# Patient Record
Sex: MALE | Race: ASIAN | NOT HISPANIC OR LATINO | Employment: OTHER
[De-identification: names, ages, dates, MRNs, and addresses within clinical notes are randomized per-mention and may not be internally consistent; named-entity substitution may affect disease eponyms.]

---

## 2017-01-03 ENCOUNTER — RECORDS - HEALTHEAST (OUTPATIENT)
Dept: ADMINISTRATIVE | Facility: OTHER | Age: 27
End: 2017-01-03

## 2017-01-05 ENCOUNTER — RECORDS - HEALTHEAST (OUTPATIENT)
Dept: ADMINISTRATIVE | Facility: OTHER | Age: 27
End: 2017-01-05

## 2017-01-06 ENCOUNTER — RECORDS - HEALTHEAST (OUTPATIENT)
Dept: ADMINISTRATIVE | Facility: OTHER | Age: 27
End: 2017-01-06

## 2017-01-08 ENCOUNTER — RECORDS - HEALTHEAST (OUTPATIENT)
Dept: ADMINISTRATIVE | Facility: OTHER | Age: 27
End: 2017-01-08

## 2017-01-10 ENCOUNTER — RECORDS - HEALTHEAST (OUTPATIENT)
Dept: ADMINISTRATIVE | Facility: OTHER | Age: 27
End: 2017-01-10

## 2017-01-11 ENCOUNTER — RECORDS - HEALTHEAST (OUTPATIENT)
Dept: ADMINISTRATIVE | Facility: OTHER | Age: 27
End: 2017-01-11

## 2017-01-12 ENCOUNTER — RECORDS - HEALTHEAST (OUTPATIENT)
Dept: ADMINISTRATIVE | Facility: OTHER | Age: 27
End: 2017-01-12

## 2017-03-16 ENCOUNTER — RECORDS - HEALTHEAST (OUTPATIENT)
Dept: ADMINISTRATIVE | Facility: OTHER | Age: 27
End: 2017-03-16

## 2017-03-30 ENCOUNTER — RECORDS - HEALTHEAST (OUTPATIENT)
Dept: ADMINISTRATIVE | Facility: OTHER | Age: 27
End: 2017-03-30

## 2017-04-11 ENCOUNTER — RECORDS - HEALTHEAST (OUTPATIENT)
Dept: ADMINISTRATIVE | Facility: OTHER | Age: 27
End: 2017-04-11

## 2017-04-12 ENCOUNTER — RECORDS - HEALTHEAST (OUTPATIENT)
Dept: ADMINISTRATIVE | Facility: OTHER | Age: 27
End: 2017-04-12

## 2017-04-13 ENCOUNTER — RECORDS - HEALTHEAST (OUTPATIENT)
Dept: ADMINISTRATIVE | Facility: OTHER | Age: 27
End: 2017-04-13

## 2017-04-17 ENCOUNTER — RECORDS - HEALTHEAST (OUTPATIENT)
Dept: ADMINISTRATIVE | Facility: OTHER | Age: 27
End: 2017-04-17

## 2017-04-19 ENCOUNTER — RECORDS - HEALTHEAST (OUTPATIENT)
Dept: ADMINISTRATIVE | Facility: OTHER | Age: 27
End: 2017-04-19

## 2017-04-27 ENCOUNTER — RECORDS - HEALTHEAST (OUTPATIENT)
Dept: ADMINISTRATIVE | Facility: OTHER | Age: 27
End: 2017-04-27

## 2017-08-09 ENCOUNTER — COMMUNICATION - HEALTHEAST (OUTPATIENT)
Dept: FAMILY MEDICINE | Facility: CLINIC | Age: 27
End: 2017-08-09

## 2017-08-17 ENCOUNTER — OFFICE VISIT - HEALTHEAST (OUTPATIENT)
Dept: FAMILY MEDICINE | Facility: CLINIC | Age: 27
End: 2017-08-17

## 2017-08-17 ENCOUNTER — COMMUNICATION - HEALTHEAST (OUTPATIENT)
Dept: NURSING | Facility: CLINIC | Age: 27
End: 2017-08-17

## 2017-08-17 ENCOUNTER — RECORDS - HEALTHEAST (OUTPATIENT)
Dept: ADMINISTRATIVE | Facility: OTHER | Age: 27
End: 2017-08-17

## 2017-08-17 DIAGNOSIS — R06.2 WHEEZING: ICD-10-CM

## 2017-08-17 DIAGNOSIS — D35.2 PITUITARY MICROADENOMA (H): ICD-10-CM

## 2017-08-17 DIAGNOSIS — E03.9 HYPOTHYROIDISM: ICD-10-CM

## 2017-08-17 DIAGNOSIS — R44.0 AUDITORY HALLUCINATION: ICD-10-CM

## 2017-08-17 DIAGNOSIS — E27.40 ADRENAL INSUFFICIENCY (H): ICD-10-CM

## 2017-08-17 ASSESSMENT — MIFFLIN-ST. JEOR: SCORE: 1373.93

## 2017-08-24 ENCOUNTER — AMBULATORY - HEALTHEAST (OUTPATIENT)
Dept: CARE COORDINATION | Facility: CLINIC | Age: 27
End: 2017-08-24

## 2017-08-25 ENCOUNTER — COMMUNICATION - HEALTHEAST (OUTPATIENT)
Dept: NURSING | Facility: CLINIC | Age: 27
End: 2017-08-25

## 2017-08-25 ENCOUNTER — AMBULATORY - HEALTHEAST (OUTPATIENT)
Dept: CARE COORDINATION | Facility: CLINIC | Age: 27
End: 2017-08-25

## 2017-08-25 ENCOUNTER — OFFICE VISIT - HEALTHEAST (OUTPATIENT)
Dept: FAMILY MEDICINE | Facility: CLINIC | Age: 27
End: 2017-08-25

## 2017-08-25 DIAGNOSIS — E27.40 ADRENAL INSUFFICIENCY (H): ICD-10-CM

## 2017-08-25 DIAGNOSIS — Z71.89 COUNSELING AND COORDINATION OF CARE: ICD-10-CM

## 2017-08-25 DIAGNOSIS — F20.9 SCHIZOPHRENIA (H): ICD-10-CM

## 2017-08-28 ENCOUNTER — AMBULATORY - HEALTHEAST (OUTPATIENT)
Dept: CARE COORDINATION | Facility: CLINIC | Age: 27
End: 2017-08-28

## 2017-08-28 ENCOUNTER — HOME CARE/HOSPICE - HEALTHEAST (OUTPATIENT)
Dept: HOME HEALTH SERVICES | Facility: HOME HEALTH | Age: 27
End: 2017-08-28

## 2017-08-28 ENCOUNTER — COMMUNICATION - HEALTHEAST (OUTPATIENT)
Dept: HOME HEALTH SERVICES | Facility: HOME HEALTH | Age: 27
End: 2017-08-28

## 2017-08-28 ENCOUNTER — RECORDS - HEALTHEAST (OUTPATIENT)
Dept: ADMINISTRATIVE | Facility: OTHER | Age: 27
End: 2017-08-28

## 2017-08-28 ENCOUNTER — COMMUNICATION - HEALTHEAST (OUTPATIENT)
Dept: CARE COORDINATION | Facility: CLINIC | Age: 27
End: 2017-08-28

## 2017-08-28 DIAGNOSIS — Z91.148 NON COMPLIANCE W MEDICATION REGIMEN: ICD-10-CM

## 2017-08-28 DIAGNOSIS — Z91.148 H/O MEDICATION NONCOMPLIANCE: ICD-10-CM

## 2017-08-30 ENCOUNTER — COMMUNICATION - HEALTHEAST (OUTPATIENT)
Dept: NURSING | Facility: CLINIC | Age: 27
End: 2017-08-30

## 2017-09-06 ENCOUNTER — COMMUNICATION - HEALTHEAST (OUTPATIENT)
Dept: NURSING | Facility: CLINIC | Age: 27
End: 2017-09-06

## 2017-09-07 ENCOUNTER — AMBULATORY - HEALTHEAST (OUTPATIENT)
Dept: CARE COORDINATION | Facility: CLINIC | Age: 27
End: 2017-09-07

## 2017-09-07 ENCOUNTER — COMMUNICATION - HEALTHEAST (OUTPATIENT)
Dept: NURSING | Facility: CLINIC | Age: 27
End: 2017-09-07

## 2017-09-07 ENCOUNTER — COMMUNICATION - HEALTHEAST (OUTPATIENT)
Dept: CARE COORDINATION | Facility: CLINIC | Age: 27
End: 2017-09-07

## 2017-09-12 ENCOUNTER — COMMUNICATION - HEALTHEAST (OUTPATIENT)
Dept: CARE COORDINATION | Facility: CLINIC | Age: 27
End: 2017-09-12

## 2017-09-12 ENCOUNTER — AMBULATORY - HEALTHEAST (OUTPATIENT)
Dept: CARE COORDINATION | Facility: CLINIC | Age: 27
End: 2017-09-12

## 2017-09-15 ENCOUNTER — AMBULATORY - HEALTHEAST (OUTPATIENT)
Dept: CARE COORDINATION | Facility: CLINIC | Age: 27
End: 2017-09-15

## 2017-09-19 ENCOUNTER — COMMUNICATION - HEALTHEAST (OUTPATIENT)
Dept: CARE COORDINATION | Facility: CLINIC | Age: 27
End: 2017-09-19

## 2017-09-20 ENCOUNTER — COMMUNICATION - HEALTHEAST (OUTPATIENT)
Dept: NURSING | Facility: CLINIC | Age: 27
End: 2017-09-20

## 2017-09-20 DIAGNOSIS — Z71.89 COUNSELING AND COORDINATION OF CARE: ICD-10-CM

## 2017-09-21 ENCOUNTER — COMMUNICATION - HEALTHEAST (OUTPATIENT)
Dept: FAMILY MEDICINE | Facility: CLINIC | Age: 27
End: 2017-09-21

## 2017-09-21 ENCOUNTER — COMMUNICATION - HEALTHEAST (OUTPATIENT)
Dept: NURSING | Facility: CLINIC | Age: 27
End: 2017-09-21

## 2017-09-21 DIAGNOSIS — E27.40 ADRENAL INSUFFICIENCY (H): ICD-10-CM

## 2017-09-22 ENCOUNTER — COMMUNICATION - HEALTHEAST (OUTPATIENT)
Dept: NURSING | Facility: CLINIC | Age: 27
End: 2017-09-22

## 2017-09-26 ENCOUNTER — COMMUNICATION - HEALTHEAST (OUTPATIENT)
Dept: NURSING | Facility: CLINIC | Age: 27
End: 2017-09-26

## 2017-09-26 ENCOUNTER — OFFICE VISIT - HEALTHEAST (OUTPATIENT)
Dept: FAMILY MEDICINE | Facility: CLINIC | Age: 27
End: 2017-09-26

## 2017-09-26 DIAGNOSIS — E27.40 ADRENAL INSUFFICIENCY (H): ICD-10-CM

## 2017-09-26 DIAGNOSIS — R09.81 NASAL CONGESTION: ICD-10-CM

## 2017-09-26 DIAGNOSIS — R06.2 WHEEZING: ICD-10-CM

## 2017-09-26 DIAGNOSIS — E03.9 HYPOTHYROIDISM: ICD-10-CM

## 2017-09-26 DIAGNOSIS — R44.0 AUDITORY HALLUCINATION: ICD-10-CM

## 2017-09-26 DIAGNOSIS — F20.9 SCHIZOPHRENIA (H): ICD-10-CM

## 2017-09-26 DIAGNOSIS — Z71.89 COUNSELING AND COORDINATION OF CARE: ICD-10-CM

## 2017-09-26 DIAGNOSIS — Z23 NEED FOR INFLUENZA VACCINATION: ICD-10-CM

## 2017-09-27 ENCOUNTER — COMMUNICATION - HEALTHEAST (OUTPATIENT)
Dept: FAMILY MEDICINE | Facility: CLINIC | Age: 27
End: 2017-09-27

## 2017-09-27 DIAGNOSIS — R44.0 AUDITORY HALLUCINATION: ICD-10-CM

## 2017-09-28 ENCOUNTER — RECORDS - HEALTHEAST (OUTPATIENT)
Dept: ADMINISTRATIVE | Facility: OTHER | Age: 27
End: 2017-09-28

## 2017-10-04 ENCOUNTER — AMBULATORY - HEALTHEAST (OUTPATIENT)
Dept: CARE COORDINATION | Facility: CLINIC | Age: 27
End: 2017-10-04

## 2017-10-04 ENCOUNTER — COMMUNICATION - HEALTHEAST (OUTPATIENT)
Dept: CARE COORDINATION | Facility: CLINIC | Age: 27
End: 2017-10-04

## 2017-10-09 ENCOUNTER — COMMUNICATION - HEALTHEAST (OUTPATIENT)
Dept: NURSING | Facility: CLINIC | Age: 27
End: 2017-10-09

## 2017-10-10 ENCOUNTER — COMMUNICATION - HEALTHEAST (OUTPATIENT)
Dept: FAMILY MEDICINE | Facility: CLINIC | Age: 27
End: 2017-10-10

## 2017-10-10 DIAGNOSIS — R44.0 AUDITORY HALLUCINATION: ICD-10-CM

## 2017-10-13 ENCOUNTER — COMMUNICATION - HEALTHEAST (OUTPATIENT)
Dept: NURSING | Facility: CLINIC | Age: 27
End: 2017-10-13

## 2017-10-17 ENCOUNTER — COMMUNICATION - HEALTHEAST (OUTPATIENT)
Dept: FAMILY MEDICINE | Facility: CLINIC | Age: 27
End: 2017-10-17

## 2017-10-17 DIAGNOSIS — R44.0 AUDITORY HALLUCINATION: ICD-10-CM

## 2017-10-24 ENCOUNTER — OFFICE VISIT - HEALTHEAST (OUTPATIENT)
Dept: BEHAVIORAL HEALTH | Facility: CLINIC | Age: 27
End: 2017-10-24

## 2017-10-24 ENCOUNTER — COMMUNICATION - HEALTHEAST (OUTPATIENT)
Dept: NURSING | Facility: CLINIC | Age: 27
End: 2017-10-24

## 2017-10-24 DIAGNOSIS — F20.9 SCHIZOPHRENIA, UNSPECIFIED TYPE (H): ICD-10-CM

## 2017-10-25 ENCOUNTER — AMBULATORY - HEALTHEAST (OUTPATIENT)
Dept: CARE COORDINATION | Facility: CLINIC | Age: 27
End: 2017-10-25

## 2017-10-31 ENCOUNTER — COMMUNICATION - HEALTHEAST (OUTPATIENT)
Dept: NURSING | Facility: CLINIC | Age: 27
End: 2017-10-31

## 2017-11-01 ENCOUNTER — AMBULATORY - HEALTHEAST (OUTPATIENT)
Dept: CARE COORDINATION | Facility: CLINIC | Age: 27
End: 2017-11-01

## 2017-11-01 ENCOUNTER — COMMUNICATION - HEALTHEAST (OUTPATIENT)
Dept: CARE COORDINATION | Facility: CLINIC | Age: 27
End: 2017-11-01

## 2017-11-03 ENCOUNTER — COMMUNICATION - HEALTHEAST (OUTPATIENT)
Dept: NURSING | Facility: CLINIC | Age: 27
End: 2017-11-03

## 2017-11-06 ENCOUNTER — OFFICE VISIT - HEALTHEAST (OUTPATIENT)
Dept: BEHAVIORAL HEALTH | Facility: CLINIC | Age: 27
End: 2017-11-06

## 2017-11-06 DIAGNOSIS — R44.0 AUDITORY HALLUCINATION: ICD-10-CM

## 2017-11-06 DIAGNOSIS — F20.9 SCHIZOPHRENIA, UNSPECIFIED TYPE (H): ICD-10-CM

## 2017-11-06 DIAGNOSIS — F51.5 NIGHTMARES: ICD-10-CM

## 2017-11-06 ASSESSMENT — MIFFLIN-ST. JEOR: SCORE: 1442.95

## 2017-11-07 ENCOUNTER — RECORDS - HEALTHEAST (OUTPATIENT)
Dept: ADMINISTRATIVE | Facility: OTHER | Age: 27
End: 2017-11-07

## 2017-11-07 ENCOUNTER — COMMUNICATION - HEALTHEAST (OUTPATIENT)
Dept: FAMILY MEDICINE | Facility: CLINIC | Age: 27
End: 2017-11-07

## 2017-11-13 ENCOUNTER — COMMUNICATION - HEALTHEAST (OUTPATIENT)
Dept: NURSING | Facility: CLINIC | Age: 27
End: 2017-11-13

## 2017-11-14 ENCOUNTER — COMMUNICATION - HEALTHEAST (OUTPATIENT)
Dept: FAMILY MEDICINE | Facility: CLINIC | Age: 27
End: 2017-11-14

## 2017-11-21 ENCOUNTER — COMMUNICATION - HEALTHEAST (OUTPATIENT)
Dept: FAMILY MEDICINE | Facility: CLINIC | Age: 27
End: 2017-11-21

## 2017-11-22 ENCOUNTER — RECORDS - HEALTHEAST (OUTPATIENT)
Dept: ADMINISTRATIVE | Facility: OTHER | Age: 27
End: 2017-11-22

## 2017-11-28 ENCOUNTER — OFFICE VISIT - HEALTHEAST (OUTPATIENT)
Dept: BEHAVIORAL HEALTH | Facility: CLINIC | Age: 27
End: 2017-11-28

## 2017-11-28 DIAGNOSIS — F20.9 SCHIZOPHRENIA, UNSPECIFIED TYPE (H): ICD-10-CM

## 2017-12-04 ENCOUNTER — COMMUNICATION - HEALTHEAST (OUTPATIENT)
Dept: NURSING | Facility: CLINIC | Age: 27
End: 2017-12-04

## 2017-12-12 ENCOUNTER — COMMUNICATION - HEALTHEAST (OUTPATIENT)
Dept: FAMILY MEDICINE | Facility: CLINIC | Age: 27
End: 2017-12-12

## 2017-12-14 ENCOUNTER — OFFICE VISIT - HEALTHEAST (OUTPATIENT)
Dept: BEHAVIORAL HEALTH | Facility: CLINIC | Age: 27
End: 2017-12-14

## 2017-12-14 ENCOUNTER — COMMUNICATION - HEALTHEAST (OUTPATIENT)
Dept: FAMILY MEDICINE | Facility: CLINIC | Age: 27
End: 2017-12-14

## 2017-12-14 ENCOUNTER — COMMUNICATION - HEALTHEAST (OUTPATIENT)
Dept: NURSING | Facility: CLINIC | Age: 27
End: 2017-12-14

## 2017-12-14 DIAGNOSIS — F20.9 SCHIZOPHRENIA, UNSPECIFIED TYPE (H): ICD-10-CM

## 2017-12-18 ENCOUNTER — COMMUNICATION - HEALTHEAST (OUTPATIENT)
Dept: FAMILY MEDICINE | Facility: CLINIC | Age: 27
End: 2017-12-18

## 2017-12-19 ENCOUNTER — OFFICE VISIT - HEALTHEAST (OUTPATIENT)
Dept: ENDOCRINOLOGY | Facility: CLINIC | Age: 27
End: 2017-12-19

## 2017-12-19 DIAGNOSIS — E03.9 HYPOTHYROIDISM: ICD-10-CM

## 2017-12-19 ASSESSMENT — MIFFLIN-ST. JEOR: SCORE: 1438.46

## 2017-12-29 ENCOUNTER — COMMUNICATION - HEALTHEAST (OUTPATIENT)
Dept: NURSING | Facility: CLINIC | Age: 27
End: 2017-12-29

## 2017-12-29 ENCOUNTER — AMBULATORY - HEALTHEAST (OUTPATIENT)
Dept: CARE COORDINATION | Facility: CLINIC | Age: 27
End: 2017-12-29

## 2017-12-29 ENCOUNTER — COMMUNICATION - HEALTHEAST (OUTPATIENT)
Dept: FAMILY MEDICINE | Facility: CLINIC | Age: 27
End: 2017-12-29

## 2018-01-04 ENCOUNTER — COMMUNICATION - HEALTHEAST (OUTPATIENT)
Dept: FAMILY MEDICINE | Facility: CLINIC | Age: 28
End: 2018-01-04

## 2018-01-04 ENCOUNTER — AMBULATORY - HEALTHEAST (OUTPATIENT)
Dept: BEHAVIORAL HEALTH | Facility: CLINIC | Age: 28
End: 2018-01-04

## 2018-01-04 ENCOUNTER — OFFICE VISIT - HEALTHEAST (OUTPATIENT)
Dept: BEHAVIORAL HEALTH | Facility: CLINIC | Age: 28
End: 2018-01-04

## 2018-01-04 DIAGNOSIS — F20.9 SCHIZOPHRENIA, UNSPECIFIED TYPE (H): ICD-10-CM

## 2018-01-05 ENCOUNTER — COMMUNICATION - HEALTHEAST (OUTPATIENT)
Dept: CARE COORDINATION | Facility: CLINIC | Age: 28
End: 2018-01-05

## 2018-01-05 ENCOUNTER — OFFICE VISIT - HEALTHEAST (OUTPATIENT)
Dept: FAMILY MEDICINE | Facility: CLINIC | Age: 28
End: 2018-01-05

## 2018-01-05 DIAGNOSIS — R50.9 FEVER: ICD-10-CM

## 2018-01-05 DIAGNOSIS — E27.40 ADRENAL INSUFFICIENCY (H): ICD-10-CM

## 2018-01-05 DIAGNOSIS — R05.9 COUGH: ICD-10-CM

## 2018-01-05 LAB
FLUAV AG SPEC QL IA: NORMAL
FLUBV AG SPEC QL IA: NORMAL

## 2018-01-08 ENCOUNTER — AMBULATORY - HEALTHEAST (OUTPATIENT)
Dept: CARE COORDINATION | Facility: CLINIC | Age: 28
End: 2018-01-08

## 2018-01-08 ENCOUNTER — COMMUNICATION - HEALTHEAST (OUTPATIENT)
Dept: FAMILY MEDICINE | Facility: CLINIC | Age: 28
End: 2018-01-08

## 2018-01-08 ENCOUNTER — COMMUNICATION - HEALTHEAST (OUTPATIENT)
Dept: NURSING | Facility: CLINIC | Age: 28
End: 2018-01-08

## 2018-01-08 DIAGNOSIS — E27.40 ADRENAL INSUFFICIENCY (H): ICD-10-CM

## 2018-01-17 ENCOUNTER — COMMUNICATION - HEALTHEAST (OUTPATIENT)
Dept: BEHAVIORAL HEALTH | Facility: CLINIC | Age: 28
End: 2018-01-17

## 2018-01-23 ENCOUNTER — COMMUNICATION - HEALTHEAST (OUTPATIENT)
Dept: NURSING | Facility: CLINIC | Age: 28
End: 2018-01-23

## 2018-01-23 ENCOUNTER — RECORDS - HEALTHEAST (OUTPATIENT)
Dept: ADMINISTRATIVE | Facility: OTHER | Age: 28
End: 2018-01-23

## 2018-01-23 ENCOUNTER — COMMUNICATION - HEALTHEAST (OUTPATIENT)
Dept: FAMILY MEDICINE | Facility: CLINIC | Age: 28
End: 2018-01-23

## 2018-01-23 ENCOUNTER — COMMUNICATION - HEALTHEAST (OUTPATIENT)
Dept: CARE COORDINATION | Facility: CLINIC | Age: 28
End: 2018-01-23

## 2018-01-23 ENCOUNTER — OFFICE VISIT - HEALTHEAST (OUTPATIENT)
Dept: BEHAVIORAL HEALTH | Facility: CLINIC | Age: 28
End: 2018-01-23

## 2018-01-23 DIAGNOSIS — F20.9 SCHIZOPHRENIA, UNSPECIFIED TYPE (H): ICD-10-CM

## 2018-01-23 DIAGNOSIS — Z71.89 COUNSELING AND COORDINATION OF CARE: ICD-10-CM

## 2018-01-24 ENCOUNTER — OFFICE VISIT - HEALTHEAST (OUTPATIENT)
Dept: FAMILY MEDICINE | Facility: CLINIC | Age: 28
End: 2018-01-24

## 2018-01-24 ENCOUNTER — RECORDS - HEALTHEAST (OUTPATIENT)
Dept: GENERAL RADIOLOGY | Facility: CLINIC | Age: 28
End: 2018-01-24

## 2018-01-24 DIAGNOSIS — E27.40 ADRENAL INSUFFICIENCY (H): ICD-10-CM

## 2018-01-24 DIAGNOSIS — R05.9 COUGH: ICD-10-CM

## 2018-01-24 DIAGNOSIS — R09.81 NASAL CONGESTION: ICD-10-CM

## 2018-01-25 ENCOUNTER — AMBULATORY - HEALTHEAST (OUTPATIENT)
Dept: CARE COORDINATION | Facility: CLINIC | Age: 28
End: 2018-01-25

## 2018-01-25 ENCOUNTER — COMMUNICATION - HEALTHEAST (OUTPATIENT)
Dept: NURSING | Facility: CLINIC | Age: 28
End: 2018-01-25

## 2018-01-29 ENCOUNTER — COMMUNICATION - HEALTHEAST (OUTPATIENT)
Dept: FAMILY MEDICINE | Facility: CLINIC | Age: 28
End: 2018-01-29

## 2018-01-29 ENCOUNTER — AMBULATORY - HEALTHEAST (OUTPATIENT)
Dept: CARE COORDINATION | Facility: CLINIC | Age: 28
End: 2018-01-29

## 2018-01-29 DIAGNOSIS — Z71.89 COUNSELING AND COORDINATION OF CARE: ICD-10-CM

## 2018-01-31 ENCOUNTER — COMMUNICATION - HEALTHEAST (OUTPATIENT)
Dept: FAMILY MEDICINE | Facility: CLINIC | Age: 28
End: 2018-01-31

## 2018-01-31 DIAGNOSIS — E27.40 ADRENAL INSUFFICIENCY (H): ICD-10-CM

## 2018-02-06 ENCOUNTER — OFFICE VISIT - HEALTHEAST (OUTPATIENT)
Dept: FAMILY MEDICINE | Facility: CLINIC | Age: 28
End: 2018-02-06

## 2018-02-06 DIAGNOSIS — E03.9 HYPOTHYROIDISM: ICD-10-CM

## 2018-02-06 DIAGNOSIS — E27.40 ADRENAL INSUFFICIENCY (H): ICD-10-CM

## 2018-02-06 DIAGNOSIS — J01.00 ACUTE NON-RECURRENT MAXILLARY SINUSITIS: ICD-10-CM

## 2018-02-06 DIAGNOSIS — R09.81 NASAL CONGESTION: ICD-10-CM

## 2018-02-06 LAB
ANION GAP SERPL CALCULATED.3IONS-SCNC: 10 MMOL/L (ref 5–18)
BUN SERPL-MCNC: 11 MG/DL (ref 8–22)
CALCIUM SERPL-MCNC: 9.1 MG/DL (ref 8.5–10.5)
CHLORIDE BLD-SCNC: 108 MMOL/L (ref 98–107)
CO2 SERPL-SCNC: 25 MMOL/L (ref 22–31)
CREAT SERPL-MCNC: 0.77 MG/DL (ref 0.7–1.3)
ERYTHROCYTE [DISTWIDTH] IN BLOOD BY AUTOMATED COUNT: 12.1 % (ref 11–14.5)
GFR SERPL CREATININE-BSD FRML MDRD: >60 ML/MIN/1.73M2
GLUCOSE BLD-MCNC: 84 MG/DL (ref 70–125)
HCT VFR BLD AUTO: 46.6 % (ref 40–54)
HGB BLD-MCNC: 15.1 G/DL (ref 14–18)
MCH RBC QN AUTO: 28.8 PG (ref 27–34)
MCHC RBC AUTO-ENTMCNC: 32.3 G/DL (ref 32–36)
MCV RBC AUTO: 89 FL (ref 80–100)
PLATELET # BLD AUTO: 123 THOU/UL (ref 140–440)
PMV BLD AUTO: 9 FL (ref 7–10)
POTASSIUM BLD-SCNC: 4 MMOL/L (ref 3.5–5)
RBC # BLD AUTO: 5.24 MILL/UL (ref 4.4–6.2)
SODIUM SERPL-SCNC: 143 MMOL/L (ref 136–145)
T4 FREE SERPL-MCNC: 1.2 NG/DL (ref 0.7–1.8)
TSH SERPL DL<=0.005 MIU/L-ACNC: 0.85 UIU/ML (ref 0.3–5)
WBC: 4.4 THOU/UL (ref 4–11)

## 2018-02-13 ENCOUNTER — COMMUNICATION - HEALTHEAST (OUTPATIENT)
Dept: FAMILY MEDICINE | Facility: CLINIC | Age: 28
End: 2018-02-13

## 2018-02-13 ENCOUNTER — OFFICE VISIT - HEALTHEAST (OUTPATIENT)
Dept: BEHAVIORAL HEALTH | Facility: CLINIC | Age: 28
End: 2018-02-13

## 2018-02-13 ENCOUNTER — COMMUNICATION - HEALTHEAST (OUTPATIENT)
Dept: NURSING | Facility: CLINIC | Age: 28
End: 2018-02-13

## 2018-02-13 ENCOUNTER — AMBULATORY - HEALTHEAST (OUTPATIENT)
Dept: CARE COORDINATION | Facility: CLINIC | Age: 28
End: 2018-02-13

## 2018-02-13 DIAGNOSIS — F20.9 SCHIZOPHRENIA, UNSPECIFIED TYPE (H): ICD-10-CM

## 2018-02-15 ENCOUNTER — RECORDS - HEALTHEAST (OUTPATIENT)
Dept: ADMINISTRATIVE | Facility: OTHER | Age: 28
End: 2018-02-15

## 2018-02-16 ENCOUNTER — AMBULATORY - HEALTHEAST (OUTPATIENT)
Dept: CARE COORDINATION | Facility: CLINIC | Age: 28
End: 2018-02-16

## 2018-02-28 ENCOUNTER — COMMUNICATION - HEALTHEAST (OUTPATIENT)
Dept: FAMILY MEDICINE | Facility: CLINIC | Age: 28
End: 2018-02-28

## 2018-03-07 ENCOUNTER — OFFICE VISIT - HEALTHEAST (OUTPATIENT)
Dept: BEHAVIORAL HEALTH | Facility: CLINIC | Age: 28
End: 2018-03-07

## 2018-03-07 ENCOUNTER — OFFICE VISIT - HEALTHEAST (OUTPATIENT)
Dept: FAMILY MEDICINE | Facility: CLINIC | Age: 28
End: 2018-03-07

## 2018-03-07 ENCOUNTER — COMMUNICATION - HEALTHEAST (OUTPATIENT)
Dept: FAMILY MEDICINE | Facility: CLINIC | Age: 28
End: 2018-03-07

## 2018-03-07 DIAGNOSIS — J40 BRONCHITIS: ICD-10-CM

## 2018-03-07 DIAGNOSIS — F20.9 SCHIZOPHRENIA, UNSPECIFIED TYPE (H): ICD-10-CM

## 2018-03-07 DIAGNOSIS — Z71.85 IMMUNIZATION COUNSELING: ICD-10-CM

## 2018-03-07 DIAGNOSIS — R06.2 WHEEZING: ICD-10-CM

## 2018-03-12 ENCOUNTER — COMMUNICATION - HEALTHEAST (OUTPATIENT)
Dept: CARE COORDINATION | Facility: CLINIC | Age: 28
End: 2018-03-12

## 2018-03-19 ENCOUNTER — COMMUNICATION - HEALTHEAST (OUTPATIENT)
Dept: NURSING | Facility: CLINIC | Age: 28
End: 2018-03-19

## 2018-03-27 ENCOUNTER — COMMUNICATION - HEALTHEAST (OUTPATIENT)
Dept: NURSING | Facility: CLINIC | Age: 28
End: 2018-03-27

## 2018-03-27 ENCOUNTER — OFFICE VISIT - HEALTHEAST (OUTPATIENT)
Dept: FAMILY MEDICINE | Facility: CLINIC | Age: 28
End: 2018-03-27

## 2018-03-27 DIAGNOSIS — R09.81 NASAL CONGESTION: ICD-10-CM

## 2018-03-27 DIAGNOSIS — R06.2 WHEEZING: ICD-10-CM

## 2018-03-27 DIAGNOSIS — J45.40 MODERATE PERSISTENT ASTHMA: ICD-10-CM

## 2018-03-28 ENCOUNTER — COMMUNICATION - HEALTHEAST (OUTPATIENT)
Dept: FAMILY MEDICINE | Facility: CLINIC | Age: 28
End: 2018-03-28

## 2018-03-28 DIAGNOSIS — J45.40 MODERATE PERSISTENT ASTHMA: ICD-10-CM

## 2018-03-29 ENCOUNTER — COMMUNICATION - HEALTHEAST (OUTPATIENT)
Dept: NURSING | Facility: CLINIC | Age: 28
End: 2018-03-29

## 2018-04-05 ENCOUNTER — COMMUNICATION - HEALTHEAST (OUTPATIENT)
Dept: NURSING | Facility: CLINIC | Age: 28
End: 2018-04-05

## 2018-04-05 ENCOUNTER — OFFICE VISIT - HEALTHEAST (OUTPATIENT)
Dept: BEHAVIORAL HEALTH | Facility: CLINIC | Age: 28
End: 2018-04-05

## 2018-04-05 ENCOUNTER — COMMUNICATION - HEALTHEAST (OUTPATIENT)
Dept: FAMILY MEDICINE | Facility: CLINIC | Age: 28
End: 2018-04-05

## 2018-04-05 DIAGNOSIS — F20.9 SCHIZOPHRENIA, UNSPECIFIED TYPE (H): ICD-10-CM

## 2018-04-13 ENCOUNTER — COMMUNICATION - HEALTHEAST (OUTPATIENT)
Dept: NURSING | Facility: CLINIC | Age: 28
End: 2018-04-13

## 2018-04-18 ENCOUNTER — AMBULATORY - HEALTHEAST (OUTPATIENT)
Dept: NURSING | Facility: CLINIC | Age: 28
End: 2018-04-18

## 2018-04-26 ENCOUNTER — COMMUNICATION - HEALTHEAST (OUTPATIENT)
Dept: FAMILY MEDICINE | Facility: CLINIC | Age: 28
End: 2018-04-26

## 2018-04-26 ENCOUNTER — OFFICE VISIT - HEALTHEAST (OUTPATIENT)
Dept: BEHAVIORAL HEALTH | Facility: CLINIC | Age: 28
End: 2018-04-26

## 2018-04-26 DIAGNOSIS — F20.9 SCHIZOPHRENIA, UNSPECIFIED TYPE (H): ICD-10-CM

## 2018-04-26 DIAGNOSIS — J45.40 MODERATE PERSISTENT ASTHMA: ICD-10-CM

## 2018-05-02 ENCOUNTER — COMMUNICATION - HEALTHEAST (OUTPATIENT)
Dept: NURSING | Facility: CLINIC | Age: 28
End: 2018-05-02

## 2018-05-03 ENCOUNTER — COMMUNICATION - HEALTHEAST (OUTPATIENT)
Dept: NURSING | Facility: CLINIC | Age: 28
End: 2018-05-03

## 2018-05-04 ENCOUNTER — AMBULATORY - HEALTHEAST (OUTPATIENT)
Dept: NURSING | Facility: CLINIC | Age: 28
End: 2018-05-04

## 2018-05-08 ENCOUNTER — RECORDS - HEALTHEAST (OUTPATIENT)
Dept: ADMINISTRATIVE | Facility: OTHER | Age: 28
End: 2018-05-08

## 2018-05-17 ENCOUNTER — COMMUNICATION - HEALTHEAST (OUTPATIENT)
Dept: FAMILY MEDICINE | Facility: CLINIC | Age: 28
End: 2018-05-17

## 2018-05-17 ENCOUNTER — OFFICE VISIT - HEALTHEAST (OUTPATIENT)
Dept: FAMILY MEDICINE | Facility: CLINIC | Age: 28
End: 2018-05-17

## 2018-05-17 ENCOUNTER — AMBULATORY - HEALTHEAST (OUTPATIENT)
Dept: BEHAVIORAL HEALTH | Facility: CLINIC | Age: 28
End: 2018-05-17

## 2018-05-17 ENCOUNTER — OFFICE VISIT - HEALTHEAST (OUTPATIENT)
Dept: BEHAVIORAL HEALTH | Facility: CLINIC | Age: 28
End: 2018-05-17

## 2018-05-17 DIAGNOSIS — J30.9 ALLERGIC RHINITIS: ICD-10-CM

## 2018-05-17 DIAGNOSIS — R09.81 NASAL CONGESTION: ICD-10-CM

## 2018-05-17 DIAGNOSIS — F20.9 SCHIZOPHRENIA, UNSPECIFIED TYPE (H): ICD-10-CM

## 2018-05-24 ENCOUNTER — COMMUNICATION - HEALTHEAST (OUTPATIENT)
Dept: FAMILY MEDICINE | Facility: CLINIC | Age: 28
End: 2018-05-24

## 2018-05-24 DIAGNOSIS — E27.40 ADRENAL INSUFFICIENCY (H): ICD-10-CM

## 2018-06-01 ENCOUNTER — COMMUNICATION - HEALTHEAST (OUTPATIENT)
Dept: LAB | Facility: CLINIC | Age: 28
End: 2018-06-01

## 2018-06-01 DIAGNOSIS — E03.9 HYPOTHYROIDISM: ICD-10-CM

## 2018-06-05 ENCOUNTER — AMBULATORY - HEALTHEAST (OUTPATIENT)
Dept: NURSING | Facility: CLINIC | Age: 28
End: 2018-06-05

## 2018-06-06 ENCOUNTER — COMMUNICATION - HEALTHEAST (OUTPATIENT)
Dept: ENDOCRINOLOGY | Facility: CLINIC | Age: 28
End: 2018-06-06

## 2018-06-06 DIAGNOSIS — E03.9 HYPOTHYROIDISM: ICD-10-CM

## 2018-06-07 ENCOUNTER — COMMUNICATION - HEALTHEAST (OUTPATIENT)
Dept: NURSING | Facility: CLINIC | Age: 28
End: 2018-06-07

## 2018-06-07 ENCOUNTER — OFFICE VISIT - HEALTHEAST (OUTPATIENT)
Dept: BEHAVIORAL HEALTH | Facility: CLINIC | Age: 28
End: 2018-06-07

## 2018-06-07 ENCOUNTER — COMMUNICATION - HEALTHEAST (OUTPATIENT)
Dept: FAMILY MEDICINE | Facility: CLINIC | Age: 28
End: 2018-06-07

## 2018-06-07 DIAGNOSIS — F20.9 SCHIZOPHRENIA, UNSPECIFIED TYPE (H): ICD-10-CM

## 2018-06-20 ENCOUNTER — COMMUNICATION - HEALTHEAST (OUTPATIENT)
Dept: FAMILY MEDICINE | Facility: CLINIC | Age: 28
End: 2018-06-20

## 2018-06-20 DIAGNOSIS — E27.40 ADRENAL INSUFFICIENCY (H): ICD-10-CM

## 2018-06-28 ENCOUNTER — COMMUNICATION - HEALTHEAST (OUTPATIENT)
Dept: FAMILY MEDICINE | Facility: CLINIC | Age: 28
End: 2018-06-28

## 2018-06-28 ENCOUNTER — OFFICE VISIT - HEALTHEAST (OUTPATIENT)
Dept: BEHAVIORAL HEALTH | Facility: CLINIC | Age: 28
End: 2018-06-28

## 2018-06-28 DIAGNOSIS — F20.9 SCHIZOPHRENIA, UNSPECIFIED TYPE (H): ICD-10-CM

## 2018-07-18 ENCOUNTER — COMMUNICATION - HEALTHEAST (OUTPATIENT)
Dept: NURSING | Facility: CLINIC | Age: 28
End: 2018-07-18

## 2018-07-19 ENCOUNTER — RECORDS - HEALTHEAST (OUTPATIENT)
Dept: ADMINISTRATIVE | Facility: OTHER | Age: 28
End: 2018-07-19

## 2018-07-19 ENCOUNTER — OFFICE VISIT - HEALTHEAST (OUTPATIENT)
Dept: BEHAVIORAL HEALTH | Facility: CLINIC | Age: 28
End: 2018-07-19

## 2018-07-19 DIAGNOSIS — F20.9 SCHIZOPHRENIA, UNSPECIFIED TYPE (H): ICD-10-CM

## 2018-08-07 ENCOUNTER — COMMUNICATION - HEALTHEAST (OUTPATIENT)
Dept: NURSING | Facility: CLINIC | Age: 28
End: 2018-08-07

## 2018-08-09 ENCOUNTER — OFFICE VISIT - HEALTHEAST (OUTPATIENT)
Dept: BEHAVIORAL HEALTH | Facility: CLINIC | Age: 28
End: 2018-08-09

## 2018-08-09 ENCOUNTER — COMMUNICATION - HEALTHEAST (OUTPATIENT)
Dept: NURSING | Facility: CLINIC | Age: 28
End: 2018-08-09

## 2018-08-09 DIAGNOSIS — F20.9 SCHIZOPHRENIA, UNSPECIFIED TYPE (H): ICD-10-CM

## 2018-08-29 ENCOUNTER — COMMUNICATION - HEALTHEAST (OUTPATIENT)
Dept: NURSING | Facility: CLINIC | Age: 28
End: 2018-08-29

## 2018-09-06 ENCOUNTER — RECORDS - HEALTHEAST (OUTPATIENT)
Dept: ADMINISTRATIVE | Facility: OTHER | Age: 28
End: 2018-09-06

## 2018-09-21 ENCOUNTER — COMMUNICATION - HEALTHEAST (OUTPATIENT)
Dept: NURSING | Facility: CLINIC | Age: 28
End: 2018-09-21

## 2018-09-21 ENCOUNTER — AMBULATORY - HEALTHEAST (OUTPATIENT)
Dept: CARE COORDINATION | Facility: CLINIC | Age: 28
End: 2018-09-21

## 2018-09-26 ENCOUNTER — COMMUNICATION - HEALTHEAST (OUTPATIENT)
Dept: FAMILY MEDICINE | Facility: CLINIC | Age: 28
End: 2018-09-26

## 2018-09-26 ENCOUNTER — COMMUNICATION - HEALTHEAST (OUTPATIENT)
Dept: NURSING | Facility: CLINIC | Age: 28
End: 2018-09-26

## 2018-09-26 DIAGNOSIS — E27.40 ADRENAL INSUFFICIENCY (H): ICD-10-CM

## 2018-10-08 ENCOUNTER — COMMUNICATION - HEALTHEAST (OUTPATIENT)
Dept: NURSING | Facility: CLINIC | Age: 28
End: 2018-10-08

## 2018-10-20 ENCOUNTER — AMBULATORY - HEALTHEAST (OUTPATIENT)
Dept: NURSING | Facility: CLINIC | Age: 28
End: 2018-10-20

## 2018-11-13 ENCOUNTER — OFFICE VISIT - HEALTHEAST (OUTPATIENT)
Dept: FAMILY MEDICINE | Facility: CLINIC | Age: 28
End: 2018-11-13

## 2018-11-13 ENCOUNTER — COMMUNICATION - HEALTHEAST (OUTPATIENT)
Dept: FAMILY MEDICINE | Facility: CLINIC | Age: 28
End: 2018-11-13

## 2018-11-13 ENCOUNTER — COMMUNICATION - HEALTHEAST (OUTPATIENT)
Dept: NURSING | Facility: CLINIC | Age: 28
End: 2018-11-13

## 2018-11-13 DIAGNOSIS — E03.9 HYPOTHYROIDISM: ICD-10-CM

## 2018-11-13 DIAGNOSIS — R19.7 DIARRHEA, UNSPECIFIED TYPE: ICD-10-CM

## 2018-11-13 DIAGNOSIS — E27.40 ADRENAL INSUFFICIENCY (H): ICD-10-CM

## 2018-11-13 DIAGNOSIS — F20.9 SCHIZOPHRENIA, UNSPECIFIED TYPE (H): ICD-10-CM

## 2018-11-13 LAB
ANION GAP SERPL CALCULATED.3IONS-SCNC: 11 MMOL/L (ref 5–18)
BASOPHILS # BLD AUTO: 0 THOU/UL (ref 0–0.2)
BASOPHILS NFR BLD AUTO: 1 % (ref 0–2)
CALCIUM SERPL-MCNC: 9.9 MG/DL (ref 8.5–10.5)
CHLORIDE BLD-SCNC: 105 MMOL/L (ref 98–107)
CO2 SERPL-SCNC: 26 MMOL/L (ref 22–31)
CREAT SERPL-MCNC: 0.85 MG/DL (ref 0.7–1.3)
EOSINOPHIL # BLD AUTO: 0.2 THOU/UL (ref 0–0.4)
EOSINOPHIL NFR BLD AUTO: 3 % (ref 0–6)
ERYTHROCYTE [DISTWIDTH] IN BLOOD BY AUTOMATED COUNT: 11.9 % (ref 11–14.5)
GFR SERPL CREATININE-BSD FRML MDRD: >60 ML/MIN/1.73M2
HCT VFR BLD AUTO: 50.4 % (ref 40–54)
HGB BLD-MCNC: 16.5 G/DL (ref 14–18)
LYMPHOCYTES # BLD AUTO: 2.3 THOU/UL (ref 0.8–4.4)
LYMPHOCYTES NFR BLD AUTO: 41 % (ref 20–40)
MCH RBC QN AUTO: 29.3 PG (ref 27–34)
MCHC RBC AUTO-ENTMCNC: 32.8 G/DL (ref 32–36)
MCV RBC AUTO: 89 FL (ref 80–100)
MONOCYTES # BLD AUTO: 0.4 THOU/UL (ref 0–0.9)
MONOCYTES NFR BLD AUTO: 7 % (ref 2–10)
NEUTROPHILS # BLD AUTO: 2.8 THOU/UL (ref 2–7.7)
NEUTROPHILS NFR BLD AUTO: 49 % (ref 50–70)
PLATELET # BLD AUTO: 123 THOU/UL (ref 140–440)
PMV BLD AUTO: 9.5 FL (ref 7–10)
POTASSIUM BLD-SCNC: 3.9 MMOL/L (ref 3.5–5)
PTH-INTACT SERPL-MCNC: 82 PG/ML (ref 10–86)
RBC # BLD AUTO: 5.64 MILL/UL (ref 4.4–6.2)
SODIUM SERPL-SCNC: 142 MMOL/L (ref 136–145)
T4 FREE SERPL-MCNC: 1.2 NG/DL (ref 0.7–1.8)
TSH SERPL DL<=0.005 MIU/L-ACNC: 1.03 UIU/ML (ref 0.3–5)
WBC: 5.7 THOU/UL (ref 4–11)

## 2018-11-13 ASSESSMENT — MIFFLIN-ST. JEOR: SCORE: 1466.15

## 2018-11-14 LAB — 25(OH)D3 SERPL-MCNC: 5.8 NG/ML (ref 30–80)

## 2018-11-19 ENCOUNTER — COMMUNICATION - HEALTHEAST (OUTPATIENT)
Dept: ENDOCRINOLOGY | Facility: CLINIC | Age: 28
End: 2018-11-19

## 2018-11-21 ENCOUNTER — COMMUNICATION - HEALTHEAST (OUTPATIENT)
Dept: ADMINISTRATIVE | Facility: CLINIC | Age: 28
End: 2018-11-21

## 2018-11-21 DIAGNOSIS — E56.9 VITAMIN DEFICIENCY: ICD-10-CM

## 2018-11-24 ENCOUNTER — COMMUNICATION - HEALTHEAST (OUTPATIENT)
Dept: BEHAVIORAL HEALTH | Facility: CLINIC | Age: 28
End: 2018-11-24

## 2018-11-24 DIAGNOSIS — R44.0 AUDITORY HALLUCINATION: ICD-10-CM

## 2018-11-27 ENCOUNTER — COMMUNICATION - HEALTHEAST (OUTPATIENT)
Dept: FAMILY MEDICINE | Facility: CLINIC | Age: 28
End: 2018-11-27

## 2018-11-28 ENCOUNTER — COMMUNICATION - HEALTHEAST (OUTPATIENT)
Dept: FAMILY MEDICINE | Facility: CLINIC | Age: 28
End: 2018-11-28

## 2018-12-04 ENCOUNTER — COMMUNICATION - HEALTHEAST (OUTPATIENT)
Dept: FAMILY MEDICINE | Facility: CLINIC | Age: 28
End: 2018-12-04

## 2018-12-04 ENCOUNTER — COMMUNICATION - HEALTHEAST (OUTPATIENT)
Dept: NURSING | Facility: CLINIC | Age: 28
End: 2018-12-04

## 2018-12-04 ENCOUNTER — OFFICE VISIT - HEALTHEAST (OUTPATIENT)
Dept: FAMILY MEDICINE | Facility: CLINIC | Age: 28
End: 2018-12-04

## 2018-12-04 DIAGNOSIS — E27.40 ADRENAL INSUFFICIENCY (H): ICD-10-CM

## 2018-12-04 DIAGNOSIS — D69.6 THROMBOCYTOPENIA (H): ICD-10-CM

## 2018-12-04 DIAGNOSIS — F20.9 SCHIZOPHRENIA, UNSPECIFIED TYPE (H): ICD-10-CM

## 2018-12-04 DIAGNOSIS — J06.9 ACUTE URI: ICD-10-CM

## 2018-12-04 DIAGNOSIS — D35.2 PITUITARY MICROADENOMA (H): ICD-10-CM

## 2018-12-04 LAB
ANION GAP SERPL CALCULATED.3IONS-SCNC: 11 MMOL/L (ref 5–18)
BUN SERPL-MCNC: 8 MG/DL (ref 8–22)
CALCIUM SERPL-MCNC: 9.5 MG/DL (ref 8.5–10.5)
CHLORIDE BLD-SCNC: 108 MMOL/L (ref 98–107)
CHOLEST SERPL-MCNC: 168 MG/DL
CO2 SERPL-SCNC: 27 MMOL/L (ref 22–31)
CREAT SERPL-MCNC: 0.78 MG/DL (ref 0.7–1.3)
FASTING STATUS PATIENT QL REPORTED: YES
GFR SERPL CREATININE-BSD FRML MDRD: >60 ML/MIN/1.73M2
GLUCOSE BLD-MCNC: 81 MG/DL (ref 70–125)
HDLC SERPL-MCNC: 55 MG/DL
LDLC SERPL CALC-MCNC: 104 MG/DL
POTASSIUM BLD-SCNC: 3.9 MMOL/L (ref 3.5–5)
PROLACTIN SERPL-MCNC: 21.3 NG/ML (ref 0–15)
SODIUM SERPL-SCNC: 146 MMOL/L (ref 136–145)
TRIGL SERPL-MCNC: 47 MG/DL

## 2018-12-05 ENCOUNTER — COMMUNICATION - HEALTHEAST (OUTPATIENT)
Dept: FAMILY MEDICINE | Facility: CLINIC | Age: 28
End: 2018-12-05

## 2018-12-05 ENCOUNTER — COMMUNICATION - HEALTHEAST (OUTPATIENT)
Dept: NURSING | Facility: CLINIC | Age: 28
End: 2018-12-05

## 2018-12-05 ENCOUNTER — COMMUNICATION - HEALTHEAST (OUTPATIENT)
Dept: ENDOCRINOLOGY | Facility: CLINIC | Age: 28
End: 2018-12-05

## 2018-12-05 ENCOUNTER — COMMUNICATION - HEALTHEAST (OUTPATIENT)
Dept: BEHAVIORAL HEALTH | Facility: CLINIC | Age: 28
End: 2018-12-05

## 2018-12-05 DIAGNOSIS — J45.40 MODERATE PERSISTENT ASTHMA: ICD-10-CM

## 2018-12-05 DIAGNOSIS — R44.0 AUDITORY HALLUCINATION: ICD-10-CM

## 2018-12-05 DIAGNOSIS — E03.9 HYPOTHYROIDISM: ICD-10-CM

## 2018-12-10 ENCOUNTER — COMMUNICATION - HEALTHEAST (OUTPATIENT)
Dept: FAMILY MEDICINE | Facility: CLINIC | Age: 28
End: 2018-12-10

## 2018-12-19 ENCOUNTER — COMMUNICATION - HEALTHEAST (OUTPATIENT)
Dept: BEHAVIORAL HEALTH | Facility: CLINIC | Age: 28
End: 2018-12-19

## 2018-12-19 ENCOUNTER — COMMUNICATION - HEALTHEAST (OUTPATIENT)
Dept: NURSING | Facility: CLINIC | Age: 28
End: 2018-12-19

## 2018-12-19 ENCOUNTER — COMMUNICATION - HEALTHEAST (OUTPATIENT)
Dept: FAMILY MEDICINE | Facility: CLINIC | Age: 28
End: 2018-12-19

## 2018-12-19 ENCOUNTER — OFFICE VISIT - HEALTHEAST (OUTPATIENT)
Dept: BEHAVIORAL HEALTH | Facility: CLINIC | Age: 28
End: 2018-12-19

## 2018-12-19 DIAGNOSIS — R44.0 AUDITORY HALLUCINATION: ICD-10-CM

## 2018-12-19 DIAGNOSIS — F20.9 SCHIZOPHRENIA, UNSPECIFIED TYPE (H): ICD-10-CM

## 2018-12-21 ENCOUNTER — OFFICE VISIT - HEALTHEAST (OUTPATIENT)
Dept: ENDOCRINOLOGY | Facility: CLINIC | Age: 28
End: 2018-12-21

## 2018-12-21 DIAGNOSIS — I95.0 IDIOPATHIC HYPOTENSION: ICD-10-CM

## 2018-12-21 ASSESSMENT — MIFFLIN-ST. JEOR: SCORE: 1483.18

## 2019-01-02 ENCOUNTER — COMMUNICATION - HEALTHEAST (OUTPATIENT)
Dept: ADMINISTRATIVE | Facility: CLINIC | Age: 29
End: 2019-01-02

## 2019-01-02 ENCOUNTER — COMMUNICATION - HEALTHEAST (OUTPATIENT)
Dept: NURSING | Facility: CLINIC | Age: 29
End: 2019-01-02

## 2019-01-02 DIAGNOSIS — E27.40 ADRENAL INSUFFICIENCY (H): ICD-10-CM

## 2019-01-02 DIAGNOSIS — R44.0 AUDITORY HALLUCINATION: ICD-10-CM

## 2019-01-08 ENCOUNTER — COMMUNICATION - HEALTHEAST (OUTPATIENT)
Dept: FAMILY MEDICINE | Facility: CLINIC | Age: 29
End: 2019-01-08

## 2019-01-11 ENCOUNTER — COMMUNICATION - HEALTHEAST (OUTPATIENT)
Dept: FAMILY MEDICINE | Facility: CLINIC | Age: 29
End: 2019-01-11

## 2019-01-11 ENCOUNTER — OFFICE VISIT - HEALTHEAST (OUTPATIENT)
Dept: FAMILY MEDICINE | Facility: CLINIC | Age: 29
End: 2019-01-11

## 2019-01-11 DIAGNOSIS — E27.40 ADRENAL INSUFFICIENCY (H): ICD-10-CM

## 2019-01-11 DIAGNOSIS — J01.90 ACUTE NON-RECURRENT SINUSITIS, UNSPECIFIED LOCATION: ICD-10-CM

## 2019-01-12 ENCOUNTER — RECORDS - HEALTHEAST (OUTPATIENT)
Dept: ADMINISTRATIVE | Facility: OTHER | Age: 29
End: 2019-01-12

## 2019-01-14 ENCOUNTER — COMMUNICATION - HEALTHEAST (OUTPATIENT)
Dept: FAMILY MEDICINE | Facility: CLINIC | Age: 29
End: 2019-01-14

## 2019-01-16 ENCOUNTER — COMMUNICATION - HEALTHEAST (OUTPATIENT)
Dept: NURSING | Facility: CLINIC | Age: 29
End: 2019-01-16

## 2019-01-16 ENCOUNTER — OFFICE VISIT - HEALTHEAST (OUTPATIENT)
Dept: BEHAVIORAL HEALTH | Facility: CLINIC | Age: 29
End: 2019-01-16

## 2019-01-16 ENCOUNTER — COMMUNICATION - HEALTHEAST (OUTPATIENT)
Dept: FAMILY MEDICINE | Facility: CLINIC | Age: 29
End: 2019-01-16

## 2019-01-16 DIAGNOSIS — F20.9 SCHIZOPHRENIA, UNSPECIFIED TYPE (H): ICD-10-CM

## 2019-01-23 ENCOUNTER — COMMUNICATION - HEALTHEAST (OUTPATIENT)
Dept: ADMINISTRATIVE | Facility: CLINIC | Age: 29
End: 2019-01-23

## 2019-01-28 ENCOUNTER — COMMUNICATION - HEALTHEAST (OUTPATIENT)
Dept: ENDOCRINOLOGY | Facility: CLINIC | Age: 29
End: 2019-01-28

## 2019-01-28 ENCOUNTER — COMMUNICATION - HEALTHEAST (OUTPATIENT)
Dept: FAMILY MEDICINE | Facility: CLINIC | Age: 29
End: 2019-01-28

## 2019-01-28 DIAGNOSIS — E03.9 HYPOTHYROIDISM: ICD-10-CM

## 2019-02-06 ENCOUNTER — COMMUNICATION - HEALTHEAST (OUTPATIENT)
Dept: NURSING | Facility: CLINIC | Age: 29
End: 2019-02-06

## 2019-02-07 ENCOUNTER — COMMUNICATION - HEALTHEAST (OUTPATIENT)
Dept: NURSING | Facility: CLINIC | Age: 29
End: 2019-02-07

## 2019-02-12 ENCOUNTER — COMMUNICATION - HEALTHEAST (OUTPATIENT)
Dept: FAMILY MEDICINE | Facility: CLINIC | Age: 29
End: 2019-02-12

## 2019-02-12 ENCOUNTER — COMMUNICATION - HEALTHEAST (OUTPATIENT)
Dept: NURSING | Facility: CLINIC | Age: 29
End: 2019-02-12

## 2019-02-12 ENCOUNTER — COMMUNICATION - HEALTHEAST (OUTPATIENT)
Dept: BEHAVIORAL HEALTH | Facility: CLINIC | Age: 29
End: 2019-02-12

## 2019-02-12 ENCOUNTER — OFFICE VISIT - HEALTHEAST (OUTPATIENT)
Dept: BEHAVIORAL HEALTH | Facility: CLINIC | Age: 29
End: 2019-02-12

## 2019-02-12 DIAGNOSIS — F32.9 DEPRESSION, MAJOR: ICD-10-CM

## 2019-02-12 DIAGNOSIS — F51.5 NIGHTMARES: ICD-10-CM

## 2019-02-12 DIAGNOSIS — F20.9 SCHIZOPHRENIA, UNSPECIFIED TYPE (H): ICD-10-CM

## 2019-02-13 ENCOUNTER — AMBULATORY - HEALTHEAST (OUTPATIENT)
Dept: BEHAVIORAL HEALTH | Facility: CLINIC | Age: 29
End: 2019-02-13

## 2019-02-13 ENCOUNTER — COMMUNICATION - HEALTHEAST (OUTPATIENT)
Dept: NURSING | Facility: CLINIC | Age: 29
End: 2019-02-13

## 2019-02-13 ENCOUNTER — COMMUNICATION - HEALTHEAST (OUTPATIENT)
Dept: BEHAVIORAL HEALTH | Facility: CLINIC | Age: 29
End: 2019-02-13

## 2019-02-13 DIAGNOSIS — F20.9 SCHIZOPHRENIA, UNSPECIFIED TYPE (H): ICD-10-CM

## 2019-02-19 ENCOUNTER — OFFICE VISIT - HEALTHEAST (OUTPATIENT)
Dept: FAMILY MEDICINE | Facility: CLINIC | Age: 29
End: 2019-02-19

## 2019-02-19 DIAGNOSIS — E27.40 ADRENAL INSUFFICIENCY (H): ICD-10-CM

## 2019-02-19 DIAGNOSIS — B34.9 VIRAL SYNDROME: ICD-10-CM

## 2019-02-19 DIAGNOSIS — F20.9 SCHIZOPHRENIA, UNSPECIFIED TYPE (H): ICD-10-CM

## 2019-02-19 DIAGNOSIS — E03.9 HYPOTHYROIDISM, UNSPECIFIED TYPE: ICD-10-CM

## 2019-02-25 ENCOUNTER — OFFICE VISIT - HEALTHEAST (OUTPATIENT)
Dept: FAMILY MEDICINE | Facility: CLINIC | Age: 29
End: 2019-02-25

## 2019-02-25 ENCOUNTER — COMMUNICATION - HEALTHEAST (OUTPATIENT)
Dept: FAMILY MEDICINE | Facility: CLINIC | Age: 29
End: 2019-02-25

## 2019-02-25 DIAGNOSIS — R09.81 NASAL CONGESTION: ICD-10-CM

## 2019-03-08 ENCOUNTER — OFFICE VISIT - HEALTHEAST (OUTPATIENT)
Dept: BEHAVIORAL HEALTH | Facility: CLINIC | Age: 29
End: 2019-03-08

## 2019-03-08 ENCOUNTER — AMBULATORY - HEALTHEAST (OUTPATIENT)
Dept: BEHAVIORAL HEALTH | Facility: CLINIC | Age: 29
End: 2019-03-08

## 2019-03-08 ENCOUNTER — OFFICE VISIT - HEALTHEAST (OUTPATIENT)
Dept: FAMILY MEDICINE | Facility: CLINIC | Age: 29
End: 2019-03-08

## 2019-03-08 DIAGNOSIS — F20.1 DISORGANIZED SCHIZOPHRENIA (H): ICD-10-CM

## 2019-03-08 DIAGNOSIS — E03.8 OTHER SPECIFIED HYPOTHYROIDISM: ICD-10-CM

## 2019-03-08 DIAGNOSIS — K52.9 GASTROENTERITIS: ICD-10-CM

## 2019-03-08 DIAGNOSIS — F20.9 SCHIZOPHRENIA, UNSPECIFIED TYPE (H): ICD-10-CM

## 2019-03-14 ENCOUNTER — RECORDS - HEALTHEAST (OUTPATIENT)
Dept: ADMINISTRATIVE | Facility: OTHER | Age: 29
End: 2019-03-14

## 2019-03-18 ENCOUNTER — COMMUNICATION - HEALTHEAST (OUTPATIENT)
Dept: NURSING | Facility: CLINIC | Age: 29
End: 2019-03-18

## 2019-03-20 ENCOUNTER — OFFICE VISIT - HEALTHEAST (OUTPATIENT)
Dept: FAMILY MEDICINE | Facility: CLINIC | Age: 29
End: 2019-03-20

## 2019-03-20 DIAGNOSIS — E55.9 VITAMIN D DEFICIENCY: ICD-10-CM

## 2019-03-20 DIAGNOSIS — E03.8 OTHER SPECIFIED HYPOTHYROIDISM: ICD-10-CM

## 2019-03-20 DIAGNOSIS — K52.9 GASTROENTERITIS: ICD-10-CM

## 2019-03-20 DIAGNOSIS — F20.1 DISORGANIZED SCHIZOPHRENIA (H): ICD-10-CM

## 2019-03-21 ENCOUNTER — COMMUNICATION - HEALTHEAST (OUTPATIENT)
Dept: NURSING | Facility: CLINIC | Age: 29
End: 2019-03-21

## 2019-03-22 ENCOUNTER — OFFICE VISIT - HEALTHEAST (OUTPATIENT)
Dept: BEHAVIORAL HEALTH | Facility: CLINIC | Age: 29
End: 2019-03-22

## 2019-03-22 DIAGNOSIS — F43.10 PTSD (POST-TRAUMATIC STRESS DISORDER): ICD-10-CM

## 2019-03-22 DIAGNOSIS — F25.1 SCHIZOAFFECTIVE DISORDER, DEPRESSIVE TYPE, WITH CATATONIA (H): ICD-10-CM

## 2019-03-22 DIAGNOSIS — F06.1 SCHIZOAFFECTIVE DISORDER, DEPRESSIVE TYPE, WITH CATATONIA (H): ICD-10-CM

## 2019-03-26 ENCOUNTER — OFFICE VISIT - HEALTHEAST (OUTPATIENT)
Dept: FAMILY MEDICINE | Facility: CLINIC | Age: 29
End: 2019-03-26

## 2019-03-26 DIAGNOSIS — R00.0 TACHYCARDIA: ICD-10-CM

## 2019-03-26 DIAGNOSIS — R11.10 VOMITING, INTRACTABILITY OF VOMITING NOT SPECIFIED, PRESENCE OF NAUSEA NOT SPECIFIED, UNSPECIFIED VOMITING TYPE: ICD-10-CM

## 2019-03-26 DIAGNOSIS — E03.9 HYPOTHYROIDISM: ICD-10-CM

## 2019-03-26 LAB
ALBUMIN SERPL-MCNC: 4.9 G/DL (ref 3.5–5)
ALP SERPL-CCNC: 108 U/L (ref 45–120)
ALT SERPL W P-5'-P-CCNC: 38 U/L (ref 0–45)
ANION GAP SERPL CALCULATED.3IONS-SCNC: 11 MMOL/L (ref 5–18)
AST SERPL W P-5'-P-CCNC: 27 U/L (ref 0–40)
ATRIAL RATE - MUSE: 92 BPM
BASOPHILS # BLD AUTO: 0.1 THOU/UL (ref 0–0.2)
BASOPHILS NFR BLD AUTO: 1 % (ref 0–2)
BILIRUB SERPL-MCNC: 0.6 MG/DL (ref 0–1)
BUN SERPL-MCNC: 17 MG/DL (ref 8–22)
CALCIUM SERPL-MCNC: 10.2 MG/DL (ref 8.5–10.5)
CHLORIDE BLD-SCNC: 103 MMOL/L (ref 98–107)
CHOLEST SERPL-MCNC: 194 MG/DL
CO2 SERPL-SCNC: 23 MMOL/L (ref 22–31)
CREAT SERPL-MCNC: 0.92 MG/DL (ref 0.7–1.3)
DIASTOLIC BLOOD PRESSURE - MUSE: NORMAL MMHG
EOSINOPHIL # BLD AUTO: 0.2 THOU/UL (ref 0–0.4)
EOSINOPHIL NFR BLD AUTO: 3 % (ref 0–6)
ERYTHROCYTE [DISTWIDTH] IN BLOOD BY AUTOMATED COUNT: 12 % (ref 11–14.5)
FASTING STATUS PATIENT QL REPORTED: ABNORMAL
GFR SERPL CREATININE-BSD FRML MDRD: >60 ML/MIN/1.73M2
GLUCOSE BLD-MCNC: 136 MG/DL (ref 70–125)
HCT VFR BLD AUTO: 53.9 % (ref 40–54)
HDLC SERPL-MCNC: 41 MG/DL
HGB BLD-MCNC: 18.6 G/DL (ref 14–18)
INTERPRETATION ECG - MUSE: NORMAL
LDLC SERPL CALC-MCNC: 135 MG/DL
LYMPHOCYTES # BLD AUTO: 3 THOU/UL (ref 0.8–4.4)
LYMPHOCYTES NFR BLD AUTO: 38 % (ref 20–40)
MCH RBC QN AUTO: 29.5 PG (ref 27–34)
MCHC RBC AUTO-ENTMCNC: 34.5 G/DL (ref 32–36)
MCV RBC AUTO: 85 FL (ref 80–100)
MONOCYTES # BLD AUTO: 0.5 THOU/UL (ref 0–0.9)
MONOCYTES NFR BLD AUTO: 7 % (ref 2–10)
NEUTROPHILS # BLD AUTO: 4 THOU/UL (ref 2–7.7)
NEUTROPHILS NFR BLD AUTO: 52 % (ref 50–70)
P AXIS - MUSE: 25 DEGREES
PLATELET # BLD AUTO: 173 THOU/UL (ref 140–440)
PMV BLD AUTO: 12.5 FL (ref 8.5–12.5)
POTASSIUM BLD-SCNC: 3.8 MMOL/L (ref 3.5–5)
PR INTERVAL - MUSE: 116 MS
PROT SERPL-MCNC: 8.3 G/DL (ref 6–8)
QRS DURATION - MUSE: 72 MS
QT - MUSE: 344 MS
QTC - MUSE: 425 MS
R AXIS - MUSE: 31 DEGREES
RBC # BLD AUTO: 6.31 MILL/UL (ref 4.4–6.2)
SODIUM SERPL-SCNC: 137 MMOL/L (ref 136–145)
SYSTOLIC BLOOD PRESSURE - MUSE: NORMAL MMHG
T AXIS - MUSE: 22 DEGREES
T3 SERPL-MCNC: 103 NG/DL (ref 45–175)
T4 FREE SERPL-MCNC: 1.2 NG/DL (ref 0.7–1.8)
TRIGL SERPL-MCNC: 89 MG/DL
TSH SERPL DL<=0.005 MIU/L-ACNC: 6.24 UIU/ML (ref 0.3–5)
VENTRICULAR RATE- MUSE: 92 BPM
WBC: 7.8 THOU/UL (ref 4–11)

## 2019-04-01 ENCOUNTER — COMMUNICATION - HEALTHEAST (OUTPATIENT)
Dept: NURSING | Facility: CLINIC | Age: 29
End: 2019-04-01

## 2019-04-03 ENCOUNTER — AMBULATORY - HEALTHEAST (OUTPATIENT)
Dept: BEHAVIORAL HEALTH | Facility: CLINIC | Age: 29
End: 2019-04-03

## 2019-04-08 ENCOUNTER — OFFICE VISIT - HEALTHEAST (OUTPATIENT)
Dept: BEHAVIORAL HEALTH | Facility: CLINIC | Age: 29
End: 2019-04-08

## 2019-04-08 DIAGNOSIS — F20.9 SCHIZOPHRENIA, UNSPECIFIED TYPE (H): ICD-10-CM

## 2019-04-09 ENCOUNTER — COMMUNICATION - HEALTHEAST (OUTPATIENT)
Dept: FAMILY MEDICINE | Facility: CLINIC | Age: 29
End: 2019-04-09

## 2019-04-11 ENCOUNTER — OFFICE VISIT - HEALTHEAST (OUTPATIENT)
Dept: FAMILY MEDICINE | Facility: CLINIC | Age: 29
End: 2019-04-11

## 2019-04-11 DIAGNOSIS — J45.40 MODERATE PERSISTENT ASTHMA WITHOUT COMPLICATION: ICD-10-CM

## 2019-04-11 DIAGNOSIS — J01.90 ACUTE NON-RECURRENT SINUSITIS, UNSPECIFIED LOCATION: ICD-10-CM

## 2019-04-11 DIAGNOSIS — E03.8 OTHER SPECIFIED HYPOTHYROIDISM: ICD-10-CM

## 2019-04-11 DIAGNOSIS — Z91.09 ENVIRONMENTAL ALLERGIES: ICD-10-CM

## 2019-04-11 DIAGNOSIS — E27.40 ADRENAL INSUFFICIENCY (H): ICD-10-CM

## 2019-04-11 DIAGNOSIS — J45.40 MODERATE PERSISTENT ASTHMA: ICD-10-CM

## 2019-04-16 ENCOUNTER — AMBULATORY - HEALTHEAST (OUTPATIENT)
Dept: BEHAVIORAL HEALTH | Facility: CLINIC | Age: 29
End: 2019-04-16

## 2019-04-16 ENCOUNTER — COMMUNICATION - HEALTHEAST (OUTPATIENT)
Dept: FAMILY MEDICINE | Facility: CLINIC | Age: 29
End: 2019-04-16

## 2019-04-18 ENCOUNTER — OFFICE VISIT - HEALTHEAST (OUTPATIENT)
Dept: BEHAVIORAL HEALTH | Facility: CLINIC | Age: 29
End: 2019-04-18

## 2019-04-18 DIAGNOSIS — F20.9 SCHIZOPHRENIA, UNSPECIFIED TYPE (H): ICD-10-CM

## 2019-04-23 ENCOUNTER — COMMUNICATION - HEALTHEAST (OUTPATIENT)
Dept: NURSING | Facility: CLINIC | Age: 29
End: 2019-04-23

## 2019-05-01 ENCOUNTER — OFFICE VISIT - HEALTHEAST (OUTPATIENT)
Dept: BEHAVIORAL HEALTH | Facility: CLINIC | Age: 29
End: 2019-05-01

## 2019-05-01 ENCOUNTER — COMMUNICATION - HEALTHEAST (OUTPATIENT)
Dept: BEHAVIORAL HEALTH | Facility: CLINIC | Age: 29
End: 2019-05-01

## 2019-05-01 DIAGNOSIS — F20.9 SCHIZOPHRENIA, UNSPECIFIED TYPE (H): ICD-10-CM

## 2019-05-06 ENCOUNTER — COMMUNICATION - HEALTHEAST (OUTPATIENT)
Dept: FAMILY MEDICINE | Facility: CLINIC | Age: 29
End: 2019-05-06

## 2019-05-08 ENCOUNTER — OFFICE VISIT - HEALTHEAST (OUTPATIENT)
Dept: BEHAVIORAL HEALTH | Facility: CLINIC | Age: 29
End: 2019-05-08

## 2019-05-08 DIAGNOSIS — R40.0 SOMNOLENCE: ICD-10-CM

## 2019-05-08 DIAGNOSIS — R44.0 AUDITORY HALLUCINATION: ICD-10-CM

## 2019-05-08 ASSESSMENT — MIFFLIN-ST. JEOR: SCORE: 1513.98

## 2019-05-09 ENCOUNTER — COMMUNICATION - HEALTHEAST (OUTPATIENT)
Dept: FAMILY MEDICINE | Facility: CLINIC | Age: 29
End: 2019-05-09

## 2019-05-09 ENCOUNTER — COMMUNICATION - HEALTHEAST (OUTPATIENT)
Dept: BEHAVIORAL HEALTH | Facility: CLINIC | Age: 29
End: 2019-05-09

## 2019-05-09 ENCOUNTER — COMMUNICATION - HEALTHEAST (OUTPATIENT)
Dept: NURSING | Facility: CLINIC | Age: 29
End: 2019-05-09

## 2019-05-10 ENCOUNTER — COMMUNICATION - HEALTHEAST (OUTPATIENT)
Dept: FAMILY MEDICINE | Facility: CLINIC | Age: 29
End: 2019-05-10

## 2019-05-10 ENCOUNTER — RECORDS - HEALTHEAST (OUTPATIENT)
Dept: ADMINISTRATIVE | Facility: OTHER | Age: 29
End: 2019-05-10

## 2019-05-16 ENCOUNTER — COMMUNICATION - HEALTHEAST (OUTPATIENT)
Dept: ADMINISTRATIVE | Facility: CLINIC | Age: 29
End: 2019-05-16

## 2019-05-16 ENCOUNTER — OFFICE VISIT - HEALTHEAST (OUTPATIENT)
Dept: FAMILY MEDICINE | Facility: CLINIC | Age: 29
End: 2019-05-16

## 2019-05-16 ENCOUNTER — OFFICE VISIT - HEALTHEAST (OUTPATIENT)
Dept: BEHAVIORAL HEALTH | Facility: CLINIC | Age: 29
End: 2019-05-16

## 2019-05-16 ENCOUNTER — COMMUNICATION - HEALTHEAST (OUTPATIENT)
Dept: NURSING | Facility: CLINIC | Age: 29
End: 2019-05-16

## 2019-05-16 DIAGNOSIS — E03.9 HYPOTHYROIDISM, UNSPECIFIED TYPE: ICD-10-CM

## 2019-05-16 DIAGNOSIS — E27.40 ADRENAL INSUFFICIENCY (H): ICD-10-CM

## 2019-05-16 DIAGNOSIS — D69.6 THROMBOCYTOPENIA (H): ICD-10-CM

## 2019-05-16 DIAGNOSIS — Z91.09 ENVIRONMENTAL ALLERGIES: ICD-10-CM

## 2019-05-16 DIAGNOSIS — F25.1 SCHIZOAFFECTIVE DISORDER, DEPRESSIVE TYPE, WITH CATATONIA (H): ICD-10-CM

## 2019-05-16 DIAGNOSIS — F43.10 PTSD (POST-TRAUMATIC STRESS DISORDER): ICD-10-CM

## 2019-05-16 DIAGNOSIS — F06.1 SCHIZOAFFECTIVE DISORDER, DEPRESSIVE TYPE, WITH CATATONIA (H): ICD-10-CM

## 2019-05-16 LAB
ANION GAP SERPL CALCULATED.3IONS-SCNC: 12 MMOL/L (ref 5–18)
BASOPHILS # BLD AUTO: 0 THOU/UL (ref 0–0.2)
BASOPHILS NFR BLD AUTO: 0 % (ref 0–2)
BUN SERPL-MCNC: 21 MG/DL (ref 8–22)
CALCIUM SERPL-MCNC: 9.8 MG/DL (ref 8.5–10.5)
CHLORIDE BLD-SCNC: 105 MMOL/L (ref 98–107)
CO2 SERPL-SCNC: 21 MMOL/L (ref 22–31)
CREAT SERPL-MCNC: 0.79 MG/DL (ref 0.7–1.3)
EOSINOPHIL # BLD AUTO: 0.2 THOU/UL (ref 0–0.4)
EOSINOPHIL NFR BLD AUTO: 4 % (ref 0–6)
ERYTHROCYTE [DISTWIDTH] IN BLOOD BY AUTOMATED COUNT: 12.2 % (ref 11–14.5)
GFR SERPL CREATININE-BSD FRML MDRD: >60 ML/MIN/1.73M2
GLUCOSE BLD-MCNC: 85 MG/DL (ref 70–125)
HCT VFR BLD AUTO: 49.8 % (ref 40–54)
HGB BLD-MCNC: 16.7 G/DL (ref 14–18)
LYMPHOCYTES # BLD AUTO: 1.6 THOU/UL (ref 0.8–4.4)
LYMPHOCYTES NFR BLD AUTO: 33 % (ref 20–40)
MCH RBC QN AUTO: 30.7 PG (ref 27–34)
MCHC RBC AUTO-ENTMCNC: 33.6 G/DL (ref 32–36)
MCV RBC AUTO: 91 FL (ref 80–100)
MONOCYTES # BLD AUTO: 0.2 THOU/UL (ref 0–0.9)
MONOCYTES NFR BLD AUTO: 4 % (ref 2–10)
NEUTROPHILS # BLD AUTO: 2.8 THOU/UL (ref 2–7.7)
NEUTROPHILS NFR BLD AUTO: 59 % (ref 50–70)
PLATELET # BLD AUTO: 110 THOU/UL (ref 140–440)
PMV BLD AUTO: 9.5 FL (ref 7–10)
POTASSIUM BLD-SCNC: 3.9 MMOL/L (ref 3.5–5)
RBC # BLD AUTO: 5.45 MILL/UL (ref 4.4–6.2)
SODIUM SERPL-SCNC: 138 MMOL/L (ref 136–145)
T4 FREE SERPL-MCNC: 1.3 NG/DL (ref 0.7–1.8)
TSH SERPL DL<=0.005 MIU/L-ACNC: 0.43 UIU/ML (ref 0.3–5)
WBC: 4.8 THOU/UL (ref 4–11)

## 2019-05-20 ENCOUNTER — COMMUNICATION - HEALTHEAST (OUTPATIENT)
Dept: FAMILY MEDICINE | Facility: CLINIC | Age: 29
End: 2019-05-20

## 2019-05-20 LAB
T PALLIDUM AB SER QL: NEGATIVE
VIT B12 SERPL-MCNC: 527 PG/ML (ref 213–816)

## 2019-05-29 ENCOUNTER — COMMUNICATION - HEALTHEAST (OUTPATIENT)
Dept: NURSING | Facility: CLINIC | Age: 29
End: 2019-05-29

## 2019-05-30 ENCOUNTER — OFFICE VISIT - HEALTHEAST (OUTPATIENT)
Dept: BEHAVIORAL HEALTH | Facility: CLINIC | Age: 29
End: 2019-05-30

## 2019-05-30 ENCOUNTER — COMMUNICATION - HEALTHEAST (OUTPATIENT)
Dept: NURSING | Facility: CLINIC | Age: 29
End: 2019-05-30

## 2019-05-30 DIAGNOSIS — F20.9 SCHIZOPHRENIA, UNSPECIFIED TYPE (H): ICD-10-CM

## 2019-06-04 ENCOUNTER — COMMUNICATION - HEALTHEAST (OUTPATIENT)
Dept: ENDOCRINOLOGY | Facility: CLINIC | Age: 29
End: 2019-06-04

## 2019-06-05 ENCOUNTER — OFFICE VISIT - HEALTHEAST (OUTPATIENT)
Dept: FAMILY MEDICINE | Facility: CLINIC | Age: 29
End: 2019-06-05

## 2019-06-05 DIAGNOSIS — J01.90 ACUTE SINUSITIS WITH SYMPTOMS > 10 DAYS: ICD-10-CM

## 2019-06-07 ENCOUNTER — COMMUNICATION - HEALTHEAST (OUTPATIENT)
Dept: NURSING | Facility: CLINIC | Age: 29
End: 2019-06-07

## 2019-06-12 ENCOUNTER — COMMUNICATION - HEALTHEAST (OUTPATIENT)
Dept: ADMINISTRATIVE | Facility: CLINIC | Age: 29
End: 2019-06-12

## 2019-06-19 ENCOUNTER — COMMUNICATION - HEALTHEAST (OUTPATIENT)
Dept: FAMILY MEDICINE | Facility: CLINIC | Age: 29
End: 2019-06-19

## 2019-06-19 DIAGNOSIS — E03.9 HYPOTHYROIDISM: ICD-10-CM

## 2019-06-24 ENCOUNTER — HOSPITAL ENCOUNTER (OUTPATIENT)
Dept: MRI IMAGING | Facility: HOSPITAL | Age: 29
Discharge: HOME | End: 2019-06-24
Attending: FAMILY MEDICINE

## 2019-06-24 DIAGNOSIS — F20.9 SCHIZOPHRENIA, UNSPECIFIED TYPE (H): ICD-10-CM

## 2019-06-25 ENCOUNTER — OFFICE VISIT - HEALTHEAST (OUTPATIENT)
Dept: BEHAVIORAL HEALTH | Facility: CLINIC | Age: 29
End: 2019-06-25

## 2019-06-25 ENCOUNTER — COMMUNICATION - HEALTHEAST (OUTPATIENT)
Dept: NURSING | Facility: CLINIC | Age: 29
End: 2019-06-25

## 2019-06-25 ENCOUNTER — AMBULATORY - HEALTHEAST (OUTPATIENT)
Dept: BEHAVIORAL HEALTH | Facility: CLINIC | Age: 29
End: 2019-06-25

## 2019-06-25 DIAGNOSIS — F06.1 SCHIZOAFFECTIVE DISORDER, DEPRESSIVE TYPE, WITH CATATONIA (H): ICD-10-CM

## 2019-06-25 DIAGNOSIS — F43.10 PTSD (POST-TRAUMATIC STRESS DISORDER): ICD-10-CM

## 2019-06-25 DIAGNOSIS — F25.1 SCHIZOAFFECTIVE DISORDER, DEPRESSIVE TYPE, WITH CATATONIA (H): ICD-10-CM

## 2019-06-26 ENCOUNTER — COMMUNICATION - HEALTHEAST (OUTPATIENT)
Dept: FAMILY MEDICINE | Facility: CLINIC | Age: 29
End: 2019-06-26

## 2019-07-02 ENCOUNTER — COMMUNICATION - HEALTHEAST (OUTPATIENT)
Dept: ENDOCRINOLOGY | Facility: CLINIC | Age: 29
End: 2019-07-02

## 2019-07-02 DIAGNOSIS — E27.40 ADRENAL INSUFFICIENCY (H): ICD-10-CM

## 2019-07-03 ENCOUNTER — COMMUNICATION - HEALTHEAST (OUTPATIENT)
Dept: FAMILY MEDICINE | Facility: CLINIC | Age: 29
End: 2019-07-03

## 2019-07-03 DIAGNOSIS — J45.40 MODERATE PERSISTENT ASTHMA WITHOUT COMPLICATION: ICD-10-CM

## 2019-07-11 ENCOUNTER — COMMUNICATION - HEALTHEAST (OUTPATIENT)
Dept: NURSING | Facility: CLINIC | Age: 29
End: 2019-07-11

## 2019-07-12 ENCOUNTER — OFFICE VISIT - HEALTHEAST (OUTPATIENT)
Dept: BEHAVIORAL HEALTH | Facility: CLINIC | Age: 29
End: 2019-07-12

## 2019-07-12 ENCOUNTER — COMMUNICATION - HEALTHEAST (OUTPATIENT)
Dept: ADMINISTRATIVE | Facility: CLINIC | Age: 29
End: 2019-07-12

## 2019-07-12 DIAGNOSIS — F25.1 SCHIZOAFFECTIVE DISORDER, DEPRESSIVE TYPE, WITH CATATONIA (H): ICD-10-CM

## 2019-07-12 DIAGNOSIS — F06.1 SCHIZOAFFECTIVE DISORDER, DEPRESSIVE TYPE, WITH CATATONIA (H): ICD-10-CM

## 2019-07-12 DIAGNOSIS — F43.10 PTSD (POST-TRAUMATIC STRESS DISORDER): ICD-10-CM

## 2019-07-17 ENCOUNTER — COMMUNICATION - HEALTHEAST (OUTPATIENT)
Dept: CARE COORDINATION | Facility: CLINIC | Age: 29
End: 2019-07-17

## 2019-07-22 ENCOUNTER — COMMUNICATION - HEALTHEAST (OUTPATIENT)
Dept: NURSING | Facility: CLINIC | Age: 29
End: 2019-07-22

## 2019-07-26 ENCOUNTER — AMBULATORY - HEALTHEAST (OUTPATIENT)
Dept: BEHAVIORAL HEALTH | Facility: CLINIC | Age: 29
End: 2019-07-26

## 2019-07-26 ENCOUNTER — COMMUNICATION - HEALTHEAST (OUTPATIENT)
Dept: ADMINISTRATIVE | Facility: CLINIC | Age: 29
End: 2019-07-26

## 2019-07-26 ENCOUNTER — OFFICE VISIT - HEALTHEAST (OUTPATIENT)
Dept: BEHAVIORAL HEALTH | Facility: CLINIC | Age: 29
End: 2019-07-26

## 2019-07-26 DIAGNOSIS — F43.10 PTSD (POST-TRAUMATIC STRESS DISORDER): ICD-10-CM

## 2019-07-26 DIAGNOSIS — F25.1 SCHIZOAFFECTIVE DISORDER, DEPRESSIVE TYPE, WITH CATATONIA (H): ICD-10-CM

## 2019-07-26 DIAGNOSIS — F06.1 SCHIZOAFFECTIVE DISORDER, DEPRESSIVE TYPE, WITH CATATONIA (H): ICD-10-CM

## 2019-08-08 ENCOUNTER — OFFICE VISIT - HEALTHEAST (OUTPATIENT)
Dept: FAMILY MEDICINE | Facility: CLINIC | Age: 29
End: 2019-08-08

## 2019-08-08 DIAGNOSIS — E27.40 ADRENAL INSUFFICIENCY (H): ICD-10-CM

## 2019-08-08 DIAGNOSIS — F20.1 DISORGANIZED SCHIZOPHRENIA (H): ICD-10-CM

## 2019-08-08 DIAGNOSIS — J01.91 ACUTE RECURRENT SINUSITIS, UNSPECIFIED LOCATION: ICD-10-CM

## 2019-08-08 ASSESSMENT — MIFFLIN-ST. JEOR: SCORE: 1538.92

## 2019-08-09 ENCOUNTER — OFFICE VISIT - HEALTHEAST (OUTPATIENT)
Dept: BEHAVIORAL HEALTH | Facility: CLINIC | Age: 29
End: 2019-08-09

## 2019-08-09 DIAGNOSIS — F25.1 SCHIZOAFFECTIVE DISORDER, DEPRESSIVE TYPE, WITH CATATONIA (H): ICD-10-CM

## 2019-08-09 DIAGNOSIS — F06.1 SCHIZOAFFECTIVE DISORDER, DEPRESSIVE TYPE, WITH CATATONIA (H): ICD-10-CM

## 2019-08-12 ENCOUNTER — COMMUNICATION - HEALTHEAST (OUTPATIENT)
Dept: FAMILY MEDICINE | Facility: CLINIC | Age: 29
End: 2019-08-12

## 2019-08-16 ENCOUNTER — OFFICE VISIT - HEALTHEAST (OUTPATIENT)
Dept: FAMILY MEDICINE | Facility: CLINIC | Age: 29
End: 2019-08-16

## 2019-08-16 ENCOUNTER — RECORDS - HEALTHEAST (OUTPATIENT)
Dept: GENERAL RADIOLOGY | Facility: CLINIC | Age: 29
End: 2019-08-16

## 2019-08-16 DIAGNOSIS — M25.561 PAIN IN RIGHT KNEE: ICD-10-CM

## 2019-08-16 DIAGNOSIS — G89.29 CHRONIC PAIN OF RIGHT KNEE: ICD-10-CM

## 2019-08-16 DIAGNOSIS — G89.29 OTHER CHRONIC PAIN: ICD-10-CM

## 2019-08-16 DIAGNOSIS — M25.562 CHRONIC PAIN OF LEFT KNEE: ICD-10-CM

## 2019-08-16 DIAGNOSIS — L30.9 ECZEMA, UNSPECIFIED TYPE: ICD-10-CM

## 2019-08-16 DIAGNOSIS — G89.29 CHRONIC PAIN OF LEFT KNEE: ICD-10-CM

## 2019-08-16 DIAGNOSIS — M25.561 CHRONIC PAIN OF RIGHT KNEE: ICD-10-CM

## 2019-08-16 DIAGNOSIS — M25.562 PAIN IN LEFT KNEE: ICD-10-CM

## 2019-08-20 ENCOUNTER — COMMUNICATION - HEALTHEAST (OUTPATIENT)
Dept: FAMILY MEDICINE | Facility: CLINIC | Age: 29
End: 2019-08-20

## 2019-08-27 ENCOUNTER — COMMUNICATION - HEALTHEAST (OUTPATIENT)
Dept: ADMINISTRATIVE | Facility: CLINIC | Age: 29
End: 2019-08-27

## 2019-08-27 ENCOUNTER — OFFICE VISIT - HEALTHEAST (OUTPATIENT)
Dept: BEHAVIORAL HEALTH | Facility: CLINIC | Age: 29
End: 2019-08-27

## 2019-08-27 ENCOUNTER — COMMUNICATION - HEALTHEAST (OUTPATIENT)
Dept: ENDOCRINOLOGY | Facility: CLINIC | Age: 29
End: 2019-08-27

## 2019-08-27 ENCOUNTER — COMMUNICATION - HEALTHEAST (OUTPATIENT)
Dept: FAMILY MEDICINE | Facility: CLINIC | Age: 29
End: 2019-08-27

## 2019-08-27 DIAGNOSIS — F25.1 SCHIZOAFFECTIVE DISORDER, DEPRESSIVE TYPE, WITH CATATONIA (H): ICD-10-CM

## 2019-08-27 DIAGNOSIS — F06.1 SCHIZOAFFECTIVE DISORDER, DEPRESSIVE TYPE, WITH CATATONIA (H): ICD-10-CM

## 2019-08-27 DIAGNOSIS — F43.10 PTSD (POST-TRAUMATIC STRESS DISORDER): ICD-10-CM

## 2019-08-27 DIAGNOSIS — E27.40 ADRENAL INSUFFICIENCY (H): ICD-10-CM

## 2019-08-27 DIAGNOSIS — R09.81 NASAL CONGESTION: ICD-10-CM

## 2019-08-28 ENCOUNTER — OFFICE VISIT - HEALTHEAST (OUTPATIENT)
Dept: FAMILY MEDICINE | Facility: CLINIC | Age: 29
End: 2019-08-28

## 2019-08-28 DIAGNOSIS — L03.039 INFECTION OF TOENAIL: ICD-10-CM

## 2019-08-28 DIAGNOSIS — F43.10 PTSD (POST-TRAUMATIC STRESS DISORDER): ICD-10-CM

## 2019-08-28 DIAGNOSIS — J45.40 MODERATE PERSISTENT ASTHMA WITHOUT COMPLICATION: ICD-10-CM

## 2019-08-28 ASSESSMENT — MIFFLIN-ST. JEOR: SCORE: 1562.96

## 2019-08-29 ENCOUNTER — RECORDS - HEALTHEAST (OUTPATIENT)
Dept: ADMINISTRATIVE | Facility: OTHER | Age: 29
End: 2019-08-29

## 2019-09-01 ENCOUNTER — COMMUNICATION - HEALTHEAST (OUTPATIENT)
Dept: ENDOCRINOLOGY | Facility: CLINIC | Age: 29
End: 2019-09-01

## 2019-09-01 DIAGNOSIS — E27.40 ADRENAL INSUFFICIENCY (H): ICD-10-CM

## 2019-09-09 ENCOUNTER — COMMUNICATION - HEALTHEAST (OUTPATIENT)
Dept: BEHAVIORAL HEALTH | Facility: CLINIC | Age: 29
End: 2019-09-09

## 2019-09-09 DIAGNOSIS — R44.0 AUDITORY HALLUCINATION: ICD-10-CM

## 2019-09-10 ENCOUNTER — OFFICE VISIT - HEALTHEAST (OUTPATIENT)
Dept: FAMILY MEDICINE | Facility: CLINIC | Age: 29
End: 2019-09-10

## 2019-09-10 DIAGNOSIS — L03.039 INFECTION OF TOENAIL: ICD-10-CM

## 2019-09-10 DIAGNOSIS — Z23 NEED FOR IMMUNIZATION AGAINST INFLUENZA: ICD-10-CM

## 2019-09-10 DIAGNOSIS — Z11.4 SCREENING FOR HUMAN IMMUNODEFICIENCY VIRUS: ICD-10-CM

## 2019-09-10 DIAGNOSIS — D35.2 PITUITARY MICROADENOMA (H): ICD-10-CM

## 2019-09-10 DIAGNOSIS — E27.40 ADRENAL INSUFFICIENCY (H): ICD-10-CM

## 2019-09-10 DIAGNOSIS — R42 DIZZINESS: ICD-10-CM

## 2019-09-10 DIAGNOSIS — D69.6 THROMBOCYTOPENIA (H): ICD-10-CM

## 2019-09-10 LAB
ANION GAP SERPL CALCULATED.3IONS-SCNC: 15 MMOL/L (ref 5–18)
BUN SERPL-MCNC: 21 MG/DL (ref 8–22)
CALCIUM SERPL-MCNC: 11 MG/DL (ref 8.5–10.5)
CHLORIDE BLD-SCNC: 104 MMOL/L (ref 98–107)
CO2 SERPL-SCNC: 18 MMOL/L (ref 22–31)
CREAT SERPL-MCNC: 1.07 MG/DL (ref 0.7–1.3)
ERYTHROCYTE [DISTWIDTH] IN BLOOD BY AUTOMATED COUNT: 11.5 % (ref 11–14.5)
GFR SERPL CREATININE-BSD FRML MDRD: >60 ML/MIN/1.73M2
GLUCOSE BLD-MCNC: 86 MG/DL (ref 70–125)
HCT VFR BLD AUTO: 57.8 % (ref 40–54)
HGB BLD-MCNC: 19.8 G/DL (ref 14–18)
HIV 1+2 AB+HIV1 P24 AG SERPL QL IA: NEGATIVE
MCH RBC QN AUTO: 30.9 PG (ref 27–34)
MCHC RBC AUTO-ENTMCNC: 34.2 G/DL (ref 32–36)
MCV RBC AUTO: 90 FL (ref 80–100)
PLATELET # BLD AUTO: 171 THOU/UL (ref 140–440)
PMV BLD AUTO: 9.7 FL (ref 7–10)
POTASSIUM BLD-SCNC: 4.5 MMOL/L (ref 3.5–5)
PROLACTIN SERPL-MCNC: 11.6 NG/ML (ref 0–15)
RBC # BLD AUTO: 6.4 MILL/UL (ref 4.4–6.2)
SODIUM SERPL-SCNC: 137 MMOL/L (ref 136–145)
WBC: 5 THOU/UL (ref 4–11)

## 2019-09-12 ENCOUNTER — COMMUNICATION - HEALTHEAST (OUTPATIENT)
Dept: FAMILY MEDICINE | Facility: CLINIC | Age: 29
End: 2019-09-12

## 2019-09-12 DIAGNOSIS — E27.40 ADRENAL INSUFFICIENCY (H): ICD-10-CM

## 2019-09-12 DIAGNOSIS — R44.0 AUDITORY HALLUCINATION: ICD-10-CM

## 2019-09-13 ENCOUNTER — COMMUNICATION - HEALTHEAST (OUTPATIENT)
Dept: FAMILY MEDICINE | Facility: CLINIC | Age: 29
End: 2019-09-13

## 2019-09-16 ENCOUNTER — OFFICE VISIT - HEALTHEAST (OUTPATIENT)
Dept: BEHAVIORAL HEALTH | Facility: CLINIC | Age: 29
End: 2019-09-16

## 2019-09-16 ENCOUNTER — OFFICE VISIT - HEALTHEAST (OUTPATIENT)
Dept: FAMILY MEDICINE | Facility: CLINIC | Age: 29
End: 2019-09-16

## 2019-09-16 DIAGNOSIS — R74.8 ELEVATED LIVER ENZYMES: ICD-10-CM

## 2019-09-16 DIAGNOSIS — E03.9 HYPOTHYROIDISM, UNSPECIFIED TYPE: ICD-10-CM

## 2019-09-16 DIAGNOSIS — F06.1 SCHIZOAFFECTIVE DISORDER, DEPRESSIVE TYPE, WITH CATATONIA (H): ICD-10-CM

## 2019-09-16 DIAGNOSIS — F25.1 SCHIZOAFFECTIVE DISORDER, DEPRESSIVE TYPE, WITH CATATONIA (H): ICD-10-CM

## 2019-09-16 DIAGNOSIS — F43.10 PTSD (POST-TRAUMATIC STRESS DISORDER): ICD-10-CM

## 2019-09-16 DIAGNOSIS — E27.40 ADRENAL INSUFFICIENCY (H): ICD-10-CM

## 2019-09-16 DIAGNOSIS — E83.52 HYPERCALCEMIA: ICD-10-CM

## 2019-09-16 LAB
ALBUMIN SERPL-MCNC: 4.5 G/DL (ref 3.5–5)
ALP SERPL-CCNC: 80 U/L (ref 45–120)
ALT SERPL W P-5'-P-CCNC: 71 U/L (ref 0–45)
ANION GAP SERPL CALCULATED.3IONS-SCNC: 11 MMOL/L (ref 5–18)
AST SERPL W P-5'-P-CCNC: 46 U/L (ref 0–40)
BILIRUB DIRECT SERPL-MCNC: 0.3 MG/DL
BILIRUB SERPL-MCNC: 0.9 MG/DL (ref 0–1)
BUN SERPL-MCNC: 12 MG/DL (ref 8–22)
CALCIUM SERPL-MCNC: 9.8 MG/DL (ref 8.5–10.5)
CALCIUM, IONIZED MEASURED: 1.16 MMOL/L (ref 1.11–1.3)
CHLORIDE BLD-SCNC: 109 MMOL/L (ref 98–107)
CO2 SERPL-SCNC: 20 MMOL/L (ref 22–31)
CREAT SERPL-MCNC: 0.94 MG/DL (ref 0.7–1.3)
ERYTHROCYTE [DISTWIDTH] IN BLOOD BY AUTOMATED COUNT: 11.1 % (ref 11–14.5)
GFR SERPL CREATININE-BSD FRML MDRD: >60 ML/MIN/1.73M2
GLUCOSE BLD-MCNC: 84 MG/DL (ref 70–125)
HCT VFR BLD AUTO: 50.2 % (ref 40–54)
HGB BLD-MCNC: 17.2 G/DL (ref 14–18)
ION CA PH 7.4: 1.16 MMOL/L (ref 1.11–1.3)
MCH RBC QN AUTO: 30 PG (ref 27–34)
MCHC RBC AUTO-ENTMCNC: 34.2 G/DL (ref 32–36)
MCV RBC AUTO: 88 FL (ref 80–100)
PH: 7.39 (ref 7.35–7.45)
PHOSPHATE SERPL-MCNC: 3 MG/DL (ref 2.5–4.5)
PLATELET # BLD AUTO: 130 THOU/UL (ref 140–440)
PMV BLD AUTO: 10.1 FL (ref 7–10)
POTASSIUM BLD-SCNC: 4.1 MMOL/L (ref 3.5–5)
PROT SERPL-MCNC: 7.5 G/DL (ref 6–8)
PTH-INTACT SERPL-MCNC: 81 PG/ML (ref 10–86)
RBC # BLD AUTO: 5.71 MILL/UL (ref 4.4–6.2)
SODIUM SERPL-SCNC: 140 MMOL/L (ref 136–145)
T3 SERPL-MCNC: 72 NG/DL (ref 45–175)
T4 FREE SERPL-MCNC: 1 NG/DL (ref 0.7–1.8)
TSH SERPL DL<=0.005 MIU/L-ACNC: 3.71 UIU/ML (ref 0.3–5)
WBC: 4.9 THOU/UL (ref 4–11)

## 2019-09-16 ASSESSMENT — MIFFLIN-ST. JEOR: SCORE: 1523.5

## 2019-09-17 ENCOUNTER — COMMUNICATION - HEALTHEAST (OUTPATIENT)
Dept: FAMILY MEDICINE | Facility: CLINIC | Age: 29
End: 2019-09-17

## 2019-09-17 ENCOUNTER — COMMUNICATION - HEALTHEAST (OUTPATIENT)
Dept: BEHAVIORAL HEALTH | Facility: CLINIC | Age: 29
End: 2019-09-17

## 2019-09-17 LAB
25(OH)D3 SERPL-MCNC: 22 NG/ML (ref 30–80)
HBV SURFACE AG SERPL QL IA: NEGATIVE
HCV AB SERPL QL IA: NEGATIVE
HEPATITIS B SURFACE ANTIBODY LHE- HISTORICAL: NEGATIVE

## 2019-09-27 ENCOUNTER — COMMUNICATION - HEALTHEAST (OUTPATIENT)
Dept: NURSING | Facility: CLINIC | Age: 29
End: 2019-09-27

## 2019-09-30 ENCOUNTER — COMMUNICATION - HEALTHEAST (OUTPATIENT)
Dept: CARE COORDINATION | Facility: CLINIC | Age: 29
End: 2019-09-30

## 2019-10-02 ENCOUNTER — AMBULATORY - HEALTHEAST (OUTPATIENT)
Dept: BEHAVIORAL HEALTH | Facility: CLINIC | Age: 29
End: 2019-10-02

## 2019-10-02 ENCOUNTER — OFFICE VISIT - HEALTHEAST (OUTPATIENT)
Dept: BEHAVIORAL HEALTH | Facility: CLINIC | Age: 29
End: 2019-10-02

## 2019-10-02 ENCOUNTER — COMMUNICATION - HEALTHEAST (OUTPATIENT)
Dept: ADMINISTRATIVE | Facility: CLINIC | Age: 29
End: 2019-10-02

## 2019-10-02 DIAGNOSIS — F43.10 PTSD (POST-TRAUMATIC STRESS DISORDER): ICD-10-CM

## 2019-10-02 DIAGNOSIS — F25.1 SCHIZOAFFECTIVE DISORDER, DEPRESSIVE TYPE, WITH CATATONIA (H): ICD-10-CM

## 2019-10-02 DIAGNOSIS — F06.1 SCHIZOAFFECTIVE DISORDER, DEPRESSIVE TYPE, WITH CATATONIA (H): ICD-10-CM

## 2019-10-02 ASSESSMENT — ANXIETY QUESTIONNAIRES
2. NOT BEING ABLE TO STOP OR CONTROL WORRYING: SEVERAL DAYS
3. WORRYING TOO MUCH ABOUT DIFFERENT THINGS: SEVERAL DAYS
7. FEELING AFRAID AS IF SOMETHING AWFUL MIGHT HAPPEN: SEVERAL DAYS
GAD7 TOTAL SCORE: 6
4. TROUBLE RELAXING: SEVERAL DAYS
1. FEELING NERVOUS, ANXIOUS, OR ON EDGE: MORE THAN HALF THE DAYS
5. BEING SO RESTLESS THAT IT IS HARD TO SIT STILL: NOT AT ALL
IF YOU CHECKED OFF ANY PROBLEMS ON THIS QUESTIONNAIRE, HOW DIFFICULT HAVE THESE PROBLEMS MADE IT FOR YOU TO DO YOUR WORK, TAKE CARE OF THINGS AT HOME, OR GET ALONG WITH OTHER PEOPLE: SOMEWHAT DIFFICULT
6. BECOMING EASILY ANNOYED OR IRRITABLE: NOT AT ALL

## 2019-10-02 ASSESSMENT — PATIENT HEALTH QUESTIONNAIRE - PHQ9: SUM OF ALL RESPONSES TO PHQ QUESTIONS 1-9: 17

## 2019-10-09 ENCOUNTER — COMMUNICATION - HEALTHEAST (OUTPATIENT)
Dept: ADMINISTRATIVE | Facility: CLINIC | Age: 29
End: 2019-10-09

## 2019-10-09 ENCOUNTER — OFFICE VISIT - HEALTHEAST (OUTPATIENT)
Dept: FAMILY MEDICINE | Facility: CLINIC | Age: 29
End: 2019-10-09

## 2019-10-09 ENCOUNTER — COMMUNICATION - HEALTHEAST (OUTPATIENT)
Dept: ENDOCRINOLOGY | Facility: CLINIC | Age: 29
End: 2019-10-09

## 2019-10-09 DIAGNOSIS — Z23 NEED FOR HEPATITIS A IMMUNIZATION: ICD-10-CM

## 2019-10-09 DIAGNOSIS — Z23 NEED FOR HEPATITIS B VACCINATION: ICD-10-CM

## 2019-10-09 DIAGNOSIS — E03.8 OTHER SPECIFIED HYPOTHYROIDISM: ICD-10-CM

## 2019-10-09 DIAGNOSIS — E27.40 ADRENAL INSUFFICIENCY (H): ICD-10-CM

## 2019-10-09 DIAGNOSIS — D35.2 PITUITARY MICROADENOMA (H): ICD-10-CM

## 2019-10-09 DIAGNOSIS — E55.9 VITAMIN D DEFICIENCY: ICD-10-CM

## 2019-10-09 DIAGNOSIS — R74.8 ELEVATED LIVER ENZYMES: ICD-10-CM

## 2019-10-09 ASSESSMENT — MIFFLIN-ST. JEOR: SCORE: 1540.06

## 2019-10-09 NOTE — TELEPHONE ENCOUNTER
RECORDS RECEIVED FROM: [facility]   DATE RECEIVED: 10/09/2019   NOTES (FOR ALL VISITS) STATUS DETAILS   OFFICE NOTES from referring provider Received Dr. Jose Rangel [facility]   OFFICE NOTES from other specialist N/A    ED NOTES N/A    OPERATIVE REPORT  (thyroid, pituitary, adrenal, parathyroid) N/A    MEDICATION LIST Received    IMAGING      DEXASCAN N/A    MRI (BRAIN) Received 06/25/2019 - [facility] - Neurology-related   XR (Chest) Received 01/24/2018 - [facility] - Pulmonary-related   CT (HEAD/NECK/CHEST/ABDOMEN) N/A    NUCLEAR  N/A    ULTRASOUND (HEAD/NECK) N/A    LABS     DIABETES: HBGA1C, CREATININE, FASTING LIPIDS, MICROALBUMIN URINE, POTASSIUM, TSH, T4    THYROID: TSH, T4, CBC, THYRODLONULIN, TOTAL T3, FREE T4, CALCITONIN, CEA Received   09/16/2019 - [facility]

## 2019-10-11 ENCOUNTER — COMMUNICATION - HEALTHEAST (OUTPATIENT)
Dept: NURSING | Facility: CLINIC | Age: 29
End: 2019-10-11

## 2019-10-16 ENCOUNTER — OFFICE VISIT - HEALTHEAST (OUTPATIENT)
Dept: BEHAVIORAL HEALTH | Facility: CLINIC | Age: 29
End: 2019-10-16

## 2019-10-16 DIAGNOSIS — F25.1 SCHIZOAFFECTIVE DISORDER, DEPRESSIVE TYPE, WITH CATATONIA (H): ICD-10-CM

## 2019-10-16 DIAGNOSIS — F06.1 SCHIZOAFFECTIVE DISORDER, DEPRESSIVE TYPE, WITH CATATONIA (H): ICD-10-CM

## 2019-10-23 ENCOUNTER — COMMUNICATION - HEALTHEAST (OUTPATIENT)
Dept: ENDOCRINOLOGY | Facility: CLINIC | Age: 29
End: 2019-10-23

## 2019-10-23 DIAGNOSIS — E27.40 ADRENAL INSUFFICIENCY (H): ICD-10-CM

## 2019-10-31 ENCOUNTER — OFFICE VISIT - HEALTHEAST (OUTPATIENT)
Dept: BEHAVIORAL HEALTH | Facility: CLINIC | Age: 29
End: 2019-10-31

## 2019-10-31 ENCOUNTER — COMMUNICATION - HEALTHEAST (OUTPATIENT)
Dept: NURSING | Facility: CLINIC | Age: 29
End: 2019-10-31

## 2019-10-31 DIAGNOSIS — F25.1 SCHIZOAFFECTIVE DISORDER, DEPRESSIVE TYPE, WITH CATATONIA (H): ICD-10-CM

## 2019-10-31 DIAGNOSIS — F06.1 SCHIZOAFFECTIVE DISORDER, DEPRESSIVE TYPE, WITH CATATONIA (H): ICD-10-CM

## 2019-11-04 ENCOUNTER — COMMUNICATION - HEALTHEAST (OUTPATIENT)
Dept: ENDOCRINOLOGY | Facility: CLINIC | Age: 29
End: 2019-11-04

## 2019-11-04 DIAGNOSIS — E27.40 ADRENAL INSUFFICIENCY (H): ICD-10-CM

## 2019-11-05 ENCOUNTER — COMMUNICATION - HEALTHEAST (OUTPATIENT)
Dept: NURSING | Facility: CLINIC | Age: 29
End: 2019-11-05

## 2019-11-05 ENCOUNTER — COMMUNICATION - HEALTHEAST (OUTPATIENT)
Dept: CARE COORDINATION | Facility: CLINIC | Age: 29
End: 2019-11-05

## 2019-11-06 ENCOUNTER — COMMUNICATION - HEALTHEAST (OUTPATIENT)
Dept: FAMILY MEDICINE | Facility: CLINIC | Age: 29
End: 2019-11-06

## 2019-11-06 DIAGNOSIS — J45.40 MODERATE PERSISTENT ASTHMA WITHOUT COMPLICATION: ICD-10-CM

## 2019-11-15 ENCOUNTER — AMBULATORY - HEALTHEAST (OUTPATIENT)
Dept: NURSING | Facility: CLINIC | Age: 29
End: 2019-11-15

## 2019-11-15 DIAGNOSIS — Z23 NEED FOR HEPATITIS B VACCINATION: ICD-10-CM

## 2019-11-22 ENCOUNTER — COMMUNICATION - HEALTHEAST (OUTPATIENT)
Dept: NURSING | Facility: CLINIC | Age: 29
End: 2019-11-22

## 2019-11-22 ENCOUNTER — OFFICE VISIT - HEALTHEAST (OUTPATIENT)
Dept: BEHAVIORAL HEALTH | Facility: CLINIC | Age: 29
End: 2019-11-22

## 2019-11-22 DIAGNOSIS — F25.1 SCHIZOAFFECTIVE DISORDER, DEPRESSIVE TYPE, WITH CATATONIA (H): ICD-10-CM

## 2019-11-22 DIAGNOSIS — F06.1 SCHIZOAFFECTIVE DISORDER, DEPRESSIVE TYPE, WITH CATATONIA (H): ICD-10-CM

## 2019-11-25 ENCOUNTER — AMBULATORY - HEALTHEAST (OUTPATIENT)
Dept: CARE COORDINATION | Facility: CLINIC | Age: 29
End: 2019-11-25

## 2019-11-25 DIAGNOSIS — Z65.9 PSYCHOSOCIAL PROBLEM: ICD-10-CM

## 2019-12-02 ENCOUNTER — DOCUMENTATION ONLY (OUTPATIENT)
Dept: CARE COORDINATION | Facility: CLINIC | Age: 29
End: 2019-12-02

## 2019-12-05 ENCOUNTER — COMMUNICATION - HEALTHEAST (OUTPATIENT)
Dept: NURSING | Facility: CLINIC | Age: 29
End: 2019-12-05

## 2019-12-05 ENCOUNTER — COMMUNICATION - HEALTHEAST (OUTPATIENT)
Dept: ENDOCRINOLOGY | Facility: CLINIC | Age: 29
End: 2019-12-05

## 2019-12-05 ENCOUNTER — COMMUNICATION - HEALTHEAST (OUTPATIENT)
Dept: FAMILY MEDICINE | Facility: CLINIC | Age: 29
End: 2019-12-05

## 2019-12-05 DIAGNOSIS — J45.40 MODERATE PERSISTENT ASTHMA WITHOUT COMPLICATION: ICD-10-CM

## 2019-12-05 DIAGNOSIS — E27.40 ADRENAL INSUFFICIENCY (H): ICD-10-CM

## 2019-12-06 ENCOUNTER — COMMUNICATION - HEALTHEAST (OUTPATIENT)
Dept: NURSING | Facility: CLINIC | Age: 29
End: 2019-12-06

## 2019-12-06 ENCOUNTER — AMBULATORY - HEALTHEAST (OUTPATIENT)
Dept: NURSING | Facility: CLINIC | Age: 29
End: 2019-12-06

## 2019-12-09 ENCOUNTER — PRE VISIT (OUTPATIENT)
Dept: ENDOCRINOLOGY | Facility: CLINIC | Age: 29
End: 2019-12-09

## 2019-12-09 ENCOUNTER — COMMUNICATION - HEALTHEAST (OUTPATIENT)
Dept: NURSING | Facility: CLINIC | Age: 29
End: 2019-12-09

## 2019-12-18 ENCOUNTER — OFFICE VISIT - HEALTHEAST (OUTPATIENT)
Dept: FAMILY MEDICINE | Facility: CLINIC | Age: 29
End: 2019-12-18

## 2019-12-18 DIAGNOSIS — R09.81 NASAL CONGESTION: ICD-10-CM

## 2019-12-18 ASSESSMENT — MIFFLIN-ST. JEOR: SCORE: 1571.24

## 2019-12-19 ENCOUNTER — COMMUNICATION - HEALTHEAST (OUTPATIENT)
Dept: NURSING | Facility: CLINIC | Age: 29
End: 2019-12-19

## 2019-12-30 ENCOUNTER — COMMUNICATION - HEALTHEAST (OUTPATIENT)
Dept: ADMINISTRATIVE | Facility: CLINIC | Age: 29
End: 2019-12-30

## 2020-01-05 ENCOUNTER — COMMUNICATION - HEALTHEAST (OUTPATIENT)
Dept: FAMILY MEDICINE | Facility: CLINIC | Age: 30
End: 2020-01-05

## 2020-01-05 ENCOUNTER — COMMUNICATION - HEALTHEAST (OUTPATIENT)
Dept: ENDOCRINOLOGY | Facility: CLINIC | Age: 30
End: 2020-01-05

## 2020-01-05 DIAGNOSIS — E27.40 ADRENAL INSUFFICIENCY (H): ICD-10-CM

## 2020-01-05 DIAGNOSIS — Z91.09 ENVIRONMENTAL ALLERGIES: ICD-10-CM

## 2020-01-05 DIAGNOSIS — E03.8 OTHER SPECIFIED HYPOTHYROIDISM: ICD-10-CM

## 2020-01-06 ENCOUNTER — AMBULATORY - HEALTHEAST (OUTPATIENT)
Dept: BEHAVIORAL HEALTH | Facility: CLINIC | Age: 30
End: 2020-01-06

## 2020-01-06 DIAGNOSIS — F06.1 SCHIZOAFFECTIVE DISORDER, DEPRESSIVE TYPE, WITH CATATONIA (H): ICD-10-CM

## 2020-01-06 DIAGNOSIS — F25.1 SCHIZOAFFECTIVE DISORDER, DEPRESSIVE TYPE, WITH CATATONIA (H): ICD-10-CM

## 2020-01-07 ENCOUNTER — COMMUNICATION - HEALTHEAST (OUTPATIENT)
Dept: NURSING | Facility: CLINIC | Age: 30
End: 2020-01-07

## 2020-01-08 ENCOUNTER — COMMUNICATION - HEALTHEAST (OUTPATIENT)
Dept: CARE COORDINATION | Facility: CLINIC | Age: 30
End: 2020-01-08

## 2020-01-09 ENCOUNTER — COMMUNICATION - HEALTHEAST (OUTPATIENT)
Dept: FAMILY MEDICINE | Facility: CLINIC | Age: 30
End: 2020-01-09

## 2020-01-09 DIAGNOSIS — E27.40 ADRENAL INSUFFICIENCY (H): ICD-10-CM

## 2020-01-10 ENCOUNTER — OFFICE VISIT - HEALTHEAST (OUTPATIENT)
Dept: FAMILY MEDICINE | Facility: CLINIC | Age: 30
End: 2020-01-10

## 2020-01-10 ENCOUNTER — COMMUNICATION - HEALTHEAST (OUTPATIENT)
Dept: NURSING | Facility: CLINIC | Age: 30
End: 2020-01-10

## 2020-01-10 ENCOUNTER — AMBULATORY - HEALTHEAST (OUTPATIENT)
Dept: BEHAVIORAL HEALTH | Facility: CLINIC | Age: 30
End: 2020-01-10

## 2020-01-10 DIAGNOSIS — J45.40 MODERATE PERSISTENT ASTHMA WITHOUT COMPLICATION: ICD-10-CM

## 2020-01-10 DIAGNOSIS — F06.1 SCHIZOAFFECTIVE DISORDER, DEPRESSIVE TYPE, WITH CATATONIA (H): ICD-10-CM

## 2020-01-10 DIAGNOSIS — F25.1 SCHIZOAFFECTIVE DISORDER, DEPRESSIVE TYPE, WITH CATATONIA (H): ICD-10-CM

## 2020-01-10 DIAGNOSIS — J01.90 ACUTE SINUSITIS WITH SYMPTOMS > 10 DAYS: ICD-10-CM

## 2020-01-10 ASSESSMENT — MIFFLIN-ST. JEOR: SCORE: 1567.28

## 2020-01-16 ENCOUNTER — COMMUNICATION - HEALTHEAST (OUTPATIENT)
Dept: FAMILY MEDICINE | Facility: CLINIC | Age: 30
End: 2020-01-16

## 2020-01-16 ENCOUNTER — COMMUNICATION - HEALTHEAST (OUTPATIENT)
Dept: ENDOCRINOLOGY | Facility: CLINIC | Age: 30
End: 2020-01-16

## 2020-01-16 DIAGNOSIS — E27.40 ADRENAL INSUFFICIENCY (H): ICD-10-CM

## 2020-01-16 DIAGNOSIS — R44.0 AUDITORY HALLUCINATION: ICD-10-CM

## 2020-01-29 ENCOUNTER — COMMUNICATION - HEALTHEAST (OUTPATIENT)
Dept: ENDOCRINOLOGY | Facility: CLINIC | Age: 30
End: 2020-01-29

## 2020-01-29 ENCOUNTER — COMMUNICATION - HEALTHEAST (OUTPATIENT)
Dept: FAMILY MEDICINE | Facility: CLINIC | Age: 30
End: 2020-01-29

## 2020-01-29 DIAGNOSIS — E27.40 ADRENAL INSUFFICIENCY (H): ICD-10-CM

## 2020-01-31 ENCOUNTER — COMMUNICATION - HEALTHEAST (OUTPATIENT)
Dept: NURSING | Facility: CLINIC | Age: 30
End: 2020-01-31

## 2020-02-03 ENCOUNTER — OFFICE VISIT (OUTPATIENT)
Dept: ENDOCRINOLOGY | Facility: CLINIC | Age: 30
End: 2020-02-03
Payer: COMMERCIAL

## 2020-02-03 ENCOUNTER — RECORDS - HEALTHEAST (OUTPATIENT)
Dept: ADMINISTRATIVE | Facility: OTHER | Age: 30
End: 2020-02-03

## 2020-02-03 VITALS
WEIGHT: 163.4 LBS | SYSTOLIC BLOOD PRESSURE: 112 MMHG | HEIGHT: 64 IN | DIASTOLIC BLOOD PRESSURE: 79 MMHG | BODY MASS INDEX: 27.9 KG/M2 | HEART RATE: 85 BPM

## 2020-02-03 DIAGNOSIS — Z86.39 HISTORY OF HYPERCALCEMIA: ICD-10-CM

## 2020-02-03 DIAGNOSIS — E27.1 ADDISON'S DISEASE (H): ICD-10-CM

## 2020-02-03 DIAGNOSIS — E03.9 HYPOTHYROIDISM, UNSPECIFIED TYPE: ICD-10-CM

## 2020-02-03 DIAGNOSIS — Z60.3 IMMIGRANT WITH LANGUAGE DIFFICULTY: ICD-10-CM

## 2020-02-03 DIAGNOSIS — E27.40 ADRENAL INSUFFICIENCY (H): ICD-10-CM

## 2020-02-03 DIAGNOSIS — R79.89 HIGH SERUM THYROID STIMULATING HORMONE (TSH): ICD-10-CM

## 2020-02-03 DIAGNOSIS — E27.40 ADRENAL INSUFFICIENCY (H): Primary | ICD-10-CM

## 2020-02-03 LAB
CALCIUM SERPL-MCNC: 9.4 MG/DL (ref 8.5–10.1)
CREAT SERPL-MCNC: 0.76 MG/DL (ref 0.66–1.25)
GFR SERPL CREATININE-BSD FRML MDRD: >90 ML/MIN/{1.73_M2}
PHOSPHATE SERPL-MCNC: 3.4 MG/DL (ref 2.5–4.5)
POTASSIUM SERPL-SCNC: 3.8 MMOL/L (ref 3.4–5.3)
PTH-INTACT SERPL-MCNC: 43 PG/ML (ref 18–80)
SODIUM SERPL-SCNC: 139 MMOL/L (ref 133–144)
T4 FREE SERPL-MCNC: 1.16 NG/DL (ref 0.76–1.46)
TSH SERPL DL<=0.005 MIU/L-ACNC: 0.18 MU/L (ref 0.4–4)

## 2020-02-03 PROCEDURE — 82024 ASSAY OF ACTH: CPT

## 2020-02-03 PROCEDURE — 83970 ASSAY OF PARATHORMONE: CPT

## 2020-02-03 RX ORDER — DIPHENOXYLATE HYDROCHLORIDE AND ATROPINE SULFATE 2.5; .025 MG/1; MG/1
TABLET ORAL
COMMUNITY
Start: 2020-02-01 | End: 2020-02-04

## 2020-02-03 RX ORDER — TRIAMCINOLONE ACETONIDE 1 MG/G
CREAM TOPICAL
COMMUNITY
Start: 2019-08-16 | End: 2021-07-12

## 2020-02-03 RX ORDER — MECLIZINE HCL 25 MG/1
TABLET, CHEWABLE ORAL
COMMUNITY
Start: 2019-11-06 | End: 2021-07-12

## 2020-02-03 RX ORDER — ACETAMINOPHEN 325 MG/1
TABLET ORAL
COMMUNITY
Start: 2019-12-09 | End: 2022-01-26

## 2020-02-03 RX ORDER — FLUTICASONE PROPIONATE 50 MCG
SPRAY, SUSPENSION (ML) NASAL
COMMUNITY
Start: 2019-12-18 | End: 2020-02-04

## 2020-02-03 RX ORDER — FEXOFENADINE HCL 180 MG/1
TABLET ORAL
COMMUNITY
Start: 2020-01-06 | End: 2020-02-04

## 2020-02-03 RX ORDER — ALBUTEROL SULFATE 90 UG/1
2 AEROSOL, METERED RESPIRATORY (INHALATION)
COMMUNITY
Start: 2019-04-11 | End: 2021-08-18

## 2020-02-03 RX ORDER — FLUDROCORTISONE ACETATE 0.1 MG/1
TABLET ORAL
COMMUNITY
Start: 2019-12-05 | End: 2020-02-04

## 2020-02-03 RX ORDER — OLANZAPINE 5 MG/1
TABLET ORAL
COMMUNITY
Start: 2020-01-20 | End: 2020-02-04

## 2020-02-03 RX ORDER — AMOXICILLIN 500 MG/1
CAPSULE ORAL
COMMUNITY
Start: 2020-01-10 | End: 2020-02-04

## 2020-02-03 RX ORDER — ONDANSETRON 4 MG/1
TABLET, ORALLY DISINTEGRATING ORAL
COMMUNITY
Start: 2019-10-09 | End: 2021-07-12

## 2020-02-03 RX ORDER — HYDROCORTISONE 5 MG/1
TABLET ORAL
COMMUNITY
Start: 2020-01-09 | End: 2020-02-04

## 2020-02-03 RX ORDER — PALIPERIDONE 6 MG/1
6 TABLET, EXTENDED RELEASE ORAL
COMMUNITY
Start: 2019-05-08 | End: 2020-02-04

## 2020-02-03 RX ORDER — LOPERAMIDE HCL 2 MG
2 CAPSULE ORAL PRN
COMMUNITY
End: 2020-02-04

## 2020-02-03 RX ORDER — LEVOTHYROXINE SODIUM 100 UG/1
100 TABLET ORAL
COMMUNITY
Start: 2019-06-21 | End: 2020-02-04

## 2020-02-03 RX ORDER — LORATADINE 10 MG/1
10 TABLET ORAL
COMMUNITY
Start: 2019-12-18 | End: 2021-07-29

## 2020-02-03 SDOH — SOCIAL STABILITY - SOCIAL INSECURITY: ACCULTURATION DIFFICULTY: Z60.3

## 2020-02-03 ASSESSMENT — MIFFLIN-ST. JEOR: SCORE: 1617.18

## 2020-02-03 ASSESSMENT — PAIN SCALES - GENERAL: PAINLEVEL: NO PAIN (0)

## 2020-02-03 NOTE — NURSING NOTE
Chief Complaint   Patient presents with     New Patient     hypercalcemia,hypothyroidism      Rola Carrington CMA

## 2020-02-03 NOTE — PROGRESS NOTES
Endocrine Consult note-    Attending Assessment/Plan :     Adrenal insufficiency, primary with history of high ACTH. He is hyperpigmented  But not cushingoid in appearance today.  The med list from the home care RN says he is on HC 20/5 and florinef 0.1 mg/day.  Multiple clinic notes seem to indicate they believe he is on 10 mg AM and 5 mg PM HC.  The HE hdyrocortisone order appears to be for only 5 mg tablets given as 10 mg AM and 5 mg PM.    Labs to include renin, ACTH.    Addendum 2/5/2020: I have now been told that the dose of HC he was being given by homecare is 10/5.  (ie the MAR they submitted to us was wrong).  Renin is pending.  I am going to increase HC to 10 and 10. The 2nd dose should be given afternoon no sooner than 8 hours after the AM dose.     Immigrant with language barrier.  He is on unknown treatment through home care nurse who did not send med list until well after the appt.    I have a concern a further concern about discrepancy between the home care nurse med list and the [facility] med list, which do not align.   We will reach out to his home are nurse and see if she can provide an explanation for this.      Addendum: see my other clinic notes regarding his med managmeent  I was notified on 2/11/19 that he was out of [facility] since December!!!!!!!  I am very concerned at this late information as this was not conveyed in earlier communications with his home care nurse.      Hypothyroidism by history.  He looks hypothyriod except he is warm.  Lans following the appt show low TSH with normal free T4 on LT4.  T3 was not measured.   I will reduce the LT4- treat to normal target TSH.      Pituitary related questions.  Discrepant reports on outside past MRIs.  I will need to get the images pushed from HE so I can review, including 7/24/17 and 6/25/19 brain     Maddison Witt MD    Chief complaint:  Toe is a 29 year old male seen in consultation at the request of Dr Jose Rangel for hypercalcemia,  adrenal insufficiency hypothyroidism.   I have reviewed Care Everywhere including  [facility] lab reports, imaging reports and provider notes as indicated.      HISTORY OF PRESENT ILLNESS  Brooke was seen today along with . He presented, alone, without med list which we did not retrieve until over an hour after the appt had ended.     Toe had previously been seen by endocrinologist Dr Moisés Schmidt.  He last saw him 12/21/18. At that time he was on HC 20/10 and florinef 0.2, LT4 88 mcg/day.  He looked cushingoid.  He was advised to decrease HC to 10/5 and decrease florinef to 0.1 . LT4 changed to 100 mg/day on 6/21/2019.  He last saw dr Rangel 9/10/19 for toenail infection     The original diagnosis of hypothyroidism is not well documented on care everywhere.   The original diagnosis of adrenal insufficiency was 7/2017. He had high ACTH with low cortisol, failure to respond to cortrosyn stimulation testing.      The med list we retrieved following the appt lists the HC dose as 20 /5 which is higher than had been prescribed by Dr Schmidt. The [facility] med list has the HC as 10/5.      7/12/17: Na 129, K 4.7, Cl 97, C02 18, creatinine 1.47, lactate 1.9  7/24/17: ACTH 1845  7/25/17 cortrosyn stimulatin test : cortisol < 1 -- 1.4-- 1.8  7/29/17: TSH 2.12  12/19/17 LT4 dose increased from 75 to 88 mc/day by Dr Schmidt  11/30/18: 25OHD 5.8, PTH 82, Ca 9.9, creatinine 0.85  12/4/18: prolactin 21.3, Na 146, K 3.9, Cl 108, Co2 27, creatinine 0.78  3/26/19: TSH 6.24, free T4 1.2,   5/16/19:L Ca 9.8, Co2 21, Na 138, K 3.9, creatinine 0.79, TSH 0.43, free T4 1.3, B12 527  9/10/19 Na 137, K 4.5, Co2 18, Ca 11, creatinine 1.07  9/16/19 iCa 1.16    Imaging  7/24/17 CT abdomen: atrophic adrenal glands.   7/24/17 Brain MRI: 7 mm nodule within pituitary, hypoenhancing  6/25/19: MRI brain: normal - no comment on pituitary    REVIEW OF SYSTEMS  Stuffy nose/ runny nose.   Getting better but still have dizziness  Can't sleep  well  Bedtime ? Don't know.   Doesn't know about weight  Lost appetite  Skin color unchanged?  Don't know  Cardiac: negative  Respiratory: catching cold  GI: nausea; no vomiting;   Dizziness - last had this this AM, came on while he was sitting.  It lasted throughout the day - he has it now.   By dizziness he means head spinnign and feels he will pass out.    Reclining makes it worse; worse in bed at night;  Cane is because of the dizziness -worried he will pass out and fall down  He has passed out and fallen down in the past - ? One week ago was last time, he didn't pass out but he went down.   Strength is weak, tired  Headache   Back pain  Asking for med for dizzines and for cold.   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      Medications    Current Outpatient Medications   Medication Sig Dispense Refill     acetaminophen (TYLENOL) 325 MG tablet TAKE 1-2 TABLETS (650 MG TOTAL) BY MOUTH EVERY 6 (SIX) HOURS AS NEEDED FOR PAIN.       albuterol (PROAIR HFA/PROVENTIL HFA/VENTOLIN HFA) 108 (90 Base) MCG/ACT inhaler Inhale 2 puffs into the lungs       Ergocalciferol 50 MCG (2000 UT) TABS Take 2,000 Units by mouth daily       fexofenadine (ALLEGRA) 180 MG tablet Take 1 tablet (180 mg) by mouth daily       fludrocortisone (FLORINEF) 0.1 MG tablet Take 1 tablet (0.1 mg) by mouth daily       fluticasone (FLONASE) 50 MCG/ACT nasal spray Spray 1 spray into both nostrils daily as needed for rhinitis or allergies       guaiFENesin-dextromethorphan (ROBITUSSIN DM) 100-10 MG/5ML syrup Take 5 mLs by mouth 3 times daily as needed for cough       hydrocortisone (CORTEF)  10 MG tablet Take 2 tablets (20 mg) by mouth every morning Take extra 20 mg AM x 3 days if feeling sick       hydrocortisone (CORTEF) 5 MG tablet Take 1 tablet (5 mg) by mouth At Bedtime Take extra 10 mg at bedtime x 3 days if feeling sick       levothyroxine (SYNTHROID/LEVOTHROID) 100 MCG tablet Take 1 tablet (100 mcg) by mouth       loperamide (IMODIUM) 2 MG capsule Take 1 capsule (2 mg) by mouth as needed for diarrhea Take 1 capsule up to 3 times a day as needed       loratadine (CLARITIN) 10 MG tablet Take 10 mg by mouth       meclizine (ANTIVERT) 25 MG tablet Take 1 tablet (25 mg) by mouth 3 times daily as needed for dizziness       mometasone (ASMANEX, 60 METERED DOSES,) 220 MCG/INH inhaler INHALE 1 PUFF BY MOUTH 2 (TWO) TIMES A DAY.       Multiple Vitamin (MULTI-VITAMINS) TABS Take 1 tablet by mouth daily       OLANZapine (ZYPREXA) 5 MG tablet Take 1 tablet (5 mg) by mouth every morning       ondansetron (ZOFRAN-ODT) 4 MG ODT tab DISSOLVE 1 TABLET (4 MG TOTAL) BY MOUTH EVERY 8 (EIGHT) HOURS AS NEEDED FOR NAUSEA.       paliperidone ER (INVEGA) 6 MG 24 hr tablet Take 1 tablet (6 mg) by mouth At Bedtime       TRAVEL SICKNESS 25 MG CHEW        triamcinolone (KENALOG) 0.1 % external cream Apply thin layer to affected areas twice daily as needed       I Have personally gone over the home care med list item by item in establishing the historical med list today.      Allergies  No Known Allergies    Family History  family history includes Diabetes in his mother.  Toe doesn't know family history    Social History  Social History     Tobacco Use     Smoking status: Not on file   Substance Use Topics     Alcohol use: Not on file     Drug use: Not on file     Lives with parents; he does not live in a group home.  Dad does the cooking. Parents help him remember his medicine  [employer] phone [phone]; fax [phone] voice mail: [phone]; nurse Geeta Mojica RN [phone]; address [address] [place] [address]  "[id number]  [facility] Nurse gives him pills every 2 weeks prepared for every day; he takes pills 2 times/day at morning and afternoon.  He took once today.    He spends the days at home - watch TV, walking around.  NO smoking; ETOH : none  [place] refugee cam[place] to [place]  ARM worker reads English    Physical Exam  /79   Pulse 85   Ht 1.626 m (5' 4\")   Wt 74.1 kg (163 lb 6.4 oz)   BMI 28.05 kg/m    Body mass index is 28.05 kg/m .  GENERAL :  In no apparent distress, still, no eye contact; head slightly bent forward.  Cane.    SKIN: hyperpigmented knuckles and palmar creases  Normal c temperature, texture.      EYES: PERRL, EOMI, No scleral icterus,  No proptosis, conjunctival redness, stare, retraction  MOUTH: Moist, pink; pharynx not visualized due to poor mouth opening and tongue.  Buccal mucosa suboptimaly visutalzed for the same reason  NECK: No visible masses. No palpable adenopathy, or masses. No carotid bruits.   THYROID:  Not palpable   RESP: Lungs clear to auscultation bilaterally  CARDIAC: Regular rate and rhythm, normal S1 S2, without murmurs, rubs or gallops    ABDOMEN: Normal bowel sounds; soft, nontender, no HSM or masses       NEURO: awake, alert, Poor historian,   Cranial nerves intact.  Moves all extremities; Gait normal.  No tremor of the outstretched hand.  DTRs   o/4 ,   EXTREMITIES: No clubbing, cyanosis or edema.    DATA REVIEW      Results for [employer] T (MRN [id number]) as of 2/3/2020 22:51   Ref. Range 2/3/2020 16:27   Sodium Latest Ref Range: 133 - 144 mmol/L 139   Potassium Latest Ref Range: 3.4 - 5.3 mmol/L 3.8   Creatinine Latest Ref Range: 0.66 - 1.25 mg/dL 0.76   GFR Estimate Latest Ref Range: >60 mL/min/1.73_m2 >90   GFR Estimate If Black Latest Ref Range: >60 mL/min/1.73_m2 >90   Calcium Latest Ref Range: 8.5 - 10.1 mg/dL 9.4   Phosphorus Latest Ref Range: 2.5 - 4.5 mg/dL 3.4   T4 Free Latest Ref Range: 0.76 - 1.46 ng/dL 1.16   TSH Latest Ref Range: 0.40 - 4.00 mU/L " 0.18 (L)   Parathyroid Hormone Intact Latest Ref Range: 18 - 80 pg/mL 43

## 2020-02-03 NOTE — PATIENT INSTRUCTIONS
Labs today    We need the home care nurse to provide an accurate and up to date medication list for each doctor appointment visit.    We cannot make any medication changes without knowing exactly what is currently being taken.

## 2020-02-03 NOTE — LETTER
2/3/2020       RE: Brooke SCHAEFER Po  [address]  [address]  [place] [address]     Dear Colleague,    Thank you for referring your patient, Brooke Peterson, to the [facility] ENDOCRINOLOGY at [facility]. Please see a copy of my visit note below.    Endocrine Consult note-    Attending Assessment/Plan :     Adrenal insufficiency, primary with history of high ACTH. He is hyperpigmented  But not cushingoid in appearance today.  The med list from the home care RN says he is on HC 20/5 and florinef 0.1 mg/day.  Multiple clinic notes seem to indicate they believe he is on 10 mg AM and 5 mg PM HC.  The HE hdyrocortisone order appears to be for only 5 mg tablets given as 10 mg AM and 5 mg PM.    Labs to include renin, ACTH.    Addendum 2/5/2020: I have now been told that the dose of HC he was being given by homecare is 10/5.  (ie the MAR they submitted to us was wrong).  Renin is pending.  I am going to increase HC to 10 and 10. The 2nd dose should be given afternoon no sooner than 8 hours after the AM dose.     Immigrant with language barrier.  He is on unknown treatment through home care nurse who did not send med list until well after the appt.    I have a concern a further concern about discrepancy between the home care nurse med list and the [facility] med list, which do not align.   We will reach out to his home are nurse and see if she can provide an explanation for this.      Hypothyroidism by history.  He looks hypothyriod except he is warm.  Lans following the appt show low TSH with normal free T4 on LT4.  T3 was not measured.   I will reduce the LT4- treat to normal target TSH.      Pituitary related questions.  Discrepant reports on outside past MRIs.  I will need to get the images pushed from HE so I can review, including 7/24/17 and 6/25/19 brain     Maddison Witt MD    Chief complaint:  Brooke is a 29 year old male seen in consultation at the request of Dr Jose Rangel for  hypercalcemia, adrenal insufficiency hypothyroidism.   I have reviewed Care Everywhere including  [facility] lab reports, imaging reports and provider notes as indicated.      HISTORY OF PRESENT ILLNESS  Toe was seen today along with . He presented, alone, without med list which we did not retrieve until over an hour after the appt had ended.     Toe had previously been seen by endocrinologist Dr Moisés Schmidt.  He last saw him 12/21/18. At that time he was on HC 20/10 and florinef 0.2, LT4 88 mcg/day.  He looked cushingoid.  He was advised to decrease HC to 10/5 and decrease florinef to 0.1 . LT4 changed to 100 mg/day on 6/21/2019.  He last saw dr Rangel 9/10/19 for toenail infection     The original diagnosis of hypothyroidism is not well documented on care everywhere.   The original diagnosis of adrenal insufficiency was 7/2017. He had high ACTH with low cortisol, failure to respond to cortrosyn stimulation testing.      The med list we retrieved following the appt lists the HC dose as 20 /5 which is higher than had been prescribed by Dr Schmidt. The [facility] med list has the HC as 10/5.      7/12/17: Na 129, K 4.7, Cl 97, C02 18, creatinine 1.47, lactate 1.9  7/24/17: ACTH 1845  7/25/17 cortrosyn stimulatin test : cortisol < 1 -- 1.4-- 1.8  7/29/17: TSH 2.12  12/19/17 LT4 dose increased from 75 to 88 mc/day by Dr Schmidt  11/30/18: 25OHD 5.8, PTH 82, Ca 9.9, creatinine 0.85  12/4/18: prolactin 21.3, Na 146, K 3.9, Cl 108, Co2 27, creatinine 0.78  3/26/19: TSH 6.24, free T4 1.2,   5/16/19:L Ca 9.8, Co2 21, Na 138, K 3.9, creatinine 0.79, TSH 0.43, free T4 1.3, B12 527  9/10/19 Na 137, K 4.5, Co2 18, Ca 11, creatinine 1.07  9/16/19 iCa 1.16    Imaging  7/24/17 CT abdomen: atrophic adrenal glands.   7/24/17 Brain MRI: 7 mm nodule within pituitary, hypoenhancing  6/25/19: MRI brain: normal - no comment on pituitary    REVIEW OF SYSTEMS  Stuffy nose/ runny nose.   Getting better but still have  dizziness  Can't sleep well  Bedtime ? Don't know.   Doesn't know about weight  Lost appetite  Skin color unchanged?  Don't know  Cardiac: negative  Respiratory: catching cold  GI: nausea; no vomiting;   Dizziness - last had this this AM, came on while he was sitting.  It lasted throughout the day - he has it now.   By dizziness he means head spinnign and feels he will pass out.    Reclining makes it worse; worse in bed at night;  Cane is because of the dizziness -worried he will pass out and fall down  He has passed out and fallen down in the past - ? One week ago was last time, he didn't pass out but he went down.   Strength is weak, tired  Headache   Back pain  Asking for med for dizzines and for cold.   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      Medications    Current Outpatient Medications   Medication Sig Dispense Refill     acetaminophen (TYLENOL) 325 MG tablet TAKE 1-2 TABLETS (650 MG TOTAL) BY MOUTH EVERY 6 (SIX) HOURS AS NEEDED FOR PAIN.       albuterol (PROAIR HFA/PROVENTIL HFA/VENTOLIN HFA) 108 (90 Base) MCG/ACT inhaler Inhale 2 puffs into the lungs       Ergocalciferol 50 MCG (2000 UT) TABS Take 2,000 Units by mouth daily       fexofenadine (ALLEGRA) 180 MG tablet Take 1 tablet (180 mg) by mouth daily       fludrocortisone (FLORINEF) 0.1 MG tablet Take 1 tablet (0.1 mg) by mouth daily       fluticasone (FLONASE) 50 MCG/ACT nasal spray Spray 1 spray into both nostrils daily as needed for rhinitis or allergies       guaiFENesin-dextromethorphan (ROBITUSSIN DM) 100-10 MG/5ML syrup Take 5 mLs by mouth 3 times daily as needed for cough        hydrocortisone (CORTEF) 10 MG tablet Take 2 tablets (20 mg) by mouth every morning Take extra 20 mg AM x 3 days if feeling sick       hydrocortisone (CORTEF) 5 MG tablet Take 1 tablet (5 mg) by mouth At Bedtime Take extra 10 mg at bedtime x 3 days if feeling sick       levothyroxine (SYNTHROID/LEVOTHROID) 100 MCG tablet Take 1 tablet (100 mcg) by mouth       loperamide (IMODIUM) 2 MG capsule Take 1 capsule (2 mg) by mouth as needed for diarrhea Take 1 capsule up to 3 times a day as needed       loratadine (CLARITIN) 10 MG tablet Take 10 mg by mouth       meclizine (ANTIVERT) 25 MG tablet Take 1 tablet (25 mg) by mouth 3 times daily as needed for dizziness       mometasone (ASMANEX, 60 METERED DOSES,) 220 MCG/INH inhaler INHALE 1 PUFF BY MOUTH 2 (TWO) TIMES A DAY.       Multiple Vitamin (MULTI-VITAMINS) TABS Take 1 tablet by mouth daily       OLANZapine (ZYPREXA) 5 MG tablet Take 1 tablet (5 mg) by mouth every morning       ondansetron (ZOFRAN-ODT) 4 MG ODT tab DISSOLVE 1 TABLET (4 MG TOTAL) BY MOUTH EVERY 8 (EIGHT) HOURS AS NEEDED FOR NAUSEA.       paliperidone ER (INVEGA) 6 MG 24 hr tablet Take 1 tablet (6 mg) by mouth At Bedtime       TRAVEL SICKNESS 25 MG CHEW        triamcinolone (KENALOG) 0.1 % external cream Apply thin layer to affected areas twice daily as needed       I Have personally gone over the home care med list item by item in establishing the historical med list today.      Allergies  No Known Allergies    Family History  family history includes Diabetes in his mother.  Toe doesn't know family history    Social History  Social History     Tobacco Use     Smoking status: Not on file   Substance Use Topics     Alcohol use: Not on file     Drug use: Not on file     Lives with parents; he does not live in a group home.  Dad does the cooking. Parents help him remember his medicine  [employer] phone [phone]; fax [phone] voice mail: [phone]; nurse Geeta Mojica RN [phone]; address [address] [place] [address]  " [facility] [address]  [employer] Nurse gives him pills every 2 weeks prepared for every day; he takes pills 2 times/day at morning and afternoon.  He took once today.    He spends the days at home - watch TV, walking around.  NO smoking; ETOH : none  [place] refugee cam[place] to [place]  ARM worker reads English    Physical Exam  /79   Pulse 85   Ht 1.626 m (5' 4\")   Wt 74.1 kg (163 lb 6.4 oz)   BMI 28.05 kg/m     Body mass index is 28.05 kg/m .  GENERAL :  In no apparent distress, still, no eye contact; head slightly bent forward.  Cane.    SKIN: hyperpigmented knuckles and palmar creases  Normal c temperature, texture.      EYES: PERRL, EOMI, No scleral icterus,  No proptosis, conjunctival redness, stare, retraction  MOUTH: Moist, pink; pharynx not visualized due to poor mouth opening and tongue.  Buccal mucosa suboptimaly visutalzed for the same reason  NECK: No visible masses. No palpable adenopathy, or masses. No carotid bruits.   THYROID:  Not palpable   RESP: Lungs clear to auscultation bilaterally  CARDIAC: Regular rate and rhythm, normal S1 S2, without murmurs, rubs or gallops    ABDOMEN: Normal bowel sounds; soft, nontender, no HSM or masses       NEURO: awake, alert, Poor historian,   Cranial nerves intact.  Moves all extremities; Gait normal.  No tremor of the outstretched hand.  DTRs   o/4 ,   EXTREMITIES: No clubbing, cyanosis or edema.    DATA REVIEW      Results for PO, TOE T (MRN [id number]) as of 2/3/2020 22:51   Ref. Range 2/3/2020 16:27   Sodium Latest Ref Range: 133 - 144 mmol/L 139   Potassium Latest Ref Range: 3.4 - 5.3 mmol/L 3.8   Creatinine Latest Ref Range: 0.66 - 1.25 mg/dL 0.76   GFR Estimate Latest Ref Range: >60 mL/min/1.73_m2 >90   GFR Estimate If Black Latest Ref Range: >60 mL/min/1.73_m2 >90   Calcium Latest Ref Range: 8.5 - 10.1 mg/dL 9.4   Phosphorus Latest Ref Range: 2.5 - 4.5 mg/dL 3.4   T4 Free Latest Ref Range: 0.76 - 1.46 ng/dL 1.16   TSH Latest Ref Range: 0.40 - " 4.00 mU/L 0.18 (L)   Parathyroid Hormone Intact Latest Ref Range: 18 - 80 pg/mL 43       Again, thank you for allowing me to participate in the care of your patient.      Sincerely,    Maddison Witt MD

## 2020-02-03 NOTE — LETTER
2/3/2020      RE: Brooke T Po  [address]  [address]  [place] [address]     Endocrine Consult note-    Attending Assessment/Plan :     Adrenal insufficiency, primary with history of high ACTH. He is hyperpigmented  But not cushingoid in appearance today.  The med list from the home care RN says he is on HC 20/5 and florinef 0.1 mg/day.  Multiple clinic notes seem to indicate they believe he is on 10 mg AM and 5 mg PM HC.  The HE hdyrocortisone order appears to be for only 5 mg tablets given as 10 mg AM and 5 mg PM.    Labs to include renin, ACTH.    Addendum 2/5/2020: I have now been told that the dose of HC he was being given by homecare is 10/5.  (ie the MAR they submitted to us was wrong).  Renin is pending.  I am going to increase HC to 10 and 10. The 2nd dose should be given afternoon no sooner than 8 hours after the AM dose.     Immigrant with language barrier.  He is on unknown treatment through home care nurse who did not send med list until well after the appt.    I have a concern a further concern about discrepancy between the home care nurse med list and the [facility] med list, which do not align.   We will reach out to his home are nurse and see if she can provide an explanation for this.      Hypothyroidism by history.  He looks hypothyriod except he is warm.  Lans following the appt show low TSH with normal free T4 on LT4.  T3 was not measured.   I will reduce the LT4- treat to normal target TSH.      Pituitary related questions.  Discrepant reports on outside past MRIs.  I will need to get the images pushed from HE so I can review, including 7/24/17 and 6/25/19 brain     Maddison Witt MD    Chief complaint:  Brooke is a 29 year old male seen in consultation at the request of Dr Jose Rangel for hypercalcemia, adrenal insufficiency hypothyroidism.   I have reviewed Care Everywhere including  [facility] lab reports, imaging reports and provider notes as indicated.      HISTORY OF PRESENT  ILLNESS  Toe was seen today along with . He presented, alone, without med list which we did not retrieve until over an hour after the appt had ended.     Toe had previously been seen by endocrinologist Dr Moisés Schmidt.  He last saw him 12/21/18. At that time he was on HC 20/10 and florinef 0.2, LT4 88 mcg/day.  He looked cushingoid.  He was advised to decrease HC to 10/5 and decrease florinef to 0.1 . LT4 changed to 100 mg/day on 6/21/2019.  He last saw dr Rangel 9/10/19 for toenail infection     The original diagnosis of hypothyroidism is not well documented on care everywhere.   The original diagnosis of adrenal insufficiency was 7/2017. He had high ACTH with low cortisol, failure to respond to cortrosyn stimulation testing.      The med list we retrieved following the appt lists the HC dose as 20 /5 which is higher than had been prescribed by Dr Schmidt. The [facility] med list has the HC as 10/5.      7/12/17: Na 129, K 4.7, Cl 97, C02 18, creatinine 1.47, lactate 1.9  7/24/17: ACTH 1845  7/25/17 cortrosyn stimulatin test : cortisol < 1 -- 1.4-- 1.8  7/29/17: TSH 2.12  12/19/17 LT4 dose increased from 75 to 88 mc/day by Dr Schmidt  11/30/18: 25OHD 5.8, PTH 82, Ca 9.9, creatinine 0.85  12/4/18: prolactin 21.3, Na 146, K 3.9, Cl 108, Co2 27, creatinine 0.78  3/26/19: TSH 6.24, free T4 1.2,   5/16/19:L Ca 9.8, Co2 21, Na 138, K 3.9, creatinine 0.79, TSH 0.43, free T4 1.3, B12 527  9/10/19 Na 137, K 4.5, Co2 18, Ca 11, creatinine 1.07  9/16/19 iCa 1.16    Imaging  7/24/17 CT abdomen: atrophic adrenal glands.   7/24/17 Brain MRI: 7 mm nodule within pituitary, hypoenhancing  6/25/19: MRI brain: normal - no comment on pituitary    REVIEW OF SYSTEMS  Stuffy nose/ runny nose.   Getting better but still have dizziness  Can't sleep well  Bedtime ? Don't know.   Doesn't know about weight  Lost appetite  Skin color unchanged?  Don't know  Cardiac: negative  Respiratory: catching cold  GI: nausea; no vomiting;    Dizziness - last had this this AM, came on while he was sitting.  It lasted throughout the day - he has it now.   By dizziness he means head spinnign and feels he will pass out.    Reclining makes it worse; worse in bed at night;  Cane is because of the dizziness -worried he will pass out and fall down  He has passed out and fallen down in the past - ? One week ago was last time, he didn't pass out but he went down.   Strength is weak, tired  Headache   Back pain  Asking for med for dizzines and for cold.   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      Medications    Current Outpatient Medications   Medication Sig Dispense Refill     acetaminophen (TYLENOL) 325 MG tablet TAKE 1-2 TABLETS (650 MG TOTAL) BY MOUTH EVERY 6 (SIX) HOURS AS NEEDED FOR PAIN.       albuterol (PROAIR HFA/PROVENTIL HFA/VENTOLIN HFA) 108 (90 Base) MCG/ACT inhaler Inhale 2 puffs into the lungs       Ergocalciferol 50 MCG (2000 UT) TABS Take 2,000 Units by mouth daily       fexofenadine (ALLEGRA) 180 MG tablet Take 1 tablet (180 mg) by mouth daily       fludrocortisone (FLORINEF) 0.1 MG tablet Take 1 tablet (0.1 mg) by mouth daily       fluticasone (FLONASE) 50 MCG/ACT nasal spray Spray 1 spray into both nostrils daily as needed for rhinitis or allergies       guaiFENesin-dextromethorphan (ROBITUSSIN DM) 100-10 MG/5ML syrup Take 5 mLs by mouth 3 times daily as needed for cough       hydrocortisone (CORTEF) 10 MG tablet Take 2 tablets (20 mg) by mouth every morning Take extra 20 mg AM x 3 days if feeling sick       hydrocortisone (CORTEF) 5 MG tablet Take 1 tablet (5 mg) by mouth At  Bedtime Take extra 10 mg at bedtime x 3 days if feeling sick       levothyroxine (SYNTHROID/LEVOTHROID) 100 MCG tablet Take 1 tablet (100 mcg) by mouth       loperamide (IMODIUM) 2 MG capsule Take 1 capsule (2 mg) by mouth as needed for diarrhea Take 1 capsule up to 3 times a day as needed       loratadine (CLARITIN) 10 MG tablet Take 10 mg by mouth       meclizine (ANTIVERT) 25 MG tablet Take 1 tablet (25 mg) by mouth 3 times daily as needed for dizziness       mometasone (ASMANEX, 60 METERED DOSES,) 220 MCG/INH inhaler INHALE 1 PUFF BY MOUTH 2 (TWO) TIMES A DAY.       Multiple Vitamin (MULTI-VITAMINS) TABS Take 1 tablet by mouth daily       OLANZapine (ZYPREXA) 5 MG tablet Take 1 tablet (5 mg) by mouth every morning       ondansetron (ZOFRAN-ODT) 4 MG ODT tab DISSOLVE 1 TABLET (4 MG TOTAL) BY MOUTH EVERY 8 (EIGHT) HOURS AS NEEDED FOR NAUSEA.       paliperidone ER (INVEGA) 6 MG 24 hr tablet Take 1 tablet (6 mg) by mouth At Bedtime       TRAVEL SICKNESS 25 MG CHEW        triamcinolone (KENALOG) 0.1 % external cream Apply thin layer to affected areas twice daily as needed       I Have personally gone over the home care med list item by item in establishing the historical med list today.      Allergies  No Known Allergies    Family History  family history includes Diabetes in his mother.  Toe doesn't know family history    Social History  Social History     Tobacco Use     Smoking status: Not on file   Substance Use Topics     Alcohol use: Not on file     Drug use: Not on file     Lives with parents; he does not live in a group home.  Dad does the cooking. Parents help him remember his medicine  [employer] phone [phone]; fax [phone] voice mail: [phone]; nurse Geeta Mojica RN [phone]; address [address] [place], [place] [id number]  [employer] Nurse gives him pills every 2 weeks prepared for every day; he takes pills 2 times/day at morning and afternoon.  He took once today.    He spends the days at home -  "watch TV, walking around.  NO smoking; ETOH : none  [place] refugee cam;[place] to [place]  ARM worker reads English    Physical Exam  /79   Pulse 85   Ht 1.626 m (5' 4\")   Wt 74.1 kg (163 lb 6.4 oz)   BMI 28.05 kg/m     Body mass index is 28.05 kg/m .  GENERAL :  In no apparent distress, still, no eye contact; head slightly bent forward.  Cane.    SKIN: hyperpigmented knuckles and palmar creases  Normal c temperature, texture.      EYES: PERRL, EOMI, No scleral icterus,  No proptosis, conjunctival redness, stare, retraction  MOUTH: Moist, pink; pharynx not visualized due to poor mouth opening and tongue.  Buccal mucosa suboptimaly visutalzed for the same reason  NECK: No visible masses. No palpable adenopathy, or masses. No carotid bruits.   THYROID:  Not palpable   RESP: Lungs clear to auscultation bilaterally  CARDIAC: Regular rate and rhythm, normal S1 S2, without murmurs, rubs or gallops    ABDOMEN: Normal bowel sounds; soft, nontender, no HSM or masses       NEURO: awake, alert, Poor historian,   Cranial nerves intact.  Moves all extremities; Gait normal.  No tremor of the outstretched hand.  DTRs   o/4 ,   EXTREMITIES: No clubbing, cyanosis or edema.    DATA REVIEW      Results for [employer] [facility] (MRN [id number]) as of 2/3/2020 22:51   Ref. Range 2/3/2020 16:27   Sodium Latest Ref Range: 133 - 144 mmol/L 139   Potassium Latest Ref Range: 3.4 - 5.3 mmol/L 3.8   Creatinine Latest Ref Range: 0.66 - 1.25 mg/dL 0.76   GFR Estimate Latest Ref Range: >60 mL/min/1.73_m2 >90   GFR Estimate If Black Latest Ref Range: >60 mL/min/1.73_m2 >90   Calcium Latest Ref Range: 8.5 - 10.1 mg/dL 9.4   Phosphorus Latest Ref Range: 2.5 - 4.5 mg/dL 3.4   T4 Free Latest Ref Range: 0.76 - 1.46 ng/dL 1.16   TSH Latest Ref Range: 0.40 - 4.00 mU/L 0.18 (L)   Parathyroid Hormone Intact Latest Ref Range: 18 - 80 pg/mL 43       Maddison Witt MD    "

## 2020-02-04 ENCOUNTER — TELEPHONE (OUTPATIENT)
Dept: ENDOCRINOLOGY | Facility: CLINIC | Age: 30
End: 2020-02-04

## 2020-02-04 PROBLEM — E27.1 ADDISON'S DISEASE (H): Status: ACTIVE | Noted: 2020-02-04

## 2020-02-04 PROBLEM — Z86.39 HISTORY OF HYPERCALCEMIA: Status: ACTIVE | Noted: 2020-02-04

## 2020-02-04 PROBLEM — Z60.3 IMMIGRANT WITH LANGUAGE DIFFICULTY: Status: ACTIVE | Noted: 2020-02-04

## 2020-02-04 PROBLEM — E27.40 ADRENAL INSUFFICIENCY (H): Status: ACTIVE | Noted: 2020-02-04

## 2020-02-04 RX ORDER — GUAIFENESIN/DEXTROMETHORPHAN 100-10MG/5
5 SYRUP ORAL 3 TIMES DAILY PRN
COMMUNITY
Start: 2020-02-04 | End: 2021-07-12

## 2020-02-04 RX ORDER — HYDROCORTISONE 10 MG/1
20 TABLET ORAL EVERY MORNING
COMMUNITY
Start: 2020-02-04 | End: 2020-02-05 | Stop reason: DRUGHIGH

## 2020-02-04 RX ORDER — MECLIZINE HYDROCHLORIDE 25 MG/1
25 TABLET ORAL 3 TIMES DAILY PRN
COMMUNITY
Start: 2020-02-04 | End: 2022-03-22

## 2020-02-04 RX ORDER — HYDROCORTISONE 5 MG/1
5 TABLET ORAL AT BEDTIME
COMMUNITY
Start: 2017-07-31 | End: 2020-02-04

## 2020-02-04 RX ORDER — OLANZAPINE 5 MG/1
5 TABLET ORAL EVERY MORNING
COMMUNITY
Start: 2020-02-04 | End: 2021-09-21

## 2020-02-04 RX ORDER — LOPERAMIDE HCL 2 MG
2 CAPSULE ORAL PRN
COMMUNITY
Start: 2018-11-19 | End: 2021-07-12

## 2020-02-04 RX ORDER — MECLIZINE HYDROCHLORIDE 25 MG/1
TABLET ORAL
COMMUNITY
Start: 2020-02-04 | End: 2020-02-04

## 2020-02-04 RX ORDER — FLUDROCORTISONE ACETATE 0.1 MG/1
0.1 TABLET ORAL DAILY
COMMUNITY
Start: 2020-02-04 | End: 2020-02-04

## 2020-02-04 RX ORDER — FLUTICASONE PROPIONATE 50 MCG
1 SPRAY, SUSPENSION (ML) NASAL DAILY PRN
COMMUNITY
Start: 2020-02-04 | End: 2022-06-14

## 2020-02-04 RX ORDER — FEXOFENADINE HCL 180 MG/1
180 TABLET ORAL DAILY
COMMUNITY
Start: 2020-02-04 | End: 2020-02-04

## 2020-02-04 RX ORDER — FLUDROCORTISONE ACETATE 0.1 MG/1
0.1 TABLET ORAL DAILY
COMMUNITY
Start: 2019-01-02 | End: 2020-02-11

## 2020-02-04 RX ORDER — LEVOTHYROXINE SODIUM 100 UG/1
100 TABLET ORAL
COMMUNITY
Start: 2019-11-14 | End: 2020-02-05 | Stop reason: DRUGHIGH

## 2020-02-04 RX ORDER — HYDROCORTISONE 5 MG/1
5 TABLET ORAL AT BEDTIME
COMMUNITY
Start: 2020-02-04 | End: 2020-02-05 | Stop reason: DRUGHIGH

## 2020-02-04 RX ORDER — HYDROCORTISONE 10 MG/1
20 TABLET ORAL EVERY MORNING
COMMUNITY
Start: 2017-07-31 | End: 2020-02-04

## 2020-02-04 RX ORDER — PALIPERIDONE 6 MG/1
6 TABLET, EXTENDED RELEASE ORAL AT BEDTIME
COMMUNITY
Start: 2020-02-04 | End: 2022-03-22

## 2020-02-04 RX ORDER — FEXOFENADINE HCL 180 MG/1
180 TABLET ORAL DAILY
COMMUNITY
Start: 2019-05-20 | End: 2021-07-26

## 2020-02-04 RX ORDER — DIPHENOXYLATE HYDROCHLORIDE AND ATROPINE SULFATE 2.5; .025 MG/1; MG/1
1 TABLET ORAL DAILY
COMMUNITY
Start: 2019-09-24 | End: 2021-09-21

## 2020-02-04 NOTE — TELEPHONE ENCOUNTER
Please call his home care nurse with the following orders (read them to her and see what she says)  and also fax this note to the home care agency:   1.   Change levothyroxine from 100 mcg/day to 88 mcg/day.    2.  Labs in 2 months: TSH, free T4.  Be sure to send results to Dr Witt  3.  Please investigate the hydrocortisone dose discrepancy between your order set provided yesterday and the orders I can see in the [facility] system.    The orders I was given state he is on hydrocortisone 20 mg AM and 5 mg at HS.  The [facility] orders are for 10 mg AM and 5 mg at HS.   Which has he actually been getting?  IF it important for me to understand what has actually been happening to make the correct changes.  Either answer is correct as long as it is the truth.  This will require a review of his pill bottles and possibly a call to the pharmacy that is providing the medication.  Let me know.  Thanks    Maddison Witt MD

## 2020-02-04 NOTE — TELEPHONE ENCOUNTER
Can you tell me who is Home care nurse is ? A phone number for home care ?   I have been calling  and nobody can tell me who this is. I did call [facility] and on 1/9/20  Jose Aguilar sent them an order for  Hydrocortisone 10 mg AM and 5 mg PM  this is the  only order they have filled.  In 2018 he had been on  20 mg am and 10 mg PM . I have calls out to find out who to contact for  Home care as the nurse looking at the pill bottles will be the best resource.

## 2020-02-05 ENCOUNTER — TELEPHONE (OUTPATIENT)
Dept: ENDOCRINOLOGY | Facility: CLINIC | Age: 30
End: 2020-02-05

## 2020-02-05 DIAGNOSIS — E27.1 ADDISON'S DISEASE (H): ICD-10-CM

## 2020-02-05 DIAGNOSIS — E27.40 ADRENAL INSUFFICIENCY (H): ICD-10-CM

## 2020-02-05 RX ORDER — LEVOTHYROXINE SODIUM 88 UG/1
88 TABLET ORAL DAILY
Qty: 90 TABLET | Refills: 3 | Status: SHIPPED | OUTPATIENT
Start: 2020-02-05 | End: 2020-12-28

## 2020-02-05 RX ORDER — HYDROCORTISONE 10 MG/1
10 TABLET ORAL 2 TIMES DAILY
Qty: 210 TABLET | Refills: 3 | Status: SHIPPED | OUTPATIENT
Start: 2020-02-05 | End: 2021-02-12

## 2020-02-05 RX ORDER — HYDROCORTISONE 10 MG/1
10 TABLET ORAL 2 TIMES DAILY
Qty: 210 TABLET | Refills: 3 | Status: SHIPPED | OUTPATIENT
Start: 2020-02-05 | End: 2020-02-05

## 2020-02-05 NOTE — TELEPHONE ENCOUNTER
I have located the home care which is [facility] and the nurse is Angela who is on vacation. I entered numbers  In demographics .  No one else has been in the home  and she will go out again  next week.   Per call to pharmacy  he is taking  Hydrocortisone  10 mg AM and 5 mg PM  prescribed by Jose Aguilar 1/9/2020.No other  Orders for a different  aount are at the pharmacy this is  what he must be doing.     I will fax your note to [facility] and leave a message for Angela to obtain the information needed once she goes out again  next week .Ioana Mendoza, RN on 2/5/2020 at 8:42 AM

## 2020-02-05 NOTE — TELEPHONE ENCOUNTER
I called the  Home care nurse to leave a message for next week and  she answered. She told me the  Hydrocortisone 10 mg AM and 5 mg pm is the correct dose that he is  getting.    Scripts should go to [facility] including the  Levothyroxine  new dose . I don't see that that  has been sent .   He will have labs done in 2 months .   Ioana Mendoza RN on 2/5/2020 at 8:56 AM

## 2020-02-05 NOTE — TELEPHONE ENCOUNTER
Note with medication dose changes faxed to [facility] . Hydrocortisone did not go through  to pharmacy because of the length of the script. Script  downsized without changing the order and resent and did go through  . Ioana Mendoza RN on 2/5/2020 at 2:46 PM

## 2020-02-05 NOTE — TELEPHONE ENCOUNTER
Thank you.  I have submitted orders for the levothyroxine 88 mcg/day and the new hydrocortisone dose.  Please make sure the pharmacy received both as it appears that the hydrocortisone order might have gotten printed for some reason.      Please get her these additional orders, both verbally and written/fax'd to the home care agency    1.  Change hydrocortisone to 10 mg twice/day. The 2nd dose should be approximately 8 hours after the morning dose. (ie not at bedtime)  2.  Change hydrocortisone dose for illness to 20 mg twice/day for 3 days.     Maddison Witt MD

## 2020-02-06 LAB
ACTH PLAS-MCNC: >1250 PG/ML
RENIN PLAS-CCNC: 9.3 NG/ML/HR

## 2020-02-10 ENCOUNTER — MEDICAL CORRESPONDENCE (OUTPATIENT)
Dept: HEALTH INFORMATION MANAGEMENT | Facility: CLINIC | Age: 30
End: 2020-02-10

## 2020-02-10 ENCOUNTER — TELEPHONE (OUTPATIENT)
Dept: ENDOCRINOLOGY | Facility: CLINIC | Age: 30
End: 2020-02-10

## 2020-02-10 NOTE — TELEPHONE ENCOUNTER
[facility] Call Center    Phone Message    May a detailed message be left on voicemail: yes     Reason for Call: Medication Question or concern regarding medication   Prescription Clarification  Name of Medication: fludrocortisone (FLORINEF) 0.1 MG tablet  Prescribing Provider: Eduar     What on the order needs clarification? Angela, nurse from [facility] called and stated pt has been out of this med for awhile. Angela would like to know if pt should continue taking this med, if so pt would need refills for it. Please call back Angela for clarification. Thanks.        Action Taken: Message routed to:  [facility] ENDO    Travel Screening: Not Applicable

## 2020-02-11 ENCOUNTER — NURSE TRIAGE (OUTPATIENT)
Dept: NURSING | Facility: CLINIC | Age: 30
End: 2020-02-11

## 2020-02-11 ENCOUNTER — COMMUNICATION - HEALTHEAST (OUTPATIENT)
Dept: SCHEDULING | Facility: CLINIC | Age: 30
End: 2020-02-11

## 2020-02-11 ENCOUNTER — TELEPHONE (OUTPATIENT)
Dept: ENDOCRINOLOGY | Facility: CLINIC | Age: 30
End: 2020-02-11

## 2020-02-11 DIAGNOSIS — E27.1 ADDISON'S DISEASE (H): Primary | ICD-10-CM

## 2020-02-11 RX ORDER — FLUDROCORTISONE ACETATE 0.1 MG/1
0.1 TABLET ORAL DAILY
Qty: 90 TABLET | Refills: 3 | Status: SHIPPED | OUTPATIENT
Start: 2020-02-11 | End: 2021-03-29

## 2020-02-11 NOTE — TELEPHONE ENCOUNTER
Part of the problem is that [facility]  Home care is not part of [facility] or [facility]  so  theres no communication.  Angela [facility] care   now tells me that she first requested the refill on florinef  1/13/2020 and again 1/27/2020 from  previous endocrinologist   and with no response assumed they did not want him on it.  Florinef is for his [place] and is life sustaining . The patient was  offered [facility] but declined wanting the [facility]  Home care . Suggest speaking to the family  on changing to [facility] . Angela reports that we have been the only Dr Office to communicate with her agency on medication changes and updates. Ioana Mendoza RN on 2/11/2020 at 9:36 AM

## 2020-02-11 NOTE — TELEPHONE ENCOUNTER
Angela mobley RN from [facility] was out to see Toe and commented that the Florinef he has been out of  since possibly  December. She had contacted the  PCP and  old Endocrinologist  for refills with no response so figured they didn't want him on it. . She did fill the pill box with the hydrocortisone and levothyroxine new doses. Ioana Mendoza RN on 2/11/2020 at 8:52 AM

## 2020-02-11 NOTE — TELEPHONE ENCOUNTER
See my other note on this.  I hadn't seen this note at the time I wrote the other one. My concerns remain.   Maddison Witt MD

## 2020-02-11 NOTE — TELEPHONE ENCOUNTER
[facility]  Fax # [phone] The director is Lexie who  s not there until Thursday . She has asked for me to call her. Her direct number is  [phone].sign

## 2020-02-11 NOTE — TELEPHONE ENCOUNTER
I want to be clear that patient's have to be given choice on home care agency.(JCHAO requirement)  We cannot mandate that a Pt uses our home care agency. If there is an issue with the nurse at his agency, I recommend that you call the director of the agency and report the concern about that nurse. Madison

## 2020-02-11 NOTE — TELEPHONE ENCOUNTER
[facility] Call Center    Phone Message    May a detailed message be left on voicemail: yes     Reason for Call: Other: Director of Home care agency would like to speak to Ioana RE: conversation with homecare nurse.      Action Taken: Message routed to:  [facility] Endo    Travel Screening: Not Applicable

## 2020-02-11 NOTE — TELEPHONE ENCOUNTER
Get me the contact information and I will write a complaint letter to the home care director.    Maddison Witt MD

## 2020-02-11 NOTE — TELEPHONE ENCOUNTER
This highlights a very frequent problem here and that is that when Pt's have home care, there is no or little communication BOTH ways- us to the home care agency when there are medication adjustments or the home care agency to us about how the Pt is doing and about their current med list. This needs policy change as what we are doing is not in our Pt's best interest. Ioana can you put the home care nurse/agency in the Pt's care coordination header? Madison

## 2020-02-11 NOTE — TELEPHONE ENCOUNTER
I am concerned about what has happened with his home care / medication management. Based on a series of communications since his one and only appt with me, my current conclusions and concerns are the followin.  The medication list provided by home care was inaccurate and not in alignment with what they later reported they were actually giving him.  This includes  A) inaccurate hydrocortisone dose  B) ? Patient out of [facility] since [place] reported on 2/10/2020.  This was not conveyed at the 2/3/2020 appt, in follow up communications with his home care, and only came to us through a refill request on 2/10/2020.  Note that both of these medications are critical to sustain life in this patient.   Running out of this medication, especially without anyone knowing this has happened is not an option.   Please reach out to our  and his home care agency to see if they can explain these issues.  What can be done to be sure this doesn't happen again?  Should he have a different home care agency?    Maddison Witt MD

## 2020-02-11 NOTE — TELEPHONE ENCOUNTER
I forwarded the issue onto Madison Whites and also contacted his PCP Dr Rangel to request that  [facility]  be started instead of [facility] that is in there now. He really needs his providers to be in the same system for the best care . Life sustaining medication Florinef  for Addisons  reported as taking  have not been taken since 1/13/2020 reported by Home care nurse  for the first time today. Ioana Mendoza RN on 2/11/2020 at 10:05 AM   .

## 2020-02-11 NOTE — TELEPHONE ENCOUNTER
I connected the RN with the [facility] side of the nursing so they can get a message to the MD about the need for a critical medication refill.  Naa Devi RN-[facility]

## 2020-02-13 ENCOUNTER — COMMUNICATION - HEALTHEAST (OUTPATIENT)
Dept: NURSING | Facility: CLINIC | Age: 30
End: 2020-02-13

## 2020-02-14 ENCOUNTER — COMMUNICATION - HEALTHEAST (OUTPATIENT)
Dept: NURSING | Facility: CLINIC | Age: 30
End: 2020-02-14

## 2020-02-24 ENCOUNTER — OFFICE VISIT - HEALTHEAST (OUTPATIENT)
Dept: FAMILY MEDICINE | Facility: CLINIC | Age: 30
End: 2020-02-24

## 2020-02-24 DIAGNOSIS — G47.00 INSOMNIA, UNSPECIFIED TYPE: ICD-10-CM

## 2020-02-24 DIAGNOSIS — F20.1 DISORGANIZED SCHIZOPHRENIA (H): ICD-10-CM

## 2020-02-28 ENCOUNTER — COMMUNICATION - HEALTHEAST (OUTPATIENT)
Dept: FAMILY MEDICINE | Facility: CLINIC | Age: 30
End: 2020-02-28

## 2020-02-28 DIAGNOSIS — J45.40 MODERATE PERSISTENT ASTHMA WITHOUT COMPLICATION: ICD-10-CM

## 2020-03-01 ENCOUNTER — COMMUNICATION - HEALTHEAST (OUTPATIENT)
Dept: FAMILY MEDICINE | Facility: CLINIC | Age: 30
End: 2020-03-01

## 2020-03-03 ENCOUNTER — COMMUNICATION - HEALTHEAST (OUTPATIENT)
Dept: NURSING | Facility: CLINIC | Age: 30
End: 2020-03-03

## 2020-03-08 ENCOUNTER — RECORDS - HEALTHEAST (OUTPATIENT)
Dept: ADMINISTRATIVE | Facility: OTHER | Age: 30
End: 2020-03-08

## 2020-03-10 ENCOUNTER — COMMUNICATION - HEALTHEAST (OUTPATIENT)
Dept: NURSING | Facility: CLINIC | Age: 30
End: 2020-03-10

## 2020-03-24 ENCOUNTER — COMMUNICATION - HEALTHEAST (OUTPATIENT)
Dept: NURSING | Facility: CLINIC | Age: 30
End: 2020-03-24

## 2020-04-08 ENCOUNTER — COMMUNICATION - HEALTHEAST (OUTPATIENT)
Dept: NURSING | Facility: CLINIC | Age: 30
End: 2020-04-08

## 2020-04-10 ENCOUNTER — COMMUNICATION - HEALTHEAST (OUTPATIENT)
Dept: NURSING | Facility: CLINIC | Age: 30
End: 2020-04-10

## 2020-04-17 ENCOUNTER — AMBULATORY - HEALTHEAST (OUTPATIENT)
Dept: NURSING | Facility: CLINIC | Age: 30
End: 2020-04-17

## 2020-04-17 DIAGNOSIS — Z23 NEED FOR HEPATITIS A IMMUNIZATION: ICD-10-CM

## 2020-04-17 DIAGNOSIS — Z23 NEED FOR HEPATITIS B VACCINATION: ICD-10-CM

## 2020-05-06 ENCOUNTER — COMMUNICATION - HEALTHEAST (OUTPATIENT)
Dept: FAMILY MEDICINE | Facility: CLINIC | Age: 30
End: 2020-05-06

## 2020-05-06 DIAGNOSIS — R44.0 AUDITORY HALLUCINATION: ICD-10-CM

## 2020-05-08 ENCOUNTER — COMMUNICATION - HEALTHEAST (OUTPATIENT)
Dept: NURSING | Facility: CLINIC | Age: 30
End: 2020-05-08

## 2020-05-08 ENCOUNTER — COMMUNICATION - HEALTHEAST (OUTPATIENT)
Dept: FAMILY MEDICINE | Facility: CLINIC | Age: 30
End: 2020-05-08

## 2020-05-12 ENCOUNTER — COMMUNICATION - HEALTHEAST (OUTPATIENT)
Dept: NURSING | Facility: CLINIC | Age: 30
End: 2020-05-12

## 2020-06-03 ENCOUNTER — COMMUNICATION - HEALTHEAST (OUTPATIENT)
Dept: FAMILY MEDICINE | Facility: CLINIC | Age: 30
End: 2020-06-03

## 2020-06-03 DIAGNOSIS — E03.8 OTHER SPECIFIED HYPOTHYROIDISM: ICD-10-CM

## 2020-06-11 ENCOUNTER — COMMUNICATION - HEALTHEAST (OUTPATIENT)
Dept: NURSING | Facility: CLINIC | Age: 30
End: 2020-06-11

## 2020-06-12 ENCOUNTER — OFFICE VISIT - HEALTHEAST (OUTPATIENT)
Dept: FAMILY MEDICINE | Facility: CLINIC | Age: 30
End: 2020-06-12

## 2020-06-12 DIAGNOSIS — E27.40 ADRENAL INSUFFICIENCY (H): ICD-10-CM

## 2020-06-12 DIAGNOSIS — F25.1 SCHIZOAFFECTIVE DISORDER, DEPRESSIVE TYPE, WITH CATATONIA (H): ICD-10-CM

## 2020-06-12 DIAGNOSIS — E03.9 HYPOTHYROIDISM, UNSPECIFIED TYPE: ICD-10-CM

## 2020-06-12 DIAGNOSIS — J45.40 MODERATE PERSISTENT ASTHMA WITHOUT COMPLICATION: ICD-10-CM

## 2020-06-12 DIAGNOSIS — J45.40 MODERATE PERSISTENT ASTHMA: ICD-10-CM

## 2020-06-12 DIAGNOSIS — Z91.09 ENVIRONMENTAL ALLERGIES: ICD-10-CM

## 2020-06-12 DIAGNOSIS — F06.1 SCHIZOAFFECTIVE DISORDER, DEPRESSIVE TYPE, WITH CATATONIA (H): ICD-10-CM

## 2020-06-12 ASSESSMENT — PATIENT HEALTH QUESTIONNAIRE - PHQ9: SUM OF ALL RESPONSES TO PHQ QUESTIONS 1-9: 8

## 2020-06-16 ENCOUNTER — AMBULATORY - HEALTHEAST (OUTPATIENT)
Dept: FAMILY MEDICINE | Facility: CLINIC | Age: 30
End: 2020-06-16

## 2020-06-16 ENCOUNTER — AMBULATORY - HEALTHEAST (OUTPATIENT)
Dept: LAB | Facility: CLINIC | Age: 30
End: 2020-06-16

## 2020-06-16 DIAGNOSIS — E03.9 HYPOTHYROIDISM, UNSPECIFIED TYPE: ICD-10-CM

## 2020-06-16 DIAGNOSIS — E27.40 ADRENAL INSUFFICIENCY (H): ICD-10-CM

## 2020-06-16 LAB
ANION GAP SERPL CALCULATED.3IONS-SCNC: 8 MMOL/L (ref 5–18)
BUN SERPL-MCNC: 8 MG/DL (ref 8–22)
CALCIUM SERPL-MCNC: 9.9 MG/DL (ref 8.5–10.5)
CHLORIDE BLD-SCNC: 107 MMOL/L (ref 98–107)
CO2 SERPL-SCNC: 24 MMOL/L (ref 22–31)
CREAT SERPL-MCNC: 0.85 MG/DL (ref 0.7–1.3)
ERYTHROCYTE [DISTWIDTH] IN BLOOD BY AUTOMATED COUNT: 11.7 % (ref 11–14.5)
GFR SERPL CREATININE-BSD FRML MDRD: >60 ML/MIN/1.73M2
GLUCOSE BLD-MCNC: 79 MG/DL (ref 70–125)
HCT VFR BLD AUTO: 51 % (ref 40–54)
HGB BLD-MCNC: 17 G/DL (ref 14–18)
MCH RBC QN AUTO: 29.9 PG (ref 27–34)
MCHC RBC AUTO-ENTMCNC: 33.4 G/DL (ref 32–36)
MCV RBC AUTO: 90 FL (ref 80–100)
PLATELET # BLD AUTO: 134 THOU/UL (ref 140–440)
PMV BLD AUTO: 9.6 FL (ref 7–10)
POTASSIUM BLD-SCNC: 3.9 MMOL/L (ref 3.5–5)
RBC # BLD AUTO: 5.7 MILL/UL (ref 4.4–6.2)
SODIUM SERPL-SCNC: 139 MMOL/L (ref 136–145)
TSH SERPL DL<=0.005 MIU/L-ACNC: 0.78 UIU/ML (ref 0.3–5)
WBC: 5.3 THOU/UL (ref 4–11)

## 2020-06-19 ENCOUNTER — COMMUNICATION - HEALTHEAST (OUTPATIENT)
Dept: NURSING | Facility: CLINIC | Age: 30
End: 2020-06-19

## 2020-06-23 ENCOUNTER — COMMUNICATION - HEALTHEAST (OUTPATIENT)
Dept: FAMILY MEDICINE | Facility: CLINIC | Age: 30
End: 2020-06-23

## 2020-06-25 ENCOUNTER — COMMUNICATION - HEALTHEAST (OUTPATIENT)
Dept: FAMILY MEDICINE | Facility: CLINIC | Age: 30
End: 2020-06-25

## 2020-06-29 ENCOUNTER — COMMUNICATION - HEALTHEAST (OUTPATIENT)
Dept: FAMILY MEDICINE | Facility: CLINIC | Age: 30
End: 2020-06-29

## 2020-06-29 ENCOUNTER — OFFICE VISIT - HEALTHEAST (OUTPATIENT)
Dept: FAMILY MEDICINE | Facility: CLINIC | Age: 30
End: 2020-06-29

## 2020-06-29 DIAGNOSIS — J06.9 UPPER RESPIRATORY TRACT INFECTION, UNSPECIFIED TYPE: ICD-10-CM

## 2020-06-29 DIAGNOSIS — E03.9 HYPOTHYROIDISM, UNSPECIFIED TYPE: ICD-10-CM

## 2020-06-29 DIAGNOSIS — J45.40 MODERATE PERSISTENT ASTHMA WITHOUT COMPLICATION: ICD-10-CM

## 2020-06-29 DIAGNOSIS — Z91.09 ENVIRONMENTAL ALLERGIES: ICD-10-CM

## 2020-07-01 ENCOUNTER — COMMUNICATION - HEALTHEAST (OUTPATIENT)
Dept: CARE COORDINATION | Facility: CLINIC | Age: 30
End: 2020-07-01

## 2020-07-01 ENCOUNTER — COMMUNICATION - HEALTHEAST (OUTPATIENT)
Dept: FAMILY MEDICINE | Facility: CLINIC | Age: 30
End: 2020-07-01

## 2020-07-05 ENCOUNTER — AMBULATORY - HEALTHEAST (OUTPATIENT)
Dept: FAMILY MEDICINE | Facility: CLINIC | Age: 30
End: 2020-07-05

## 2020-07-05 DIAGNOSIS — E03.9 HYPOTHYROIDISM: ICD-10-CM

## 2020-07-07 ENCOUNTER — COMMUNICATION - HEALTHEAST (OUTPATIENT)
Dept: FAMILY MEDICINE | Facility: CLINIC | Age: 30
End: 2020-07-07

## 2020-07-07 ENCOUNTER — AMBULATORY - HEALTHEAST (OUTPATIENT)
Dept: FAMILY MEDICINE | Facility: CLINIC | Age: 30
End: 2020-07-07

## 2020-07-14 ENCOUNTER — COMMUNICATION - HEALTHEAST (OUTPATIENT)
Dept: NURSING | Facility: CLINIC | Age: 30
End: 2020-07-14

## 2020-07-23 ENCOUNTER — COMMUNICATION - HEALTHEAST (OUTPATIENT)
Dept: NURSING | Facility: CLINIC | Age: 30
End: 2020-07-23

## 2020-07-29 ENCOUNTER — COMMUNICATION - HEALTHEAST (OUTPATIENT)
Dept: NURSING | Facility: CLINIC | Age: 30
End: 2020-07-29

## 2020-07-31 NOTE — PROGRESS NOTES
"Brooke Peterson is a 29 year old male who is being evaluated via a billable telephone visit.      The patient has been notified of following:     \"This telephone visit will be conducted via a call between you and your physician/provider. We have found that certain health care needs can be provided without the need for a physical exam.  This service lets us provide the care you need with a short phone conversation.  If a prescription is necessary we can send it directly to your pharmacy.  If lab work is needed we can place an order for that and you can then stop by our lab to have the test done at a later time.    Telephone visits are billed at different rates depending on your insurance coverage. During this emergency period, for some insurers they may be billed the same as an in-person visit.  Please reach out to your insurance provider with any questions.    If during the course of the call the physician/provider feels a telephone visit is not appropriate, you will not be charged for this service.\"    Patient has given verbal consent for Telephone visit?  Yes    What phone number would you like to be contacted at? [phone]    How would you like to obtain your AVS? Mail a copy    Rola Nix MA        "

## 2020-08-03 ENCOUNTER — MEDICAL CORRESPONDENCE (OUTPATIENT)
Dept: HEALTH INFORMATION MANAGEMENT | Facility: CLINIC | Age: 30
End: 2020-08-03

## 2020-08-03 ENCOUNTER — VIRTUAL VISIT (OUTPATIENT)
Dept: ENDOCRINOLOGY | Facility: CLINIC | Age: 30
End: 2020-08-03
Payer: COMMERCIAL

## 2020-08-03 ENCOUNTER — TELEPHONE (OUTPATIENT)
Dept: ENDOCRINOLOGY | Facility: CLINIC | Age: 30
End: 2020-08-03

## 2020-08-03 ENCOUNTER — RECORDS - HEALTHEAST (OUTPATIENT)
Dept: ADMINISTRATIVE | Facility: OTHER | Age: 30
End: 2020-08-03

## 2020-08-03 DIAGNOSIS — E27.1 ADDISON'S DISEASE (H): Primary | ICD-10-CM

## 2020-08-03 DIAGNOSIS — Z60.3 IMMIGRANT WITH LANGUAGE DIFFICULTY: ICD-10-CM

## 2020-08-03 DIAGNOSIS — E27.40 ADRENAL INSUFFICIENCY (H): ICD-10-CM

## 2020-08-03 DIAGNOSIS — E03.9 HYPOTHYROIDISM, UNSPECIFIED TYPE: ICD-10-CM

## 2020-08-03 SDOH — SOCIAL STABILITY - SOCIAL INSECURITY: ACCULTURATION DIFFICULTY: Z60.3

## 2020-08-03 NOTE — LETTER
8/3/2020       RE: Brooke Peterson  [address]  [address]  [place] [address]     Dear Colleague,    Thank you for referring your patient, Brooke Peterson, to the [facility] ENDOCRINOLOGY at [facility]. Please see a copy of my visit note below.    Brooke Peterson is a 29 year old male who is being evaluated via a billable telephone visit.      Endocrine Consult note-    Attending Assessment/Plan :     Adrenal insufficiency, primary with history of high ACTH, hyperpigmentation at our last visit.  I can't see his skin color today.  Once again we have no med list which makes this appt relatively pointless.    Labs to include renin, ACTH.    Immigrant with language barrier.  He is on unknown treatment through home care nurse who did not send med list    Hypothyroidism by history.  Most recent TFTS in June were nromal.      Dizziness complaints continue - unclear if related to # 1 vs another process.  He seems to describe vertigo .     Pituitary related questions.  Discrepant reports on outside past MRIs.  I will need to get the images pushed from HE so I can review, including 7/24/17 and 6/25/19 brain     Due to the COVID 19 pandemic this visit was a telephone  visit in order to help prevent spread of infection in this high risk patient and the general population. The patient gave verbal consent for the visit today.    Start time 1415 call to  Agustin;   Stop time 1431  Total time 16  This visit would have been billed as 55326 as an E & M code    Maddison Witt MD    Chief complaint/ HISTORY OF PRESENT ILLNESS  Toe presents for follow up of primary adrenal insufficiency and hypothyroidism. I have seen him once before 2/3/2020.  At that time I changed LT4 f4om 100 to 88 mcg/day.  On 2/5 I changed hydrocortisone to 10 mg bid when well and 20 bid  x3 days with illness.  We were later told after the appt that perhaps he had been out of florinef for months prior to our appt. I filled Rx for   A year supply of florinef 0.1 mg/day on 2/11/2020.  We had a lot of discussion internally following the appt about his home care support which seemed inadequate .      was seen today along with Angela bermaner.      Toe had previously been seen by endocrinologist Dr Moisés Schmidt.  He last saw him 12/21/18. At that time he was on HC 20/10 and florinef 0.2, LT4 88 mcg/day.  He looked cushingoid.  He was advised to decrease HC to 10/5 and decrease florinef to 0.1 . LT4 changed to 100 mg/day on 6/21/2019.  He last saw dr Rangel 9/10/19 for toenail infection     The original diagnosis of hypothyroidism is not well documented on care everywhere.   The original diagnosis of adrenal insufficiency was 7/2017. He had high ACTH with low cortisol, failure to respond to cortrosyn stimulation testing.      The med list we retrieved following the appt lists the HC dose as 20 /5 which is higher than had been prescribed by Dr Schmidt. The [facility] med list has the HC as 10/5.      7/12/17: Na 129, K 4.7, Cl 97, C02 18, creatinine 1.47, lactate 1.9  7/24/17: ACTH 1845  7/25/17 cortrosyn stimulatin test : cortisol < 1 -- 1.4-- 1.8  7/29/17: TSH 2.12  12/19/17 LT4 dose increased from 75 to 88 mc/day by Dr Schmidt  11/30/18: 25OHD 5.8, PTH 82, Ca 9.9, creatinine 0.85  12/4/18: prolactin 21.3, Na 146, K 3.9, Cl 108, Co2 27, creatinine 0.78  3/26/19: TSH 6.24, free T4 1.2,   5/16/19:L Ca 9.8, Co2 21, Na 138, K 3.9, creatinine 0.79, TSH 0.43, free T4 1.3, B12 527  9/10/19 Na 137, K 4.5, Co2 18, Ca 11, creatinine 1.07  9/16/19 iCa 1.16, 25 OHD 22, glucose 84, TSH 3.71, phos 3  6/16/2020: TSH 0.78, Ca 0.85, Na 139, K 3.9, creatinine 0.85,     Imaging  7/24/17 CT abdomen: atrophic adrenal glands.   7/24/17 Brain MRI: 7 mm nodule within pituitary, hypoenhancing  6/25/19: MRI brain: normal - no comment on pituitary    REVIEW OF SYSTEMS  He can't remember seeing me in Feb  Not doing very well  Not a good appetite  Weight unknown; doesn't know  "if weight change;   Skin color; \"I don't know\"  Fatigue  Respiratory: Has a cold - runny/stuffy nose; the record shows he had a visit on 6/29/2020 for URI  GI: nausea; no vomiting  Dizziness - no change.  No improvement isnce last seen by me.   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      Medications    Current Outpatient Medications   Medication Sig Dispense Refill     acetaminophen (TYLENOL) 325 MG tablet TAKE 1-2 TABLETS (650 MG TOTAL) BY MOUTH EVERY 6 (SIX) HOURS AS NEEDED FOR PAIN.       albuterol (PROAIR HFA/PROVENTIL HFA/VENTOLIN HFA) 108 (90 Base) MCG/ACT inhaler Inhale 2 puffs into the lungs       Ergocalciferol 50 MCG (2000 UT) TABS Take 2,000 Units by mouth daily       fexofenadine (ALLEGRA) 180 MG tablet Take 1 tablet (180 mg) by mouth daily       fludrocortisone (FLORINEF) 0.1 MG tablet Take 1 tablet (0.1 mg) by mouth daily 90 tablet 3     fluticasone (FLONASE) 50 MCG/ACT nasal spray Spray 1 spray into both nostrils daily as needed for rhinitis or allergies       guaiFENesin-dextromethorphan (ROBITUSSIN DM) 100-10 MG/5ML syrup Take 5 mLs by mouth 3 times daily as needed for cough       hydrocortisone (CORTEF) 10 MG tablet Take 1 tablet (10 mg) by mouth 2 times daily PM  dose should be approx 8 hours after  AM dose. Take 20 mg twice/day for 3 days if illness. 210 tablet 3     levothyroxine (SYNTHROID/LEVOTHROID) 88 MCG tablet Take 1 tablet (88 mcg) by mouth daily 90 tablet 3     loperamide (IMODIUM) 2 MG capsule Take 1 capsule (2 mg) by mouth as needed for diarrhea Take 1 capsule up to 3 times a day as needed       " "loratadine (CLARITIN) 10 MG tablet Take 10 mg by mouth       meclizine (ANTIVERT) 25 MG tablet Take 1 tablet (25 mg) by mouth 3 times daily as needed for dizziness       mometasone (ASMANEX, 60 METERED DOSES,) 220 MCG/INH inhaler INHALE 1 PUFF BY MOUTH 2 (TWO) TIMES A DAY.       Multiple Vitamin (MULTI-VITAMINS) TABS Take 1 tablet by mouth daily       OLANZapine (ZYPREXA) 5 MG tablet Take 1 tablet (5 mg) by mouth every morning       ondansetron (ZOFRAN-ODT) 4 MG ODT tab DISSOLVE 1 TABLET (4 MG TOTAL) BY MOUTH EVERY 8 (EIGHT) HOURS AS NEEDED FOR NAUSEA.       paliperidone ER (INVEGA) 6 MG 24 hr tablet Take 1 tablet (6 mg) by mouth At Bedtime       TRAVEL SICKNESS 25 MG CHEW        triamcinolone (KENALOG) 0.1 % external cream Apply thin layer to affected areas twice daily as needed       Nurse comes every 2 weeks;  Last there ? \"don't remember\"    He takes medicine 2 times/day at \"I don't remember\".  His father reminds him.      Allergies  No Known Allergies    Family History  family history includes Diabetes in his mother.  Toe doesn't know family history    Social History  Social History     Tobacco Use     Smoking status: Not on file   Substance Use Topics     Alcohol use: Not on file     Drug use: Not on file     Lives with parents (mom, dad, 4 siblings- dad and sibs leave the home);. Parents help him remember his medicine  [employer] phone [phone]; fax [phone] voice mail: [phone]; nurse Geeta Mojica RN [phone]; address [address] [place] [address]  He spends the days at home - watch TV, walking around.  [place] to [facility] [place] to [place]  ARM worker reads English    Physical Exam  There were no vitals taken for this visit.  There is no height or weight on file to calculate BMI.   BP Readings from Last 1 Encounters:   02/03/20 112/79      Pulse Readings from Last 1 Encounters:   02/03/20 85      Resp Readings from Last 1 Encounters:   No data found for Resp      Temp Readings from Last 1 " "Encounters:   No data found for Temp      SpO2 Readings from Last 1 Encounters:   No data found for SpO2      Wt Readings from Last 1 Encounters:   02/03/20 74.1 kg (163 lb 6.4 oz)      Ht Readings from Last 1 Encounters:   02/03/20 1.626 m (5' 4\")     Non english speaker  Responds to questions.      DATA REVIEW    Results for PO, TOE T (MRN [id number]) as of 8/3/2020 14:15   Ref. Range 2/3/2020 16:27   Sodium Latest Ref Range: 133 - 144 mmol/L 139   Potassium Latest Ref Range: 3.4 - 5.3 mmol/L 3.8   Creatinine Latest Ref Range: 0.66 - 1.25 mg/dL 0.76   GFR Estimate Latest Ref Range: >60 mL/min/1.73_m2 >90   GFR Estimate If Black Latest Ref Range: >60 mL/min/1.73_m2 >90   Calcium Latest Ref Range: 8.5 - 10.1 mg/dL 9.4   Phosphorus Latest Ref Range: 2.5 - 4.5 mg/dL 3.4   Adrenal Corticotropin Latest Ref Range: <47 pg/mL >1,250 (H)   Renin Activity Latest Units: ng/mL/hr 9.3   T4 Free Latest Ref Range: 0.76 - 1.46 ng/dL 1.16   TSH Latest Ref Range: 0.40 - 4.00 mU/L 0.18 (L)   Parathyroid Hormone Intact Latest Ref Range: 18 - 80 pg/mL 43       Again, thank you for allowing me to participate in the care of your patient.      Sincerely,    Maddison Witt MD      "

## 2020-08-03 NOTE — PROGRESS NOTES
Endocrine Consult note-    Attending Assessment/Plan :     Adrenal insufficiency, primary with history of high ACTH, hyperpigmentation at our last visit.  I can't see his skin color today.  Once again we have no med list which makes this appt relatively pointless.    Labs to include renin, ACTH.    Immigrant with language barrier.  He is on unknown treatment through home care nurse who did not send med list    Hypothyroidism by history.  Most recent TFTS in June were nromal.      Dizziness complaints continue - unclear if related to # 1 vs another process.  He seems to describe vertigo .     Pituitary related questions.  Discrepant reports on outside past MRIs.  I will need to get the images pushed from HE so I can review, including 7/24/17 and 6/25/19 brain     Due to the COVID 19 pandemic this visit was a telephone  visit in order to help prevent spread of infection in this high risk patient and the general population. The patient gave verbal consent for the visit today.    Start time 1415 call to  Manuel;   Stop time 1431  Total time 16  This visit would have been billed as 33804 as an E & M code    Maddison Witt MD    Chief complaint/ HISTORY OF PRESENT ILLNESS  Toe presents for follow up of primary adrenal insufficiency and hypothyroidism. I have seen him once before 2/3/2020.  At that time I changed LT4 f4om 100 to 88 mcg/day.  On 2/5 I changed hydrocortisone to 10 mg bid when well and 20 bid  x3 days with illness.  We were later told after the appt that perhaps he had been out of florinef for months prior to our appt. I filled Rx for  A year supply of florinef 0.1 mg/day on 2/11/2020.  We had a lot of discussion internally following the appt about his home care support which seemed inadequate .      was seen today along with Angela blue.      Toe had previously been seen by endocrinologist Dr Moisés Schmidt.  He last saw him 12/21/18. At that time he was on HC 20/10 and florinef 0.2, LT4  "88 mcg/day.  He looked cushingoid.  He was advised to decrease HC to 10/5 and decrease florinef to 0.1 . LT4 changed to 100 mg/day on 6/21/2019.  He last saw dr Rangel 9/10/19 for toenail infection     The original diagnosis of hypothyroidism is not well documented on care everywhere.   The original diagnosis of adrenal insufficiency was 7/2017. He had high ACTH with low cortisol, failure to respond to cortrosyn stimulation testing.      The med list we retrieved following the appt lists the HC dose as 20 /5 which is higher than had been prescribed by Dr Schmidt. The [facility] med list has the HC as 10/5.      7/12/17: Na 129, K 4.7, Cl 97, C02 18, creatinine 1.47, lactate 1.9  7/24/17: ACTH 1845  7/25/17 cortrosyn stimulatin test : cortisol < 1 -- 1.4-- 1.8  7/29/17: TSH 2.12  12/19/17 LT4 dose increased from 75 to 88 mc/day by Dr Schmidt  11/30/18: 25OHD 5.8, PTH 82, Ca 9.9, creatinine 0.85  12/4/18: prolactin 21.3, Na 146, K 3.9, Cl 108, Co2 27, creatinine 0.78  3/26/19: TSH 6.24, free T4 1.2,   5/16/19:L Ca 9.8, Co2 21, Na 138, K 3.9, creatinine 0.79, TSH 0.43, free T4 1.3, B12 527  9/10/19 Na 137, K 4.5, Co2 18, Ca 11, creatinine 1.07  9/16/19 iCa 1.16, 25 OHD 22, glucose 84, TSH 3.71, phos 3  6/16/2020: TSH 0.78, Ca 0.85, Na 139, K 3.9, creatinine 0.85,     Imaging  7/24/17 CT abdomen: atrophic adrenal glands.   7/24/17 Brain MRI: 7 mm nodule within pituitary, hypoenhancing  6/25/19: MRI brain: normal - no comment on pituitary    REVIEW OF SYSTEMS  He can't remember seeing me in Feb  Not doing very well  Not a good appetite  Weight unknown; doesn't know if weight change;   Skin color; \"I don't know\"  Fatigue  Respiratory: Has a cold - runny/stuffy nose; the record shows he had a visit on 6/29/2020 for URI  GI: nausea; no vomiting  Dizziness - no change.  No improvement isnce last seen by me.   Spinning; light headed; Dizziness is worse in bed at night;  Still using cane;    Past Medical History  Past Medical " History:   Diagnosis Date     [place]sterfield's disease (H) 07/2017     Asthma      Hypercalcemia 09/2019     Hyperprolactinemia (H) 12/2018     Hypothyroidism      Hypovitaminosis D 11/2018     Pituitary microadenoma (H)      PTSD (post-traumatic stress disorder)      Schizophrenia (H)       No past surgical history on file.      Medications    Current Outpatient Medications   Medication Sig Dispense Refill     acetaminophen (TYLENOL) 325 MG tablet TAKE 1-2 TABLETS (650 MG TOTAL) BY MOUTH EVERY 6 (SIX) HOURS AS NEEDED FOR PAIN.       albuterol (PROAIR HFA/PROVENTIL HFA/VENTOLIN HFA) 108 (90 Base) MCG/ACT inhaler Inhale 2 puffs into the lungs       Ergocalciferol 50 MCG (2000 UT) TABS Take 2,000 Units by mouth daily       fexofenadine (ALLEGRA) 180 MG tablet Take 1 tablet (180 mg) by mouth daily       fludrocortisone (FLORINEF) 0.1 MG tablet Take 1 tablet (0.1 mg) by mouth daily 90 tablet 3     fluticasone (FLONASE) 50 MCG/ACT nasal spray Spray 1 spray into both nostrils daily as needed for rhinitis or allergies       guaiFENesin-dextromethorphan (ROBITUSSIN DM) 100-10 MG/5ML syrup Take 5 mLs by mouth 3 times daily as needed for cough       hydrocortisone (CORTEF) 10 MG tablet Take 1 tablet (10 mg) by mouth 2 times daily PM  dose should be approx 8 hours after  AM dose. Take 20 mg twice/day for 3 days if illness. 210 tablet 3     levothyroxine (SYNTHROID/LEVOTHROID) 88 MCG tablet Take 1 tablet (88 mcg) by mouth daily 90 tablet 3     loperamide (IMODIUM) 2 MG capsule Take 1 capsule (2 mg) by mouth as needed for diarrhea Take 1 capsule up to 3 times a day as needed       loratadine (CLARITIN) 10 MG tablet Take 10 mg by mouth       meclizine (ANTIVERT) 25 MG tablet Take 1 tablet (25 mg) by mouth 3 times daily as needed for dizziness       mometasone (ASMANEX, 60 METERED DOSES,) 220 MCG/INH inhaler INHALE 1 PUFF BY MOUTH 2 (TWO) TIMES A DAY.       Multiple Vitamin (MULTI-VITAMINS) TABS Take 1 tablet by mouth daily        "OLANZapine (ZYPREXA) 5 MG tablet Take 1 tablet (5 mg) by mouth every morning       ondansetron (ZOFRAN-ODT) 4 MG ODT tab DISSOLVE 1 TABLET (4 MG TOTAL) BY MOUTH EVERY 8 (EIGHT) HOURS AS NEEDED FOR NAUSEA.       paliperidone ER (INVEGA) 6 MG 24 hr tablet Take 1 tablet (6 mg) by mouth At Bedtime       TRAVEL SICKNESS 25 MG CHEW        triamcinolone (KENALOG) 0.1 % external cream Apply thin layer to affected areas twice daily as needed       Nurse comes every 2 weeks;  Last there ? \"don't remember\"    He takes medicine 2 times/day at \"I don't remember\".  His father reminds him.      Allergies  No Known Allergies    Family History  family history includes Diabetes in his mother.  Toe doesn't know family history    Social History  Social History     Tobacco Use     Smoking status: Not on file   Substance Use Topics     Alcohol use: Not on file     Drug use: Not on file     Lives with parents (mom, dad, 4 siblings- dad and sibs leave the home);. Parents help him remember his medicine  [employer] phone [phone]; fax [phone] voice mail: [phone]; nurse Geeta Mojica RN [phone]; address [address] [place] [address]  He spends the days at home - watch TV, walking around.  [place] to [facility] refugee [place] to   ARM worker reads English    Physical Exam  There were no vitals taken for this visit.  There is no height or weight on file to calculate BMI.   BP Readings from Last 1 Encounters:   02/03/20 112/79      Pulse Readings from Last 1 Encounters:   02/03/20 85      Resp Readings from Last 1 Encounters:   No data found for Resp      Temp Readings from Last 1 Encounters:   No data found for Temp      SpO2 Readings from Last 1 Encounters:   No data found for SpO2      Wt Readings from Last 1 Encounters:   02/03/20 74.1 kg (163 lb 6.4 oz)      Ht Readings from Last 1 Encounters:   02/03/20 1.626 m (5' 4\")     Non english speaker  Responds to questions.      DATA REVIEW    Results for [employer] (MRN [id number]) as of " 8/3/2020 14:15   Ref. Range 2/3/2020 16:27   Sodium Latest Ref Range: 133 - 144 mmol/L 139   Potassium Latest Ref Range: 3.4 - 5.3 mmol/L 3.8   Creatinine Latest Ref Range: 0.66 - 1.25 mg/dL 0.76   GFR Estimate Latest Ref Range: >60 mL/min/1.73_m2 >90   GFR Estimate If Black Latest Ref Range: >60 mL/min/1.73_m2 >90   Calcium Latest Ref Range: 8.5 - 10.1 mg/dL 9.4   Phosphorus Latest Ref Range: 2.5 - 4.5 mg/dL 3.4   Adrenal Corticotropin Latest Ref Range: <47 pg/mL >1,250 (H)   Renin Activity Latest Units: ng/mL/hr 9.3   T4 Free Latest Ref Range: 0.76 - 1.46 ng/dL 1.16   TSH Latest Ref Range: 0.40 - 4.00 mU/L 0.18 (L)   Parathyroid Hormone Intact Latest Ref Range: 18 - 80 pg/mL 43

## 2020-08-03 NOTE — TELEPHONE ENCOUNTER
----- Message from Maddison Witt MD sent at 8/3/2020  9:09 AM CDT -----  Regarding: med list also needed  We also need the med list that his home care team is using and clear identification of who is providing his home are, how to reach them.  All of this is needed prior to the appt to be available at the appt  Thanks  Maddison Witt

## 2020-08-06 ENCOUNTER — MEDICAL CORRESPONDENCE (OUTPATIENT)
Dept: HEALTH INFORMATION MANAGEMENT | Facility: CLINIC | Age: 30
End: 2020-08-06

## 2020-08-06 ENCOUNTER — TELEPHONE (OUTPATIENT)
Dept: ENDOCRINOLOGY | Facility: CLINIC | Age: 30
End: 2020-08-06

## 2020-08-06 NOTE — TELEPHONE ENCOUNTER
Per Dr. Witt 8/3 Checkout:     Arrange for labs at [facility] - He also need a ride to get there

## 2020-08-10 ENCOUNTER — COMMUNICATION - HEALTHEAST (OUTPATIENT)
Dept: NURSING | Facility: CLINIC | Age: 30
End: 2020-08-10

## 2020-08-12 ENCOUNTER — OFFICE VISIT - HEALTHEAST (OUTPATIENT)
Dept: FAMILY MEDICINE | Facility: CLINIC | Age: 30
End: 2020-08-12

## 2020-08-12 DIAGNOSIS — E27.40 ADRENAL INSUFFICIENCY (H): ICD-10-CM

## 2020-08-13 ENCOUNTER — COMMUNICATION - HEALTHEAST (OUTPATIENT)
Dept: FAMILY MEDICINE | Facility: CLINIC | Age: 30
End: 2020-08-13

## 2020-08-13 DIAGNOSIS — E27.40 ADRENAL INSUFFICIENCY (H): ICD-10-CM

## 2020-09-11 ENCOUNTER — COMMUNICATION - HEALTHEAST (OUTPATIENT)
Dept: FAMILY MEDICINE | Facility: CLINIC | Age: 30
End: 2020-09-11

## 2020-09-11 DIAGNOSIS — R44.0 AUDITORY HALLUCINATION: ICD-10-CM

## 2020-09-14 ENCOUNTER — COMMUNICATION - HEALTHEAST (OUTPATIENT)
Dept: NURSING | Facility: CLINIC | Age: 30
End: 2020-09-14

## 2020-09-17 ENCOUNTER — COMMUNICATION - HEALTHEAST (OUTPATIENT)
Dept: NURSING | Facility: CLINIC | Age: 30
End: 2020-09-17

## 2020-10-07 ENCOUNTER — TRANSFERRED RECORDS (OUTPATIENT)
Dept: HEALTH INFORMATION MANAGEMENT | Facility: CLINIC | Age: 30
End: 2020-10-07

## 2020-10-08 ENCOUNTER — COMMUNICATION - HEALTHEAST (OUTPATIENT)
Dept: SCHEDULING | Facility: CLINIC | Age: 30
End: 2020-10-08

## 2020-10-08 ENCOUNTER — OFFICE VISIT - HEALTHEAST (OUTPATIENT)
Dept: FAMILY MEDICINE | Facility: CLINIC | Age: 30
End: 2020-10-08

## 2020-10-08 DIAGNOSIS — E27.40 ADRENAL INSUFFICIENCY (H): ICD-10-CM

## 2020-10-08 DIAGNOSIS — D35.2 PITUITARY MICROADENOMA (H): ICD-10-CM

## 2020-10-08 DIAGNOSIS — R05.9 COUGH: ICD-10-CM

## 2020-10-08 DIAGNOSIS — Z23 NEED FOR INFLUENZA VACCINATION: ICD-10-CM

## 2020-10-08 DIAGNOSIS — E03.9 HYPOTHYROIDISM, UNSPECIFIED TYPE: ICD-10-CM

## 2020-10-08 LAB
ANION GAP SERPL CALCULATED.3IONS-SCNC: 13 MMOL/L (ref 5–18)
BUN SERPL-MCNC: 6 MG/DL (ref 8–22)
CALCIUM SERPL-MCNC: 9.5 MG/DL (ref 8.5–10.5)
CHLORIDE BLD-SCNC: 104 MMOL/L (ref 98–107)
CO2 SERPL-SCNC: 22 MMOL/L (ref 22–31)
CREAT SERPL-MCNC: 0.79 MG/DL (ref 0.7–1.3)
GFR SERPL CREATININE-BSD FRML MDRD: >60 ML/MIN/1.73M2
GLUCOSE BLD-MCNC: 59 MG/DL (ref 70–125)
POTASSIUM BLD-SCNC: 3.7 MMOL/L (ref 3.5–5)
PROLACTIN SERPL-MCNC: 8.8 NG/ML (ref 0–15)
SODIUM SERPL-SCNC: 139 MMOL/L (ref 136–145)
T4 FREE SERPL-MCNC: 1.3 NG/DL (ref 0.7–1.8)
TSH SERPL DL<=0.005 MIU/L-ACNC: 0.77 UIU/ML (ref 0.3–5)

## 2020-10-12 ENCOUNTER — COMMUNICATION - HEALTHEAST (OUTPATIENT)
Dept: FAMILY MEDICINE | Facility: CLINIC | Age: 30
End: 2020-10-12

## 2020-10-12 LAB — ACTH PLAS-MCNC: 106 PG/ML

## 2020-10-14 ENCOUNTER — MEDICAL CORRESPONDENCE (OUTPATIENT)
Dept: HEALTH INFORMATION MANAGEMENT | Facility: CLINIC | Age: 30
End: 2020-10-14

## 2020-10-19 ENCOUNTER — COMMUNICATION - HEALTHEAST (OUTPATIENT)
Dept: NURSING | Facility: CLINIC | Age: 30
End: 2020-10-19

## 2020-10-19 ENCOUNTER — COMMUNICATION - HEALTHEAST (OUTPATIENT)
Dept: FAMILY MEDICINE | Facility: CLINIC | Age: 30
End: 2020-10-19

## 2020-10-19 ENCOUNTER — TRANSFERRED RECORDS (OUTPATIENT)
Dept: HEALTH INFORMATION MANAGEMENT | Facility: CLINIC | Age: 30
End: 2020-10-19

## 2020-10-19 DIAGNOSIS — J45.40 MODERATE PERSISTENT ASTHMA WITHOUT COMPLICATION: ICD-10-CM

## 2020-10-20 LAB — RENIN PLAS-CCNC: 3.3 NG/ML/HR

## 2020-10-28 ENCOUNTER — COMMUNICATION - HEALTHEAST (OUTPATIENT)
Dept: NURSING | Facility: CLINIC | Age: 30
End: 2020-10-28

## 2020-11-05 DIAGNOSIS — Z53.9 DIAGNOSIS NOT YET DEFINED: Primary | ICD-10-CM

## 2020-11-17 ENCOUNTER — COMMUNICATION - HEALTHEAST (OUTPATIENT)
Dept: NURSING | Facility: CLINIC | Age: 30
End: 2020-11-17

## 2020-12-01 ENCOUNTER — COMMUNICATION - HEALTHEAST (OUTPATIENT)
Dept: NURSING | Facility: CLINIC | Age: 30
End: 2020-12-01

## 2020-12-04 ENCOUNTER — OFFICE VISIT - HEALTHEAST (OUTPATIENT)
Dept: FAMILY MEDICINE | Facility: CLINIC | Age: 30
End: 2020-12-04

## 2020-12-04 DIAGNOSIS — L60.9 NAIL ABNORMALITIES: ICD-10-CM

## 2020-12-04 DIAGNOSIS — F25.1 SCHIZOAFFECTIVE DISORDER, DEPRESSIVE TYPE, WITH CATATONIA (H): ICD-10-CM

## 2020-12-04 DIAGNOSIS — F06.1 SCHIZOAFFECTIVE DISORDER, DEPRESSIVE TYPE, WITH CATATONIA (H): ICD-10-CM

## 2020-12-04 DIAGNOSIS — M79.675 GREAT TOE PAIN, LEFT: ICD-10-CM

## 2020-12-04 DIAGNOSIS — H81.10 BENIGN PAROXYSMAL POSITIONAL VERTIGO, UNSPECIFIED LATERALITY: ICD-10-CM

## 2020-12-04 DIAGNOSIS — R42 DIZZINESS: ICD-10-CM

## 2020-12-08 ENCOUNTER — COMMUNICATION - HEALTHEAST (OUTPATIENT)
Dept: NURSING | Facility: CLINIC | Age: 30
End: 2020-12-08

## 2020-12-08 ASSESSMENT — ACTIVITIES OF DAILY LIVING (ADL): DEPENDENT_IADLS:: CLEANING;COOKING;LAUNDRY;SHOPPING;MEAL PREPARATION

## 2020-12-14 ENCOUNTER — COMMUNICATION - HEALTHEAST (OUTPATIENT)
Dept: FAMILY MEDICINE | Facility: CLINIC | Age: 30
End: 2020-12-14

## 2020-12-14 DIAGNOSIS — E03.8 OTHER SPECIFIED HYPOTHYROIDISM: ICD-10-CM

## 2020-12-14 DIAGNOSIS — R05.9 COUGH: ICD-10-CM

## 2020-12-14 DIAGNOSIS — J45.40 MODERATE PERSISTENT ASTHMA WITHOUT COMPLICATION: ICD-10-CM

## 2020-12-14 DIAGNOSIS — R42 DIZZINESS: ICD-10-CM

## 2020-12-16 ENCOUNTER — OFFICE VISIT - HEALTHEAST (OUTPATIENT)
Dept: PODIATRY | Facility: CLINIC | Age: 30
End: 2020-12-16

## 2020-12-16 DIAGNOSIS — L03.032 PARONYCHIA OF GREAT TOE OF LEFT FOOT: ICD-10-CM

## 2020-12-16 ASSESSMENT — MIFFLIN-ST. JEOR: SCORE: 1612.64

## 2020-12-28 ENCOUNTER — COMMUNICATION - HEALTHEAST (OUTPATIENT)
Dept: FAMILY MEDICINE | Facility: CLINIC | Age: 30
End: 2020-12-28

## 2020-12-28 ENCOUNTER — OFFICE VISIT - HEALTHEAST (OUTPATIENT)
Dept: PODIATRY | Facility: CLINIC | Age: 30
End: 2020-12-28

## 2020-12-28 DIAGNOSIS — L03.032 PARONYCHIA OF GREAT TOE OF LEFT FOOT: ICD-10-CM

## 2020-12-28 DIAGNOSIS — R44.0 AUDITORY HALLUCINATION: ICD-10-CM

## 2020-12-28 DIAGNOSIS — E03.9 HYPOTHYROIDISM, UNSPECIFIED TYPE: ICD-10-CM

## 2020-12-28 RX ORDER — LEVOTHYROXINE SODIUM 88 UG/1
88 TABLET ORAL DAILY
Qty: 30 TABLET | Refills: 0 | Status: SHIPPED | OUTPATIENT
Start: 2020-12-28 | End: 2021-02-01

## 2020-12-28 ASSESSMENT — MIFFLIN-ST. JEOR: SCORE: 1612.64

## 2020-12-28 NOTE — TELEPHONE ENCOUNTER
LEVOTHYROXINE SODIUM 88 MCG 88 Tablet  Last Written Prescription Date:  2/5/2020  Last Fill Quantity: 90,   # refills: 3  Last Office Visit : 8/3/2020  Future Office visit:  None    Routing refill request to provider for review/approval because:  Abnormal Labs:    TSH    Refer to Provider for reviwe    Recent Labs   Lab Test 02/03/20  1627   TSH 0.18*           Piedad Box RN  Central Triage Red Flags/Med Refills

## 2021-01-07 DIAGNOSIS — Z53.9 DIAGNOSIS NOT YET DEFINED: Primary | ICD-10-CM

## 2021-01-08 ENCOUNTER — COMMUNICATION - HEALTHEAST (OUTPATIENT)
Dept: NURSING | Facility: CLINIC | Age: 31
End: 2021-01-08

## 2021-01-18 ENCOUNTER — COMMUNICATION - HEALTHEAST (OUTPATIENT)
Dept: NURSING | Facility: CLINIC | Age: 31
End: 2021-01-18

## 2021-01-25 ENCOUNTER — COMMUNICATION - HEALTHEAST (OUTPATIENT)
Dept: CARE COORDINATION | Facility: CLINIC | Age: 31
End: 2021-01-25

## 2021-01-25 ENCOUNTER — COMMUNICATION - HEALTHEAST (OUTPATIENT)
Dept: NURSING | Facility: CLINIC | Age: 31
End: 2021-01-25

## 2021-01-27 ENCOUNTER — COMMUNICATION - HEALTHEAST (OUTPATIENT)
Dept: FAMILY MEDICINE | Facility: CLINIC | Age: 31
End: 2021-01-27

## 2021-01-27 DIAGNOSIS — J45.40 MODERATE PERSISTENT ASTHMA WITHOUT COMPLICATION: ICD-10-CM

## 2021-01-27 DIAGNOSIS — E03.9 HYPOTHYROIDISM, UNSPECIFIED TYPE: ICD-10-CM

## 2021-01-27 DIAGNOSIS — R42 DIZZINESS: ICD-10-CM

## 2021-01-27 DIAGNOSIS — R05.9 COUGH: ICD-10-CM

## 2021-02-01 ENCOUNTER — OFFICE VISIT - HEALTHEAST (OUTPATIENT)
Dept: FAMILY MEDICINE | Facility: CLINIC | Age: 31
End: 2021-02-01

## 2021-02-01 DIAGNOSIS — E27.40 ADRENAL INSUFFICIENCY (H): ICD-10-CM

## 2021-02-01 DIAGNOSIS — E03.9 HYPOTHYROIDISM, UNSPECIFIED TYPE: ICD-10-CM

## 2021-02-01 DIAGNOSIS — F06.1 SCHIZOAFFECTIVE DISORDER, DEPRESSIVE TYPE, WITH CATATONIA (H): ICD-10-CM

## 2021-02-01 DIAGNOSIS — R42 DIZZINESS: ICD-10-CM

## 2021-02-01 DIAGNOSIS — F25.1 SCHIZOAFFECTIVE DISORDER, DEPRESSIVE TYPE, WITH CATATONIA (H): ICD-10-CM

## 2021-02-01 LAB
ALBUMIN SERPL-MCNC: 4.5 G/DL (ref 3.5–5)
ALP SERPL-CCNC: 74 U/L (ref 45–120)
ALT SERPL W P-5'-P-CCNC: 204 U/L (ref 0–45)
ANION GAP SERPL CALCULATED.3IONS-SCNC: 13 MMOL/L (ref 5–18)
AST SERPL W P-5'-P-CCNC: 101 U/L (ref 0–40)
BILIRUB SERPL-MCNC: 0.8 MG/DL (ref 0–1)
BUN SERPL-MCNC: 14 MG/DL (ref 8–22)
CALCIUM SERPL-MCNC: 9.7 MG/DL (ref 8.5–10.5)
CHLORIDE BLD-SCNC: 104 MMOL/L (ref 98–107)
CO2 SERPL-SCNC: 22 MMOL/L (ref 22–31)
CREAT SERPL-MCNC: 0.79 MG/DL (ref 0.7–1.3)
ERYTHROCYTE [DISTWIDTH] IN BLOOD BY AUTOMATED COUNT: 12.1 % (ref 11–14.5)
GFR SERPL CREATININE-BSD FRML MDRD: >60 ML/MIN/1.73M2
GLUCOSE BLD-MCNC: 90 MG/DL (ref 70–125)
HCT VFR BLD AUTO: 49.8 % (ref 40–54)
HGB BLD-MCNC: 16.8 G/DL (ref 14–18)
MCH RBC QN AUTO: 29.4 PG (ref 27–34)
MCHC RBC AUTO-ENTMCNC: 33.7 G/DL (ref 32–36)
MCV RBC AUTO: 87 FL (ref 80–100)
PLATELET # BLD AUTO: 164 THOU/UL (ref 140–440)
PMV BLD AUTO: 11.1 FL (ref 7–10)
POTASSIUM BLD-SCNC: 4.3 MMOL/L (ref 3.5–5)
PROT SERPL-MCNC: 7.7 G/DL (ref 6–8)
RBC # BLD AUTO: 5.72 MILL/UL (ref 4.4–6.2)
SODIUM SERPL-SCNC: 139 MMOL/L (ref 136–145)
T4 FREE SERPL-MCNC: 1.8 NG/DL (ref 0.7–1.8)
TSH SERPL DL<=0.005 MIU/L-ACNC: 0.02 UIU/ML (ref 0.3–5)
WBC: 4.8 THOU/UL (ref 4–11)

## 2021-02-01 RX ORDER — LEVOTHYROXINE SODIUM 88 UG/1
88 TABLET ORAL DAILY
Qty: 90 TABLET | Refills: 4 | Status: SHIPPED | OUTPATIENT
Start: 2021-02-01 | End: 2021-03-22 | Stop reason: ALTCHOICE

## 2021-02-01 NOTE — TELEPHONE ENCOUNTER
LEVOTHYROXINE SODIUM 88 MCG 88 Tablet      Last Written Prescription Date:  12/28/20  Last Fill Quantity: 30,   # refills: 0  Last Office Visit : 8/3/20  Future Office visit:  None scheduled    Routing refill request to provider for review/approval because:  Provider preference  Last TSH in care everywhere from 6/16/20 at 0.78

## 2021-02-02 ENCOUNTER — COMMUNICATION - HEALTHEAST (OUTPATIENT)
Dept: NURSING | Facility: CLINIC | Age: 31
End: 2021-02-02

## 2021-02-02 ENCOUNTER — TRANSCRIBE ORDERS (OUTPATIENT)
Dept: OTHER | Age: 31
End: 2021-02-02

## 2021-02-02 DIAGNOSIS — E27.40 ADRENAL INSUFFICIENCY (H): Primary | ICD-10-CM

## 2021-02-03 ENCOUNTER — AMBULATORY - HEALTHEAST (OUTPATIENT)
Dept: NURSING | Facility: CLINIC | Age: 31
End: 2021-02-03

## 2021-02-05 ENCOUNTER — AMBULATORY - HEALTHEAST (OUTPATIENT)
Dept: FAMILY MEDICINE | Facility: CLINIC | Age: 31
End: 2021-02-05

## 2021-02-05 ENCOUNTER — COMMUNICATION - HEALTHEAST (OUTPATIENT)
Dept: FAMILY MEDICINE | Facility: CLINIC | Age: 31
End: 2021-02-05

## 2021-02-05 DIAGNOSIS — E03.9 HYPOTHYROIDISM: ICD-10-CM

## 2021-02-10 ENCOUNTER — COMMUNICATION - HEALTHEAST (OUTPATIENT)
Dept: NURSING | Facility: CLINIC | Age: 31
End: 2021-02-10

## 2021-02-10 DIAGNOSIS — E27.40 ADRENAL INSUFFICIENCY (H): ICD-10-CM

## 2021-02-10 DIAGNOSIS — E27.1 ADDISON'S DISEASE (H): ICD-10-CM

## 2021-02-12 NOTE — TELEPHONE ENCOUNTER
HYDROCORTISONE 10 MG TABS 10 Tablet      Last Written Prescription Date:  2/5/20  Last Fill Quantity: 210,   # refills: 3  Last Office Visit : 8/3/20  Future Office visit:  None scheduled    Routing refill request to provider for review/approval because:  Provider preference

## 2021-02-15 ENCOUNTER — TELEPHONE (OUTPATIENT)
Dept: ENDOCRINOLOGY | Facility: CLINIC | Age: 31
End: 2021-02-15

## 2021-02-15 RX ORDER — HYDROCORTISONE 10 MG/1
TABLET ORAL
Qty: 210 TABLET | Refills: 4 | Status: SHIPPED | OUTPATIENT
Start: 2021-02-15 | End: 2021-09-01

## 2021-02-15 NOTE — TELEPHONE ENCOUNTER
Please track down his home care provider and get accurate med list of what they are actually giving him.  Get weights over the last year.  Let me know results of both.   Help him schedule a follow up appointment .   Maddison Witt MD

## 2021-02-15 NOTE — TELEPHONE ENCOUNTER
Home care is closed today but I left a detailed message to fax us the medication list they use in the home to fill his medication. The  Nurse had been Angela that went into the home but she has retired.  I did ask for the name and new number for the current nurse.  I also asked if they weigh  Toe  At home otherwise I did take the weights off his visits seen in care everywhere at clinic.   2/1/21 171 lb 12 oz   12/28/20 167 lbs   12/4/20  167 lbs  8 oz   10/8/20 170 lbs   2/24/20 157 lb 1 oz.     I will notify Dr Witt when the Medication list from home care arrives. Ioana Mendoza RN on 2/15/2021 at 1:56 PM

## 2021-02-16 ENCOUNTER — TELEPHONE (OUTPATIENT)
Dept: ENDOCRINOLOGY | Facility: CLINIC | Age: 31
End: 2021-02-16

## 2021-02-16 DIAGNOSIS — E27.1 ADDISON'S DISEASE (H): ICD-10-CM

## 2021-02-16 NOTE — TELEPHONE ENCOUNTER
2/16/2021 review of med list from his home care agency;   It includes the following endocrine relevant:  Vitamin D2 2000 international unit(s)/day  Melatonin 2 mg - this has no instructions attached.   Levothyroxine 75 mcg/day - this is a dose reduction since 2/11/2021, the change made by Dr Rangel  Hydrocortisone 10 mg bid well dose; 20 mg bid x 3 days or illness   The florinef he was on last year and at our last appt is not on the med list! I have no information on whowhen it was stopped?  Florinef appears on the 2/1/2021 clinic vist med list with Dr Rangel.      Chart review of care everywhere  Lab reports  10/8/2020 ACTH 106 ' Na 139, K 3.7, glucose 59  2/1/2021 Na 139, K 4.3, TSH 0.02, free T4 1.8, , ALT 2p4, bili 0.8, alk phos 74, creatinine 0.79, Ca 9.7    Vitals  2/1/2021 BMI 30.4, weight 171, /mintue /77    To clinic RN:  Please reach back to his home care team and see if they can tell on their records when the Florinef was stopped and who stopped it?  Why is Florinef not on his med list?  Thanks.      Maddison Witt MD

## 2021-02-16 NOTE — TELEPHONE ENCOUNTER
I spoke with the new nurse Renetta who took over in October. Angela the old nurse told her the Florinef was on hold and he was taking Hydrocortisone in it's place . He has Florinef in the home  It's just not in his pill box. Renetta tells me she can  go in today if needed to make any adjustments.  The levothyroxine  Change was done a week ago by his PCP. She is not aware of who  put the Florinef on hold or how long ago.   His main complaints when she goes in is lower back pain and dizziness. Ioana Mendoza RN on 2/16/2021 at 12:44 PM

## 2021-02-16 NOTE — TELEPHONE ENCOUNTER
We need to know who put the Florinef on hold and when. They must have a date record on this and an order from someone . See if she can get us this.   Maddison Witt MD

## 2021-02-16 NOTE — TELEPHONE ENCOUNTER
I have calls into the  Nurse Renetta who sets up his medications. I have a feeling the refills ran out  and nobody contacted us so they just stopped it. This was the issue last time . . At his last visit with PCP 2/1/21  he was experiencing nausea and dizziness . I have routed a refill onto  you to fill  In case this is what happened. Last refill was 2/2020 for a year   so he would have ran out recently . .I will explain the importance of this medication to life once again to home care staff.  Ioana Mendoza, RN on 2/16/2021 at 9:27 AM

## 2021-02-16 NOTE — TELEPHONE ENCOUNTER
Before we refill it we need to be clear on why it is not on the list . Did someone who knows something we don't know discontinue it for a good reason or not?    It shouldn't be necessary for us to dora after all of these changes in the meds we prescribe that are being made by others.  How can we resolve this issue?     Maddison Witt MD

## 2021-02-16 NOTE — TELEPHONE ENCOUNTER
Action Needed --  I've placed a hold that needs scheduling. Please see below.  Department: Endocrine  Provider: Eduar RTN  Date/Time:9/22/21 3PM  Telephone  No video access   needed   RFV:Sterling Mendoza RN on 2/16/2021 at 8:14 AM

## 2021-02-17 ENCOUNTER — COMMUNICATION - HEALTHEAST (OUTPATIENT)
Dept: NURSING | Facility: CLINIC | Age: 31
End: 2021-02-17

## 2021-02-18 NOTE — TELEPHONE ENCOUNTER
Amilcar Triplett,    This is scheduled. Just confirming... your message said 3pm but the 3:20pm was on hold. I scheduled for 3pm like your message said but wanted to check.    Thanks,  Deepthi

## 2021-02-22 ENCOUNTER — RECORDS - HEALTHEAST (OUTPATIENT)
Dept: ADMINISTRATIVE | Facility: OTHER | Age: 31
End: 2021-02-22

## 2021-02-23 ENCOUNTER — MEDICAL CORRESPONDENCE (OUTPATIENT)
Dept: HEALTH INFORMATION MANAGEMENT | Facility: CLINIC | Age: 31
End: 2021-02-23

## 2021-02-24 ENCOUNTER — COMMUNICATION - HEALTHEAST (OUTPATIENT)
Dept: NURSING | Facility: CLINIC | Age: 31
End: 2021-02-24

## 2021-02-25 ENCOUNTER — COMMUNICATION - HEALTHEAST (OUTPATIENT)
Dept: NURSING | Facility: CLINIC | Age: 31
End: 2021-02-25

## 2021-03-01 ENCOUNTER — OFFICE VISIT - HEALTHEAST (OUTPATIENT)
Dept: FAMILY MEDICINE | Facility: CLINIC | Age: 31
End: 2021-03-01

## 2021-03-01 DIAGNOSIS — E03.9 HYPOTHYROIDISM, UNSPECIFIED TYPE: ICD-10-CM

## 2021-03-01 DIAGNOSIS — F20.1 DISORGANIZED SCHIZOPHRENIA (H): ICD-10-CM

## 2021-03-01 DIAGNOSIS — F06.1 SCHIZOAFFECTIVE DISORDER, DEPRESSIVE TYPE, WITH CATATONIA (H): ICD-10-CM

## 2021-03-01 DIAGNOSIS — F25.1 SCHIZOAFFECTIVE DISORDER, DEPRESSIVE TYPE, WITH CATATONIA (H): ICD-10-CM

## 2021-03-01 DIAGNOSIS — R79.89 ELEVATED LIVER FUNCTION TESTS: ICD-10-CM

## 2021-03-01 LAB
ALT SERPL W P-5'-P-CCNC: 100 U/L (ref 0–45)
AST SERPL W P-5'-P-CCNC: 54 U/L (ref 0–40)

## 2021-03-04 DIAGNOSIS — Z53.9 DIAGNOSIS NOT YET DEFINED: Primary | ICD-10-CM

## 2021-03-07 ENCOUNTER — AMBULATORY - HEALTHEAST (OUTPATIENT)
Dept: FAMILY MEDICINE | Facility: CLINIC | Age: 31
End: 2021-03-07

## 2021-03-07 DIAGNOSIS — R79.89 ELEVATED LIVER FUNCTION TESTS: ICD-10-CM

## 2021-03-08 ENCOUNTER — TELEPHONE (OUTPATIENT)
Dept: ENDOCRINOLOGY | Facility: CLINIC | Age: 31
End: 2021-03-08

## 2021-03-08 ENCOUNTER — COMMUNICATION - HEALTHEAST (OUTPATIENT)
Dept: NURSING | Facility: CLINIC | Age: 31
End: 2021-03-08

## 2021-03-08 NOTE — TELEPHONE ENCOUNTER
Message left for Jazmin manager of home care to fax home care visits  with vitals going back to October 2020. They don't do weights in the home so weights need to come off PCP visits which I thought I sent you previous.   I was told 11/2/20 was when pharmacy supportively  put the florinef on hold which again  they deny  and 10/26/20 Angela the home care nurse reported off to álvaro Jackson that it was on hold.   Labs are all in care [facility] done when at PCP visits. Last labs 2/1/21. No potassium. Sodium normal .  You  last saw him in August 2020 and the MAR from home care had this on the med list then. Home care would send his med list to PCP to say yes this is correct and even after they placed it on hold  Jazmin tells me the PCP would  Approve the medication list without the Florinef on it .   I think we need to move forward to establish if he needs this medication rather then continue to not give orders to take it  as it's been at least 4.5 months since he last received it .   Do you want  Labs ordered to be done to make this decision  ?   When you first saw him  he also wasn't getting this medication because his previous provider didn't refill it and the nurse just assumed it wasn't needed since they didn't . I feel this same nurse who has since retired is part of the mystery as she is the only one saying it was on hold . Ioana Mendoza, RN on 3/8/2021 at 5:06 PM

## 2021-03-08 NOTE — TELEPHONE ENCOUNTER
"----- Message from Maddison Witt MD sent at 3/8/2021  4:23 PM CST -----  We are depending on them to follow our orders and to provide us accurate information.  This patient already suffers from many barriers to care making his care more difficult.  Does he need it?  I can't tell that from this distance with the information I have.  How can we answer the question.  The should provider the followin.  Vitals signs dating back to 3 months prior to holding the florinef and since then  2  Weight dating back to 3 months prior to holding the florinef and since then  3.  I am not sure if we have labs that would also fall in that timeline - specifically Na and K labs.    4.  Exact date of when the last dose of florinef was given. :They must have MARs they can provide that document all of this month by month.  If they do not perhaps we should report them to the state.    Maddison Witt MD  ----- Message -----  From: Ioana Mendoza, RN  Sent: 3/8/2021   3:43 PM CST  To: Maddison Witt MD    The home care manager told me the pharmacy told them to hold it . She even said she spoke to tem but didn't say why . When I called the pharmacy the denied this . They filled the refill request and if it had been on hold by a physician they would not have filled the request. It's home care manager telling me this. He has been without  the med now for months so is the medication not needed ? I can't track any physician that gave the orders to hold it to cause that RN to not put the medication in the box and then report to the new nurse  taking over in home care  that it's on hold .   ----- Message -----  From: Maddison Witt MD  Sent: 3/8/2021   3:37 PM CST  To: Ioana Mendoza RN    I am at a loss to understand why they would need me in this action if a non-physician stopped administering the drug and put it on a \"hold\". They can't just do that. Why did they do that?  Why are we needing to dora after them " like this?   I am very concerned about mismanagement by this care company if this is really what they did.  ----- Message -----  From: Ioana Mendoza RN  Sent: 3/8/2021   3:06 PM CST  To: Maddison Witt MD    Did you see my 2/17/21 telephone encounter on who put the florinef  on hold ? I spoke to home care and pharmacy and it seems home care put it on old. He pharmacy filled it  and they have the medication in the home but they are not giving it to him and won't until you instruct them to. I noticed it still hasn't been prescribed . Ioana Mendoza RN on 3/8/2021 at 3:10 PM

## 2021-03-09 ENCOUNTER — TELEPHONE (OUTPATIENT)
Dept: ENDOCRINOLOGY | Facility: CLINIC | Age: 31
End: 2021-03-09

## 2021-03-09 ENCOUNTER — RECORDS - HEALTHEAST (OUTPATIENT)
Dept: ADMINISTRATIVE | Facility: OTHER | Age: 31
End: 2021-03-09

## 2021-03-09 ENCOUNTER — VIRTUAL VISIT (OUTPATIENT)
Dept: ENDOCRINOLOGY | Facility: CLINIC | Age: 31
End: 2021-03-09
Payer: COMMERCIAL

## 2021-03-09 ENCOUNTER — COMMUNICATION - HEALTHEAST (OUTPATIENT)
Dept: FAMILY MEDICINE | Facility: CLINIC | Age: 31
End: 2021-03-09

## 2021-03-09 DIAGNOSIS — Z60.3 IMMIGRANT WITH LANGUAGE DIFFICULTY: ICD-10-CM

## 2021-03-09 DIAGNOSIS — E27.40 ADRENAL INSUFFICIENCY (H): ICD-10-CM

## 2021-03-09 DIAGNOSIS — E27.1 ADDISON'S DISEASE (H): ICD-10-CM

## 2021-03-09 DIAGNOSIS — R79.89 LOW TSH LEVEL: ICD-10-CM

## 2021-03-09 DIAGNOSIS — Z79.899 MEDICATION MANAGEMENT: ICD-10-CM

## 2021-03-09 DIAGNOSIS — E03.9 HYPOTHYROIDISM, UNSPECIFIED TYPE: Primary | ICD-10-CM

## 2021-03-09 PROCEDURE — 99215 OFFICE O/P EST HI 40 MIN: CPT | Mod: 95

## 2021-03-09 PROCEDURE — 99417 PROLNG OP E/M EACH 15 MIN: CPT

## 2021-03-09 SDOH — SOCIAL STABILITY - SOCIAL INSECURITY: ACCULTURATION DIFFICULTY: Z60.3

## 2021-03-09 NOTE — TELEPHONE ENCOUNTER
Jazmin will quickly fax over the MAR or medication list from October to now along with vitals  . His PCP changed the synthroid dose 2/10/21 which I informed you of to 75 mcg  from your 88 mcg.   Last dose of Florinef 10/26/20  noted per Angela the retired nurse no longer there. It seems Angela made an  communication error  that unfortunately since she is gone this is all we know.     The med list without the florinef on it was sent to PCP  10/26/20, 12/23/20 and 2/25/21 that he signed off on as correct. She might send those to you as well.     Going forward we will have to connect with PCP on visit notes and plan or any new orders. Expect the email in 10 minutes .   All labs are done by PCP clinic seen in care everywhere along with Weights. Ioana Mendoza, RN on 3/9/2021 at 3:12 PM

## 2021-03-09 NOTE — TELEPHONE ENCOUNTER
Action Needed --  I've placed a hold that needs scheduling. Please see below.  Department: Endocrine RTN  Provider: Eduar   Date/Time:Today 3/9/21 3:40 PM Tel;tam [phone]  RFV:Daniele Miller  needed  Ioana Mendoza RN on 3/9/2021 at 10:37 AM

## 2021-03-09 NOTE — PROGRESS NOTES
Endocrine video visit note-    Attending Assessment/Plan :     Adrenal insufficiency, primary with history of high ACTH, hyperpigmentation at our last visit.  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  Labs to include renin  Clarify HC dose and reduce slightly .  More Florinef and less HC may help the iatrogenic cushing.     Medication management is a major issue despite having homecare. We are getting conflicting information from home care compared with the [facility] med list.   The [facility] med list does not align with what I prescribed in 2/2020 nor the refill I prescribed on 2/15/2021.  Which is actually happening?      Addendum: Following the appt I was sent 22 pages of records from [facility].  We now confirm the following meds are being administered:  Hydrocortisone 10 mg AM and 10 mg 8 PM started 2/10/2020  Lt4 75 mcg/day started 2/11/2021  Triamcinolone 0.1% cream bid prn   Vitamin D 2000 2 capsules in the AM  Started 12/17/18 Dr Schmidt  I note that florinef 0.1 mg/day is on the med list from 10/27/2020-12/26/2020 but it is NOT on the med list from 12/26/2020 to 2/23/2021    Immigrant with language barrier. He is dependent on the care of others , unable to advocate and protect himself.      Hypothyroidism by history. On LT4 with low TSH on 2/2021 labs.  He does not appear or sound thyrotoxic.   Labs: TSH, free T4, T3      Dizziness complaints continue - unclear if related to # 1 vs another process.     Pituitary - Discrepant reports on outside past MRIs.  I will need to get the images pushed from HE so I can review, including 7/24/17 and 6/25/19 brain .  The images are not on pACS as of today.    Due to the COVID 19 pandemic this visit was a telephone/video visit in order to help prevent spread of infection in this high risk patient and the general population. The patient gave verbal consent for the visit today.    I have independently reviewed and interpreted labs, imaging as indicated.     Chart  review/prep time 1  [id number]  Visit Start time doximity invite [id number]   Visit Stop time  4573   72__ minutes spent on the date of the encounter doing chart review, history and exam, documentation and further activities as noted above.    Addendum: 3/10/2021 review of [facility] labs - it appears my orders were not followed and the labs I ordered wree not done??  Addendum 3/12/2021 labs from 3/10/201 review of [facility] care everywhere: free T4 1.1, TSH 1.62, T3 75, Na 138, K 4, creatinine 0.82; renin pending??     Addendum # 2 chart review 3/20/2021 -review of [facility] care everywhere-- renin activity is not resulted - was it drawn?     Maddison Witt MD    Chief complaint/ HISTORY OF PRESENT ILLNESS  Toe presents for follow up of primary adrenal insufficiency and hypothyroidism.  I have reviewed Care Everywhere including [facility] lab reports, imaging reports and provider notes as indicated.      He was seen today along with Angela .      The original diagnosis of hypothyroidism is not well documented on care everywhere.   The original diagnosis of adrenal insufficiency was 7/2017. He had high ACTH with low cortisol, failure to respond to cortrosyn stimulation testing.    Dr Moisés Schmidt note that Toe looked cushingoing on 12/21/18. At that time he was on HC 20/10 and florinef 0.2, LT4 88 mcg/day.    He was advised to decrease HC to 10/5 and decrease florinef to 0.1 .    2/3/2020. I changed LT4 f4om 100 to 88 mcg/day.   2/5/2020 I changed hydrocortisone to 10 mg bid when well and 20 bid  x3 days with illness.    2/11/2020 I filled Rx for  A year supply of florinef 0.1 mg/day     We have had a lot of difficulty working with his home care team relative to getting an accurate medication list and also possibly relative to our medication rx orders being followed.     2/24/2020 157 lbs, 110/78, /minute, BMI 27.8 florinef 0.1 on the med list; HC 20/5 on the med list  8/12/2020  e-visit  10/8/2020 170 lbs, 116/78, 77/minute, BMI 30.1- florinef on the med list at 0.1 mg/day; HC 20/5 on the med list - appt with Dr Rangel reviewed by me  11/2/2020 [facility] reported tells homecare nurse Angela galeana is on hold.  (Our follow up with the pharmacy on 2/17/2021 disputes this but they note he last filled it 11/2/2020.    12/4/2020 167 lbs, 120/84, HR 76/minute, BMI 29.67; florinef 0.1 mg /day, HC 20/5 on the med list  2/1/2021 171 lbs, 110/77, /minute; florineft 0.1/HC 20/5 on the med list appt with Dr Rangel  3/1/2021 175 lbs, 111/77, HR 86/minute; florinef 0.1; HC 20/5 on the med list - appt with Dr Rangel    We have the following data:   7/12/17: Na 129, K 4.7, Cl 97, C02 18, creatinine 1.47, lactate 1.9  7/24/17: ACTH 1845  7/25/17 cortrosyn stimulatin test : cortisol < 1 -- 1.4-- 1.8  7/29/17: TSH 2.12  12/19/17 LT4 dose increased from 75 to 88 mc/day by Dr Schmidt  11/30/18: 25OHD 5.8, PTH 82, Ca 9.9, creatinine 0.85  12/4/18: prolactin 21.3, Na 146, K 3.9, Cl 108, Co2 27, creatinine 0.78  3/26/19: TSH 6.24, free T4 1.2,   5/16/19:L Ca 9.8, Co2 21, Na 138, K 3.9, creatinine 0.79, TSH 0.43, free T4 1.3, B12 527  9/10/19 Na 137, K 4.5, Co2 18, Ca 11, creatinine 1.07  9/16/19 iCa 1.16, 25 OHD 22, glucose 84, TSH 3.71, phos 3  2/3/2020 ACTH > 1250, TSH 0.18, free T4 1.16, renin activity 9.3, Na 139, K 3.8, Ca 9.4 , phos 3.4  6/16/2020: TSH 0.78, Ca 0.85, Na 139, K 3.9, creatinine 0.85,   10/8/2020 TSH 0.77, prolactin 8.8, ACTH 106, renin activity 3.3  2/1/2021 TSH 0.02, Hgb 16.8, platelets 164K    Imaging-- review of PAC by me today - we have no outside images on PACS  7/24/17 CT abdomen: atrophic adrenal glands.   7/24/17 Brain MRI: 7 mm nodule within pituitary, hypoenhancing  6/25/19: MRI brain: normal - no comment on pituitary    REVIEW OF SYSTEMS  Weight gain is attributed to ??   don't have appetite  Cardiac: denies heart pounding or racing; sometimes feels faster;  "  Respiratory: don't have enough breathing -   GI: + nausea; currently has it a little; no vomiting; no diarrhea  Dizziness most of the time-- worse with reclining.  Sometimes about to pass out;   ? 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      Medications    Current Outpatient Medications   Medication Sig Dispense Refill     acetaminophen (TYLENOL) 325 MG tablet TAKE 1-2 TABLETS (650 MG TOTAL) BY MOUTH EVERY 6 (SIX) HOURS AS NEEDED FOR PAIN.       albuterol (PROAIR HFA/PROVENTIL HFA/VENTOLIN HFA) 108 (90 Base) MCG/ACT inhaler Inhale 2 puffs into the lungs       Ergocalciferol 50 MCG (2000 UT) TABS Take 2,000 Units by mouth daily       fexofenadine (ALLEGRA) 180 MG tablet Take 1 tablet (180 mg) by mouth daily       fludrocortisone (FLORINEF) 0.1 MG tablet Take 1 tablet (0.1 mg) by mouth daily 90 tablet 3     fluticasone (FLONASE) 50 MCG/ACT nasal spray Spray 1 spray into both nostrils daily as needed for rhinitis or allergies       guaiFENesin-dextromethorphan (ROBITUSSIN DM) 100-10 MG/5ML syrup Take 5 mLs by mouth 3 times daily as needed for cough       hydrocortisone (CORTEF) 10 MG tablet Take 1 tablet (10 mg) by mouth 2 times daily PM  dose should be approx 8 hours after  AM dose. Take 20 mg twice/day for 3 days if illness. 210 tablet 4     levothyroxine (SYNTHROID/LEVOTHROID) 88 MCG tablet Take 1 tablet (88 mcg) by mouth daily 90 tablet 4     " loperamide (IMODIUM) 2 MG capsule Take 1 capsule (2 mg) by mouth as needed for diarrhea Take 1 capsule up to 3 times a day as needed       loratadine (CLARITIN) 10 MG tablet Take 10 mg by mouth       meclizine (ANTIVERT) 25 MG tablet Take 1 tablet (25 mg) by mouth 3 times daily as needed for dizziness       mometasone (ASMANEX, 60 METERED DOSES,) 220 MCG/INH inhaler INHALE 1 PUFF BY MOUTH 2 (TWO) TIMES A DAY.       Multiple Vitamin (MULTI-VITAMINS) TABS Take 1 tablet by mouth daily       OLANZapine (ZYPREXA) 5 MG tablet Take 1 tablet (5 mg) by mouth every morning       ondansetron (ZOFRAN-ODT) 4 MG ODT tab DISSOLVE 1 TABLET (4 MG TOTAL) BY MOUTH EVERY 8 (EIGHT) HOURS AS NEEDED FOR NAUSEA.       paliperidone ER (INVEGA) 6 MG 24 hr tablet Take 1 tablet (6 mg) by mouth At Bedtime       TRAVEL SICKNESS 25 MG CHEW        triamcinolone (KENALOG) 0.1 % external cream Apply thin layer to affected areas twice daily as needed         Allergies  No Known Allergies    Family History  family history includes Diabetes in his mother.  Toe doesn't know family history    Social History  Social History     Tobacco Use     Smoking status: Not on file   Substance Use Topics     Alcohol use: Not on file     Drug use: Not on file     Lives with parents (mom, dad, 4 siblings- dad and sibs leave the home);.   Nurse sets up the medication every 2 weeks - they are expected tomorrow.    [facility] to     Physical Exam  There were no vitals taken for this visit.  There is no height or weight on file to calculate BMI.   BP Readings from Last 1 Encounters:   02/03/20 112/79      Pulse Readings from Last 1 Encounters:   02/03/20 85      Resp Readings from Last 1 Encounters:   No data found for Resp      Temp Readings from Last 1 Encounters:   No data found for Temp      SpO2 Readings from Last 1 Encounters:   No data found for SpO2      Wt Readings from Last 1 Encounters:   02/03/20 74.1 kg (163 lb 6.4 oz)      Ht Readings  "from Last 1 Encounters:   02/03/20 1.626 m (5' 4\")   GENERAL: no distress ; round cushingoid face; stocking cap  SKIN: moustache; no acne; Visible skin clear. No significant rash, abnormal pigmentation or lesions.  EYES: Eyes grossly normal to inspection.  .  NECK: no visible masses; no grossly visible goiter  RESP: No audible wheeze, cough, or visible cyanosis.  No visible retractions or increased work of breathing.    ABD: rounded contour; no abdominal striae seen;   NEURO:    Awake, alert, responds appropriately to questions.  Mentation and speech fluent. NO tremor  PSYCH:affect normal,  appearance well-groomed.      "

## 2021-03-09 NOTE — TELEPHONE ENCOUNTER
Please schedule him to open clinic space today if it remains open.  Virtual visit.  Family member should also be present at the appt.  Maddison Witt MD

## 2021-03-09 NOTE — TELEPHONE ENCOUNTER
Please get my lab orders and the AVS to his home care. :They will be there tomorrow.  We are hoping they can draw labs then.  I also wrote specific orders to them on the AVS.  Thanks  Maddison Witt MD

## 2021-03-09 NOTE — LETTER
3/9/2021       RE: Brooke Peterson  [address]  [address]  [place] [address]     Dear Colleague,    Thank you for referring your patient, Brooke Peterson, to the [facility] [place] [facility]. Please see a copy of my visit note below.    Brooke is a 30 year old who is being evaluated via a billable telephone visit.      What phone number would you like to be contacted at? [phone]    How would you like to obtain your AVS? Mail a copy    Rola Nix MA      Endocrine video visit note-    Attending Assessment/Plan :     Adrenal insufficiency, primary with history of high ACTH, hyperpigmentation at our last visit.  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  Labs to include renin  Clarify HC dose and reduce slightly .  More Florinef and less HC may help the iatrogenic cushing.     Medication management is a major issue despite having homecare. We are getting conflicting information from home care compared with the [facility] med list.   The [facility] med list does not align with what I prescribed in 2/2020 nor the refill I prescribed on 2/15/2021.  Which is actually happening?      Addendum: Following the appt I was sent 22 pages of records from [facility].  We now confirm the following meds are being administered:  Hydrocortisone 10 mg AM and 10 mg 8 PM started 2/10/2020  Lt4 75 mcg/day started 2/11/2021  Triamcinolone 0.1% cream bid prn   Vitamin D 2000 2 capsules in the AM  Started 12/17/18 Dr Schmidt  I note that florinef 0.1 mg/day is on the med list from 10/27/2020-12/26/2020 but it is NOT on the med list from 12/26/2020 to 2/23/2021    Immigrant with language barrier. He is dependent on the care of others , unable to advocate and protect himself.      Hypothyroidism by history. On LT4 with low TSH on 2/2021 labs.  He does not appear or sound thyrotoxic.   Labs: TSH, free T4, T3      Dizziness complaints continue - unclear if related  to # 1 vs another process.     Pituitary - Discrepant reports on outside past MRIs.  I will need to get the images pushed from HE so I can review, including 7/24/17 and 6/25/19 brain .  The images are not on pACS as of today.    Due to the COVID 19 pandemic this visit was a telephone/video visit in order to help prevent spread of infection in this high risk patient and the general population. The patient gave verbal consent for the visit today.    I have independently reviewed and interpreted labs, imaging as indicated.     Chart review/prep time 1  4501-9837  Visit Start time doximity invite [id number]   Visit Stop time  8458   72__ minutes spent on the date of the encounter doing chart review, history and exam, documentation and further activities as noted above.    Addendum: 3/10/2021 review of [facility] labs - it appears my orders were not followed and the labs I ordered wree not done??  Addendum 3/12/2021 labs from 3/10/201 review of [facility] care everywhere: free T4 1.1, TSH 1.62, T3 75, Na 138, K 4, creatinine 0.82; renin pending??     Maddison Witt MD    Chief complaint/ HISTORY OF PRESENT ILLNESS  Toe presents for follow up of primary adrenal insufficiency and hypothyroidism.  I have reviewed Care Everywhere including [facility] lab reports, imaging reports and provider notes as indicated.      He was seen today along with Angela .      The original diagnosis of hypothyroidism is not well documented on care everywhere.   The original diagnosis of adrenal insufficiency was 7/2017. He had high ACTH with low cortisol, failure to respond to cortrosyn stimulation testing.    Dr Moisés Schmidt note that Toe looked cushingoing on 12/21/18. At that time he was on HC 20/10 and florinef 0.2, LT4 88 mcg/day.    He was advised to decrease HC to 10/5 and decrease florinef to 0.1 .    2/3/2020. I changed LT4 f4om 100 to 88 mcg/day.   2/5/2020 I changed hydrocortisone to 10 mg bid when well and 20 bid   x3 days with illness.    2/11/2020 I filled Rx for  A year supply of florinef 0.1 mg/day     We have had a lot of difficulty working with his home care team relative to getting an accurate medication list and also possibly relative to our medication rx orders being followed.     2/24/2020 157 lbs, 110/78, /minute, BMI 27.8 florinef 0.1 on the med list; HC 20/5 on the med list  8/12/2020 e-visit  10/8/2020 170 lbs, 116/78, 77/minute, BMI 30.1- florinef on the med list at 0.1 mg/day; HC 20/5 on the med list - appt with Dr Rangel reviewed by me  11/2/2020 [facility] reported tells homecare nurse Angela lissett is on hold.  (Our follow up with the pharmacy on 2/17/2021 disputes this but they note he last filled it 11/2/2020.    12/4/2020 167 lbs, 120/84, HR 76/minute, BMI 29.67; florinef 0.1 mg /day, HC 20/5 on the med list  2/1/2021 171 lbs, 110/77, /minute; florineft 0.1/HC 20/5 on the med list appt with Dr Rangel  3/1/2021 175 lbs, 111/77, HR 86/minute; florinef 0.1; HC 20/5 on the med list - appt with Dr Rangel    We have the following data:   7/12/17: Na 129, K 4.7, Cl 97, C02 18, creatinine 1.47, lactate 1.9  7/24/17: ACTH 1845  7/25/17 cortrosyn stimulatin test : cortisol < 1 -- 1.4-- 1.8  7/29/17: TSH 2.12  12/19/17 LT4 dose increased from 75 to 88 mc/day by Dr Schmidt  11/30/18: 25OHD 5.8, PTH 82, Ca 9.9, creatinine 0.85  12/4/18: prolactin 21.3, Na 146, K 3.9, Cl 108, Co2 27, creatinine 0.78  3/26/19: TSH 6.24, free T4 1.2,   5/16/19:L Ca 9.8, Co2 21, Na 138, K 3.9, creatinine 0.79, TSH 0.43, free T4 1.3, B12 527  9/10/19 Na 137, K 4.5, Co2 18, Ca 11, creatinine 1.07  9/16/19 iCa 1.16, 25 OHD 22, glucose 84, TSH 3.71, phos 3  2/3/2020 ACTH > 1250, TSH 0.18, free T4 1.16, renin activity 9.3, Na 139, K 3.8, Ca 9.4 , phos 3.4  6/16/2020: TSH 0.78, Ca 0.85, Na 139, K 3.9, creatinine 0.85,   10/8/2020 TSH 0.77, prolactin 8.8, ACTH 106, renin activity 3.3  2/1/2021 TSH 0.02, Hgb 16.8, platelets  "164K    Imaging-- review of PAC by me today - we have no outside images on PACS  7/24/17 CT abdomen: atrophic adrenal glands.   7/24/17 Brain MRI: 7 mm nodule within pituitary, hypoenhancing  6/25/19: MRI brain: normal - no comment on pituitary    REVIEW OF SYSTEMS  Weight gain is attributed to ??   don't have appetite  Cardiac: denies heart pounding or racing; sometimes feels faster;   Respiratory: don't have enough breathing -   GI: + nausea; currently has it a little; no vomiting; no diarrhea  Dizziness most of the time-- worse with reclining.  Sometimes about to pass out;   ? 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      Medications    Current Outpatient Medications   Medication Sig Dispense Refill     acetaminophen (TYLENOL) 325 MG tablet TAKE 1-2 TABLETS (650 MG TOTAL) BY MOUTH EVERY 6 (SIX) HOURS AS NEEDED FOR PAIN.       albuterol (PROAIR HFA/PROVENTIL HFA/VENTOLIN HFA) 108 (90 Base) MCG/ACT inhaler Inhale 2 puffs into the lungs       Ergocalciferol 50 MCG (2000 UT) TABS Take 2,000 Units by mouth daily       fexofenadine (ALLEGRA) 180 MG tablet Take 1 tablet (180 mg) by mouth daily       fludrocortisone (FLORINEF) 0.1 MG tablet Take 1 tablet (0.1 mg) by mouth daily 90 tablet 3     fluticasone (FLONASE) 50 MCG/ACT nasal spray Spray 1 spray into both nostrils daily as needed for rhinitis or allergies       guaiFENesin-dextromethorphan " (ROBITUSSIN DM) 100-10 MG/5ML syrup Take 5 mLs by mouth 3 times daily as needed for cough       hydrocortisone (CORTEF) 10 MG tablet Take 1 tablet (10 mg) by mouth 2 times daily PM  dose should be approx 8 hours after  AM dose. Take 20 mg twice/day for 3 days if illness. 210 tablet 4     levothyroxine (SYNTHROID/LEVOTHROID) 88 MCG tablet Take 1 tablet (88 mcg) by mouth daily 90 tablet 4     loperamide (IMODIUM) 2 MG capsule Take 1 capsule (2 mg) by mouth as needed for diarrhea Take 1 capsule up to 3 times a day as needed       loratadine (CLARITIN) 10 MG tablet Take 10 mg by mouth       meclizine (ANTIVERT) 25 MG tablet Take 1 tablet (25 mg) by mouth 3 times daily as needed for dizziness       mometasone (ASMANEX, 60 METERED DOSES,) 220 MCG/INH inhaler INHALE 1 PUFF BY MOUTH 2 (TWO) TIMES A DAY.       Multiple Vitamin (MULTI-VITAMINS) TABS Take 1 tablet by mouth daily       OLANZapine (ZYPREXA) 5 MG tablet Take 1 tablet (5 mg) by mouth every morning       ondansetron (ZOFRAN-ODT) 4 MG ODT tab DISSOLVE 1 TABLET (4 MG TOTAL) BY MOUTH EVERY 8 (EIGHT) HOURS AS NEEDED FOR NAUSEA.       paliperidone ER (INVEGA) 6 MG 24 hr tablet Take 1 tablet (6 mg) by mouth At Bedtime       TRAVEL SICKNESS 25 MG CHEW        triamcinolone (KENALOG) 0.1 % external cream Apply thin layer to affected areas twice daily as needed         Allergies  No Known Allergies    Family History  family history includes Diabetes in his mother.  Toe doesn't know family history    Social History  Social History     Tobacco Use     Smoking status: Not on file   Substance Use Topics     Alcohol use: Not on file     Drug use: Not on file     Lives with parents (mom, dad, 4 siblings- dad and sibs leave the home);.   Nurse sets up the medication every 2 weeks - they are expected tomorrow.    [place]    Physical Exam  There were no vitals taken for this visit.  There is no height or weight on file to calculate BMI.   BP Readings from  "Last 1 Encounters:   02/03/20 112/79      Pulse Readings from Last 1 Encounters:   02/03/20 85      Resp Readings from Last 1 Encounters:   No data found for Resp      Temp Readings from Last 1 Encounters:   No data found for Temp      SpO2 Readings from Last 1 Encounters:   No data found for SpO2      Wt Readings from Last 1 Encounters:   02/03/20 74.1 kg (163 lb 6.4 oz)      Ht Readings from Last 1 Encounters:   02/03/20 1.626 m (5' 4\")   GENERAL: no distress ; round cushingoid face; stocking cap  SKIN: moustache; no acne; Visible skin clear. No significant rash, abnormal pigmentation or lesions.  EYES: Eyes grossly normal to inspection.  .  NECK: no visible masses; no grossly visible goiter  RESP: No audible wheeze, cough, or visible cyanosis.  No visible retractions or increased work of breathing.    ABD: rounded contour; no abdominal striae seen;   NEURO:    Awake, alert, responds appropriately to questions.  Mentation and speech fluent. NO tremor  PSYCH:affect normal,  appearance well-groomed.    Again, thank you for allowing me to participate in the care of your patient.      Sincerely,    Maddison Witt MD      "

## 2021-03-09 NOTE — PATIENT INSTRUCTIONS
Labs tomorrow     See me in about 6 months      ORDERS FOR HOME CARE:  1.  Please measure orthostatic heart rate and blood pressure and report back to me  2.  Get me the MAR for the labs you have set up from October, 2020 to present

## 2021-03-09 NOTE — TELEPHONE ENCOUNTER
The med list at [facility] and our med list do not align.  Please get a print out of the official med list the homecare agency is using.  Specifically, I would like their MAR on him from October 1 to the present.   Thanks  Maddison Witt MD

## 2021-03-09 NOTE — TELEPHONE ENCOUNTER
Home care cannot do lab draws. I spoke to Berta who will speak with  Toe  to have family bring him tomorrow to [facility] lab.  Lab slip emailed to Dr Witt for signature and will be faxed to  [phone] once received back Ioana Mendoza RN on 3/9/2021 at 4:26 PM

## 2021-03-09 NOTE — TELEPHONE ENCOUNTER
Please also upload what was sent to his EMR so we have it for future reference. Thanks  Maddison Witt MD

## 2021-03-10 ENCOUNTER — AMBULATORY - HEALTHEAST (OUTPATIENT)
Dept: LAB | Facility: CLINIC | Age: 31
End: 2021-03-10

## 2021-03-10 DIAGNOSIS — E03.9 HYPOTHYROIDISM, UNSPECIFIED TYPE: ICD-10-CM

## 2021-03-10 DIAGNOSIS — E27.1 ADRENAL INSUFFICIENCY (ADDISON'S DISEASE) (H): ICD-10-CM

## 2021-03-10 DIAGNOSIS — R79.89 ELEVATED LIVER FUNCTION TESTS: ICD-10-CM

## 2021-03-10 LAB
ALT SERPL W P-5'-P-CCNC: 96 U/L (ref 0–45)
AST SERPL W P-5'-P-CCNC: 50 U/L (ref 0–40)
CREAT SERPL-MCNC: 0.82 MG/DL (ref 0.7–1.3)
GFR SERPL CREATININE-BSD FRML MDRD: >60 ML/MIN/1.73M2
POTASSIUM BLD-SCNC: 4 MMOL/L (ref 3.5–5)
SODIUM SERPL-SCNC: 138 MMOL/L (ref 136–145)
T3 SERPL-MCNC: 75 NG/DL (ref 45–175)
T4 FREE SERPL-MCNC: 1.1 NG/DL (ref 0.7–1.8)
TSH SERPL DL<=0.005 MIU/L-ACNC: 1.62 UIU/ML (ref 0.3–5)

## 2021-03-11 ENCOUNTER — COMMUNICATION - HEALTHEAST (OUTPATIENT)
Dept: NURSING | Facility: CLINIC | Age: 31
End: 2021-03-11

## 2021-03-11 ENCOUNTER — RECORDS - HEALTHEAST (OUTPATIENT)
Dept: ADMINISTRATIVE | Facility: OTHER | Age: 31
End: 2021-03-11

## 2021-03-11 ENCOUNTER — COMMUNICATION - HEALTHEAST (OUTPATIENT)
Dept: FAMILY MEDICINE | Facility: CLINIC | Age: 31
End: 2021-03-11

## 2021-03-11 ENCOUNTER — TELEPHONE (OUTPATIENT)
Dept: ENDOCRINOLOGY | Facility: CLINIC | Age: 31
End: 2021-03-11

## 2021-03-11 NOTE — TELEPHONE ENCOUNTER
----- Message from Maddison Witt MD sent at 3/10/2021  7:40 PM CST -----  Regarding: RE: your labs not done  You should document this on the record if you didn't already.  Maddison Witt,   ----- Message -----  From: Ioana Mendoza RN  Sent: 3/10/2021   3:22 PM CST  To: Maddison Witt MD  Subject: your labs not done                               I faxed lab orders to [facility] last night late . I see he had labs done there today but not what I faxed. I called wanting to speak to the lab and they could not transfer me. Apparently the PCP Dr Rangel has to approve any outside lab orders for this clinic. When I asked to speak to Dr Rangel nurse I am told they are short staffed and have to take a message. I finally got someone to transfer me to the lab . They will try to add them on they missed the fax. If unable they will call the patient to come back  to complete . So frustrating .Ioana Mendoza, RN on 3/10/2021 at 3:38 PM

## 2021-03-11 NOTE — TELEPHONE ENCOUNTER
Waiting on lab for add ons. If unable to they will call Toe to come back in to complete Dr Márquez orders. Ioana Mendoza RN on 3/11/2021 at 6:53 AM

## 2021-03-12 ENCOUNTER — COMMUNICATION - HEALTHEAST (OUTPATIENT)
Dept: NURSING | Facility: CLINIC | Age: 31
End: 2021-03-12

## 2021-03-15 ENCOUNTER — TELEPHONE (OUTPATIENT)
Dept: ENDOCRINOLOGY | Facility: CLINIC | Age: 31
End: 2021-03-15

## 2021-03-15 ENCOUNTER — COMMUNICATION - HEALTHEAST (OUTPATIENT)
Dept: FAMILY MEDICINE | Facility: CLINIC | Age: 31
End: 2021-03-15

## 2021-03-15 NOTE — TELEPHONE ENCOUNTER
Still no renin lab back. I called the home care nurse who will be in the office tomorrow to see if orders for orthostatic B/P - Pulse had arrived. Did you fax this order ? She won't be going out again until next Wednesday but if the orders arrive she will make a visit this week to complete. Ioana Mendoza RN on 3/15/2021 at 4:35 PM

## 2021-03-15 NOTE — TELEPHONE ENCOUNTER
----- Message from Maddison Witt MD sent at 3/12/2021  4:49 PM CST -----  Regarding: RE: results  Renin is fancy so it may still be pending.  Is there a way we can figure this out?  Also , I believe I sent orders to home care for orthostatic blood pressure and pulse check - have they sent this back yet?  Maddison Witt  ----- Message -----  From: Ioana Mendoza RN  Sent: 3/12/2021   3:02 PM CST  To: Maddison Witt MD  Subject: results                                          All labs except Renin have been resulted. Seeing these results do you still want him back for that ?  Looks like I may have to  call him to go back if so. Seen [facility] Care everywhere Ioana Mendoza RN on 3/12/2021 at 3:03 PM

## 2021-03-16 ENCOUNTER — COMMUNICATION - HEALTHEAST (OUTPATIENT)
Dept: CARE COORDINATION | Facility: CLINIC | Age: 31
End: 2021-03-16

## 2021-03-16 ENCOUNTER — TELEPHONE (OUTPATIENT)
Dept: ENDOCRINOLOGY | Facility: CLINIC | Age: 31
End: 2021-03-16

## 2021-03-16 NOTE — TELEPHONE ENCOUNTER
AVS Faxed.   Jeri Cowan RN on 3/16/2021 at 10:19 AM     RE  Ioana Mendoza RN  You 1 hour ago (9:11 AM)     Please fax the  AVS from the last visit to home care. Fax #  [phone]  Angelique Jackson RN    Message text

## 2021-03-18 ENCOUNTER — TELEPHONE (OUTPATIENT)
Dept: ENDOCRINOLOGY | Facility: CLINIC | Age: 31
End: 2021-03-18

## 2021-03-18 LAB — RENIN PLAS-CCNC: 7.4 NG/ML/HR

## 2021-03-18 NOTE — TELEPHONE ENCOUNTER
ANTONIO Jackson   [phone] with review and recommendation from Dr Witt and clinic number to call with questions.   Jeri Cowan, RN on 3/18/2021 at 11:15 AM         RE    It appears the home care nurse has cited the orders they are aware of but they have not indicated what they were actually doing in the context of their question.  :Were the actually giving the 75 mcg/day levothyroxine dose since the order?  If the answer is yes, they should continue with the most recent Rx order for levothyroxine which is 75 mcg/day.  Maddison Witt

## 2021-03-18 NOTE — TELEPHONE ENCOUNTER
Spoke w/ Renetta, Home Care Nurse, Will fax orthostatic B/P; HR results today.   Asking for clarification:   LEVOTHyroxine 88 MCG tablet ordered 02/01/2021 by Dr Witt.  Lt4 75 mcg/day started 2/11/2021 by PCP  Thyroid Lab draw 03/10/2021.   What is the dose Dr Witt recommends going forward?   Sent to provider for review.   Jeri Cowan RN on 3/18/2021 at 9:01 AM

## 2021-03-18 NOTE — TELEPHONE ENCOUNTER
It appears the home care nurse has cited the orders they are aware of but they have not indicated what they were actually doing in the context of their question.  :Were the actually giving the 75 mcg/day levothyroxine dose since the order?  If the answer is yes, they should continue with the most recent Rx order for levothyroxine which is 75 mcg/day.  Maddison Witt

## 2021-03-19 ENCOUNTER — COMMUNICATION - HEALTHEAST (OUTPATIENT)
Dept: CARE COORDINATION | Facility: CLINIC | Age: 31
End: 2021-03-19

## 2021-03-22 DIAGNOSIS — E03.9 HYPOTHYROIDISM, UNSPECIFIED TYPE: Primary | ICD-10-CM

## 2021-03-22 DIAGNOSIS — Z53.9 DIAGNOSIS NOT YET DEFINED: Primary | ICD-10-CM

## 2021-03-22 RX ORDER — LEVOTHYROXINE SODIUM 75 UG/1
75 TABLET ORAL DAILY
Qty: 90 TABLET | Refills: 1 | COMMUNITY
Start: 2021-03-22 | End: 2022-04-12

## 2021-03-24 DIAGNOSIS — E27.1 ADDISON'S DISEASE (H): ICD-10-CM

## 2021-03-26 ENCOUNTER — TELEPHONE (OUTPATIENT)
Dept: ENDOCRINOLOGY | Facility: CLINIC | Age: 31
End: 2021-03-26

## 2021-03-26 NOTE — TELEPHONE ENCOUNTER
3/26/2021 telephone conversation with Dr Rangel.  He is reporting the renin level was  7.4  And I am also able to see [facility]  Based on this result I recommend to resume florinef which had been previously stopped for unclear reasons.  We will reduce hydrocortisone simultaneously.      To clinic RN triage team:  Please submit the orders listed below to his home care team for filling his medications.      3/26/2021 ORDERS for Toe Po  1.  Start Florinef 0.1 mg/day  2   Reduce hydrocortisone to 10 mg AM and 5 mg afternoon .  This dose change should occur at the same time as when the florinef starts, not before then.    Maddison Witt MD

## 2021-03-26 NOTE — TELEPHONE ENCOUNTER
[facility]- [phone] Fax [phone] RN     Nurse Renetta [phone] Shanna Director  [phone]  Home Care Team Renetta notified per Celia.  The following orders faxed:   Jeri Cowan RN on 3/26/2021 at 2:55 PM          3/26/2021 ORDERS for Toe Po  1.  Start Florinef 0.1 mg/day  2   Reduce hydrocortisone to 10 mg AM and 5 mg afternoon .  This dose change should occur at the same time as when the florinef starts, not before then.     Maddison Witt MD

## 2021-03-28 ENCOUNTER — RECORDS - HEALTHEAST (OUTPATIENT)
Dept: ADMINISTRATIVE | Facility: OTHER | Age: 31
End: 2021-03-28

## 2021-03-29 RX ORDER — FLUDROCORTISONE ACETATE 0.1 MG/1
0.1 TABLET ORAL DAILY
Qty: 90 TABLET | Refills: 4 | Status: SHIPPED | OUTPATIENT
Start: 2021-03-29 | End: 2022-04-11

## 2021-03-29 RX ORDER — FLUDROCORTISONE ACETATE 0.1 MG/1
0.1 TABLET ORAL DAILY
Qty: 90 TABLET | Refills: 3 | OUTPATIENT
Start: 2021-03-29

## 2021-03-29 NOTE — TELEPHONE ENCOUNTER
fludrocortisone (FLORINEF) 0.1 MG tablet      Last Written Prescription Date:  2/11/2020  Last Fill Quantity: 90,   # refills: 3  Last Office Visit : 3/9/2021  Future Office visit:  9/22/2020    Routing refill request to provider for review/approval because:  Provider request.

## 2021-03-31 ENCOUNTER — COMMUNICATION - HEALTHEAST (OUTPATIENT)
Dept: NURSING | Facility: CLINIC | Age: 31
End: 2021-03-31

## 2021-04-08 ENCOUNTER — RECORDS - HEALTHEAST (OUTPATIENT)
Dept: ADMINISTRATIVE | Facility: OTHER | Age: 31
End: 2021-04-08

## 2021-04-09 ENCOUNTER — COMMUNICATION - HEALTHEAST (OUTPATIENT)
Dept: NURSING | Facility: CLINIC | Age: 31
End: 2021-04-09

## 2021-04-11 NOTE — TELEPHONE ENCOUNTER
[facility] home care - [phone] Fax [phone]   DENIS Miller   [phone]    Spoke w/ RN Home Care RN, Angela: states she will fax med list. States reaching Pt via phone is very difficult. She has only been able to reach Pt in person. Will ask for information requested by Dr Witt on her next home visit, which is unknown at this time.  Provider notified.   Jeri Cowan RN on 8/3/2020 at 9:50 AM         RE    We also need the med list that his home care team is using and clear identification of who is providing his home are, how to reach them.  All of this is needed prior to the appt to be available at the appt   Thanks   Maddison Witt            No

## 2021-04-12 ENCOUNTER — AMBULATORY - HEALTHEAST (OUTPATIENT)
Dept: FAMILY MEDICINE | Facility: CLINIC | Age: 31
End: 2021-04-12

## 2021-04-12 ENCOUNTER — AMBULATORY - HEALTHEAST (OUTPATIENT)
Dept: LAB | Facility: CLINIC | Age: 31
End: 2021-04-12

## 2021-04-12 DIAGNOSIS — E27.40 ADRENAL INSUFFICIENCY (H): ICD-10-CM

## 2021-04-12 DIAGNOSIS — R79.89 LOW TSH LEVEL: ICD-10-CM

## 2021-04-12 DIAGNOSIS — E27.1 ADDISON DISEASE (H): ICD-10-CM

## 2021-04-12 DIAGNOSIS — E27.1 ADRENAL INSUFFICIENCY (ADDISON'S DISEASE) (H): ICD-10-CM

## 2021-04-12 DIAGNOSIS — E03.9 HYPOTHYROIDISM, UNSPECIFIED TYPE: ICD-10-CM

## 2021-04-12 LAB
ALBUMIN SERPL-MCNC: 4.2 G/DL (ref 3.5–5)
ALP SERPL-CCNC: 84 U/L (ref 45–120)
ALT SERPL W P-5'-P-CCNC: 106 U/L (ref 0–45)
AST SERPL W P-5'-P-CCNC: 45 U/L (ref 0–40)
BILIRUB DIRECT SERPL-MCNC: <0.1 MG/DL
BILIRUB SERPL-MCNC: 0.3 MG/DL (ref 0–1)
CREAT SERPL-MCNC: 0.87 MG/DL (ref 0.7–1.3)
GFR SERPL CREATININE-BSD FRML MDRD: >60 ML/MIN/1.73M2
POTASSIUM BLD-SCNC: 4 MMOL/L (ref 3.5–5)
PROT SERPL-MCNC: 7.7 G/DL (ref 6–8)
SODIUM SERPL-SCNC: 139 MMOL/L (ref 136–145)
T3 SERPL-MCNC: 73 NG/DL (ref 45–175)
T4 FREE SERPL-MCNC: 1.2 NG/DL (ref 0.7–1.8)
TSH SERPL DL<=0.005 MIU/L-ACNC: 2.34 UIU/ML (ref 0.3–5)

## 2021-04-16 LAB — RENIN PLAS-CCNC: 4.4 NG/ML/HR

## 2021-04-21 ENCOUNTER — COMMUNICATION - HEALTHEAST (OUTPATIENT)
Dept: FAMILY MEDICINE | Facility: CLINIC | Age: 31
End: 2021-04-21

## 2021-04-21 DIAGNOSIS — E03.9 HYPOTHYROIDISM: ICD-10-CM

## 2021-04-21 DIAGNOSIS — J45.40 MODERATE PERSISTENT ASTHMA WITHOUT COMPLICATION: ICD-10-CM

## 2021-04-21 DIAGNOSIS — R44.0 AUDITORY HALLUCINATION: ICD-10-CM

## 2021-04-28 ENCOUNTER — COMMUNICATION - HEALTHEAST (OUTPATIENT)
Dept: CARE COORDINATION | Facility: CLINIC | Age: 31
End: 2021-04-28

## 2021-05-12 ENCOUNTER — COMMUNICATION - HEALTHEAST (OUTPATIENT)
Dept: NURSING | Facility: CLINIC | Age: 31
End: 2021-05-12

## 2021-05-13 ENCOUNTER — OFFICE VISIT - HEALTHEAST (OUTPATIENT)
Dept: FAMILY MEDICINE | Facility: CLINIC | Age: 31
End: 2021-05-13

## 2021-05-13 ENCOUNTER — COMMUNICATION - HEALTHEAST (OUTPATIENT)
Dept: FAMILY MEDICINE | Facility: CLINIC | Age: 31
End: 2021-05-13

## 2021-05-13 DIAGNOSIS — J31.0 CHRONIC RHINITIS: ICD-10-CM

## 2021-05-13 ASSESSMENT — PATIENT HEALTH QUESTIONNAIRE - PHQ9: SUM OF ALL RESPONSES TO PHQ QUESTIONS 1-9: 5

## 2021-05-17 ENCOUNTER — TRANSFERRED RECORDS (OUTPATIENT)
Dept: HEALTH INFORMATION MANAGEMENT | Facility: CLINIC | Age: 31
End: 2021-05-17

## 2021-05-26 ASSESSMENT — PATIENT HEALTH QUESTIONNAIRE - PHQ9
SUM OF ALL RESPONSES TO PHQ QUESTIONS 1-9: 17
SUM OF ALL RESPONSES TO PHQ QUESTIONS 1-9: 8

## 2021-05-27 ASSESSMENT — PATIENT HEALTH QUESTIONNAIRE - PHQ9: SUM OF ALL RESPONSES TO PHQ QUESTIONS 1-9: 5

## 2021-05-27 NOTE — PROGRESS NOTES
Subjective: Patient comes in for evaluation is a 28-year-old male.    Patient has a history of asthma.    Also allergies    The patient is a non-smoker    Patient has been on pro-air as well as annuity Ellipta.    Breathing's been fairly well controlled    Is had more symptoms of congestion sneezing but also colored drainage.  Has had some headache more in the maxillary and frontal area.    No significant fever    Symptoms been going on for a few weeks now.    Patient has adrenal insufficiency has been on Florinef.  Also the inhalers as outlined in thyroid for hypothyroid.    His levothyroxine was increased a month ago he is seeing endocrine in a couple months.    No vomiting no diarrhea    Tobacco status: He  reports that he has never smoked. He has never used smokeless tobacco.    Patient Active Problem List    Diagnosis Date Noted     Pituitary microadenoma (H) 12/04/2018     Moderate persistent asthma 03/27/2018     Thrombocytopenia (H) 02/07/2018     Hypothyroidism 09/27/2017     Schizophrenia (H) 08/25/2017     Adrenal insufficiency (H) 07/28/2017       Current Outpatient Medications   Medication Sig Dispense Refill     albuterol (PROAIR HFA;PROVENTIL HFA;VENTOLIN HFA) 90 mcg/actuation inhaler Inhale 2 puffs every 6 (six) hours as needed for wheezing. 1 each 0     cholecalciferol, vitamin D3, (VITAMIN D3) 1,000 unit capsule 2 po qd 60 capsule 5     fludrocortisone (FLORINEF) 0.1 mg tablet TAKE 1TABLET BY MOUTH DAILY. 90 tablet 2     hydrocortisone (CORTEF) 5 MG tablet Take 2 tabs in the morning, 1 tab in the early afternoon 270 tablet 1     levothyroxine (SYNTHROID, LEVOTHROID) 100 MCG tablet Take 1 tablet (100 mcg total) by mouth daily. 90 tablet 0     OLANZapine (ZYPREXA) 10 MG tablet TAKE 1 TABLET (10 MG TOTAL) BY MOUTH AT BEDTIME FOR DEPRESSION 90 tablet 1     acetaminophen (TYLENOL) 325 MG tablet Take 2 tablets (650 mg total) by mouth every 6 (six) hours as needed for pain. 1-2 tablets as needed for pain  30 tablet 0     azithromycin (ZITHROMAX Z-NELSON) 250 MG tablet Take 2 tablets (500 mg) on  Day 1,  followed by 1 tablet (250 mg) once daily on Days 2 through 5. 6 tablet 0     fexofenadine (ALLEGRA) 180 MG tablet TAKE 1 TABLET  180 MG TOTAL  BY MOUTH DAILY FOR ALLERGIES  10     fluticasone (FLONASE) 50 mcg/actuation nasal spray 1 spray into each nostril daily. 16 g 2     fluticasone furoate (ARNUITY ELLIPTA) 200 mcg/actuation DsDv Inhale 200 mcg daily. 30 each 11     loperamide (IMODIUM A-D) 2 mg tablet Take 1 tablet (2 mg total) by mouth 3 (three) times a day as needed for diarrhea. 15 tablet 0     ondansetron (ZOFRAN-ODT) 4 MG disintegrating tablet Take 1 tablet (4 mg total) by mouth every 8 (eight) hours as needed for nausea. 15 tablet 0     No current facility-administered medications for this visit.        ROS: 10 point review of systems positive as outlined above, otherwise negative  Objective:    BP 98/76 (Patient Site: Right Arm, Patient Position: Sitting, Cuff Size: Adult Regular)   Pulse 76   Temp 98.5  F (36.9  C) (Oral)   Resp 16   Wt 144 lb (65.3 kg)   BMI 25.87 kg/m    Body mass index is 25.87 kg/m .    General appearance no acute distress    HEENT neck is negative no adenopathy oropharynx was clear no erythema nose with congestion and some pressure through the sinus area    Pupils react normally no scleral icterus    Heart was regular S1-S2 rate in 70s blood pressure looked okay at 98/76 he denies any dizziness    Lungs were clear throughout no rales or rhonchi.    Labs from last month noted with a slight elevated TSH.  Graph    Results for orders placed or performed in visit on 03/26/19   Comprehensive Metabolic Panel   Result Value Ref Range    Sodium 137 136 - 145 mmol/L    Potassium 3.8 3.5 - 5.0 mmol/L    Chloride 103 98 - 107 mmol/L    CO2 23 22 - 31 mmol/L    Anion Gap, Calculation 11 5 - 18 mmol/L    Glucose 136 (H) 70 - 125 mg/dL    BUN 17 8 - 22 mg/dL    Creatinine 0.92 0.70 - 1.30 mg/dL     GFR MDRD Af Amer >60 >60 mL/min/1.73m2    GFR MDRD Non Af Amer >60 >60 mL/min/1.73m2    Bilirubin, Total 0.6 0.0 - 1.0 mg/dL    Calcium 10.2 8.5 - 10.5 mg/dL    Protein, Total 8.3 (H) 6.0 - 8.0 g/dL    Albumin 4.9 3.5 - 5.0 g/dL    Alkaline Phosphatase 108 45 - 120 U/L    AST 27 0 - 40 U/L    ALT 38 0 - 45 U/L   HM1 (CBC with Diff)   Result Value Ref Range    WBC 7.8 4.0 - 11.0 thou/uL    RBC 6.31 (H) 4.40 - 6.20 mill/uL    Hemoglobin 18.6 (H) 14.0 - 18.0 g/dL    Hematocrit 53.9 40.0 - 54.0 %    MCV 85 80 - 100 fL    MCH 29.5 27.0 - 34.0 pg    MCHC 34.5 32.0 - 36.0 g/dL    RDW 12.0 11.0 - 14.5 %    Platelets 173 140 - 440 thou/uL    MPV 12.5 8.5 - 12.5 fL    Neutrophils % 52 50 - 70 %    Lymphocytes % 38 20 - 40 %    Monocytes % 7 2 - 10 %    Eosinophils % 3 0 - 6 %    Basophils % 1 0 - 2 %    Neutrophils Absolute 4.0 2.0 - 7.7 thou/uL    Lymphocytes Absolute 3.0 0.8 - 4.4 thou/uL    Monocytes Absolute 0.5 0.0 - 0.9 thou/uL    Eosinophils Absolute 0.2 0.0 - 0.4 thou/uL    Basophils Absolute 0.1 0.0 - 0.2 thou/uL   Thyroid Stimulating Hormone (TSH)   Result Value Ref Range    TSH 6.24 (H) 0.30 - 5.00 uIU/mL   T4, Free   Result Value Ref Range    Free T4 1.2 0.7 - 1.8 ng/dL   T3, Total   Result Value Ref Range    T3, Total 103 45 - 175 ng/dL   Lipid Cascade FASTING   Result Value Ref Range    Cholesterol 194 <=199 mg/dL    Triglycerides 89 <=149 mg/dL    HDL Cholesterol 41 >=40 mg/dL    LDL Calculated 135 (H) <=129 mg/dL    Patient Fasting > 8hrs? Unknown    Electrocardiogram Perform and Read   Result Value Ref Range    SYSTOLIC BLOOD PRESSURE  mmHg    DIASTOLIC BLOOD PRESSURE  mmHg    VENTRICULAR RATE 92 BPM    ATRIAL RATE 92 BPM    P-R INTERVAL 116 ms    QRS DURATION 72 ms    Q-T INTERVAL 344 ms    QTC CALCULATION (BEZET) 425 ms    P Axis 25 degrees    R AXIS 31 degrees    T AXIS 22 degrees    MUSE DIAGNOSIS       Sinus rhythm  Normal ECG  When compared with ECG of 28-JUL-2017 00:48,  Vent. rate has increased BY   49 BPM  QRS duration has decreased  ST no longer elevated in Anterolateral leads  Confirmed by FELICIA UREÑA MD LOC:[facility] ([id number]) on 3/26/2019 4:37:11 PM         Assessment:  1. Acute non-recurrent sinusitis, unspecified location  azithromycin (ZITHROMAX Z-NELSON) 250 MG tablet   2. Environmental allergies     3. Moderate persistent asthma without complication     4. Moderate persistent asthma  albuterol (PROAIR HFA;PROVENTIL HFA;VENTOLIN HFA) 90 mcg/actuation inhaler    fluticasone furoate (ARNUITY ELLIPTA) 200 mcg/actuation DsDv   5. Other specified hypothyroidism     6. Adrenal insufficiency (H)       Sinusitis we will treat with azithromycin    Continue Allegra for allergies.    Continue on inhalers for his asthma which is stable    Follow-up with endocrine in a couple months now on levothyroxine dose at 100 mcg.    Continue Florinef  Plan: As discussed above    This transcription uses voice recognition software, which may contain typographical errors.

## 2021-05-27 NOTE — PROGRESS NOTES
"Mental Health Visit Note    4/8/2019    Start time: 10:13am    Stop Time: 10:59 am   Session # 4      Session Type: Patient is presenting for an Individual session.    Brooke Peterson is a 28 y.o. male is being seen today for    Chief Complaint   Patient presents with     MH Follow Up     Patient presented for follow up psychotherapy to address coping with feeling down, isolating and not wanting to talk much with others.       New symptoms or complaints: None    Functional Impairment:   Personal: 4  Family: 3  Work: 4  Social:4    Clinical assessment of mental status: Reviewed. MSE is current effective 4/8/19  Grooming: groomed  Attire: Appropriate  Age: Appears Stated  Behavior Towards Examiner: Awa   Motor Activity: Retarded. Has a cane today  Eye Contact: Avoidant  Mood: Depressed  Affect: Congruent w/content of speech, Blunted and Flat  Speech/Language: Delayed/Hesitant  Attention: Distractible  Concentration: Brief  Thought Process: Within normal  Thought Content: Hallucinations: Auditory  Delusions: Within normal  Orientation: X 3.  Memory: No Evidence of Impairment  Judgement: No Evidence of Impairment  Estimated Intelligence: Below Average  Demonstrated Insight: Diminished  Fund of Knowledge: adequate      Suicidal/Homicidal Ideation present: None Reported This Session     Patient's impression of their current status: Patient reports a new worker came out. It is the [place] worker from [employer]. He continues to have dizziness. He no longer has nausea or vomiting. He is recovering from a cold. He is happy that he completed the process for the citizenship waiver. His main priority is to obtain citizenship.  Patient endorses the following symptoms:   Sleep: Not sleeping well. He is requesting sleep medications.  Depressed mood: daily  Anxiety/Worries: anxious, nervous, worries  Pain: low back pain. Whole body aches.   Appetite: poor  Engagement in activities: \"I like all activities\"- however, states he mostly stays " home all day. Restricted travel from home.  Medication Adherence: filled and taking them. Set up in med box.        Therapist impression of patients current state: The patient presented late for an individual psychotherapy session with this provider. A professional Angela  was present for the visit due to the language complexity. His father was also present. He continues to present with negative symptoms such as alogia, withdrawal, and flattening. He has flat/blunted affect, avoidant eye contact, and minimal speech. He is using a cane today for mobility support. Discussed transportation barrier he has been experiencing as well as some communication barriers. Developing insight into the mind-body connection and how the body remembers our experiences. Many of his symptoms present somatically. [facility] worker is Wing Rodgers ([phone])- added to care team list.    Prior to next session: patient will meet with [facility] worker and follow up with care guide re: completing the citizenship application. Utilize the help of specialty  to schedule psychiatry through [facility].     Type of psychotherapeutic technique provided: Insight oriented, Client centered and Solution-focused    Progress toward short term goals:Progress as expected, met with physician to address not feeling well. Is now feeling better. He missed our last session together- didn't answer the phone with transportation came to pick him up.     Review of long term goals: Not done at today's visit.  Effective 3/8/2019-06/08/2019    Diagnosis:   1. Schizophrenia, unspecified type (H)      R/O Schizoaffective Disorder    Plan and Follow up: Patient is scheduled for follow up psychotherapy on 4/18/19, 5/1/19.  Medical transportation was requested for appointment      Discharge Criteria/Planning: Client has chronic symptoms and ongoing therapy for maintenance stability recommended.    Nathaly Lira, AMANDA 4/8/2019

## 2021-05-27 NOTE — PROGRESS NOTES
Received message that [facility] worker from [facility], Wing Rodgers, wanted to follow up with psychotherapist regarding care planning. Called back as there is an SAW on file signed by patient for 2 way verbal communication for coordination of mental health care. Therapist was also the referring provider for [facility]. Reviewed goals regarding tendency to isolate and how patient may benefit from communication and social skills as well as community integration and increased activities. [facility] also plans to work on psychoeducation re: schizophrenia, anxiety/rummination, and trauma. Discussed how he could also benefit from organizing mailings, important documents and calendar of appointments. Noted he has missed appointments in past and was discharged from a provider to due failed appointments. [facility] will work with him on this and also ensuring he understand utilizing medical rides and being available for . Discussed his functioning level and assessing baseline and potential ability to increase independence in some activities. Father continues to be a significant care giver who is involved in the [facility] visits as well. Therapist and [facility] worker will continue to coordinate as needed. [facility] will also coordinate with patient's care guide, Scarlett Varma, as needed. Nathaly PATEL

## 2021-05-27 NOTE — TELEPHONE ENCOUNTER
Patient is needing a ride for their appointment on:    Date: 5/8/19  Time: 1 PM    Name of Location/Address/Phone #:   [facility] [place]  [address]  [place] [address]  Phone: [phone]      Name of  patient is seeing & 's specialty:   MAYRA CARDENAS    Passenger (s):   1    Special considerations: None    Is the patient able to walk to the transportation vehicle?    Yes     Do they need DOOR to DOOR service? (company person comes knock on the door and walks them to car)     No    Please call patient back to confirm the ride details. Thanks!

## 2021-05-27 NOTE — PROGRESS NOTES
"  Mental Health Visit Note    4/18/2019    Start time: 09:13am    Stop Time: 10:03am   Session # 5      Session Type: Patient is presenting for an Individual session.    Brooke Peterson is a 28 y.o. male is being seen today for    Chief Complaint   Patient presents with      Follow Up     Patient presented for follow up to address feeling down, limited socialization and difficulty with acculturation.       New symptoms or complaints: None    Functional Impairment:   Personal: 4  Family: 3  Work: 4  Social:4    Clinical assessment of mental status: Reviewed. MSE is current effective 4/18/19  Grooming: groomed  Attire: Appropriate  Age: Appears Stated  Behavior Towards Examiner: Sullen   Motor Activity: Retarded.  Eye Contact: Avoidant  Mood: Depressed  Affect: Congruent w/content of speech, Blunted and Flat  Speech/Language: Delayed/Hesitant  Attention: Distractible  Concentration: Brief  Thought Process: Within normal  Thought Content: Hallucinations: Auditory  Delusions: Within normal  Orientation: X 3.  Memory: No Evidence of Impairment  Judgement: No Evidence of Impairment  Estimated Intelligence: Below Average  Demonstrated Insight: Diminished  Fund of Knowledge: adequate      Suicidal/Homicidal Ideation present: None Reported This Session     Patient's impression of their current status: Patient is focused on getting his citizenship. He plans to  his [place] form from [employer] tomorrow. He expresses a desire to be a  as he likes preaching. He watches preachers on Youtube. He goes to [employer] on Sundays. He plans to go to the zoo this weekend, which makes him \"happy.\"       Therapist impression of patients current state: The patient presented late for an individual psychotherapy session with this provider. A professional Angela , Celestino, was present for the visit due to the language complexity. His father was also present. Patient continues to present with negative symptoms of schizophrenia. He " responded well to solution focused approach and questioning. He became more engaged in session with this. 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 Explored how he could work towards this goal by talking directly to the  or volunteering at [employer] to help fulfill this passion. Provided positive reinforcement for engaging in activities that help decrease depression symptoms. Poor cognitive capacity- younger than age.   Prior to next session: go to the zoo and Judaism to increase happiness.     Type of psychotherapeutic technique provided: Insight oriented, Client centered and Solution-focused    Progress toward short term goals:Progress as expected, getting closer in regards to citizenship process- completed work with [facility].       Review of long term goals: Not done at today's visit.  Effective 3/8/2019-06/08/2019    Diagnosis:   1. Schizophrenia, unspecified type (H)      R/O Schizoaffective Disorder    Plan and Follow up: Patient is scheduled for follow up psychotherapy on 5/1/19.  Medical transportation was requested for appointment      Discharge Criteria/Planning: Client has chronic symptoms and ongoing therapy for maintenance stability recommended.    AMANDA Cast 4/18/2019

## 2021-05-27 NOTE — TELEPHONE ENCOUNTER
Called back. View documentation encounter re: coordination of care between [facility] worker and Psychotherapist. Nathaly SANTOSSW

## 2021-05-27 NOTE — PROGRESS NOTES
Patient has appt with Caprice Haile CNP for psychiatry services at [facility] on 5-8-19 at 1pm.

## 2021-05-27 NOTE — TELEPHONE ENCOUNTER
Provider Communication  Who is calling:  Mental health [facility] worker  Facility in which provider is associated:  [facility]  Reason for call:  The caller is asking to have Nathaly Lira reach out to her per phone.  Wing would like to go over care planning as recommended.  Please call  Urgency for return call:  as available  Okay to leave detailed message?: No [phone]

## 2021-05-27 NOTE — TELEPHONE ENCOUNTER
19  Call: [employer]  [phone]  RE: Ride for 4-3-19 at 9:30am to   [facility] [address] [place], [place] [address], [phone]     Spoke to Suzi.Provided clinic Tax ID#, member ID, patient name, , verified address, telephone, destination address appointment date and time.  Stated transportation for 4-3-19  between 8:30 8:45am was already scheduled with [employer] [phone]

## 2021-05-27 NOTE — PROGRESS NOTES
"Patient was a no show for psychotherapy on 4/3/2019. Therapist was notified by  staff that the medical ride \"will not go back to pick patient up.\" Therefore, patient unable to get to appointment. Patient has a future appointment with this provider. Nathaly PATEL  "

## 2021-05-27 NOTE — TELEPHONE ENCOUNTER
4-1-19  Called and spoke to patient and patient's father through patient's sister. Patient handed the phone to speak to sister.Sister spoke and understand English. Offered Angela .   Sister says no that she could interpret for brother.   Informed that [employer] transportation will   between 8:30am -8:45am on 4-3-19 for 9:30am appt.  Sister relay the message to patient and father.   Father confirmed transportation and appt for 4-3-19.  Thanked sister for her support.

## 2021-05-27 NOTE — PROGRESS NOTES
Subjective:  28 y.o. male with concerns of tiredness and gastroenteritis.  He was seen 6 days ago for nausea, fatigue, and dizziness.  There is always a concern given his renal insufficiency that he could be having an adrenal crisis.  Father states he still not eating very well.  He does drink some.  Father notes he is drooling at home sometimes.  There was one episode of vomiting today.  This was liquid that he had recently consumed.  He has no blood in his vomit.  No blood in his stool.  Denies diarrhea.  He did not vomit out his usual medication.    Vacation set up by nurse care manager from [facility] case management.  Justin Abdi RN PHN  [phone]    Outpatient Medications Prior to Visit   Medication Sig Dispense Refill     acetaminophen (TYLENOL) 325 MG tablet Take 2 tablets (650 mg total) by mouth every 6 (six) hours as needed for pain. 1-2 tablets as needed for pain 30 tablet 0     albuterol (PROAIR HFA;PROVENTIL HFA;VENTOLIN HFA) 90 mcg/actuation inhaler Inhale 2 puffs every 6 (six) hours as needed for wheezing. 1 each 0     ARNUITY ELLIPTA 200 mcg/actuation DsDv INHALE 1 PUFF BY MOUTH EVERY DAY 30 each 10     cetirizine (ZYRTEC) 1 mg/mL syrup Take 10 mL (10 mg total) by mouth daily for 30 doses. 300 mL 0     cholecalciferol, vitamin D3, (VITAMIN D3) 1,000 unit capsule 2 po qd 60 capsule 5     fexofenadine (ALLEGRA) 180 MG tablet TAKE 1 TABLET  180 MG TOTAL  BY MOUTH DAILY FOR ALLERGIES  10     fluticasone (FLONASE) 50 mcg/actuation nasal spray 1 spray into each nostril daily. 16 g 2     levothyroxine (SYNTHROID, LEVOTHROID) 88 MCG tablet TAKE 1 TABLET (88 MCG TOTAL) BY MOUTH DAILY FOR THYROID 90 tablet 1     OLANZapine (ZYPREXA) 10 MG tablet TAKE 1 TABLET (10 MG TOTAL) BY MOUTH AT BEDTIME FOR DEPRESSION 90 tablet 1     ondansetron (ZOFRAN-ODT) 4 MG disintegrating tablet Take 1 tablet (4 mg total) by mouth every 8 (eight) hours as needed for nausea. 15 tablet 0     fludrocortisone (FLORINEF) 0.1 mg tablet  TAKE 1TABLET BY MOUTH DAILY. 90 tablet 2     hydrocortisone (CORTEF) 5 MG tablet Take 2 tabs in the morning, 1 tab in the early afternoon 270 tablet 1     loperamide (IMODIUM A-D) 2 mg tablet Take 1 tablet (2 mg total) by mouth 3 (three) times a day as needed for diarrhea. 15 tablet 0     No facility-administered medications prior to visit.       Social History     Tobacco Use   Smoking Status Never Smoker   Smokeless Tobacco Never Used   Tobacco Comment    no second hand smoke exposure      Objective:  Pulse (!) 118   Temp 97.6  F (36.4  C) (Oral)   Wt 138 lb (62.6 kg)   SpO2 98%   BMI 24.79 kg/m    GENERAL: alert, not distressed  EARS: normal tympanic membranes and external auditory canals bilaterally  PHARYNX: no erythema or exudates  MOUTH: well hydrated mucosa, no lesions  NECK: no lymphadenopathy or thyroid nodules  CHEST: clear, no rales, rhonchi, or wheezes  CARDIAC: regular without murmur, gallop, or rub  ABDOMEN: soft, non tender, non distended, normal bowel sounds    Recent Results (from the past 72 hour(s))   Electrocardiogram Perform and Read   Result Value Ref Range    SYSTOLIC BLOOD PRESSURE  mmHg    DIASTOLIC BLOOD PRESSURE  mmHg    VENTRICULAR RATE 92 BPM    ATRIAL RATE 92 BPM    P-R INTERVAL 116 ms    QRS DURATION 72 ms    Q-T INTERVAL 344 ms    QTC CALCULATION (BEZET) 425 ms    P Axis 25 degrees    R AXIS 31 degrees    T AXIS 22 degrees    MUSE DIAGNOSIS       Sinus rhythm  Normal ECG  When compared with ECG of 28-JUL-2017 00:48,  Vent. rate has increased BY  49 BPM  QRS duration has decreased  ST no longer elevated in Anterolateral leads  Confirmed by FELICIA UREÑA MD LOC:[facility] ([id number]) on 3/26/2019 4:37:11 PM     Comprehensive Metabolic Panel   Result Value Ref Range    Sodium 137 136 - 145 mmol/L    Potassium 3.8 3.5 - 5.0 mmol/L    Chloride 103 98 - 107 mmol/L    CO2 23 22 - 31 mmol/L    Anion Gap, Calculation 11 5 - 18 mmol/L    Glucose 136 (H) 70 - 125 mg/dL    BUN 17 8 - 22 mg/dL     Creatinine 0.92 0.70 - 1.30 mg/dL    GFR MDRD Af Amer >60 >60 mL/min/1.73m2    GFR MDRD Non Af Amer >60 >60 mL/min/1.73m2    Bilirubin, Total 0.6 0.0 - 1.0 mg/dL    Calcium 10.2 8.5 - 10.5 mg/dL    Protein, Total 8.3 (H) 6.0 - 8.0 g/dL    Albumin 4.9 3.5 - 5.0 g/dL    Alkaline Phosphatase 108 45 - 120 U/L    AST 27 0 - 40 U/L    ALT 38 0 - 45 U/L   HM1 (CBC with Diff)   Result Value Ref Range    WBC 7.8 4.0 - 11.0 thou/uL    RBC 6.31 (H) 4.40 - 6.20 mill/uL    Hemoglobin 18.6 (H) 14.0 - 18.0 g/dL    Hematocrit 53.9 40.0 - 54.0 %    MCV 85 80 - 100 fL    MCH 29.5 27.0 - 34.0 pg    MCHC 34.5 32.0 - 36.0 g/dL    RDW 12.0 11.0 - 14.5 %    Platelets 173 140 - 440 thou/uL    MPV 12.5 8.5 - 12.5 fL    Neutrophils % 52 50 - 70 %    Lymphocytes % 38 20 - 40 %    Monocytes % 7 2 - 10 %    Eosinophils % 3 0 - 6 %    Basophils % 1 0 - 2 %    Neutrophils Absolute 4.0 2.0 - 7.7 thou/uL    Lymphocytes Absolute 3.0 0.8 - 4.4 thou/uL    Monocytes Absolute 0.5 0.0 - 0.9 thou/uL    Eosinophils Absolute 0.2 0.0 - 0.4 thou/uL    Basophils Absolute 0.1 0.0 - 0.2 thou/uL   Thyroid Stimulating Hormone (TSH)   Result Value Ref Range    TSH 6.24 (H) 0.30 - 5.00 uIU/mL   T4, Free   Result Value Ref Range    Free T4 1.2 0.7 - 1.8 ng/dL   T3, Total   Result Value Ref Range    T3, Total 103 45 - 175 ng/dL   Lipid Cascade FASTING   Result Value Ref Range    Cholesterol 194 <=199 mg/dL    Triglycerides 89 <=149 mg/dL    HDL Cholesterol 41 >=40 mg/dL    LDL Calculated 135 (H) <=129 mg/dL    Patient Fasting > 8hrs? Unknown      Assessment and Plan:   28-year-old man is somewhat an unreliable historian with vomiting and dizziness.  Hypothyroidism is incompletely controlled.  I would recommend he increase his his dose of levothyroxine to 100 mcg/day.  We will send prescriptions to the pharmacy for that.    He was a bit tachycardic today for a while but then that seemed to resolve.  We were also unable to get a blood pressure.     We had a discussion  about whether or not he should be seen in ultimately I do not think he look that ill and recommended trial of home therapy and oral rehydration after we checked his labs.  I discussed with father the reasons that he would need to go into the ER and    1. Vomiting  Has ondansetron  Trial ORT as noted above    2. Hypothyroidism  - Lipid Cascade FASTING  - levothyroxine (SYNTHROID, LEVOTHROID) 100 MCG tablet; Take 1 tablet (100 mcg total) by mouth daily.  Dispense: 90 tablet; Refill: 0    3. Tachycardia

## 2021-05-28 ASSESSMENT — ASTHMA QUESTIONNAIRES
ACT_TOTALSCORE: 24
ACT_TOTALSCORE: 20
ACT_TOTALSCORE: 20

## 2021-05-28 ASSESSMENT — ANXIETY QUESTIONNAIRES: GAD7 TOTAL SCORE: 6

## 2021-05-28 NOTE — PROGRESS NOTES
Correct pharmacy verified with patient and confirmed in snapshot? [] yes [x]no   roomed by another staff  Charge captured ? [x] yes  [] no    Medications Phoned  to Pharmacy [] yes [x]no  Name of Pharmacist:  List Medications, including dose, quantity and instructions      Medication Prescriptions given to patient   [] yes  [x] no   List the name of the drug the prescription was written for.       Medications ordered this visit were e-scribed.  Verified by order class [x] yes  [x] no roomed by another staff  Zyprexa and Invega  Medication changes or discontinuations were communicated to patient's pharmacy: [x] yes  [] no  Called [facility] and d/c'd Zyprexa 10 mg Q HS  UA collected [] yes  [x] no    [place] Prescription Monitoring Program Reviewed? [] yes  [x] no    Referrals were made to:  none  Future appointment was made: [] yes  [x] no    Dictation completed at time of chart check: [x] yes  [] no    I have checked the documentation for today s encounters and the above information has been reviewed and completed.

## 2021-05-28 NOTE — PROGRESS NOTES
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 Generalized Anxiety Disorder. He scored 11 on RADHA-7 measure and reports feeling worried every day and feeling afraid something awful might happen. At this time, his symptoms appear to be better explained as trauma symptoms.   Based on how he presents, he appears to have significant impairment in cognitive functioning. Therapist would recommend further testing such as neuro-psychological testing. Therapists will defer diagnosis of any neurocognitive disorder to specialist who is able to complete further testing.  Many of his symptoms present somatically, which is common in [place]  cultures. He endorses the following somatic symptoms that are bothersome: stomach pain, back pain, pain in limbs, headaches, dizziness, heart racing, shortness of breath, constipation, fatigue, nausea, trouble sleeping. He scored 15 on PHQ-15, which indicates severe levels of somatic problems. Tehrapist will continue to assess for Somatic Symptom Disorder. Many of these symptoms are somatic manifestations of his mental health concerns.     Recommendations   Patient would benefit from continued psychotherapy services every 1-2 weeks to further address presenting symptoms and concerns. Patient may also benefit from the following services  and referrals:     Psychiatric medication management    Clinic care coordination    Mental Health Targeted Case Management (MHTCM)    Adult Rehabilitative Mental Health Services (ARMHS)    Assessment for CADI waiver (services could include supported employment, ILS, continued PCA)    Neuro-psychological evaluation    Diagnosis   Schizoaffective Disorder, depressive type with catatonia, continuous.  PTSD    Provisional Diagnosis   R/O Generalized Anxiety Disorder  R/O Neurocognitive Disorder  R/O Somatic Symptom Disorder    WHODAS 2.0 12-item version: 34/48=71% severe level of disabilty      Assessment of client resolving presenting mental health concerns:  Ability  [x] low     [] average     [] high  Motivation [x] low     [] average     [] high  Willingness [x] low     [] average     [] high    Sources/references used in completing this assessment:    -Face to face interview  -Patient Chart  -Measures completed: WHODAS, C-SSRS, CAGE, PHQ-9, RADHA-7, PCL-C, PHQ-15    Initial Therapy Plan    1. Patient to present for follow up psychotherapy appointments   2. Patient to continue to follow up with primary care physician as needed and take medications as prescribed.  3. Continue working on goals in comprehensive treatment plan.   4. Refer to psychiatry services for medication management.   5. Begin to connect to additional supports/services as listed above in recommendations.      Is patient's family involved in the treatment?  [] No     [x] Yes    If yes, How?  His father was present and helps speak for patient at times and communicate how he has been doing as he is primary care taker for patient. There are times patient looks to his father to answer questions as he does not know the answer, is unable to recall, or lacks awareness or understanding.     If no, Why?  His father always attends with him.        Therapist s Signature/Supervision/co-signature statement:   AMANDA Cast 03/22/2019

## 2021-05-28 NOTE — TELEPHONE ENCOUNTER
Provider Communication  Who is calling:  Celia   Facility in which provider is associated:  [facility]  Reason for call:  Celia from [facility] is requesting a return call from PCP or his nurse as she expressed the patient is possibly having a symptomatic decline. She has received records for the patient from [place] and had a psych eval with him yesterday 5/8. Please return her call at the number provided to discuss patient care.  Urgency for return call:  end of day  Okay to leave detailed message?:  Yes

## 2021-05-28 NOTE — TELEPHONE ENCOUNTER
SPOKE WITH [employer], PT DOES HAVE TRANSPORTATION SCHEDULE WITH [employer] FOR APPT WITH [facility]SEL[facility].

## 2021-05-28 NOTE — TELEPHONE ENCOUNTER
Patient is needing a ride for their appointment on:    *EXTREMELY IMPORTANT TO ENSURE A RIDE IS SET UP FOR THIS APPOINTMENT*    Date: 5/08/2019  Time: 1:00pm    Name of Location/Address/Phone #:   [address]  [place] [address]  Phone: [phone]      Name of  patient is seeing & 's specialty:   Caprice Haile CNP- Psychiatry    Passenger (s):   2    Special considerations: None    Is the patient able to walk to the transportation vehicle?    Yes     Do they need DOOR to DOOR service? (company person comes knock on the door and walks them to car)     No    Please call patient back to confirm the ride details. Thanks!

## 2021-05-28 NOTE — TELEPHONE ENCOUNTER
Called and left detail message asking kenneth to call back and let us know what kind of questions or what does she want to address with the provider.

## 2021-05-28 NOTE — TELEPHONE ENCOUNTER
----- Message from Jose Rangel MD sent at 5/18/2019  3:06 PM CDT -----  Call:   Blood tests and kidney tests look okay.  Follow-up in 3 months

## 2021-05-28 NOTE — TELEPHONE ENCOUNTER
5-9-19  Patient drop off the completed Medical Waiver form N-648 and Psychological Evaluation from Monserrat Boss  Made copies and mailed the original Medical Waiver to  [facility]   Attn: Flaquita Bryant/Georgina Brannon  [address]  [place] [address]    Met and gave copy of completed Medical Waiver Form and Psychological Evaluation to Dr Rangel to review and scan to chart.    Requested PCP to write a letter to explain why he could not complete the medical waiver form.  PCP will write the letter and give Care Guide to send to .

## 2021-05-28 NOTE — TELEPHONE ENCOUNTER
Called and spoke with father.  relay message to father. Father picked up and stated he was patient. Father is on the consent to communicate, however, form is not valid since 4/18/2018. Per father patient is still dizzy.   Use  line to contact : Thaddeus ID:[id number]

## 2021-05-28 NOTE — PROGRESS NOTES
Subjective:  28 y.o. male with concerns of follow up.  He is here with his father and an .  Had a first psychiatric encounter with the provider at [facility] on 5/8/2019.  Notes reviewed.  Zyprexa was reduced to 5 mg for irritability and anxiety and invega was started at 6 mg daily for report of auditory hallucinations..  That provider messaged me to report that she has resigned her position, and a follow-up plan with psychiatry is unclear to me.  He does have scheduled appointments with the mental health counselor who is housed within this clinic.    Today he has concerns of dizziness like he usually does.  He also is concerned to being tired.  He notes increased runny nose and sneezing.  He attributes these to allergy symptoms.  Has had allergy pills in the past which thinks they have worked.  With some difficulty, ascertain that this was probably fexofenadine that he thinks works better than cetirizine.  He does not like to use nasal spray.    Endocrinology follow-up is also been scheduled for June but it is unclear to me if that will actually take place or not as the provider is on leave.  Need to be mindful of whether or not that appointment can be accomplished in June.    Outpatient Medications Prior to Visit   Medication Sig Dispense Refill     acetaminophen (TYLENOL) 325 MG tablet Take 2 tablets (650 mg total) by mouth every 6 (six) hours as needed for pain. 1-2 tablets as needed for pain 30 tablet 0     albuterol (PROAIR HFA;PROVENTIL HFA;VENTOLIN HFA) 90 mcg/actuation inhaler Inhale 2 puffs every 6 (six) hours as needed for wheezing. 1 each 0     cholecalciferol, vitamin D3, (VITAMIN D3) 1,000 unit capsule 2 po qd 60 capsule 5     fludrocortisone (FLORINEF) 0.1 mg tablet TAKE 1TABLET BY MOUTH DAILY. 90 tablet 2     fluticasone (FLONASE) 50 mcg/actuation nasal spray 1 spray into each nostril daily. 16 g 2     fluticasone furoate (ARNUITY ELLIPTA) 200 mcg/actuation DsDv Inhale 200  mcg daily. 30 each 11     hydrocortisone (CORTEF) 5 MG tablet Take 2 tabs in the morning, 1 tab in the early afternoon 270 tablet 1     levothyroxine (SYNTHROID, LEVOTHROID) 100 MCG tablet Take 1 tablet (100 mcg total) by mouth daily. 90 tablet 0     loperamide (IMODIUM A-D) 2 mg tablet Take 1 tablet (2 mg total) by mouth 3 (three) times a day as needed for diarrhea. 15 tablet 0     OLANZapine (ZYPREXA) 5 MG tablet Take one tablet in the morning for anxiety 30 tablet 3     ondansetron (ZOFRAN-ODT) 4 MG disintegrating tablet Take 1 tablet (4 mg total) by mouth every 8 (eight) hours as needed for nausea. 15 tablet 0     paliperidone (INVEGA) 6 MG 24 hr tablet Take 1 tablet (6 mg total) by mouth at bedtime. 30 tablet 3     fexofenadine (ALLEGRA) 180 MG tablet TAKE 1 TABLET  180 MG TOTAL  BY MOUTH DAILY FOR ALLERGIES  10     No facility-administered medications prior to visit.       Social History     Tobacco Use   Smoking Status Never Smoker   Smokeless Tobacco Never Used   Tobacco Comment    no second hand smoke exposure      Objective:  /76 (Patient Site: Left Arm, Patient Position: Sitting, Cuff Size: Adult Regular)   Pulse 72   Temp 98  F (36.7  C) (Oral)   Resp 10   Wt 148 lb (67.1 kg)   BMI 26.64 kg/m    GENERAL: alert, not distressed, unkempt  EARS: normal tympanic membranes and external auditory canals bilaterally  NOSE: mucosa slightly edematous.  PHARYNX: no erythema or exudates  MOUTH: well hydrated mucosa, no lesions  NECK: no lymphadenopathy or thyroid nodules  CHEST: clear, no rales, rhonchi, or wheezes  CARDIAC: regular without murmur, gallop, or rub      Assessment and Plan:   1. Environmental allergies  - fexofenadine (ALLEGRA) 180 MG tablet; Take 1 tablet (180 mg total) by mouth daily. TAKE 1 TABLET  180 MG TOTAL  BY MOUTH DAILY FOR ALLERGIES  Dispense: 90 tablet; Refill: 1    2. Thrombocytopenia (H)  Recheck CBC  - HM1(CBC and Differential)  - HM1 (CBC with Diff)    3. Hypothyroidism,  unspecified type  Due for recheck  - Thyroid Stimulating Hormone (TSH)  - T4, Free    4. Adrenal insufficiency (H)  - Basic Metabolic Panel    5. Schizophrenia:  Rule out other causes of psychiatric disturbance.  - Vitamin B12  - Syphilis Screen, [facility]

## 2021-05-28 NOTE — PROGRESS NOTES
Mental Health Visit Note    5/1/2019    Start time: 09:09am    Stop Time: 9:48am   Session # 6      Session Type: Patient is presenting for an Individual session.    Brooke Peterson is a 28 y.o. male is being seen today for    Chief Complaint   Patient presents with     MH Follow Up     Patient presented for follow up psychotherapy to address coping with dizziness, other somatic concerns, and psychosis.       New symptoms or complaints: None    Functional Impairment:   Personal: 4  Family: 3  Work: 4  Social:4    Clinical assessment of mental status: Reviewed. MSE is current effective 5/1/19  Grooming: Disheveled  Attire: Appropriate  Age: Appears Stated  Behavior Towards Examiner: Cooperative, Guarded   Motor Activity: Within normal. Has a cane today-barely utilizes  Eye Contact: Avoidant, downcast  Mood: Depressed  Affect: Congruent w/content of speech, Blunted and Flat  Speech/Language: Delayed/Hesitant  Attention: Distractible  Concentration: Brief  Thought Process: Within normal  Thought Content: Hallucinations: Auditory  Delusions: Within normal  Orientation: X 3.  Memory: No Evidence of Impairment  Judgement: No Evidence of Impairment  Estimated Intelligence: Below Average  Demonstrated Insight: Diminished  Fund of Knowledge: adequate      Suicidal/Homicidal Ideation present: None Reported This Session     Patient's impression of their current status: Patient reports feeling dizziness every day, all day. He would like to see his primary physician about this. He reports heavy heart. He does not have much energy. He reports concerns that his ARMHS worker hasn't been coming out. He enjoys going to the park when the weather is nice. He also enjoyed eating out in the community with his father recently. He felt more energized after eating. He reports sleep has been well. Denies any concerns re: medications. Patient is looking forward to completing appointments at [facility] for citizenship N-648 forms. He worries about  having transportation for his appointments as it is a barrier. He worries about finding appointments, such as the outpatient mental health clinic due to language barrier.     Therapist impression of patients current state: The patient presented late for an individual psychotherapy session with this provider. A professional Angela , Celestino, was present for the visit due to the language complexity. His father was also present. Patient presented with symptoms of depression and flat affect. His body language is avoidant and downcast. Developed awareness and understanding of mind-body connection. He was able to identify that when his heart feels heavy, his body does as well. Provided psychoeducation on depression symptoms. His father mostly speaks on his behalf. Patient requires prompting to be engaged at times, but when he engages, he is able to verbalize how he feels and what he thinks. We continue to work on developing his communication and social interaction skills. Provided positive reinforcement for community integration by eating out at a restaurant and desire to go to the park.   Prior to next session: Therapist to follow up with [facility] worker, Wing Rodgers ([phone]). Patient to attend psychiatric nurse practitioner appointment for medication management. .     Type of psychotherapeutic technique provided: Insight oriented, Client centered and Solution-focused, psychoeducation    Progress toward short term goals:Progress as expected, sleeping better. Maintaining appointments. Being more active and going into the community.       Review of long term goals: Not done at today's visit.  Effective 3/8/2019-06/08/2019    Diagnosis:   1. Schizophrenia, unspecified type (H)    R/O Schizoaffective Disorder    Plan and Follow up: Patient is scheduled for follow up psychotherapy on 5/16/19. He scheduled PCP appointment for that day as well.  Follow up at [facility] to complete medical waiver form review (5/7/19)  at 11am- sent message to transportation pool to verify transportation is scheduled.   Initial psychiatric nurse practitioner appointment on 5/8/19 for medication management at [facility]. Requested medical ride. Provided patient instructions on how to get to clinic upon drop off.     Discharge Criteria/Planning: Client has chronic symptoms and ongoing therapy for maintenance stability recommended.    AMANDA Cast 5/1/2019

## 2021-05-28 NOTE — PROGRESS NOTES
Last visit Ana: new today  Have you seen your PCP: Yes   What medical conditions do we need to know about: none  Are you seeing a therapist: Yes  Are you taking your medications: yes  Any concerns about your sleep: states sleep good  Any concerns about your Appetite: states not good, two meals a day  How is your Mood: down  Any Pain scale 0-10, ten being the worst: 0 a little dizzy   Any Anxiety scale 0-5, five being the worst: 0/5   Any Depression scale 0-5, five being the worst: 4/5  Any Suicidal or homicidal ideation: denies

## 2021-05-28 NOTE — TELEPHONE ENCOUNTER
Celia had return call. She is asking if you can read her note from patient last saw her 5/8/2019. When she saw patient he was not talking at all. Per Parents is back to not eating, increase fatigue, having behavior outburst. She had order another MRI for patient yesterday because of his behavior. She wants to reach out to you to if you can help collaborate with her care team about this patient.     FYI: Ruthy had place her resignation in and will be gone as of today. She mention you can talk with her Medical Director Veronique Blackburn about this patient.

## 2021-05-28 NOTE — TELEPHONE ENCOUNTER
Who is calling:  Angela the home care nurse    Reason for Call:  Angela is calling because she would like clarification on the medication changes from the visit on 5/16/19.  Angela states there are no orders about changing medications.  Please call and clarify medications from 5/16/19 at [phone].    Okay to leave a detailed message: Yes

## 2021-05-28 NOTE — TELEPHONE ENCOUNTER
"Informed Angela of Dr. Rangel's note from 5/16/2019: \"He is here with his father and an .  Had a first psychiatric encounter with the provider at [facility] on 5/8/2019.  Notes reviewed.  Zyprexa was reduced to 5 mg for irritability and anxiety and invega was started at 6 mg daily for report of auditory hallucinations.\"    Per Angela Invega has not been started due to PA needed. I will forward this to PA pool to start this.    "

## 2021-05-28 NOTE — TELEPHONE ENCOUNTER
----- Message from AMANDA Cast sent at 5/1/2019  9:19 AM CDT -----  Regarding: [facility] appointment 5/7/19  Can you please verify if transportation is set up for patient's appointment at [facility] with Tata Galeana on 5/7/19 at 11am?    Thanks

## 2021-05-28 NOTE — TELEPHONE ENCOUNTER
Kalie, can you please provide the phone number and the doctor's name and specialty which the patient is seeing?    Thanks.

## 2021-05-28 NOTE — TELEPHONE ENCOUNTER
Patient is needing a ride for their appointment on    Date: /5/24/2019  Time:/ 5;15 pm     Name of Location/Address/Phone #:   [address]  [facility] [address]    Name of  patient is seeing & 's specialty:   N/A    Passenger (s):   2    Special considerations: None    Is the patient able to walk to the transportation vehicle?    Yes     Do they need DOOR to DOOR service? (company person comes knock on the door and walks them to car)     No    Please call patient back to confirm the ride details. Thanks!

## 2021-05-28 NOTE — TELEPHONE ENCOUNTER
PA was denied. Patient must have tried and failed formulary alternatives, and dates for the trials have to be provided. Appeal information was placed in providers mail folder.

## 2021-05-28 NOTE — PROGRESS NOTES
4-23-19  Called and spoke to patient and father through Angela  Nar and follow up on goals.  Father reported:  -has appt at [facility] in May. But don't know the name of the person.   Informed patient and father that they will be seeing Caprice Haile CNP for support to manage psychiatric medications.  Transportation has been set up with [employer]  between 12-12:30pm.  Father confirmed appt and transportation.  -last appt with Monserrat don't remember the appt.    Called to [place] [phone] and left voice message to call Care Guide of last appt to get the completed medical waiver form.    Plan:   follow up in the clinic with Nathaly on  5-16-19

## 2021-05-28 NOTE — TELEPHONE ENCOUNTER
"I don't understand this note.  Tried to call Celia yesterday but did not get an answer.  This doesn't seem like a great follow up plan for a patient with \"symptomaic decline.\"    It's hard to infer from the visit notes, whether patient is at [employer] or not.    Is there a follow up plan for him in your department?  I can certainly see him back in clinic here.    Dr. RUSSELL  "

## 2021-05-28 NOTE — PROGRESS NOTES
5-16-19  Missed patient in the clinic today   Attempt 1: Care Guide called patient.  If this patient is returning my call, please transfer to [phone].  [facility]    Plan: follow up 5-22-19

## 2021-05-29 NOTE — PROGRESS NOTES
5-30-19  Patient's father and patient requested to see Care Guide.  Angela : Celestino Miller.  Informed patient and father that we are waiting for Monserrat to call back to schedule appt to review and sign the new updated medical waiver form that Dr. Tata Galeana updated.  Conference call to Monserrat with patient and father [phone] and left voice message to call back and schedule appt with Dr Galeana to review and sign updated medical waiver.  Explained to father again to make sure to give the original medical waiver to the lawyer Patsy at [facility].  Father preferred to drop it off with Care Guide and make copy and mail original to [facility].       Plan:  Follow up  7-3-19

## 2021-05-29 NOTE — PROGRESS NOTES
,  The prior referral was used for 05/24 pt no showed that appt so now we need a new one for a new MRI.  Thank you

## 2021-05-29 NOTE — PROGRESS NOTES
Assessment/ Plan  1. Acute sinusitis with symptoms > 10 days  Presumptive diagnosis, likely based on duration of symptoms, facial pressure and purulent nasal discharge as described.    10-day treatment of amoxicillin, high-dose twice daily.  1 refill to use in case of marked improvement of symptoms and return.    Otherwise, follow-up in 7 to 10 days if no improvement.  - amoxicillin (AMOXIL) 500 MG capsule; Take 2 capsules (1,000 mg total) by mouth 2 (two) times a day for 10 days.  Dispense: 40 capsule; Refill: 1    Body mass index is 26.28 kg/m .    Subjective  CC:  Chief Complaint   Patient presents with     Chills     Fatigue     HPI:  28-year-old with history of adrenal insufficiency, schizophrenia, moderate persistent asthma and thrombocytopenia presents with respiratory infections, dizziness, fatigue for the last 2 months.    Upper Respiratory infection  Duration 2month(s)      Worst Symptoms: runny nose, congestion and fatigue, dizziness, nausea  Runny nose?  Yes  Sore throat?  Yes    Fever?  No: but feels cold  HA/ achiness?  Yes: Headache  Symptoms at start of illness : the same as above  Any medications?  No  + pressure in face   No tooth pain    Patient Active Problem List   Diagnosis     Adrenal insufficiency (H)     Schizophrenia (H)     Hypothyroidism     Thrombocytopenia (H)     Moderate persistent asthma     Pituitary microadenoma (H)     Current medications reviewed as follows:  Current Outpatient Medications on File Prior to Visit   Medication Sig     acetaminophen (TYLENOL) 325 MG tablet Take 2 tablets (650 mg total) by mouth every 6 (six) hours as needed for pain. 1-2 tablets as needed for pain     albuterol (PROAIR HFA;PROVENTIL HFA;VENTOLIN HFA) 90 mcg/actuation inhaler Inhale 2 puffs every 6 (six) hours as needed for wheezing.     cholecalciferol, vitamin D3, (VITAMIN D3) 1,000 unit capsule 2 po qd     fexofenadine (ALLEGRA) 180 MG tablet Take 1 tablet (180 mg total) by mouth daily. TAKE 1  TABLET  180 MG TOTAL  BY MOUTH DAILY FOR ALLERGIES     fludrocortisone (FLORINEF) 0.1 mg tablet TAKE 1TABLET BY MOUTH DAILY.     fluticasone (FLONASE) 50 mcg/actuation nasal spray 1 spray into each nostril daily.     fluticasone furoate (ARNUITY ELLIPTA) 200 mcg/actuation DsDv Inhale 200 mcg daily.     hydrocortisone (CORTEF) 5 MG tablet Take 2 tabs in the morning, 1 tab in the early afternoon     levothyroxine (SYNTHROID, LEVOTHROID) 100 MCG tablet Take 1 tablet (100 mcg total) by mouth daily.     loperamide (IMODIUM A-D) 2 mg tablet Take 1 tablet (2 mg total) by mouth 3 (three) times a day as needed for diarrhea.     OLANZapine (ZYPREXA) 5 MG tablet Take one tablet in the morning for anxiety     ondansetron (ZOFRAN-ODT) 4 MG disintegrating tablet Take 1 tablet (4 mg total) by mouth every 8 (eight) hours as needed for nausea.     paliperidone (INVEGA) 6 MG 24 hr tablet Take 1 tablet (6 mg total) by mouth at bedtime.     No current facility-administered medications on file prior to visit.      Social History     Tobacco Use   Smoking Status Never Smoker   Smokeless Tobacco Never Used   Tobacco Comment    no second hand smoke exposure     Social History     Social History Narrative     Not on file     Patient Care Team:  Jose Rangel MD as PCP - General (Family Medicine)  Joey Montero (Father)  Harsh BasilScarlett Umaña as Clinic Care Coordination Care Guide (Primary Care - CC)  Geeta Mojica RN [facility]  as Registered Nurse (Home Health Services)  Flaquita Bryant,  at [facility]  Matt Acosta MA, CCP, [facility]  (Behavioral Health)  Nathaly Lira, AMANDA (Licensed Clinical )  Wing Rodgers ([facility]  Caprice Haile CNP as Nurse Practitioner (Psychiatry)  ROS  Full 10 system review including constitutional, respiratory, cardiac, gi, urinary, rheumatologic, neurologic, reproductive, dermatologic psychiatric is  performed (via questionnaire) and is negative except  fatigue, dizziness, abdominal discomfort, heartburn, nausea        Objective  Physical Exam  Vitals:    06/05/19 0921   BP: 110/76   Patient Site: Left Arm   Patient Position: Sitting   Cuff Size: Adult Regular   Pulse: 70   Resp: 16   Temp: 97.7  F (36.5  C)   TempSrc: Oral   Weight: 146 lb (66.2 kg)     Patient is alert, oriented and in no distress.    Conjunctiva, lids appear normal.  Nares are normal bilaterally.    TMs are visualized bilaterally and appear normal    There is no adenopathy in the neck.  Oral cavity is without any notable lesion, oropharynx appears normal without any erythema, exudate, petechia    Chest appears normal, auscultation reveals normal breath sounds, no wheezing, rales or rhonchi.    Diagnostics  None    Please note: Voice recognition software was used in this dictation.  It may therefore contain typographical errors.

## 2021-05-29 NOTE — TELEPHONE ENCOUNTER
Central PA team  [phone]  Pool: LISA PA MED ([id number])          PA has been initiated.       PA form completed and faxed insurance via Cover My Meds     Key:  [id number]     Medication: Paliperidone ER 6MG er tablets    Insurance:  [facility]           Response will be received via fax and may take up to 5-10 business days depending on plan

## 2021-05-29 NOTE — TELEPHONE ENCOUNTER
Insurance Info  Blue plus advantage        Patient is needing a ride for their appointment on:        Date: 6/24/2019  Time: 10;45    Name of Location/Address/Phone #:   [facility]  [address]  [facility] [address]    Name of  patient is seeing & 's specialty:   N/A    Passenger (s):   1    Special considerations: None    Is the patient able to walk to the transportation vehicle?    Yes     Do they need DOOR to DOOR service? (company person comes knock on the door and walks them to car)     No    Please call patient back to confirm the ride details. Thanks!

## 2021-05-29 NOTE — TELEPHONE ENCOUNTER
6-7-19  Received call back from Jose at [facility]  Explained reasons for the call to schedule another appt for patient to see Dr Galeana to review and sign the new updated medical waiver form per Geogrina Brannon at [facility].  Jose will check with Dr Galeana's schedule.  Jose stated he sent an email to  and will wait until she respond back when to schedule the appt.  Per patient to schedule any day and any time.  He will call Care Guide back.  Thanked Jose for his support.

## 2021-05-29 NOTE — PROGRESS NOTES
5-29-19  Spoke with patient's father through Angela : [employer] and follow up on goals.  Father reported:  -already completed the medical waiver form at [facility] and turned in to the .  -saw the doctor. Don't know the name of the doctor.  Patient attended psychiatry appt on 5-8-19 with Sarah Haile CNP at [facility]. Goal completed.  Conference call with father to [facility]  [phone] and left voice message to call back to schedule appt to review and sign new updated medical waiver documents.  Informed father that [facility] will be calling to schedule another appt to sign the new medical waiver form.    Plan: will wait for [facility] to call back    follow up 7-1-19    E-mail message from Georgina Brannon at [facility] to support patient to schedule appt at [facility] to review and sign the new/updated medical waiver documents.

## 2021-05-29 NOTE — PROGRESS NOTES
I have sent a request to  for a new referral due to the prior one was used and pt. No showed the appt.[email or web address]

## 2021-05-29 NOTE — PROGRESS NOTES
Mental Health Visit Note    5/30/2019    Start time: 10:10am    Stop Time: 10:50am   Session # 8      Session Type: Patient is presenting for an Individual session.    Brooke Peterson is a 28 y.o. male is being seen today for    Chief Complaint   Patient presents with     MH Follow Up     Patient presented for follow up psychotherapy to address limited social skills and fatigue/low motivation that impacts daily activities.       New symptoms or complaints: None    Functional Impairment:   Personal: 4  Family: 3  Work: 4  Social:4    Clinical assessment of mental status: Reviewed. MSE is current effective 5/30/19  Grooming: Well groomed  Attire: Appropriate  Age: Appears Stated  Behavior Towards Examiner: Cooperative  Motor Activity: Retarded.   Eye Contact: Avoidant- hunched over and stares with large eyes down at the ground when not glancing towards father for answers.  Mood: Depressed  Affect: Congruent w/content of speech, Blunted and Flat  Speech/Language: Delayed/Hesitant  Attention: Distractible  Concentration: Brief  Thought Process: Loose and Slow  Thought Content: Hallucinations: Auditory  Delusions: Within normal  Orientation: X 3.  Memory: No Evidence of Impairment  Judgement: No Evidence of Impairment  Estimated Intelligence: Below Average  Demonstrated Insight: Diminished  Fund of Knowledge: adequate      Suicidal/Homicidal Ideation present: None Reported This Session     Patient's impression of their current status:  Patient endorses the following symptoms: stuffy nose, tired, dizzy, heavy heart, poor appetite, poor memory.   Sleep: 4-5 hours/night. Bed at 10pm, wakes around 3-4am for the day. Unable to fall back asleep.   Depressed mood: daily  Anxiety/Worries: none reported  Pain: No pain reported today  Appetite: poor  Engagement in activities: limited. Is going to [employer]. Plans to shadow the  more as interested in preaching and learning from him.   Medication Adherence: good.   He expresses concern  about not seeing his [employer] worker for well over a month.       Therapist impression of patients current state: The patient presented late for an individual psychotherapy session with this provider. A professional Angela , Celestino, was present for the visit due to the language complexity. His father was also present. Patient continues to present with symptoms of depressed mood, severely blunted affect, emotionally withdrawn, apathetic social withdrawal, interpersonal distancing and reduced/nonverbal communication.   His mental health symptoms and cognition continue to impact his daily functioning. It is unclear what he understands and is able to verbalize. He presents with conceptual disorganization evidenced by frequent irrelevences, loose associations, and disconnectedness. He frequently asks his father to answer questions for him. However, they are things that only patient would be able to answer. He does not show the capacity to be very reflective and lacks insight and awareness. He is unable to answer about frequency of symptoms, duration, onset. He is unable to answer what goals or improvements he would like to see in his life. He looked at his father and requested him to answer. Therapist is not sure patient is a good candidate for psychotherapy services. It would be beneficial to understand his cognitive functioning through completion of a neuro psychological testing. This would give us insight into how he learns, memory functions, capability to do more abstract thinking and processing. Patient was referred by psychiatric NP for MRI and patient reports he didn't attend as a ride did not come. Therapist recommended this get rescheduled. He will also need to be connected with a new medication management provider as the NP no longer works for organization.  Therapist has left  for [employer] worker about following up with patient. [employer] worker has contact information to call provider back. SAW has been  signed for 2 way verbal communication.     Prior to next session: therapist will coordinate care with PCP and request referral for neuropsych evaluation (recommending Todd Siegler as provider) and new referral for MRI at [facility].   Patient to expect call back from [facility] worker to re-connect.     Type of psychotherapeutic technique provided: Insight oriented, Client centered and Solution-focused, motivational interviewing    Progress toward short term goals:Poor progress, no contact from [facility]. Missed MRI. Continues to feel depressed daily.  Continues to have severe mental health symptoms. He struggles to engage in therapy and often resorts to his father to provide answers.      Review of long term goals: Not done at today's visit.  Effective 3/8/2019-06/08/2019.    Diagnosis:   1. Schizophrenia, unspecified type (H)    R/O Schizoaffective Disorder    Plan and Follow up: Due to patient needing to be connected with neuropsych, [facility], and psychiatric medication management, therapist will continue to work with patient to bridge the gap and provide mental health support and ensure he is connected with these services. However, he likely will not be a long-term psychotherapy patient. Plan for duration of therapy or different resources/techniques may be better known after completion of neuropsych eval.   Patient is scheduled for follow up psychotherapy on 6/11/19.    Discharge Criteria/Planning: Client has chronic symptoms and ongoing therapy for maintenance stability recommended.    AMANDA Cast 5/30/2019

## 2021-05-29 NOTE — TELEPHONE ENCOUNTER
Pharmacist from [employer] called with additional questions.  Answered over the phone.  They state they will fax us determination

## 2021-05-29 NOTE — TELEPHONE ENCOUNTER
I spoke with Raza from [employer].     Patient has been scheduled with Paul [phone] for a  time of 9am round trip.   TRIP ID#:  [id number]

## 2021-05-29 NOTE — TELEPHONE ENCOUNTER
Psychiatry team at our organization has not followed through with follow up.  Do we need to send him outside, such as to [address] [place] [address] or [facility]?

## 2021-05-29 NOTE — TELEPHONE ENCOUNTER
Refill Approved    Rx renewed per Medication Renewal Policy. Medication was last renewed on 3/28/19.    Falguni Shetty, [facility] Triage/Med Refill 6/21/2019     Requested Prescriptions   Pending Prescriptions Disp Refills     levothyroxine (SYNTHROID, LEVOTHROID) 100 MCG tablet [Pharmacy Med Name: LEVOTHYROXINE 100 MCG  TAB] 90 tablet 0     Sig: TAKE 1 TABLET (100 MCG TOTAL) BY MOUTH DAILY.       Thyroid Hormones Protocol Passed - 6/19/2019  4:31 PM        Passed - Provider visit in past 12 months or next 3 months     Last office visit with prescriber/PCP: 5/16/2019 Jose Rangel MD OR same dept: 6/5/2019 Fritz Nye MD OR same specialty: 6/5/2019 Fritz Nye MD  Last physical: Visit date not found Last MTM visit: Visit date not found   Next visit within 3 mo: Visit date not found  Next physical within 3 mo: Visit date not found  Prescriber OR PCP: Jose Rangel MD  Last diagnosis associated with med order: 1. Hypothyroidism  - levothyroxine (SYNTHROID, LEVOTHROID) 100 MCG tablet [Pharmacy Med Name: LEVOTHYROXINE 100 MCG  TAB]; Take 1 tablet (100 mcg total) by mouth daily.  Dispense: 90 tablet; Refill: 0    If protocol passes may refill for 12 months if within 3 months of last provider visit (or a total of 15 months).             Passed - TSH on file in past 12 months for patient age 12 & older     TSH   Date Value Ref Range Status   05/16/2019 0.43 0.30 - 5.00 uIU/mL Final

## 2021-05-29 NOTE — TELEPHONE ENCOUNTER
Used Language Line/  Name: NA  ID: N/A       Left message #1 at [phone] with ride info below.    Completing task.

## 2021-05-29 NOTE — PATIENT INSTRUCTIONS - HE
I think it is very likely that you have a bacterial infection in your sinuses of your face.  I have prescribed an antibiotic, amoxicillin, to kill the bacteria and hopefully help your symptoms  This usually takes a few days to start working, if it is not starting to work in 7 to 10 days, please follow-up  If you get totally better, then stop the antibiotic after the 10 days and your symptoms come back, use the refill.    If things do not completely go away, please come back and let us know    Potential side effects of antibiotics are allergy-which could lead to a rash and if that happens stop the antibiotic.  Diarrhea which is common and if it is not too bad, continue the antibiotic and live with the diarrhea.

## 2021-05-30 NOTE — PROGRESS NOTES
Mental Health Visit Note    6/25/2019    Start time: 10:09am    Stop Time: 11:01am   Session # 9      Session Type: Patient is presenting for an Individual session.    Brooke Peterson is a 28 y.o. male is being seen today for    Chief Complaint   Patient presents with     MH Follow Up     Patient presented for follow up psychotherapy to address coping with low motivation and depressed mood that impacts his ability to be independent.       New symptoms or complaints: None    Functional Impairment:   Personal: 4  Family: 3  Work: 4  Social:4    Clinical assessment of mental status: Reviewed. MSE is current effective 6/25/19  Grooming: Well groomed  Attire: Appropriate  Age: Appears Stated  Behavior Towards Examiner: Cooperative  Motor Activity: Retarded.   Eye Contact: Avoidant  Mood: Depressed  Affect: Congruent w/content of speech, Blunted and Flat  Speech/Language: Delayed/Hesitant  Attention: Distractible  Concentration: Brief  Thought Process: Loose and Slow  Thought Content: Hallucinations: Auditory  Delusions: Within normal  Orientation: X 3.  Memory: No Evidence of Impairment  Judgement: No Evidence of Impairment  Estimated Intelligence: Below Average  Demonstrated Insight: Diminished  Fund of Knowledge: adequate      Suicidal/Homicidal Ideation present: None Reported This Session     Patient's impression of their current status:  Patient endorses the following symptoms that are bothersome and impact his daily functioning: low energy, dizziness, nausea.   He feels tired all the time, but continues to have poor sleep some days. He does enjoy time outside in the sun and going to [employer].  He had an MRI yesterday which he reports was scary/anxiety provoking. His father prompts him to do ADLs and helps him with tasks such as setting up a bath and reminding him to take one.      Therapist impression of patients current state: The patient presented late for an individual psychotherapy session with this provider. TORI  professional Angela , Celestino, was present for the visit due to the language complexity. His father was also present. Patient was a no show last session. He appears disheveled and has poor eye contact today. He lacks insight and awareness into his mental health symptoms. His cognitive functioning is impaired. Therapist has recommended a neuropsych evaluation. Patient and therapist updated treatment plan goals and measures. View treatment plan document and flow sheets.    Type of psychotherapeutic technique provided: Insight oriented, Client centered and Solution-focused, motivational interviewing    Progress toward short term goals:Poor progress, continued symptoms. Increased in anxiety symptoms triggered by citizenship worries.    He did successfully complete MRI yesterday which was ordered by psychiatric NP.   PHQ-9 Total Score: 20  RADHA 7 Total Score: 18  Score: __/4: 0  Significant increase in RADHA-7 measure. Worries about SSI being cut off because he hasn't gotten his citizenship yet.     Review of long term goals: Treatment Plan updated.  Effective 06/25/2019-09/25/2019.    Diagnosis:   1. Schizoaffective disorder, depressive type, with catatonia (H)    2. PTSD (post-traumatic stress disorder)        Plan and Follow up: Patient is scheduled for follow up psychotherapy on 7/12/19  Therapist to follow up on psych evaluation, [facility] provider and psychiatry.     Discharge Criteria/Planning: Patient will continue with follow-up until therapy can be discontinued without return of signs and symptoms.    AMANDA Cast 6/25/2019

## 2021-05-30 NOTE — PROGRESS NOTES
6-25-19  Met with patient and father.  Joint visit with Nathaly Lira, therapist.  Angela : Celestino Belcher-Stephen    Patient brought in the new updated Medical Waiver form completed by Dr. Galeana at [facility] on 6-11-19  Made 2 copies of Medical Waiver form.  Gave a copy to patient and original Medical Waiver form mail to Eloisa Brannon,  at [facility].  Sent an email to Georgina Brannon at [facility] and notify original Medical Waiver form is mailed out today.    Medical Waiver copy to PCP to review and scan to chart.    Plan:  Follow up 7-25-19

## 2021-05-30 NOTE — TELEPHONE ENCOUNTER
----- Message from Jose Rangel MD sent at 6/26/2019  9:05 AM CDT -----  Call:  Good news.  Brain MRI was normal.

## 2021-05-30 NOTE — TELEPHONE ENCOUNTER
Insurance Info  Blue plus advantage      Patient is needing a ride for their appointment on:        Date: 7/23/2019  Time: 10;30    Name of Location/Address/Phone #:   [facility]  [address] [place] [address] [address]  [place] [address]  Phone: [phone]    Name of  patient is seeing & 's specialty:   N/A    Passenger (s):   1    Special considerations: None    Is the patient able to walk to the transportation vehicle?    Yes     Do they need DOOR to DOOR service? (company person comes knock on the door and walks them to car)     No    Please call patient back to confirm the ride details. Thanks!

## 2021-05-30 NOTE — TELEPHONE ENCOUNTER
Insurance Info  Blue plud advantage       Patient is needing a ride for their appointment on:        Date: 8/21  Time: 10;00    Name of Location/Address/Phone #:   [facility]  [address] [place] [address] [address]  [place] [address]  Phone: [phone]    Name of  patient is seeing & 's specialty:   N/A    Passenger (s):   2    Special considerations: None    Is the patient able to walk to the transportation vehicle?    Yes     Do they need DOOR to DOOR service? (company person comes knock on the door and walks them to car)     No    Please call patient back to confirm the ride details. Thanks!

## 2021-05-30 NOTE — PROGRESS NOTES
"7-11-19  [employer]  Open Encounter today.  Episodes created, care management status validated and encounters linked.    7-11-19  Met with patient and father in clinic.  Angela : Celestino Miller  Patient brought in letter from Social Security Administration and requested help to review the letter.  Reviewed the letter and explained to patient and father the SSI Rules for Non-Citizen \"Under the 7-year rule, your SSI benefits will end effective February 2020 unless you become a [place] citizen or an eligible alien under the law.\"    Coordinated with Georgina Brannon from [facility] to meet with patient to support and explain citizenship process and application and prepare for interview.  Patient and father understand to understand to contact Georgina Brannon if they have questions.    Discussed with patient about transition to maintenance.  Patient okay to transition to maintenance.    Patient has support from father and family members.  -has father is PCA.  -has MA active  -has Skilled Nursing Service for medication set up .  -connected with  at [facility] for citizenship  -has SSI benefit but will end Feb 2020 if he is not a [place] citizen. Citizenship is in progress.  -Has Georgina Brannon from [facility] supporting to file citizenship application.  -has [facility] Worker ( Wing Rodgers) at [facility] for support in the community.   -has established mental health provider ( psychiatrist at [facility] and therapist AMANDA Cast at [facility])    Route to [facility] RN to review chart.  Please review chart if patient is appropriate to transition to maintenance.  If patient is appropriate for Maintenance, please update the Emergency Plan.  Patient does not have any other goals or needs at this time.  Patient has all the support and services in the community.    Plan:  [facility] RN to review chart transition to maintenance  Wellness Plan by 7-22-19  Outreach: 7-22-19             "

## 2021-05-30 NOTE — TELEPHONE ENCOUNTER
"Called and left message for pt to return call.# 1  \" Okay to relay message\"  Use  line to contact :  Vivian ID:[id number]    "

## 2021-05-30 NOTE — TELEPHONE ENCOUNTER
Fax received from [facility], they have started the Prior Authorization Process via Cover My Meds    CoverMyMeds Key: [id number]    Medication Name:   fluticasone furoate (ARNUITY ELLIPTA) 200 mcg/actuation DsDv 30 each 11 4/11/2019     Sig - Route: Inhale 200 mcg daily. - Inhalation    Sent to pharmacy as: fluticasone furoate (ARNUITY ELLIPTA) 200 mcg/actuation DsDv    E-Prescribing Status: Receipt confirmed by pharmacy (4/11/2019 12:14 PM CDT)          Insurance Plan: BCBS  PBM:   Patient ID: not provided on fax    Please complete the PA process

## 2021-05-30 NOTE — PROGRESS NOTES
"    S= Situation-Received a request from [facility] CHW for patient to transition to [facility] Maintenance.  B= Background   \"Patient has support from father and family members.  -has father is PCA.  -has MA active  -has Skilled Nursing Service for medication set up .  -connected with  at [facility] [place] for citizenship  -has SSI benefit but will end Feb 2020 if he is not a [place] citizen. Citizenship is in progress.  -Has Georgina Brannon from [facility] [place] supporting to file citizenship application.  -has [facility] Worker ( Wing Rodgers) at [facility] for support in the community.   -has established mental health provider ( psychiatrist at [facility] and therapist AMANDA Cast at [facility])\"     Patient has all the support and services in the community.     A= Action Step-Chart Review completed.  Will notify PCP  R= Recommendation-All goals completed.  No new goals identified.    Understanding Schizoaffective Disorder  Schizoaffective disorder is a serious and puzzling brain disorder. It combines symptoms of 2 other serious disorders--bipolar disorder and schizophrenia. The symptoms are often severe and ongoing. They can disrupt lives and cause great emotional pain for both the person with the condition and his or her family and friends. But there is reason for hope. Talk with your healthcare provider or mental health professional.  What are the symptoms?  The symptoms of schizoaffective disorder can vary greatly. People with this disorder may see or hear things that aren t there (hallucinations). Or they may hold false, fixed beliefs (delusions). These can occur without any mood changes. At times, people with this disorder may seem withdrawn, listless, and remote. They may also have extreme mood swings. They may feel intensely happy for a time. Later, they may be very depressed. In some cases, they might have only lows without the highs. They might also have problems with sleep or a change in appetite. They may become more " or less talkative, lose focus in their thinking, or even think about suicide.  What causes it?  The causes of schizoaffective disorder aren t fully understood. It is known that this disorder runs in families. Certain chemicals in the brain also play a role. In some people, abuse, neglect, or a major trauma may trigger the disorder.  Finding help  Right now there is no cure. But treatment with medicines and therapy may be helpful. There are also many support services for people with schizoaffective disorders and their families.  Medicines  Medicines may relieve many symptoms of schizoaffective disorder. These may include antipsychotic medicines, antidepressant medicines, or mood stabilizers. If your loved one is troubled by medicine side effects, tell his or her healthcare provider. Changing the dose or type of medicine may help. Encourage your loved one to keep taking his or her medicines. Not taking them may cause symptoms to come back.  Supportive therapy  A therapist (counselor) can offer your loved one advice and support. Social workers may help with work, money, or housing issues. Friends and family members may also need support. Learning more about schizoaffective disorder can help you cope. To learn how best to help with your loved one s care, ask your mental healthcare providers for reliable community or online patient and family resources.    Understanding Post-Traumatic Stress Disorder (PTSD)  You may have post-traumatic stress disorder (PTSD) if you ve been through a traumatic event and are having trouble dealing with it. Such events may include a car crash, rape, domestic violence,  combat, or violent crime. While it is normal to have some anxiety after such an event, it usually goes away in time. But with PTSD, the anxiety is more intense and keeps coming back. And the trauma is relived through nightmares, intrusive memories, and flashbacks (vivid memories that seem real). The symptoms of PTSD  can cause problems with relationships and make it hard to cope with daily life. But it can be treated. With help, you can feel better.  How does it feel?  Symptoms of PTSD often start within a few months of the event. Here are some common symptoms:  You startle more easily, and feel anxious and on edge all the time. This can lead to sleep problems. It a can cause you to feel overwhelmed or become angry or upset more easily. Panic attacks (sudden, intense feelings of terror and a strong need to escape from wherever you are) can also occur.  You relive the event through nightmares and flashbacks. During these, you may feel strong emotions and as though you re reliving the event all over again.  You avoid people, places, or activities that remind you of the trauma. You may hold in your feelings and become emotionally numb. It may be hard to concentrate at work or school or to relax with friends. You may be afraid to let people get close to you.  Who does it affect?  Not everyone who survives a trauma will have PTSD. But many will. In fact, millions of people have the condition. PTSD can happen to anyone, but it most often develops after a person feels his or her or another s life is threatened.  You re at risk for PTSD if you have experienced or witnessed:  A rape or sexual abuse  A mugging or carjacking  A car accident or plane crash  A life-threatening illness  War  Domestic violence  Childhood abuse  Natural disasters such as earthquakes, floods, or hurricanes  The sudden death of a loved one  Finding help  The first step is to talk to a trusted counselor or healthcare provider. He or she can help you take the next step to treatment. This may involve therapy (also called counseling) and medication.  Are you having suicidal thoughts?  You may be feeling helpless, hopeless, and that you can t go on. You may even have thoughts of suicide. But help is available. There are ways to ease this pain and manage the problems  in your life.  If you are thinking about harming or killing yourself, please tell your healthcare provider or someone you care about immediately or go to the nearest crisis walk-in center or emergency room.  You can also call, toll-free,  800-SUICIDE (801-011-1118)   433-377-VVMT ([phone])     Resources  American Psychiatric Association  267.808.3083  www.healthyminds.org  American Psychological Association  www.apa.org/helpcenter  Anxiety and Depression Association of [place]  www.adaa.org  Mental Health [place]  www.nmha.org  National Center for PTSD  www.ptsd.va.gov  National [place] of Mental Health  www.nimh.nih.gov/topics/fmvka-fwih-fqwe.shtml  National Suicide Prevention Lifeline  862.982.8608 (843-851-SWQQ)  Www.suicidepreventionlifeline.org

## 2021-05-30 NOTE — PROGRESS NOTES
"Mental Health Visit Note    7/12/2019    Start time: 11:03am    Stop Time: 11:35am   Session # 10      Session Type: Patient is presenting for an Individual session.    Brooke Peterson is a 28 y.o. male is being seen today for    Chief Complaint   Patient presents with      Follow Up     session address coping with somatic concerns and worries about his health and citizenship       New symptoms or complaints: None    Functional Impairment:   Personal: 4  Family: 3  Work: 4  Social:4    Clinical assessment of mental status: Reviewed. MSE is current effective 7/12/19  Grooming: Well groomed  Attire: Appropriate  Age: Appears Stated  Behavior Towards Examiner: Cooperative  Motor Activity: Retarded.   Eye Contact: Avoidant  Mood: Depressed  Affect: Congruent w/content of speech, Blunted and Flat  Speech/Language: Delayed/Hesitant  Attention: Distractible  Concentration: Brief  Thought Process: Loose and Slow  Thought Content: Hallucinations: Auditory  Delusions: Within normal  Orientation: X 3.  Memory: No Evidence of Impairment  Judgement: No Evidence of Impairment  Estimated Intelligence: Below Average  Demonstrated Insight: Diminished  Fund of Knowledge: adequate      Suicidal/Homicidal Ideation present: None Reported This Session     Patient's impression of their current status: Patient is worried about his health and his citizenship.  He views his health concerns as including dizziness, fatigue, leg pain, knee pain, anxiety, and depression.  He has a form for disability parking that he would like his primary doctor to complete.  He stated \"I cannot walk\" and he uses a cane for mobility support.  He endorses low appetite he expresses a goal to \"get my health better.\"  He denies any interest in having social relationships such as friendship or romantic relationship.  When asked what life would look like if his health concerns were resolved, he said \"go to work, drive a car, and feel healthy.\"    Therapist impression of " patients current state: The patient presented for an individual psychotherapy session with this provider. A professional Angela , Celestino, was present for the visit due to the language complexity. His father was also present.  Patient continues to have flat affect and is easily distracted in session with his father who is present laughs often in the session and sometimes it occurs during what one would consider inappropriate times.  During these moments patient does not usually laugh along and continues to have extremely flat affect.  And he continues to have extremely poor functioning in his social life and isolates at home.  He did respond well to solution focused questions which helped increase his engagement in session.  He presented with a brighter affect at the end of session today.  Therapist would like to follow up with [facility] as arms worker is still not coming out.    Type of psychotherapeutic technique provided: Insight oriented, Client centered and Solution-focused, motivational interviewing    Progress toward short term goals:Progress as expected, good coping skill for depression to help mood.   Prior to next session, patient to meet with specialty  to get scheduled with a new psychiatric medication management provider in the community as informed that [facility] does not have capacity and his provider has left.     Review of long term goals: Long-term goals reviewed , signed and scanned into EMR.   Effective 06/25/2019-09/25/2019.    Diagnosis:   1. Schizoaffective disorder, depressive type, with catatonia (H)    2. PTSD (post-traumatic stress disorder)        Plan and Follow up: Patient is scheduled for follow up psychotherapy on 7/26/19     Discharge Criteria/Planning: Patient will continue with follow-up until therapy can be discontinued without return of signs and symptoms.    AMANDA Cast 7/12/2019

## 2021-05-30 NOTE — PROGRESS NOTES
"Mental Health Visit Note    7/26/2019    Start time: 10:06am    Stop Time: 10:50am   Session # 11      Session Type: Patient is presenting for an Individual session.    Brooke Peterson is a 28 y.o. male is being seen today for    Chief Complaint   Patient presents with      Follow Up     session to address coping with acculturation difficulties and daily tasks due to fatigue, dizziness, and cognitive functioning.        New symptoms or complaints: None    Functional Impairment:   Personal: 4  Family: 3  Work: 4  Social:4    Clinical assessment of mental status: Reviewed. MSE is current effective 7/26/19  Grooming: Well groomed  Attire: Appropriate  Age: Appears Stated  Behavior Towards Examiner: Cooperative  Motor Activity: Within normal. Using cane.   Eye Contact: Avoidant  Mood: Depressed  Affect: Congruent w/content of speech, Blunted and Flat  Speech/Language: mutism mostly  Attention: Distractible  Concentration: Brief  Thought Process: Slow  Thought Content: Hallucinations: Auditory  Delusions: Within normal  Orientation: X 3.  Memory: No Evidence of Impairment  Judgement: No Evidence of Impairment  Estimated Intelligence: Below Average  Demonstrated Insight: Diminished  Fund of Knowledge: adequate      Suicidal/Homicidal Ideation present: None Reported This Session     Patient's impression of their current status: Patient states he is doing \"not good.\"  He is feeling dizzy and tired today.  The he missed his appointment with his primary doctor and would like to follow up with him again for a handicap parking pass.  He did make his an appointment to the new psychiatrist (Janay Holcomb PA-C) at [facility] on July 23, 2019.  He brought the visit summary to her appointment today- there does not appear to be any medication changes made at this time.  His father stated that the psychiatric medication management provider was going to speak with the homecare nurse as well as this provider.  He still has not had " an [facility] worker come out.  He expresses a desire to come to therapy biweekly and verbalizes that he finds it to be helpful.    Therapist impression of patients current state: The patient presented for an individual psychotherapy session with this provider. A professional Angela , Celestino, was present for the visit due to the language complexity. His father was also present.  Patient continues to have flat affect. His father continues to provide most of the feedback in session. Patient is withdrawn.  Patient is using a cane for mobility support today.  Therapist and patient signed an SAW's for [employer] in order to coordinate mental health care.  He continues to need a lot of assistance with more case management type tasks and organization, which is why an [facility] worker is crucial for this patient.  Therapist is making out reach to [facility] today about the [facility] worker.  We could also consider referral for a mental health targeted case management is another option.  His level of cognitive functioning is unclear, but at this time there appears to be cognitive impairment in addition to the other mental health conditions that patient meets DSM-5 criteria for.  Therapist would like patient to have neuropsychological testing completed in order to get a better understanding of his cognitive functioning.  There will likely be challenges to her testing secondary to cultural differences, no access of formal education, and language barrier.  Therefore it is not clear what kind of results we would get in the accuracy of them, but it may be beneficial to attempt the testing and see if it gives us any insight and further understanding of his cognitive capacity.  These results would also be helpful in learning the best ways to help this patient with his functioning in daily life and the best ways that he can learn and recall information.    Type of psychotherapeutic technique provided: Insight  oriented, Client centered and Solution-focused    Progress toward short term goals:Progress as expected, attended initial intake appointment with psychiatrist at [facility].      Review of long term goals: Not done at today's visit  Effective 06/25/2019-09/25/2019.    Diagnosis:   1. Schizoaffective disorder, depressive type, with catatonia (H)    2. PTSD (post-traumatic stress disorder)        Plan and Follow up: Patient is scheduled for follow up psychotherapy on 8/9/19     Discharge Criteria/Planning: Patient will continue with follow-up until therapy can be discontinued without return of signs and symptoms.    Nathaly Lira, LICSW 7/26/2019

## 2021-05-30 NOTE — PROGRESS NOTES
7-22-19  My Clinic Care Coordination Wellness Plan  This Maintenance Wellness Plan provides private information in regard to the work I have done with my Care Team at my Primary Care Clinic.  This document provides insight on the goals I have worked hard to achieve.  My Care Team congratulates me on my journey to become well.  With the assistance of the Clinic Care Coordination Team and my Primary Care Provider, I have succeeded in improving areas of my health that I identified as barriers to becoming well.  I will continue to seek wellness and use the skills I have obtained to further my journey.  My Community Health Worker will follow up with me in 2 months (9-23-19).  In the meantime, if I should have any questions or concerns I will contact my Community Health Worker.    [address]  [address]  [phone]    My Preferred Method of Contact:  Phone: [phone]    My Primary/Preferred Language:  Angela    Preferred Learning Style:  Reading information, Face to face discussion, Pictures/Diagrams and Hands on teaching    Emergency Contact: Extended Emergency Contact Information  Primary Emergency Contact: PremaMikki   [facility]  Home Phone: [phone]  Relation: Mother  Contact is visually and hearing impaired.  Preferred language: Angela   needed? Yes  Secondary Emergency Contact: Joey Montero   [facility]  Home Phone: [phone]  Relation: Father  Contact is visually and hearing impaired.  Preferred language: Angela   needed? Yes     PCP:  Jose Rangel MD  Specialists:    Care Team            Jose Rangel MD   [phone] PCP - General, Family Medicine    Joey Montero (Father)   [phone]     SAW signed: 4-18-18    [facility]   [phone]     Member ID: #[id number]      [facility]   [phone] or Paul Mojica RN [facility]    [phone] Registered Nurse, [facility]    Skilled Nursing Services    medication set up every 2 weeks   SAW signed: 2-12-19    Nathaly Lira, [facility]   [phone] Licensed Clinical     [facility] Psychotherapist    Wing Rodgers ([facility]   [phone]     Cell: [phone]    Caprice Haile CNP   [phone] Nurse Practitioner, Psychiatry    appt 5-8-19 at 1pm     Scarlett Varma, W   [phone] Community Health Worker, Primary Care - CC    Alyson Yang RN   [phone] Primary Care - CC        Accomplishments:  Goals        Patient Stated      COMPLETED: I have connected with [facility] and have a  Flaquita Bryant for support support with citizenship application and  process. (pt-stated)      Personal Plan:  I will ask [facility] worker to help to call the  at [facility] [phone], if I have any questions about my citizenship process and application.                 COMPLETED: I have established psychiatry services with Sarah Haile CNP at [facility] as of 5-8-19. (pt-stated)      Personal Plan:   My father and I will continue to follow up with Caprice Haile CNP for psychiatry services as scheduled.    [facility]   [address] [facility]    [facility] [address]  [phone]         COMPLETED: I have food stamp from the [facility]. (pt-stated)      Personal Plan:  I received $20 for food stamp from the [facility].   If I pay rent, then I will ask [facility] worker for help to reapply for food stamp.            COMPLETED: I have PCA Services now 4 hours  a day.  (pt-stated)      Personal Plan:   My father is my PCA and ask him for support.         COMPLETED: I have Skilled Nurse Services now for support to set up medications.  (pt-stated)      Personal Plan:  I will continue to meet with RN from [facility] for medication set up every 2 weeks.         COMPLETED: I have support from  ( Georgina Brannon) at [facility] for support with citizenship.  (pt-stated)       Personal Plan:   I have  at [facility] for support with citizenship.   If I have any questions I will call Georgina Brannon at [facility] [phone].          COMPLETED: I now have [facility] Worker for support in the community. (pt-stated)      Personal Plan:  Father and I will ask for support from [facility] worker with mails and forms.  I will continue to meet with [facility] worker as schedules to work on my goals.                 COMPLETED: Other (pt-stated)      I have support from Georgina Brannon from [facility] for support and prepare with citizenship interview.    Personal Plan:   My father will help call to Georgina Clovis [phone] if I have any questions.  I will wait for finger print notification from immigration office.  I will ask [facility] Worker Wing Rodgers [phone] from [facility]  for support if needed.          Other      COMPLETED: I have the Medical Waiver form updated and completed on 6-11-19 at [facility].      Personal Plan:  My father and I will wait to get notification from immigration office for finger print.  If I have any questions, I will ask [facility] worker to help call to Eloisa Brannon,  [phone]  [facility]  [address]  [place] [address]  [phone]              Advanced Directive/Living Will: The patient was given information regarding Adanced Directives/Living Will    Understanding Schizoaffective Disorder  Schizoaffective disorder is a serious and puzzling brain disorder. It combines symptoms of 2 other serious disorders--bipolar disorder and schizophrenia. The symptoms are often severe and ongoing. They can disrupt lives and cause great emotional pain for both the person with the condition and his or her family and friends. But there is reason for hope. Talk with your healthcare provider or mental health professional.  What are the symptoms?  The symptoms of schizoaffective disorder can vary greatly. People with this disorder may see or hear things that aren t there  (hallucinations). Or they may hold false, fixed beliefs (delusions). These can occur without any mood changes. At times, people with this disorder may seem withdrawn, listless, and remote. They may also have extreme mood swings. They may feel intensely happy for a time. Later, they may be very depressed. In some cases, they might have only lows without the highs. They might also have problems with sleep or a change in appetite. They may become more or less talkative, lose focus in their thinking, or even think about suicide.  What causes it?  The causes of schizoaffective disorder aren t fully understood. It is known that this disorder runs in families. Certain chemicals in the brain also play a role. In some people, abuse, neglect, or a major trauma may trigger the disorder.  Finding help  Right now there is no cure. But treatment with medicines and therapy may be helpful. There are also many support services for people with schizoaffective disorders and their families.  Medicines  Medicines may relieve many symptoms of schizoaffective disorder. These may include antipsychotic medicines, antidepressant medicines, or mood stabilizers. If your loved one is troubled by medicine side effects, tell his or her healthcare provider. Changing the dose or type of medicine may help. Encourage your loved one to keep taking his or her medicines. Not taking them may cause symptoms to come back.  Supportive therapy  A therapist (counselor) can offer your loved one advice and support. Social workers may help with work, money, or housing issues. Friends and family members may also need support. Learning more about schizoaffective disorder can help you cope. To learn how best to help with your loved one s care, ask your mental healthcare providers for reliable community or online patient and family resources.    Understanding Post-Traumatic Stress Disorder (PTSD)  You may have post-traumatic stress disorder (PTSD) if you ve been  through a traumatic event and are having trouble dealing with it. Such events may include a car crash, rape, domestic violence,  combat, or violent crime. While it is normal to have some anxiety after such an event, it usually goes away in time. But with PTSD, the anxiety is more intense and keeps coming back. And the trauma is relived through nightmares, intrusive memories, and flashbacks (vivid memories that seem real). The symptoms of PTSD can cause problems with relationships and make it hard to cope with daily life. But it can be treated. With help, you can feel better.  How does it feel?  Symptoms of PTSD often start within a few months of the event. Here are some common symptoms:    You startle more easily, and feel anxious and on edge all the time. This can lead to sleep problems. It a can cause you to feel overwhelmed or become angry or upset more easily. Panic attacks (sudden, intense feelings of terror and a strong need to escape from wherever you are) can also occur.    You relive the event through nightmares and flashbacks. During these, you may feel strong emotions and as though you re reliving the event all over again.    You avoid people, places, or activities that remind you of the trauma. You may hold in your feelings and become emotionally numb. It may be hard to concentrate at work or school or to relax with friends. You may be afraid to let people get close to you.  Who does it affect?  Not everyone who survives a trauma will have PTSD. But many will. In fact, millions of people have the condition. PTSD can happen to anyone, but it most often develops after a person feels his or her or another s life is threatened.  You re at risk for PTSD if you have experienced or witnessed:    A rape or sexual abuse    A mugging or carjacking    A car accident or plane crash    A life-threatening illness    War    Domestic violence    Childhood abuse    Natural disasters such as earthquakes, floods,  or hurricanes    The sudden death of a loved one  Finding help  The first step is to talk to a trusted counselor or healthcare provider. He or she can help you take the next step to treatment. This may involve therapy (also called counseling) and medication.  Are you having suicidal thoughts?  You may be feeling helpless, hopeless, and that you can t go on. You may even have thoughts of suicide. But help is available. There are ways to ease this pain and manage the problems in your life.  If you are thinking about harming or killing yourself, please tell your healthcare provider or someone you care about immediately or go to the nearest crisis walk-in center or emergency room.  You can also call, toll-free,    800-SUICIDE (137-692-5877)     568-851-YEAP ([phone])      Resources    American Psychiatric Association  530.601.6624  www.healthyminds.org    American Psychological Association  www.apa.org/helpcenter    Anxiety and Depression Association of [place]  www.adaa.org    Mental Health [place]  www.nmha.org    National Center for PTSD  www.ptsd.va.gov    National [place] of Mental Health  www.nimh.nih.gov/topics/zzsze-yswz-qggh.shtml  National Suicide Prevention Lifeline  185.777.2744 (251-148-HXPT)  Www.suicidepreventionlifeline.org    Clinical Emergency Plan    All [facility] clinic patients have access to a Nurse 24 hours a day, 7 days a week.  If you have questions or want advice from a Nurse, please know [facility] is here for you.  You can call your clinic and they will connect you or you can call [facility] at [phone].  [facility] also has Walk In Care clinics in multiple locations.  Call the number listed above for more information about our Walk In Care clinics or visit the [facility] website at [email or web address].

## 2021-05-30 NOTE — PROGRESS NOTES
Patient established care with a new psychotropic medication management provider, Yola Holcomb PA-C, at [facility]  ([place]). He completed initial appointment on 7/23/2019. Therapist had patient sign ROIs for verbal communication and record release for coordination of care between [facility] and [facility]. This will allow for coordination of mental health care between [facility] and [facility]. This provider faxed copy of patient's current medication list, most recent Standard Diagnostic Assessment and current treatment plan to [facility] (Attn: Yola Holcomb PA-C). Included copy of ROIs. Therapist requested a fax from [facility] with any changes to medications that are made in order to ensure an accurate/updated medication list at primary clinic. ROIs effective 7/26/19-7/26/20.  Nathaly SANTOS

## 2021-05-30 NOTE — PROGRESS NOTES
Outpatient Mental Health Treatment Plan     Name:  Brooke Peterson  :  1990  MRN:  [id number]     Treatment Plan:  Updated Treatment Plan  Intake/initial treatment plan date:  10/24/17  Benefit and risks and alternatives have been discussed: Yes  Is this treatment appropriate with minimal intrusion/restrictions: Yes  Estimated duration of treatment:  Approximately 20 sessions.  Anticipated frequency of services:  Every 2 weeks  Necessity for frequency: This frequency is needed to establish therapeutic goals and for continuity of care in order to monitor progress.  Necessity for treatment: To address cognitive, behavioral, and/or emotional barriers in order to work toward goals and to improve quality of life.     Plan:      Goal: Schizophrenia  Goal: Decrease average impact of symptoms from 4 to 1                        Strategies:   [x] Learn and Implement CBT skills.                                             [x] Develop insight into mental health concerns.                                            [x] Psychoeducation                                            [x] Establish care with psychiatrist and maintain appointments                                            [x] Continue compliance with medical treatment plan (or explore barriers)                                            [x] Consider Neuropsych Evaluation or other psychological testing                                                                              ?  Degree to which this is a problem: 4  Degree to which goal is met: 1  Date of Review: 2019-2019                   ? Depression                 Goal:    Decrease average depression level from 4 to 1. Decrease PHQ-9 score to 10.              Strategies:       ?[x] Decrease social isolation                                      [x] Increase involvement in meaningful activities                                      ?[x] Discuss sleep hygiene                                      ?[x]  Explore thoughts and expectations about self and others                                      ?[x] Process grief (loss of significant person, independence, role,                                     etc.)                                      ?[x] Assess for suicide risk                                      ?[x] Implement physical activity routine (with physician approval)                                      [x] Consider introduction of bibliotherapy and/or videos                                      [x] Continue compliance with medical treatment plan (or explore                                      barriers)     Degree to which this is a problem: 4  Degree to which goal is met: 1  Date of Review: 06/25/2019-09/25/2019        Goal: Improve relationships and initiate social interactions. Go out into the community 1x/week.              Strategies:       ?[x] Decrease social isolation                                      [x] Increase involvement in meaningful activities                                      ?[x] Practice basic social skills  ?                                    [x] Explore thoughts and expectations about self and others                          ?  Degree to which this is a problem: 3  Degree to which goal is met: 1  Date of Review: 06/25/2019-09/25/2019            ?   ? Anxiety    Goal:  Decrease average anxiety level from 3 to 1. Decrease RADHA-7 score to 7.   Strategies: ? [x]Learn and practice relaxation techniques and other coping strategies (e.g., thought stopping, reframing, meditation)     ? [x] Increase involvement in meaningful activities     ? [x] Discuss sleep hygiene     ? [x] Explore thoughts and expectations about self and others     ? [x] Identify and monitor triggers for panic/anxiety symptoms     ? [x] Implement physical activity routine (with physician approval)     ? [x] Consider introduction of bibliotherapy and/or videos     ? [x] Continue compliance with medical treatment plan (or  explore barriers)     Degree to which this is a problem: 3  Degree to which goal is met: 1  Date of Review: 6/25/2019-09/25/2019       Other recommendations:  - Neuropsychological evaluation  - [facility] with [facility]  - Establish care with new psychiatric medication management provider: Therapist to follow up with [facility]       Functional Impairment:     Personal : 3  Family : 2  Social : 4  Work/school : 4     Diagnosis:  1. Schizoaffective disorder, depressive type, with catatonia (H)    2. PTSD (post-traumatic stress disorder)         WHODAS 2.0 score: 33/48=69% Severe disability     Clinical assessments and measures completed:. RADHA-7 and PHQ-9, CAGE, C-SSRS  PHQ-9 = 20  C-SSRS= Low Risk. Denies SI  CAGE = 0/4  RADHA-7 = 18     Strengths:  Patient has a supportive family and is medication compliant and managing symptoms well. Enrolled in clinic care coordination.  Limitations:  Patient has poor memory, poor attention, and some cognitive delays that may impact his ability and/or willingness to try new things and push his limits.  Needs to be connected with new psychiatric medication provider. No recent follow up with [facility]- need support to get connected again.   Cultural Considerations: Patient is Angela and the understanding of Schizophrenia as a mental illness is limited, more psychoeducation may be needed. He requires the use of a Angela  to communicate with providers. He relies on his father to provide daily support, cares, and help communicate with providers.     Persons responsible for this plan: Patient and Provider. Coordinate with PCP (Dr. Rangel), clinic care guide (Scarlett) and psychiatry as needed.          Psychotherapist Signature           Patient Signature:              Guardian Signature             Provider: Performed and documented by AMANDA Cast   Date:  6/25/2019

## 2021-05-30 NOTE — PROGRESS NOTES
Telephone call to director of refugee [employer] at [facility], Mikki ([phone]) regarding patient not having [employer] worker coming for a few months. Was informed by her that [employer] worker is no longer with the agency. Directors reports she has tried to contact patient to reassign him to a new worker, but has not had success reaching him via phone. She stopped outreach attempts about a month ago due to failed attempts. She will try to contact him again to reassign [employer]. Therapist noted this need for an [employer] worker. Nathaly PATEL

## 2021-05-30 NOTE — TELEPHONE ENCOUNTER
contact :  Elaine ID:[id number]  Called and spoke with pt , Message was given, pt understood, no further questions.

## 2021-05-30 NOTE — TELEPHONE ENCOUNTER
Central PA team  [phone]  Pool: HE PA MED ([id number])          PA has been initiated.       PA form completed and faxed insurance via Cover My Meds     Key:  [id number] - Rx #: [id number]     Medication:  Arnuity Ellipta 200MCG/ACT aerosol powder    Insurance:  [employer] [place]        Response will be received via fax and may take up to 5-10 business days depending on plan

## 2021-05-31 VITALS — BODY MASS INDEX: 23.14 KG/M2 | WEIGHT: 129 LBS

## 2021-05-31 VITALS — BODY MASS INDEX: 21.53 KG/M2 | WEIGHT: 117 LBS | HEIGHT: 62 IN

## 2021-05-31 VITALS — HEIGHT: 63 IN | BODY MASS INDEX: 23.04 KG/M2 | WEIGHT: 130 LBS

## 2021-05-31 VITALS — WEIGHT: 127 LBS | BODY MASS INDEX: 23.04 KG/M2

## 2021-05-31 VITALS — BODY MASS INDEX: 23.21 KG/M2 | HEIGHT: 63 IN | WEIGHT: 131 LBS

## 2021-05-31 VITALS — BODY MASS INDEX: 22.96 KG/M2 | WEIGHT: 128 LBS

## 2021-05-31 VITALS — BODY MASS INDEX: 22.5 KG/M2 | WEIGHT: 124 LBS

## 2021-05-31 VITALS — WEIGHT: 125 LBS | BODY MASS INDEX: 22.43 KG/M2

## 2021-05-31 VITALS — BODY MASS INDEX: 23.05 KG/M2 | WEIGHT: 128.5 LBS

## 2021-05-31 NOTE — TELEPHONE ENCOUNTER
I spoke with srinivas figueroa from [employer].    Patient has been scheduled with [phone] for a  time of 730 am round trip.   TRIP ID#:  [id number]

## 2021-05-31 NOTE — PROGRESS NOTES
Subjective:  28 y.o. male with concerns of follow-up on congestion.  He reports today that congestion, rhinorrhea, and sneezing are better since he started taking amoxicillin.  He reports that his breathing has been stable and unchanged.  He does not think he has used Flonase for a few days because he ran out of it.  I cannot confirm that he is taking an antihistamine or not today.  He does have this prescribed chronically.    Has concerns about a rash present on lateral right elbow.  States this is itchy.  Has not been treating it in any way.    Patient has request for disability parking permit today.  He reports that he stopped falls about once or twice per week.  Today he is carrying a cane but I am uncertain about his need for it.  He states he can only block walk half a block.  He states he becomes fatigued with walking.  This could reasonably be from adrenal insufficiency.  Reports the knee pain is a major issue as well.  If not assisting for knee pain in the past.  States this is always present.    Outpatient Medications Prior to Visit   Medication Sig Dispense Refill     acetaminophen (TYLENOL) 325 MG tablet Take 2 tablets (650 mg total) by mouth every 6 (six) hours as needed for pain. 1-2 tablets as needed for pain 30 tablet 0     albuterol (PROAIR HFA;PROVENTIL HFA;VENTOLIN HFA) 90 mcg/actuation inhaler Inhale 2 puffs every 6 (six) hours as needed for wheezing. 1 each 0     amoxicillin (AMOXIL) 500 MG capsule Take 2 capsules (1,000 mg total) by mouth 2 (two) times a day for 10 days. 40 capsule 0     cholecalciferol, vitamin D3, (VITAMIN D3) 1,000 unit capsule 2 po qd 60 capsule 5     fexofenadine (ALLEGRA) 180 MG tablet Take 1 tablet (180 mg total) by mouth daily. TAKE 1 TABLET  180 MG TOTAL  BY MOUTH DAILY FOR ALLERGIES 90 tablet 1     fludrocortisone (FLORINEF) 0.1 mg tablet TAKE 1TABLET BY MOUTH DAILY. 90 tablet 2     fluticasone (FLONASE) 50 mcg/actuation nasal spray 1 spray into each nostril daily. 16  g 2     fluticasone furoate (ARNUITY ELLIPTA) 200 mcg/actuation DsDv Inhale 200 mcg daily. 30 each 11     hydrocortisone (CORTEF) 5 MG tablet TAKE 2 TABS IN THE MORNING, 1 TAB IN THE EARLY AFTERNOON 90 tablet 0     levothyroxine (SYNTHROID, LEVOTHROID) 100 MCG tablet TAKE 1 TABLET (100 MCG TOTAL) BY MOUTH DAILY. 90 tablet 3     mometasone (ASMANEX TWISTHALER) 220 mcg (60 doses) inhaler Inhale 1 puff 2 (two) times a day. 1 each 2     OLANZapine (ZYPREXA) 5 MG tablet Take one tablet in the morning for anxiety 30 tablet 3     ondansetron (ZOFRAN-ODT) 4 MG disintegrating tablet Take 1 tablet (4 mg total) by mouth every 8 (eight) hours as needed for nausea. 15 tablet 0     paliperidone (INVEGA) 6 MG 24 hr tablet Take 1 tablet (6 mg total) by mouth at bedtime. 30 tablet 3     loperamide (IMODIUM A-D) 2 mg tablet Take 1 tablet (2 mg total) by mouth 3 (three) times a day as needed for diarrhea. 15 tablet 0     No facility-administered medications prior to visit.       Social History     Tobacco Use   Smoking Status Never Smoker   Smokeless Tobacco Never Used   Tobacco Comment    no second hand smoke exposure      Objective:  /72 (Patient Site: Right Arm, Patient Position: Sitting, Cuff Size: Adult Regular)   Pulse 80   Resp 20   Wt 149 lb (67.6 kg)   BMI 25.98 kg/m    GENERAL: alert, not distressed  EYES: PERRL/EOMI, no scleral icterus, no conjunctival injection  EARS: normal tympanic membranes and external auditory canals bilaterally  PHARYNX: no erythema or exudates  NOSE: Mucosa is edematous.  MOUTH: well hydrated mucosa, no lesions  NECK: no lymphadenopathy or thyroid nodules  CHEST: clear, no rales, rhonchi, or wheezes  CARDIAC: regular without murmur, gallop, or rub    Knee Right and left  No effusion  No erythema  No warmth  Does not appear to have tenderness to palpation  Lachmans: negative  Anterior drawer: negative   Varus stress: non tender  Valgus stress: nontender  Modified Appleys:  negative    Patellar grind: negative     Xray both knees:  Normal without signs of early djd or effusion  Personally reviewed today    Assessment and Plan:   1. Eczema, unspecified type  Discussed treatement  - triamcinolone (KENALOG) 0.1 % cream; Apply thin layer to affected areas twice daily as needed  Dispense: 45 g; Refill: 0    2. Chronic pain of left knee  3. Chronic pain of right knee  Discussed.  Will recommend PT.  I am unsure of his qualifications for [employer] parking.  Will defer decision on completion of the form.  - XR Knee Bilateral Plus [facility] VW; Future

## 2021-05-31 NOTE — PROGRESS NOTES
"ASSESSMENT/PLAN:  1. Infection of toenail  cephalexin (KEFLEX) 500 MG capsule   2. Moderate persistent asthma without complication     3. PTSD (post-traumatic stress disorder)         This is a 29 yo male - with underlying severe mental health issues - presents with pain/drainage at left great toenail.  He has purulent drainage at base of what should be the great toenail - toenail has been removed (it is not clear by history what happened to the toenail;  reports that it was painful and patient \"peeled\" it off - I'm suspicious there was some trauma to nailbed and it was not connected except at base.).  Now has infection at base of nail x 10+ days - hasn't been doing anything to keep it clean.  We have soaked this today with Hibiclens and the area cleans up nicely - but given the amount of fresh purulent drainage noted at initial exam, it is reasonable to cover with antibiotic ointment (vaseline gauze applied and then gauze).  Will treat with Cephalexin 500 mg po three times a day and soaking daily in soapy water - covering with antibiotic ointment and bandaid (provided to patient).  He should follow up early next week.     Also today,   ACT Total Score: (!) 17 (8/28/2019  8:00 AM)  Patient's asthma history is difficult to elicit due to poor interpretation.  Today's exam suggests no acute symptoms.  And,  PHQ-9 Total Score: 7 (8/28/2019  8:00 AM)  This reflects improvement in overall score in last six months.  Continue current mental health treatment.        Return in about 5 days (around 9/2/2019) for Recheck.      Medications Discontinued During This Encounter   Medication Reason     fluticasone furoate (ARNUITY ELLIPTA) 200 mcg/actuation DsDv Formulary change     Patient Instructions   1.  Remove current bandage tomorrow (WEdnesday) - you may need to soak this off.  2.  Soak toe in soapy water every day - dry it off.  Then, apply antibiotic ointment (in green package).  Cover with bandaid.  Do this once " "a day.  3.  Take antibiotic by mouth - one tablet three times a day (Cephalexin).    4.  Follow up early next week.       Chief Complaint:  Chief Complaint   Patient presents with     Nail Problem       HPI:   Brooke Peterson is a 28 y.o. male c/o  Toenail - left great toenail -   1-1/2 week - pain came up so \"I peeled off the nail\"  Has been washing it off with water -       PMH:   Patient Active Problem List    Diagnosis Date Noted     Schizoaffective disorder, depressive type, with catatonia (H) 06/05/2019     PTSD (post-traumatic stress disorder) 06/05/2019     Pituitary microadenoma (H) 12/04/2018     Moderate persistent asthma 03/27/2018     Thrombocytopenia (H) 02/07/2018     Hypothyroidism 09/27/2017     Schizophrenia (H) 08/25/2017     Adrenal insufficiency (H) 07/28/2017     Past Medical History:   Diagnosis Date     Asthma      History reviewed. No pertinent surgical history.  Social History     Socioeconomic History     Marital status:      Spouse name: Mikki Lloyd     Number of children: 4     Years of education: Not on file     Highest education level: Not on file   Occupational History     Not on file   Social Needs     Financial resource strain: Not on file     Food insecurity:     Worry: Not on file     Inability: Not on file     Transportation needs:     Medical: Not on file     Non-medical: Not on file   Tobacco Use     Smoking status: Never Smoker     Smokeless tobacco: Never Used     Tobacco comment: no second hand smoke exposure   Substance and Sexual Activity     Alcohol use: No     Drug use: No     Sexual activity: Not on file   Lifestyle     Physical activity:     Days per week: Not on file     Minutes per session: Not on file     Stress: Not on file   Relationships     Social connections:     Talks on phone: Not on file     Gets together: Not on file     Attends Sabianism service: Not on file     Active member of club or organization: Not on file     Attends meetings of clubs or organizations: " Not on file     Relationship status: Not on file     Intimate partner violence:     Fear of current or ex partner: Not on file     Emotionally abused: Not on file     Physically abused: Not on file     Forced sexual activity: Not on file   Other Topics Concern     Not on file   Social History Narrative     Not on file     History reviewed. No pertinent family history.    Meds:    Current Outpatient Medications:      acetaminophen (TYLENOL) 325 MG tablet, Take 2 tablets (650 mg total) by mouth every 6 (six) hours as needed for pain. 1-2 tablets as needed for pain, Disp: 30 tablet, Rfl: 0     albuterol (PROAIR HFA;PROVENTIL HFA;VENTOLIN HFA) 90 mcg/actuation inhaler, Inhale 2 puffs every 6 (six) hours as needed for wheezing., Disp: 1 each, Rfl: 0     cholecalciferol, vitamin D3, (VITAMIN D3) 1,000 unit capsule, 2 po qd, Disp: 60 capsule, Rfl: 5     fexofenadine (ALLEGRA) 180 MG tablet, Take 1 tablet (180 mg total) by mouth daily. TAKE 1 TABLET  180 MG TOTAL  BY MOUTH DAILY FOR ALLERGIES, Disp: 90 tablet, Rfl: 1     fludrocortisone (FLORINEF) 0.1 mg tablet, TAKE 1TABLET BY MOUTH DAILY., Disp: 90 tablet, Rfl: 2     fluticasone propionate (FLONASE) 50 mcg/actuation nasal spray, USE 1 SPRAY IN EACH NOSTRIL EVERY DAY, Disp: 48 g, Rfl: 3     hydrocortisone (CORTEF) 5 MG tablet, TAKE 2 TABS IN THE MORNING, 1 TAB IN THE EARLY AFTERNOON, Disp: 90 tablet, Rfl: 0     levothyroxine (SYNTHROID, LEVOTHROID) 100 MCG tablet, TAKE 1 TABLET (100 MCG TOTAL) BY MOUTH DAILY., Disp: 90 tablet, Rfl: 3     mometasone (ASMANEX TWISTHALER) 220 mcg (60 doses) inhaler, Inhale 1 puff 2 (two) times a day., Disp: 1 each, Rfl: 2     OLANZapine (ZYPREXA) 5 MG tablet, Take one tablet in the morning for anxiety, Disp: 30 tablet, Rfl: 3     ondansetron (ZOFRAN-ODT) 4 MG disintegrating tablet, Take 1 tablet (4 mg total) by mouth every 8 (eight) hours as needed for nausea., Disp: 15 tablet, Rfl: 0     paliperidone (INVEGA) 6 MG 24 hr tablet, Take 1 tablet  "(6 mg total) by mouth at bedtime., Disp: 30 tablet, Rfl: 3     triamcinolone (KENALOG) 0.1 % cream, Apply thin layer to affected areas twice daily as needed, Disp: 45 g, Rfl: 0     cephalexin (KEFLEX) 500 MG capsule, Take 1 capsule (500 mg total) by mouth 3 (three) times a day for 10 days., Disp: 30 capsule, Rfl: 0    Allergies:  No Known Allergies    ROS:  Pertinent positives as noted in HPI; otherwise 12 point ROS negative.      Physical Exam:  EXAM:  /60 (Patient Site: Right Arm, Patient Position: Sitting, Cuff Size: Adult Regular)   Pulse 75   Temp 97.4  F (36.3  C) (Oral)   Resp 18   Ht 5' 3.5\" (1.613 m)   Wt 154 lb 4.8 oz (70 kg)   BMI 26.90 kg/m     Gen:  NAD, appears well, well-hydrated  Neck:  Supple, no adenopathy, no thyromegaly, no carotid bruits, no JVD  Lungs:  Clear to auscultation bilaterally  Cor:  RRR no murmur  Extr:  Left great toenail is absent with purulent drainage at base of nail (large amount) - soaked in Hibiclens solution/water and it cleans up nicely - toenail is absent with underlying grainy surface  Neuro:  No asymmetry  Skin:  Warm/dry        "

## 2021-05-31 NOTE — TELEPHONE ENCOUNTER
[facility]  [address]  [place] [address]  Phone: [phone]

## 2021-05-31 NOTE — PROGRESS NOTES
Relayed message, pt is already signed up for medication delivery. But amoxicillin has not been sent over to pharmacy. Not sure if it takes a couple hours before they do see new refills?

## 2021-05-31 NOTE — TELEPHONE ENCOUNTER
Patient Demographics  1990    [phone]    Insurance ID # / Name of Insurance  [id number] - (Blue Cross Blue Shield)      Patient is needing a ride for their appointment on:        Date: 9/4/2019  Time: 8;30    Name of Location/Address/Phone #:   [place]  [address] [address]    Name of  patient is seeing & 's specialty:   N/A    Passenger (s):   2    Special considerations: None    Is the patient able to walk to the transportation vehicle?    Yes     Do they need DOOR to DOOR service? (company person comes knock on the door and walks them to car)     No    Please call patient back to confirm the ride details. Thanks!

## 2021-05-31 NOTE — PROGRESS NOTES
Mental Health Visit Note    8/9/2019    Start time: 9:08am    Stop Time: 9:49am   Session # 12      Session Type: Patient is presenting for an Individual session.    Brooke Peterson is a 28 y.o. male is being seen today for    Chief Complaint   Patient presents with      Follow Up     session to address coping with hearing voices that cause fear and experiencing sadness and dizziness       New symptoms or complaints: None    Functional Impairment:   Personal: 4  Family: 3  Work: 4  Social:4    Clinical assessment of mental status: Reviewed. MSE is current effective 8/9/19  Grooming: Well groomed  Attire: Appropriate  Age: Appears Stated  Behavior Towards Examiner: Cooperative  Motor Activity: Within normal. Using cane.   Eye Contact: Avoidant  Mood: Depressed  Affect: Congruent w/content of speech, Blunted and Flat  Speech/Language: Within normal   Attention: Distractible  Concentration: Brief  Thought Process: Slow  Thought Content: Hallucinations: Auditory  Delusions: Within normal  Orientation: X 3.  Memory: No Evidence of Impairment  Judgement: No Evidence of Impairment  Estimated Intelligence: Below Average  Demonstrated Insight: Diminished  Fund of Knowledge: adequate      Suicidal/Homicidal Ideation present: None Reported This Session     Patient's impression of their current status: Patient endorses dizziness and headache that are bothersome daily. He feels sad occasionally. Going to the park, fishing or Synagogue help improve his mood. He continues to hear voices at times that increase anxiety/fear. He does not respond to them and tries to ignore them.     Therapist impression of patients current state: The patient presented for an individual psychotherapy session with this provider. A professional Angela , Celestino, was present for the visit due to the language complexity. His father was also present.  Patient continues to have flat affect with depressed mood. He was more communicative and engaged in  session today. He continues to need a lot of assistance with more case management type tasks and organization and has not heard from [employer] director yet. We called together in session and left a VM for the director asking for outreach to patient for assignment to new [facility] worker.  He continues to follow up with PCP and psychiatrist- he has appointments with both before our next session. Today we worked on coping skills for depression and anxiety symptoms.     Type of psychotherapeutic technique provided: Insight oriented, Client centered, Solution-focused and CBT    Progress toward short term goals:Progress as expected, continues to work on goals to manage depression, schizophrenia and other health conditions. Good medication adherence. More engaged in session today.      Review of long term goals: Not done at today's visit  Effective 06/25/2019-09/25/2019.    Diagnosis:   1. Schizoaffective disorder, depressive type, with catatonia (H)        Plan and Follow up: Patient is scheduled for follow up psychotherapy on 8/27/19     Discharge Criteria/Planning: Patient will continue with follow-up until therapy can be discontinued without return of signs and symptoms.    AMANDA Cast 8/9/2019

## 2021-05-31 NOTE — TELEPHONE ENCOUNTER
Fax received from [facility], they have started the Prior Authorization Process via Cover My Meds    CoverMyMeds Key: [id number]    Medication Name:   fluticasone furoate (ARNUITY ELLIPTA) 200 mcg/actuation DsDv 30 each 11 4/11/2019     Sig - Route: Inhale 200 mcg daily. - Inhalation    Sent to pharmacy as: fluticasone furoate (ARNUITY ELLIPTA) 200 mcg/actuation DsDv    E-Prescribing Status: Receipt confirmed by pharmacy (4/11/2019 12:14 PM CDT)          Insurance Plan: Blue Plus Advantage  PBM: [employer]  Patient ID: not provided on fax    Please complete the PA process

## 2021-05-31 NOTE — PROGRESS NOTES
"Mental Health Visit Note    8/27/2019    Start time: 10:12am    Stop Time: 10:43am   Session # 13      Session Type: Patient is presenting for an Individual session.    Brooke Peterson is a 28 y.o. male is being seen today for    Chief Complaint   Patient presents with      Follow Up     session to address coping with depressed mood, fatigue and somatic concerns that require additional support for daily functioning.        New symptoms or complaints: None    Functional Impairment:   Personal: 4  Family: 3  Work: 4  Social:4    Clinical assessment of mental status: Reviewed. MSE is current effective 8/27/19  Grooming: Well groomed  Attire: Appropriate  Age: Appears Stated  Behavior Towards Examiner: Cooperative  Motor Activity: Within normal. Using cane.   Eye Contact: Avoidant  Mood: Depressed  Affect: Congruent w/content of speech, Blunted and Flat  Speech/Language: Within normal   Attention: Distractible  Concentration: Brief  Thought Process: Slow  Thought Content: Hallucinations: Auditory  Delusions: Within normal  Orientation: X 3.  Memory: No Evidence of Impairment  Judgement: No Evidence of Impairment  Estimated Intelligence: Below Average  Demonstrated Insight: Diminished  Fund of Knowledge: adequate      Suicidal/Homicidal Ideation present: None Reported This Session     Patient's impression of their current status:   \"Not good\"- leg pain, toenail came off, dizzy. Requesting to see doctor. His pain makes his mood low.    He confirms he was assigned to a new [employer] worker.  He asks about things that were discussed at recent PCP appointment- he can't remember and also notes some confusion on what it next for follow up.     Therapist impression of patients current state: The patient presented for an individual psychotherapy session with this provider. A professional Angela , Celestino, was present for the visit due to the language complexity. His father was also present.  Patient continues to have flat affect " with depressed mood. He had a recent appointment with PCP about pain concerns. He also had a follow up with psychiatry, but wasn't able to be seen as an  was not present.  As he continues to need assistance with case management type tasks, therapist recommended a referral to [facility]ed [facility], but patient is declining at this time. He would like to remain with [facility] and not add an additional appointment. We continue to work on coping skills for depression and anxiety symptoms. Patient was instructed to reach out to [facility] community health worker to address questions about PCP appointment- her business card was provided. He struggles to be actively involved in his care.   Therapist had patient meet with specialty  to reschedule a psychiatry appointment at [facility].     Type of psychotherapeutic technique provided: Insight oriented, Client centered, Solution-focused and CBT    Progress toward short term goals:Progress as expected, attended follow up appointments with PCP to addres managing pain and mental health symptoms.      Review of long term goals: Not done at today's visit  Effective 06/25/2019-09/25/2019.    Diagnosis:   1. Schizoaffective disorder, depressive type, with catatonia (H)    2. PTSD (post-traumatic stress disorder)        Plan and Follow up: Patient is scheduled for follow up psychotherapy on 9/16/19     Discharge Criteria/Planning: Patient will continue with follow-up until therapy can be discontinued without return of signs and symptoms.    AMANDA Cast 8/27/2019

## 2021-05-31 NOTE — PATIENT INSTRUCTIONS - HE
1.  Remove current bandage tomorrow (WEdnesday) - you may need to soak this off.  2.  Soak toe in soapy water every day - dry it off.  Then, apply antibiotic ointment (in green package).  Cover with bandaid.  Do this once a day.  3.  Take antibiotic by mouth - one tablet three times a day (Cephalexin).    4.  Follow up early next week.

## 2021-05-31 NOTE — TELEPHONE ENCOUNTER
Use  line to contact : Vikram Carmona ID:[id number]  Called and spoke with pt , Message was given, pt understood, no further questions.    Patient is not interested in doing on doing PT.

## 2021-05-31 NOTE — TELEPHONE ENCOUNTER
Refill Approved    Rx renewed per Medication Renewal Policy. Medication was last renewed on 5/17/18.    Falguni Shetty, [facility] Triage/Med Refill 8/27/2019     Requested Prescriptions   Pending Prescriptions Disp Refills     fluticasone propionate (FLONASE) 50 mcg/actuation nasal spray [Pharmacy Med Name: FLUTICASONE PROP 50 MCG SPR 50 BEREKET] 16 g 2     Sig: USE 1 SPRAY IN EACH NOSTRIL EVERY DAY       Nasal Steroid Refill Protocol Passed - 8/27/2019  1:42 PM        Passed - Patient has had office visit/physical in last 2 years     Last office visit with prescriber/PCP: 6/5/2019 OR same dept: 8/16/2019 Jose Rangel MD OR same specialty: 8/16/2019 Jose Rangel MD Last physical: Visit date not found Last MTM visit: Visit date not found    Next appt within 3 mo: Visit date not found  Next physical within 3 mo: Visit date not found  Prescriber OR PCP: Fritz Nye MD  Last diagnosis associated with med order: 1. Nasal congestion  - fluticasone propionate (FLONASE) 50 mcg/actuation nasal spray [Pharmacy Med Name: FLUTICASONE PROP 50 MCG SPR 50 BEREKET]; USE 1 SPRAY IN EACH NOSTRIL EVERY DAY  Dispense: 16 g; Refill: 2     If protocol passes may refill for 12 months if within 3 months of last provider visit (or a total of 15 months).

## 2021-05-31 NOTE — TELEPHONE ENCOUNTER
----- Message from Jose Rangel MD sent at 8/17/2019  1:17 PM CDT -----  Call:  Knee xrays looked ok.  I would recommend physical therapy to address knee pain.

## 2021-05-31 NOTE — PROGRESS NOTES
Subjective:  28 y.o. male with concerns of rhinorrhea and sneezing.  He feels a frontal headache and some dizziness.  He denies any tooth pain.  Symptoms are similar to June 5 when he presented and was diagnosed with acute sinusitis.  He reports that amoxicillin was very effective in reducing the symptoms.  I tried to figure out if he had renewed the prescription or not as it was provided with a refill.  He did not think he had but is unsure if he had or not.  He said the symptoms stayed away for a while after that medicine was done but then they seem to come back.  Now they seem unchanged.  Interestingly, an MRI on Brittny 10 showed no sinus disease.  Now for the last 2 weeks he is again had pain, rhinorrhea, sneezing, and dizziness.    Diagnosis of schizophrenia fairly stable on invega but does feel a bit depressed.  Care currently at [facility]    Adrenal insufficiency on fludrocortisone and cortisone.  In May, normal potassium, sodium, creatinine, GFR    Hypothyroidism controlled with labs in May T4 1.3 and TSH 0.43    Outpatient Medications Prior to Visit   Medication Sig Dispense Refill     acetaminophen (TYLENOL) 325 MG tablet Take 2 tablets (650 mg total) by mouth every 6 (six) hours as needed for pain. 1-2 tablets as needed for pain 30 tablet 0     albuterol (PROAIR HFA;PROVENTIL HFA;VENTOLIN HFA) 90 mcg/actuation inhaler Inhale 2 puffs every 6 (six) hours as needed for wheezing. 1 each 0     cholecalciferol, vitamin D3, (VITAMIN D3) 1,000 unit capsule 2 po qd 60 capsule 5     fexofenadine (ALLEGRA) 180 MG tablet Take 1 tablet (180 mg total) by mouth daily. TAKE 1 TABLET  180 MG TOTAL  BY MOUTH DAILY FOR ALLERGIES 90 tablet 1     fludrocortisone (FLORINEF) 0.1 mg tablet TAKE 1TABLET BY MOUTH DAILY. 90 tablet 2     fluticasone (FLONASE) 50 mcg/actuation nasal spray 1 spray into each nostril daily. 16 g 2     fluticasone furoate (ARNUITY ELLIPTA) 200 mcg/actuation DsDv Inhale 200 mcg daily. 30 each  "11     hydrocortisone (CORTEF) 5 MG tablet TAKE 2 TABS IN THE MORNING, 1 TAB IN THE EARLY AFTERNOON 90 tablet 0     levothyroxine (SYNTHROID, LEVOTHROID) 100 MCG tablet TAKE 1 TABLET (100 MCG TOTAL) BY MOUTH DAILY. 90 tablet 3     loperamide (IMODIUM A-D) 2 mg tablet Take 1 tablet (2 mg total) by mouth 3 (three) times a day as needed for diarrhea. 15 tablet 0     mometasone (ASMANEX TWISTHALER) 220 mcg (60 doses) inhaler Inhale 1 puff 2 (two) times a day. 1 each 2     OLANZapine (ZYPREXA) 5 MG tablet Take one tablet in the morning for anxiety 30 tablet 3     ondansetron (ZOFRAN-ODT) 4 MG disintegrating tablet Take 1 tablet (4 mg total) by mouth every 8 (eight) hours as needed for nausea. 15 tablet 0     paliperidone (INVEGA) 6 MG 24 hr tablet Take 1 tablet (6 mg total) by mouth at bedtime. 30 tablet 3     No facility-administered medications prior to visit.       Social History     Tobacco Use   Smoking Status Never Smoker   Smokeless Tobacco Never Used   Tobacco Comment    no second hand smoke exposure      Objective:  /78 (Patient Site: Left Arm, Patient Position: Sitting, Cuff Size: Adult Regular)   Pulse 68   Resp 12   Ht 5' 3.5\" (1.613 m)   Wt 149 lb (67.6 kg)   BMI 25.98 kg/m    GENERAL: alert, not distressed  EARS: normal tympanic membranes and external auditory canals bilaterally  PHARYNX: no erythema or exudates  MOUTH: well hydrated mucosa, no lesions  NECK: no lymphadenopathy or thyroid nodules  CHEST: clear, no rales, rhonchi, or wheezes  CARDIAC: regular without murmur, gallop, or rub  ABDOMEN: soft, non tender, non distended, normal bowel sounds    Assessment and Plan:   1. Acute recurrent sinusitis, unspecified location  Fairly minimal exam findings but also fairly concrete and clear history of improvement with antibiotics.  I will renew that right now.  I like to see him next week.  In either case (improvement are not) he may need to see ENT.  - amoxicillin (AMOXIL) 500 MG capsule; Take 2 " capsules (1,000 mg total) by mouth 2 (two) times a day for 10 days.  Dispense: 40 capsule; Refill: 0    2. Adrenal insufficiency (H)  Continue current doses of fludrocortisone and hydrocortisone.  No follow-up with endocrine plan since he is stable and availability of consultation is limited.    3. Disorganized schizophrenia (H)  Continue follow-up with Olivia.  Invega and Zyprexa are reported medications the patient is unable to confirm.

## 2021-06-01 VITALS — BODY MASS INDEX: 23.77 KG/M2 | WEIGHT: 132.5 LBS

## 2021-06-01 VITALS — BODY MASS INDEX: 23.86 KG/M2 | WEIGHT: 133 LBS

## 2021-06-01 NOTE — PROGRESS NOTES
"Mental Health Visit Note    9/16/2019    Start time: 10:36am    Stop Time: 11:00am   Session # 14      Session Type: Patient is presenting for an Individual session.    Brooke Peterson is a 28 y.o. male is being seen today for    Chief Complaint   Patient presents with      Follow Up     session to follow up on worries about his health.        New symptoms or complaints: None    Functional Impairment:   Personal: 4  Family: 3  Work: 4  Social:4    Clinical assessment of mental status: Reviewed. MSE is current effective 9/16/19  Grooming: Disheveled  Attire: Appropriate, casual  Age: Appears Stated  Behavior Towards Examiner: Cooperative  Motor Activity: Retarded. Using cane.   Eye Contact: Avoidant- head down.  Mood: Depressed  Affect: Congruent w/content of speech, Blunted and Flat  Speech/Language: Within normal   Attention: Distractible  Concentration: Brief  Thought Process: Slow  Thought Content: Hallucinations: Auditory  Delusions: Within normal  Orientation: X 3.  Memory: No Evidence of Impairment  Judgement: No Evidence of Impairment  Estimated Intelligence: Below Average  Demonstrated Insight: Diminished  Fund of Knowledge: adequate      Suicidal/Homicidal Ideation present: None Reported This Session     Patient's impression of their current status: Patient endorses dizziness, nausea, bone pain, fatigue. He saw PCP prior to this appointment due to these symptoms- patient believes it is something to due with his blood counts. He reports feeling \"down\" due to his health concerns. Yesterday, since the weather was nice, went for a walk. Was nice to get out in the sun, but hard due to poor energy. Sleeping well. Not eating well due to dizziness and nausea- limited appetite. He is hearing voices at times, but unable to recall what they say. Denies they tell him to harm himself or others. Not hearing voices during therapy session.  Didn't feel well enough to go to [employer] yesterday, which is usually his favorite " "activity.  ARM: New worker, Niles, has been coming out. He is hoping JEFF will help with citizenship process and papers. Helpful with scheduling appointments and following up on mental health and other tasks that are difficult due to acculturation difficulties.   Psychiatry: went to appointment, but no . This is the second consecutive time the [facility] has not had an  and therefore has been unable to meet with patient. \"It's very far away- hard to get  who will go that far.\"       Therapist impression of patients current state: The patient presented for an individual psychotherapy session with this provider. A professional Angela , Celestino, was present for the visit due to the language complexity. His father was also present. He didn't remember his appointment today and therefore arrived late for them. Session was late as his PCP appointment went over-time.   Patient continues to have flat affect with depressed mood. His head was down in session and he barely participated- likely due to dizziness and nausea present. He continues to struggle to be actively involved in his care which appears to be due to psychiatric condition, neurodevelopmental disorder, memory impairment, and acculturation difficulties. More information from PCP regarding labs that could be attributing to somatic symptoms would be beneficial. Rule out medical conditions first- then consider impact of psychiatric conditions.     CCC team: In \"maintanance\" for CCC program. Aun care guide.    Type of psychotherapeutic technique provided: Insight oriented, Client centered and Solution-focused, motivational interviewing    Progress toward short term goals:Progress as expected, attended PCP appointment today for labs to address fatigue, dizziness and nausea. Walking outside when able for more energy and sunshine to improve mood.      Review of long term goals: Not done at today's visit  Effective " 06/25/2019-09/25/2019. Will update next session.    Diagnosis:   1. Schizoaffective disorder, depressive type, with catatonia (H)    2. PTSD (post-traumatic stress disorder)        Plan and Follow up: Patient is scheduled for follow up psychotherapy on 10/02/19     Discharge Criteria/Planning: Patient will continue with follow-up until therapy can be discontinued without return of signs and symptoms.    Nathaly Lira, AMANDA 9/16/2019

## 2021-06-01 NOTE — PROGRESS NOTES
"  Mental Health Visit Note    10/02/2019    Start time: 3:08pm    Stop Time: 3:42pm   Session # 15      Session Type: Patient is presenting for an Individual session.    Brooke Peterson is a 28 y.o. male is being seen today for    Chief Complaint   Patient presents with      Follow Up     session to address coping with fatigue, heavy heart and dizziness.         New symptoms or complaints: None    Functional Impairment:   Personal: 4  Family: 3  Work: 4  Social:4    Clinical assessment of mental status: Reviewed. MSE is current effective 10/02/2019  Grooming: Disheveled  Attire: Appropriate, casual  Age: Appears Stated  Behavior Towards Examiner: Cooperative  Motor Activity: slow. Using cane.   Eye Contact: Avoidant  Mood: Depressed  Affect: Congruent w/content of speech, Blunted and Flat  Speech/Language: Within normal   Attention: Distractible  Concentration: Brief  Thought Process: Tangential  Thought Content: Hallucinations: Within noraml  Delusions: Within normal  Orientation: X 3.  Memory: No Evidence of Impairment  Judgement: No Evidence of Impairment  Estimated Intelligence: Below Average  Demonstrated Insight: Diminished  Fund of Knowledge: adequate      Suicidal/Homicidal Ideation present: None Reported This Session -Denies SI/ HI.   Completed C-SSRS in session. No ideation. No self-harm. No preparatory behaviors. No past attempts. Low risk for suicide.      Patient's impression of their current status: He is noticing cold symptoms- has follow up with PCP in a week- fine waiting until then.   Nausea a bit better. Dizziness and fatigue is all day, every day. \"The worst\" symptom.   Eating and hydrating daily. No headaches. Denies hearing or seeing things others don't see. Reports being on his medications helps with this.   Sleeping about 5-6 hours in the night. Occasional bad dreams with ghosts.   PTSD Sx: flashbacks- like movie in his mind. Resting helps this.   Worried/anxious about citizenship process. "   Depressed about his poor health and why he isn't getting any better. Hopelessness.   Decreased activity due to colder weather and rain. No longer going for walks outside. Planning to decrease [employer] to every other week because of rain.       Therapist impression of patients current state: The patient presented for an individual psychotherapy session with this provider. A professional Angela , Celestino, was present for the visit due to the language complexity. Dad is not present today, which is unusual. It allowed patient to be more communicative and actively engaged in session as he cannot rely on his father for report.  Absent of delusions and hallucinations. Difficulty tracking the conversation and needs things repeated by the - poor focus/concentration. Developed insight into how going to [employer] helps improve his depression and how isolating at home and decreasing activity will likely increase depression symptoms. He will reconsider his plans to cut back on attending [employer]. HE was able to identify that going is helpful to his mood. He has access to get to [employer] weekly. Therapist continues to wait on more information about his labs to r/o medical etiology for the dizziness and fatigue. He was sent to specialty  today to get scheduled with endocrinology as recommended by PCP. Therapist also requested specialty schedule to contact Olivia re: when an upcoming appointment is for patient and concerns that Olivia has repeatedly not had an  available when patient attends. Therapist wants to ensure patient is being followed by the psychiatrist closely and continues to receive his psychiatric medications.    Patient and therapist updated treatment plan goals and measures. View  Treatment plan document and flow-sheets that were entered on treatment plan document and also copied below.  PHQ-9: 17 (Decreased since last updated)- continue to work on decreasing symptoms as still  moderately severe and impacting daily functioning.  RADHA-7: 6 (Significant decrease since last updated). Indicates progress on management of anxiety symptoms and decrease in severity. Anxiety is having less of an impact on his functioning.     Over the last 2 weeks, how often have you been bothered by any of the following problems?  Little interest or pleasure in doing things: More than half the days  Feeling down, depressed, or hopeless: Nearly every day  Trouble falling or staying asleep, or sleeping too much: Several days  Feeling tired or having little energy: Nearly every day  Poor appetite or overeating: Several days  Feeling bad about yourself - or that you are a failure or have let yourself or your family down: More than half the days  Trouble concentrating on things, such as reading the newspaper or watching television: Nearly every day  Moving or speaking so slowly that other people could have noticed. Or the opposite - being so fidgety or restless that you have been moving around a lot more than usual: More than half the days  Thoughts that you would be better off dead, or of hurting yourself in some way: Not at all  PHQ-9 Total Score: 17  If you checked off any problems, how difficult have these problems made it for you to do your work, take care of things at home, or get along with other people?: Very difficult  Depression Follow-up Plan: under care of mental health counselor, under care of community psychiatric nurse  Over the last 2 weeks, how often have you been bothered by the following problems?  Feeling nervous, anxious, or on edge: Over half the days  Not being able to stop or control worrying: Several Days  Worrying too much about different things: Several Days  Trouble relaxing: Several Days  Being so restless that it's hard to sit still: Not at all  Becoming easily annoyed or irritable: Not at all  Feeling afraid as if something awful might happen: Several Days  RADHA 7 Total Score: 6  How  difficult did these problems make it for you to do your work, take care of things at home or get along with other people? : Somewhat difficult      Clinical Global Impressions Scale Ratings:    1. Considering your total clinical experience with this particular population, how mentally ill is the patient at this time? (which is rated on the following seven-point scale: 1=normal, not at all ill; 2=borderline mentally ill; 3=mildly ill; 4=moderately ill; 5=markedly ill; 6=severely ill; 7=among the most extremely ill patients:) score of 6 today    2. Compared to the patient's condition at admission to the program, currently this patient's condition is: (1=very much improved since the initiation of treatment; 2=much improved; 3=minimally improved; 4=no change from baseline (the initiation of treatment); 5=minimally worse; 6= much worse; 7=very much worse since the initiation of treatment:) score of 3 today      Type of psychotherapeutic technique provided: Insight oriented, Client centered and Solution-focused, motivational interviewing    Progress toward short term goals:Progress as expected, improved anxiety symptoms. Trying to implement coping skills and also motivated to seek answers, understanding and improvement in his health.  Increased engagement in session today. Able to act on his own behalf, communicate his needs, and be actively engaged in his own care during visit.     Review of long term goals: Treatment Plan updated  Effective 10/02/2019-1/02/2020.    Diagnosis:   1. Schizoaffective disorder, depressive type, with catatonia (H)    2. PTSD (post-traumatic stress disorder)        Plan and Follow up: Patient is scheduled for follow up psychotherapy on 10/16/19     Discharge Criteria/Planning: Patient will continue with follow-up until therapy can be discontinued without return of signs and symptoms.    Nathaly Lira, TOMSW 10/02/2019

## 2021-06-01 NOTE — PROGRESS NOTES
Outpatient Mental Health Treatment Plan     Name:  Brooke Peterson  :  1990  MRN:  [id number]     Treatment Plan:  Updated Treatment Plan  Intake/initial treatment plan date:  10/24/17  Benefit and risks and alternatives have been discussed: Yes  Is this treatment appropriate with minimal intrusion/restrictions: Yes  Estimated duration of treatment:  Approximately 20 sessions.  Anticipated frequency of services:  Every 2 weeks  Necessity for frequency: This frequency is needed to establish therapeutic goals and for continuity of care in order to monitor progress.  Necessity for treatment: To address cognitive, behavioral, and/or emotional barriers in order to work toward goals and to improve quality of life.     Plan:      Goal: Schizophrenia  Goal: Decrease average impact of symptoms from 4 to 1                        Strategies:   [x]? Learn and Implement CBT skills.                                             [x]? Develop insight into mental health concerns.                                            [x]? Psychoeducation                                            [x]? Establish care with psychiatrist and maintain appointments                                            [x]? Continue compliance with medical treatment plan (or explore barriers)                                            [x]? Consider Neuropsych Evaluation or other psychological testing                                                                              ?  Degree to which this is a problem: 4  Degree to which goal is met: 1  Date of Review: 10/02/2019-2020        ? Depression                 Goal:    Decrease average depression level from 4 to 1. Decrease PHQ-9 score to 10.              Strategies:       ?[x]? Decrease social isolation                                      [x]? Increase involvement in meaningful activities                                      ?[x]? Discuss sleep hygiene                                      ?[x]? Explore  thoughts and expectations about self and others                                      ?[x]? Process grief (loss of significant person, independence, role,                                     etc.)                                      ?[x]? Assess for suicide risk                                      ?[x]? Implement physical activity routine (with physician approval)                                      [x]? Consider introduction of bibliotherapy and/or videos                                      [x]? Continue compliance with medical treatment plan (or explore                                      barriers)     Degree to which this is a problem: 4  Degree to which goal is met: 1  Date of Review: 10/02/2019-01/02/2020        Goal: Improve relationships and initiate social interactions. Go out into the community 1x/week.              Strategies:       ?[x]? Decrease social isolation                                      [x]? Increase involvement in meaningful activities                                      ?[x]? Practice basic social skills  ?                                    [x]? Explore thoughts and expectations about self and others                          ?  Degree to which this is a problem: 3  Degree to which goal is met: 1  Date of Review: 10/02/2019-01/02/2020                                                                                ?              ? Anxiety                        Goal:    Decrease average anxiety level from 2 to 1. Decrease RADHA-7 score to 3.              Strategies:       ? [x]?Learn and practice relaxation techniques and other coping strategies (e.g., thought stopping, reframing, meditation)                                      ? [x]? Increase involvement in meaningful activities                                      ? [x]? Discuss sleep hygiene                                      ? [x]? Explore thoughts and expectations about self and others                                      ? [x]?  "Identify and monitor triggers for panic/anxiety symptoms                                      ? [x]? Implement physical activity routine (with physician approval)                                      ? [x]? Consider introduction of bibliotherapy and/or videos                                      ? [x]? Continue compliance with medical treatment plan (or explore barriers)                 Degree to which this is a problem: 2  Degree to which goal is met: 1  Date of Review: 10/02/2019-01/02/2020        Other recommendations:  - Neuropsychological evaluation   - Endocrinology- rule out medical etiology for dizziness and fatigue.        Functional Impairment:     Personal : 3  Family : 2  Social : 4  Work/school : 4     Diagnosis:  1. Schizoaffective disorder, depressive type, with catatonia (H)    2. PTSD (post-traumatic stress disorder)          WHODAS 2.0 score: 33/48=69% Severe disability     Clinical assessments and measures completed:. RADHA-7 and PHQ-9, CAGE, C-SSRS  *View flowsheets on this document*  C-SSRS= Low Risk. Denies SI  RADHA 7 Total Score: 6  PHQ-9 Total Score: 17  Score: __/4: 0       Strengths:  Patient has a supportive family and is medication compliant and managing symptoms well. Enrolled in clinic care coordination. [facility] through [facility]. Psychiatry through [facility] [place].  Limitations:  Patient has poor memory, poor attention, and some cognitive delays that may impact his ability and/or willingness to try new things and push his limits.    Cultural Considerations: Patient is Angela and the understanding of mental illness is limited. 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 He requires the use of a Angela  to communicate with providers. He relies on his father to provide daily support, cares, and help communicate with providers. Many of his symptoms present somatically. Good medication adherence is " imperative for management of hallucinations (auditory and visual).     Persons responsible for this plan: Patient and Provider. Coordinate with PCP (Dr. Rangel), clinic care guide (Scarlett) and psychiatry as needed.          Nathaly PATEL 10/2/2019  Psychotherapist Signature               Toe T Po 10/2/2019  Patient Signature:              Guardian Signature             Provider: Performed and documented by AMANDA Cast   Date:  10/2/2019

## 2021-06-01 NOTE — PROGRESS NOTES
Chief Complaint   Patient presents with     Follow Up     feels cold, has chills x 2 wks     Subjective:  28 y.o. male with concerns of follow up on toe nail  8/28 treated with cephalexin for left great toe nail that was actively having purulent drainage.  This has improved with soaks and oral antibiotics.  Hygeine not his forte, owing to mental health concerns.    C/o dizziness, which is a continuous problems for him    H/o adrenal insufficiency.  On fludrocortisone and hydrocortisone chronically.    Outpatient Medications Prior to Visit   Medication Sig Dispense Refill     acetaminophen (TYLENOL) 325 MG tablet Take 2 tablets (650 mg total) by mouth every 6 (six) hours as needed for pain. 1-2 tablets as needed for pain 30 tablet 0     albuterol (PROAIR HFA;PROVENTIL HFA;VENTOLIN HFA) 90 mcg/actuation inhaler Inhale 2 puffs every 6 (six) hours as needed for wheezing. 1 each 0     cholecalciferol, vitamin D3, (VITAMIN D3) 1,000 unit capsule 2 po qd 60 capsule 5     fexofenadine (ALLEGRA) 180 MG tablet Take 1 tablet (180 mg total) by mouth daily. TAKE 1 TABLET  180 MG TOTAL  BY MOUTH DAILY FOR ALLERGIES 90 tablet 1     fludrocortisone (FLORINEF) 0.1 mg tablet TAKE 1TABLET BY MOUTH DAILY. 90 tablet 2     fluticasone propionate (FLONASE) 50 mcg/actuation nasal spray USE 1 SPRAY IN EACH NOSTRIL EVERY DAY 48 g 3     hydrocortisone (CORTEF) 5 MG tablet TAKE 2 TABS IN THE MORNING AND 1 TAB IN THE EARLY AFTERNOON 90 tablet 0     levothyroxine (SYNTHROID, LEVOTHROID) 100 MCG tablet TAKE 1 TABLET (100 MCG TOTAL) BY MOUTH DAILY. 90 tablet 3     mometasone (ASMANEX TWISTHALER) 220 mcg (60 doses) inhaler Inhale 1 puff 2 (two) times a day. 1 each 2     ondansetron (ZOFRAN-ODT) 4 MG disintegrating tablet Take 1 tablet (4 mg total) by mouth every 8 (eight) hours as needed for nausea. 15 tablet 0     paliperidone (INVEGA) 6 MG 24 hr tablet Take 1 tablet (6 mg total) by mouth at bedtime. 30 tablet 3     triamcinolone (KENALOG) 0.1 %  cream Apply thin layer to affected areas twice daily as needed 45 g 0     OLANZapine (ZYPREXA) 5 MG tablet Take one tablet in the morning for anxiety 30 tablet 3     No facility-administered medications prior to visit.       Social History     Tobacco Use   Smoking Status Never Smoker   Smokeless Tobacco Never Used   Tobacco Comment    no second hand smoke exposure      Objective:  /70 (Patient Site: Left Arm, Patient Position: Sitting, Cuff Size: Adult Regular)   Pulse 64   Temp 98.7  F (37.1  C) (Oral)   Wt 147 lb (66.7 kg)   BMI 25.63 kg/m    GENERAL: alert, not distressed  CHEST: clear, no rales, rhonchi, or wheezes  CARDIAC: regular without murmur, gallop, or rub  ABDOMEN: soft, non tender, non distended, normal bowel sounds  Ext: distal toenail has been removed.  No drainage of any kind.  Not tender to palpation.  Normal vascular status.  No erythema or edema present.    Assessment and Plan:   1. Infection of toenail  Seems to be improving.  No signs of active infection around toe now.    2. Thrombocytopenia (H)  Follow up on platelets.  - HM2(CBC w/o Differential)    3. Adrenal insufficiency (H)  - multivitamin (ONE A DAY) per tablet; Take 1 tablet by mouth daily.  Dispense: 120 tablet; Refill: 2  - Basic Metabolic Panel    4. Pituitary microadenoma (H)  - Prolactin    5. Dizziness  Normal MRI brain 6/24/19  - meclizine (ANTIVERT) 25 mg tablet; Take 1 tablet (25 mg total) by mouth 3 (three) times a day as needed for dizziness or nausea.  Dispense: 30 tablet; Refill: 1    6. Screening for human immunodeficiency virus  - HIV Antigen/Antibody Screening [place]    7. Need for immunization against influenza  - Influenza,Seasonal,Quad,INJ =/>6months

## 2021-06-01 NOTE — PROGRESS NOTES
Subjective:  28 y.o. male with concerns of follow up on labs.    Had high Ca++.  Previously imaged pit adenoma on MRI.  Still c/o dizziness.  Has not improved.  Did not start meclizine.  This is chronic and present for months to years.  Has muscles aches.  Says bones hurt.  No hx of kidney stones.  Appetitie is poor.  Some stomach pain.    Feels feverish with cold feet, chills, shaking.  No measured fever.  No ear pain or ST.  Has chronic rhinorrhea.  No cough or wheeze.  No dyspnea.  No chest pain or palpiations.  Has nausea but no vomiting.  No diarrhea or constiapation.  No blood in stool or melena.  No blood in urine. No dysuria or frequency.  No rash or brusing.  Dizzy but not HA.  ? Hot cold intolerance   Has muscle pains.    Follow up on asthma  Asthma Control Test:  Asthma Control Test:  In the past 4 weeks, how much of the time did your asthma keep you from getting as much done at work, school, or at home?: A little of the time  During the past 4 weeks, how often have you had shortness of breath?: Once or twice a week  During the past 4 weeks, how often did your asthma symptoms (wheezing, coughing, shortness of breath, chest tightness or pain) wake you up at night or earlier in the morning?: Once or twice  During the past 4 weeks, how often have you used your rescue inhaler or nebulizer medication (such as albuterol)?: Once a week or less  How would you rate your asthma control during the past 4 weeks?: Well controlled  ACT Total Score: 20  In the past 12 months, have you visited the emergency room due to your asthma?: No  Number of Emergency Room visits in the past 12 months due to asthma: 0  In the past 12 months, have you been hospitalized due to your asthma?: No         Outpatient Medications Prior to Visit   Medication Sig Dispense Refill     acetaminophen (TYLENOL) 325 MG tablet Take 2 tablets (650 mg total) by mouth every 6 (six) hours as needed for pain. 1-2 tablets as needed for pain 30 tablet 0  "    albuterol (PROAIR HFA;PROVENTIL HFA;VENTOLIN HFA) 90 mcg/actuation inhaler Inhale 2 puffs every 6 (six) hours as needed for wheezing. 1 each 0     cholecalciferol, vitamin D3, (VITAMIN D3) 1,000 unit capsule 2 po qd 60 capsule 5     fexofenadine (ALLEGRA) 180 MG tablet Take 1 tablet (180 mg total) by mouth daily. TAKE 1 TABLET  180 MG TOTAL  BY MOUTH DAILY FOR ALLERGIES 90 tablet 1     fludrocortisone (FLORINEF) 0.1 mg tablet TAKE 1TABLET BY MOUTH DAILY. 90 tablet 2     fluticasone propionate (FLONASE) 50 mcg/actuation nasal spray USE 1 SPRAY IN EACH NOSTRIL EVERY DAY 48 g 3     hydrocortisone (CORTEF) 5 MG tablet TAKE 2 TABS IN THE MORNING AND 1 TAB IN THE EARLY AFTERNOON 90 tablet 0     levothyroxine (SYNTHROID, LEVOTHROID) 100 MCG tablet TAKE 1 TABLET (100 MCG TOTAL) BY MOUTH DAILY. 90 tablet 3     meclizine (ANTIVERT) 25 mg tablet Take 1 tablet (25 mg total) by mouth 3 (three) times a day as needed for dizziness or nausea. 30 tablet 1     mometasone (ASMANEX TWISTHALER) 220 mcg (60 doses) inhaler Inhale 1 puff 2 (two) times a day. 1 each 2     multivitamin (ONE A DAY) per tablet Take 1 tablet by mouth daily. 120 tablet 2     OLANZapine (ZYPREXA) 5 MG tablet Take one tablet in the morning for anxiety 30 tablet 3     ondansetron (ZOFRAN-ODT) 4 MG disintegrating tablet Take 1 tablet (4 mg total) by mouth every 8 (eight) hours as needed for nausea. 15 tablet 0     paliperidone (INVEGA) 6 MG 24 hr tablet Take 1 tablet (6 mg total) by mouth at bedtime. 30 tablet 3     triamcinolone (KENALOG) 0.1 % cream Apply thin layer to affected areas twice daily as needed 45 g 0     No facility-administered medications prior to visit.       Social History     Tobacco Use   Smoking Status Never Smoker   Smokeless Tobacco Never Used   Tobacco Comment    no second hand smoke exposure      Objective:  /60 (Patient Site: Left Arm, Patient Position: Sitting, Cuff Size: Adult Regular)   Pulse 76   Resp 18   Ht 5' 3.39\" (1.61 " m)   Wt 146 lb (66.2 kg)   BMI 25.55 kg/m    GENERAL: alert, not distressed  CHEST: clear, no rales, rhonchi, or wheezes  CARDIAC: regular without murmur, gallop, or rub  ABDOMEN: soft, non tender, non distended, normal bowel sounds    Recent Results (from the past 24 hour(s))   HM2(CBC w/o Differential)   Result Value Ref Range    WBC 4.9 4.0 - 11.0 thou/uL    RBC 5.71 4.40 - 6.20 mill/uL    Hemoglobin 17.2 14.0 - 18.0 g/dL    Hematocrit 50.2 40.0 - 54.0 %    MCV 88 80 - 100 fL    MCH 30.0 27.0 - 34.0 pg    MCHC 34.2 32.0 - 36.0 g/dL    RDW 11.1 11.0 - 14.5 %    Platelets 130 (L) 140 - 440 thou/uL    MPV 10.1 (H) 7.0 - 10.0 fL   Calcium, Ionized, Measured   Result Value Ref Range    Calcium, Ionized Measured 1.16 1.11 - 1.30 mmol/L    Calcium, Ionized pH 7.4 1.16 1.11 - 1.30 mmol/L    pH 7.39 7.35 - 7.45      Assessment and Plan:   1. Hypercalcemia  Hx of pit adenoma on MRI.  Further work up Ca++:  - Vitamin D, Total (25-Hydroxy)  - Basic Metabolic Panel  - Hepatic Profile  - HM2(CBC w/o Differential)  - Parathyroid Hormone Intact  - Phosphorus  - Calcium, Ionized, Measured    2. Adrenal insufficiency (H)  - Ambulatory referral to Endocrinology    3. Hypothyroidism, unspecified type  - Ambulatory referral to Endocrinology  - Thyroid Stimulating Hormone (TSH)  - T3, Total  - T4, Free

## 2021-06-01 NOTE — TELEPHONE ENCOUNTER
Who is calling:  Patient  Reason for Call:  Has transportation scheduled for a 10:00 appt on Monday 9/16 at [facility], but now needs transportation to pick him up earlier as has now added a 9:15 appointment with his PCP that morning as well before his 10:00 appt.  Date of last appointment with primary care: 9/10/19  Okay to leave a detailed message: Yes

## 2021-06-01 NOTE — TELEPHONE ENCOUNTER
RN cannot approve Refill Request    RN can NOT refill this medication med is not covered by policy/route to provider. Last office visit: 9/10/2019 Jose Rangel MD Last Physical: Visit date not found Last MTM visit: Visit date not found Last visit same specialty: 9/10/2019 Jose Rangel MD.  Next visit within 3 mo: Visit date not found  Next physical within 3 mo: Visit date not found      Morelia Freeman, [facility] Triage/Med Refill 9/12/2019    Requested Prescriptions   Pending Prescriptions Disp Refills     OLANZapine (ZYPREXA) 5 MG tablet 30 tablet 3     Sig: Take one tablet in the morning for anxiety       There is no refill protocol information for this order

## 2021-06-01 NOTE — TELEPHONE ENCOUNTER
"Patient returned call. Relays Dr Rangel's information ( [phone])   \"Blood tests look ok.  WBC is somewhat high-I'm not exactly sure what is causing this.  Is there signs of an infection somewhere?\"    Patient has additional complaints, Feeling dizziness, light-headed, weak, tired, nausea, so scheduled another appointment to see Dr Rangel on Monday 9/16/19. [facility]  "

## 2021-06-01 NOTE — PROGRESS NOTES
9-27-19  Maintenance Plan  Discuss graduation from [facility]    Called and spoke to patient and father through Angela :  [facility] -Language and follow up on goals.  Patient reported:     -Has ARMSARIAH worker Jose Dyson from [facility]   -has PCA services.  Father is PCA  -has support from father   -has therapist at [facility]  -has Skilled Nursing (Geeta Mojica from [facility]) for long term medication set up    -has SSI benefits  -already completed the fingerprints and working with  Georgina at [facility] for citizenship  Explained graduating from [facility] because he does not have any other goals or needs and that he has support from [facility] worker and Skilled Nursing.  Patient and father were reluctant to graduate from [facility] worry that no one will help to schedule appt to see the doctor.  Encouraged them to ask JEFF Garcia for help to call the clinic to schedule appt to see the doctor and for other needs and support.  Patient and father okay to graduate from [facility].    Route to [facility] RN   Please review chart if patient is appropriate to graduate from [facility].  If patient is appropriate to Graduate from [facility], please update the Emergency Plan.    Patient does not have any other goals or needs at this time.  Patient has all the support and services in the community.      Plan:  CHW complete Graduation Wellness plan by 10-11-19

## 2021-06-01 NOTE — TELEPHONE ENCOUNTER
"Use  line to contact  ID:[id number]  Called and left message for pt to return call.# 1  \" Okay to relay message\"      "

## 2021-06-01 NOTE — TELEPHONE ENCOUNTER
----- Message from Jose Rangel MD sent at 9/12/2019  2:12 PM CDT -----  Call:  Blood tests look ok.  WBC is somewhat high-I'm not exactly sure what is causing this.  Is there signs of an infection somewhere?

## 2021-06-01 NOTE — TELEPHONE ENCOUNTER
Patient Demographics  : 1990  Phone #: [phone]  Address:   [address]  [place] [address]    Insurance ID # / Name of Insurance  [id number] - (Blue Cross Blue Shield)      Patient is needing a ride for their appointment on:        Date: 10/30/2019  Time: 10;30    Name of Location/Address/Phone #:   [facility]  [address] [facility] [address]  [place] [address]  Phone: [phone]    Name of  patient is seeing & 's specialty:   N/A    Passenger (s):   1    Special considerations: None    Is the patient able to walk to the transportation vehicle?    No     Do they need DOOR to DOOR service? (company person comes knock on the door and walks them to car)     Yes    Please call patient back to confirm the ride details. Thanks!

## 2021-06-01 NOTE — TELEPHONE ENCOUNTER
Previously followed by RACHEL Haile    According to our record:  8/2019- Attending [facility] for psychotropic medication management.     4.25.19 Patient was informed of Ana Hiale CNP departure from [facility]. Care was referred to [facility] to establish care with a new provider 6.7.19 ly    Phone call made to [facility] and notified that provider is no longer at this practice and according to our note is following up with Olivia for psychotropic med management

## 2021-06-01 NOTE — TELEPHONE ENCOUNTER
Called. Left message to call back using language line. Okay to relay message upon return call. Thank you.

## 2021-06-01 NOTE — TELEPHONE ENCOUNTER
Labs look better.  Would recommend checking in with endocrinology again.    ---- Message from Jose Rangel MD sent at 9/17/2019  8:06 AM CDT -----  Call:  Labs look better today.

## 2021-06-01 NOTE — TELEPHONE ENCOUNTER
Former pt of Ana Haile's CNP, no longer provider in [facility] since 5/2019.    Per [facility]: 6.13.19 - number not in service, sent letter -WALTER ROCHA 9/17/19

## 2021-06-01 NOTE — TELEPHONE ENCOUNTER
Called. Left message to call back using language line.#2. Okay to relay message upon return call. Thank you

## 2021-06-02 ENCOUNTER — COMMUNICATION - HEALTHEAST (OUTPATIENT)
Dept: SCHEDULING | Facility: CLINIC | Age: 31
End: 2021-06-02

## 2021-06-02 ENCOUNTER — HOSPITAL ENCOUNTER (EMERGENCY)
Dept: EMERGENCY MEDICINE | Facility: HOSPITAL | Age: 31
Discharge: HOME | End: 2021-06-02
Attending: EMERGENCY MEDICINE
Payer: COMMERCIAL

## 2021-06-02 ENCOUNTER — RECORDS - HEALTHEAST (OUTPATIENT)
Dept: FAMILY MEDICINE | Facility: CLINIC | Age: 31
End: 2021-06-02

## 2021-06-02 VITALS — BODY MASS INDEX: 24.43 KG/M2 | WEIGHT: 136 LBS

## 2021-06-02 VITALS — WEIGHT: 148 LBS | BODY MASS INDEX: 26.64 KG/M2

## 2021-06-02 VITALS — BODY MASS INDEX: 24.99 KG/M2 | WEIGHT: 139.13 LBS

## 2021-06-02 VITALS — WEIGHT: 138 LBS | BODY MASS INDEX: 24.79 KG/M2

## 2021-06-02 VITALS — WEIGHT: 140 LBS | BODY MASS INDEX: 24.8 KG/M2 | HEIGHT: 63 IN

## 2021-06-02 VITALS — HEIGHT: 63 IN | WEIGHT: 147 LBS | BODY MASS INDEX: 26.05 KG/M2

## 2021-06-02 VITALS — BODY MASS INDEX: 26.28 KG/M2 | WEIGHT: 146 LBS

## 2021-06-02 VITALS — BODY MASS INDEX: 25.87 KG/M2 | WEIGHT: 144 LBS

## 2021-06-02 VITALS — WEIGHT: 136.25 LBS | HEIGHT: 63 IN | BODY MASS INDEX: 24.14 KG/M2

## 2021-06-02 VITALS — BODY MASS INDEX: 26.59 KG/M2 | WEIGHT: 148 LBS

## 2021-06-02 VITALS — BODY MASS INDEX: 26.41 KG/M2 | WEIGHT: 147 LBS

## 2021-06-02 VITALS — BODY MASS INDEX: 24.97 KG/M2 | WEIGHT: 139 LBS

## 2021-06-02 VITALS — WEIGHT: 140.5 LBS | BODY MASS INDEX: 25.24 KG/M2

## 2021-06-02 DIAGNOSIS — B34.9 VIRAL SYNDROME: ICD-10-CM

## 2021-06-02 DIAGNOSIS — R50.9 FEVER, UNSPECIFIED FEVER CAUSE: ICD-10-CM

## 2021-06-02 LAB — SARS-COV-2 PCR RESULT-HE - HISTORICAL: NEGATIVE

## 2021-06-02 ASSESSMENT — MIFFLIN-ST. JEOR: SCORE: 1712.42

## 2021-06-02 NOTE — PROGRESS NOTES
Chief Complaint   Patient presents with     Follow-up     Subjective:  28 y.o. male with concerns of follow up.  Was not able to schedule with neurology.  He needs assistance to do that.  We will try to do that today.  Has continued concerns of feeling dizzy and tired.  Reports that meclizine did not help.  I encouraged him to stop.  Continues to take in [place] and Zyprexa.  This may be to blame for his mild elevation of liver enzymes.    Repeat calcium studies seem normal but vitamin D was low.  Platelets mildly low at 130,000.  Denies bleeding or bruising.    Has been checked for hepatitis C and hepatitis B.  Needs vaccines for hepatitis B.  He consented to those today.    Outpatient Medications Prior to Visit   Medication Sig Dispense Refill     acetaminophen (TYLENOL) 325 MG tablet Take 2 tablets (650 mg total) by mouth every 6 (six) hours as needed for pain. 1-2 tablets as needed for pain 30 tablet 0     albuterol (PROAIR HFA;PROVENTIL HFA;VENTOLIN HFA) 90 mcg/actuation inhaler Inhale 2 puffs every 6 (six) hours as needed for wheezing. 1 each 0     cholecalciferol, vitamin D3, (VITAMIN D3) 1,000 unit capsule 2 po qd 60 capsule 5     fexofenadine (ALLEGRA) 180 MG tablet Take 1 tablet (180 mg total) by mouth daily. TAKE 1 TABLET  180 MG TOTAL  BY MOUTH DAILY FOR ALLERGIES 90 tablet 1     fluticasone propionate (FLONASE) 50 mcg/actuation nasal spray USE 1 SPRAY IN EACH NOSTRIL EVERY DAY 48 g 3     hydrocortisone (CORTEF) 5 MG tablet TAKE 2 TABS IN THE MORNING AND 1 TAB IN THE EARLY AFTERNOON 90 tablet 0     levothyroxine (SYNTHROID, LEVOTHROID) 100 MCG tablet TAKE 1 TABLET (100 MCG TOTAL) BY MOUTH DAILY. 90 tablet 3     meclizine (ANTIVERT) 25 mg tablet Take 1 tablet (25 mg total) by mouth 3 (three) times a day as needed for dizziness or nausea. 30 tablet 1     mometasone (ASMANEX TWISTHALER) 220 mcg (60 doses) inhaler Inhale 1 puff 2 (two) times a day. 1 each 2     multivitamin (ONE A DAY) per tablet Take 1 tablet  "by mouth daily. 120 tablet 2     OLANZapine (ZYPREXA) 5 MG tablet Take one tablet in the morning for anxiety 30 tablet 3     ondansetron (ZOFRAN-ODT) 4 MG disintegrating tablet Take 1 tablet (4 mg total) by mouth every 8 (eight) hours as needed for nausea. 15 tablet 0     paliperidone (INVEGA) 6 MG 24 hr tablet Take 1 tablet (6 mg total) by mouth at bedtime. 30 tablet 3     triamcinolone (KENALOG) 0.1 % cream Apply thin layer to affected areas twice daily as needed 45 g 0     fludrocortisone (FLORINEF) 0.1 mg tablet TAKE 1TABLET BY MOUTH DAILY. 90 tablet 2     No facility-administered medications prior to visit.       Social History     Tobacco Use   Smoking Status Never Smoker   Smokeless Tobacco Never Used   Tobacco Comment    no second hand smoke exposure      Objective:  /70 (Patient Site: Left Arm, Patient Position: Sitting, Cuff Size: Adult Regular)   Pulse 98   Temp 98.3  F (36.8  C) (Oral)   Resp 14   Ht 5' 3\" (1.6 m)   Wt 151 lb (68.5 kg)   BMI 26.75 kg/m    GENERAL: alert, not distressed  CHEST: clear, no rales, rhonchi, or wheezes  CARDIAC: regular without murmur, gallop, or rub  ABDOMEN: soft, non tender, non distended, normal bowel sounds    Assessment and Plan:   1. Adrenal insufficiency (H)  2. Pituitary microadenoma (H)  3. Other specified hypothyroidism  Relatively stable.  Ca elevation appears to have been transient  Will help him get connected with endocrinology.    4. Need for hepatitis B vaccination  - Hepatitis B Vaccine Age 20 years and above  - Hepatitis B Vaccine Age 20 years and above; Standing    5. Vitamin D deficiency  On replacement.    6. Need for hepatitis A immunization  - Hepatitis A adult 2 dose IM (19 yr & older)  - Hepatitis A adult 2 dose IM (19 yr & older); Standing     Low level elevation of liver enzymes likely due to antipsychotic medications.  Will need to follow.  "

## 2021-06-02 NOTE — PROGRESS NOTES
"Mental Health Visit Note    10/16/2019    Start time: 11:17am    Stop Time: 11:50am   Session # 16      Session Type: Patient is presenting for an Individual session.    Brooke Peterson is a 28 y.o. male is being seen today for    Chief Complaint   Patient presents with      Follow Up     coping with dizziness and other physical symptoms with depressed mood.       New symptoms or complaints: None    Functional Impairment:   Personal: 4  Family: 3  Work: 4  Social:4    Clinical assessment of mental status: Reviewed. MSE is current effective 10/16/2019  Grooming: Disheveled  Attire: Appropriate, casual  Age: Appears Stated  Behavior Towards Examiner: Cooperative  Motor Activity: slow. Using cane.   Eye Contact: Avoidant, downcast  Mood: Depressed  Affect: Congruent w/content of speech, Blunted and Flat  Speech/Language: Within normal   Attention: Distractible  Concentration: Brief  Thought Process: Within normal  Thought Content: Hallucinations: Within noraml  Delusions: Within normal  Orientation: X 3.  Memory: No Evidence of Impairment  Judgement: No Evidence of Impairment  Estimated Intelligence: Below Average  Demonstrated Insight: Diminished  Fund of Knowledge: adequate      Suicidal/Homicidal Ideation present: None Reported This Session.     Patient's impression of their current status:   Patient endorses the following symptoms: dizziness, nausea  Sleep: poor- \"want sleep medication- thinking too much\"  Depressed mood: almost every day. Watching movies to help distract self.   Anxiety/Worries: anticipation about hearing back about citizenship interview. frequent thoughts worrying about health, thinking about dizziness- wishing it would go away.   Pain: denies any  Appetite: poor.   Engagement in activities:Energy poor. Going to Worship. Watch movies and chat with family.  Medication Adherence: taking as prescribed. RN visits to set up meds. Hasn't been taking meclizine not set up in meds because an as needed. Would " like help with refill from pharmacy to start taking.   Psychiatry through [facility] moved to [place] location to be closer for patient. Next appointment is 10/30/19 with psychiatry.     Therapist impression of patients current state: The patient presented for an individual psychotherapy session with this provider. A professional Angela , Celestino, was present for the visit due to the language complexity.  It was evident that patient was not feeling well today in session. Patient's body language included slow movements, downcast. He was having a spitting response due to nausea. Patient presents with symptoms of depressed mood. He responded well to a solution focused approach: wake up happy, no depression or sickness, own house, own car, go to school, become a  or monk. Therapist recommended a day treatment group or learning environment for additional support and socialization- patient is not open to this at this time and prefers to spend his days at home.  Prior to next session, patient will attend psychiatry appointment and engage in activities.      Type of psychotherapeutic technique provided: Insight oriented, Client centered and Solution-focused    Progress toward short term goals:Progress as expected, scheduled appointments with endocrinology and psychiatry- they are coming up in future months.  Has been attending [employer] as form of coping with depression and illness.     Review of long term goals: Long-term goals reviewed , signed and scanned into EMR.   Effective 10/02/2019-1/02/2020.    Diagnosis:   1. Schizoaffective disorder, depressive type, with catatonia (H)        Plan and Follow up: Patient is scheduled for follow up psychotherapy on 10/31/19     Discharge Criteria/Planning: Patient will continue with follow-up until therapy can be discontinued without return of signs and symptoms.    AMANDA Cast 10/16/2019

## 2021-06-02 NOTE — PROGRESS NOTES
Mental Health Visit Note    10/31/2019    Start time: 10:10am    Stop Time: 10:46am   Session # 17      Session Type: Patient is presenting for an Individual session.    Brooke Peterson is a 28 y.o. male is being seen today for    Chief Complaint   Patient presents with      Follow Up     coping with acculturation difficulties       New symptoms or complaints: None    Functional Impairment:   Personal: 4  Family: 3  Work: 4  Social:4    Clinical assessment of mental status: Reviewed. MSE is current effective 10/31/2019  Grooming: Disheveled  Attire: Inappropriate, sandals without socks, no jacket (inappropriate for weather)  Age: Appears Stated  Behavior Towards Examiner: Cooperative  Motor Activity: slow. Using cane.   Eye Contact: Avoidant, downcast  Mood: Depressed  Affect: Congruent w/content of speech, Blunted and Flat  Speech/Language: Within normal   Attention: Distractible  Concentration: Brief  Thought Process: Within normal  Thought Content: Hallucinations: Within noraml  Delusions: Within normal  Orientation: X 3.  Memory: No Evidence of Impairment  Judgement: No Evidence of Impairment  Estimated Intelligence: Below Average  Demonstrated Insight: Diminished  Fund of Knowledge: adequate      Suicidal/Homicidal Ideation present: None Reported This Session.     Patient's impression of their current status:   Patient endorses following symptoms: dizzy, headache, cold and tired. Feels this is due to weather changes. Typically sleeping around 10pm-7am. Occasional scary dreams about ghosts. Sometimes think about ghosts in day time and feel scared. Denies any hallucinations. Enjoyable activities of attending [employer] and spending time with family.  Didn't see psychiatrist- took medical ride that came, upon attending [facility] in [place] was told there was no appointment.  Patient requested review of letter he brings today from immigration.    Therapist impression of patients current state: The patient presented for an  individual psychotherapy session with this provider. A professional Angela , Celestino, was present for the visit due to the language complexity. Having psychiatry appointments in the community/outside of the system has been a challenge for patient. There have been many failed appointments. This provider has a goal for patient to establish consistent psychiatry care. Consulted with care guide re: psychiatry and letter. CHW will do outreach to [facility] worker re: psychiatry problems. Plan agreed upon by team today is for [facility] to bring patient to [facility] to do intake for psychiatry there ([address]). Goal to establish consistent care with psychiatry and maintain [facility] visits. Plan to eventually discharge from psychotherapy once he has established stable mental health care with other providers.   Prior to next session, meet with [facility] and attend [facility] to do intake for psychiatry.    Clinical Global Impressions  Considering your total clinical experience with this particular patient population, how severe are the patient's symptoms at this time?: 6 - Severely ill  Compared to the patient's condition at the START of treatment, this patient's condition is:: 3 - Minimally improved      Type of psychotherapeutic technique provided: Insight oriented, Client centered and Solution-focused    Progress toward short term goals:Poor progress, another unsuccessful psychiatry appointment. There have been failed attempts to get to these appointment (transportation barrier) or be seen (lack of  presence).      Review of long term goals: Not done at today's visit  Effective 10/02/2019-1/02/2020.    Diagnosis:   1. Schizoaffective disorder, depressive type, with catatonia (H)        Plan and Follow up: Patient is scheduled for follow up psychotherapy on 11/22/19     Discharge Criteria/Planning: Patient will continue with follow-up until therapy can be discontinued  without return of signs and symptoms.    Nathaly Lira, LICSW 10/31/2019

## 2021-06-02 NOTE — PROGRESS NOTES
10-31-19  Joint visit with AMANDA Cast : Celestino Miller.    Patient requested to see CHW he received letter from [facility].  Requesting support to coordinate with getting establish with psychiatrist.  Informed patient he needs to tell his [facility] worker to help him.  Suggested to notify Georgina Brannon at [facility] he received letter from [employer] for to write a letter why he needs his application expedited.     Discuss possible of going to [facility].  CHW will contact [facility] worker to help patient with this.    Patient graduated from [facility]  Plan:  Will discuss with [facility] SHIRA and RN if need to be enrolled with [facility] again  CHW follow up 11-22-19 in clinic with CCC SW for Assessment     11-5-19  Per discussion with CCC SHIRA and CCC RN to schedule Assessment with ACNDIDO SILVA on 11-22-19 at 12pm after therapy appt at 11 am

## 2021-06-02 NOTE — PATIENT INSTRUCTIONS - HE
Psychiatry Appointment:   Date: 10/30/2019  Time: 10:30 am     [facility]  [address] [facility] [address] [place] [id number]  Phone: [phone]

## 2021-06-02 NOTE — TELEPHONE ENCOUNTER
Patient Demographics  : 1990  Phone #: [phone]  Address:   [address]  [place] [address]    Insurance ID # / Name of Insurance  [id number] - (Blue Cross Blue Shield)      Patient is needing a ride for their appointment on:        Date: 2019  Time: 5;45 PM    Name of Location/Address/Phone #:   [place] [id number] [facility]    Name of  patient is seeing & 's specialty:   N/A    Passenger (s):   1    Special considerations: None    Is the patient able to walk to the transportation vehicle?    Yes     Do they need DOOR to DOOR service? (company person comes knock on the door and walks them to car)     No    Please call patient back to confirm the ride details. Thanks!

## 2021-06-03 VITALS
HEART RATE: 76 BPM | SYSTOLIC BLOOD PRESSURE: 110 MMHG | HEIGHT: 63 IN | DIASTOLIC BLOOD PRESSURE: 60 MMHG | BODY MASS INDEX: 25.87 KG/M2 | WEIGHT: 146 LBS | RESPIRATION RATE: 18 BRPM

## 2021-06-03 VITALS
TEMPERATURE: 98.7 F | SYSTOLIC BLOOD PRESSURE: 110 MMHG | BODY MASS INDEX: 25.63 KG/M2 | DIASTOLIC BLOOD PRESSURE: 70 MMHG | HEART RATE: 64 BPM | WEIGHT: 147 LBS

## 2021-06-03 VITALS
DIASTOLIC BLOOD PRESSURE: 78 MMHG | WEIGHT: 157 LBS | BODY MASS INDEX: 27.82 KG/M2 | HEART RATE: 64 BPM | HEIGHT: 63 IN | SYSTOLIC BLOOD PRESSURE: 102 MMHG | RESPIRATION RATE: 14 BRPM

## 2021-06-03 VITALS — BODY MASS INDEX: 25.44 KG/M2 | HEIGHT: 64 IN | WEIGHT: 149 LBS

## 2021-06-03 VITALS
DIASTOLIC BLOOD PRESSURE: 70 MMHG | BODY MASS INDEX: 26.75 KG/M2 | TEMPERATURE: 98.3 F | WEIGHT: 151 LBS | HEART RATE: 98 BPM | SYSTOLIC BLOOD PRESSURE: 100 MMHG | RESPIRATION RATE: 14 BRPM | HEIGHT: 63 IN

## 2021-06-03 VITALS — WEIGHT: 149 LBS | BODY MASS INDEX: 25.98 KG/M2

## 2021-06-03 VITALS — HEIGHT: 64 IN | BODY MASS INDEX: 26.34 KG/M2 | WEIGHT: 154.3 LBS

## 2021-06-03 NOTE — PROGRESS NOTES
"Mental Health Visit Note    11/22/2019    Start time: 11:06am    Stop Time: 11:42am   Session # 18      Session Type: Patient is presenting for an Individual session.    Brooke Peterson is a 29 y.o. male is being seen today for    Chief Complaint   Patient presents with      Follow Up     coping with heavy heart, fatigue, and difficulty caring for himself        New symptoms or complaints: None    Functional Impairment:   Personal: 4  Family: 3  Work: 4  Social:4    Clinical assessment of mental status: Reviewed. MSE is current effective 11/22/2019  Grooming: Disheveled  Attire: Appropriate for weather and occasion  Age: Appears Stated  Behavior Towards Examiner: Cooperative  Motor Activity: slow. Using cane.   Eye Contact: Avoidant, downcast  Mood: Depressed  Affect: Congruent w/content of speech, Blunted and Flat  Speech/Language: Within normal   Attention: Distractible  Concentration: Brief  Thought Process: Within normal  Thought Content: Hallucinations: Within noraml  Delusions: Within normal  Orientation: X 3.  Memory: No Evidence of Impairment  Judgement: No Evidence of Impairment  Estimated Intelligence: Below Average  Demonstrated Insight: Diminished  Fund of Knowledge: adequate      Suicidal/Homicidal Ideation present: None Reported This Session.     Patient's impression of their current status:   Patient continues endorses following symptoms: dizzy, headache, cold and tired. Most bothered by dizziness- wonders why it doesn't go away or get better. He continues to feel the cold weather is impacting him. Enjoyable activities of attending [employer] and spending time with family- he feels \"good in these moments.\"  Reports taking medications daily. He reports worrying about his citizenship and noticing his \"mind floating.\" He explains his mind is \"somewhere else\", not focused. Today, \"mind feels empty.\" Continues to have dreams of ghosts that are scary and cause him to wake- sometimes he can't go back to sleep. Feels " support at home and able to talk to father and siblings about his worries.     Therapist impression of patients current state: The patient presented for an individual psychotherapy session with this provider. A professional Angela , Celestino, was present for the visit due to the language complexity. Limited ability to gain insight. Limited ability to actively engage in therapy. Patient would be better served by [facility], [facility], and psychiatry services as well as continued daily support to ensure is daily needs are met.   Therapist recommended a group environment, possibly look into [facility] programming, but patient declined. He doesn't want to be in a group environment and prefers being home.  At this time, his medications appear to be managing his hallucinations. Reframe thinking: engage in positive self-talk/reassurance, finding proof. Coping skills of distraction techniques from worries and focusing on what we can control versus worrying about things out of our control.  Prior to next session, meet with [facility] and attend [facility] to do intake for psychiatry as this was not completed after last session. CHW stated she would contact [facility] worker again about this.     Clinical Global Impressions    Clinical Global Impressions Scale Ratings:    1. Considering your total clinical experience with this particular population, how mentally ill is the patient at this time? (which is rated on the following seven-point scale: 1=normal, not at all ill; 2=borderline mentally ill; 3=mildly ill; 4=moderately ill; 5=markedly ill; 6=severely ill; 7=among the most extremely ill patients:) score of 6 today    2. Compared to the patient's condition at admission to the program, currently this patient's condition is: (1=very much improved since the initiation of treatment; 2=much improved; 3=minimally improved; 4=no change from baseline (the initiation of treatment); 5=minimally worse; 6= much worse; 7=very much  worse since the initiation of treatment:) score of 3 today        Type of psychotherapeutic technique provided: Client centered and Solution-focused, CBT    Progress toward short term goals:Poor progress, [facility] did not connect him to [facility] Psychiatry yet.  Plan to continue this goal.     Review of long term goals: Not done at today's visit  Effective 10/02/2019-1/02/2020.    Diagnosis:   1. Schizoaffective disorder, depressive type, with catatonia (H)        Plan and Follow up: Patient is scheduled for follow up psychotherapy on 12/6/19.   Patient will also be meeting [facility] SW on 12/6/19.    Discharge Criteria/Planning: Patient will continue with follow-up until therapy can be discontinued without return of signs and symptoms.    Nathaly Lira, AMANDA 11/22/2019

## 2021-06-03 NOTE — PROGRESS NOTES
11-5-19  Per discussion with CCC SW and CCC RN to schedule Assessment with [facility] SW on 11-22-19 at 12pm after therapy appt at 11 am    Plan:  [facility] SW Assessment 11-22-19 at 12pm to enroll in [facility] again for support  CHW follow up in clinic 11-22-19 with [facility] SHIRA SAW to communicate with [facility]

## 2021-06-03 NOTE — PROGRESS NOTES
Disciplines Present: GAVIOTA and RN Mathew SILVA     The patient was discussed during weekly Care Conference Huddle today.   Goals and barriers were discussed and a plan was established.     Pt had graduated program - care team noted concern with resource needs     Action Plan if indicated: SHIRA CC reassessment with follow up [facility] appt to establish goal around resource supports

## 2021-06-03 NOTE — PROGRESS NOTES
11-22-19  Re enroll with [facility].  Joint visit with Nathaly Lira.    Met with patient in clinic  Scheduled appt with [facility] SW for re assessment to enroll in [facility] 12-6-19 at 8am.  Help to see psychiatrist.    Plan:   [facility] SHIRA appt 12-6-19 at 8am    Sent to  to put in order for CCC

## 2021-06-03 NOTE — TELEPHONE ENCOUNTER
RN cannot approve Refill Request    RN can NOT refill this medication med is not covered by policy/route to provider. Last office visit: 10/9/2019 Jose Rangel MD Last Physical: Visit date not found Last MTM visit: Visit date not found Last visit same specialty: 10/9/2019 Jose Rangel MD.  Next visit within 3 mo: Visit date not found  Next physical within 3 mo: Visit date not found      Makenzie Panchal, Care Connection Triage/Med Refill 11/6/2019    Requested Prescriptions   Pending Prescriptions Disp Refills     ASMANEX TWISTHALER 220 mcg/ actuation (60) inhaler [Pharmacy Med Name: ASMANEX 220 MCG (60 DOSES) 220 AERO] 1 each 2     Sig: INHALE 1 PUFF BY MOUTH 2 (TWO) TIMES A DAY.       There is no refill protocol information for this order

## 2021-06-04 VITALS
DIASTOLIC BLOOD PRESSURE: 78 MMHG | BODY MASS INDEX: 29.18 KG/M2 | HEART RATE: 77 BPM | SYSTOLIC BLOOD PRESSURE: 116 MMHG | WEIGHT: 170 LBS

## 2021-06-04 VITALS
SYSTOLIC BLOOD PRESSURE: 110 MMHG | WEIGHT: 157 LBS | TEMPERATURE: 97.8 F | HEART RATE: 100 BPM | BODY MASS INDEX: 26.95 KG/M2 | RESPIRATION RATE: 22 BRPM | DIASTOLIC BLOOD PRESSURE: 78 MMHG

## 2021-06-04 VITALS
HEIGHT: 63 IN | BODY MASS INDEX: 27.82 KG/M2 | SYSTOLIC BLOOD PRESSURE: 92 MMHG | RESPIRATION RATE: 18 BRPM | DIASTOLIC BLOOD PRESSURE: 74 MMHG | TEMPERATURE: 98.6 F | WEIGHT: 157 LBS | HEART RATE: 72 BPM

## 2021-06-04 NOTE — TELEPHONE ENCOUNTER
Received several soice messages from Niles Dyson [facility] worker.  12-10-19  [facility] worker reported that they will go to [facility] this Friday 12-13-19 and that he set up transportation for appt with ROMULO Boss  If needs special referral information for psychiatry appt this Friday.  He called [facility] and that they do intake with therapist first before meeting with psychiatrist. They don't have opening until January 2020.  Skilled nurse will come tomorrow for medication set up.    12-13-19   Received voice message from [facility] worker.  He stated they went to [facility] today 12-13-19 and they met with the intake person.  Stated that they don't take anyone with insurance at this time only people without insurance and provided a list of where to go.    12-13-19  Called and spoke to [facility] worker and to help patient select where he wants to go for psychiatry services.  Requested [facility] worker to fax the list of providers to CHW to review with Nathaly at next office visit.  Patient preferred [facility] location.  [facility] worker will review the list with patient and fax the list to CHW.  Thanked [facility] Worker for taking patient to [facility] and supporting him to establish care for psychiatry services.  CHW wait for fax

## 2021-06-04 NOTE — TELEPHONE ENCOUNTER
RN cannot approve Refill Request    RN can NOT refill this medication med is not covered by policy/route to provider     . Last office visit: 4/11/2019 Artie Booker MD Last Physical: Visit date not found Last MTM visit: Visit date not found Last visit same specialty: 12/18/2019 Karyn Guzmán PA-C.  Next visit within 3 mo: Visit date not found  Next physical within 3 mo: Visit date not found      Falguni Shetty, [facility] Connection Triage/Med Refill 1/5/2020    Requested Prescriptions   Pending Prescriptions Disp Refills     cholecalciferol, vitamin D3, 25 mcg (1,000 unit) capsule [Pharmacy Med Name: VITAMIN D3 1,000 UNIT SOFTG 25 MCG CAP] 60 capsule 5     Sig: TAKE 2 PILLS BY MOUTH EVERYDAY       There is no refill protocol information for this order

## 2021-06-04 NOTE — TELEPHONE ENCOUNTER
12-5-19  Called: [employer]   [phone]  RE: Medical ride for 12-6-19 at 8am to [facility].    Provided tax ID#, member ID#, verified patient's demographics, destination address, appt date and time  12-6-19 at 8am with [facility] and 9 am with Nathaly Miller [facility]  [phone]   7:15am  1 Passenger  Trip #[id number]  Will Call Return

## 2021-06-04 NOTE — TELEPHONE ENCOUNTER
Refill Approved    Rx renewed per Medication Renewal Policy. Medication was last renewed on 5/16/19.    Tata George, [facility] Connection Triage/Med Refill 1/5/2020     Requested Prescriptions   Pending Prescriptions Disp Refills     fexofenadine (ALLEGRA) 180 MG tablet [Pharmacy Med Name: FEXOFENADINE  MG  TAB] 90 tablet 1     Sig: TAKE 1 TABLET (180 MG TOTAL) BY MOUTH DAILY FOR ALLERGIES       Antihistamine Refill Protocol Passed - 1/5/2020  5:13 PM        Passed - Patient has had office visit/physical in last year     Last office visit with prescriber/PCP: 10/9/2019 Jose Rangel MD OR same dept: 12/18/2019 Karyn Guzmán PA-C OR same specialty: 12/18/2019 Karyn Guzmán PA-C  Last physical: Visit date not found Last MTM visit: Visit date not found   Next visit within 3 mo: Visit date not found  Next physical within 3 mo: Visit date not found  Prescriber OR PCP: Jose Rangel MD  Last diagnosis associated with med order: 1. Environmental allergies  - fexofenadine (ALLEGRA) 180 MG tablet [Pharmacy Med Name: FEXOFENADINE  MG  TAB]; TAKE 1 TABLET (180 MG TOTAL) BY MOUTH DAILY FOR ALLERGIES  Dispense: 90 tablet; Refill: 1    If protocol passes may refill for 12 months if within 3 months of last provider visit (or a total of 15 months).

## 2021-06-04 NOTE — TELEPHONE ENCOUNTER
12-9-19  Caller: Niles Dyson- Replaced by [facility]  [facility]  [phone]    Spoke Niles and introduced as patient's CHW from [facility] requesting support to help patient go to [facility] for psychiatry services med management   Explained that patient needs to go with someone to talk the intake form to schedule initial appt to see or access psychiatry services for psychiatric med management.  Niles stated he will see patient tomorrow and discuss about it and go to [facility] on Friday 12-13-19.  Provided Replaced by [facility] worker address to [facility] [address] [facility] 2nd floor above [facility].   Stated to let him know what he can help or practice with patient.  He meets with patient every Fridays.  Requested him to support with setting medical transportation to appt and help to keep track of upcoming appts.    CHW mailed [facility] brochure information to Replaced by [facility] Worker    Replaced by [facility] Worker support to establish psychiatry services for med management at [facility].  Thanked Niles for supporting patient in the community.  Call CHW if he has any questions or update on the psychiatry services and psychiatrist name and contact.

## 2021-06-04 NOTE — TELEPHONE ENCOUNTER
RN cannot approve Refill Request    RN can NOT refill this medication med is not covered by policy/route to provider     . Last office visit: 2/19/2019 Cristobal Sy MD Last Physical: Visit date not found Last MTM visit: Visit date not found Last visit same specialty: 10/9/2019 Jose Rangel MD.  Next visit within 3 mo: Visit date not found  Next physical within 3 mo: Visit date not found      Falguni Shetty, [facility] Connection Triage/Med Refill 12/6/2019    Requested Prescriptions   Pending Prescriptions Disp Refills     acetaminophen (TYLENOL) 325 MG tablet [Pharmacy Med Name: ACETAMINOPHEN 325 MG TABS 325 TAB] 30 tablet 0     Sig: TAKE 1-2 TABLETS (650 MG TOTAL) BY MOUTH EVERY 6 (SIX) HOURS AS NEEDED FOR PAIN.       There is no refill protocol information for this order

## 2021-06-04 NOTE — PROGRESS NOTES
Clinic Care Coordination Contact    Follow Up Progress Note      Assessment: SW met with Toe, appointment was scheduled for an assessment, however this was unable to be completed due to  being 40 minutes late. SW assessed for immediate needs.    From previous case consultations, SW was aware Toe needs to connect with a medication provider for mental health. SW asked Toe about CCC assisting with this and he agreed to have our assistance.     Toe asked about citizenship. SW reviewed that Toe's [employer] worker was helping him communicate with [facility]. Toe reported they spoke to them and they told him they are in a waiting period now. SW affirmed this that there is nothing SW can do if [facility] said he needs to wait, it is likely they are processing his information. Encouraged him if he receives anything from [facility] to have his [employer] worker assist him    Goals addressed this encounter:   Goals Addressed                 This Visit's Progress      Mental Health Management (pt-stated)        Goal Statement- I want to be connected to a provider to manage my mental health medications within one month    Action steps to achieve this goal  1.  My CHW will work with my ARM worker to get me to [address] [place] [address] for an intake        Date goal set:  12/6/2019 BC                 Intervention/Education provided during outreach:           Plan:   Connect to [address] [place] [address]

## 2021-06-04 NOTE — PROGRESS NOTES
12-6-19  Patient met with [facility] SW for assessment to re enroll in [facility].    See [facility] SW's Assessment 12-6-19    Plan:  CHW follow up 1-7-20

## 2021-06-05 VITALS — OXYGEN SATURATION: 97 % | WEIGHT: 167 LBS | HEART RATE: 94 BPM | HEIGHT: 63 IN | BODY MASS INDEX: 29.59 KG/M2

## 2021-06-05 VITALS
RESPIRATION RATE: 16 BRPM | SYSTOLIC BLOOD PRESSURE: 120 MMHG | WEIGHT: 167.5 LBS | HEART RATE: 76 BPM | DIASTOLIC BLOOD PRESSURE: 84 MMHG | BODY MASS INDEX: 28.75 KG/M2

## 2021-06-05 VITALS — WEIGHT: 167 LBS | HEIGHT: 63 IN | BODY MASS INDEX: 29.59 KG/M2 | HEART RATE: 99 BPM | OXYGEN SATURATION: 98 %

## 2021-06-05 VITALS
TEMPERATURE: 97.7 F | SYSTOLIC BLOOD PRESSURE: 110 MMHG | WEIGHT: 171.75 LBS | HEART RATE: 108 BPM | BODY MASS INDEX: 30.42 KG/M2 | DIASTOLIC BLOOD PRESSURE: 77 MMHG

## 2021-06-05 VITALS
BODY MASS INDEX: 31 KG/M2 | WEIGHT: 175 LBS | SYSTOLIC BLOOD PRESSURE: 111 MMHG | DIASTOLIC BLOOD PRESSURE: 77 MMHG | HEART RATE: 86 BPM

## 2021-06-05 NOTE — PROGRESS NOTES
Pt is scheduled for Intake with Salena at [facility] 01/13/@1030. His  will be going with him to appt. Aun (CHW) will make transportation arrangements.

## 2021-06-05 NOTE — TELEPHONE ENCOUNTER
Spoke to pt's nurse Angela [phone] and informed her of the following message. She states she will watch out for the medication.

## 2021-06-05 NOTE — TELEPHONE ENCOUNTER
Please call   [facility]  [phone]    I received a fax that says patient does not have hydrocortisone and endocrine will not refill it.  This is critical that he takes this med.  I renewed it.  We need to ensure that he is taking it.    Geeta Mojica faxed me the note on 12/30

## 2021-06-05 NOTE — PROGRESS NOTES
Nathaly,   Whoever covered for Kalie and I on 01/06/ sent referral to [facility] I called them today got VM I asked that they call me to let me know when he is scheduled or not.  JERARDO

## 2021-06-05 NOTE — PATIENT INSTRUCTIONS - HE
I think you have a sinus infection  We treat those with antibiotics  Continue the nose spray- I think it will start helping    If not better, come back in 10 days

## 2021-06-05 NOTE — PROGRESS NOTES
1-10-20  Met patient and patient's [facility] worker Niles Kiel in clinic and follow up on goal.  Angela : Celestino Miller  Introduced self to [facility] worker Niles Dyson and provided business card to coordinate care.  Patient reported:  -completed finger print for citizenship. Waiting for interview date.    Coordinated with speciality  for support to schedule psychiatry appt with [facility].  Psychiatry intake appt on 1-13-20 at 10:30am arrival time 10:15am with Salena.  Provided appt information to [facility] worker and patient.  CHW set up medical ride for this appt to ensure he gets to the appt.    Provided patient and ARM worker psychiatry intake appt 1-13-20 at 10:15am and information.  Introduced [facility] Worker to Nathaly Lira, therapist.  Thanked and appreciated [facility] worker for his support.      Telephone  Called [facility] [phone] and spoke Amada and requested to set up medical ride for 1-13-20 at 10;15am to [facility].   Transportation set up for 1-13-20   time between 9:15am and 9:45am  Trip #[id number]   2 passengers-PCA  Will call   Not yet assign which transportation vendor    Called and spoke to patient's father through Angela  Thaddeus and informed father of transportation  time on 1-13-20 to [facility]. Father confirmed transportation information and will attend appt.    Plan:  CHW follow up 2-13-20  Schedule follow up with Nathaly Lira

## 2021-06-05 NOTE — PROGRESS NOTES
Patient has still been unsuccessful at getting established with outpatient psychiatry per discussion with [facility] CHW, Scarlett.   He would benefit from community clinic in [facility]- recommend trying [facility] for psychiatric med management if they have openings.   He needs to establish routine care with psychiatry for ongoing mental health management and medications.   Nathaly PATEL

## 2021-06-05 NOTE — TELEPHONE ENCOUNTER
Endo calling and wants to know how to contact home care? Needs information.    Care team to follow up call [phone] Ioana BARRIOS.    Makenzie Panchal, RN [facility] Triage Nurse Advisor

## 2021-06-05 NOTE — TELEPHONE ENCOUNTER
RN cannot approve Refill Request    RN can NOT refill this medication med is not covered by policy/route to provider. Last office visit: 10/9/2019 Jose Rangel MD Last Physical: Visit date not found Last MTM visit: Visit date not found Last visit same specialty: 1/10/2020 Fritz Nye MD.  Next visit within 3 mo: Visit date not found  Next physical within 3 mo: Visit date not found      Dipti Gonzalez, Care Connection Triage/Med Refill 1/16/2020    Requested Prescriptions   Pending Prescriptions Disp Refills     OLANZapine (ZYPREXA) 5 MG tablet [Pharmacy Med Name: OLANZAPINE 5 MG TABS 5 TAB] 30 tablet 3     Sig: TAKE 1 PILL BY MOUTH IN THE MORNING FOR ANXIETY       There is no refill protocol information for this order

## 2021-06-05 NOTE — PROGRESS NOTES
Clinic Care Coordination Contact    Situation: Patient chart reviewed by care coordinator.    Background: Patient is recommended to obtain psychiatry services. CHW assisted in providing resource to [address] [place] [address], however they are only accepting patients without insurance at this time. CHW suggested checking with [facility] and communicated with patient's therapist. Therapist placed order for psychiatry referral.    Assessment: SW reviewed chart and order has been placed to [facility] for psychiatry. They will reach out to the patient    Plan/Recommendations: Patient to schedule with [facility] when they contact him. Patient to update us on the status of the appointment and attend with assistance of [facility] worker

## 2021-06-05 NOTE — PROGRESS NOTES
Assessment/ Plan  1. Acute sinusitis with symptoms > 10 days  Bilateral maxillary, 3 weeks of symptoms, yellowish discharge.  Underlying asthma with cough but no big increase in wheezing/shortness of breath.  Antibiotic alone, without steroid.  - amoxicillin (AMOXIL) 500 MG capsule; Take 2 capsules (1,000 mg total) by mouth 2 (two) times a day for 10 days.  Dispense: 40 capsule; Refill: 1    2. Moderate persistent asthma without complication  Acute sinusitis but minimal increase in problems.  No steroid today.    3. Schizoaffective disorder, depressive type, with catatonia (H)  On helped arrange follow-up psychiatrist appointment in the near future today.  Patient was with their ARMS worker      Subjective    Chief Complaint   Patient presents with     Cough     Nasal Congestion       HPI  29-year-old with history of schizophrenia and moderate persistent asthma congestion and cough    Upper Respiratory infection  Duration 3week(s)      Worst Symptoms: runny nose, congestion, cough and sore throat  Runny nose?  Yes: Yellowish mucus  Sore throat?  Yes: Mild  Cough/ productive or dry?  Yes: But not the main symptom, mildly short of breath  Fever?  No  HA/ achiness?  No  Symptoms at start of illness : Same  Any medications?  No  Current Outpatient Medications on File Prior to Visit   Medication Sig     acetaminophen (TYLENOL) 325 MG tablet TAKE 1-2 TABLETS (650 MG TOTAL) BY MOUTH EVERY 6 (SIX) HOURS AS NEEDED FOR PAIN.     albuterol (PROAIR HFA;PROVENTIL HFA;VENTOLIN HFA) 90 mcg/actuation inhaler Inhale 2 puffs every 6 (six) hours as needed for wheezing.     ASMANEX TWISTHALER 220 mcg/ actuation (60) inhaler INHALE 1 PUFF BY MOUTH 2 (TWO) TIMES A DAY.     cholecalciferol, vitamin D3, 25 mcg (1,000 unit) capsule TAKE 2 PILLS BY MOUTH EVERYDAY     fexofenadine (ALLEGRA) 180 MG tablet TAKE 1 TABLET (180 MG TOTAL) BY MOUTH DAILY FOR ALLERGIES     fludrocortisone (FLORINEF) 0.1 mg tablet TAKE 1 TABLET BY MOUTH DAILY.      "fluticasone propionate (FLONASE) 50 mcg/actuation nasal spray USE 1 SPRAY IN EACH NOSTRIL EVERY DAY     hydrocortisone (CORTEF) 5 MG tablet Take 2 tablets (10 mg total) by mouth every morning AND 1 tablet (5 mg total) every evening.     levothyroxine (SYNTHROID, LEVOTHROID) 100 MCG tablet TAKE 1 TABLET (100 MCG TOTAL) BY MOUTH DAILY.     loratadine (CLARITIN) 10 mg tablet Take 1 tablet (10 mg total) by mouth daily.     meclizine (ANTIVERT) 25 mg tablet Take 1 tablet (25 mg total) by mouth 3 (three) times a day as needed for dizziness or nausea.     multivitamin (ONE A DAY) per tablet Take 1 tablet by mouth daily.     OLANZapine (ZYPREXA) 5 MG tablet Take one tablet in the morning for anxiety     ondansetron (ZOFRAN-ODT) 4 MG disintegrating tablet Take 1 tablet (4 mg total) by mouth every 8 (eight) hours as needed for nausea.     paliperidone (INVEGA) 6 MG 24 hr tablet Take 1 tablet (6 mg total) by mouth at bedtime.     triamcinolone (KENALOG) 0.1 % cream Apply thin layer to affected areas twice daily as needed     No current facility-administered medications on file prior to visit.      Patient Active Problem List   Diagnosis     Adrenal insufficiency (H)     Schizophrenia (H)     Hypothyroidism     Moderate persistent asthma     Pituitary microadenoma (H)     Schizoaffective disorder, depressive type, with catatonia (H)     PTSD (post-traumatic stress disorder)     Vitamin D deficiency     No past surgical history on file.  No family history on file.    ROS  As listed above  Objective  Physical Exam  Vitals:    01/10/20 0915   BP: 92/74   Patient Site: Right Arm   Patient Position: Sitting   Cuff Size: Adult Regular   Pulse: 72   Resp: 18   Temp: 98.6  F (37  C)   TempSrc: Oral   Weight: 157 lb (71.2 kg)   Height: 5' 3\" (1.6 m)     Patient is alert, oriented and in no distress.    Conjunctiva, lids appear normal.  Nares are normal bilaterally.    TMs are visualized bilaterally and appear normal    There is no " adenopathy in the neck.  Oral cavity is without any notable lesion,   oropharynx appears normal without any erythema, exudate, petechia    Chest appears normal,   auscultation reveals :  normal breath sounds,   no wheezing,  no rales   no rhonchi.        Please note: Voice recognition software was used in this dictation.  It may therefore contain typographical errors.

## 2021-06-05 NOTE — PROGRESS NOTES
1-7-20  Scheduled Follow Up Call: Attempt 1 [facility] Worker called and left a message for the patient. If the patient is returning my call, please transfer the patient to [phone].    Upcoming appt with PCP 1-20-20    ARM worker will be accompanying patient to appt.    Plan:  CHW follow up in clinic 1-10-20 after PCP visit and introduce self to JEFF Cage

## 2021-06-05 NOTE — TELEPHONE ENCOUNTER
1-13-20  Called [employer] [phone] and spoke Amada    Requested to set up medical ride for 1-13-20 at 1030am arrival at 10:15am to [facility].   Transportation set up for 1-13-20 for 10:30am appt    time between 9:15am and 9:45am  Trip #[id number]   2 passengers-PCA  Will call   Not yet assign which transportation vendor

## 2021-06-05 NOTE — TELEPHONE ENCOUNTER
20  Received voice message from patient's [facility] worker Niles [phone] that patient missed appt at [facility] on 20 to update the medical waiver and that it will be . He is not sure what to do.  He has appt with [facility] on 20.    20  Called Niles and left voice message to call Scarlett, CHW back at [phone] and if he help to reschedule with [facility].

## 2021-06-06 NOTE — TELEPHONE ENCOUNTER
"Called and left message for [facility] worker flako quintana [phone] [facility] to return call.# 1    \" Okay to relay message\"    "

## 2021-06-06 NOTE — PROGRESS NOTES
2-14-20  Received call [facility] worker Niles that he is with the patient and Angela .  [facility]HS worker reported:  -received letter from [facility] that SSI benefit will end on 3-1-20.  Suggested [facility] Worker to mail or fax the SSA letter to  Syd Mark at [facility] for support on what to do next.   -patient had citizenship interview on Wednesday and they need mother to be the person to answer the questions and do the oath for him. He is supporting to gather mother's document to send to Syd Mark.     CHW requested [facility] worker to help patient call to [facility] to reschedule psychiatry appt since he missed 2nd appt due to no transportation.  Niles, [facility] worker agreed to help reschedule appt and update CHW.  Thanked Niles for supporting patient to achieve his goal.      Plan:   Monthly  Care Coordinator will follow up in 3-16-20

## 2021-06-06 NOTE — TELEPHONE ENCOUNTER
Called and spoke with Niles Dyson  [facility] worker. Niles stated he meets with pt every Friday at 0900. Next appt 03/06/20 @ 0900.

## 2021-06-06 NOTE — TELEPHONE ENCOUNTER
RN cannot approve Refill Request    RN can NOT refill this medication Protocol failed and NO refill given. Last office visit: 10/9/2019 Jose Rangel MD Last Physical: Visit date not found Last MTM visit: Visit date not found Last visit same specialty: 2/24/2020 Karyn Guzmán PA-C.  Next visit within 3 mo: Visit date not found  Next physical within 3 mo: Visit date not found      Delia Stevenson, Care Connection Triage/Med Refill 2/29/2020    Requested Prescriptions   Pending Prescriptions Disp Refills     acetaminophen (TYLENOL) 325 MG tablet [Pharmacy Med Name: ACETAMINOPHEN 325 MG TABS 325 TAB] 30 tablet 0     Sig: TAKE 1-2 TABLETS (650 MG TOTAL) BY MOUTH EVERY 6 (SIX) HOURS AS NEEDED FOR PAIN.       There is no refill protocol information for this order

## 2021-06-06 NOTE — TELEPHONE ENCOUNTER
Ioana called; told Dr Rangel Is okay with Homecare change. Ioana is waiting until Monday to speak with Mei, director.   Patient has a choice to change.    Almita Cheng RN    Care Connection Triage/med refill  2/14/2020  9:49 AM

## 2021-06-06 NOTE — TELEPHONE ENCOUNTER
I just saw him for ER follow-up for insomnia.  He says his [facility] worker is keeping track of his psych appts.  Can you call [facility] worker so find out when next appt is?

## 2021-06-06 NOTE — TELEPHONE ENCOUNTER
RN Assessment/Reason for Call:   Okay to leave Detailed Message  Ioana calling in, home care is [facility]; Dr Neil order home care. Home care is not working well is not working well for him. Spoke with nurse at the agency.    [facility] for Ariel DR. Maddison Witt, Maddison VALLE MD   med list inaccurate for adrenal insufficiency.   fludrocortisone (FLORINEF) 0.1 mg tablet 90 tablet 0 10/9/2019  No   Sig: TAKE 1 TABLET BY MOUTH DAILY.   Sent to pharmacy as: fludrocortisone 0.1 mg tablet (FLORINEF)   Notes to Pharmacy: Please cx RX sent under Dr Roxana Jiménez for Ordering: Accepted by Flaquita Hammonds NP on 10/9/2019  2:43 PM   E-Prescribing Status: Receipt confirmed by pharmacy (10/9/2019  2:21 PM CDT)     [facility].    RN Action/Disposition:  Can he change back to [facility] home care?  Dr/nurse  feels it is a disservice to him.    Alimta Cheng RN    Care Connection Triage/med refill  2/11/2020  10:03 AM

## 2021-06-06 NOTE — PROGRESS NOTES
3/10/2020  Clinic Care Coordination Contact  [facility]/Voicemail       Clinical Data: Care Coordinator Outreach  Outreach attempted x 1.  Spoke to patient's sister stated patient is not home. left message for patient to call CHW back at [phone].   Plan: Care Coordinatorlayne try to reach patient again in 10 business days.  3-24-20    Upcoming appt with PCP on 3-19-20 at 11:15am  CHW unable to follow up in clinic 3-19-20 due to out of clinic week of 3-16-20 to 3-20-20    3-10-20  Called patient's [facility] Worker Niles  [phone] and left voice message for support to gather patient's mother document and submit to [facility] for Oath Waiver request  -copy of patient's mother (Mikki Thin) Certificate of Naturalization  -copy of Rental Lease agreement  -copy of evidences showing patient and mother living in the same residence    Faxed the letter from [facility] to JEFF Cage at [facility] fax #[phone].

## 2021-06-06 NOTE — PROGRESS NOTES
2-13-20  Called and spoke to patient through Angela : [employer] and follow up on goals.  Patient reported:   - he went to see psychiatrist on 1-13-20 at [facility] but there was no Angela   They rescheduled appt but then there was no transportation so he missed it again.  [facility] will call back to reschedule but no one call back yet.  [facility] worker Niles called to [facility] to reschedule but no one  the phone.  Suggested patient to ask JEFF Cage to call to [facility] to reschedule psychiatry appt.  Patient will ask ARM worker for help.    Plan:  CHW follow up 3-13-20

## 2021-06-06 NOTE — PROGRESS NOTES
2-13-20  Received call from Syd Mark,  at [facility]  [phone]  Stated patient had interview yesterday 2-12-20 for citizenship.  Stated  accept the Medical Waiver due to patient could not answer the questions and thinking he does not understand the oath so they will need a designated representative answer the questions and to do the oath.  Patient's father can't be the designated representative because he is not [place] citizen.  He contacted with [facility] worker to gather document for patient's mother to be the designate representative.  He needs a letter from PCP.   He will send a sample letter to show to PCP of how to write the letter.

## 2021-06-06 NOTE — PROGRESS NOTES
Subjective:    Brooke Peterson is a 29 y.o. male with schizophrenia, who presents for ER follow-up for insomnia.  He is being treated by psychiatry for his mental health conditions.  He does have some medicines he takes at bedtime to help him sleep.  His [facility] worker says he does have an upcoming appointment with psychiatry, not sure when.  He was seen at the ER yesterday.  I reviewed ER note today.  They had no acute concerns.  They recommended 1 mg melatonin.  Patient took 1 mg and said it did not help at all.  He says his room is quiet and dark when he is trying to sleep.  He states he has trouble falling asleep and staying asleep.  Generally he gets to bed at 9 or 10 PM and then just lays in bed until it is daylight.  He says he has not had any sleep at all in the past week and a half.  He has not had naps or felt sleepy during the day.  I discussed this several times with him.  He insists he has not had any sleep at all, not even minutes, for the past week and a half.  He says he sometimes feels dizzy or has leg pain.  Appetite is a little less than normal.    Patient Active Problem List   Diagnosis     Adrenal insufficiency (H)     Schizophrenia (H)     Hypothyroidism     Moderate persistent asthma     Pituitary microadenoma (H)     Schizoaffective disorder, depressive type, with catatonia (H)     PTSD (post-traumatic stress disorder)     Vitamin D deficiency     Ariel's disease (H)       Current Outpatient Medications:      acetaminophen (TYLENOL) 325 MG tablet, TAKE 1-2 TABLETS (650 MG TOTAL) BY MOUTH EVERY 6 (SIX) HOURS AS NEEDED FOR PAIN., Disp: 30 tablet, Rfl: 0     albuterol (PROAIR HFA;PROVENTIL HFA;VENTOLIN HFA) 90 mcg/actuation inhaler, Inhale 2 puffs every 6 (six) hours as needed for wheezing., Disp: 1 each, Rfl: 0     ASMANEX TWISTHALER 220 mcg/ actuation (60) inhaler, INHALE 1 PUFF BY MOUTH 2 (TWO) TIMES A DAY., Disp: 1 each, Rfl: 2     cholecalciferol, vitamin D3, 25 mcg (1,000 unit) capsule, TAKE 2  PILLS BY MOUTH EVERYDAY, Disp: 60 capsule, Rfl: 5     fexofenadine (ALLEGRA) 180 MG tablet, TAKE 1 TABLET (180 MG TOTAL) BY MOUTH DAILY FOR ALLERGIES, Disp: 90 tablet, Rfl: 3     fludrocortisone (FLORINEF) 0.1 mg tablet, TAKE 1 TABLET BY MOUTH DAILY., Disp: 90 tablet, Rfl: 0     fluticasone propionate (FLONASE) 50 mcg/actuation nasal spray, USE 1 SPRAY IN EACH NOSTRIL EVERY DAY, Disp: 48 g, Rfl: 3     hydrocortisone (CORTEF) 5 MG tablet, Take 2 tablets (10 mg total) by mouth every morning AND 1 tablet (5 mg total) every evening., Disp: 270 tablet, Rfl: 1     levothyroxine (SYNTHROID, LEVOTHROID) 100 MCG tablet, TAKE 1 TABLET (100 MCG TOTAL) BY MOUTH DAILY., Disp: 90 tablet, Rfl: 3     loratadine (CLARITIN) 10 mg tablet, Take 1 tablet (10 mg total) by mouth daily., Disp: 30 tablet, Rfl: 0     meclizine (ANTIVERT) 25 mg tablet, Take 1 tablet (25 mg total) by mouth 3 (three) times a day as needed for dizziness or nausea., Disp: 30 tablet, Rfl: 1     melatonin 1 mg Tab tablet, Take 1 tablet (1 mg total) by mouth at bedtime as needed., Disp: 30 tablet, Rfl: 0     multivitamin (ONE A DAY) per tablet, Take 1 tablet by mouth daily., Disp: 120 tablet, Rfl: 2     OLANZapine (ZYPREXA) 5 MG tablet, TAKE 1 PILL BY MOUTH IN THE MORNING FOR ANXIETY, Disp: 30 tablet, Rfl: 3     ondansetron (ZOFRAN-ODT) 4 MG disintegrating tablet, Take 1 tablet (4 mg total) by mouth every 8 (eight) hours as needed for nausea., Disp: 15 tablet, Rfl: 0     paliperidone (INVEGA) 6 MG 24 hr tablet, Take 1 tablet (6 mg total) by mouth at bedtime., Disp: 30 tablet, Rfl: 3     triamcinolone (KENALOG) 0.1 % cream, Apply thin layer to affected areas twice daily as needed, Disp: 45 g, Rfl: 0     Objective:   Allergies:  Patient has no known allergies.    Vitals:  Vitals:    02/24/20 1446   BP: 110/78   Patient Site: Left Arm   Patient Position: Sitting   Cuff Size: Adult Regular   Pulse: 100   Resp: 22   Temp: 97.8  F (36.6  C)   TempSrc: Oral   Weight: 157  lb 0.1 oz (71.2 kg)     Body mass index is 27.81 kg/m .    Vital signs reviewed.  General: Patient is alert and oriented x 3, in no apparent distress, appropriately groomed with normal affect  Eyes: Sclera clear bilaterally, PERRLA laterally, EOMs intact bilaterally  Throat: no erythema, edema or exudate noted  Lymphatic: no anterior cervical lymph node enlargement  Cardiac: regular rate and rhythm, no murmurs  Pulmonary: lungs clear to auscultation bilaterally, no crackles, rales, rhonchi, or wheezing noted  Neuro: Cranial Nerves 2-12 grossly intact, negative Romberg test, patient walks in a normal tandem gait    Assessment and Plan:   1.  Follow-up ER for insomnia.  Patient has significant medical issues along with mental health issues.  He is currently seeing endocrine for adrenal insufficiency and thyroid issues.  Currently seeing psychiatry for schizophrenia and PTSD.  Endocrine labs completed recently, following with endocrine, taking levothyroxine.  On his med list, we have that he is also taking Florinef for endocrine issues.  Taking olanzapine and inVega for mental health.  I am reluctant to adjust these medicines, as patient has a complex medical history.  We will put in a call to his [facility] worker to see when his next psychiatry appointment is.  For now I think it would be reasonable to try 2 mg of melatonin at night.  I wrote this down and gave him a printed copy of directions.  Keep all other medicines the same.  No acute concerns today.    If is going to be a while before he sees psychiatry, will try to either speed that up or call them and update them on his condition.  He states that he has not had any sleep at all, not even for minutes, in the past week and a half.  I suspect this may not be accurate.  He is aware that should any of his symptoms worsen, he should notify us, or his psychiatrist, or go in to the emergency room.    This dictation uses voice recognition software, which may contain  typographical errors.

## 2021-06-06 NOTE — PROGRESS NOTES
3/3/2020       Clinic Care Coordination Contact  [facility]/Voicemail       Clinical Data: Care Coordinator Outreach  Outreach attempted x 1.  Left message on patient's voicemail with call back information and requested return call.  Plan: Care Coordinator  will try to reach patient again in 3-5 business days.  3-10-20        Received letter from [facility] Requesting support from CHW to help connect with patient to gather Documents for an Oath Waiver Request     Called patient and left voice message to call CHW regarding the letter from [facility] requesting documents for the waiver of the Oath of Allegiance to the [place].     Will connect with patient's [facility] worker Niles for support to gather the document needed for the oath waiver.    Faxed the letter to JEFF Cage at [facility] Fax# [phone] to bring to appt on Friday.  Upcoming appt 4-17-20     Plan  CHW follow up 3-10-20 with JEFF Cage

## 2021-06-07 NOTE — PROGRESS NOTES
3/24/2020  Clinic Care Coordination Contact  [facility]/Voicemail       Clinical Data: Care Coordinator Outreach  Outreach attempted x 2. No answer no voice mailbox. unable to leave message on patient's voicemail with call back information and requested return call.  Plan: Care Coordinatorlayne try to reach patient again in 10 business days.  4-8-20    Received message from [facility] that interview at immigration office is cancelled due to coronavirus outbreak.  Wait for immigration office to reschedule interview appt.

## 2021-06-07 NOTE — PROGRESS NOTES
4/8/2020   Clinic Care Coordination Contact    Follow Up Progress Note      Assessment:     Goals addressed this encounter:   Goals Addressed                 This Visit's Progress       Patient Stated      Mental Health Management (pt-stated)        Goal Statement- I want to be connected to a provider to manage my mental health medications within one month    Action steps to achieve this goal  1.  Due to COVID-19, I will wait until [facility] office  [phone] to call and schedule psychiatry appt  2. I will ask JEFF Cage Worker for help to schedule psychiatry appt if I don't get a call from [facility].    Updated: 4-8-20 AL  Date goal set:  12/6/2019 BC                 Intervention/Education provided during outreach:  Called and spoke to patient through Angela [facility]ena -Language Line and follow up on goal.  Patient reported:    -he has a cold.  Lost connection with patient.  Reconnected with patient.  Informed patient that immigration office is closed and immigration will send notification of when immigration office will be open again per  from [facility].  Lost connection with patient again.  Called and reconnected with patient with Angela  [place].  Patient reported:  -did not see psychiatrist at [facility] yet.  -JEFF Cage worker calls him every Fridays.    Explained to patient that clinic is still open but not seeing many patients in the clinic due to the COVID-19.  Patient does not have any questions or concerns about the COVID-19.  Declined COVID-19 Hotline number stated he does not know how to call.   Suggested to have English family members help call if needed.    Plan:  CHW follow up 5-8-20

## 2021-06-07 NOTE — PROGRESS NOTES
4/10/2020  Received voice message from patient's [employer] Worker Niles stated he needs a form to be completed by PCP to apply for food stamp. Not sure if clinic is open or can see the doctor.    4/10/2020  Called and spoke to JEFF Cage Worker.  He said patient went to [facility] for help to complete the food stamp application.  Informed Niles that clinic is still open but doctors are seeing patients only if urgent and that doctors are doing telephone visit.  He is wondering if patient can drop off the form.    Patient will do the oath himself not his mother.  worked on preparing him to do the oath himself.  He will continue to call and check on patient once a week on Fridays  [facility]ed [facility] worker for his support.    4/10/2020  Called and spoke to Diallo, registrar at [facility] to verify procedures regarding patient dropping off form for PCP to complete.  She stated patients can drop off the form but they will need to be screen for any symptoms and if they are sick not to come to the clinic. Have someone else drop off the form.    4/10/2020  Called patient with a [facility] and left voice message that he can drop off the form if he is not sick or show any symptoms and notify clinic when he drop off the form.    4/10/2020  Called and spoke to Diallo and informed her that CHW was not able to connect with patient to do the screening.  Provided patient MRN# for  staff to be aware and screen patient before he comes in since CHW did not connect with patient.  Diallo stated she knows the patient and will screen when he comes in the clinic.    Plan:  CHW follow up 5-8-20  CHW follow up 5-8-20

## 2021-06-08 ENCOUNTER — OFFICE VISIT - HEALTHEAST (OUTPATIENT)
Dept: FAMILY MEDICINE | Facility: CLINIC | Age: 31
End: 2021-06-08

## 2021-06-08 DIAGNOSIS — R50.9 FEVER, UNSPECIFIED FEVER CAUSE: ICD-10-CM

## 2021-06-08 NOTE — PROGRESS NOTES
6/11/2020   Clinic Care Coordination Contact    Community Health Worker Follow Up    Goals:   Goals Addressed                 This Visit's Progress       Patient Stated      Mental Health Management (pt-stated)   On track     Goal Statement- I want to be connected to a provider to manage my mental health medications within one month    Action steps to achieve this goal  1.  Due to COVID-19 wait until [facility] office open to schedule appt   2. I will ask PRASHANT Cage Worker [phone] tomorrow 6-12-20 at 9am to check if [facility] opens to  schedule psychiatry appt either telephone or face to face visit.    Updated: 6-11-20 AL  Date goal set:  12/6/2019 BC              Clinic Care Coordination Contact    Community Health Worker Follow Up    Goals:   Goals Addressed                 This Visit's Progress       Patient Stated      Mental Health Management (pt-stated)   On track     Goal Statement- I want to be connected to a provider to manage my mental health medications within one month    Action steps to achieve this goal  1.  Due to COVID-19 wait until [facility] office open to schedule appt   2. I will ask PRASHANT Cage Worker [phone] tomorrow 6-12-20 at 9am to check if [facility] opens to  schedule psychiatry appt either telephone or face to face visit.    Updated: 6-11-20 AL  Date goal set:  12/6/2019 BC              Called and spoke to patient through [facility] : Randy  and follow up on goal.  Patient reported:   -not feeling well. Dizziness, headache, chill, and runny nose but no fever.  Conference call and connected with Diallo at [facility] to schedule telephone visit with PCP.  Telephone visit scheduled for tomorrow 6-12-20 at 11am   Patient confirmed appt.    Informed patient that  Syd Mark sent letter to CHW regarding PCP to write a letter for Oath waiver.  CHW forward the email-letter to PCP CA'susan Greer  to  give to PCP.  Suggested to connect with immigration layer at [facility]    -home care nurse comes out every other week on  Mondays for med set up.  -JEFF Cage worker calls every Friday at 9 am.   Suggested patient to ask [employer] worker tomorrow to help check if [facility] open to scheduling psychiatry appt.  Patient stated he needs help to tell the JEFF worker.    CHW called JEFF Cage and left voice message to help with getting psychiatry appt schedule at [facility] and update on citizenship process.      CHW Next Follow-up: 7-14-20  CHW Plan: email letter to PCP completed  Left voice message with [employer] worker completed.

## 2021-06-08 NOTE — PROGRESS NOTES
Programs Applying For: None  [facility]:    Outreach:   5/18/20: FRW called pt, he stated that he was approved for SNAP assistance and is waiting on approval for CASH. The medical opinion form was completed by PCP and should of been faxed last week. Pt has no needs from W at this time. He will let the clinic know if he has any more questions.    5/12/20: FRW reviewed the forms received from W that pt dropped off at clinic. There was a medical opinion form completed by PCP, this needs to be faxed to [facility]. CHW will message PCP team to do this. Another form was an application for [facility] benefits. FRW called pt and left VM. FRW will call again within one week. Attempt x 1.           Referral:   Patient received forms from [facility].   Please review the email with the attached forms.

## 2021-06-08 NOTE — TELEPHONE ENCOUNTER
I did my part of the form.  Needs consent signed to release info.  Also, there is much more form that family is likelt to need help fill out.  Can [facility] do this?

## 2021-06-08 NOTE — TELEPHONE ENCOUNTER
Dr. Rangel completed his part of the form. Pt needs to sign/date front page. Please make a copy for our record after pt signed form. I did call and informed pt of this thru language line . Incomplete combined form to the [facility] and medical opinion (needs signature) are at  for pickup. Father plans to come in tomorrow. Thank you.

## 2021-06-08 NOTE — TELEPHONE ENCOUNTER
Pt came in to sign release of information, form fax to [facility] worker @ [phone]. Gave original forms to pt. miguel dunlap. Completing task.

## 2021-06-08 NOTE — PROGRESS NOTES
"Brooke Peterson is a 29 y.o. male who is being evaluated via a billable telephone visit.      The patient has been notified of following:     \"This telephone visit will be conducted via a call between you and your physician/provider. We have found that certain health care needs can be provided without the need for a physical exam.  This service lets us provide the care you need with a short phone conversation.  If a prescription is necessary we can send it directly to your pharmacy.  If lab work is needed we can place an order for that and you can then stop by our lab to have the test done at a later time.    Telephone visits are billed at different rates depending on your insurance coverage. During this emergency period, for some insurers they may be billed the same as an in-person visit.  Please reach out to your insurance provider with any questions.    If during the course of the call the physician/provider feels a telephone visit is not appropriate, you will not be charged for this service.\"    Patient has given verbal consent to a Telephone visit? Yes    What phone number would you like to be contacted at? [phone]    Patient would like to receive their AVS by AVS Preference: Mail a copy.    Additional provider notes:     Personally reviewed asthma control test survey with patient.  Many of his symptoms are related to chronic tiredness, and therefore he answers the questions in the context shortness of breath.  In conversation with him it does not appear that these are due to coughing or wheezing, which would be typical of asthma.  I think his other medical conditions are more likely to be contributing including the psychiatric conditions (schizoaffective disorder, PTSD), and chronic adrenal insufficiency.    They will update the controller medication to include inhaled corticosteroid and long-acting beta agonist.  He will continue short acting beta agonist as needed.    He requests antibiotics to treat his \"cold " "symptoms.\"  This is a common request from him.  He states they do help.  He states that the help is durable.  The chart history argues against this however.  I do not think he should continue to use amoxicillin regularly for cold symptom relief.  I will prescribe an antihistamine.  He has had them prescribed before I am not sure if he is taking them.  He is unable to verify whether or not he has these today.    Assessment/Plan:    1. Moderate persistent asthma  2. Moderate persistent asthma without complication  - albuterol (PROAIR HFA;PROVENTIL HFA;VENTOLIN HFA) 90 mcg/actuation inhaler; Inhale 2 puffs every 6 (six) hours as needed for wheezing.  Dispense: 1 each; Refill: 0  - fluticasone propion-salmeteroL (ADVAIR HFA) 115-21 mcg/actuation inhaler; Inhale 2 puffs 2 (two) times a day.  Dispense: 1 Inhaler; Refill: 12    3. Schizoaffective disorder, depressive type, with catatonia (H)      4. Environmental allergies  - fexofenadine (ALLEGRA) 180 MG tablet; TAKE 1 TABLET (180 MG TOTAL) BY MOUTH DAILY FOR ALLERGIES  Dispense: 90 tablet; Refill: 3    5. Adrenal insufficiency (H)  He is due for lab test.  Will call to arrange a lab only visit.  Orders pending     Should have follow-up at least by phone in the next 2 to 3 weeks.      Phone call duration:  11 minutes      "

## 2021-06-08 NOTE — PROGRESS NOTES
5/8/2020   Clinic Care Coordination Contact    Community Health Worker Follow Up    Goals:   Goals Addressed                 This Visit's Progress       Patient Stated      Mental Health Management (pt-stated)        Goal Statement- I want to be connected to a provider to manage my mental health medications within one month    Action steps to achieve this goal  1.  Due to COVID-19, I will wait until [facility] Counsel[facility] office  [phone] to call and schedule psychiatry appt  2. I will ask Niles [facility] Worker for help to schedule psychiatry appt if I don't get a call from [facility].    Updated: 5-8-20 AL  Date goal set:  12/6/2019 BC                CHW Next Follow-up: 6-11-20    CHW Plan:  Called and spoke to patient through  Angela  [facility] # [id number] and follow up on goal.  Patient reported:   -doing okay  -continue to talk to ARM worker every Friday supporting to apply for SNAP and Cash.  He dropped off the form for PCP to complete.  He went to [facility] for support to complete the application  Encouraged and reinforced to continue ask for help from [facility] worker.  -no psychiatry appt yet due to COVID19 at [facility] counseling        Plan  CHW follow up 6-11-20

## 2021-06-08 NOTE — TELEPHONE ENCOUNTER
Travel questionnaire was asked. Verified that they have no signs of COVID-19 symptoms.    Patient dropped off [place] [employer] for Dr. Rangel to fill out. Placed the original copies in the 's slot.    When forms are completed, patient would like it:    -Please call patient to let them know it's ready      Please re-route task back to the  to shred the copied forms and complete the task. Thanks!

## 2021-06-09 NOTE — TELEPHONE ENCOUNTER
----- Message from Jose Rangel MD sent at 6/21/2020  8:55 AM CDT -----  Call:  Labs look ok.  No medicine changes.

## 2021-06-09 NOTE — TELEPHONE ENCOUNTER
Who is calling:  Brooke Po  Reason for Call:  Requesting transportation to Covid test on 7/2 at 1pm in [place]  Date of last appointment with primary care: 6/29/20  Okay to leave a detailed message: Yes

## 2021-06-09 NOTE — TELEPHONE ENCOUNTER
Called and spoke with pt using lauange line internpter. Pt understand. Appointment  schedule on 06/29 at

## 2021-06-09 NOTE — PROGRESS NOTES
Clinic Care Coordination Contact  [facility]/Voicemail    Reason: CCC CHW follow up call    Clinical Data: Care Coordinator Outreach  Outreach attempted x 1. Language line  service is use for this called. Female  name Jason with ID#: [id number]. Called the patient and no answer. Left message on patient's voicemail with call back information and requested return call.  Plan: Care Coordinator will try to reach patient again in 5-10 business days around 7-.

## 2021-06-09 NOTE — TELEPHONE ENCOUNTER
Called and spoke with Toe T Po, Message was given, Toe T Po understood, no further questions.  Use  line to contact : [phone]

## 2021-06-09 NOTE — TELEPHONE ENCOUNTER
----- Message from Santi Greer CMA sent at 6/10/2020  8:17 AM CDT -----      ----- Message -----  From: Jose Rangel MD  Sent: 6/9/2020  12:44 PM CDT  To: [facility] Family Medicine/Ob Support Pool    Please schedule virtual visit for Asthma Follow up.

## 2021-06-09 NOTE — PROGRESS NOTES
"Brooke Peterson is a 29 y.o. male who is being evaluated via a billable telephone visit.      The patient has been notified of following:     \"This telephone visit will be conducted via a call between you and your physician/provider. We have found that certain health care needs can be provided without the need for a physical exam.  This service lets us provide the care you need with a short phone conversation.  If a prescription is necessary we can send it directly to your pharmacy.  If lab work is needed we can place an order for that and you can then stop by our lab to have the test done at a later time.    Telephone visits are billed at different rates depending on your insurance coverage. During this emergency period, for some insurers they may be billed the same as an in-person visit.  Please reach out to your insurance provider with any questions.    If during the course of the call the physician/provider feels a telephone visit is not appropriate, you will not be charged for this service.\"    Patient has given verbal consent to a Telephone visit? Yes    Patient would like to receive their AVS by AVS Preference: Mail a copy.    Additional provider notes:     Subjective: This was a virtual visit, video because of the coronavirus pandemic.    This patient just saw Dr. Armendariz on 6/12/2020 for his moderate persistent asthma.  ACT was 21    He continues on Advair and albuterol.    Also was given some Allegra for allergies is unclear if he is taking this.  He states he is having some runny nose and sneezing.  He feels a little short of breath no significant cough appetites been okay.    Denies any exposures to COVID-19    We will check PCR for COVID-19 but also treated with some azithromycin and I did send Allegra to the pharmacy.    He is hypothyroid on replacement TSH looked okay also BMP and CBC were normal when the patient came in for labs.    No additional concerns or issues.    Tobacco status: He  reports that he has " never smoked. He has never used smokeless tobacco.    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 30 tablet 0     albuterol (PROAIR HFA;PROVENTIL HFA;VENTOLIN HFA) 90 mcg/actuation inhaler Inhale 2 puffs every 6 (six) hours as needed for wheezing. 1 each 0     cholecalciferol, vitamin D3, 25 mcg (1,000 unit) capsule TAKE 2 PILLS BY MOUTH EVERYDAY 60 capsule 5     fexofenadine (ALLEGRA) 180 MG tablet TAKE 1 TABLET (180 MG TOTAL) BY MOUTH DAILY FOR ALLERGIES 90 tablet 1     fludrocortisone (FLORINEF) 0.1 mg tablet TAKE 1 TABLET BY MOUTH DAILY. 90 tablet 0     fluticasone propion-salmeteroL (ADVAIR HFA) 115-21 mcg/actuation inhaler Inhale 2 puffs 2 (two) times a day. 1 Inhaler 12     fluticasone propionate (FLONASE) 50 mcg/actuation nasal spray USE 1 SPRAY IN EACH NOSTRIL EVERY DAY 48 g 3     hydrocortisone (CORTEF) 5 MG tablet Take 2 tablets (10 mg total) by mouth every morning AND 1 tablet (5 mg total) every evening. 270 tablet 1     levothyroxine (SYNTHROID, LEVOTHROID) 100 MCG tablet TAKE 1 TABLET (100 MCG TOTAL) BY MOUTH DAILY. 90 tablet 3     meclizine (ANTIVERT) 25 mg tablet Take 1 tablet (25 mg total) by mouth 3 (three) times a day as needed for dizziness or nausea. 30 tablet 1     multivitamin (ONE A DAY) per tablet Take 1 tablet by mouth daily. 120 tablet 2     OLANZapine (ZYPREXA) 5 MG  tablet TAKE 1 PILL BY MOUTH IN THE MORNING FOR ANXIETY 30 tablet 3     ondansetron (ZOFRAN-ODT) 4 MG disintegrating tablet Take 1 tablet (4 mg total) by mouth every 8 (eight) hours as needed for nausea. 15 tablet 0     paliperidone (INVEGA) 6 MG 24 hr tablet Take 1 tablet (6 mg total) by mouth at bedtime. 30 tablet 3     triamcinolone (KENALOG) 0.1 % cream Apply thin layer to affected areas twice daily as needed 45 g 0     azithromycin (ZITHROMAX Z-NELSON) 250 MG tablet Take 2 tablets (500 mg) on  Day 1,  followed by 1 tablet (250 mg) once daily on Days 2 through 5. 6 tablet 0     No current facility-administered medications for this visit.        ROS:   10 point review of systems positive as outlined above otherwise negative    Objective:    There were no vitals taken for this visit.  There is no height or weight on file to calculate BMI.      This was a virtual telephone visit no visual exam was done    He denies any sore throat has had some drainage from the nose.    Denies any swollen glands    Has had no fever    Lungs: Nonlabored breathing when he talks no wheezing.    Heart: No palpitations or rapid heart rate.    Lower extremities without edema.    Skin was normal, denies any rashes.    Labs from 6/16/2020 noted below        Results for orders placed or performed in visit on 06/16/20   Basic Metabolic Panel   Result Value Ref Range    Sodium 139 136 - 145 mmol/L    Potassium 3.9 3.5 - 5.0 mmol/L    Chloride 107 98 - 107 mmol/L    CO2 24 22 - 31 mmol/L    Anion Gap, Calculation 8 5 - 18 mmol/L    Glucose 79 70 - 125 mg/dL    Calcium 9.9 8.5 - 10.5 mg/dL    BUN 8 8 - 22 mg/dL    Creatinine 0.85 0.70 - 1.30 mg/dL    GFR MDRD Af Amer >60 >60 mL/min/1.73m2    GFR MDRD Non Af Amer >60 >60 mL/min/1.73m2   Thyroid Stimulating Hormone (TSH)   Result Value Ref Range    TSH 0.78 0.30 - 5.00 uIU/mL   HM2(CBC w/o Differential)   Result Value Ref Range    WBC 5.3 4.0 - 11.0 thou/uL    RBC 5.70 4.40 - 6.20 mill/uL     Hemoglobin 17.0 14.0 - 18.0 g/dL    Hematocrit 51.0 40.0 - 54.0 %    MCV 90 80 - 100 fL    MCH 29.9 27.0 - 34.0 pg    MCHC 33.4 32.0 - 36.0 g/dL    RDW 11.7 11.0 - 14.5 %    Platelets 134 (L) 140 - 440 thou/uL    MPV 9.6 7.0 - 10.0 fL       Assessment:  1. Upper respiratory tract infection, unspecified type  Symptomatic COVID-19 Virus (CORONAVIRUS) PCR    azithromycin (ZITHROMAX Z-NELSON) 250 MG tablet   2. Moderate persistent asthma without complication     3. Environmental allergies  fexofenadine (ALLEGRA) 180 MG tablet   4. Hypothyroidism, unspecified type       Upper respiratory infection with history of asthma which has been stable, will treat with azithromycin    Also rule out COVID-19    Refill on Fexofenadine Hydrochloride Hives.    Hypothyroid on a therapeutic dose.        Plan: Patient be contacted with results of the COVID-19    This transcription uses voice recognition software, which may contain typographical errors.      Phone call duration: 11 minutes, from 1:13 PM through 1:24 pm    Artie Booker MD

## 2021-06-09 NOTE — PROGRESS NOTES
Clinic Care Coordination Contact  Care Team Conversations     SW received message from W that patient has an oath ceremony scheduled for 8:00am on July 10th. CHW reported the Oath Waiver form requested by patient's  needs to be completed prior to this.    CHW had messaged PCP care team via email on 6/11/2020 informing them of another letter received by  and asking for support to please complete. This letter was forwarded in the email because CCC team is offsite due to COVID-19.    SW completed chart review and did not see documentation of this being completed or responded to. SW coordinated with care team to ask this to be reviewed and if possible, completed prior to July 10th so patient can have the statement prior to oath ceremony.

## 2021-06-09 NOTE — PROGRESS NOTES
Clinic Care Coordination Contact  [facility]/Voicemail    Reason: CHW follow up call    Clinical Data: Care Coordinator Outreach  Outreach attempted x 1. Language line  service is use for this called. In-house  name Way. Called and spoke with the pt's father. Asked to speak with the pt.   Father stated; Brooke Peterson is currently at work and will not be back in the next 2 hours.   Thanks the father and left message with the father request pt to return called.   Plan: Care Coordinator will try to reach patient again in the next 5-10 business days around 7-.

## 2021-06-09 NOTE — TELEPHONE ENCOUNTER
6/19/2020  Received message from patient [employer] worker [place]. [facility] did not received the Medical Opinion Form that PCP fill out on 5-11-20.  Requesting clinic to re fax the medical opinion [place] to the county again.

## 2021-06-09 NOTE — TELEPHONE ENCOUNTER
----- Message from Santi Greer CMA sent at 6/10/2020  8:17 AM CDT -----      ----- Message -----  From: Jose Rangel MD  Sent: 6/9/2020  12:44 PM CDT  To: [email or web address] Family Medicine/Ob Support Pool    Please schedule virtual visit for Asthma Follow up.

## 2021-06-09 NOTE — TELEPHONE ENCOUNTER
Contacted pt @ [phone], pt dad Joey Mccallum picked up phone, language line  Matt relay message to pt father to relay to message to pt that clinic no longer schedule transportation, advised pt father to have pt call blue ride phone number on back of insurance card to schedule ride. Pt father understood and will relay message to pt. Completing task.

## 2021-06-10 NOTE — PROGRESS NOTES
8/10/2020   Clinic Care Coordination Contact    Community Health Worker Follow Up    Goals:   Goals Addressed                 This Visit's Progress       Patient Stated      Mental Health Management (pt-stated)   30%     Goal Statement- I want to be connected to a provider to manage my mental health medications within one month    Action steps to achieve this goal  1.  I will ask PRASHANT Cage Worker [phone] this Friday 8-14-20 to help call; to [facility] to  schedule psychiatry appt either telephone or face to face visit.    Updated: 8- AL  Date goal set:  12/6/2019 BC              Called and spoke to patient through Angela : Ne Dolan ID#[id number]  Language Line and follow up on goal.  Patient reported:   -not feeling well dizziness, tired, runny/stuffy nose. Offered to consult with triage nurse. Patient decline he would like to see or talk to the doctor.  -still waiting to hear back from .  -[facility] worker Niles continue to call weekly Friday.  He will help to call to [facility] to schedule psychiatry appt.    Lost connection with  and patient.  Redialed and connected Angela  Regine ID#[id number] and patient.  Conference call with patient and connected with Diane Greer at [facility].  Telephone appt with PCP on 8-12-20 at 3:20pm.  Patient confirmed appt.     Patient missed COVID 19 testing appt on 7-2-20 due to no transportation  CHW Next Follow-up: 9-14-20

## 2021-06-10 NOTE — PROGRESS NOTES
7/29/2020  Clinic Care Coordination Contact  [facility]/Voicemail       Clinical Data: Care Coordinator Outreach  Outreach attempted x 1. No answer. Unable to leave message on patient's voicemail with call back information and requested return call.  Plan: Care Coordinator outreach again 8-5-20    Called and left message with [facility] worker to call back to update regarding psychiatry appt at [facility].    CHW follow up: 8-10-20

## 2021-06-10 NOTE — PROGRESS NOTES
Patient answer the phone for being checked and for this visit.    When I was able to join the phone call he was no longer reachable.

## 2021-06-11 ENCOUNTER — COMMUNICATION - HEALTHEAST (OUTPATIENT)
Dept: CARE COORDINATION | Facility: CLINIC | Age: 31
End: 2021-06-11

## 2021-06-11 ENCOUNTER — COMMUNICATION - HEALTHEAST (OUTPATIENT)
Dept: NURSING | Facility: CLINIC | Age: 31
End: 2021-06-11

## 2021-06-11 NOTE — PROGRESS NOTES
9-14-20  Received voice message from patient's [facility] Worker (Niles).Stated he no longer working with [employer] for a month now.  Stated to call to Mikki at [phone] to find out who is new ARM Worker  Updated in Care Teams

## 2021-06-11 NOTE — PROGRESS NOTES
"Clinic Care Coordination Contact  [facility]/Voicemail       Clinical Data: Care Coordinator Outreach  Outreach attempted x 1.Called with Angela  Vivian [id number]. Phone is invalid.   \"not a working number.\"    Plan: Care Coordinator  will try to reach patient again 9-17-20    Called [facility] worker Niles at [employer] [phone] and left voice message if he has patient's update contact info and regarding appt with psychiatrist at [facility] and copy of Naturalization Certificate to send to [facility] office to reinstate SSI benefit.        "

## 2021-06-11 NOTE — PROGRESS NOTES
9/17/2020  Clinic Care Coordination Contact  [facility]/Voicemail       Clinical Data: Care Coordinator Outreach  Outreach attempted x 2.  Left message on patient's voicemail with call back information and requested return call.  Plan: Care Coordinator will send unable to contact letter with care coordinator contact information via mail. Care Coordinator will try to reach patient again in 1 month. 10-19-20  -verify if patient receive Naturalization Certificate and bring tit down to [facility] to update  Immigration status to [place] Citizen to reinstate SSI benefit.  -need psychiatry services -[facility]    Called and spoke to Geeta Mojica RN [facility] if she has patient's update contact information.  Stated she has [phone] which is invalid.  She will connect with Angela  if he has a different number and will notify CHW.  She will be going out to see him in a few weeks and let CHW if she has new contact number.      Called to Mikki at [employer] [phone] and left voice message to call CHW back status of new [facility] worker for patient.

## 2021-06-12 NOTE — PROGRESS NOTES
Assessment/Plan:        There are no diagnoses linked to this encounter.        Subjective:    Patient ID: Brooke Peterson is a 26 y.o. male.    HPI    The following portions of the patient's history were reviewed and updated as appropriate: allergies, past family history, past social history and past surgical history.    Review of Systems          Objective:    Physical Exam        Clinic Care Coordination RN Assessed Needs  Patient Centered Assessment Method-No Data Recorded  Level 2:  A score of 25-36 indicates that the patient has a moderate initial need for RN or SW intervention at the discretion of the .  The RN will add this patient to her panel and follow closely in partnership with the care guide until stable.  She will reach out to the care guide for support in care coordination needs and graduate the patient to standard care guide outreach when appropriate.      Goals  Goals        Patient Stated      Obtain PCA services for support with ADLs in within 1-2 months. (pt-stated)            Action steps to achieve this goal  1.  SW referral to [facility] for PCA assessment. 8-25-17      Date goal set:  8/25/2017          Resolve outstanding medical bills from [place] within 1-2 months. (pt-stated)            Action steps to achieve this goal  1.  Father and I will meet with  for support to resolve medical bills. 8-25-17    Date goal set:  8/25/2017          Skilled Nurse Services for long term medication management  ASAP. (pt-stated)            Action steps to achieve this goal  1.  Father and I will see Annel Michaels CCC RN Monday 8-28-17 at 2pm for medication set up and RN Assessment. 8-25-17  2.  PCP/RN referral to [facility] for long term medication management. 8-25-17      Date goal set:  8/25/2017                RN Recommendations and Referrals  [facility] for [facility] term medication set up and management    Action Plan    RN  Will  Schedule MEV (Medication Evaluation Visit)  Will add the patient to [facility] RN tracking list  Be available to the patient as nursing needs arise   Schedule appointment with [facility] SW to discuss unpaid medical bills    Care Guide Will    Due Date Delegation   9/1/17 Patient needs assistance coordinating transportation for upcoming visits   9/1/17 Coordinate home care referral to non [facility] agency. Order is placed.       PCAM (Patient Centered Assessment Method)    See Flow sheet      Emergency Plan  Knowing When to Seek Treatment  Knowing when to seek treatment for mental health disorders is important for parents and families. Many times, families, spouses, or friends are the first to suspect that their loved one is challenged by feelings, behaviors, and/or environmental conditions that cause them to act disruptive, rebellious, or sad. This may include, but is not limited to, problems with relationships with friends and/or family members, work, school, sleeping, eating, substance abuse, emotional expression, development, coping, attentiveness, and responsiveness. It's also important to know that people of different ages will exhibit different symptoms and behaviors. Familiarizing yourself with the common behaviors of children, adolescents, and adults that make it hard for them to adapt to situations will often help to identify any problems early when they can be treated. It's important for families who suspect a problem in one, or more, of these areas to seek treatment as soon as possible. Treatment for mental health disorders is available.    What are the symptoms of a potential problem in an adult?  The following are the most common symptoms of a potential emotional, behavioral, and/or developmental problem in an adult, which necessitates a psychiatric evaluation. However, each individual may experience symptoms differently. Symptoms may include:  Significant decline in work performance, poor work attendance,  "and/or lack of productivity  Social withdrawal from activities, friends, and/or family  Substance (alcohol and drugs) abuse  Sleep disturbances (like persistent nightmares, insomnia, hypersomnia, or flashbacks)  Depression (poor mood, negativity, or mood swings)  Appetite changes (like significant weight gain or loss)  Continuous or frequent aggression  Continuous or frequent anger (for periods longer than 6 months)  Excessive worry and/or anxiety  Threats to self or others  Thoughts of death  Thoughts and/or talk of suicide  Destructive behaviors (like criminal activity, or stealing)  Sexually \"acting out\"  Lying and/or cheating  Many physical complaints, including being constantly tense and/or frequent aches and pains that cannot be traced to a physical cause or injury  Sudden feelings of panic, dizziness, or increased heartbeat  Increased feelings of guilt, helplessness, and/or hopelessness  Decreased energy  The symptoms of a potential emotional, behavioral, and/or developmental problem may look like other conditions. Always talk with your child's health care provider for a diagnosis.      Emergency Plan Recommendation:    When to Use the Emergency Department (ED)  An emergency means you could die if you don t get care quickly. Or you could be hurt permanently (disabled). Read below to know when to use -- and when not to use -- an emergency department (also called ED).    Dangers to your life  Here are examples of emergencies. These need immediate care:  A hard time breathing  Severe chest pain  Choking  Severe bleeding  Suddenly not able to move or speak  Blacking out (fainting)`  Poisoning    Dangers of permanent injuries  Here are other emergencies. These also need immediate care:  Deep cuts or severe burns  Broken bones, or sudden severe pain and swelling in a joint    When it s an emergency  If you have an emergency, follow these steps:    1. Go to the nearest emergency department  If you can, go to the " hospital ED closest to you right away.  If you cannot get there right away, or if it is not safe to take yourself, call 911 or your police emergency number.  2. Call your primary care doctor  Tell your doctor about the emergency. Call within 24 hours of going to the ED.  If you cannot call, have someone call for you.  Go to your doctor (not the ED) for any follow-up care.    When it s not an emergency  If a problem is not an emergency, follow these steps:    1. Call your primary care doctor  If you don t know the name of your doctor, call your health plan.  If you cannot call, have someone call for you.  2. Follow instructions  Your doctor will tell you what you should do.  You may be told to see your doctor right away. You may be told to go to the ED. Or you may be told to go to an urgent care center.  Follow your doctor s advice.

## 2021-06-12 NOTE — PROGRESS NOTES
"8-17-17  Per PCP requested to enroll in CCC as soon as possible.  PCP would like goal setting schedule for next Friday 8-25-17 with Care Guide and .    Clinic Care Guide met with the patient and his father Joey Montero in clinic today at the request of the PCP to discuss possible clinic care coordination enrollment.   Spoke to patient and father through Angela Peña  from [facility].  Asked patient for permission to talk to father to coordinate care.   Patient nodded his head \"yes\" to talk to father.    Clinic Care Coordination was described to the patient and father and immediate needs were discussed.  The patient agreed to enrollment in Clinic Care Coordination and future appointments were scheduled for an RN care coordination assessment and an enrollment visit with the primary care physician and care guide.   The patient was provided with an informational packet describing clinic care coordination and given contact information for the clinic care guide.    Patient's father did majority of the talking. Patient was quiet did say much.   Father reported:  -just moved from [place] to [place] two months ago.  -patient had RN comes out to home to set up medications in [place] and requesting if he can have RN to set up medications     Patient scheduled with Dr. Rangel on 8-25-17 at 9am for Goal Setting with Care Guide and .    RN Assessment: 8-28-17 at 2 pm with Annel Michaels RN.  Father will bring all his medications to the appointment.    Transportation:  [employer] for medical appt [phone]      Plan:  Set up transportation 8-25-17 at 9 am and 8-28-17 at 2 pm.  Goal Setting     "

## 2021-06-12 NOTE — PROGRESS NOTES
8-25-17  Met and consulted with Dr. Rangel and Leela Muñoz,  regarding appropriate services and support for patient in the community to address his medical, mental health and social needs.  Discussed possible services and support patient might needs and eligible.    -establish psychiatry service for medication management and any other mental health services and support in the community ( Targeted Care Management (TCM) at [facility]).  -Skilled Nursing as soon as possible for long term medication set up  -referral PCA services for ADLs  -see Endocrinologist   -check legal guardian   -check if patient has SSI or county benefit  -possible to obtain CADI Waiver Services-to pursue legal guardian and more PCA hours and services and support .  -check and clarify health insurance if it is appropriate for any services  -ROIs to communicates with previous health providers and other services  -resolve outstanding medical bills from [facility]

## 2021-06-12 NOTE — PROGRESS NOTES
"Subjective:  29 y.o. male   He comes alone today.  This visit was conducted with the aid of a professional .     As he is done on numerous previous occasions he requests amoxicillin for cough or rhinorrhea, the history is not exactly clear.    He states that he felt  better when he got this in June for sinusitis.    Denies fever, anosmia, aguesia.  Has a cough for 2-3 weeks  Thinks it's worse than usual, but acknowledges that he has it all the time.  Hx of asthma  Does not thing GERD.    Cannot answer question on mood  Does not sleep well.  Feels tired.  Feels dizzy--culturally, I have found this word in Angela to encompass a broad spectrum of disaffection and would equate it to \"I don't feel quite rite,\" if expressed in American English.    Endocrine has tried to evaluate him for adrenal insufficiency.  I share their frustration with difficulty in ascertaining what meds he is taking.  He is not an accurate  and has meds set up by home nurse.  He reports that he take them as instructed.    Outpatient Medications Prior to Visit   Medication Sig Dispense Refill     acetaminophen (TYLENOL) 325 MG tablet TAKE 1-2 TABLETS (650 MG TOTAL) BY MOUTH EVERY 6 (SIX) HOURS AS NEEDED FOR PAIN. 30 tablet 0     albuterol (PROAIR HFA;PROVENTIL HFA;VENTOLIN HFA) 90 mcg/actuation inhaler Inhale 2 puffs every 6 (six) hours as needed for wheezing. 1 each 0     cholecalciferol, vitamin D3, 25 mcg (1,000 unit) capsule TAKE 2 PILLS BY MOUTH EVERYDAY 60 capsule 5     fexofenadine (ALLEGRA) 180 MG tablet TAKE 1 TABLET (180 MG TOTAL) BY MOUTH DAILY FOR ALLERGIES 90 tablet 1     fludrocortisone (FLORINEF) 0.1 mg tablet TAKE 1 TABLET BY MOUTH DAILY. 90 tablet 0     fluticasone propion-salmeteroL (ADVAIR HFA) 115-21 mcg/actuation inhaler Inhale 2 puffs 2 (two) times a day. 1 Inhaler 12     fluticasone propionate (FLONASE) 50 mcg/actuation nasal spray USE 1 SPRAY IN EACH NOSTRIL EVERY DAY 48 g 3     hydrocortisone (CORTEF) 5 MG " tablet Take 2 tablets (10 mg total) by mouth every morning AND 1 tablet (5 mg total) every evening. 270 tablet 1     levothyroxine (SYNTHROID, LEVOTHROID) 88 MCG tablet Take 1 tablet (88 mcg total) by mouth daily.       meclizine (ANTIVERT) 25 mg tablet Take 1 tablet (25 mg total) by mouth 3 (three) times a day as needed for dizziness or nausea. 30 tablet 1     multivitamin (ONE A DAY) per tablet TAKE 1 TABLET BY MOUTH DAILY. 120 tablet 2     OLANZapine (ZYPREXA) 5 MG tablet TAKE 1 PILL BY MOUTH IN THE MORNING FOR ANXIETY 30 tablet 3     paliperidone (INVEGA) 6 MG 24 hr tablet Take 1 tablet (6 mg total) by mouth at bedtime. 30 tablet 3     triamcinolone (KENALOG) 0.1 % cream Apply thin layer to affected areas twice daily as needed 45 g 0     No facility-administered medications prior to visit.       Social History     Tobacco Use   Smoking Status Never Smoker   Smokeless Tobacco Never Used   Tobacco Comment    no second hand smoke exposure      Objective:  /78 (Patient Site: Left Arm, Patient Position: Sitting, Cuff Size: Adult Regular)   Pulse 77   Wt 170 lb (77.1 kg)   BMI 30.11 kg/m    GENERAL: alert, not distressed  CHEST: clear, no rales, rhonchi, or wheezes  CARDIAC: regular without murmur, gallop, or rub  ABDOMEN: soft, non tender, non distended, normal bowel sounds    SKIN slightly bronze    Chest xray:  personally reviewed  No infiltrates     Assessment and Plan:   I am confident that his presentation does not demonstrate a change from baseline.    1. Cough  ? Asthma vs. GERD  Could add H2 to see if improves    2. Adrenal insufficiency (H)  Try to assess replacement of steroids with:  - Basic Metabolic Panel  - Renin Activity  - Adrenocorticotropic Hormone (ACTH)    3. Hypothyroidism, unspecified type  Assess status with   - Thyroid Stimulating Hormone (TSH)  - T4, Free    4. Pituitary microadenoma (H)  - Prolactin    5. Need for influenza vaccination  Vaccine ordered.

## 2021-06-12 NOTE — PROGRESS NOTES
Assessment/Plan:        n/a        Subjective:    Patient ID: Brooke Peterson is a 26 y.o. male.    HPI    n/a    Review of Systems          Objective:    Physical Exam        Clinic Care Coordination Medication Education FOLLOW UP Visit    Patient presents for:  Compliance monitoring and Medication reconciliation    Language: Angela    Communication: Illiterate in English but able to identify English Alphabet and/or Numbers    Accompanied by: other: Father, Name:  Joey Montero, Phone see chart    Patient Living Situation:  Dependent with family    Primary Care Provider:  Jose Rangel MD    Barriers:  Language, Cultural, Poor insight into disease process and Chronic persistent mental illness    Medication List (see cited below): Patient presents with all ordered medications    Current Outpatient Prescriptions   Medication Sig     albuterol (PROAIR HFA;PROVENTIL HFA;VENTOLIN HFA) 90 mcg/actuation inhaler Inhale 2 puffs every 6 (six) hours as needed for wheezing.     fludrocortisone (FLORINEF) 0.1 mg tablet Take 2 tablets (0.2 mg total) by mouth daily.     hydrocortisone (CORTEF) 10 MG tablet Take two tablets (20mg) every morning with breakfast and one tablet (10mg) every afternoon around 2pm     levothyroxine (SYNTHROID) 75 MCG tablet Take 1 tablet (75 mcg total) by mouth daily.     OLANZapine (ZYPREXA) 15 MG tablet Take 1 tablet (15 mg total) by mouth at bedtime.       Equipment: Basic pill box- twice daily dosing and Color Dot Indentification System    Pill Box was set up by RN at visit  One weekly BID dosing pill box set-up.      Medication Set Up: Dependent  Medication Administration:  Dependent    Compliance: Good compliance with nursing support for set-up. Parents remind patient to administer medications.    Future Appointments  Date Time Provider Department Center   9/14/2017 10:30 AM Tg Goins RN [facility]   9/15/2017 9:00 AM SHIRA Duque [facility]   9/26/2017 10:30 AM Jose Rangel MD [facility]  OB [facility]   12/7/2017 10:30 AM Moisés Schmidt MD [facility] ENDO [facility]       Action Plan  RN Will:    Schedule an MEV in one week (done: 9/14/17 at 10:30 with Tg Goins, DENIS)  Call in refills for Synthroid and Zyprexa (done 9/7/17)  Follow up with CG regarding establishment of home care    Care Guide Will:  Care Guide Delegation  Due Date: 9/13/17  Call the patient to remind them of their next MEV  Remind the patient to bring their pill box, all medications in bottles and any refills they need  Arrange transportation  Due Date: 9/13/17  Other:  Please follow up on home care       Nursing NotesToe arrived today with his father and Angela  for medication set-up.  He presented with one BID dosing pill box. This writer offered to fill two boxes (giving him another box), but unfortunately, the patient did not have enough of all of his medications to completely fill two week's worth of medications.  One week was filled.  The color dot system was used to label his existing pill bottles for clarity. Refills were called for Synthroid and Zyprexa and they will be delivered soon.  Pt will return in one week for MEV and appointment care given to patient's father. Home care is in the process of being arranged. We are just waiting for a start date, but should be within 1-2 weeks. Brooke arrived today with his father and Angela  for medication set-up.  He presented with one BID dosing pill box. This writer offered to fill two boxes (giving him another box), but unfortunately, the patient did not have enough of all of his medications to completely fill two week's worth of medications.  One week was filled.  The color dot system was used to label his existing pill bottles for clarity. Refills were called for Synthroid and Zyprexa and they will be delivered soon.  Pt will return in one week for MEV and appointment care given to patient's father. Home care is in the process of being arranged. We are just waiting  for a start date, but should be within 1-2 weeks.

## 2021-06-12 NOTE — PROGRESS NOTES
9-6-17  Called and spoke to patient's father through Angela  Marianela.  Father reported:  -no RN comes out to home yet for medication set up.    Reminded of tomorrow appointment at 10am with DENIS Agudelo for medication set up and father will bring all medications to appt.  Father confirmed appointment.    Plan:  Need transportation  9-15-17 with    Follow up on [facility] skilled nursing services

## 2021-06-12 NOTE — PROGRESS NOTES
10/28/2020  Clinic Care Coordination Contact    Community Health Worker Follow Up    Goals:   Goals Addressed                 This Visit's Progress       Patient Stated      Mental Health Management (pt-stated)   30%     Goal Statement- I want to be connected to a provider to manage my mental health medications within one month    Action steps to achieve this goal  1.  I will wait until new [facility] Worker start to help call and schedule psychiatry appt at [facility] [phone]  2. I will ask new [facility] worker to help set up medical ride to psychiatry appt to [facility].  3. I will  update CHW at next outreach.    [facility]  [address]  [place] [address]   [phone]  Fax: [phone]      Updated: 10-27-20 AL  Date goal set:  12/6/2019 BC              Called and spoke to patient through Angela : Naw ID# [id number] and follow up on goal.  Patient reported:   -don't have [employer] worker anymore. Niles no longer works at [facility] [employer].  -waiting to get new [employer] worker. Don't know who the [employer] worker is yet.  -need help to set up medical ride to psychiatry appt.  Encouraged patient to ask help from [facility] worker to help set up medical ride to appt.     CHW will collaborate with new [facility] worker to support with setting up medical ride.    CHW called Mikki at [facility] and left voice message to call back regarding new [employer] worker contact information.      CHW Next Follow-up: 11-17-20

## 2021-06-12 NOTE — PROGRESS NOTES
10/19/2020    Clinic Care Coordination Contact  [facility]/Voicemail  Angela  ID# [id number] [facility]  Patient has updated new contact number     Clinical Data: Care Coordinator Outreach  Outreach attempted x 1. Called with Angela  ID# [id number] [facility].  left message on patient's voicemail with call back information and requested return call.    Plan: Care Coordinator will try to reach patient again within 10 business days.    CHW Next Follow Up: 10-28-20

## 2021-06-12 NOTE — TELEPHONE ENCOUNTER
Critical lab glucose of 59 today at 1:41pm.  He was not feeling well today at visit per note. Sent paging note to Dr. Guillory re critical lab. No action this evening.

## 2021-06-12 NOTE — PROGRESS NOTES
Adult Physical:    CC:  Hospital Follow up.    HPI:  26 y.o. who was admitted on 7/24 and discharged on 7/31 from [place], having had a workup up concluding in diagnoses of adrenal insufficiency and pituitary microadenoma.    Today he is here with his father and an .  They bring old pill bottles from a pharmacy in [place].  They include:  Olanzapine 15 mg nightly  Fludrocortisone 0.1 mg twice daily  Levothyroxine 75 mcg daily  Lithium 150 mg twice daily  Hydrocortisone 10 mg every morning  Hydrocortisone 5 mg every afternoon  An unlabeled bottle of 1 g sodium chloride tablets.  All are empty.    He has two bottles from [facility] in [place] which have have pills in them.  They match the discharge meds from [place].    Father confirms he had been out of medicines for about 1 month before becoming sick with nausea, vomiting, and dizziness.  Father notes he has hx of hospitalization in [facility], and has a clinic hx there.  We obtained ROIs for all locations.    Now, he feels a bit better.  He still feels as though he is like he has a cold.  He feels somewhat fatigued.  Eating a little.  Nausea and vomiting is better.    Patient Active Problem List   Diagnosis     Fatigue     Hypotension     Hyponatremia     MARY (acute kidney injury)     Adrenal insufficiency     Past Medical History:  Past Medical History:   Diagnosis Date     Asthma    Probable psychiatric disease and known adrenal insufficiency from review of past medicines.    Past Surgical History:  No past surgical history on file.    Medicines:  Outpatient Medications Prior to Visit   Medication Sig Dispense Refill     fludrocortisone (FLORINEF) 0.1 mg tablet Take 2 tablets (0.2 mg total) by mouth daily. 60 tablet 0     hydrocortisone (CORTEF) 10 MG tablet Take two tablets (20mg) every morning with breakfast and one tablet (10mg) every afternoon around 2pm 90 tablet 0     No facility-administered medications prior to visit.      Allergies:  No Known  "Allergies    Family History:  Mother and Father in \"poor health.\"    Social History:  Does not work.  Moved to [place] from [place] two months ago.  Lives with 7 other family members.    Review of Systems:  10 point intake review significant for  General: Fatigue and occasional dizziness  Cardiovascular: Occasional chest pains  GI: Constipation, low appetite, nausea vomiting, heartburn, abdominal pain  Musculoskeletal: Back pain, weakness  Endocrine: Cold  Neurologic: Numbness and tingling and anxiety  Psychiatric: Hearing voices of people speaking to him.    Physical Exam:  /60 (Patient Site: Right Arm, Patient Position: Sitting, Cuff Size: Adult Small)  Pulse 96  Temp 99.4  F (37.4  C) (Oral)   Ht 5' 2.25\" (1.581 m)  Wt 117 lb (53.1 kg)  BMI 21.23 kg/m2  Gen:   Alert, not distressed, affect flat  Head:   Normocephalic, without obvious abnormality, atraumatic  Eyes:   PERRL, conjunctiva/corneas clear, EOM's intact  Ears:   Normal tympanic membranes and external ear canals  Nose:    Mucosa normal, no drainage or sinus tenderness  Throat:    No erythema or exudates  Neck:    No adenopathy no nodules in thyroid, normal ROM  Lungs:    Clear to auscultation bilaterally, respirations unlabored  Chest wall:  No tenderness or deformity  Heart:     Regular, normal S1 and S2, no murmur, gallop or rub  Abdomen:  Soft, non-tender, bowel sounds active all four quadrants, no masses, no organomegaly  Extremities: Extremities normal, atraumatic, no cyanosis or edema  Skin:   Skin color, texture, turgor normal, no rashes or lesions  Lymph nodes: Cervical, and supraclavicular, nodes normal  Psychiatric: Endorses hearing voices.  Denies visual hallucinations.    Assessment and Plan:  Acutely, set up meds in pill box for the next seven days.    Family cannot get a to pharmacy without help.    New rx's sent to [facility] with request to deliver to his address.    Return to clinic in one week.    See  and health care home " for enrollment at that time.  They have medical bills from [place].  PCA is likely needed.  Home care referral for med set up and supervision will be requested.  Will likely need to establish with endocrine and psychiatric consultants.    1. Auditory hallucination  - OLANZapine (ZYPREXA) 15 MG tablet; Take 1 tablet (15 mg total) by mouth at bedtime.  Dispense: 30 tablet; Refill: 1  I will review records before starting Lithium    2. Adrenal insufficiency  - Basic Metabolic Panel    3. Pituitary microadenoma  - Prolactin    4. Wheezing  - albuterol (PROAIR HFA;PROVENTIL HFA;VENTOLIN HFA) 90 mcg/actuation inhaler; Inhale 2 puffs every 6 (six) hours as needed for wheezing.  Dispense: 1 each; Refill: 0    5. Hypothyroidism  - levothyroxine (SYNTHROID) 75 MCG tablet; Take 1 tablet (75 mcg total) by mouth daily.  Dispense: 90 tablet; Refill: 0  - T4, Free

## 2021-06-12 NOTE — PROGRESS NOTES
Subjective:  26 y.o. male with concerns of care coordination enrollment.  Please see the care coordinators notes for more information.    Since the previous visit, old records have been reviewed.  Previous history of schizophrenia.  Olanzapine prescription was provided.  Patient had care through ever a system in [place].  SAW for that today.    Set up his med box with hydrocortisone again today.  He had only enough for 2-1/2 days of fludrocortisone.  A refill was sent for that and should be delivered to his house.  A medication monitoring nurse visit has been set up for Monday (2 days from now).  Ideally, he would take the afternoon dose of hydrocortisone in the afternoon.  This complicates metabolic set up a bit because he has an afternoon dose and a night dose of olanzapine but has only med boxes for twice daily administration.  I left the olanzapine out of the med box and instructed them to take 1 pill at bedtime.    Father reports that the patient's appetite is been fairly low but usually is fairly low.  He does not have any nausea and vomiting.    Outpatient Medications Prior to Visit   Medication Sig Dispense Refill     albuterol (PROAIR HFA;PROVENTIL HFA;VENTOLIN HFA) 90 mcg/actuation inhaler Inhale 2 puffs every 6 (six) hours as needed for wheezing. 1 each 0     levothyroxine (SYNTHROID) 75 MCG tablet Take 1 tablet (75 mcg total) by mouth daily. 90 tablet 0     OLANZapine (ZYPREXA) 15 MG tablet Take 1 tablet (15 mg total) by mouth at bedtime. 30 tablet 1     fludrocortisone (FLORINEF) 0.1 mg tablet Take 2 tablets (0.2 mg total) by mouth daily. 60 tablet 0     hydrocortisone (CORTEF) 10 MG tablet Take two tablets (20mg) every morning with breakfast and one tablet (10mg) every afternoon around 2pm 90 tablet 0     No facility-administered medications prior to visit.       History   Smoking Status     Never Smoker   Smokeless Tobacco     Never Used     Comment: no second hand smoke exposure       Objective:  /60 (Patient Site: Right Arm, Patient Position: Sitting, Cuff Size: Adult Regular)  Pulse 68  Wt 124 lb (56.2 kg)  BMI 22.5 kg/m2  A/o nad  Speaks only a little.  Still has auditory hallucinations.    Assessment and Plan:   1.  Schedule visit with  to address medical bills from [place]  2.  Request to his insurance company for PCA.  May need insurance change due to disability status  3.  Will meet Monday with RN here in clinic to fill med boxes.  4.  [facility] home care will be requested to provide services.    Total time 25 min with all in counseling and coordination of care.

## 2021-06-12 NOTE — PROGRESS NOTES
"8-25-17  Met with patient and patient's father Joey Montero for [facility] Goal Setting in clinic. Co-Visit with Leela Muñoz,  and Dr. Rangel,  Spoke to patient through Angela  Felipe Esquivel from [place].     Asked if patient have legal guardianship document stating father is the legal guardian.   Patient quiet did not response. Father stated he does not have legal guardian paper.  Asked patient permission to discuss health concerns and communicate with father to help coordinate services and support to address his needs.  Patient nodded his head \"yes.\" Patient gave verbal permission to communicate with father.    Reviewed with patient and father reasons for enrolling in [facility] and what services are needed to help and support him at home.   Father reported:  - PCA services to help with ADLs  - RN to manage medications to help set up medications ( long term medication management)   - resolve outstanding medical bills from [place]  -establish mental health providers-psychiatrist for medication management and mental health services   Father requested for any services or support in the community.    Explained what each ROIs are for and reasons.   Patient signed ROIs to communicate with:  -[facility]-insurance issues  -[facility] CADI Waiver Unit  -Father Joey Montero  -[facility]  -[facility],  -[facility] for Skilled Nursing services    Dr. Rangel and reviewed the above information and goals.  Order in for psychiatry and endocrinologist.    Background Information:  -moved from [place] 2 months ago  -House: lives with father   -Immigration:  has a green card  (not [place] citizen)  -Income: applied and approved for SSI in [place] -pending not sure when he will get the SSI benefit since he moved to [place]  -Food: has food stamp (SNAP)  -Insurance: [place]-PMAP   -Transportation: assist with transportation    Immediate services:  1. Skilled Nursing " Services for medication set up  2. PCA services  3. Resolve outstanding med bills        Plan:  Monthly follow up

## 2021-06-12 NOTE — PROGRESS NOTES
Spoke with [facility] and was told that [facility] SNV Start of Care (SOC)  scheduled for 9/14/17. RN cancelled [facility] MEV scheduled for 9/14/17.     Care Guide Delegation:   1.  Due Date: Before 09/14/17       Delegation: Call patient to notify [facility] is scheduled on 9/14/17 to come to the home to start Home Care service for SNV and [facility] MEV has been cancelled. Remind patient of [facility] SW appointment on 9/15/17.

## 2021-06-12 NOTE — PROGRESS NOTES
CCC SW attended goal setting mtg with CG. SW checked pt's insurance, currently has [place]. Pt already receiving SSI income, no legal guardianship established (father willing to be guardian). Pt was getting PCA and in-home RN to manage meds. SW asked pt and father what pt's needs are for services in the home, pt's father very focused on the medications and getting assist with this. CG will assist with in-home RN referral, CCC RN will assist with med management until then (PCP has been assisting at this time).     SW clarified what pt's needs are on a daily basis--is pt safe to be at home alone? Pt's father reported that they do not leave pt alone and there is someone with him at all times. From this report, pt is needing 24/7 supervision as pt seems capable of basic self-cares such as dressing, bathing, grooming, independent with all mobility. Unclear to SW if pt needs reminders to complete daily self-care tasks--such as bathing and grooming. Unclear to SW if pt is capable of homemaking tasks--chores around the home and cooking, meal management (eating healthy, balanced meals).     Pt can get referral for PCA, ASAP due to having PMAP. SW would like more information on needs (will wait to hear what RN assesses at [facility]) to clarify if CADI waiver is appropriate as pt may have a different spend down to change insurance [place] to qualify--county may assess for CADI and find pt doesn't need it as well and will have gone through changing his insurance. CADI would be beneficial if pt and family are needing more assist beyond PCA such as adult day, respite care, ILS worker, equipment, etc.     Pt is getting SNAP and already connected to the [facility] for financial benefits he qualifies for.

## 2021-06-12 NOTE — PROGRESS NOTES
FYI  Patient scheduled for Goal Setting 8-25-17 at 9 am.  SHIRA Rosen will be available for support.

## 2021-06-12 NOTE — PROGRESS NOTES
Brooke LAWRENCE   Discussed at 8/18/17 Care Conferences   Medications, Mental Health (has diagnosis of Schizophrenia), needs home care services.   Make sure the patient got his medications.  Do a referral to home care (outside agency).  Patient is scheduled for his Goal Setting appointment next week.

## 2021-06-12 NOTE — TELEPHONE ENCOUNTER
----- Message from Jose Rangel MD sent at 10/10/2020  1:25 PM CDT -----  Call:  Labs look very good.

## 2021-06-13 NOTE — PATIENT INSTRUCTIONS - HE
POSTOPERATIVE INSTRUCTIONS     What to expect  Your toe will be numb for around 2-8 hours after your nail procedure due to the shots that were given.  Expect some degree of soreness in your toe later today when the numbness wears off.  Rest, elevation and ice applied at the ankle will help ease the pain. Your bandage was wrapped snug to cut down on bleeding.    This may feel tight when the numbness wears off.  Please remove the bandage tomorrow morning and begin the foot soaks described below.  Warm water will feel good and helps to ease the pain  How to Care for Your Toe One daily foot soak will be necessary to heal properly.  Chemicals were used to kill the root of the nail.  .  Soaking helps loosen the scab and dried drainage.  Failure to soak leads to a hard scab that blocks drainage.  Back up drainage increases the pain and the scab may need to be removed with another office procedure.  You will heal much quicker and be more comfortable if you are consistent with local wound care and foot soaks.  Soak one time every day for 2 weeks.  Soaks should last 10 minutes.  Soak after taking a shower to get the germs out.  Soak in warm water with hydrogen peroxide 5 parts water to 1 part hydrogen peroxide.  A small amount of bleeding may be noted which is normal.   Clean the surgical site with a Q-tip during each soak.  Dip the Q-tip in the water and gently clean away any crusted drainage.  The area may be tender but you will not harm anything with the Q-tip.  Pat the area dry and allow a few minutes to air dry before applying any bandages.  Flexible fabric style band aids work best.  In general, the area will be wet from drainage.  A small dab of antibiotic ointment is okay but watch out for excessive moisture.  White and wrinkled skin is a sign of too much moisture and that the skin needs to be dried out. It is ok to allow the toe some air by removing the band aid as needed.  Activity  Feel free to do normal activities  as tolerated on the following day.  Wear open toe sandals if regular shoes are not comfortable.  Avoid shoes that are tight on the toe.  Medications   Finish any antibiotics if they have been prescribed.  Tylenol or ibuprofen may be used as needed for pain.  Icing and elevation also help with pain and swelling.  Risks  Watch for signs of infection.    Call the clinic at [phone] if you see red streaks spreading up the toe, foot, or leg.  Fever and chills are also concerning and you should notify the clinic if you are having these symptoms.  If these symptoms occur when the clinic is closed, please go to urgent care.  How Well Does Permanent Nail Surgery Work?  Permanent nail surgery means that we intend that the nail problem will not come back.  In a small percentage of patients, nail problems return in about 6 months to a year.  We would like to see you back in the office if you are experiencing problems in the future.  Please call [phone] with any additional questions.

## 2021-06-13 NOTE — PROGRESS NOTES
Assessment  Pt, pt's father and  present for meeting with SHIRA to assist with outstanding medical bills. SW assisted the pt and his father with calling the billing departments where the medical bills were from, both bills had been sent to the same collections agency. SHIRA contacted this agency and was able to assist the pt with setting up a plan to pay the minimum $25/month fee to pay off the debt. SHIRA has educated the pt's father to notify [facility] once they get a letter from [place] regarding if the pt was approved for financial aid or not. Please see Subjective section for more info. The pt seems to have a good support system, however, due to the language barrier the pt's father struggles staying on top of tasks.    Action Plan:    will:  1) Available as needed       Care Guide will:  Care Guide Delegation:   1)  Due Date: After pt receives a letter back from [place] regarding if he will receive financial aid or not        Delegation: Set up appt with SHIRA to check status of [place] bills that went to collections. If pt was not approved for financial aid, SW will need to assist to contact [place] to see how pt can pay off the medical bills.      Subjective   The pt, pt's father and  present at meeting with SHIRA to get assist with addressing outstanding medical bills. SHIRA checked-in on status of [place] application for financial aid and pt's father reported that they have mailed the application in along with the supporting documents. SW informed pt's father to notify [facility] once they get a letter regarding if they will be approved or not--SW will need to meet again to check the status of medical bills that were sent to collection agencies from [place]---will the bills sent to collection agencies be included in the financial aid or will they stay in collections, SW will need to meet again to contact billing agencies to understand this better.    SHIRA assisted the pt's father with calling [facility]  regarding an outstanding bill. SW was informed that the bill was already sent to a collections agency and that the pt will have to talk with the [employer] agency to figure out payment--will not work with pt on financial aid after bill has been sent to [employer]. SW was provided the [employer] agency's info: [facility] [phone]. SW called the agency and was informed that the pt owed the agency over $1,000. The worker explained that the pt had several outstanding medical bills that had been sent to this collections agency and that is why the amount is so high. SW discussed pt's financial situation and inability to pay this amount off. SW was informed the pt could pay a minimum of $25/month to pay of the medical bills. Pt's father agreed to this amount and pt will be mailed information about paying this off. This amount in [facility] will possibly be reduced if [facility] approves the pt for financial assistance and if they will take bills back out of collections. SW unable to know this until after pt gets a letter saying if he's been approved or not.          Objective    Appearance: Casually dressed but not appropriate for weather conditions (wore shorts and sandals in cold, snowy weather), normal gait      Behavior: Alert, allowed father to speak on his behalf throughout the meeting, avoided eye contact with SW      Speech: Non-English; did not speak throughout the meeting--said a few words at times.      Emotion/Affect: Calm, non-interactive      Thought Processes: not assessed       Orientation: not assessed       Memory: not assessed       General Intellectual Abilities: not assessed       Judgment: not assessed       Insight: not assessed       Clinical Recommendations: The pt has a good support system, however, due to the language barrier, the pt and his father struggle with navigating services and resources and staying on top of tasks---cannot read mail. The pt and ather would benefit from  establishing connections with community agencies that assist non-English speaking people with learning to read/speak English and assist with reading mail to stay on top of tasks.

## 2021-06-13 NOTE — PROGRESS NOTES
9-21-17  Called to schedule appt with  9-26-17 at 9am and then see PCP at 10:30am.  Not answer.

## 2021-06-13 NOTE — PROGRESS NOTES
"Chief Complaint:  Chief Complaint   Patient presents with     Dizziness       HPI:   Brooke Peterson is a 30 y.o. male with schizophrenia, with chief complaint of dizziness.  1. Dizziness  \"I am dizzy.\"  Spinning when he lays down.  Longtime dizziness, but this feels different.  Not improving.  No falls or head injuries.  Reports he taking his medications as directed.  No fevers, no nausea or vomiting, normal vision.  No head pain.  He has a meclizine prescription that he uses for intermittent dizziness, prescribed in 9/2019, unsure if he has any of this left.    2. Left great toe pain.  Toe pain.  Has been soaking toe.  Pain x 5 days, not worsening.  Right great toe feels OK.  History of bilateral great toenail deformity.  Had infected left great toe in the past, needed oral antibiotics.  No recent to injuries.    Patient came to appointment alone today.  He did seem to answer questions appropriately.      Current Outpatient Medications:      acetaminophen (TYLENOL) 325 MG tablet, TAKE 1-2 TABLETS (650 MG TOTAL) BY MOUTH EVERY 6 (SIX) HOURS AS NEEDED FOR PAIN., Disp: 30 tablet, Rfl: 0     albuterol (PROAIR HFA;PROVENTIL HFA;VENTOLIN HFA) 90 mcg/actuation inhaler, INHALE 2 PUFFS EVERY 6 (SIX) HOURS AS NEEDED FOR WHEEZING., Disp: 18 g, Rfl: 0     cholecalciferol, vitamin D3, 25 mcg (1,000 unit) capsule, TAKE 2 PILLS BY MOUTH EVERYDAY, Disp: 60 capsule, Rfl: 5     famotidine (PEPCID) 20 MG tablet, Take 1 tablet (20 mg total) by mouth 2 (two) times a day., Disp: 60 tablet, Rfl: 1     fexofenadine (ALLEGRA) 180 MG tablet, TAKE 1 TABLET (180 MG TOTAL) BY MOUTH DAILY FOR ALLERGIES, Disp: 90 tablet, Rfl: 1     fludrocortisone (FLORINEF) 0.1 mg tablet, TAKE 1 TABLET BY MOUTH DAILY., Disp: 90 tablet, Rfl: 0     fluticasone propion-salmeteroL (ADVAIR HFA) 115-21 mcg/actuation inhaler, Inhale 2 puffs 2 (two) times a day., Disp: 1 Inhaler, Rfl: 12     fluticasone propionate (FLONASE) 50 mcg/actuation nasal spray, USE 1 SPRAY IN EACH " NOSTRIL EVERY DAY, Disp: 48 g, Rfl: 3     hydrocortisone (CORTEF) 5 MG tablet, Take 2 tablets (10 mg total) by mouth every morning AND 1 tablet (5 mg total) every evening., Disp: 270 tablet, Rfl: 1     levothyroxine (SYNTHROID, LEVOTHROID) 88 MCG tablet, Take 1 tablet (88 mcg total) by mouth daily., Disp: , Rfl:      meclizine (ANTIVERT) 25 mg tablet, Take 1 tablet (25 mg total) by mouth 3 (three) times a day as needed for dizziness or nausea., Disp: 30 tablet, Rfl: 0     multivitamin (ONE A DAY) per tablet, TAKE 1 TABLET BY MOUTH DAILY., Disp: 120 tablet, Rfl: 2     neomycin-bacitracin-polymyxin (NEOSPORIN) ointment, Apply to toe and nail 2-3 times a day, as needed., Disp: 14 g, Rfl: 0     OLANZapine (ZYPREXA) 5 MG tablet, TAKE 1 PILL BY MOUTH IN THE MORNING FOR ANXIETY, Disp: 30 tablet, Rfl: 3     paliperidone (INVEGA) 6 MG 24 hr tablet, Take 1 tablet (6 mg total) by mouth at bedtime., Disp: 30 tablet, Rfl: 3     triamcinolone (KENALOG) 0.1 % cream, Apply thin layer to affected areas twice daily as needed, Disp: 45 g, Rfl: 0    Physical Exam:  Vitals:    12/04/20 1015   BP: 120/84   Pulse: 76   Resp: 16     Body mass index is 29.67 kg/m .    Vital signs stable as recorded above  General: Patient is alert and oriented x 3, in no apparent distress, appropriately groomed, slightly flat affect  Eyes: PERRL, EOMI bilaterally, no nystagmus  Ears: TMs non-erythematous with good light reflex bilaterally  Throat: no erythema, edema, or exudate noted  Lymphatic: No cervical lymph node enlargement  Cardiac: Regular rate and rhythm; no murmurs  Pulmonary: Lung clear to auscultation bilaterally; no crackles, rales, rhonchi, or wheezing noted  Musculoskeletal: Normal 4/5 strength in major muscle groups in upper and lower extremities bilaterally  Bilateral great toes examined, bilateral great toenails are small and deformed, possible minimal erythema and edema to left great toe as compared to right - difficult to tell  Patient  "reports mild pain with palpation of left great toe, none on right, no purulent drainage on left toe, minimal bleeding at the nailbed  He is wearing large boots today without socks  Neuro: Cranial Nerves 2 through 12 grossly intact, negative Romberg Test, patient walks in a normal tandem gait, positive Hallpike maneuver to the right,  Neuro exam limited slightly due to language barrier and his chronic mental health conditions    Assessment/Plan:  1. Dizziness  2. Benign paroxysmal positional vertigo, unspecified laterality  Differential includes possible BPPV given exam findings, flare of chronic \"dizziness,\" URI, versus other.  Neuro exam today, though slightly limited, was reassuring to rule out and serious concerns.  Based on symptom report and exam, I think BPPV is a possibility.  Meclizine refilled.  Referral to vestibular rehab.  Follow-up as needed.  - meclizine (ANTIVERT) 25 mg tablet; Take 1 tablet (25 mg total) by mouth 3 (three) times a day as needed for dizziness or nausea.  Dispense: 30 tablet; Refill: 0  - Ambulatory referral to PT/OT    3. Great toe pain, left  4. Nail abnormalities  I don't think he has a significant infection in the toe today, though slightly difficult to tell on exam.  I would prefer to avoid oral antibiotics if possible.  Trial of Neosporin and discussed warm soapy water soaks to the toe twice a day.  I also discussed wearing socks if he is going to wear shoes, as I think rubbing against the shoe is also irritating the skin and aggravating his symptoms.  He says he has never seen podiatry for his toe concerns before.  I can't find any mention of podiatry in his EHR.  Referral to them today to see if they have any thoughts on how to improve his symptoms.  I asked him to let us know if symptoms are getting worse.  - Ambulatory referral to Podiatry  - neomycin-bacitracin-polymyxin (NEOSPORIN) ointment; Apply to toe and nail 2-3 times a day, as needed.  Dispense: 14 g; Refill: 0    5. " Schizoaffective disorder, depressive type, with catatonia (H)  He reports he is taking medicines as directed.  Appears stable today.      This dictation uses voice recognition software, which may contain typographical errors.

## 2021-06-13 NOTE — PROGRESS NOTES
Subjective:  26 y.o. male with concerns of follow up.  Started zyprexa.  Feels better.  Very sleepy.  Psychotic sympotms better.  Seems a bit more intereactive.    Taking replacement steroids for adrenal insufficiency on regular basis.  Appetite still low.  Normal lytes when checked 8/17/17.  Medication supply is now reliable.    Good work done by CCC team to enroll in services in [place].  Situation stabilized a great deal.  Risk of need for hospitalization has greatly diminished.    Complains of rhinorrhea for the last 2-3 days.  No fever.   No ear pain.  No ST.  No HA.  No dyspnea.    Outpatient Medications Prior to Visit   Medication Sig Dispense Refill     albuterol (PROAIR HFA;PROVENTIL HFA;VENTOLIN HFA) 90 mcg/actuation inhaler Inhale 2 puffs every 6 (six) hours as needed for wheezing. 1 each 0     fludrocortisone (FLORINEF) 0.1 mg tablet Take 2 tablets (0.2 mg total) by mouth daily. 120 tablet 0     hydrocortisone (CORTEF) 10 MG tablet TAKE TWO TABLETS (20MG) EVERY MORNING WITH BREAKFAST AND ONE TABLET (10MG) EVERY AFTERNOON AROUND 2PM 90 tablet 5     levothyroxine (SYNTHROID) 75 MCG tablet Take 1 tablet (75 mcg total) by mouth daily. 90 tablet 0     OLANZapine (ZYPREXA) 15 MG tablet Take 1 tablet (15 mg total) by mouth at bedtime. 30 tablet 1     No facility-administered medications prior to visit.       History   Smoking Status     Never Smoker   Smokeless Tobacco     Never Used     Comment: no second hand smoke exposure      Objective:  /82 (Patient Site: Right Arm, Patient Position: Sitting, Cuff Size: Adult Regular)  Pulse 76  Temp 98  F (36.7  C) (Oral)   Resp 18  Wt 127 lb (57.6 kg)  BMI 23.04 kg/m2  GENERAL: alert, not distressed   More interactive than previous visits.  EARS: normal tympanic membranes and external auditory canals bilaterally  PHARYNX: no erythema or exudates  MOUTH: well hydrated mucosa, no lesions  NECK: no lymphadenopathy or thyroid nodules   CHEST: clear, no rales, rhonchi,  or wheezes  CARDIAC: regular without murmur    Assessment and Plan:   1. Adrenal insufficiency  Stable now.  Will ask for endocrine consult to help manage doses in regard to appetite.  - fludrocortisone (FLORINEF) 0.1 mg tablet; Take 2 tablets (0.2 mg total) by mouth daily.  Dispense: 120 tablet; Refill: 1  - hydrocortisone (CORTEF) 10 MG tablet; TAKE TWO TABLETS (20MG) EVERY MORNING WITH BREAKFAST AND ONE TABLET (10MG) EVERY AFTERNOON AROUND 2PM  Dispense: 270 tablet; Refill: 0  - Ambulatory referral to Endocrinology    2. Auditory hallucination  Decrease dose from 15 to 10.  Ask psych for input on med management  - OLANZapine (ZYPREXA) 10 MG tablet; Take 1 tablet (10 mg total) by mouth at bedtime.  Dispense: 30 tablet; Refill: 0    3. Hypothyroidism  Stable, continue current dose.  Last TSH 2.12 July 2017 and FT4 1.0 August.  - levothyroxine (SYNTHROID) 75 MCG tablet; Take 1 tablet (75 mcg total) by mouth daily.  Dispense: 90 tablet; Refill: 3    4. Wheezing  prn  - albuterol (PROAIR HFA;PROVENTIL HFA;VENTOLIN HFA) 90 mcg/actuation inhaler; Inhale 2 puffs every 6 (six) hours as needed for wheezing.  Dispense: 1 each; Refill: 0    5. Nasal congestion  Does not appear sever or infectious.  Trial antihistamine.  - cetirizine (ZYRTEC) 10 MG tablet; Take 1 tablet (10 mg total) by mouth daily.  Dispense: 20 tablet; Refill: 0    6. Schizophrenia  - Ambulatory referral to Psychiatry    7. Need for influenza vaccination  - Influenza, Seasonal,Quad Inj, 36+ MOS     This visit was conducted with the aid of a professional .

## 2021-06-13 NOTE — PROGRESS NOTES
12/1/2020   Clinic Care Coordination Contact    Community Health Worker Follow Up    Goals:   Goals Addressed                 This Visit's Progress       Patient Stated      Mental Health Management (pt-stated)   30%     Goal Statement- I want to be connected to a provider to manage my mental health medications within one month    Action steps to achieve this goal  1.  I will talk to [facility] on 12-8-20 at 10am  for support to get new ARMHS Worker and establish psychiatry services at  [facility] [phone].  2. I will ask new ARMHS worker to help set up medical ride to psychiatry appt to [facility].  3. I will  update CHW at next outreach.    [facility]  [address]  [place] [address]   [phone]  Fax: [phone]      Updated: 12-1-20 AL  Date goal set:  12/6/2019 ABEBE Miller : Julian ID#[id number] Agency: [employer]    Called and spoke to patient through Angela  and follow up on goal.  Patient reported:   -not doing well. Feeling dizzy for few days has appt.   Informed patient to discuss this Friday 12-4-20 at 10:40am with Karyn Guzmán PA-C.  -nurse continue to come to the home for medication set up  -still waiting for new ARMHS worker    Scheduled appt with [facility] 12-8-20 at 10am for support if need new referral for ARMHS worker and establish psychiatry services.    [facility] 12-8-20 at 10am for reassessment and support to connect with mental health provider and  ARMH  services.    CHW Next Follow-up: 1-8-21  CHW Plan: Follow up on goal

## 2021-06-13 NOTE — PROGRESS NOTES
9-26-17  Met with patient and patient's father Joey Montero in clinic and follow up on goals and action steps.  Angela  Lan Senior.    Explained to patient and patient's father that [facility] agreed to reduce the medical bills to $80 and waive the rest.   Stated he needs to make payment of $80 to [employer] as soon possible. Patient's father agreed to pay $80.   Father is rep payee and requested to help write out the check to the pay the bill.    Patient signed financial assistance application for [employer] to address medical bills in [place].    Patient's father met with Care Guide in clinic today for support to write out check to pay medical bill.

## 2021-06-13 NOTE — PROGRESS NOTES
9-26-17 Patient's father is the rep payee and PCA.    Met with patient's father Joey Montero and patient's niece Kell Marinelli.  Collina Raw interpreted for father.  Father brought a blank check.  Assisted father to write out check for $80 to pay medical bill to [employer].  Explained to father that he needs to deposit the $80 cash into his checking account.  Explained and educated father about Rep Payee and responsibility of a Rep Payee.  Father unsure if he wants to be the rep payee and that he will think about it.    Patient has PCA services 4 hours a day.  Father does not know the PCA agency name.    Scheduled with  10-8-17at 2pmto address other bill and apply for [employer] financial assistance program.    Copy check and account information and mailed payment today.  1) Visit Type: Mental Health/[place] Setting  NEED mental health  for support in the community   a. Does the patient have support outside of the Care Guide for mental health needs? no  b. Has the patient had a recent major life event? no  c. Has the patient had a negative change in behavior from baseline? no  d. Does the patient push Care Guide boundaries (for example repetitive calling, unscheduled drop- ins, despite redirection from the care guide)?   e. If questioning boundaries- ASK!    Assist with addressing outstanding medical bills from [place] group and medical bill    Are you working with any other Social workers or providers on this issue? no

## 2021-06-13 NOTE — PROGRESS NOTES
Pt was a no show for [facility] SW visit to complete financial aid assistance p/w for [facility] and address other medical bills.  Please reschedule at next outreach if needed.    [facility] Care Guide Delegation:  Due: at next outreach  Delegation: Ask the pt and family if they would like to re-schedule with SW to complete financial aid paperwork with [facility] and address the other medical bill questions for [facility].

## 2021-06-13 NOTE — PROGRESS NOTES
"Brief Diagnostic Assessment    Patient Name: Brooke Peterson  Patient : 1990  Patient Age: 26 y.o.    Date: 10/24/17  Start time: 2:00pm  Stop time: 3:10pm    Referring Provider:   Dr. Rangel    Persons Present:   Patient, patient's father/PCA, professional , and therapist    Patient expectation for treatment:   Patient's father reports they are looking to establish care with psychiatrist for medication management.    Recipient's description of symptoms (including reason for referral):   Patient was referred by PCP to psychiatry for medication management for schizophrenia. Patient endorses auditory hallucinations, that started when he came to the [place] in . Patient's father reports that patient would hear voices that say \"many things that scare him,\" to the point where father would put cotton balls in patient's ear to help drown out the voices. Patient's father also report negative symptoms, such as diminished emotional expression and avolition.    Mental Status Exam:  Grooming: Well groomed  Attire: Appropriate  Age: Appears Stated  Behavior Towards Examiner: Distracted or uninterested  Motor Activity: Within normal   Eye Contact: Avoidant  Mood: Content  Affect: Congruent w/content of speech  Speech/Language: Within normal  Attention: Distractible  Concentration: Brief  Thought Process: Within normal  Thought Content: Auditory  Within normal  Orientation: X 3No Evidence of Impairment  Memory: No Evidence of Impairment  Judgement: No Evidence of Impairment  Estimated Intelligence: Average  Demonstrated Insight: Adequate  Fund of Knowledge: adequate    Current living situation (including household membership and housing status):   Patient lives with his parents and 4 younger siblings, in an apartment in [place].    Basic needs status including economic status:   Patient has a number of medical bills that his parents are helping him figure out how to pay off. The family is receiving assistance from " "social work with this.    Education level:   Patient did not receive a formal education in [place] or [place]. Patient's dad reported that patient attended an adult school in [place] and learned a little English, such as the alphabet and how to write his name, but struggled with learning.    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    Significant personal relationships (including recipient's evaluation of relationship quality:  Patient's primary relationship includes his father, who is his PCA. When asked about their relationship, patient's father stated, \"he doesn't really follow directions, not sure if he doesn't understand or just doesn't want to do it. His mind is somewhere else.\" Patient also has an adequate relationship with his mother and 6 other siblings. Patient's dad reported that patient never talks to his siblings, he just \"glares\" or looks at them, no relationship building but no major issues either.     Strengths and resources (including extent and quality of social networks):  Patient was not able to identify any strengths, patient's father stated, \"since he has this disease that diminishes his abilities.\"    Belief system:  Patient identifies as Jehovah's witness.    Contextual non-personal factors contributing to the recipient's presenting concerns:  Patient was exposed to war trauma in [place] and lived in the refugee camps in [place] prior to coming to the [place] in [place].    General physical health and relationship to recipient's culture:  Patient understands and is receptive to using Western medicine and following doctor's orders.    Current medications:    Current Outpatient Prescriptions:      albuterol (PROAIR HFA;PROVENTIL HFA;VENTOLIN HFA) 90 mcg/actuation inhaler, Inhale 2 puffs every 6 (six) hours as needed for wheezing., Disp: 1 each, Rfl: 0     cetirizine (ZYRTEC) 10 MG tablet, Take 1 tablet (10 mg total) by mouth " "daily., Disp: 20 tablet, Rfl: 0     fludrocortisone (FLORINEF) 0.1 mg tablet, Take 2 tablets (0.2 mg total) by mouth daily., Disp: 120 tablet, Rfl: 1     hydrocortisone (CORTEF) 10 MG tablet, TAKE TWO TABLETS (20MG) EVERY MORNING WITH BREAKFAST AND ONE TABLET (10MG) EVERY AFTERNOON AROUND 2PM, Disp: 270 tablet, Rfl: 0     levothyroxine (SYNTHROID) 75 MCG tablet, Take 1 tablet (75 mcg total) by mouth daily., Disp: 90 tablet, Rfl: 3     OLANZapine (ZYPREXA) 10 MG tablet, TAKE 1 TABLET (10 MG TOTAL) BY MOUTH AT BEDTIME FOR DEPRESSION, Disp: 30 tablet, Rfl: 10      Substance use, abuse, or dependency:  Patient denied any chemical use or abuse or history.    Medical History  Past Medical History:   Diagnosis Date     Asthma        Other standardized screening instruments:  WHODAS 2.0 score: 33/48=69% Severe disability    Clinical summary that explains the provisional diagnosis:  Patient is a 26-year-old Angela male who presents for a diagnostic assessment for referral to psychiatry for medication management. Patient required the assistance of a professional . Patient's father is also his PCA, and provided much of the information during today's assessment. When asked for input, patient agreed with his father's statements. Patient was born in [place], exposed to war trauma, moved to the refugee camps in [facility] [place], and came [place] the [place] with his family. Patient and his family was previously living in [place] and recently moved to [place] 4 months ago. Patient lives with his parents and 4 younger siblings, in an apartment in [place]. Patient also has 2 older siblings who are living independently. Patient's dad reported that patient never talks to his siblings, he just \"glares\" or looks at them, no relationship building but no major issues either. Patient endorses auditory hallucinations, that started when he came [place]. Patient's father reports that patient would hear voices that say \"many " "things that scare him,\" to the point where father would put cotton balls in patient's ear to help drown out the voices. Patient's father also report negative symptoms, such as diminished emotional expression and avolition. Patient's father stated that patient needs assistance to clean up after himself, shower and brush his teeth, and reminders to eat meals. Patient endorsed some anxiety and depressed mood and having nightmares occasionally. Patient denied any self injurious behaviors and suicidal or homicidal ideation. Patient denied any chemical use, abuse, or history. Patient reports taking his medications as prescribed, stating that it has helped minimize the auditory hallucinations. Based upon the reported symptoms and reported problems, patient meets DSM-5 criteria for Schizophrenia, unspecified type. Patient will be referred to psychiatry to establish care for medication management.    Diagnosis:   1. Schizophrenia, unspecified type          AMANDA Mac  "

## 2021-06-13 NOTE — PROGRESS NOTES
9-22-17  Called and spoke to patient's father through Angela  [facility].  Instructed father to bring in copy of son's SSI benefit statement and 2 months of bank statements on 9-26-17.  Father confirmed appoitment 9-26-17 at 10:30am and transportation  9:30am.    RN comes out every 2 weeks for medication set up. Goal completed.    Plan:  monthly  Meet 9-26-17 bank statements and SSI benefit.

## 2021-06-13 NOTE — PROGRESS NOTES
Pt is here to establish psychiatric care. He says he is doing better. Reports feeling down. He denies having any SI, no auditory hallucinations since he started Zyprexa approximately one year ago.   Client c/o of nightmares onece or twice per week.    Correct pharmacy verified with patient and confirmed in snapshot? [] yes []no    Charge captured ? [x] yes  [] no    Medications Phoned  to Pharmacy [] yes [x]no  Name of Pharmacist:  List Medications, including dose, quantity and instructions      Medication Prescriptions given to patient   [] yes  [x] no   List the name of the drug the prescription was written for.       Medications ordered this visit were e-scribed.  Verified by order class [x] yes  [] no  Zyprexa and Prazosin  Medication changes or discontinuations were communicated to patient's pharmacy: [] yes  [x] no    UA collected [] yes  [x] no    [place] Prescription Monitoring Program Reviewed? [] yes  [x] no    Referrals were made to:   psychology    Future appointment was made: [x] yes  [] no    Dictation completed at time of chart check: [x] yes  [] no    I have checked the documentation for today s encounters and the above information has been reviewed and completed.

## 2021-06-13 NOTE — PROGRESS NOTES
Assessment  Pt, pt's father, , SW intern and SW present for meeting to assist with [facility] financial aid paperwork and other medical bills. SW assisted pt's father with completing the financial aid paperwork--SW struggled with this as pt's father was not able to report information very well and SW had to explain several times what supporting documents they will need to also include with the application. SW also assisted pt with calling [employer] to learn more about status of bills, pt still owing $180. SW was not able to get to the other bills due to time limitation, SW scheduled another appt.      Action Plan:    will:  1) Meet with pt again to assist with additional medical bills      Care Guide will:  Care Guide Delegation:   1)  Due Date: Before 11/1/17         Delegation: Please call and remind pt and his father of appt with CCC SW on 11/1/17 at 2:00 PM to assist with medical bills.    Subjective   Pt and pt's father were present for the meeting with SW to assist with completing [facility] application for financial aid. SW assisted with completing the application, however, the pt's father was not able to report very well a lot of the financial information and SW had to clarify several times what information SW was needing for the form. SW also had to clarify several times what supporting documents the pt will need to send in with the form--SW had called [facility] billing and was informed that they need to send in the financial information for the whole household, not just the pt. Pt's father was not sure how he could obtain the pt's SSI award letter--SW educated him to go to the local office for this need. SW gave pt's father an addressed envelope for them to mail the application and supporting documents in to [facility] for financial assistance. SHIRA was also informed that some of the pt's bills may have already gone to collections and the financial aid might not be able to assist at this point--pt does  "have some bills that are in collections from [facility] that SHIRA saw. SHIRA educated the pt's father to wait until after pt has been approved for financial aid then look into this to see if they were taken back out of collections.    SHIRA also assisted with calling [facility] to learn more about current balance as last meeting SW was under the impression pt only owed $80 and has paid this. SHIRA was informed that [facility]'s \"systems have changed\" and that \"some of the pt's bills were still in the old system\" but that now they were all in the new and it is showing pt still owes $180--with the $80 already paid accounted for. SHIRA was informed sliding fee has already been applied, this will be as low as the amount goes. SHIRA educated pt's father on this, pt's father said he couldn't mail in a check today but will in early November--SHIRA showed him where he needs to mail the check.    There were still other bills that needed to be addressed, however, due to time limitation SHIRA was not able to get to this. SHIRA scheduled another appt for 11/1/17 at 2:00 PM to address this.          Objective    Appearance: casually dressed, normal gait.      Behavior: Cooperative, alert, allowed father to talk on his behalf most of meeting       Speech: Non-English speaking; seemed clear, spoke more during this meeting than last meeting with SHIRA.      Emotion/Affect: Calm      Thought Processes:not assessed      Orientation: not assessed      Memory:not assessed        General Intellectual Abilities: not assessed      Judgment:not assessed       Insight:not assessed       Clinical Recommendations: Pt does not speak much during meetings, father does, however SW did not get the impression it was not overbearing. Pt and pt's father struggle with navigating billing due to language barrier, would benefit from staying on top of checking mail to avoid bills going to collection--after more of pt's needs are addressed, CCC should provide information for MORE or other " agencies that can assist with reading mail.

## 2021-06-13 NOTE — PROGRESS NOTES
11/17/2020   Clinic Care Coordination Contact  [facility] : Felipe Martinez     Clinical Data: Care Coordinator Outreach  Outreach attempted x 1.  Angela : Felipe Martinez  left message on patient's voicemail with call back information and requested return call.    Plan: Care Coordinator  will try to reach patient again in 10 business days.      CHW Follow up: 12-1-20

## 2021-06-13 NOTE — PROGRESS NOTES
Assessment  Pt, pt's father and  present for the meeting. SHIRA assisted with contacting facilities in [place] where pt had outstanding medical bills--[facility] and [facility] Pt will be applying for financial aid for [facility] and pt had sliding fee scale cost assist applied to account with [facility]--at this time pt is owing $80 to cover the bills for them. Pt's father would benefit from having a reliable source to seek assistance from to read and interpret mail to stay on top of bills and services.       Action Plan:    will:  1) Call pt's father with update on new amount owed to [facility].  2) Assist pt's father with completing financial aid paperwork for [facility] once they receive it.      Care Guide will:  Care Guide Delegation:   1)  Due Date:  None at this time       Delegation:      Subjective   Pt, pt's father and  present for meeting with SHIRA to discuss outstanding medical bills from [place]. SHIRA reviewed the medical bills, discovered they were all from 2 medical facilities--[facility] and [facility] [employer]. SHIRA assisted with contacting [facility] and was informed by billing that none of the dates of service pt has with their facility can be billed by [facility]. SHIRA was informed [facility] does have a financial aid program and that they will mail the pt the paperwork. SHIRA was informed pt will need to send the paperwork back within the time frame--will say on the paperwork and then pt will be determined for financial aid. SHIRA informed pt's bills are on hold now due to this process. SHIRA informed pt's father that when he gets this paperwork, he should let the CG know and CG will schedule with SW to assist getting this paperwork completed.     SHIRA noticed one of the bills was with a collection agency regarding a bill from [facility]. SHIRA contacted this agency and inquired about status of this if pt is applying for financial aid with [facility]. SHIRA was informed collections  will still be attempting to collect regardless of this until they are contacted by [facility]. SHIRA informed worker that pt is applying for financial aid and cannot pay the bill.    SW contacted [employer] About bills and was informed pt owes $292 currently--more than amount on the paper bills pt brought. SW was informed pt had applied for sliding fee cost and this has already been applied. Worker informed SW pt could be eligible for additional sliding fee cost assist, will check with department and call SW back.    SW received a return call and was notified pt does qualify for additional sliding fee assist and pt now owes $80 and can pay this via credit debit if he needs to. SW informed pt should call back and ask for [facility] to assist.    Pt already left appt, SHIRA will call and follow up with father on this and inquire as to if he needs assist to call and get this paid off.          Objective    Appearance: casually dressed, moderately groomed, normal gait.       Behavior: Quiet, did not interact with SW       Speech: Non-English; did not speak much during meeting.      Emotion/Affect: Calm      Thought Processes:not assessed    Orientation: not assessed    Memory: not assessed      General Intellectual Abilities: not assessed      Judgment: not assessed       Insight: not assessed      Clinical Recommendations: Pt seems to have a good support system via his father. Pt would benefit from having services to assist his father with keeping on top of mail--language barrier. Still unclear to SHIRA as to if pt's family would like to pursue CADI waiver--changing pt's insurance. Pt may benefit from CADI services.

## 2021-06-13 NOTE — PROGRESS NOTES
9-20-17  Received call from patient's father.  Spoke to father through Angela  [employer].  Father stated he received letter and form in the mail and requested to see  as soon as possible. Stated it is regarding the bills.  Father requested to drop off the letter and form for .      Father drop off letter and form at the clinic.    Plan:   Schedule appt with  9-26-17 at 9am.

## 2021-06-13 NOTE — PROGRESS NOTES
FOOT AND ANKLE SURGERY/PODIATRY CONSULT NOTE         ASSESSMENT:   Paronychia left great toe      TREATMENT:  The patient was placed on Keflex 500 mg 1 tab 3 times daily.  He was given written instructions to soak the toe when warm water and peroxide.  He is to apply bacitracin and a Band-Aid for 2 weeks.  He was started on Tylenol 3 1 tab every 4-6 hours as needed pain.  I have asked the patient to return to the clinic in 2 weeks for follow-up visit.         HPI: I was asked to see Toe T Po today evaluate and treat an infected left great toe.  The patient stated that approximately 2 weeks ago the left great toenail fell off spontaneously.  At that the nail had fallen off he attempted to remove the remaining portion of the toenail.  Since that time he has developed redness, pain, swelling, drainage on the top of the left great toe.  The toe was quite painful.  He rates the pain a 5-6 of the pain scale.  There are no factors which relieve the pain.  He has pain even while resting.  The pain is aggravated with weightbearing and ambulation.  He denies any other previous treatment.  The patient was seen in consultation at the request of Karyn Guzmán PA-C for evaluation and treatment of infected left great toe.     Past Medical History:   Diagnosis Date     Asthma        No past surgical history on file.    No Known Allergies      Current Outpatient Medications:      acetaminophen (TYLENOL) 325 MG tablet, TAKE 1-2 TABLETS (650 MG TOTAL) BY MOUTH EVERY 6 (SIX) HOURS AS NEEDED FOR PAIN., Disp: 30 tablet, Rfl: 0     albuterol (PROAIR HFA;PROVENTIL HFA;VENTOLIN HFA) 90 mcg/actuation inhaler, INHALE 2 PUFFS EVERY 6 (SIX) HOURS AS NEEDED FOR WHEEZING., Disp: 18 g, Rfl: 0     cholecalciferol, vitamin D3, (VITAMIN D3) 25 mcg (1,000 unit) capsule, TAKE 2 PILLS BY MOUTH EVERYDAY FOR BONE HEALTH, Disp: 60 capsule, Rfl: 5     famotidine (PEPCID) 20 MG tablet, TAKE 1 TABLET (20 MG TOTAL) BY MOUTH 2 (TWO) TIMES A DAY FOR  STOMACH, Disp: 60 tablet, Rfl: 1     fexofenadine (ALLEGRA) 180 MG tablet, TAKE 1 TABLET (180 MG TOTAL) BY MOUTH DAILY FOR ALLERGIES, Disp: 90 tablet, Rfl: 1     fludrocortisone (FLORINEF) 0.1 mg tablet, TAKE 1 TABLET BY MOUTH DAILY., Disp: 90 tablet, Rfl: 0     fluticasone propion-salmeteroL (ADVAIR HFA) 115-21 mcg/actuation inhaler, Inhale 2 puffs 2 (two) times a day., Disp: 1 Inhaler, Rfl: 12     fluticasone propionate (FLONASE) 50 mcg/actuation nasal spray, USE 1 SPRAY IN EACH NOSTRIL EVERY DAY, Disp: 48 g, Rfl: 3     hydrocortisone (CORTEF) 5 MG tablet, Take 2 tablets (10 mg total) by mouth every morning AND 1 tablet (5 mg total) every evening., Disp: 270 tablet, Rfl: 1     levothyroxine (SYNTHROID, LEVOTHROID) 88 MCG tablet, Take 1 tablet (88 mcg total) by mouth daily., Disp: , Rfl:      multivitamin (ONE A DAY) per tablet, TAKE 1 TABLET BY MOUTH DAILY., Disp: 120 tablet, Rfl: 2     OLANZapine (ZYPREXA) 5 MG tablet, TAKE 1 PILL BY MOUTH IN THE MORNING FOR ANXIETY, Disp: 30 tablet, Rfl: 3     paliperidone (INVEGA) 6 MG 24 hr tablet, Take 1 tablet (6 mg total) by mouth at bedtime., Disp: 30 tablet, Rfl: 3     TRAVEL SICKNESS, MECLIZINE, 25 mg chewable tablet, TAKE 1 TABLET (25 MG TOTAL) BY MOUTH 3 (THREE) TIMES A DAY AS NEEDED FOR DIZZINESS OR NAUSEA., Disp: 30 tablet, Rfl: 0     triamcinolone (KENALOG) 0.1 % cream, Apply thin layer to affected areas twice daily as needed, Disp: 45 g, Rfl: 0    No family history on file.    Social History     Socioeconomic History     Marital status: Single     Spouse name: Mikki Lloyd     Number of children: 4     Years of education: Not on file     Highest education level: Not on file   Occupational History     Not on file   Social Needs     Financial resource strain: Not on file     Food insecurity     Worry: Not on file     Inability: Not on file     Transportation needs     Medical: Not on file     Non-medical: Not on file   Tobacco Use     Smoking status: Never Smoker      Smokeless tobacco: Never Used     Tobacco comment: no second hand smoke exposure   Substance and Sexual Activity     Alcohol use: No     Drug use: No     Sexual activity: Not on file   Lifestyle     Physical activity     Days per week: Not on file     Minutes per session: Not on file     Stress: Not on file   Relationships     Social connections     Talks on phone: Not on file     Gets together: Not on file     Attends [employer] service: Not on file     Active member of club or organization: Not on file     Attends meetings of clubs or organizations: Not on file     Relationship status: Not on file     Intimate partner violence     Fear of current or ex partner: Not on file     Emotionally abused: Not on file     Physically abused: Not on file     Forced sexual activity: Not on file   Other Topics Concern     Not on file   Social History Narrative     Not on file       Review of Systems - Patient denies fever, chills, rash, wound, stiffness, limping, numbness, weakness, heart burn, blood in stool, chest pain with activity, calf pain when walking, shortness of breath with activity, chronic cough, easy bleeding/bruising, swelling of ankles, excessive thirst, fatigue, depression, anxiety.  Patient admits to painful infected left great toe.      OBJECTIVE:  Appearance: alert, well appearing, and in no distress.    There were no vitals filed for this visit.    BMI= There is no height or weight on file to calculate BMI.    General appearance: Patient is alert and fully cooperative with history & exam.  No sign of distress is noted during the visit.  Psychiatric: Affect is pleasant & appropriate.  Patient appears motivated to improve health.  Respiratory: Breathing is regular & unlabored while sitting.  HEENT: Hearing is intact to spoken word.  Speech is clear.  No gross evidence of visual impairment that would impact ambulation.    Vascular: Dorsalis pedis and posterior tibial pulses are palpable. There is no pedal hair  growth bilaterally.  CFT < 3 sec from anterior tibial surface to distal digits bilaterally. There is no appreciable edema noted.  Dermatologic: The left great toenail has been removed.  The nailbed shows edema, erythema, cellulitis, drainage at the level of the eponychium.  Turgor and texture are within normal limits. No coloration or temperature changes. No primary or secondary lesions noted.  Neurologic: All epicritic and proprioceptive sensations are grossly intact bilaterally.  Musculoskeletal: All active and passive ankle, subtalar, midtarsal, and 1st MPJ range of motion are grossly intact without pain or crepitus, with the exception of none. Manual muscle strength is within normal limits bilaterally. All dorsiflexors, plantarflexors, invertors, evertors are intact bilaterally. Tenderness present to the left great toe on palpation. Tenderness to the left great toe with range of motion. Calf is soft/non-tender without warmth/induration    Imaging:         No results found.       Christiano Beckwith; JULIEN  [facility] Foot & Ankle Surgery/Podiatry

## 2021-06-13 NOTE — PROGRESS NOTES
"      Date of Service:  2017    Name:  Brooke Peterson  :  1990  MRN:  [id number]    HPI:   Brooke Peterson is a 27 y.o. male with a history of schizophrenia presenting to the clinic today to establish psychiatric acre for medication management .Patient does not speak English, therefore,  services were used to address the complexities of language interpretation in this meeting    Per chart Review from   10/24/17 \" Patient was exposed to war trauma in [facility] and lived in the refugee camps in [facility] prior to coming [place] in .  Patient endorses auditory hallucinations, that started when he came to [place] in .  Patient and his family was previously living in [place] and recently moved to [place] 4 months ago  Patient's father is also his PCA.  The patient statement also has a nurse who comes home to set up his medications on a weekly basis.  He did bring medications in pill boxes with him today      Patient was interviewed alone and also together with his father who was present for the appointment in the interview per his request.  The patient statement he believes is doing well.  He stopped experiencing hallucinations after he started taking his Zyprexa.  He continues to experience nightmares.  I propose starting him on a low-dose processing to cover the nightmares and he was agreeable.  I also recommended that he starts psychotherapy and he agreed and edematous referral for that.  His father who is also his PCA was present with him he is before 4 hours of PCA at home.  He tells me that other family members unable to do with patients when he is not with him.  He was reluctant to give me a lot of information but eventually tell me that he will continue observing patients and reporting to me any changes he notices.  He does not want any medication changes.  I will therefore only add prazosin for the nightmares and keep him on Zyprexa 10 mg.  I discussed medications benefits and " "side effects profile including metabolic side effects and the risk of tardive dyskinesia with neuroleptic medications patient endorsed understanding and signed neuroleptic consent form.  All questions answered.  I will have him return in 4 weeks for follow-up appointment meanwhile I did ask him to call in between visits with any questions or concerns        Psychiatric History:  Current psychiatrist:  Denies   Current psychotherapist: None.  Current :Unknown  Diagnoses:Schizophrenia, unspecified type   Hospitalizations: Denies   Suicide attempts: Denies   Current medications: Zyprexa 10 mg  Electroconvulsive therapy:Denies .  Judicial commitments: Denies         Chemical use History:            Patient denied any chemical use or abuse or history.           Past Medical History:           Patient Active Problem List   Diagnosis     Hypotension     Adrenal insufficiency     Schizophrenia     Hypothyroidism     Past Medical History:   Diagnosis Date     Asthma        No past surgical history on file.     Family Psychiatric History:      Mental illness: Denies   Addiction: Denies   Suicide: Denies       Social History:       Marital Status : Single   Number of children: None .  Current living circumstances: . Patient lives with his parents and 4 younger siblings, in an apartment in [place]  Current sources of financial support: Unemployed .[employer]    Obstetric History:  Last menstrual period: No LMP for male patient.       History:  Denied  service.    Access to weapons  Denies access to weapons.            Trauma & Abuse History:  Major accidents and injuries: Denies  Concussion or traumatic brain injury: Denies   Abuse/ [place]: Patient was exposed to war trauma in [facility] and lived in the refugee camps in [facility] prior to coming [place].    Spiritual History:  Sources of hope, meaning, comfort, strength, peace and love: \" Family \"   Part of an organized Religious: Patient identifies " as Mosque    Birth & Development History:  City and state of birth: [facility]  Highest education achieved:No formal education in [facility]. Adult English Classes while in [place]     Legal History:  DWI : Denies  Number of arrests: Denies.  Longest period of incarceration: Denies.  Probation/parole status: Denies .      [place] Prescription Monitoring Program:  No worrisome pharmacy activity.  Not indicated for this patient.    Medications:   These were reviewed.    Current Outpatient Prescriptions on File Prior to Visit   Medication Sig Dispense Refill     albuterol (PROAIR HFA;PROVENTIL HFA;VENTOLIN HFA) 90 mcg/actuation inhaler Inhale 2 puffs every 6 (six) hours as needed for wheezing. 1 each 0     cetirizine (ZYRTEC) 10 MG tablet Take 1 tablet (10 mg total) by mouth daily. 20 tablet 0     fludrocortisone (FLORINEF) 0.1 mg tablet Take 2 tablets (0.2 mg total) by mouth daily. 120 tablet 1     hydrocortisone (CORTEF) 10 MG tablet TAKE TWO TABLETS (20MG) EVERY MORNING WITH BREAKFAST AND ONE TABLET (10MG) EVERY AFTERNOON AROUND 2PM 270 tablet 0     levothyroxine (SYNTHROID) 75 MCG tablet Take 1 tablet (75 mcg total) by mouth daily. 90 tablet 3     OLANZapine (ZYPREXA) 10 MG tablet TAKE 1 TABLET (10 MG TOTAL) BY MOUTH AT BEDTIME FOR DEPRESSION 30 tablet 10     No current facility-administered medications on file prior to visit.          Lab Results:   Personally reviewed and discussed with the patient    Lab Results   Component Value Date    WBC 6.2 07/24/2017    HGB 15.0 07/24/2017    HCT 43.0 07/24/2017     (L) 07/24/2017     08/17/2017    K 3.7 08/17/2017     08/17/2017    CREATININE 0.77 08/17/2017    BUN 14 08/17/2017    CO2 26 08/17/2017    TSH 2.12 07/29/2017     No results found for: PHENYTOIN, PHENOBARB, VALPROATE, CBMZ      Vital signs:  There were no vitals taken for this visit.  Vitals:    11/06/17 1040   BP: 111/73   Patient Site: Left Arm   Patient Position: Sitting   Cuff Size:  "Adult Regular   Pulse: 81   Temp: 98.4  F (36.9  C)   TempSrc: Oral   Weight: 131 lb (59.4 kg)   Height: 5' 2.6\" (1.59 m)     Allergies:      No Known Allergies    Associated Clinical Documents:       Notes reviewed in EPIC and [facility] including: medication reconciliation, progress notes, recent labs, PMH, and OSH records.    ROS:      10 point ROS was negative except for the items listed in HPI.  No Medication s/e's      MSE:      Alert & oriented x 3.   Appearance: Appears stated age, casually dressed.  Speech: Normal rate, rhythm and tone.  Gait: Normal.  Musculoskeletal: Normal strength, no abnormal movements.  Mood/Affect: Neutral.  Thought Process: Normal rate, logical.  Thought Content: No suicide or homicide ideation.  Associations: Intact, no delusions.  Perceptions: No hallucinations.  Memory: recent and remote memory intact.  Attention span and concentration: normal.  Language: Intact.  Fund of Knowledge: Normal.  Insight and Judgement: Adequate.    Impression:       Schizophrenia, unspecified type       Plan:         Patient and I reviewed diagnosis and treatment plan.   Reviewed risks/benefits of medication with patient.  Ongoing education given regarding diagnostic and treatment options with adequate verbalization of understanding  Patient agrees with following recommendations:    1. Start prazosin 2 mg for nightmares.    Continue  Zyprexa 10 mg schizophrenia-neuroleptic medication consent form signed  3.  Referral for psychotherapy made  4.  Return to the clinic in 4 weeks call in between visits with any questions or concerns      Total Time:      60 Minutes spent on this visit with >50% time spent on  discussing and educating patient about diagnosis, treatment options, risks, benefits ,side effects of medications and instructions for follow up.  Time also spent on reviewing  EHR, documentation and entering orders.      This dictation was completed with speech recognition software and there may be " unintended word substitutions.

## 2021-06-14 NOTE — PROGRESS NOTES
Chief Complaint   Patient presents with     Dizziness     ongoing     Fatigue     ongoing     Nausea     ongoing       Subjective:  30 y.o. male with concerns as above.  Very difficult historian.  He cannot name care locations, though thinks some mental health help from [facility].  SAW signed for them  Psych meds from PMD (me).    The above complaints are fairly constant since I met him.  Unsure if involvement from Addisons, psych meds, psych disease, or all of the above.    No one in his family drives.  Nurse to house one time per week.    Gaining weight.  Wt Readings from Last 3 Encounters:   02/01/21 171 lb 12 oz (77.9 kg)   12/28/20 167 lb (75.8 kg)   12/16/20 167 lb (75.8 kg)      Outpatient Medications Prior to Visit   Medication Sig Dispense Refill     acetaminophen (TYLENOL) 325 MG tablet TAKE 1-2 TABLETS (650 MG TOTAL) BY MOUTH EVERY 6 (SIX) HOURS AS NEEDED FOR PAIN. 30 tablet 0     acetaminophen-codeine (TYLENOL #3) 300-30 mg per tablet Take 1 tablet by mouth every 6 (six) hours as needed for pain. 20 tablet 0     albuterol (PROAIR HFA;PROVENTIL HFA;VENTOLIN HFA) 90 mcg/actuation inhaler INHALE 2 PUFFS EVERY 6 (SIX) HOURS AS NEEDED FOR WHEEZING. 18 g 4     cholecalciferol, vitamin D3, (VITAMIN D3) 25 mcg (1,000 unit) capsule TAKE 2 PILLS BY MOUTH EVERYDAY FOR BONE HEALTH 60 capsule 5     famotidine (PEPCID) 20 MG tablet TAKE 1 TABLET (20 MG TOTAL) BY MOUTH 2 (TWO) TIMES A DAY FOR STOMACH 180 tablet 3     fexofenadine (ALLEGRA) 180 MG tablet TAKE 1 TABLET (180 MG TOTAL) BY MOUTH DAILY FOR ALLERGIES 90 tablet 1     fludrocortisone (FLORINEF) 0.1 mg tablet TAKE 1 TABLET BY MOUTH DAILY. 90 tablet 0     fluticasone propion-salmeteroL (ADVAIR HFA) 115-21 mcg/actuation inhaler Inhale 2 puffs 2 (two) times a day. 1 Inhaler 12     fluticasone propionate (FLONASE) 50 mcg/actuation nasal spray USE 1 SPRAY IN EACH NOSTRIL EVERY DAY 48 g 3     hydrocortisone (CORTEF) 5 MG tablet Take 2 tablets (10 mg total) by mouth  every morning AND 1 tablet (5 mg total) every evening. 270 tablet 1     levothyroxine (SYNTHROID, LEVOTHROID) 88 MCG tablet Take 1 tablet (88 mcg total) by mouth daily.       meclizine (ANTIVERT) 32 MG chewable tablet TAKE 1 TABLET (25 MG TOTAL) BY MOUTH 3 (THREE) TIMES A DAY AS NEEDED FOR DIZZINESS OR NAUSEA. 30 tablet 6     multivitamin (ONE A DAY) per tablet TAKE 1 TABLET BY MOUTH DAILY. 120 tablet 2     OLANZapine (ZYPREXA) 5 MG tablet TAKE 1 PILL BY MOUTH IN THE MORNING FOR ANXIETY 30 tablet 3     paliperidone (INVEGA) 6 MG 24 hr tablet Take 1 tablet (6 mg total) by mouth at bedtime. 30 tablet 3     triamcinolone (KENALOG) 0.1 % cream Apply thin layer to affected areas twice daily as needed 45 g 0     No facility-administered medications prior to visit.       Social History     Tobacco Use   Smoking Status Never Smoker   Smokeless Tobacco Never Used   Tobacco Comment    no second hand smoke exposure      Objective:  /77 (Patient Site: Left Arm, Patient Position: Sitting, Cuff Size: Adult Regular)   Pulse (!) 108   Temp 97.7  F (36.5  C) (Temporal)   Wt 171 lb 12 oz (77.9 kg)   BMI 30.42 kg/m    GENERAL: alert, not distressed  CHEST: clear, no rales, rhonchi, or wheezes  CARDIAC: regular without murmur, gallop, or rub  ABDOMEN: soft, non tender, non distended, normal bowel sounds    Assessment and Plan:   1. Schizoaffective disorder, depressive type, with catatonia (H)  Oversight from psychiatry would be appreciated.  If his only MH symptoms are dizziness and nausea, I think we may need to consider him controlled and at baseline.  - AMB REFERRAL TO MENTAL HEALTH AND ADDICTION  - Adult (18+); Psychiatry, ECT and Medication Management; Psychiatry; [facility] [phone]; We will contact you to schedule the appointment or please call with any ques...    2. Adrenal insufficiency (H)  Will try to get follow up with endocrine.  Check labs.  BP is good.  - Comprehensive  Metabolic Panel  - [facility](CBC w/o Differential)  - [facility]    3. Hypothyroidism, unspecified type  Will recheck labs today  - Thyroid Stimulating Hormone (TSH)  - T4, Free    4. Dizziness  The above complaints are fairly constant since I met him.  Unsure if involvement from Addisons, psych meds, psych disease, or all of the above.  Add to that the difficulty in translating dizziness from Angela, which can be a catch all term.  Will ask neuro to evaluate.  Hx of pituitary tumor.  Normal PRL October 2020.  Imaged with contrast 2017 and without (but not focused exam) 2019.    - Ambulatory referral to Neurology - [facility]  [place]

## 2021-06-14 NOTE — PROGRESS NOTES
1/18/2021   Clinic Care Coordination Contact    Community Health Worker Follow Up    Goals:   Goals Addressed                 This Visit's Progress       Patient Stated      Mental Health Management (pt-stated)   30%     Goal Statement- I want to be connected to a provider to manage my mental health medications within one month    Action steps to achieve this goal  1.  I will attend Diagnostic Assessment appointment on 1-27-21 at 9:45am at [facility] with brett Quiroz to complete DA before seeing psychiatrist.  2. I will update [facility] team at next outreach.     [facility]  [address]  [place] [address]   [phone]  Fax: [phone]      Updated: 1-18-21 AL  Date goal set:  12/6/2019 BC            Other (pt-stated)        Goal Statement: I would like support to complete forms from [employer] office by 1-29-21 and return form by 2-2-21 in order to continue with S. S. I benefit.   Date Goal set: 1/18/2021  Barriers: language  Strengths: support from sister and father  Date to Achieve By: 2-1-21  Patient expressed understanding of goal: yes  Action steps to achieve this goal:  1. I will talk to [facility] SW on 1-25-21 at 9am for support regarding the form from [employer] office and return before 2-2-21.  2. I will update [facility] Team at next outreach.              Received call from patient and his sister Rebekah.  Stated he received a letter from [facility] office to complete and send back by 2-2-21 or his benefit will be closed.    Scheduled appt with [facility] SW 1-25-21 at 9am for support with SSA document and forms.    Sister stated they drop off the document to the clinic and staff email to [facility].    Reviewed email and forward attached document to [facility] SW.    Contacted [facility] [phone] and spoke Natalia to verify if patient has established psychiatry services at [facility] on 12-10-20   They had telephone appt on 12-10-20 but patient was not able to answer and did had family member to assist with  appt but needed to reschedule to do the DA again with therapist.   Natalia rescheduled for face to face on 1-27-21 at 10am  arrival at 9:45 am with brett Quiroz to complete DA before seeing psychiatrist.  Bring Insurance card, medication list to appt    Called [phone] and spoke patient's home health care RN at [facility] Geeta Mojica RN.  Stated she is no longer patient's home care nurse. She retired.Thanked her for all her support and congratulated on her MCC.  Stated to call [employer] main office to find out her replacement.    Called [employer] [phone] office  and spoke to staff. Stated patient new nurse is Renetta [phone].  Updated in care teams    Called and spoke to DENIS Jackson [phone] and requested if she can fax the current med list to [facility] to Gabriella therapist at [facility]  Provided fax number.  Renetta stated she will fax the current med list to [facility] and will remind him of his appt on 1-27-21 at 9:45am.      appt with [facility] 1-25-21 at 9am  CHW Follow up: Monthly    CHW Plan: set up medical transportation for 1-27-21 appt at 9:45am to [facility]  Follow up on goals    CHW Next Follow Up: 2-25-21

## 2021-06-14 NOTE — TELEPHONE ENCOUNTER
Requested Prescriptions     Pending Prescriptions Disp Refills     OLANZapine (ZYPREXA) 5 MG tablet [Pharmacy Med Name: OLANZAPINE 5 MG TABS 5 Tablet] 30 tablet 3     Sig: TAKE 1 PILL BY MOUTH IN THE MORNING FOR ANXIETY

## 2021-06-14 NOTE — TELEPHONE ENCOUNTER
Brooke Po would like to see PCP to discuss dizziness     Will need help with transportation once scheduled. Please let me know and I can help schedule.    Thank you!

## 2021-06-14 NOTE — TELEPHONE ENCOUNTER
Refill Approved    Rx renewed per Medication Renewal Policy. Medication was last renewed on 10/21/20.12/15/20.    Falguni Shetty, [facility] Triage/Med Refill 1/28/2021     Requested Prescriptions   Pending Prescriptions Disp Refills     albuterol (PROAIR HFA;PROVENTIL HFA;VENTOLIN HFA) 90 mcg/actuation inhaler [Pharmacy Med Name: ALBUTEROL SULFATE  ( 108 (90 BAS Aerosol] 18 g 0     Sig: INHALE 2 PUFFS EVERY 6 (SIX) HOURS AS NEEDED FOR WHEEZING.       Albuterol/Levalbuterol Refill Protocol Passed - 1/27/2021 12:24 PM        Passed - PCP or prescribing provider visit in last year     Last office visit with prescriber/PCP: 10/8/2020 Jose Rangel MD OR same dept: 12/4/2020 Karyn Guzmán PA-C OR same specialty: 12/4/2020 Karyn Guzmán PA-C Last physical: Visit date not found       Next appt within 3 mo: Visit date not found  Next physical within 3 mo: Visit date not found  Prescriber OR PCP: Jose Rangel MD  Last diagnosis associated with med order: 1. Moderate persistent asthma without complication  - albuterol (PROAIR HFA;PROVENTIL HFA;VENTOLIN HFA) 90 mcg/actuation inhaler [Pharmacy Med Name: ALBUTEROL SULFATE  ( 108 (90 BAS Aerosol]; INHALE 2 PUFFS EVERY 6 (SIX) HOURS AS NEEDED FOR WHEEZING.  Dispense: 18 g; Refill: 0    2. Cough  - famotidine (PEPCID) 20 MG tablet [Pharmacy Med Name: FAMOTIDINE 20 MG TABS 20 Tablet]; TAKE 1 TABLET (20 MG TOTAL) BY MOUTH 2 (TWO) TIMES A DAY FOR STOMACH  Dispense: 60 tablet; Refill: 1    3. Dizziness  - meclizine (ANTIVERT) 32 MG chewable tablet [Pharmacy Med Name: MECLIZINE HCL 25 MG CHEW 25 Tablet]; TAKE 1 TABLET (25 MG TOTAL) BY MOUTH 3 (THREE) TIMES A DAY AS NEEDED FOR DIZZINESS OR NAUSEA.  Dispense: 30 tablet; Refill: 0    If protocol passes may refill for 6 months if within 3 months of last provider visit (or a total of 9 months). If patient requesting >1 inhaler per month refill x 6 months and have patient make appointment with  provider.             famotidine (PEPCID) 20 MG tablet [Pharmacy Med Name: FAMOTIDINE 20 MG TABS 20 Tablet] 60 tablet 1     Sig: TAKE 1 TABLET (20 MG TOTAL) BY MOUTH 2 (TWO) TIMES A DAY FOR STOMACH       GI Medications Refill Protocol Passed - 1/27/2021 12:24 PM        Passed - PCP or prescribing provider visit in last 12 or next 3 months.     Last office visit with prescriber/PCP: 10/8/2020 Jose Rangel MD OR same dept: 12/4/2020 Karyn Guzmán PA-C OR same specialty: 12/4/2020 Karyn Guzmán PA-C  Last physical: Visit date not found Last MTM visit: Visit date not found   Next visit within 3 mo: Visit date not found  Next physical within 3 mo: Visit date not found  Prescriber OR PCP: Jose Rangel MD  Last diagnosis associated with med order: 1. Moderate persistent asthma without complication  - albuterol (PROAIR HFA;PROVENTIL HFA;VENTOLIN HFA) 90 mcg/actuation inhaler [Pharmacy Med Name: ALBUTEROL SULFATE  ( 108 (90 BAS Aerosol]; INHALE 2 PUFFS EVERY 6 (SIX) HOURS AS NEEDED FOR WHEEZING.  Dispense: 18 g; Refill: 0    2. Cough  - famotidine (PEPCID) 20 MG tablet [Pharmacy Med Name: FAMOTIDINE 20 MG TABS 20 Tablet]; TAKE 1 TABLET (20 MG TOTAL) BY MOUTH 2 (TWO) TIMES A DAY FOR STOMACH  Dispense: 60 tablet; Refill: 1    3. Dizziness  - meclizine (ANTIVERT) 32 MG chewable tablet [Pharmacy Med Name: MECLIZINE HCL 25 MG CHEW 25 Tablet]; TAKE 1 TABLET (25 MG TOTAL) BY MOUTH 3 (THREE) TIMES A DAY AS NEEDED FOR DIZZINESS OR NAUSEA.  Dispense: 30 tablet; Refill: 0    If protocol passes may refill for 12 months if within 3 months of last provider visit (or a total of 15 months).                meclizine (ANTIVERT) 32 MG chewable tablet [Pharmacy Med Name: MECLIZINE HCL 25 MG CHEW 25 Tablet] 30 tablet 0     Sig: TAKE 1 TABLET (25 MG TOTAL) BY MOUTH 3 (THREE) TIMES A DAY AS NEEDED FOR DIZZINESS OR NAUSEA.       There is no refill protocol information for this order

## 2021-06-14 NOTE — PROGRESS NOTES
"Mental Health Visit Note    11/28/2017    Start time: 9:10am    Stop Time: 9:55am   Session # 1    Brooke Peterson is a 27 y.o. male is being seen today for    Chief Complaint   Patient presents with      Follow Up     Schizophrenia   .     New symptoms or complaints: None    Functional Impairment:   Personal: 3  Family: 3  Work: Not working  Social:4    Clinical assessment of mental status:   Grooming: Well groomed  Attire: Appropriate  Age: Appears Stated  Behavior Towards Examiner: Cooperative  Motor Activity: Within normal   Eye Contact: Avoidant  Mood: Apathetic  Affect: Flat  Speech/Language: Within normal  Attention: Within normal  Concentration: Brief  Thought Process: Within normal and Slow  Thought Content: Hallucinations: Within noraml and managed with medications  Delusions: Within normal  Orientation: X 3  Memory: Impairment  Judgement: Impairment  Estimated Intelligence: Average  Demonstrated Insight: Diminished  Fund of Knowledge: adequate      Suicidal/Homicidal Ideation present: None Reported This Session    Patient's impression of their current status: Patient presented to appointment and was accompanied by his father/PCA, and professional . Patient stated he does not recall meeting with therapist earlier this month for a diagnostic assessment, dad stated he remembers. Patient reports his mood is down most of the time. He reports having a cold today. He states his appetite is not so good, only eating once a day. Patient reports sleep is good, stating that he used to have nightmares, but medications have helped. No other major concerns reported. Patient has a couple questions about his medical bills, but dad stated they were waiting to receive a letter, and then would call the  to help them with their repayment plan. Dad reports that patient is \"stable,\" and dad had no major concerns. Patient reports spending his days watching TV or movies, \"nothing else to do for fun.\" Patient " "reports his dad is his PCA and is usually home with him. Patient reports being in [place] for 5 months and reports liking it here. Patient recalled being in the refugee [place] in [place], stating that there was \"no snow, hot over there... Here is better, don't want to think about past.\"      Therapist impression of patients current state: Patient presented with apathetic mood and flat affect. Patient was more engaged during this session than initial session. Patient participated more and responded to more questions. Dad also allowed patient the time to answer before jumping in. Patient appears to have short term memory, as he did not recall meeting with me previously. Although patient participated and answered questioned appropriately, patient displayed little insight. Patient endorses feeling down all the time but denied any major concerns. Therapist will continue to build rapport with patient and help identify areas of strength and strategies to improve mood.    Type of psychotherapeutic technique provided: Client centered    Progress toward short term goals:Progress as expected, patient attended follow-up visit and building rapport with therapist.    Review of long term goals: Not done at today's visit    Diagnosis:   1. Schizophrenia, unspecified type        Plan and Follow up: Patient is scheduled for follow up visit with therapist on 12/14/17 at 10am.      Discharge Criteria/Planning: Client has chronic symptoms and ongoing therapy for maintenance stability recommended.    Guevara Her 11/28/2017      "

## 2021-06-14 NOTE — PROGRESS NOTES
12-14-17  Met with patient and patient's father in clinic and follow up on goals and action steps.  Angela : Prabhu Huizar  Patient reported:  -received letter from [employer] that they were not approved for financial assistance for medical bills.  -4 hours of PCA services. Dad is PCA.  -still waiting for Psychiatric hospital worker.      Scheduled patient to meet with  on 12-29-17 at 11 am to complete Intake Questionnaire packet and discuss outstanding medical bills.    Plan:  2 weeks follow up   -check on ARM services

## 2021-06-14 NOTE — PROGRESS NOTES
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    The patient came to the clinic with his father we have an  over the phone.    For the last 2 weeks she has been feeling nauseous and weak and dizzy he actually vomited.    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    His random serum cortisol was 1.8.    ACTH stimulation test baseline is less than 130 minutes 1.460 minutes 1.8.    His ACTH was 1845.    He does not smoke or drink is not .      Component      Latest Ref Rng & Units 7/24/2017 7/25/2017 7/26/2017 7/28/2017           7:00 AM      Sodium      136 - 145 mmol/L  134 (L) 138 137   Potassium      3.5 - 5.0 mmol/L  4.0 4.0 4.2   Chloride      98 - 107 mmol/L  111 (H) 110 (H) 103   CO2      22 - 31 mmol/L  21 (L) 20 (L) 24   Anion Gap, Calculation      5 - 18 mmol/L  2 (L) 8 10   Glucose      70 - 125 mg/dL  185 (H) 140 (H) 82   Calcium      8.5 - 10.5 mg/dL  8.3 (L) 8.7 9.4   BUN      8 - 22 mg/dL  14 13 19   Creatinine      0.70 - 1.30 mg/dL  0.75 0.68 (L) 0.79   GFR MDRD Af Amer      >60 mL/min/1.73m2  >60 >60 >60   GFR MDRD Non Af Amer      >60 mL/min/1.73m2  >60 >60 >60   Magnesium      1.8 - 2.6 mg/dL    2.5   Cortisol      ug/dL       Adrenocorticotropic Hormone      pg/mL 1845 (H)      Phosphorus      2.5 - 4.5 mg/dL    2.9   TSH      0.30 - 5.00 uIU/mL       Prolactin      0.0 - 15.0 ng/mL       Free T4      0.7 - 1.8 ng/dL         Component      Latest Ref Rng & Units 7/29/2017 8/17/2017              Sodium      136 - 145 mmol/L 139 142   Potassium      3.5 - 5.0 mmol/L 4.1 3.7   Chloride      98 - 107 mmol/L 110 (H) 106   CO2      22 - 31 mmol/L 24 26   Anion Gap, Calculation      5 - 18 mmol/L 5 10   Glucose      70 - 125 mg/dL 92 77   Calcium      8.5 - 10.5 mg/dL 8.4 (L) 9.6   BUN      8 - 22 mg/dL 16  14   Creatinine      0.70 - 1.30 mg/dL 0.72 0.77   GFR MDRD Af Amer      >60 mL/min/1.73m2 >60 >60   GFR MDRD Non Af Amer      >60 mL/min/1.73m2 >60 >60   Magnesium      1.8 - 2.6 mg/dL     Cortisol      ug/dL     Adrenocorticotropic Hormone      pg/mL     Phosphorus      2.5 - 4.5 mg/dL     TSH      0.30 - 5.00 uIU/mL 2.12    Prolactin      0.0 - 15.0 ng/mL  7.0   Free T4      0.7 - 1.8 ng/dL  1.0         Review of Systems:    Nervous System: No headache, dizziness, fainting or memory loss. No tingling sensation of hand or feet.  Ears: No hearing loss or ringing in the ears  Eyes: No blurring of vision, redness, itching or dryness.  Nose: No nosebleed or loss of smell  Mouth: No mouth sores or loss of taste  Throat: No hoarseness or difficulty swallowing  Neck: No enlarged thyroid or lymph nodes.  Heart: No chest pain, palpitation or irregular heartbeat. No swelling of hands or feet  Lungs: No shortness of breath, cough, night sweats, wheezing or hemoptysis.  Gastrointestinal: No nausea or vomiting, constipation or diarrhea.  No acid reflux, abdominal pain or blood in stools.  Kidney/Bladdr: No polyuria, polydipsia, nocturia or hematuria.  Genital/Sexual: No loss of libido  Skin: No rash, hair loss or hirsutism.  No abnormal striae  Muscles/Joints/Bones: No morning stiffness, muscle aches and pain or loss of height.    Current Medications:  Current Outpatient Prescriptions   Medication Sig     albuterol (PROAIR HFA;PROVENTIL HFA;VENTOLIN HFA) 90 mcg/actuation inhaler Inhale 2 puffs every 6 (six) hours as needed for wheezing.     fludrocortisone (FLORINEF) 0.1 mg tablet Take 2 tablets (0.2 mg total) by mouth daily.     hydrocortisone (CORTEF) 10 MG tablet TAKE TWO TABLETS (20MG) EVERY MORNING WITH BREAKFAST AND ONE TABLET (10MG) EVERY AFTERNOON AROUND 2PM     levothyroxine (SYNTHROID) 75 MCG tablet Take 1 tablet (75 mcg total) by mouth daily.     OLANZapine (ZYPREXA) 10 MG tablet TAKE 1 TABLET (10 MG TOTAL) BY MOUTH  "AT BEDTIME FOR DEPRESSION     prazosin (MINIPRESS) 2 MG capsule Take 1 capsule (2 mg total) by mouth at bedtime.       Patients Active Problems:  Patient Active Problem List   Diagnosis     Hypotension     Adrenal insufficiency     Schizophrenia     Hypothyroidism       History:   reports that he has never smoked. He has never used smokeless tobacco. He reports that he does not drink alcohol or use illicit drugs.   reports that he has never smoked. He has never used smokeless tobacco. He reports that he does not drink alcohol or use illicit drugs.  History   Smoking Status     Never Smoker   Smokeless Tobacco     Never Used     Comment: no second hand smoke exposure      reports that he has never smoked. He has never used smokeless tobacco. He reports that he does not drink alcohol or use illicit drugs.  History   Sexual Activity     Sexual activity: Not on file     Past Medical History:   Diagnosis Date     Asthma      No family history on file.  Past Medical History:   Diagnosis Date     Asthma      No past surgical history on file.    Vitals   height is 5' 2.6\" (1.59 m) and weight is 130 lb (59 kg).         Exam  General appearance: The patient looked well, not in acute distress.  Eyes: no evidence of thyroid eye disease.   Retinal exam: No evidence of diabetic retinopathy.  Mouth and Throat: Normal  Neck: No evidence of thyromegaly, enlarged lymph node or tenderness  Chest: Trachea is central. Chest is clear to auscultation and percussion. Breat sounds are normal.  Cardiovascular exam: JVP is not raised. Heart sounds are normal, no murmurs or rub  Peripheral pulses are palpable.   Abdomen: No masses or tenderness.    Back: No vertebral tenderness or kyphosis.  Extremities: No evidence of leg edema.   Skin: Normal to touch.  No abnormal striae  Neurologic exam:  Visual fields are intact by confrontation, grossly intact. No evidence of peripheral neuropathy.  Detailed foot exam normal.        Diagnosis:  No " diagnosis found.    Orders:   No orders of the defined types were placed in this encounter.        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    Initially his sodium was 129 potassium 4.7.    More recently sodium 142 potassium 3.7 creatinine is normal at 0.7 TSH is 2.17 prolactin is 7.    Hypothyroidism on Synthroid 75 mcg because he feels fatigued and tired I did advise him to increase that to 88 mcg.    Again the patient feeling fatigued and tired I did ask him to gradually discontinue the Minipress or the presence.    Patient will return to clinic in 6 month I did spend 60 minutes with the patient more than 50% was spent on counseling and managing his care.  His blood pressure is fine 120/80.

## 2021-06-14 NOTE — PROGRESS NOTES
12/4/17  Attempt 1: Care Guide called patient.   Left vm with Angela Henry on transportation for upcoming appts:  : [facility]  ID: [id number]    12/7/17- [facility] will  @ 9:30 am.  12/11/17- [facility] will  @ 7:45 am  12/14/17- [facility] will  @ 9:00 am     If this patient is returning my call, please transfer to [phone].    Patient has upcoming appt on 12/14. [facility] will meet w/pt on 12/14 to follow up on goals.    Apurva SHER, Community Health Worker Intern assist Scarlett GILES with calls.

## 2021-06-14 NOTE — PROGRESS NOTES
1/8/2021    Clinic Care Coordination Contact  [facility]/Voicemail  [facility] : Gissell Hummel     Clinical Data: Care Coordinator Outreach  Outreach attempted x 1. Angela  Left message on patient's voicemail with call back information and requested return call.  Plan: Care Coordinator  will try to reach patient again in 10 business days.      CHW Follow up: 1-19-21

## 2021-06-14 NOTE — TELEPHONE ENCOUNTER
RN cannot approve Refill Request    RN can NOT refill this medication med is not covered by policy/route to provider. Last office visit: 10/8/2020 Jose Rangel MD Last Physical: Visit date not found Last MTM visit: Visit date not found Last visit same specialty: 12/4/2020 Karyn Guzmán PA-C.  Next visit within 3 mo: Visit date not found  Next physical within 3 mo: Visit date not found      Falguni Shetty, [facility] Connection Triage/Med Refill 1/28/2021    Requested Prescriptions   Pending Prescriptions Disp Refills     meclizine (ANTIVERT) 32 MG chewable tablet [Pharmacy Med Name: MECLIZINE HCL 25 MG CHEW 25 Tablet] 30 tablet 6     Sig: TAKE 1 TABLET (25 MG TOTAL) BY MOUTH 3 (THREE) TIMES A DAY AS NEEDED FOR DIZZINESS OR NAUSEA.       There is no refill protocol information for this order      Signed Prescriptions Disp Refills    albuterol (PROAIR HFA;PROVENTIL HFA;VENTOLIN HFA) 90 mcg/actuation inhaler 18 g 4     Sig: INHALE 2 PUFFS EVERY 6 (SIX) HOURS AS NEEDED FOR WHEEZING.       Albuterol/Levalbuterol Refill Protocol Passed - 1/27/2021 12:24 PM        Passed - PCP or prescribing provider visit in last year     Last office visit with prescriber/PCP: 10/8/2020 Jose Rangel MD OR same dept: 12/4/2020 Karyn Guzmán PA-C OR same specialty: 12/4/2020 Karyn Guzmán PA-C Last physical: Visit date not found       Next appt within 3 mo: Visit date not found  Next physical within 3 mo: Visit date not found  Prescriber OR PCP: Jose Rangel MD  Last diagnosis associated with med order: 1. Moderate persistent asthma without complication  - albuterol (PROAIR HFA;PROVENTIL HFA;VENTOLIN HFA) 90 mcg/actuation inhaler; INHALE 2 PUFFS EVERY 6 (SIX) HOURS AS NEEDED FOR WHEEZING.  Dispense: 18 g; Refill: 4    2. Cough  - famotidine (PEPCID) 20 MG tablet; TAKE 1 TABLET (20 MG TOTAL) BY MOUTH 2 (TWO) TIMES A DAY FOR STOMACH  Dispense: 180 tablet; Refill: 3    3. Dizziness  - meclizine (ANTIVERT) 32 MG  chewable tablet [Pharmacy Med Name: MECLIZINE HCL 25 MG CHEW 25 Tablet]; TAKE 1 TABLET (25 MG TOTAL) BY MOUTH 3 (THREE) TIMES A DAY AS NEEDED FOR DIZZINESS OR NAUSEA.  Dispense: 30 tablet; Refill: 0    If protocol passes may refill for 6 months if within 3 months of last provider visit (or a total of 9 months). If patient requesting >1 inhaler per month refill x 6 months and have patient make appointment with provider.            famotidine (PEPCID) 20 MG tablet 180 tablet 3     Sig: TAKE 1 TABLET (20 MG TOTAL) BY MOUTH 2 (TWO) TIMES A DAY FOR STOMACH       GI Medications Refill Protocol Passed - 1/27/2021 12:24 PM        Passed - PCP or prescribing provider visit in last 12 or next 3 months.     Last office visit with prescriber/PCP: 10/8/2020 Jose Rangel MD OR same dept: 12/4/2020 Karyn Guzmán PA-C OR same specialty: 12/4/2020 Karyn Guzmán PA-C  Last physical: Visit date not found Last MTM visit: Visit date not found   Next visit within 3 mo: Visit date not found  Next physical within 3 mo: Visit date not found  Prescriber OR PCP: Jose Rangel MD  Last diagnosis associated with med order: 1. Moderate persistent asthma without complication  - albuterol (PROAIR HFA;PROVENTIL HFA;VENTOLIN HFA) 90 mcg/actuation inhaler; INHALE 2 PUFFS EVERY 6 (SIX) HOURS AS NEEDED FOR WHEEZING.  Dispense: 18 g; Refill: 4    2. Cough  - famotidine (PEPCID) 20 MG tablet; TAKE 1 TABLET (20 MG TOTAL) BY MOUTH 2 (TWO) TIMES A DAY FOR STOMACH  Dispense: 180 tablet; Refill: 3    3. Dizziness  - meclizine (ANTIVERT) 32 MG chewable tablet [Pharmacy Med Name: MECLIZINE HCL 25 MG CHEW 25 Tablet]; TAKE 1 TABLET (25 MG TOTAL) BY MOUTH 3 (THREE) TIMES A DAY AS NEEDED FOR DIZZINESS OR NAUSEA.  Dispense: 30 tablet; Refill: 0    If protocol passes may refill for 12 months if within 3 months of last provider visit (or a total of 15 months).

## 2021-06-14 NOTE — PROGRESS NOTES
11-13-17  Spoke with patient through Angela : Vicki -Language Line and follow up on goals and action steps.  Patient unable to speak. Father speaks on patient behalf.  Spoke to patient's father.  -no letter from [place] yet. Stated paying $20/month for medical .  -has nurse coming out every 2 weeks to set up medications.   -has PCA services 4 hours a day. Father is PCA (Kylah Cook)  -has all the medications. RN visited 11-7-17  Discussed and educated father about [facility] services and support in the home from [facility] worker to read mail, help with setting up transportation and other needs.  Father agreed and like the services and for someone to come out to the home for support.      Plan:  -discuss with SW referral for [facility] worker to support in the community.  -sent [facility] intake form to patient to sign.  -monthly follow up

## 2021-06-14 NOTE — PROGRESS NOTES
Subjective findings: The patient return to the clinic today for follow-up visit of infected left great toe.  The patient was placed on Keflex 500 mg 1 tab 3 times daily.  He was given instructions to soak his toe in warm water and peroxide.  The patient stated he feels markedly improved.  The redness and the drainage has markedly improved.  He has no pain.  He is able to wear shoes without any discomfort.    Objective findings: The nailbed is granulating well.  There is no edema, erythema, cellulitis noted left great toe.  No pain on palpation left great toe.  Neurovascular status is intact.    Assessment: Paronychia left great toe    Plan: I told the patient is well healed.  He was told that he should have a new nail which will grow over the next 10 months.  He is to return to the clinic as needed.  He may now return to normal activities.

## 2021-06-14 NOTE — PROGRESS NOTES
Clinic Care Coordination Contact    Follow Up Progress Note      Assessment: SW contacted Toe Po with an  on the phone. He reported he was feeling dizzy, wanted to see provider. SW messaged scheduling pool.    SW reviewed [facility] paperwork, need to complete annual form. SW scheduled with Toe Po to meet at [facility] on 2/3/2021 to assist with completing the form.     Goals addressed this encounter:   Goals Addressed                 This Visit's Progress      Other (pt-stated)        Goal Statement: I would like support to complete forms from [facility] office by 1-29-21 and return form by 2-12-21 in order to continue with S. S. I benefit.   Date Goal set: 1/18/2021  Barriers: language  Strengths: support from sister and father  Date to Achieve By: 2-1-21  Patient expressed understanding of goal: yes  Action steps to achieve this goal:  1. I will meet with CCC SW at [facility] on 2/3/2021 to complete SSA forms  2. I will update CCC Team at next outreach.                 Intervention/Education provided during outreach: See above     Outreach Frequency: monthly    Plan:   SW will meet Toe Po in the clinic to complete the paperwork

## 2021-06-14 NOTE — PROGRESS NOTES
2/2/2021  PCP sent referral to CCC   Significant mental health disease and needs help with coordinating appointments and transportation.  No one in his family drives.    Referral to see Neurology at [facility]  Endocrinology at [facility]  Mental Health      [facility] SW meeting with patient on  2-3-21     CHW Follow up: Monthly    CHW Plan: Follow up on goals    CHW Next Follow Up: 2-25-21

## 2021-06-15 NOTE — PROGRESS NOTES
Clinic Care Coordination Contact  Care Team Conversations     SW reviewed referrals, their status has changed to 'not needed' and there are no future appointments scheduled.    SW attempted to reach [facility], was on hold and line disconnected.    SW will follow up in about one week.

## 2021-06-15 NOTE — PROGRESS NOTES
Mental Health Visit Note    12/14/2017    Start time: 10:15am    Stop Time: 11:00am   Session # 2    Brooke Peterson is a 27 y.o. male is being seen today for    Chief Complaint   Patient presents with      Follow Up     Schizophrenia   .     New symptoms or complaints: None    Functional Impairment:   Personal: 3  Family: 3  Work: Not working  Social:4    Clinical assessment of mental status:   Grooming: Well groomed  Attire: Appropriate  Age: Appears Stated  Behavior Towards Examiner: Cooperative  Motor Activity: Within normal   Eye Contact: Avoidant  Mood: Apathetic  Affect: Flat  Speech/Language: Within normal  Attention: Within normal  Concentration: Brief  Thought Process: Within normal and Slow  Thought Content: Hallucinations: Within noraml and pt reports managed with medications  Delusions: Within normal  Orientation: X 3  Memory: Impairment  Judgement: Impairment  Estimated Intelligence: Average  Demonstrated Insight: Diminished  Fund of Knowledge: adequate      Suicidal/Homicidal Ideation present: None Reported This Session    Patient's impression of their current status: Patient presented to appointment and was accompanied by his father/PCA, and professional . Patient reported he was not feeling well today, stating that he may have a cold. Patient reports taking his medications per usual, getting enough sleep, but has decreased appetite and only eats 1x per day. Patient continues to endorse depressed mood, stating that he is worried about his medical bills and that is the only thing he is worried about. Patient brought his medical bills with him today and wanted therapist to look at them. One of the letters, from [place], stated that patient does not qualify for financial assistance. Therapist will connect patient to [facility] SW to discuss what that means and what options they have. Patient is already in a repayment plan for another medical bill, and patient's father asked therapist to help father, who is  patient's rep payee, how to write a check appropriately.     Therapist impression of patients current state: Patient presented with apathetic mood and flat affect. Though patient needed prompting, patient was engaged and cooperative and was able to express his concerns independently. Dad jumped in when he had questions of his own. Patient continues to endorse feeling down and depressed, stating that his primary concern was his medical bills. Therapist reviewed the bills and mailings patient brought with him today. Therapist assisted patient with questions he had and reviewed therapist role and how therapist could connect patient with [facility] SW to help him resolve his medical bills. Therapist will continue to build rapport with patient and help identify areas of strength and strategies to improve mood.    Type of psychotherapeutic technique provided: Client centered and Solution-focused    Progress toward short term goals:Progress as expected, patient attended follow-up visit and building rapport with therapist.    Review of long term goals: Not done at today's visit    Diagnosis:   1. Schizophrenia, unspecified type        Plan and Follow up: Patient is scheduled for follow up visit with therapist on 1/4/17 at 10am.      Discharge Criteria/Planning: Client has chronic symptoms and ongoing therapy for maintenance stability recommended.    Guevara Her 12/14/2017

## 2021-06-15 NOTE — PROGRESS NOTES
Subjective:    Brooke Peterson is a 27 y.o. male with adrenal insufficiency who presents for evaluation of nasal congestion.  He saw PCP Dr. Rangel in beginning of January with cough and runny nose symptoms.  Note reviewed today.  Initially patient says that his symptoms today have continued since then.  Later he says that his cough is pretty intermittent.  His primary symptoms are runny nose alternating with nasal congestion.  No fevers.  No ear pain or sore throat.  No facial pain.  Dr. Rangel reviewed taking stress steroid doses with him at his last visit.  It is unclear whether he did this or not.  He did not  cough medicine prescribed by Dr. Rangel at last visit.  Several months ago he saw Dr. Rangel with chief complaint of nasal congestion.  Zyrtec was prescribed on a trial basis.  Patient says he never picked that up, or it was never delivered to his house.  He is accompanied by his mother today.  His mother is unable to contribute any concerns or specific symptoms to HPI.    Wt Readings from Last 3 Encounters:   01/24/18 125 lb (56.7 kg)   01/05/18 129 lb (58.5 kg)   12/19/17 130 lb (59 kg)     Patient Active Problem List   Diagnosis     Hypotension     Adrenal insufficiency     Schizophrenia     Hypothyroidism       Current Outpatient Prescriptions:      albuterol (PROAIR HFA;PROVENTIL HFA;VENTOLIN HFA) 90 mcg/actuation inhaler, Inhale 2 puffs every 6 (six) hours as needed for wheezing., Disp: 1 each, Rfl: 0     dextromethorphan-guaifenesin  mg/5 mL Liqd, Take 5 mL by mouth every 4 (four) hours as needed (cough)., Disp: 120 mL, Rfl: 1     fludrocortisone (FLORINEF) 0.1 mg tablet, Take 2 tablets (0.2 mg total) by mouth daily., Disp: 120 tablet, Rfl: 1     hydrocortisone (CORTEF) 10 MG tablet, TAKE TWO TABLETS (20MG) EVERY MORNING WITH BREAKFAST AND ONE TABLET (10MG) EVERY AFTERNOON AROUND 2PM, Disp: 90 tablet, Rfl: 0     levothyroxine (SYNTHROID, LEVOTHROID) 88 MCG tablet, Take 1 tablet (88 mcg  total) by mouth daily., Disp: 90 tablet, Rfl: 1     OLANZapine (ZYPREXA) 10 MG tablet, TAKE 1 TABLET (10 MG TOTAL) BY MOUTH AT BEDTIME FOR DEPRESSION, Disp: 30 tablet, Rfl: 10     Objective:   Allergies:  Review of patient's allergies indicates no known allergies.    Vitals:  Vitals:    01/24/18 1026   BP: 120/70   Pulse: 72   Resp: 24   Temp: 97.6  F (36.4  C)     Body mass index is 22.43 kg/(m^2).    Vital signs reviewed.  General: Patient is alert and oriented x 3, in no apparent distress, he is appropriately groomed with normal affect  Ears: TMs are non-erythematous with good light reflex bilaterally  Throat: no erythema, edema or exudate noted  Face: No pain with percussion of frontal or maxillary sinuses bilaterally  Lymphatic: no anterior cervical lymph node enlargement  Cardiac: regular rate and rhythm, no murmurs  Pulmonary: lungs clear to auscultation bilaterally, no crackles, rales, rhonchi, or wheezing noted    Chest x-ray grossly normal.  XR CHEST 2 VIEWS  1/24/2018 11:10 AM   INDICATION: Cough.  COMPARISON: 7/23/2017.   FINDINGS: No significant interval change. The heart size and pulmonary vessels are within normal limits, and the lungs are clear.   CONCLUSIONS: No acute findings.    Assessment and Plan:   1.  Runny nose and nasal congestion.  Trial of Zyrtec re-sent for patient.  Chest x-ray normal.  Continue to monitor and follow-up with PCP in 2 weeks if no better, sooner if concerns.    This dictation uses voice recognition software, which may contain typographical errors.

## 2021-06-15 NOTE — TELEPHONE ENCOUNTER
Called and spoke with Toe SILVANO Po , Message was given, Toe T Po  understood, no further questions.  Use  line to contact :Ne Paw  ID:[id number]    Appointment was set. He was wondering if Ronny Varma can help him set up a ride to his appointment.

## 2021-06-15 NOTE — TELEPHONE ENCOUNTER
ID: [id number]    Called and left message for patient to return call. Attempt #1, okay to relay the message below.

## 2021-06-15 NOTE — PROGRESS NOTES
[facility] FRG consult with SW on pt's medical bills from [place]. G informed SW that in [place] if someone is approved for [place] they are automatically enrolled in [place]. G wondering when pt's SSI began--as pt may have had MA during the billing period [place] is billing the pt for. SW does not know when pt's SSI began, if pt and pt's father do not know, they will need to contact the [facility] to find out--can do this by going to local office or by calling. SHIRA asked CG to f/u with pt's father on if he knows the start date of pt's SSI and offer SW assist if needed.    SHIRA had asked [place] if they had billed [place] for the billing period they were sending the pt and SW was informed the pt had not had MA so they did not bill MA. After learning what G shared about SSI and automatic enrollment for [place], pt may have had MA--dependent on if pt [place]. [place] date was June, 2017.    Update: YESSICA informed  that [facility] was unaware bills were sent to collections. If they are sent to collections, the state will not pay for them at that point.

## 2021-06-15 NOTE — TELEPHONE ENCOUNTER
----- Message from Karyn Guzmán PA-C sent at 3/7/2021  7:04 PM CST -----  His liver tests are still a little high, but getting better.  He should re-check these in 1 month.    Can be lab-only if he wants, otherwise visit with Dr. Rangel if needed.

## 2021-06-15 NOTE — PROGRESS NOTES
Clinic Care Coordination Contact    Follow Up Progress Note      Assessment: SW met with Brooke Peterson in person and had  on the phone to complete SSA forms.     Forms completed and will be mailed from the clinic. Copies given to Brooke Peterson.     SHIRA will outreach in one week to support with the referrals he had from 2/1/2021    Goals addressed this encounter:   Goals Addressed                 This Visit's Progress      Medical (pt-stated)        Goal Statement: I want to schedule my appointments with my referrals that I was given within one month  Date Goal set: 02/03/21  Barriers: Language, Transportation  Strengths:   Date to Achieve By: 3/01/2021  Patient expressed understanding of goal: Yes  Action steps to achieve this goal:  1. I will meet with CCC SHIRA on 2/10/2021 to try to schedule with referrals           COMPLETED: Other (pt-stated)        I have completed my SSA forms                 Intervention/Education provided during outreach: See above     Outreach Frequency: monthly    Plan:   SW will outreach in one week

## 2021-06-15 NOTE — TELEPHONE ENCOUNTER
----- Message from Jose Rangel MD sent at 3/12/2021  1:24 PM CST -----  Call:  These tests are normal.  The liver tests are mildly elevated but stable.

## 2021-06-15 NOTE — TELEPHONE ENCOUNTER
"\"Used the language line, 's ID # is [id number], Naw. Left message for the patient to call back. Ok to relay message upon returned call.          "

## 2021-06-15 NOTE — PROGRESS NOTES
Outpatient Mental Health Treatment Plan    Name:  Brooke Peterson  :  1990  MRN:  [id number]    Treatment Plan:  Initial Treatment Plan  Intake/initial treatment plan date:  10/24/17  Benefit and risks and alternatives have been discussed: Yes  Is this treatment appropriate with minimal intrusion/restrictions: Yes  Estimated duration of treatment:  Approximately 20 sessions.  Anticipated frequency of services:  Every 2 weeks  Necessity for frequency: This frequency is needed to establish therapeutic goals and for continuity of care in order to monitor progress.  Necessity for treatment: To address cognitive, behavioral, and/or emotional barriers in order to work toward goals and to improve quality of life.    Plan:      ? Schizophrenia     Goal:  Decrease average depression and anxiety level from 4 to 2.   Strategies:    ?[x] Decrease social isolation     [x] Increase involvement in meaningful activities     ?[x] Discuss sleep hygiene     ?[x] Explore thoughts and expectations about self and others     ?[x] Process grief (loss of significant person, independence, role, etc.)     ?[x] Assess for suicide risk     ?[x] Implement physical activity routine (with physician approval)     [] Consider introduction of bibliotherapy and/or videos     [x] Continue compliance with medical treatment plan (or explore barriers)       ?   ?Degree to which this is a problem: 4  Degree to which goal is met: 1  Date of Review: 6 months      Goal: Improve relationships and initiate social interactions   Strategies:  ?[x] Decrease social isolation     [x] Increase involvement in meaningful activities     ?[x] Practice basic social skills  ?   [x] Explore thoughts and expectations about self and others    ?    Degree to which this is a problem: 4  Degree to which goal is met: 1  Date of Review: 6 months       Functional Impairment:  1=Not at all/Rarely  2=Some days  3=Most Days  4=Every Day    Personal : 4  Family : 4  Social : 4    Work/school : 4    Diagnosis:  (EXAMPLE of DSM V: Major depressive disorder, recurrent, moderate; Generalized Anxiety disorder; borderline personality per patient PHI; fibromyalgia, History of breast cancer in remission; Problem with primary relationship.)   1. Schizophrenia, unspecified type        WHODAS 2.0 score: 33/48=69% Severe disability    Clinical assessments and measures completed:. RADHA-7 and PHQ-9     Strengths:  Patient has a supportive family and is medication compliant and managing symptoms well.  Limitations:  Patient has poor memory, poor attention, and some cognitive delays that may impact his ability and/or willingness to try new things and push his limits.  Cultural Considerations: Patient is [place] and the understanding of Schizophrenia as a mental illness is limited, more psychoeducation may be needed.    Persons responsible for this plan: Patient and Provider          AMANDA Mac  Psychotherapist Signature           Patient Signature:              Guardian Signature             Provider: Performed and documented by AMANDA Mac   Date:  1/4/2018

## 2021-06-15 NOTE — PROGRESS NOTES
2/25/2021  Angela : [facility] ID# [id number]    Spoke to father. Stated he is not home. Patient is at home.  Clinic Care Coordination Contact  [facility]       Clinical Data: Care Coordinator Outreach  Outreach attempted x 1.  Informed patient's father transportation pickup on Monday 3-1-21 10:20am to [facility]     Plan: Care Coordinator will try to reach patient again in 10 business days.  CHW Follow up: 3-8-21

## 2021-06-15 NOTE — PROGRESS NOTES
1-23-18  Met with patient and patient's father in clinic and follow up on goals and action steps.  Angela : Celestino Miller.    Patient requested help to re apply for food stamp. Stated he pays for rent and medical bills.  Sent a referral to  for support to re apply for food stamp.  Scheduled patient with YESSICA You on 1-29-18 at 2:30pm.  Father manages his SSI benefit.   Educated patient and father about Rep Payee. Patient Discussed about father being Rep Payee  Father reported someone came out to the home but not sure if it is  or [facility] Worker.    Plan:  Call to [facility] regarding [facility] Services.   Monthly     Request transportation for 1-29-18 at 2:30pm

## 2021-06-15 NOTE — PROGRESS NOTES
3/12/2021   Clinic Care Coordination Contact    Community Health Worker Follow Up    Goals:   Goals Addressed                 This Visit's Progress       Patient Stated      Medical (pt-stated)   20%     Goal Statement: I want to schedule my appointments with my referrals that I was given within one month  Date Goal set: 02/03/21  Barriers: Language, Transportation  Strengths:   Date to Achieve By: 3/01/2021  Patient expressed understanding of goal: Yes  Action steps to achieve this goal:  1. I will talk to [facility] SW today 3-12-21 at 3:30pm to try to schedule with speciality appt.    Updated: 3-12-21 AL          Mental Health Management (pt-stated)   60%     Goal Statement- I want to be connected to a provider to manage my mental health medications within one month    Action steps to achieve this goal  1.  I will attend [facility].  2. I will update [facility] team at next outreach.     [address], [facility]  [place] [address]   [phone]  Fax: [phone]    Updated: 3-12-21 AL  Date goal set:  12/6/2019 BC              Received call from patient and sister.  Stated he needs transportation for 4-12-21 at 10am to [facility].  Stated SW will call back today at 3:30pm to help schedule the other appt.    Patient has app[facility] on 4[facility]6-[facility].      CHW Follow up: Monthly    CHW Plan: Follow up on goals    CHW Next Follow Up: 4-14-21

## 2021-06-15 NOTE — PROGRESS NOTES
1-8-18  Patient and father met with  today to resolve outstanding medical bills.  Referred to  note for further details.

## 2021-06-15 NOTE — PROGRESS NOTES
Clinic Care Coordination Contact  Care Team Conversations     CCC SW spoke with scheduling at Neurology. They have made attempts to schedule with Toe Po, no answer or response. They will try again.        [facility] SW spoke with Endocrinology scheduling. They received referral. The referral team will be contacting him.    SW will chart review and outreach if needed in one week

## 2021-06-15 NOTE — TELEPHONE ENCOUNTER
LAB DRAW PT NEED TO REDRAW RENIN FOR EDTA.   LEFT A VM FOR PT. IF PT CALLS BACK PLEASE HELP PT SCHEDULE FOR APT

## 2021-06-15 NOTE — PROGRESS NOTES
2021  Call: [employer]  [phone]  RE: Ride for 3-1-21 at 11:20am arrive 11am     Provided clinic NPI#, member ID#, patient name, , verified address, telephone, destination address appointment date and time.  8-5pm hours  Short notice ride today for March appt  Ride needs to cancel call 4 hours before appt.    [facility] [phone]   (used to be [employer])   time: 10:20am  Will call: round trip  2 Passengers: father/PCA

## 2021-06-15 NOTE — PROGRESS NOTES
3/8/2021  [id number] [facility] Care Coordination Contact    Community Health Worker Follow Up    Goals:   Goals Addressed                 This Visit's Progress       Patient Stated      Medical (pt-stated)   100%     Goal Statement: I want to schedule my appointments with my referrals that I was given within one month  Date Goal set: 02/03/21  Barriers: Language, Transportation  Strengths:   Date to Achieve By: 3/01/2021  Patient expressed understanding of goal: Yes  Action steps to achieve this goal:  1. I will attend my neurology appointment on 3/23 at 9:15 am    Updated: 3-16-21 AL          Mental Health Management (pt-stated)   60%     Goal Statement- I want to be connected to a provider to manage my mental health medications within one month    Action steps to achieve this goal  1.  I will attend intake appt on 4-6-21 at 10am at [facility] to see psychiatrist.  2. I will update CCC team at next outreach.     [address], [facility]  [place] [address]   [phone]  Fax: [phone]    Updated: 3-12-21 AL  Date goal set:  12/6/2019 BC              Called and spoke to patient and follow up on goals.  Patient reported:  -he has appt on 4-6-21 at 10am at [facility] and needs transportation.    .  CHW Follow up: Monthly    CHW Plan: CHW support to set up transportation for  4-6-21  CHW Next Follow Up: 4-7-21

## 2021-06-15 NOTE — PROGRESS NOTES
Yes. Asad and I are working to get him schedule but we have not been able to connect with him.  Asad will be outreaching to him     We will keep you posted.

## 2021-06-15 NOTE — PROGRESS NOTES
12-29-17  Met with patient and father in clinic and briefly follow up on one of the goals.  Patient and father brought all the medical bills and letters to meet with SHIRA Rosen today to resolve outstanding medical bills from [place].      Patient scheduled with SHIRA next week 1-8-18 regarding medical bills.

## 2021-06-15 NOTE — PROGRESS NOTES
The patient was discussed during weekly Care Team Pod Touch Base today. Goals and barriers were discussed and a plan was established.     Goals        Patient Stated       I would like [facility] Worker to help with setting up transportation and to support in the community (pt-stated)            Action steps to achieve this goal  1. Care Guide will check on status of [facility] at [facility]. 1-23-18    Date goal set:  11/13/2017          Obtain food stamp (pt-stated)            Action steps to achieve this goal  1.  I will meet with 1-29-18 at 2:30pm with YESSICA Recio to apply for food stamp. 1-23-18      Date goal set:  1/23/2018          Resolve outstanding medical bills from [place] within 1-2 months. (pt-stated)            Action steps to achieve this goal  1. Met with  Leela Patel and continue to work with  to resolve outstanding medical bills. 1-23-18      Date goal set:  8/25/2017              Barriers: Questions RE: Rep payee and food stamps    Action Plan if indicated: Meet with CANDIDO Swenson to discuss Rep payee  Meet with YESSICA to discuss food stamp concerns

## 2021-06-15 NOTE — PROGRESS NOTES
See [facility] notes in chart.   [facility] Financial Resource Guide  [phone]

## 2021-06-15 NOTE — PROGRESS NOTES
Subjective:  27 y.o. male here with concerns of cough and runny nose for the last 3-4 days.  Has not been febrile.  Does have headache and muscle aches.  Cough has been mildly productive.  He does report some dyspnea.  No sick contacts within the home.  He is here with his father.  He is a history of adrenal insufficiency.  He does not know about taking stress dose steroids for illnesses.  Current glucocorticoid is hydrocortisone 20 mg every morning and 10 mg every midday.      Objective:  /60 (Patient Site: Left Arm, Patient Position: Sitting, Cuff Size: Adult Small)  Pulse (!) 102  Temp 99.2  F (37.3  C) (Oral)   Wt 129 lb (58.5 kg)  SpO2 99% Comment: room air  BMI 23.14 kg/m2  GENERAL: alert, not distressed  EARS: tympanic membranes normal, external auditory canals normal  NOSE: no rhinorrhea, nasal mucosa normal  PHARYNX: no erythema or exudates  FRONTAL and MAXILLARY SINUSES: non tender  NECK: no lymphadenopathy, nodules, or rigidity.  CHEST: clear, no rales, rhonchi, or wheezes  CARDIAC: regular without murmur    Recent Results (from the past 24 hour(s))   Influenza A/B Rapid Test   Result Value Ref Range    Influenza  A, Rapid Antigen No Influenza A antigen detected No Influenza A antigen detected    Influenza B, Rapid Antigen No Influenza B antigen detected No Influenza B antigen detected      Assessment and Plan:  1. Fever  Likely viral illness.  Mild symptoms on exam.  Symptomatic therapy suggested: push fluids and maintain hydration, rest, and return office visit prn if symptoms persist or worsen. Call or return to clinic prn if these symptoms worsen or fail to improve as anticipated.      2. Adrenal insufficiency  Has supply of hydrocortisone enough to employ stress dose of doubling dose for three days.  This was exceeding difficult to explain to patient and caregiver.  Eventually told them to take current meds as set up by home nurse and attempted to use pictoral cues to indicate two extra  "pills in AM and one extra at midday and taped this to the bottle of hydrocortisone.  I am not sure he will be able to accomplish this, even with the help of his father. Rx to pharmacy to back fill \"borrowed\" base line hydrocortisone.  Stressed the need to seek emergency care if he gets worse.  - hydrocortisone (CORTEF) 10 MG tablet; TAKE TWO TABLETS (20MG) EVERY MORNING WITH BREAKFAST AND ONE TABLET (10MG) EVERY AFTERNOON AROUND 2PM  Dispense: 9 tablet; Refill: 0    3. Cough  Called pharmacy to see if they could deliver today, they cannot.  - dextromethorphan-guaifenesin  mg/5 mL Liqd; Take 5 mL by mouth every 4 (four) hours as needed (cough).  Dispense: 120 mL; Refill: 1    Home care agency was not available to accept request for extra nurse visit this weekend.  I fear the complications that a more significant illness could present.   We may need to set up a contingency plan for future events.    "

## 2021-06-15 NOTE — PROGRESS NOTES
18 Met with patient this day went over medical expenses (patient reports he has less than $35/month) and rent/utilities. Since he lives with parents and rents a room he does not qualify to pay rent and utilities since he is not primary renter he is not able to claim utilities as an expense. Let him know if there is any changes in future to let FRG know and I can see about assisting him.   No further FRG outreach needed at this time. I am available for assistance if patient has a new need for FRG outreach-  patient or Care Guide may notify me.                  Almita Jeremi Zhang, Phone: [phone], Fax: [phone]      18 Referral- Care Guide was able to schedule appointment for FRG and patient. Faxed SAW to [facility]. Care guide reports rent (including utilities) is $300/month and patient receives SSI. Expenses are less than income that is why SNAP is low. Will explain to patient at meeting.     Po, Toe T - Food stamp << Less Detail     Food stamp     Scarlett Varma     Sent:  10:21 AM     To: P Clinic Financial Resource Guide Pool      Brooke Peterson     MRN: [id number] :     1990     Pt Home: [phone]              Message      Patient wondering why he only gets $15 for food stamp and that his friend gets $100.      I told them every one case is different.          The father states he pays rent and foods.     Father manages his SSI benefit.          I don't dad ahs been assign at Rep payee     They want to see you to reapply for food stamp.

## 2021-06-15 NOTE — PROGRESS NOTES
Assessment  Pt, , pt, pt's father and SW intern present for meeting with SW to discuss outstanding medical debt with [employer]. Pt was denied financial aid with [employer] and has a very high amount of medical bill debt with the organization. The pt cannot afford the payment plan that was offered to keep the pt out of collections. SW will research if there are any programs that can assist and f/u with the pt on this. The pt's father seemed confused on where the pt owed medical bills to and seemed to think all of the organizations were 1 entity that they owed money to--SW had to explain this several times. Please see Subjective section for more info. SW feels the pt needs to have a regular, established person to meet with to assist with reading mail and staying organized with this--refer to MORE at next visit.      Action Plan:    will:  1) Research government loans/grants/programs for medical debt--see if there is anything that can assist  2) Meet w/ pt again to assist with citizenship resources      Care Guide will:  Care Guide Delegation:   1)  Due Date:         Delegation:      Subjective   Pt, , pt, pt's father and SW intern present for SW meeting to discuss [employer] medical bills. Pt brought letter stating that he was denied financial aid for [employer] outstanding medical bills. SW explained that since he was denied financial aid, the only option was to call [employer] and discuss a payment plan. SW assisted with calling [employer] and was informed that the pt had already come up with a payment plan of $50/month, however, only sent in $25 for first payment---as a result, pt breached payment plan agreement and no longer has this amount as an option. SW did not assist with setting up this payment plan and pt did not know when this was set up or with whom.      SW informed worker pt is experiencing financial hardship and cannot make payments. Worker informed pt and SW that at this time, the pt needs to  come to an agreement with [employer] on a payment plan or pt's account will continue to go to collections and build interest. SHIRA was informed that the pt owed a very large sum and the monthly amount needed to pay it off is is not realistic for the pt's situation. Worker offered to compromise on $100/month payment plan, however, pt's father declined as pt cannot afford even this amount--pt's only income is SSI. SHIRA ended conversation with [employer] nelson.    SHIRA informed pt and pt's father that at this time, SHIRA recommends they come up with a plan for paying back [place] to agreed upon payment plan for both parties and informed pt's father that SW will research if there are any government loan or manny programs that could assist. SHIRA educated the pt's father that SW does not know if there are any such programs that can assist, but will research this.    Pt's father also asked about [employer] monthly bills of $25/month. SHIRA reviewed previous visits with pt where a payment plan was set up to fulfill the debt with this organization. SHIRA had to explain several times that each organization was it's own entity and they were not all tied together--meaning the pt did not have all his medical debt owed to one organization. The pt's father reported to SHIRA he was nervous about taking the pt to medical appts now--SHIRA educated the father that pt has coverage right now and payment is not an issue. SHIRA explained the medical debt was a result of no coverage and MA not being able to back pay---as pt waited too long to sign up for MA (MA can only back pay 3 months).    SHIRA scheduled another meeting to discuss citizenship resources as well as f/u on research SHIRA did for medical debt.          Objective    Appearance: casually and appropriately dressed for weather, well-groomed, normal gait.      Behavior: Cooperative      Speech: Non-English speaking, ordinary, did not speak much--father spoke on his behalf mostly (not overbearing)      Emotion/Affect: Calm,  frustrated at times      Thought Processes: not assessed      Orientation: not assessed       Memory: not assessed      General Intellectual Abilities: not assessed       Judgment: not assessed      Insight: not assessed       Clinical Recommendations: The pt and family need a lot of support at this time with navigating medical bills, services and resources. The family seems to not have had this support back in [place] and now have medical bill debt. The pt's father seems confused on staying organized with mail and bills and seems to think that all medical organizations are the same--had stated several times concerns about current providers declining service due to outstanding medical debt from [place] and other organizations. The family needs more support with interpreting and reading their mail to stay on top of bills and other services.

## 2021-06-15 NOTE — PROGRESS NOTES
Nurse Angela from [facility] informed of message. She is scheduled to see him next Monday.

## 2021-06-15 NOTE — PROGRESS NOTES
Called [employer] at [phone] without success. Their office is now closed. Will try again next week.

## 2021-06-15 NOTE — PROGRESS NOTES
Mental Health Visit Note    1/4/2018    Start time: 10:10am    Stop Time: 11:52am   Session # 1    Brooke Peterson is a 27 y.o. male is being seen today for    Chief Complaint   Patient presents with      Follow Up     Schizophrenia   .     New symptoms or complaints: ongoing concerns regarding medical bills    Functional Impairment:   Personal: 4  Family: 3  Work: Not working  Social:4    Clinical assessment of mental status:   Grooming: Groomed but malodorous  Attire: Appropriate  Age: Appears Stated  Behavior Towards Examiner: Cooperative  Motor Activity: Within normal   Eye Contact: Avoidant  Mood: Apathetic  Affect: Flat  Speech/Language: Within normal  Attention: Within normal  Concentration: Brief  Thought Process: Within normal and Slow  Thought Content: Hallucinations: Within noraml and pt reports managed with medications  Delusions: Within normal  Orientation: X 3  Memory: Impairment  Judgement: Impairment  Estimated Intelligence: Average  Demonstrated Insight: Diminished  Fund of Knowledge: adequate      Suicidal/Homicidal Ideation present: None Reported This Session    Patient's impression of their current status: Patient presented on time for scheduled appointment and was accompanied by his father and professional . Patient reported having a cold and stuffy nose along with lower back pain. He will be seeing PCP tomorrow and discuss health concerns. Patient continues to report ongoing anxiety regarding his medical bills, as he gets statements in the mail regularly. Patient reports that is his primary source of stress right now, otherwise no other major concerns. Patient reports taking medications regularly, with assistance from a nurse with med set up. Patient reports his medications has helped him to feel better, particularly with not having any psychotic symptoms anymore. Patient reports he continues to have a poor appetite, as he feels full all the time. Patient expressed some concern regarding  his poor memory and ability to learn and pay attention. Therapist inquired about patient's previous hospitalizations and patient was unable to recall why he was in the hospital so much. Patient's father shared the health concerns that caused patient to be in the hospital for so long. Patient's dad reported that he was thankful patient's condition has improved though dad still keeps a close eye on patient. Patient was able to identify positive aspects in life, such as spending time with his dad, talking to his brother, watching movies, and going to Taoism. Patient reports as his condition improves, he would like to eventually go back to work and school.    Therapist impression of patients current state: Patient presented with apathetic mood and flat affect but was engaged and talkative today. Patient shared how things were going and expressed ongoing concerns regarding repaying his medical bills. Therapist will consult with clinic care guide to get clarification regarding what support patient can get with his medical bills. Patient expressed being disoriented and having a poor memory, stating he relies on his dad to keep track of his appointments and where they're supposed to go. Patient was unable to recall health issues that led to his previous hospitalizations, but agreed with what his father reported. Patient maintains a positive attitude as his health has improved over the years. Patient was able to identify supports and coping skills and goals to work towards.    Type of psychotherapeutic technique provided: Client centered and Solution-focused    Progress toward short term goals:Progress as expected, patient attended follow-up visit and building rapport with therapist.    Review of long term goals: Long-term goals reviewed , patient reports he would eventually like to be able to work again.    Diagnosis:   1. Schizophrenia, unspecified type        Plan and Follow up: Patient is scheduled for follow up visit  with therapist on 1/23/18 at 9am.      Discharge Criteria/Planning: Client has chronic symptoms and ongoing therapy for maintenance stability recommended.    Guevara Arden 1/4/18

## 2021-06-15 NOTE — PROGRESS NOTES
Clinic Care Coordination Contact    Follow Up Progress Note      Assessment: [facility] SHIRA contacted Toe Po with an  to work on getting his specialty appointments scheduled.    He has an Endocrinology appointment scheduled on September 22nd 2021 at 3:00pm by telephone.    He had a neurology appointment, but missed it. Registrar was unable to reschedule at this time due to their system being down.    SW will call tomorrow to help Toe reschedule    Goals addressed this encounter:   Goals Addressed                 This Visit's Progress      Medical (pt-stated)        Goal Statement: I want to schedule my appointments with my referrals that I was given within one month  Date Goal set: 02/03/21  Barriers: Language, Transportation  Strengths:   Date to Achieve By: 3/01/2021  Patient expressed understanding of goal: Yes  Action steps to achieve this goal:  1. I will talk to [facility] SHIRA today 3-12-21 at 3:30pm to try to schedule with speciality appt.    Updated: 3-11-21 AL               Intervention/Education provided during outreach: See above     Outreach Frequency: monthly    Plan:   SHIRA will support Toe with rescheduling tomorrow

## 2021-06-15 NOTE — PROGRESS NOTES
Assessment  The pt, pt's father and  were present for meeting with SW to provide citizenship resources and follow-up on medical bill debt. SW referred pt to [facility] for legal assist with citizenship, [facility] will be contacting to the pt if he is eligible to apply. SW also inquired about legal advice for medical bill debt ad was informed that since the pt's only income is SSI, he will not have to worry about his income being garnished to pay back medical debt--see Subjective section for more info. The pt has a good support system, however, the pt's father struggles navigating services and resources due to the language barrier which seems to be a source of stress for the pt's father. The pt's father would benefit from establishing connection with support for addressing language barrier--SW forgot to provide MORE's info at meeting. If the pt's father continues to ask [facility] for assist with reading mail, SW can provide MORE's info.      Action Plan:    will:  1) Available as needed      Care Guide will:  Care Guide Delegation:   1)  Due Date:  None at this time.       Delegation:    Subjective   The pt, his father and an  were present for the meeting. SHIRA assisted with calling [facility] ([facility]) to assist with citizenship needs. Intake was completed and  informed SW she was not sure if pt could appy yet--due to pt not having green card for 5 years ( informed SW he should still be able to apply because he has been living in the  for 5 years);  will contact the pt either way to let him know if this process can begin.    SHIRA also inquired about medical bill debt and if there were any legal actions the pt could take. SHIRA explained pt's medical bill debt situation and was informed by the  that since the pt's only income is SSI, he doesn't have to worry about his SSI income being garnished due to debt.  explained that SSI was a  protected income and could not be garnished by debt collectors. SW was informed that the pt could be taken to court for the outstanding debt, however, all that would occur is the  telling pt he owed whatever amount he owed and no actions could necessarily be taken--as  cannot garnish SSI income. If the pt was working, this form of income could be garnished, however SSI cannot.     informed SW the pt could ask the medical provider for verifications of the bills to ensure all billing is accurate and review charges and could also write a letter requesting that the collectors stop sending letters--debt would still be owed, they just would not be receiving frequent letters.    Pt and pt's father understood that pt's income will not be forfeit to paying back medical debt since it is SSI.          Objective    Appearance: Casually dressed, moderately groomed.      Behavior: Alert      Speech: Did not speak much, short, quick statements when speaking.      Emotion/Affect: Calm      Thought Processes: not assessed      Orientation: not assessed       Memory: not assessed       General Intellectual Abilities: not assessed       Judgment: not assessed       Insight: not assessed       Clinical Recommendations: The pt has a good support system, however, the pt's father struggles navigating services and resources due to the language barrier which seems to be a source of stress for the pt's father. The pt's father would benefit from establishing connection with support for addressing language barrier--SW forgot to provide MORE's info at meeting. If the pt's father continues to ask [facility] for assist with reading mail, SW can provide MORE's info.

## 2021-06-15 NOTE — PROGRESS NOTES
Subjective:    Brooke Peterson is a 30 y.o. male who presents with chief complaint of follow-up labs.  He was seen 1 month ago by PCP.  Screening LFTs were ordered and they were elevated.  PCP thought perhaps this was because of patient's recent weight gain.  He is on some atypical antipsychotics.  TSH was a little low at that visit, med adjusted.    Today, patient notes that he has some dizziness and fatigue, not sleeping well.  In reviewing past notes, these seem to be chronic problems for him.  He denies any alcohol, no smoking, no drug use, does not chew betel nut.  He feels like his breathing and asthma are stable.    Wt Readings from Last 3 Encounters:   03/01/21 175 lb (79.4 kg)   02/01/21 171 lb 12 oz (77.9 kg)   12/28/20 167 lb (75.8 kg)     Last visit he was referred to psychiatry for schizoaffective disorder.  In reviewing appointments, it looks like mental health called him the day after his appointment and left a message with his father to call them back.  I do not think that has been done.    He was referred to endocrine for follow-up with adrenal insufficiency.  In reviewing his chart, I see that he has an appointment with endocrine on 9/22/2021.    He was referred to neurology due to dizziness.  I cannot see that he has been contacted, nor has any appointment been made with neurology.    He is in care management, care guide Aun.    Patient Active Problem List   Diagnosis     Adrenal insufficiency (H)     Schizophrenia (H)     Hypothyroidism     Moderate persistent asthma     Pituitary microadenoma (H)     Schizoaffective disorder, depressive type, with catatonia (H)     PTSD (post-traumatic stress disorder)     Vitamin D deficiency     Walworth's disease (H)     Insomnia, unspecified type       Current Outpatient Medications:      acetaminophen (TYLENOL) 325 MG tablet, TAKE 1-2 TABLETS (650 MG TOTAL) BY MOUTH EVERY 6 (SIX) HOURS AS NEEDED FOR PAIN., Disp: 30 tablet, Rfl: 0     acetaminophen-codeine  (TYLENOL #3) 300-30 mg per tablet, Take 1 tablet by mouth every 6 (six) hours as needed for pain., Disp: 20 tablet, Rfl: 0     albuterol (PROAIR HFA;PROVENTIL HFA;VENTOLIN HFA) 90 mcg/actuation inhaler, INHALE 2 PUFFS EVERY 6 (SIX) HOURS AS NEEDED FOR WHEEZING., Disp: 18 g, Rfl: 4     cholecalciferol, vitamin D3, (VITAMIN D3) 25 mcg (1,000 unit) capsule, TAKE 2 PILLS BY MOUTH EVERYDAY FOR BONE HEALTH, Disp: 60 capsule, Rfl: 5     famotidine (PEPCID) 20 MG tablet, TAKE 1 TABLET (20 MG TOTAL) BY MOUTH 2 (TWO) TIMES A DAY FOR STOMACH, Disp: 180 tablet, Rfl: 3     fexofenadine (ALLEGRA) 180 MG tablet, TAKE 1 TABLET (180 MG TOTAL) BY MOUTH DAILY FOR ALLERGIES, Disp: 90 tablet, Rfl: 1     fludrocortisone (FLORINEF) 0.1 mg tablet, TAKE 1 TABLET BY MOUTH DAILY., Disp: 90 tablet, Rfl: 0     fluticasone propion-salmeteroL (ADVAIR HFA) 115-21 mcg/actuation inhaler, Inhale 2 puffs 2 (two) times a day., Disp: 1 Inhaler, Rfl: 12     fluticasone propionate (FLONASE) 50 mcg/actuation nasal spray, USE 1 SPRAY IN EACH NOSTRIL EVERY DAY, Disp: 48 g, Rfl: 3     hydrocortisone (CORTEF) 5 MG tablet, Take 2 tablets (10 mg total) by mouth every morning AND 1 tablet (5 mg total) every evening., Disp: 270 tablet, Rfl: 1     levothyroxine (SYNTHROID, LEVOTHROID) 75 MCG tablet, Take 1 tablet (75 mcg total) by mouth daily., Disp: 90 tablet, Rfl: 0     meclizine (ANTIVERT) 32 MG chewable tablet, TAKE 1 TABLET (25 MG TOTAL) BY MOUTH 3 (THREE) TIMES A DAY AS NEEDED FOR DIZZINESS OR NAUSEA., Disp: 30 tablet, Rfl: 6     multivitamin (ONE A DAY) per tablet, TAKE 1 TABLET BY MOUTH DAILY., Disp: 120 tablet, Rfl: 2     OLANZapine (ZYPREXA) 5 MG tablet, TAKE 1 PILL BY MOUTH IN THE MORNING FOR ANXIETY, Disp: 30 tablet, Rfl: 3     paliperidone (INVEGA) 6 MG 24 hr tablet, Take 1 tablet (6 mg total) by mouth at bedtime., Disp: 30 tablet, Rfl: 3     triamcinolone (KENALOG) 0.1 % cream, Apply thin layer to affected areas twice daily as needed, Disp: 45 g, Rfl:  0     Objective:   Allergies:  Patient has no known allergies.    Vitals:  Vitals:    03/01/21 1127   BP: 111/77   Patient Site: Left Arm   Patient Position: Sitting   Cuff Size: Adult Regular   Pulse: 86   Weight: 175 lb (79.4 kg)     Body mass index is 31 kg/m .    Vital signs reviewed.  General: Patient is alert and oriented x 3, in no apparent distress, appropriately groomed with slightly flat affect  Cardiac: regular rate and rhythm, no murmurs  Pulmonary: lungs clear to auscultation bilaterally, no crackles, rales, rhonchi, or wheezing noted  Abdomen: Non tender to palpation, no hepatosplenomegaly, no masses palpable    Labs pending    Assessment and Plan:   1. Elevated liver function tests  Elevated LFTs about 1 month ago.  PCP recommended recheck.  Labs are drawn today and I will follow-up with results  - AST (SGOT)  - ALT (SGPT)    2. Hypothyroidism, unspecified type  Has made med adjustment from last visit.  Consider rechecking thyroid level in 4 to 6 weeks.    3. Schizoaffective disorder, depressive type, with catatonia (H)  4. Disorganized schizophrenia (H)  Has medicines set up by home nurse.    Message sent to care guide today to see if she can facilitate getting him scheduled with neurology and psychiatry.    This dictation uses voice recognition software, which may contain typographical errors.

## 2021-06-15 NOTE — PROGRESS NOTES
Clinic Care Coordination Contact  Care Team Conversations     CCC SW does not see any upcoming speciality appointments.    SW attempted to reach [facility] with an , no answer. Left voicemail asking him to call back regarding specialty appointments.

## 2021-06-15 NOTE — PROGRESS NOTES
3/11/2021  Received message for patient to call back.    Called and spoke to patient through patient's sister Kell Marinelli.  Patient gave permission to talk to sister.  Sister stated to call on he cell number [phone]  and reschedule appt with [facility] SHIRA.  Stated SW can call today if possible  Scheduled for today at 1pm.  Sister and patient confirmed telephone 1pm appt.  Patient requested help to set up ride for 4-6-21 at 10am to [facility].    Discussed with sister to see if she can help set up ride.   She is not able to because she works and not always available to help.    CHW connected with  SHIRA and notifed the change of appt cancelled 3-15-21 appt reschedule to 1pm today.      CHW Follow up: Monthly    CHW Plan: Follow up on goals    CHW Next Follow Up: 4-13-21

## 2021-06-15 NOTE — PROGRESS NOTES
Clinic Care Coordination Contact    Follow Up Progress Note      Assessment: CCC SW attempted to reach [facility], no answer. Contacted mobile number, father answered but he is not with [facility].     SW will outreach on Monday to try to help with scheduling referrals    Goals addressed this encounter:   Goals Addressed                 This Visit's Progress      Medical (pt-stated)   0%     Goal Statement: I want to schedule my appointments with my referrals that I was given within one month  Date Goal set: 02/03/21  Barriers: Language, Transportation  Strengths:   Date to Achieve By: 3/01/2021  Patient expressed understanding of goal: Yes  Action steps to achieve this goal:  1. I will meet with [facility] SW on 3/15/2021 to try to schedule with referrals                Intervention/Education provided during outreach: See above     Outreach Frequency: monthly    Plan:   SW will outreach Monday to try to schedule

## 2021-06-15 NOTE — PROGRESS NOTES
Clinic Care Coordination Contact  Care Team Conversations     CCC SHIRA spoke with [facility] with an  about scheduling an appointment for neurology. Wait time was long, asked if it was ok if SHIRA scheduled and then call back.    SHIRA was able to get [facility] scheduled on   3/23/2021 at 9:15AM for Neurology appointment    SHIRA attempted to reach [facility], he asked me to call the mobile number, father reported he was not with [facility].     SHIRA will outreach next week and setup transportation

## 2021-06-16 NOTE — PROGRESS NOTES
4/9/2021   Clinic Care Coordination Contact    Community Health Worker Follow Up    Goals:   Goals Addressed                 This Visit's Progress       Patient Stated      Medical (pt-stated)   30%     Goal Statement: I want to schedule my appointments with my referrals that I was given within one month  Date Goal set: 02/03/21  Barriers: Language, Transportation  Strengths:   Date to Achieve By: 3/01/2021  Patient expressed understanding of goal: Yes  Action steps to achieve this goal:  1. I will wait to hear from the clinic of neurology appointment.  2. I will see Dr Rangel on 4-20-21 at 1:40pm for ER follow up  and ask about neurology appt.      Updated:4-9-21 AL          Mental Health Management (pt-stated)   60%     Goal Statement- I want to be connected to a provider to manage my mental health medications within one month    Action steps to achieve this goal  1.  I will attend intake appt on 4-19-21 at 1 pm at [facility] with Gabriella to establish care with a  psychiatrist.  [employer] [phone] will  at 12:15pm to [facility] on 4-19-21.    2. I will update [facility] team at next outreach.     [facility]  [address] [place] [address], [facility]  [place] [address]   [phone]  Fax: [phone]    Updated: 4-9-21 AL  Date goal set:  12/6/2019 BC            Other (pt-stated)        Goal Statement: I would like to attend 80% of medical appointments in the next 3 months to address health concerns and needs.  Date Goal set: 4/9/2021  Barriers: language, lack transportation  Strengths: medical transportation with [employer]  Date to Achieve By: 6-9-21  Patient expressed understanding of goal: Yes  Action steps to achieve this goal:  1. I will call [phone] at least 1 week before my appointments for support to set up medical ride through [employer].  2. I will check if [facility] worker could help to set up medical rides.  3. I will ask my sister for  support to call CHW to notify of appt and need transportation.  4. I will update CCC team at next outreach.                Received call from patient and patient's sister.  Sister stated that he needs a ride for 21 at 10am to [facility]  Sister interpreted and spoke on behalf of patient.    Assisted and supported to schedule ER follow up with PCP and set up medical rides for 21.  Coordinated with clinic registrar (Diane Greer) for support to se up ER follow up from [facility] appt available on 21 for ER follow up with PCP  Scheduled with PCP on 21 at 1:40pm     Conference call with patient and sister and connected with [employer] transportation representative.  [phone]  RE: Ride for 21 and 21 to [facility]  Explained to patient short notice ride policy    Provided clinic Tax ID#, member ID, patient name, , verified address, telephone, destination address appointment date and time.    appt 21 at 10am to [facility]  [employer] [phone]   9am  Will call return      appt 21 at 1pm to [address] [employer] [phone]   at 12:15pm  Will call return      appt 21 at 1:40pm to [facility]  [employer]   12:40pm   Will call return    Dad's cell # [phone]  Updated in chart.    Sent staff message to registrar/speciality  regarding neurology referral       CHW Follow up: Monthly    CHW Plan: Follow up on goals    CHW Next Follow Up:21

## 2021-06-16 NOTE — PROGRESS NOTES
"Mental Health Visit Note    3/7/2018    Start time: 8:15am    Stop Time: 8:50am   Session # 4    Brooke Peterson is a 27 y.o. male is being seen today for    Chief Complaint   Patient presents with      Follow Up     Schizophrenia   .     New symptoms or complaints: None    Functional Impairment:   Personal: 3  Family: 3  Work: 2  Social: 4    Clinical assessment of mental status:   Grooming: Well groomed  Attire: Appropriate  Age: Appears Stated  Behavior Towards Examiner: Cooperative  Motor Activity: Within normal   Eye Contact: Appropriate  Mood: Depressed  Affect: Flat  Speech/Language: Within normal  Attention: Within normal  Concentration: Brief  Thought Process: Within normal and Slow  Thought Content: Hallucinations: Within noraml and pt reports managed with medications  Delusions: Within normal  Orientation: X 3  Memory: Impairment  Judgement: Impairment  Estimated Intelligence: Average  Demonstrated Insight: Diminished  Fund of Knowledge: adequate      Suicidal/Homicidal Ideation present: None Reported This Session    Patient's impression of their current status: Patient presented for scheduled appointment and professional . Patient reports feeling \"good\" today, having a stuffy nose, but no major concerns. Patient reports he continues to take his medications as prescribed. He reports sleep is good. Patient reports having poor appetite, stating he just doesn't feel hungry. Patient shared that he feels \"okay physically\" but continues to feel depressed, but states that is because he \"hates cold weather.\" Patient reported feeling more depressed in the winter time than the summer time. Patient reports looking forward to going fishing in the summer, but that there was not much to do during the winter, besides stay inside and stay warm and try not to get sick. Patient reports some activities he does enjoy doing is watching movies and spending time with his family. Patient expressed interest in enrolling in " school this summer, or getting more involved at a community center, gym, or library. Patient reports therapy is going well and he would like to continue therapy, as his overall goal is obtain citizenship and he would like therapist support through that process.      Therapist impression of patients current state: Patient presented with depressed mood but was engaged and cooperative. Patient attended appointment without father, who's also his PCA, who typically attends appointments with patient. Patient reports his father was not feeling well, so patient is here by himself. Patient denied any major concerns and displayed some insight into the weather impacting his mood and ability to be active or do activities that he enjoys. Processed thoughts and feelings with patient. Worked with patient to come up with ideas of activities he could do in the winter time to help improve his mood. Will continue to encourage patient to engage in activities to keep him active. Patient identified that he would like ongoing support as he pursues his citizenship.    Type of psychotherapeutic technique provided: Insight oriented, Client centered and Solution-focused    Progress toward short term goals:Progress as expected, patient engaged and expressing self openly.    Review of long term goals: Long-term goals reviewed , patient reports he would eventually like to be able to work again and obtain his citizenship.    Diagnosis:   1. Schizophrenia, unspecified type        Plan and Follow up: Patient is scheduled for follow up visit with therapist on 4/5/18 at 10am.      Discharge Criteria/Planning: Client has chronic symptoms and ongoing therapy for maintenance stability recommended.    AMANDA Mac 3/7/2018

## 2021-06-16 NOTE — PROGRESS NOTES
[facility] CG consulted with SW on pt. Pt reported he is concerned about medical bills--SW has reviewed this with pt before. CG confirmed medical bills are from [place] and reiterated to pt that he does not have to be concerned---at last meeting, SHIRA consulted with  from [facility], since pt has SSI for income, his wages cannot be garnished to pay for medical bills.    SW will look at the bill pt brought in next time SHIRA is at [facility] to ensure there are no issues.

## 2021-06-16 NOTE — PROGRESS NOTES
3/31/2021  Clinic Care Coordination Contact  Care Team Conversations     Called and spoke to   Roberto at [facility] regarding appt on 4-6-21.   She stated that patient scheduled with Gabriella on 4-6-21 at 10am and that L[facility] already set up the transportation for him.  They help to set up ride for patient and  call to patients to notify of transportation information.  Due to patient in ED patient reschedule to 4-19-21 at 1pm and Betza[facility] will cancel the ride for 4-6-21 and to 4-19-21 at 1pm.   will call patient to inform of transportation       Called and left voice message with patient and sister of appt 4-6-21 and reschedule to 4-19-21 at 1pm.      CHW Follow up: Monthly    CHW Plan: Follow up on goals    CHW Next Follow Up: 4-14-21

## 2021-06-16 NOTE — TELEPHONE ENCOUNTER
Refill Approved    Rx renewed per Medication Renewal Policy. Medication was last renewed on 2/5/21.    Candelario Rollins, Care Connection Triage/Med Refill 4/22/2021     Requested Prescriptions   Pending Prescriptions Disp Refills     levothyroxine (SYNTHROID, LEVOTHROID) 75 MCG tablet [Pharmacy Med Name: LEVOTHYROXINE SODIUM 75 MCG 75 Tablet] 30 tablet 0     Sig: TAKE 1 TABLET (75 MCG TOTAL) BY MOUTH DAILY FOR THYROID       Thyroid Hormones Protocol Passed - 4/21/2021 11:49 AM        Passed - Provider visit in past 12 months or next 3 months     Last office visit with prescriber/PCP: 2/1/2021 Jose Rangel MD OR same dept: 3/1/2021 Karyn Guzmán PA-C OR same specialty: 3/1/2021 Karyn Guzmán PA-C  Last physical: Visit date not found Last MTM visit: Visit date not found   Next visit within 3 mo: Visit date not found  Next physical within 3 mo: Visit date not found  Prescriber OR PCP: Jose Rangel MD  Last diagnosis associated with med order: 1. Hypothyroidism  - levothyroxine (SYNTHROID, LEVOTHROID) 75 MCG tablet [Pharmacy Med Name: LEVOTHYROXINE SODIUM 75 MCG 75 Tablet]; TAKE 1 TABLET (75 MCG TOTAL) BY MOUTH DAILY FOR THYROID  Dispense: 30 tablet; Refill: 0    2. Auditory hallucination  - OLANZapine (ZYPREXA) 5 MG tablet [Pharmacy Med Name: OLANZAPINE 5 MG TABS 5 Tablet]; TAKE 1 PILL BY MOUTH IN THE MORNING FOR ANXIETY  Dispense: 30 tablet; Refill: 3    3. Moderate persistent asthma without complication  - acetaminophen (TYLENOL) 325 MG tablet [Pharmacy Med Name: ACETAMINOPHEN 325 MG TABS 325 Tablet]; TAKE 1-2 TABLETS (650 MG TOTAL) BY MOUTH EVERY 6 (SIX) HOURS AS NEEDED FOR PAIN.  Dispense: 30 tablet; Refill: 0    If protocol passes may refill for 12 months if within 3 months of last provider visit (or a total of 15 months).             Passed - TSH on file in past 12 months for patient age 12 & older     TSH   Date Value Ref Range Status   04/12/2021 2.34 0.30 - 5.00 uIU/mL Final                       OLANZapine (ZYPREXA) 5 MG tablet [Pharmacy Med Name: OLANZAPINE 5 MG TABS 5 Tablet] 30 tablet 3     Sig: TAKE 1 PILL BY MOUTH IN THE MORNING FOR ANXIETY       There is no refill protocol information for this order        acetaminophen (TYLENOL) 325 MG tablet [Pharmacy Med Name: ACETAMINOPHEN 325 MG TABS 325 Tablet] 30 tablet 0     Sig: TAKE 1-2 TABLETS (650 MG TOTAL) BY MOUTH EVERY 6 (SIX) HOURS AS NEEDED FOR PAIN.       There is no refill protocol information for this order

## 2021-06-16 NOTE — TELEPHONE ENCOUNTER
Telephone Encounter by Nathaly Pimentel at 7/9/2019  2:41 PM     Author: Nathaly Pimentel Service: -- Author Type: --    Filed: 7/9/2019  2:44 PM Encounter Date: 7/3/2019 Status: Signed    : Nathaly Pimentel       PA has been denied.      Starting July 1st all managed care formularies have changed:    [place]://[place].gov/dhs/assets/preferred-drug-list-fee-for-service_tcm1053-261073.pdf        PRIOR AUTHORIZATION DENIED    Denial Rational: Patient must try and fail two medications from the list below:            Appeal Information: If provider would like to appeal please provide a letter of medical necessity stating why formulary alternatives would not be clinically appropriate for the patient and route back to the PA team.

## 2021-06-16 NOTE — PROGRESS NOTES
"CG requested SHIRA to look at [facility] letter pt had received. Letter is a summary of services from January, 2017 stating unpaid balance, insurance response and total due by pt after insurance. SHIRA called [facility] to clarify the awaiting insurance response section and was informed that the amount listed in this section is what insurance would pay, the pt would cover the amount of \"due from patient\" section. Letter has $0.00 balance for insurance response meaning pt has no insurance coverage for the dates of service and is owing the total balance.    SHIRA has already met with the pt to discuss medical bill debt owed from [facility] and has consulted with [facility]  during last in-person visit--see 1/8/18 [facility] SHIRA note for more detailed info.     The packet the pt brought in from [facility] is FYI for pt--educational information about debt.   "

## 2021-06-16 NOTE — PROGRESS NOTES
Chief Complaint   Patient presents with     follow up recent visit     pt has not picked up prescriptions from pharmacy yet     Subjective:  27 y.o. male with concerns as above.  Has cetirizine in his medicine packet, today.  Also has levothyroxine, olanzapine, prazosin, hydrocortisone, and fludrocortisone.    Visiting home nurse sets up all his medications.    He needs to have concerns about rhinorrhea, cold symptoms, and tiredness.  They have been present for a month.  Chest x-ray on January 24 was normal.  Has some pain and pressure in the maxillary sinus area.    Outpatient Medications Prior to Visit   Medication Sig Dispense Refill     albuterol (PROAIR HFA;PROVENTIL HFA;VENTOLIN HFA) 90 mcg/actuation inhaler Inhale 2 puffs every 6 (six) hours as needed for wheezing. 1 each 0     cetirizine (ZYRTEC) 10 MG tablet Take 1 tablet (10 mg total) by mouth daily. 30 tablet 0     dextromethorphan-guaifenesin  mg/5 mL Liqd Take 5 mL by mouth every 4 (four) hours as needed (cough). 120 mL 1     fludrocortisone (FLORINEF) 0.1 mg tablet TAKE 2 TABLETS (0.2 MG TOTAL) BY MOUTH DAILY. 120 tablet 1     levothyroxine (SYNTHROID, LEVOTHROID) 88 MCG tablet Take 1 tablet (88 mcg total) by mouth daily. 90 tablet 1     OLANZapine (ZYPREXA) 10 MG tablet TAKE 1 TABLET (10 MG TOTAL) BY MOUTH AT BEDTIME FOR DEPRESSION 30 tablet 10     hydrocortisone (CORTEF) 10 MG tablet TAKE TWO TABLETS (20MG) EVERY MORNING WITH BREAKFAST AND ONE TABLET (10MG) EVERY AFTERNOON AROUND 2PM 90 tablet 0     No facility-administered medications prior to visit.       History   Smoking Status     Never Smoker   Smokeless Tobacco     Never Used     Comment: no second hand smoke exposure      Objective:  /60 (Patient Site: Left Arm, Patient Position: Sitting, Cuff Size: Adult Regular)  Pulse 92  Temp 98.3  F (36.8  C) (Oral)   Wt 128 lb (58.1 kg)  BMI 22.96 kg/m2  GENERAL: alert, not distressed  EARS: normal tympanic membranes and external  auditory canals bilaterally  PHARYNX: no erythema or exudates  MOUTH: well hydrated mucosa, no lesions  NECK: no lymphadenopathy or thyroid nodules   CHEST: clear, no rales, rhonchi, or wheezes  CARDIAC: regular without murmur  ABDOMEN: soft, non tender, non distended, normal bowel sounds    Assessment and Plan:   1. Adrenal insufficiency  Was very difficult for him to process increasing hydrocortisone dose for mild illnesses.  I would consider that to be a failed experiment at this time.  - hydrocortisone (CORTEF) 10 MG tablet; TAKE TWO TABLETS (20MG) EVERY MORNING WITH BREAKFAST AND ONE TABLET (10MG) EVERY AFTERNOON AROUND 2PM  Dispense: 90 tablet; Refill: 0  - Basic Metabolic Panel  - HM2(CBC w/o Differential)    2. Hypothyroidism  Due for recheck so will evaluate following  - Thyroid Stimulating Hormone (TSH)  - T4, Free    3. Nasal congestion  - fluticasone (FLONASE) 50 mcg/actuation nasal spray; 1 spray into each nostril daily.  Dispense: 16 g; Refill: 2    4. Acute non-recurrent maxillary sinusitis  Nose long-term complaints.  I think would be reasonable to treat him for sinusitis.  I do not think there is an atypical pneumonia present.  If he does not have improvement we will follow-up with him again.  Discussed potential side effects of the medications.  - amoxicillin-clavulanate (AUGMENTIN) 875-125 mg per tablet; Take 1 tablet by mouth 2 (two) times a day for 10 days.  Dispense: 20 tablet; Refill: 0     This visit was conducted with the aid of a professional .

## 2021-06-16 NOTE — PROGRESS NOTES
3-19-18  Attempt 1: Care Guide called patient. No answer.  If this patient is returning my call, please transfer to [phone].  Upcoming appt with PCP on 3-27-18 at 1 pm.    Plan:  Follow up in clinic 3-27-18 at 1pm

## 2021-06-16 NOTE — TELEPHONE ENCOUNTER
Telephone Encounter by Tammy Mayorga at 1/14/2019  9:52 AM     Author: Tammy Mayorga Service: -- Author Type: --    Filed: 1/14/2019  9:53 AM Encounter Date: 1/14/2019 Status: Signed    : Tammy Mayorga       PA INITIATION

## 2021-06-16 NOTE — PROGRESS NOTES
Subjective: Patient comes in for evaluation he has had a runny nose sneezing coughing up some yellowish phlegm has had some drainage from his nose also.  No blood    He is a non-smoker    He has had a history of some wheezing and has used albuterol inhaler in the past.  He states that at times he feels like he is wheezing    No significant myalgias symptoms    He did have a flu shot this year.    He is afebrile here    Also needed update on immunizations with TDA P and this was given today    No vomiting or diarrhea    Tobacco status: He  reports that he has never smoked. He has never used smokeless tobacco.    Patient Active Problem List    Diagnosis Date Noted     Thrombocytopenia 02/07/2018     Hypothyroidism 09/27/2017     Schizophrenia 08/25/2017     Adrenal insufficiency 07/28/2017     Hypotension 07/24/2017       Current Outpatient Prescriptions   Medication Sig Dispense Refill     albuterol (PROAIR HFA;PROVENTIL HFA;VENTOLIN HFA) 90 mcg/actuation inhaler Inhale 2 puffs every 6 (six) hours as needed for wheezing. 1 each 0     cetirizine (ZYRTEC) 10 MG tablet Take 1 tablet (10 mg total) by mouth daily. 30 tablet 0     dextromethorphan-guaifenesin  mg/5 mL Liqd Take 5 mL by mouth every 4 (four) hours as needed (cough). 120 mL 1     fludrocortisone (FLORINEF) 0.1 mg tablet TAKE 2 TABLETS (0.2 MG TOTAL) BY MOUTH DAILY. 120 tablet 1     fluticasone (FLONASE) 50 mcg/actuation nasal spray 1 spray into each nostril daily. 16 g 2     hydrocortisone (CORTEF) 10 MG tablet TAKE TWO TABLETS (20MG) EVERY MORNING WITH BREAKFAST AND ONE TABLET (10MG) EVERY AFTERNOON AROUND 2PM 90 tablet 0     levothyroxine (SYNTHROID, LEVOTHROID) 88 MCG tablet Take 1 tablet (88 mcg total) by mouth daily. 90 tablet 1     OLANZapine (ZYPREXA) 10 MG tablet TAKE 1 TABLET (10 MG TOTAL) BY MOUTH AT BEDTIME FOR DEPRESSION 30 tablet 10     azithromycin (ZITHROMAX Z-NELSON) 250 MG tablet Take 2 tablets (500 mg) on  Day 1,  followed by 1 tablet  (250 mg) once daily on Days 2 through 5. 6 tablet 0     No current facility-administered medications for this visit.        ROS: Review of systems negative other than as outlined above    Objective:    /70 (Patient Site: Right Arm, Patient Position: Sitting, Cuff Size: Adult Regular)  Pulse 88  Temp 98.8  F (37.1  C) (Oral)   Resp 20  Wt 128 lb 8 oz (58.3 kg)  SpO2 100%  BMI 23.05 kg/m2  Body mass index is 23.05 kg/(m^2).    General appearance no acute distress    HEENT nose with some congestion    Canals and TMs normal oropharynx was clear    Lungs with some scattered rhonchi cleared with coughing    Heart was regular    Abdomen nontender.  Skin is normal    Extremities without edema        Assessment:  1. Bronchitis  azithromycin (ZITHROMAX Z-NELSON) 250 MG tablet   2. Wheezing  albuterol (PROAIR HFA;PROVENTIL HFA;VENTOLIN HFA) 90 mcg/actuation inhaler   3. Immunization counseling  Tdap vaccine,  6yo or older,  IM     Bronchitis we will treat with azithromycin pack    Wheezing refill on albuterol presently not wheezing can use as needed    Immunization update with Td AP    Plan: As outlined above    This transcription uses voice recognition software, which may contain typographical errors.

## 2021-06-16 NOTE — TELEPHONE ENCOUNTER
Telephone Encounter by Macey Olivera at 5/22/2019  4:47 PM     Author: Macey Olivera Service: -- Author Type: --    Filed: 5/22/2019  4:49 PM Encounter Date: 5/20/2019 Status: Signed    : Macey Olivera       PRIOR AUTHORIZATION DENIED    Denial Rational: Unfortunately this was the 2nd request on an already denied medication for this patient.  Another facility must have initiated first request and it was denied.  Now appeal is denied since insurance considered my request an appeal.    Patient must try/fail two more formulary drugs listed in detail below (aripiprazole, clozapine, quetiapine, risperidone, ziprasidone)        Appeal Information: 2nd level appeals are handled by clinic staff as they might require [id number] reviews or further assistance from clinic.  2nd level appeal information listed below.

## 2021-06-16 NOTE — PROGRESS NOTES
"2-13-18  Met with patient in clinic and follow up on goals and action steps.  Celestino Miller -Stephen.   Patient brought in letter from [facility] and medical bill from [facility].  Read and explained to patient letter from [facility] and medical bill.  Explained to patient that [facility] is not able to take the case and provided \" Dealing with Debt\" handbook for reference.    Reviewed 's 2-13-18 note regarding the [facility] medical bill and explained to patient what SW noted.   \"[facility] CG consulted with SHIRA on pt. Pt reported he is concerned about medical bills--SW has reviewed this with pt before.   CG confirmed medical bills are from [facility] and reiterated to pt that he does not have to be concerned---at last meeting, SHIRA consulted with  from [facility], since pt has SSI for income, his wages cannot be garnished to pay for medical bills.\"    Patient stated he understands.     Sent staff message to consult regarding medical bills.  SW will call to patient.    Plan:    Monthly follow up             "

## 2021-06-16 NOTE — PROGRESS NOTES
Clinic Care Coordination Contact  Care Team Conversations     [facility] scheduled transportation for Toe Po's neurology appointment on 3/23 with Dr. Rubio at [facility]    Scheduled with [employer] [phone], 8:15 pickup time.    Attempted to reach [place] to inform him, not able to reach.

## 2021-06-16 NOTE — PROGRESS NOTES
Clinic Care Coordination Contact  Care Team Conversations     CCC SW spoke with Brooke Peterson with an  to inform him of the transportation pickup time.

## 2021-06-16 NOTE — PROGRESS NOTES
3-27-18  Met with patient in clinic and follow up on goals and action steps.  Spoke to patient through Angela  Celestino Miller.  Patient reported:  - need support with getting citizenship. Stated [place]   -does not have [employer] worker. Stated the [employer] worker that comes weekly is his father's [employer] Worker.  -still receives the medical bills.    Plan:  Consult with  for referral to [employer] worker and citizenship  Will meet with patient 3-29-18 to sign [employer] SAW for [employer] referral

## 2021-06-16 NOTE — PROGRESS NOTES
Mental Health Visit Note    1/23/2018    Start time: 9:15am    Stop Time: 9:50am   Session # 2    Brooke Peterson is a 27 y.o. male is being seen today for    Chief Complaint   Patient presents with      Follow Up     Hallucinations   .     New symptoms or complaints: Patient has ongoing concerns regarding repaying his medical bills.    Functional Impairment:   Personal: 4  Family: 3  Work: 1  Social: 4    Clinical assessment of mental status:   Grooming: Well groomed  Attire: Appropriate  Age: Appears Stated  Behavior Towards Examiner: Cooperative  Motor Activity: Within normal   Eye Contact: Avoidant  Mood: Apathetic  Affect: Flat  Speech/Language: Within normal  Attention: Within normal  Concentration: Brief  Thought Process: Within normal and Slow  Thought Content: Hallucinations: Within noraml and pt reports managed with medications  Delusions: Within normal  Orientation: X 3  Memory: Impairment  Judgement: Impairment  Estimated Intelligence: Average  Demonstrated Insight: Diminished  Fund of Knowledge: adequate      Suicidal/Homicidal Ideation present: None Reported This Session    Patient's impression of their current status: Patient presented for scheduled appointment and was accompanied by his father and professional . Patient reported that he continues to fight a cold and will make a doctor's appointment to get checked out. Patient reports he has been trying to keep warm, wash his hands, and drink plenty of water. Patient reports he missed his psych appointment because transportation did not come pick him up, they will make another appointment and request transportation again. Patient reports he continues to be distressed about his medical bills he keeps receiving in the mail. Patient also reports he hasn't paid his phone bill either. Patient reports he would like to meet with [facility] SW again to determine how he should proceed regarding these bills. Therapist will message [facility] to schedule appointment.  "Patient reports his mood has been \"down, not good,\" but he continues to take his medications, with hopes that he will feel better and will be able to go back to school.     Therapist impression of patients current state: Patient presented with baseline apathetic mood and flat affect but was engaged and cooperative. Patient expresses ongoing anxiety regarding his medical bills, therapist will continue to consult with CCC to determine what options patient has. Patient continues to maintain medication adherence and expresses hopefulness to get better and return to school. Processed thoughts and feelings with patient and validated patient concerns and affirmed patient goals to get better. Therapist will continue to help patient problem solve and develop coping skills to alleviate symptoms of anxiety.     Type of psychotherapeutic technique provided: Client centered and Solution-focused    Progress toward short term goals:Progress as expected, patient attended follow-up visit and building rapport with therapist.    Review of long term goals: Long-term goals reviewed , patient reports he would eventually like to be able to work again.    Diagnosis:   1. Schizophrenia, unspecified type        Plan and Follow up: Patient is scheduled for follow up visit with therapist on 2/13/18 at 10am.      Discharge Criteria/Planning: Client has chronic symptoms and ongoing therapy for maintenance stability recommended.    Guevara Her, AMANDA 1/23/2018      "

## 2021-06-16 NOTE — TELEPHONE ENCOUNTER
Telephone Encounter by Tammy Mayorga at 1/16/2019  8:31 AM     Author: Tammy Mayorga Service: -- Author Type: --    Filed: 1/16/2019  8:33 AM Encounter Date: 1/14/2019 Status: Signed    : Tammy Mayorga       Per insurance, PA is not required for medication. Called pharmacy and left detailed message that if they are still having issues processing medication, they will need to call the pharmacy help desk for further assistance.

## 2021-06-16 NOTE — PROGRESS NOTES
Subjective:  27 y.o. male with concerns of follow up on cough.  [place]  [place] last [place].  Wakes twice per month with asthma symptoms.  Uses rescue inhaler every day, usually two times.    Nose still stuffy.  Nasal spray helps.  Wants refill  Eyes also itch.  Reports discharge from nose.  Not coughing up sputum.      Outpatient Medications Prior to Visit   Medication Sig Dispense Refill     cetirizine (ZYRTEC) 10 MG tablet Take 1 tablet (10 mg total) by mouth daily. 30 tablet 0     dextromethorphan-guaifenesin  mg/5 mL Liqd Take 5 mL by mouth every 4 (four) hours as needed (cough). 120 mL 1     fludrocortisone (FLORINEF) 0.1 mg tablet TAKE 2 TABLETS (0.2 MG TOTAL) BY MOUTH DAILY. 120 tablet 1     hydrocortisone (CORTEF) 10 MG tablet TAKE TWO TABLETS (20MG) EVERY MORNING WITH BREAKFAST AND ONE TABLET (10MG) EVERY AFTERNOON AROUND 2PM 90 tablet 0     levothyroxine (SYNTHROID, LEVOTHROID) 88 MCG tablet Take 1 tablet (88 mcg total) by mouth daily. 90 tablet 1     OLANZapine (ZYPREXA) 10 MG tablet TAKE 1 TABLET (10 MG TOTAL) BY MOUTH AT BEDTIME FOR DEPRESSION 30 tablet 10     albuterol (PROAIR HFA;PROVENTIL HFA;VENTOLIN HFA) 90 mcg/actuation inhaler Inhale 2 puffs every 6 (six) hours as needed for wheezing. 1 each 0     fluticasone (FLONASE) 50 mcg/actuation nasal spray 1 spray into each nostril daily. 16 g 2     No facility-administered medications prior to visit.       History   Smoking Status     Never Smoker   Smokeless Tobacco     Never Used     Comment: no second hand smoke exposure      Objective:  /76 (Patient Site: Right Arm, Patient Position: Sitting, Cuff Size: Adult Regular)  Pulse (!) 112  Temp 97.6  F (36.4  C) (Oral)   Wt 133 lb (60.3 kg)  SpO2 99%  BMI 23.86 kg/m2  GENERAL: alert, not distressed  EARS: normal tympanic membranes and external auditory canals bilaterally  PHARYNX: no erythema or  exudates  MOUTH: well hydrated mucosa, no lesions  NECK: no lymphadenopathy or thyroid nodules   CHEST: clear, no rales, rhonchi, or wheezes  CARDIAC: regular without murmur  ABDOMEN: soft, non tender, non distended, normal bowel sounds    Assessment and Plan:   Nasal congestion  - fluticasone (FLONASE) 50 mcg/actuation nasal spray; 1 spray into each nostril daily.  Dispense: 16 g; Refill: 2    Moderate persistent asthma  Start controller with presumed asthma.  Could consider spirometry if not improving, as history is somewhat difficult.  - albuterol (PROAIR HFA;PROVENTIL HFA;VENTOLIN HFA) 90 mcg/actuation inhaler; Inhale 2 puffs every 6 (six) hours as needed for wheezing.  Dispense: 1 each; Refill: 0  - mometasone (ASMANEX TWISTHALER) 220 mcg (60 doses) inhaler; Inhale 1 puff daily.  Dispense: 1 each; Refill: 2     This visit was conducted with the aid of a professional .

## 2021-06-16 NOTE — TELEPHONE ENCOUNTER
"Attempt to call pt, left voice mail for patient to call clinic back .#2     \" Okay to relay message\"  "

## 2021-06-16 NOTE — PROGRESS NOTES
"Mental Health Visit Note    2/13/2018    Start time: 10:00am    Stop Time: 10:40am   Session # 3    Brooke Peterson is a 27 y.o. male is being seen today for    Chief Complaint   Patient presents with      Follow Up     Schizophrenia   .     New symptoms or complaints: Patient has ongoing concerns regarding repaying his medical bills.    Functional Impairment:   Personal: 3  Family: 3  Work: 2  Social: 4    Clinical assessment of mental status:   Grooming: Well groomed  Attire: Appropriate  Age: Appears Stated  Behavior Towards Examiner: Cooperative  Motor Activity: Within normal   Eye Contact: Avoidant  Mood: Apathetic  Affect: Flat  Speech/Language: Within normal  Attention: Within normal  Concentration: Brief  Thought Process: Within normal and Slow  Thought Content: Hallucinations: Within noraml and pt reports managed with medications  Delusions: Within normal  Orientation: X 3  Memory: Impairment  Judgement: Impairment  Estimated Intelligence: Average  Demonstrated Insight: Diminished  Fund of Knowledge: adequate      Suicidal/Homicidal Ideation present: None Reported This Session    Patient's impression of their current status: Patient presented for scheduled appointment and professional . Patient reports feeling \"so-so,\" doing better than before as he no longer has a cold. Patient reports he continues to take his medications, sleep is good, and his family is doing well. Patient reports having no hallucinations since being on medications. Patient reports his mood is \"sometimes good sometimes low,\" but he believes that if he continues to take his medications, it will help with his mood. Patient identified some activities he enjoyed doing to keep himself busy and occupied, such as watching funny Angela movies, walking around, and talking to his siblings. 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 Patient reports he would like to go back to visit [facility] " in the future, as he still has some relatives there. Patient reports ongoing concern regarding his medical bills and brought in mail for therapist to help him review.    Therapist impression of patients current state: Patient presented with baseline apathetic mood and flat affect but was engaged and cooperative. Patient attended appointment without father, who's also his PCA, who typically attends appointments with patient. Patient reports his father was not feeling well, so patient is here by himself. Patient displayed some insight into his mood, understanding that it does fluctuate but hard to distinguish what causes the fluctuation. Patient able to self-identify activities of interest and maintain positive outlook. Patient also expressed ongoing anxiety regarding his medical bills. Processed thoughts and feelings with patient and validated patient concerns. Therapist consulted with CCC and CCC will meet with patient today. CCC reports that because patient's only income is SSI, patient does not need to worry about paying back the medical bills because it will not affect his SSI income. Therapist will continue to help patient problem solve and develop coping skills to alleviate symptoms of anxiety and depression.     Type of psychotherapeutic technique provided: Insight oriented, Client centered and Solution-focused    Progress toward short term goals:Progress as expected, patient attended follow-up visit and building rapport with therapist.    Review of long term goals: Long-term goals reviewed , patient reports he would eventually like to be able to work again.    Diagnosis:   1. Schizophrenia, unspecified type        Plan and Follow up: Patient is scheduled for follow up visit with therapist on 3/6/18 at 10am.      Discharge Criteria/Planning: Client has chronic symptoms and ongoing therapy for maintenance stability recommended.    AMANDA Mac 2/13/2018

## 2021-06-17 NOTE — PROGRESS NOTES
Assessment  SW, pt, pt's father Shwe and  present for meeting with  to complete intake with [facility] for citizenship. SW assisted with this and educated pt and pt's father on how [facility] will assist. CG had noted in recent note pt's father had questions again about [employer], SW reiterated what the  from [facility] had dicussed in previous meeting with SW and pt and pt's father regarding the debt--pt's income SSI, [employer] cannot garnish wages to get debt paid off;  informed pt and pt's father they can ignore the bills. CG will most likely need to continue to reiterate this if the pt's father continues to bring this up. SW suggest for pt to discuss with [facility] worker further if they have concerns still.      Action Plan:    will:  1) Available as needed       Care Guide will:  Care Guide Delegation:   1)  Due Date:  None at this time.       Delegation:      Subjective   The pt, pt's father Shwe and  present for meeting with  to complete intake with [facility] for citizenship. SW assisted with intake and educated the pt and pt's father on [facility] and how they will be assisting. CG also mentioned in recent note pt had questions about [employer] bills. SW reiterated what SW has already discussed regarding [employer] bills in previous meetings about this matter to the pt and pt's father--consulted with [facility]  in previous meeting regarding debt, since pt has SSI as income, wages cannot be garnished to pay for outstanding medical bills with [employer]. [facility]  informed pt and pt's father they can ignore [employer] bills--CG will most likely need to reiterate this consistently for pt's father when he asks.          Objective    Appearance: Casually and appropriately dressed, well-groomed, normal gait.      Behavior: Cooperative, open       Speech: Did not speak much, allowed father to speak on his behalf.      Emotion/Affect: Calm, quiet      Thought Processes: not assessed       Orientation: not assessed        Memory: not assessed       General Intellectual Abilities: not assessed       Judgment: not assessed       Insight: not assessed       Clinical Recommendations: The pt has a good support system through his family. SW got the impression pt is needing minimal assist from CCC at this time.

## 2021-06-17 NOTE — PROGRESS NOTES
3-29-18  Patient no show today's appt.  Will connect with patient on 4-5-18 regarding referral to [facility] for [facility] worker.

## 2021-06-17 NOTE — PROGRESS NOTES
"Brooke Peterson is a 30 y.o. male who is being evaluated via a billable telephone visit.      What phone number would you like to be contacted at? [phone]   How would you like to obtain your AVS? AVS Preference: Theo.    Assessment & Plan     Chronic rhinitis  - cetirizine (ZYRTEC) 10 MG tablet  Dispense: 30 tablet; Refill: 2  Unsure if this is related to medication, allergies, smoke exposure.  Could consider other rare causes such as CSF rhinorrhea.  Seems like he has been extremely longstanding for that however.  We will make a switch in antihistamines as above.    Try to schedule Covid vaccine appointment.  Schedule follow-up in person next week.     BMI:   Estimated body mass index is 31 kg/m  as calculated from the following:    Height as of 12/28/20: 5' 3\" (1.6 m).    Weight as of 3/31/21: 175 lb (79.4 kg).       Return in about 1 week (around 5/20/2021) for Recheck.    Jose Rangel MD  [facility]    Subjective   Brooke Peterson is 30 y.o. and presents today for the following health issues   HPI     30-year-old man.  Not sure why he is having phone call to follow-up.  Does report cold symptoms of sneezing, coughing, runny nose.  Denies fever.  Taking antihistamine presumably.  Requests a \"cold medicine.\".  Medications include Invega and Zyprexa.    [facility] medication resource indicates nasal congestion with less than 2% of users of Invega and rhinitis with 7% users of Zyprexa.    Objective       Vitals:  No vitals were obtained today due to virtual visit.    Phone call duration: 11 minutes    No future appointments.   "

## 2021-06-17 NOTE — TELEPHONE ENCOUNTER
Used LL Name: Magalie ID: [id number]  [facility]    Patient needs the following scheduled:    1. Covid Vaccine  2. Follow up next week face to face (no reason was given as to what pt needs to follow up on)

## 2021-06-17 NOTE — PROGRESS NOTES
3-29-18  Met and consulted with patient's therapist Guevara Her regarding referral for [employer] worker.  Informed Guevara that patient does not have [facility] worker and that [employer] worker comes weekly was father's [employer] worker.  Guevara barker for referral for [employer] worker to support in the community.  Patient requested to apply for citizen.    Provided Guevara Her the [facility] referral intake form and to fax it to Stefania.   Stated she will complete the intake referral form and have patient sign on 4-5-18 and she will fax it Stefania.

## 2021-06-17 NOTE — TELEPHONE ENCOUNTER
USED LL NAME: ARNALDO ID [id number]    Father stated to call back tomorrow around 9-10am. Will try again later.

## 2021-06-17 NOTE — PROGRESS NOTES
Attempted to call patien to schedule appt, primary contact number, [phone], no answer. Secondary contact number, female stated that patient was at home and she is not home at the moment, to try to call back on Monday. Postponing task out to Monday and will try again.

## 2021-06-17 NOTE — PROGRESS NOTES
Clinic Care Coordination Contact    Situation: Patient chart reviewed by care coordinator.    Background: SW completed chart review    Assessment: Patient was in ED for dizziness. CHW in contact with patient and supported transportation setup and appointment reminders     Plan/Recommendations: Standard Outreach

## 2021-06-17 NOTE — PROGRESS NOTES
5/12/2021   Clinic Care Coordination Contact    Community Health Worker Follow Up    Goals:   Goals Addressed                 This Visit's Progress       Patient Stated      Medical (pt-stated)   20%     Goal Statement: I want to schedule my appointments with my referrals that I was given within one month  Date Goal set: 02/03/21  Barriers: Language, Transportation  Strengths:   Date to Achieve By: 3/01/2021  Patient expressed understanding of goal: Yes  Action steps to achieve this goal:  1. I will wait to hear from the clinic to schedule neurology appointment.  2. I will talk to Dr Rangel tomorrow 5-13-21 at 2pm for ER follow up to address dizziness and ask about neurology appt.  3. I will call CHW 1 week before the neurology appt for support to set up medical transportation    Updated: 5-12-21 AL          Mental Health Management (pt-stated)   50%     Goal Statement- I want to be connected to a provider to manage my mental health medications within one month    Action steps to achieve this goal  1.  I will attend telephone intake appt on 5-17-21 pm with [facility] to establish care with a psychiatrist.    2. I will update CCC team at next outreach.     [facility]  [address] [place] [address], [address]  [place] [address]   [phone]  Fax: [phone]    Updated: 5-12-21 AL    Date goal set:  12/6/2019 BC            Other (pt-stated)        Goal Statement: I would like support to attend 80% of medical appointments in the next 3 months to address health concerns and needs.  Date Goal set: 4/9/2021  Barriers: language, lack transportation  Strengths: medical transportation with [employer]  Date to Achieve By: 6-9-21  Patient expressed understanding of goal: Yes  Action steps to achieve this goal:  1. I will call [phone] at least 1 week before my appointments for support to set up medical ride through [employer].  2 I will ask my sister for support if she is available and if she  can help to call CHW to notify of appt and need transportation.  3. I will update CCC team at next outreach.    Updated: 5-12-1 [facility] Angela : Gissell Hummel  Called and spoke to patient through Angela  and follow up on goals.  Patient reported:   -Natalis reschedule due to no transportation for 4-19-21 and no  for the 1st appt.  Reschedule to telephone visit on 5-17-21 don't know the time. Sister knows the time.  --went to see the doctor on 4-20-21 but could not open the door at the clinic. Clinic was closed.door was locked.    Coordinated with  registrar to support patient to reschedule ED follow up with PCP..  Due to transportation issue scheduled a telephone visit for tomorrow 5-13-21 at 2pm to address dizziness and ER follow up.    Provided patient CHW direct number for support to set up medical ride temporarily until we have connect with new [facility] worker to support in the community.    Suggested patient to call CHW 1 week before appt to set up medical ride.  Encouraged patient to ask help from sister if she is available.    Offered to have CCC RN to follow up after PCP visit for plan of care and recommendations.  He declined he has nurse come out every week.      CHW Follow up: Monthly    CHW Plan: Follow up on goals    CHW Next Follow Up: 6-11-21

## 2021-06-17 NOTE — PROGRESS NOTES
Therapist and patient signed and completed [facility] referral for [facility], faxed on 4/5/18 at 11am.

## 2021-06-17 NOTE — PROGRESS NOTES
Assessment  The pt, SW and SW intern present for meeting to assist with completing the [facility] intake packet. SW intern assisted with this need. Pt did not bring a copy of his [facility] benefit letter, SW has contacted pt's financial worker requesting this information. SW will wait to mail packet in until SW receives this info.      Action Plan:    will:  1) Wait to receive [facility] benefit letter from pt's financial worker--called and left VM. SW then mail in packet.      Care Guide will:  Care Guide Delegation:   1)  Due Date:  None at this time        Delegation:      Subjective   Pt, , SW, SHIRA intern present for meeting today to assist pt with citizenship paperwork from [facility], [employer]. SW intern assisted pt with completing packet of paperwork requested from [facility] [employer] to proceed with citizenship application process. SHIRA called pt s [facility] financial workerMarleni [phone] case # [id number] and left a voicemail requesting a letter of public benefits, which is needed in order to waive pt s [facility] filing fee. SW will wait to get this p/w before mailing the packet back in. SW also made copies of pt's green card, state ID and SS card to mail in with the packet.      Objective  Pt was adequately dressed and groomed. Pt was cooperative. Pt is a non-english speaker, and speaks Israeli, soft-spoken. Orientation observed x3. Pt benefits from assist with navigating services and resources as pt seems to struggle with this need.

## 2021-06-17 NOTE — TELEPHONE ENCOUNTER
PT declined to make an appt. Will wait for another covid event. Unable to make it to this weekend. Completing task.

## 2021-06-17 NOTE — TELEPHONE ENCOUNTER
Telephone Encounter by Santi Greer CMA at 2/5/2021 11:06 AM     Author: Santi Greer CMA Service: -- Author Type: Medical Assistant    Filed: 2/5/2021 11:07 AM Encounter Date: 2/5/2021 Status: Addendum    : Santi Greer CMA (Medical Assistant)    Related Notes: Original Note by Santi Greer CMA (Medical Assistant) filed at 2/5/2021 11:06 AM       ----- Message from Jose Rangel MD sent at 2/5/2021  9:18 AM CST -----  Call:  Thyroid hormone dose looks like it has been a little too high.  We need to reduce the dose.  New prescription sent to pharmacy.  Please update home nursing agency.    Jose Rangel MD P Pelzel, [facility] [place]             Call:   Also liver tests seem to be a bit elevated today which is a new finding.  May be related to weight gain.  We should recheck in approximately 2 to 3 weeks.

## 2021-06-17 NOTE — PROGRESS NOTES
4-5-18  Met with patient in clinic and follow up on goals and action steps.  Angela : Celestino Miller.  Patient reported:  -doing okay.  Scheduled appt with Leela Muñoz, CCC SW for 4-18-18 at 9am to support with intake with [facility] for citizenship.   Informed patient to bring his green card, SSI beneft, # of people live in the home.    -saw therapist (Guevara Her) today and signed [facility] intake form and SAW for [facility].  -has same medical bills from [place]. Will discuss with  on 4-18-18 at 9am      Plan:   monthly follow up [facility] 5-9-18

## 2021-06-17 NOTE — PROGRESS NOTES
"Mental Health Visit Note    4/26/2018    Start time: 11:00am    Stop Time: 11:30am   Session # 6    Brooke Peterson is a 27 y.o. male is being seen today for    Chief Complaint   Patient presents with      Follow Up     Schizophrenia   .     New symptoms or complaints: None    Functional Impairment:   Personal: 2  Family: 1  Work: 2  Social: 3    Clinical assessment of mental status:   Grooming: Well groomed  Attire: Appropriate  Age: Appears Stated  Behavior Towards Examiner: Cooperative  Motor Activity: Within normal   Eye Contact: Appropriate  Mood: Euthymic  Affect: Blunted  Speech/Language: Within normal  Attention: Within normal  Concentration: Brief  Thought Process: Within normal  Thought Content: Hallucinations: Within noraml and pt reports managed with medications  Delusions: Within normal  Orientation: X 3  Memory: Impairment  Judgement: No Evidence of Impairment  Estimated Intelligence: Average  Demonstrated Insight: Adequate  Fund of Knowledge: adequate      Suicidal/Homicidal Ideation present: None Reported This Session    Patient's impression of their current status: Patient presented for scheduled appointment and was accompanied by professional . Patient reported feeling \"so-so\" today. He reports his only worry right now is obtaining citizenship, no other major concerns. He met with [facility] SW last week to submit paperwork but stated that he has not heard anything back yet and was wondering how long he has to wait? Therapist informed patient that it takes a while for them to review applications, but they would either call or mail patient information about next steps. Patient reports taking his medications as prescribed, stating they have been helpful. Patient reports his sleep is good, but appetite continues to be poor, as he does not feel hungry. Patient shared that his family was doing well, no issues or concerns. Patient reported feeling good about the warmer weather and looking forward to going " to the park and going fishing soon.    Therapist impression of patients current state: Patient presented with euthymic mood today and was engaged, cooperative, and more expressive today. Patient discussed his worries and anticipation for obtaining citizenship, therapist reassured patient that it is now a waiting process and that they would contact patient regarding next steps. Patient denied any other major concerns. Patient discussed family relationship and identified things he was looking forward to do as the weather gets nicer. Encouraged patient to engage in activities to keep healthy and active. Therapist will continue to follow up with patient for symptom management.    Type of psychotherapeutic technique provided: Insight oriented and Client centered    Progress toward short term goals:Progress as expected, patient engaged and expressing self openly.    Review of long term goals: Long-term goals reviewed , patient reports he would eventually like to be able to work again and obtain his citizenship.    Diagnosis:   1. Schizophrenia, unspecified type        Plan and Follow up: Patient is scheduled for follow up visit with therapist on 5/17/18 at 10am.      Discharge Criteria/Planning: Client has chronic symptoms and ongoing therapy for maintenance stability recommended.    AMANDA Mac 4/26/2018

## 2021-06-17 NOTE — PROGRESS NOTES
"Mental Health Visit Note    4/5/2018    Start time: 10:10am    Stop Time: 10:50am   Session # 5    Brooke Peterson is a 27 y.o. male is being seen today for    Chief Complaint   Patient presents with      Follow Up     Schizophrenia   .     New symptoms or complaints: None    Functional Impairment:   Personal: 2  Family: 2  Work: 2  Social: 4    Clinical assessment of mental status:   Grooming: Well groomed  Attire: Appropriate  Age: Appears Stated  Behavior Towards Examiner: Cooperative  Motor Activity: Within normal   Eye Contact: Appropriate  Mood: Euthymic  Affect: Flat  Speech/Language: Within normal  Attention: Within normal  Concentration: Brief  Thought Process: Within normal and Slow  Thought Content: Hallucinations: Within noraml and pt reports managed with medications  Delusions: Within normal  Orientation: X 3  Memory: Impairment  Judgement: No Evidence of Impairment  Estimated Intelligence: Average  Demonstrated Insight: Adequate  Fund of Knowledge: adequate      Suicidal/Homicidal Ideation present: None Reported This Session    Patient's impression of their current status: Patient presented for scheduled appointment and was accompanied by professional . Patient reported feeling \"good\" today and denied any major concerns. He endorses depressed mood due to the snow and cold weather, stating he cannot wait until summer time. Patient reports looking forward to going fishing with his friends when the weather is warmer. Patient reports he continues to take his medications as prescribed and they seem to be helping with symptom management. Patient reports his sleep is good, denied any bad dreams, stating he has good dreams. Patient does endorse having occasional thoughts about [place], both good and bad memories. Patient reports being able to talk about those memories with his family. Patient reports he continues to have a poor appetite, stating he just doesn't feel hungry, " though he does enjoy his father's cooking. No physical health concerns reported. Patient had some questions regarding citizenship and bills and will be meeting with clinic care guide to address those questions.    Therapist impression of patients current state: Patient presented with euthymic mood today and was engaged and cooperative. Patient denied any major concerns and displayed some insight into the weather impacting his mood and ability to be active or do activities that he enjoys. Patient shared memories he has about living in the refugee [place] in [facility], stating he was captured once by the [employer], and had to work for them for free for one whole week, before being able to return to the refugee [place]. Patient expressed that was a scary experience. Patient also recalls positive memories, such as farming and planting vegetables. Processed thoughts and feelings with patient and validated patient's experience. Patient reports he is able to talk about his experiences with his family and feels good talking about those memories. Patient identified things he was looking forward to be able to do when the weather improves, encouraged patient to engage in activities to keep him active. Therapist will continue to follow up with patient to support patient with symptom management.    Type of psychotherapeutic technique provided: Insight oriented, Client centered and Solution-focused    Progress toward short term goals:Progress as expected, patient engaged and expressing self openly.    Review of long term goals: Long-term goals reviewed , patient reports he would eventually like to be able to work again and obtain his citizenship.    Diagnosis:   1. Schizophrenia, unspecified type        Plan and Follow up: Patient is scheduled for follow up visit with therapist on 4/26/18 at 11am.      Discharge Criteria/Planning: Client has chronic symptoms and ongoing therapy for maintenance stability recommended.    AMANDA Mac  4/5/2018

## 2021-06-17 NOTE — PROGRESS NOTES
5-2-18  Patient Walk-In requested to see Care Guide because he received package from [facility].  Met with patient and patient's father and afamily friend interpreted for patient and father.  Read and explained to patient what the letter and forms to fill out for citizenship and sent back to lawyer Patsy at [facility].  Scheduled patient to meet with  for support to complete form Friday 5-4-18 at 11 am.  Instructed patient and father to bring copy of SSI award letter, social security card, [place] ID, letter from [facility] for food stamp to appointment on Friday.    Made copy of form and letter from [facility].    Plan:   follow up  6-2-18      Appt with Leela Muñoz, CCC SW  5-4-18 at 11am    1) Visit type: Paperwork/Forms    Information For Naturalization from [facility] (Lawyer Patsy)  Package in 's mailbox.  a. Does the paperwork or form have a due date?  If yes, what is the date? no  b. How many pages of paperwork are there? 9  **If forms or paperwork are five or more pages or multiple issues (2-3) need to be addressed, prepare the patient that it may take more than one visit with the  to address all issues.**  c. What is the type of paperwork? RE: Disability, other    2) Visit Type: Housing  no  a. Is patient homeless or at-risk of becoming homeless in the next 1-2 months?  b. Is the patient looking for other types housing resources?    3) Visit Type:  Safety Concerns  no  a. Do you suspect abuse, neglect or unsafe living environment?   b. Has abuse, neglect or unsafe living environment been voiced by the patient or discussed with the patient?   c. Is the [facility] hospital involved in the case- RE: vulnerable adult or child protective services?    4) Visit Type:  Patient/Family Care Conference   no  a. In your discussion with the patient do you sense that a  facilitated care conference would be beneficial?  b. Are there multiple complex social factors complicating coordination of care? If yes, brief  summary:    5) Visit Type: Mental Health/[facility] Setting   no  a. Does the patient have support outside of the Care Guide for mental health needs?  b. Has the patient had a recent major life event?  c. Has the patient had a negative change in behavior from baseline?  d. Does the patient push Care Guide boundaries (for example repetitive calling, unscheduled drop- ins, despite redirection from the care guide)?   e. If questioning boundaries- ASK!      Are you working with any other Social workers or providers on this issue? Not sure if he has [facility]HS Worker yet from [facility]. Guevara Her,Therapist sent in the referral.

## 2021-06-18 NOTE — PROGRESS NOTES
CCC SHIRA completing chart review, last visit with SHIRA, SHIRA assisted with [facility] intake. Pt was missing letter stating what his county benefits is. SHIRA instructed pt to bring a copy of this to the clinic and give to CG.    SHIRA f/u with CG, has pt brought in this letter? Pt's [facility] packet is completed with copies of all necessary documents and is in  mailbox. Check-in with pt about this if he has not brought this info in yet, when pt does, please put the letter in with the other documents and mail back to [facility].

## 2021-06-18 NOTE — PROGRESS NOTES
Outpatient Mental Health Treatment Plan    Name:  Brooke Peterson  :  1990  MRN:  [id number]    Treatment Plan:  Updated Treatment Plan  Intake/initial treatment plan date:  10/24/17  Benefit and risks and alternatives have been discussed: Yes  Is this treatment appropriate with minimal intrusion/restrictions: Yes  Estimated duration of treatment:  Approximately 20 sessions.  Anticipated frequency of services:  Every 3 weeks  Necessity for frequency: This frequency is needed to establish therapeutic goals and for continuity of care in order to monitor progress.  Necessity for treatment: To address cognitive, behavioral, and/or emotional barriers in order to work toward goals and to improve quality of life.    Plan:      ? Schizophrenia     Goal:  Decrease average depression and anxiety level from 2 to 1.   Strategies:    ?[x] Decrease social isolation     [x] Increase involvement in meaningful activities     ?[x] Discuss sleep hygiene     ?[x] Explore thoughts and expectations about self and others     ?[x] Process grief (loss of significant person, independence, role, etc.)     ?[x] Assess for suicide risk     ?[x] Implement physical activity routine (with physician approval)     [x] Continue compliance with medical treatment plan (or explore barriers)       ?   ?Degree to which this is a problem: 2  Degree to which goal is met: 1  Date of Review: 3 months      Goal: Improve relationships and initiate social interactions   Strategies:  ?[x] Decrease social isolation     [x] Increase involvement in meaningful activities     ?[x] Practice basic social skills  ?   [x] Explore thoughts and expectations about self and others    ?    Degree to which this is a problem: 3  Degree to which goal is met: 1  Date of Review: 3 months       Functional Impairment:  1=Not at all/Rarely  2=Some days  3=Most Days  4=Every Day    Personal : 2  Family : 2  Social : 3  Work/school : 3    Diagnosis:  (EXAMPLE of DSM V: Major depressive  disorder, recurrent, moderate; Generalized Anxiety disorder; borderline personality per patient PHI; fibromyalgia, History of breast cancer in remission; Problem with primary relationship.)   1. Schizophrenia, unspecified type        WHODAS 2.0 score: 33/48=69% Severe disability    Clinical assessments and measures completed:. RADHA-7 and PHQ-9     Strengths:  Patient has a supportive family and is medication compliant and managing symptoms well.  Limitations:  Patient has poor memory, poor attention, and some cognitive delays that may impact his ability and/or willingness to try new things and push his limits.  Cultural Considerations: Patient is [place] and the understanding of Schizophrenia as a mental illness is limited, more psychoeducation may be needed.    Persons responsible for this plan: Patient and Provider          AMANDA Mac  Psychotherapist Signature           Patient Signature:              Guardian Signature             Provider: Performed and documented by AMANDA Mac   Date:  5/17/2018

## 2021-06-18 NOTE — PROGRESS NOTES
Assessment/ Plan  1. Allergic rhinitis  Restart fluticasone, also start fexofenadine because fluticasone gave subtotal relief in the past.  Follow-up if not improving    - fluticasone (FLONASE) 50 mcg/actuation nasal spray; 1 spray into each nostril daily.  Dispense: 16 g; Refill: 2      Subjective  HPI  Runny Nose  --------------  Duration/ Onset: 4-5 days;  But present before  Description/ Mucous consistency: watery  H/o allergies/ triggers or allergy sx?  Itchy nose,Sneezing,- yes, no eye symptomsOther URI symptoms?   Cough,Sore throat, Fever, Headache? no  Triggers/ things/ times when worse? no  Comment: Patient was seen by Dr. Rangel 3/27/18 with itchy eyes and runny nose, prescribed fluticasone.  Also has a history of asthma.    Current Outpatient Prescriptions on File Prior to Visit   Medication Sig     albuterol (PROAIR HFA;PROVENTIL HFA;VENTOLIN HFA) 90 mcg/actuation inhaler Inhale 2 puffs every 6 (six) hours as needed for wheezing.     ARNUITY ELLIPTA 200 mcg/actuation DsDv INHALE 1 PUFF BY MOUTH EVERY DAY     cetirizine (ZYRTEC) 10 MG tablet Take 1 tablet (10 mg total) by mouth daily.     fludrocortisone (FLORINEF) 0.1 mg tablet TAKE 2 TABLETS (0.2 MG TOTAL) BY MOUTH DAILY.     hydrocortisone (CORTEF) 10 MG tablet TAKE TWO TABLETS (20MG) EVERY MORNING WITH BREAKFAST AND ONE TABLET (10MG) EVERY AFTERNOON AROUND 2PM     levothyroxine (SYNTHROID, LEVOTHROID) 88 MCG tablet Take 1 tablet (88 mcg total) by mouth daily.     OLANZapine (ZYPREXA) 10 MG tablet TAKE 1 TABLET (10 MG TOTAL) BY MOUTH AT BEDTIME FOR DEPRESSION     dextromethorphan-guaifenesin  mg/5 mL Liqd Take 5 mL by mouth every 4 (four) hours as needed (cough).     [DISCONTINUED] fluticasone (FLONASE) 50 mcg/actuation nasal spray 1 spray into each nostril daily.     No current facility-administered medications on file prior to visit.      Patient Active Problem List   Diagnosis     Hypotension     Adrenal insufficiency     Schizophrenia      Hypothyroidism     Thrombocytopenia     Moderate persistent asthma     No past surgical history on file.  No family history on file.    ROS  As listed above    Objective  Physical Exam  Vitals:    05/17/18 1058   BP: 104/64   Patient Site: Left Arm   Patient Position: Sitting   Cuff Size: Adult Regular   Pulse: 64   Resp: 16   Temp: 98.4  F (36.9  C)   TempSrc: Oral   SpO2: 99%   Weight: 132 lb 8 oz (60.1 kg)     Patient is alert, oriented and in no distress.    Conjunctiva, lids appear normal.  Nares are normal bilaterally.    TMs are visualized bilaterally and appear normal    There is no adenopathy in the neck.  Oral cavity is without any notable lesion, oropharynx appears normal without any erythema, exudate, petechia    Chest appears normal, auscultation reveals normal breath sounds, no wheezing, rales or rhonchi.        Please note: Voice recognition software was used in this dictation.  It may therefore contain typographical errors.

## 2021-06-18 NOTE — PROGRESS NOTES
Mental Health Visit Note    5/17/2018    Start time: 10:00am    Stop Time: 10:30am   Session # 7    Brooke Peterson is a 27 y.o. male is being seen today for    Chief Complaint   Patient presents with      Follow Up     Schizophrenia   .     New symptoms or complaints: Pt reports having a stuffy nose today and not feeling great.    Functional Impairment:   Personal: 2  Family: 1  Work: 2  Social: 3    Clinical assessment of mental status: No changes from last session, 4/26/18.  Grooming: Well groomed  Attire: Appropriate  Age: Appears Stated  Behavior Towards Examiner: Cooperative  Motor Activity: Within normal   Eye Contact: Appropriate  Mood: Euthymic  Affect: Blunted  Speech/Language: Within normal  Attention: Within normal  Concentration: Brief  Thought Process: Within normal  Thought Content: Hallucinations: Within noraml and pt reports managed with medications  Delusions: Within normal  Orientation: X 3  Memory: Impairment  Judgement: No Evidence of Impairment  Estimated Intelligence: Average  Demonstrated Insight: Adequate  Fund of Knowledge: adequate      Suicidal/Homicidal Ideation present: None Reported This Session    Patient's impression of their current status: Patient presented for scheduled appointment and was accompanied by professional . Patient reported having a stuffy nose today and not feeling so great. When asked about patient's mood in general, he report feeling unhappy right now, primarily due to the stuffy nose he's had for 4-5 days, no other reasons. Patient will try to see if provider is available to see him today regarding his stuffy nose. Patient reports taking his medications as prescribed, getting enough sleep, though he reports some bad dreams about ghosts and bad spirits. Patient continues to endorse poor appetite as nothing tastes good to him. Patient denied any hallucinations or paranoia. No major life stressors reported. Patient brought some mail for therapist to review with  him today. It was a medical bill from [place], therapist reminded patient that he does not need to worry about those bills per his work with [facility], patient reported understanding. Patient shared that his family is doing well. He reports his overall mood has improved due to the warmer weather as he's been able to get outside and go fishing.     Therapist impression of patients current state: Patient presented with euthymic mood today and was engaged and cooperative. Patient updated therapist of recent events and denied any major concerns. Patient displayed some insight regarding his mood. He initially stated being unhappy, but shared that the reason was due to his stuffy nose, nothing else. Patient later expressed that his overall mood has improved since starting therapy, but reports that is largely due to the weather change, as he really dislikes winter and snow. Patient expressed interest in ongoing psychotherapy, reviewed and updated treatment plan with patient.    Type of psychotherapeutic technique provided: Insight oriented and Client centered    Progress toward short term goals:Progress as expected, patient engaged and expressing self openly.    Review of long term goals: Long-term goals reviewed , patient reports he would eventually like to be able to work again and obtain his citizenship.    Diagnosis:   1. Schizophrenia, unspecified type        Plan and Follow up: Patient is scheduled for follow up visit with therapist on 6/7/18 at 9am.      Discharge Criteria/Planning: Client has chronic symptoms and ongoing therapy for maintenance stability recommended.    AMANDA Mac 5/17/2018

## 2021-06-18 NOTE — PROGRESS NOTES
Patient still have not heard from [facility] for [facility] Worker.  In progress working with [facility] to support with citizenship process

## 2021-06-18 NOTE — LETTER
Letter by Tatiana Vale NP at      Author: Tatiana Vale NP Service: -- Author Type: --    Filed:  Encounter Date: 2/13/2019 Status: (Other)       [address] [address]  [address]  [place] [address]                 February 13, 2019         Dear Brooke Peterson,     Due to not seeing you in 12 months or more, I am sorry to inform you that [facility] can no longer serve your needs and are hereby discharged from our  clinic. Last seen 11/06/2017 at your initial consultation and 2 no shows on 12/11/17 and 01/17/2018.      If you would like to reestablish care with in our clinic, please get a new referral from a [facility].      We wish you well. Thank you for your time in regards to this matter.     Sincerely,         [facility]   [address] [place] [address]   [address]   [place] [address]   [phone]

## 2021-06-18 NOTE — PROGRESS NOTES
Mental Health Visit Note    6/7/2018    Start time: 9:00am    Stop Time: 9:40am   Session # 8    Brooke Peterson is a 27 y.o. male is being seen today for    Chief Complaint   Patient presents with      Follow Up     Schizophrenia   .     New symptoms or complaints: Pt reports having a cold today.    Functional Impairment:   Personal: 2  Family: 1  Work: 2  Social: 3    Clinical assessment of mental status: No changes from last session, 5/17/18.  Grooming: Well groomed  Attire: Appropriate  Age: Appears Stated  Behavior Towards Examiner: Cooperative  Motor Activity: Within normal   Eye Contact: Appropriate  Mood: Euthymic  Affect: Blunted  Speech/Language: Within normal  Attention: Within normal  Concentration: Brief  Thought Process: Within normal  Thought Content: Hallucinations: Within noraml and pt reports managed with medications  Delusions: Within normal  Orientation: X 3  Memory: Impairment  Judgement: No Evidence of Impairment  Estimated Intelligence: Average  Demonstrated Insight: Adequate  Fund of Knowledge: adequate      Suicidal/Homicidal Ideation present: None Reported This Session    Patient's impression of their current status: Patient presented for scheduled appointment and was accompanied by his father/PCA and a professional . Patient denied any major concerns or issues, but continues to have complaints about an ongoing cold/stuffy nose. Patient reports he is taking his medications, his sleep is good, and appetite continues to be poor. Patient reports spending time with his family and friends, and going fishing. Patient's dad reports having some questions regarding patient's citizenship paperwork, he reports he would like a copy of all documents. Patient was working with [facility] on citizenship and therapist will reach out to [facility] regarding patient's father's request for copies of documents.     Therapist impression of patients current state: Patient presented with euthymic mood today and was  engaged and cooperative. Patient updated therapist on recent events, no new issues or concerns reported. Patient continues to report physical health symptoms of having an ongoing cold. Patient questioned if he should see PCP. Patient was prescribed Flonase at his last PCP visit, but reported that he had run out and have not picked up new refill yet. Patient will continue to use Flonase, if symptoms do not improve, patient will come in to see PCP. Discussed healthy hygiene and self-care to keep patient healthy. Patient was receptive to feedback.    Type of psychotherapeutic technique provided: Insight oriented, Client centered and Solution-focused    Progress toward short term goals:Progress as expected, patient engaged and expressing self openly.    Review of long term goals: Long-term goals reviewed , patient reports he would eventually like to be able to work again and obtain his citizenship.    Diagnosis:   1. Schizophrenia, unspecified type        Plan and Follow up: Patient is scheduled for follow up visit with therapist on 6/28/18 at 9am.  Therapist informed patient that therapist will be taking a maternity in mid August, and informed patient that there is another therapist available if patient would like to see someone else in the interim. Patient reports he would like to wait until therapist returns to clinic to resume psychotherapy with therapist. Patient is aware of crisis phone numbers and that he may call the clinic to see a provider if any concerns/issues arise.      Discharge Criteria/Planning: Client has chronic symptoms and ongoing therapy for maintenance stability recommended.    AMANDA Mac 6/7/2018

## 2021-06-18 NOTE — PROGRESS NOTES
Mailed the packet to [facility] 6-7-18 and will fax the MSA letter from the On license of [facility] to [facility] later on. Am waiting for the count to fax me the [facility] information. He does not get food stamp but gets [facility] $81.

## 2021-06-18 NOTE — PROGRESS NOTES
6-7-18  Called and spoke to patient's father Kylah Cook through [facility] [place] -Language line and follow up on goals.  Father updated goals on behalf of patient.  -no [facility] worker   Requested father to find a copy of the [facility] letter to verify if he gets any benefit from the [facility] like food stamp, MA, or MSA to submit the document to  at [facility] to process citizenship application.  Father stated he does not have the letter and not sure how to get a copy.  Explained to father that we can call to the [facility] and see if they can send or fax a copy to [employer].  Explained that patient has to be on the phone to give verbal permission to talk to [facility].  Father will call back again and will have to be on the phone.    Call: [facility] E[employer] Info Line  [phone]  Re: get a copy of food stamp, MSA, any count benefit to file for Financial waiver to waive citizenship fee.     Called patient and conference call to [facility] with [facility]-Language Line for support.   Spoke to Keysha. Explained to Keysha reasons for the call and request copy of [facility] benefit like food stamp, MSA, MA.   Explained to patient to talk to Brigida to request a copy of the [facility] benefits -food stamp to fax to [employer].  Patient gave verbal permission to send [facility] benefit to [employer].   Requested Keysha to fax it to [employer] at [phone]  Brigida stated patient only gets MSA and that she can fax it.   Thanked Keysha for her support.    Explained to patient that once we received the document from the [facility], then [employer] [facility] will mail the Information Form for Naturalization and document to  at [facility].  Patient stated okay and has no questions.    Plan:   Monthly follow up  Check referral status for [facility] worker at [facility]

## 2021-06-19 NOTE — PROGRESS NOTES
"Mental Health Visit Note    7/19/2018    Start time: 9:00am    Stop Time: 9:35am   Session # 10    Brooke Peterson is a 27 y.o. male is being seen today for    Chief Complaint   Patient presents with     MH Follow Up     Depression   .     New symptoms or complaints: Pt reports he continues to be bothered by his cold symptoms.    Functional Impairment:   Personal: 2  Family: 1  Work: 2  Social: 3    Clinical assessment of mental status: No changes from last session, 6/28/18.  Grooming: Well groomed  Attire: Appropriate  Age: Appears Stated  Behavior Towards Examiner: Cooperative  Motor Activity: Within normal   Eye Contact: Appropriate  Mood: Euthymic  Affect: Blunted  Speech/Language: Within normal  Attention: Within normal  Concentration: Brief  Thought Process: Within normal  Thought Content: Hallucinations: Within noraml and pt reports managed with medications  Delusions: Within normal  Orientation: X 3  Memory: Impairment  Judgement: No Evidence of Impairment  Estimated Intelligence: Average  Demonstrated Insight: Adequate  Fund of Knowledge: adequate      Suicidal/Homicidal Ideation present: None Reported This Session    Patient's impression of their current status: Patient presented on time for individual psychotherapy, and was accompanied by his father/PCA and a professional . Patient endorsed that he continues to be bothered by cold symptoms, though they are not severe, and patient has seen PCP regarding symptoms, it doesn't seem like it's getting resolved completely. Patient reports his mood is \"good, but not as good as others.\" He continues to express some anxiousness waiting to hear back regarding his citizenship paperwork. Patient reports he continues to take his medications, sleep is good, and appetite continues to be poor. Patient and his dad denied any major concerns at this time, patient reports his only goal at this time is citizenship.    Therapist impression of patients current state: Patient " presented with baseline euthymic mood, he was engaged and cooperative. No new issues or concerns reported, though he continues to be bothered by his cold symptoms and continues to sniffle throughout session. Patient reports his mood has been stable, he continues to have some anxiety regarding the citizenship process, but understands that it is a long process and he will be patient. Encouraged patient to continue to engage in meaningful activities and self care. Patient was receptive to feedback.    Type of psychotherapeutic technique provided: Insight oriented and Client centered    Progress toward short term goals:Progress as expected, patient engaged and expressing self openly.    Review of long term goals: Long-term goals reviewed , patient reports he would eventually like to be able to work again and obtain his citizenship.    Diagnosis:   1. Schizophrenia, unspecified type (H)        Plan and Follow up: Patient is scheduled for follow up visit with therapist on 8/9/18 at 9am.  Therapist informed patient that therapist will be taking a maternity in mid August, and informed patient that there is another therapist available if patient would like to see someone else in the interim. Patient reports he would like to wait until therapist returns to clinic to resume psychotherapy with therapist. Patient is aware of crisis phone numbers and that he may call the clinic to see a provider if any concerns/issues arise.      Discharge Criteria/Planning: Patient will continue with follow-up until therapy can be discontinued without return of signs and symptoms.    AMANDA Mac 7/19/2018

## 2021-06-19 NOTE — PROGRESS NOTES
"Mental Health Visit Note    6/28/2018    Start time: 8:50am    Stop Time: 9:20am   Session # 9    Brooke SCHAEFER Po is a 27 y.o. male is being seen today for    Chief Complaint   Patient presents with     MH Follow Up     Depression   .     New symptoms or complaints: Pt reports he continues to struggle with cold symptoms.    Functional Impairment:   Personal: 2  Family: 1  Work: 2  Social: 3    Clinical assessment of mental status: No changes from last session, 6/7/18.  Grooming: Well groomed  Attire: Appropriate  Age: Appears Stated  Behavior Towards Examiner: Cooperative  Motor Activity: Within normal   Eye Contact: Appropriate  Mood: Euthymic  Affect: Blunted  Speech/Language: Within normal  Attention: Within normal  Concentration: Brief  Thought Process: Within normal  Thought Content: Hallucinations: Within noraml and pt reports managed with medications  Delusions: Within normal  Orientation: X 3  Memory: Impairment  Judgement: No Evidence of Impairment  Estimated Intelligence: Average  Demonstrated Insight: Adequate  Fund of Knowledge: adequate      Suicidal/Homicidal Ideation present: None Reported This Session    Patient's impression of their current status: Patient presented on time for scheduled appointment and was accompanied by his father/PCA and a professional . Patient reported that he was doing \"okay,\" although he continues to endorse having cold symptoms, such as a runny/stuffy nose that bothers him. Patient reports taking his medications as prescribed. He reports his mood is \"sometimes happy, sometimes sad... More sadness than happiness.\" Patient reported he was not sure how to explain why he felt more sadness than happiness. Patient expressed that he continues to be concerned about his citizenship paperwork and is anxious to hear what the next steps are. Patient brought in some mail today for therapist to review with him. Therapist reviewed mail and helped patient complete a survey about how his " home health nurse was doing with setting up patient's medications.    Therapist impression of patients current state: Patient presented with baseline euthymic mood today, he continues to be engaged and cooperative throughout session. Patient denied any new issues or concerns, though he continues to be bothered by his cold symptoms. Patient does appear to sniffle throughout session, but it does not appear to impact his daily functioning. Patient discussed his mood but struggled with sharing insight about what impacts his mood. Therapist will continue to work with patient on improving overall mood and identifying triggers/feelings and expressing self appropriately. Discussed self-care and patient was able to identify activities he enjoys participating in.    Type of psychotherapeutic technique provided: Insight oriented, Client centered and Solution-focused    Progress toward short term goals:Progress as expected, patient engaged and expressing self openly.    Review of long term goals: Long-term goals reviewed , patient reports he would eventually like to be able to work again and obtain his citizenship.    Diagnosis:   1. Schizophrenia, unspecified type (H)        Plan and Follow up: Patient is scheduled for follow up visit with therapist on 7/18/18 at 9am and 8/9/18 at 9am.  Therapist informed patient that therapist will be taking a maternity in mid August, and informed patient that there is another therapist available if patient would like to see someone else in the interim. Patient reports he would like to wait until therapist returns to clinic to resume psychotherapy with therapist. Patient is aware of crisis phone numbers and that he may call the clinic to see a provider if any concerns/issues arise.      Discharge Criteria/Planning: Client has chronic symptoms and ongoing therapy for maintenance stability recommended.    Guevara Her, AMANDA 6/28/2018

## 2021-06-19 NOTE — PROGRESS NOTES
Standard Diagnostic Assessment  Date(s): 18  Start Time: 9:00am  Stop Time: 9:40am  Patient Name: Brooke Peterson  Age: 27 y.o.   1990     Referral Source: Dr. Jose Rangel  Therapist: AMANDA Mac        Persons Present: Patient, patient's father/PCA, professional , and therapist  Chief Complaint (in the patients words; reason patient believes they have been referred):   Patient reports managing his psychotic symptoms adequately with medications, no hallucinations reported since being on medications, his primary concern right now is obtaining citizenship, with some anxiety and depression reported.  Patient s expectation for treatment (patient stated initial goal; i.e.:  I want to let go of my worries , Medication treatment if indicated):  Patient reports he would like support with obtaining citizenship.  Presenting Problem/History:  Issues/Stressors:   Patient reports his primary concern right now is obtaining citizenship. He is getting support from [facility] and has completed initial paperwork and waiting to hear back regarding next steps.  Physical Problems: Dizzines, Chest pain, Disturbing body sensations, Constipation, Nausea/Vomiting, Headaches, Shortness of breath, Decreased energy and Decreased appitite    Social Problems: Communications problems, No close friends, Decreased social activity and Loss of interest in activities    Behavioral Problems: None reported    Cognitive Problems: Racing thoughts, Recurrent bad memories and Worries    Emotional Problems: Anxious, Excessive fears, Depressed mood, Mood swings, Lack of self confidence and Inferiority feelings      Functional Impairments:   Personal: 3  Family: 2  Work: 3  Social: 4     How does the presenting problem affect patients daily functioning:    Patient reports he typically keeps to himself and does not socialize with others. He is unable to work and provide care for himself, such as cooking, cleaning, bathing, etc... his  father is his PCA.    Onset/Frequency/Duration presenting problem symptoms:    Patient reports his mental health symptoms started in , two years after coming to the [place] Patient reports since being on medications, he has not had any hallucinations or psychotic symptoms for over a year now. Patient continues to have worries and depressed mood.  How does the patient perceive his/her problem?  Patient reports that his medications help with his mental health symptoms, and expressed that if he were to stop taking his medications, he believes his symptoms would return.  Family/Social History:   Current living situation (Household members, housing status, stability, multiple moves, potential eviction):  Patient is living with his parents and 5 younger siblings in an apartment. No housing concerns reported.  Marriages/Significant other (including patients evaluation of the relationship quality):  Patient reports being single.  Children (sex and ages, any significant issues):  Patient does not have any children.  Parents (ages, living or , how many years ):   Patient's parents are  and living, patient's father is his PCA and patient's mother has some chronic health issues. Patient reports having a good relationship with both of them.  Siblings (birth order, ages, significant issues):   Patient reports having 2 older siblings who live on their own and then 4 younger siblings who lives at home with patient and parents. Patient reports getting along well with everyone, no significant issues.  Climate in family of origin (how does the patient perceive their childhood experience):  Patient was born in [place] [place], grew up in the refugee [place] in [place] [place], and recalls both good and bad memories. Patient reports running away from the [employer], and that he was once captured by the [employer], and had to work for them for free for one whole week, before being able to return to the refugee camp. Patient also  recalls positive memories, such as farming and planting vegetables.  Education (type and level of education):  Patient did not receive a formal education when he was younger. When he arrived in the [place] he took adult learning classes and learning a little English but it became too difficult and he stopped attending.  Problems with Learning or School (developmental issues, learning disabilities, behavioral concerns in school)  None reported.  Developmental factors (developmental milestones, head injuries, CVA s, etc. that may have impeded milestones):   No significant developmental accidents/injuries reported.  Work History (current employment situation and any past employment history):  Patient was previously working at [employer] for 1 year when he first came to the [place], he has not worked since then, as he has been struggling with health/mental health symptoms.    Financial Concerns (basic status, housing, food, clothing are they on any assistance including SSI/SSDI):   Patient was previously very concerned about his medical bills, but as he does not have any income besides SSI, he has been reassured that he does not need to be concerned about his medical bills.    Significant life events (what does the patient identify as a personal life changing/influencing event):  Patient reports significant life events include being exposed to war trauma, living in refugee camp, moving to [place], and dealing with significant health issues and hospitalizations.    Sexual/physical/emotional/financial abuse/traumatic event. (any child protection involvement; who reported, Impact on patient/family/other):   None reported    PTSD Symptoms:      [x] No    Contextual Non-personal factors contributing to the patients concerns (divorce in family, nation/natural disasters):  None reported besides being exposed to war trauma.    Significant personal relationships including patient s evaluation of the relationship quality  (Co-worker s, neighbor s, AA groups, Faith peers, etc.):   Patient reports being close with his father and having a few friends, but no other significant relationships reported.    Support network(s)/Resources (including strength and quality of social networks, who does the client consider supportive, other agencies or services patient uses):   Patient reports his father is the most supportive person in his life.    Belief system:    Patient identifies as Orthodoxy. He reports his parents are Sikhism and his 3 older siblings are Orthodoxy, and the younger children have the choice to choose what Uatsdin they want to practice.    Cultural influences and impact on patient (ask about all aspects of culture and ask which are relevant to the patient. Go beyond nationality and ethnicity. Consider biases, life style, community style, i.e.: urban, poverty, abuse, etc). See page 5 Diagnostic Assessment, Clinical Training for descriptors):  Patient is a [place] refugee who was born in [place] [place] [facility] [place], exposed to war trauma, lived in the [facility] refugee camp, and grew up in poverty. Patient and his siblings and mother came to the [place] In [place], and then his father came later in 2015.     Cultural impact on health and health care (how does patient s culture influence how the patient receives health care):   Patient reports utilizing Western medicine and following treatment recommendations.    Legal Problems (DUI S, divorce, law suits, etc.):  None reported    Strengths/personal resources (what does the patient do well, what is going well in life, positive personality characteristics):  Patient reports helping his mom and dad out at home, though patient's father reports patient is unable to do much independently and need a lot of reminders.    Weaknesses (what does patient identify as a weakness):  Patient reports his health is his weakness and he would like to improve his overall health - as he seems to continue to struggle with ongoing  cold-like symptoms.    Hobbies/Interests:    Patient reports he enjoys watching movies, going fishing, and spending time with his siblings.    Assessment of client needs (based on baseline measurements, symptoms, behaviors, skills, abilities, resources, vulnerabilities, safety needs):  Patient is engaged and expressive during therapy, more so when it is just him and his father/PCA is not present. Patient and father reports patient's symptoms are being managed well under medication. No safety issues or concerns reported or observed.    Family Mental Health/Medical History    Family Mental Health:    Patient's father reports patient's mother has mental health issues and goes to therapy as well, but unable to identify what exactly.    Family history of Suicide:  None reported.    Family history Chemical Dependency:    None reported.    Family Medical history:   Patient's mother has diabetes and high blood pressure.    Patient Medical History    Hospitalizations (When/Where):     Patient reports being hospitalized several times when he lived in [place], for both medical and mental health reasons.    Medical diagnoses/concerns: (i.e.: Heart disease, thyroid problems,  Bld. Pressure,  seizures,  head Inj., Other)   Past Medical History:   Diagnosis Date     Asthma         Current physician/other non psychiatric medical provider s:    Dr. Jose Rangel    Date of last medical exam:   5/17/18    Current Medications:    Current Outpatient Prescriptions:      albuterol (PROAIR HFA;PROVENTIL HFA;VENTOLIN HFA) 90 mcg/actuation inhaler, Inhale 2 puffs every 6 (six) hours as needed for wheezing., Disp: 1 each, Rfl: 0     ARNUITY ELLIPTA 200 mcg/actuation DsDv, INHALE 1 PUFF BY MOUTH EVERY DAY, Disp: 30 each, Rfl: 10     cetirizine (ZYRTEC) 10 MG tablet, Take 1 tablet (10 mg total) by mouth daily., Disp: 30 tablet, Rfl: 0     fexofenadine (ALLEGRA) 180 MG tablet, Take 1 tablet (180 mg total) by mouth daily., Disp: 30 tablet,  Rfl: 6     fludrocortisone (FLORINEF) 0.1 mg tablet, TAKE 2 TABLETS (0.2 MG TOTAL) BY MOUTH DAILY., Disp: 120 tablet, Rfl: 1     fluticasone (FLONASE) 50 mcg/actuation nasal spray, 1 spray into each nostril daily., Disp: 16 g, Rfl: 2     hydrocortisone (CORTEF) 10 MG tablet, TAKE TWO TABLETS (20MG) EVERY MORNING WITH BREAKFAST AND ONE TABLET (10MG) EVERY AFTERNOON AROUND 2PM, Disp: 90 tablet, Rfl: 0     hydrocortisone (CORTEF) 10 MG tablet, TAKE 2 PILLS (20MG) BY MOUTH EVERY MORNING WITH BREAKFAST AND 1 PILL (10MG) EVERY AFTERNOON AROUND 2PM, Disp: 270 tablet, Rfl: 0     levothyroxine (SYNTHROID, LEVOTHROID) 88 MCG tablet, TAKE 1 TABLET (88 MCG TOTAL) BY MOUTH DAILY FOR THYROID, Disp: 90 tablet, Rfl: 1     OLANZapine (ZYPREXA) 10 MG tablet, TAKE 1 TABLET (10 MG TOTAL) BY MOUTH AT BEDTIME FOR DEPRESSION, Disp: 30 tablet, Rfl: 10      Past Mental Health History:    Previous mental health diagnosis & Date of Diagnosis:  No known previous mental health diagnosis reported.    Hx of Mental Health Treatment or Services:  No previous mental health treatment services reported.    SAW Received:      [] Yes   [x] No      Hx of MH Tx/Hospitalizations:   Patient reports a history of being hospitalized for inpatient psych for approximately one month when he was in [place], in 2012/2013.    Hx of Psychiatric Medications:  See medication list.    Suicidal/Homicidal Risk Assessment:  Suicidal: None reported  Ideation:None reported  History of Past Attempt(s): description: None reported  Crisis Plan: Patient and family aware of calling 911 or going to ED in the event of a crisis.  Homicidal: None reported   Ideation:None reported  History of Aggression towards others: None reported  Crisis Plan: Patient and family aware of calling 911 or going to ED in the event of a crisis.  History of destruction to property:  Description: None reported  Crisis Plan: Patient and family aware of calling 911 or going to ED in the event of a  crisis.  Chemical Use/Abuse History  Alcohol:   [x] None Reported    [] Yes   [] No  Type: None reported  Frequency (daily, weekly, occasionally): None reported  Age of first use: None reported  Date of last use: None reported       Street Drugs:   [x] None Reported    [] Yes   [] No  Type: None reported   Frequency (daily, weekly, occasionally): None reported  Age of first use: None reported   Date of last use: None reported  Prescription Drugs:   [] None Reported    [x] Yes   [] No  Type: See med list  Frequency (daily, weekly, occasionally): Daily  Age of first use: Not reported   Date of last use: Today  Tobacco:   [x] None Reported    [] Yes   [] No  Type: None reported   Frequency (daily, weekly, occasionally): None reported  Age of first use: None reported   Date of last use: None reported  Caffeine:   [x] None Reported    [] Yes   [] No  Type: None reported   Frequency (daily, weekly, occasionally): None reported  Age of first use: None reported   Date of last use: None reported  Currently in a treatment program:   [] Yes   [x] No    Where: Not in treatment  SAW Received:    [] Yes   [x] No       Collaborative info requested/received:   [] Yes   [x] No    History of CD Treatment:  No history of CD treatment reported   CAGE-AID (screening to determine a patients use/abuse/dependency):      0/4    Non- Substance Abuse addictive Behaviors/Compulsive Behaviors:  [] Gambling     [] Sex     [] Pornography    [] Shopping     [] Eating     [] Self-Injury  [] Other           [x] None Reported    [] Hoarding    MENTAL STATUS EVALUATION  Grooming: Well groomed  Attire: Appropriate  Age: Appears Stated  Behavior Towards Examiner: Cooperative  Motor Activity: Within normal   Eye Contact: Appropriate  Mood: Euthymic  Affect: Flat  Speech/Language: Within normal  Attention: Within normal  Concentration: Brief  Thought Process: Within normal  Thought Content: Hallucinations: Within noraml  Delusions: Within  "normal  Orientation: X 3  Memory: No Evidence of Impairment  Judgement: No Evidence of Impairment  Estimated Intelligence: Average  Demonstrated Insight: Adequate  Fund of Knowledge: adequate    Clinical Impressions/Assessment/Recommendations:   Patient is a 27-year-old [place] male who presents for a diagnostic assessment to continue ongoing psychotherapy. Patient required the assistance of a professional  and patient's father, who is also his PCA, was present and provided some collateral information. Patient was born in [facility] [place], exposed to war trauma, moved to the refugee camps in [facility], and came to the [place] in [place] with his siblings and mother, and then his father joined them later in 2015. Patient and his family was previously living in [place] and recently moved to [place] in June 2017. Patient lives with his parents and 4 younger siblings, in an apartment in [place]. Patient also has 2 older siblings who are living independently. Patient reports getting along well with everyone and being somewhat more social with others than previously. Patient was originally referred by PCP in October 2017 due to symptoms of auditory hallucinations that started when he came to the [place] in [place]. Patient's father reports that patient would hear voices that say \"many things that scare him,\" to the point where father would put cotton balls in patient's ear to help drown out the voices. Patient's father also report negative symptoms, such as diminished emotional expression and avolition. Patient's father stated that patient needs assistance to clean up after himself, shower and brush his teeth, and reminders to eat meals. Patient endorsed some anxiety and depressed mood and having nightmares occasionally. Patient denied any self injurious behaviors and suicidal or homicidal ideation, manic symptoms or significant trauma. Patient denied any chemical use, abuse, or history. Patient reports taking his medications as " prescribed, stating that since starting medications, he no longer experiences auditory hallucinations. Based upon the reported symptoms and reported problems, patient meets DSM-5 criteria for Schizophrenia, unspecified type. Alternative diagnosis that was considered but ruled out was Schizoaffective disorder.    Recommendations (treatment, referrals, services needed).  1. Continue individual psychotherapy every 3 weeks  2. Continue medication adherence and follow-up with psychiatry as scheduled  3. Increase social support network  Diagnosis (non-Axial as defined in DSM-5)  1. Schizophrenia, unspecified type (H)      Provisional Diagnosis (list only- no explanation needed)  1. Schizoaffective disorder  WHODAS 2.0 12-item version: 34/48=71% severe level of disabilty    Sources/references used in completing this assessment:   -Face to face interview  -Patient Chart  -Adult Intake Questionnaire  -Measures completed: WHODAS, CAGE, PHQ-9, RADHA-7    PHQ-9: 11 . Difficulty with daily functioning= not difficult at all .              Indicates moderate severity.    RADHA-7: 3 . Difficulty with daily functioning= not difficult at all .               Indicates minimal severity.       Assessment of client resolving presenting mental health concerns:  Ability  [] low     [x] average     [] high  Motivation [] low     [x] average     [] high  Willingness [] low     [x] average     [] high    Initial Assessment Objectives (ex: Refer to psychiatry/psych testing, Return for follow up psychotherapy, Refer to, Obtain, Administer measures, etc.):  1. Return for follow up psychotherapy  2. Continue medication adherence and follow up with psychiatry as scheduled/needed  Is patient's family involved in the treatment?  [] No     [x] Yes  If yes, How?  Family is supportive of patient participating in individual psychotherapy.    Therapist s Signature/Supervision/co-signature statement:   AMANDA Mac

## 2021-06-19 NOTE — LETTER
Letter by Jose Rangel MD at      Author: Jose Rangel MD Service: -- Author Type: --    Filed:  Encounter Date: 5/10/2019 Status: (Other)         May 10, 2019     Patient: Brooke Peterson   YOB: 1990     To Whom it May Concern:    Brooke Peterson has been a patient at the [facility] since 8/17/18.  My Most recent visit with this patient was on 4/9/19.      Brooke Peterson has been seen numerous times for both medical and mental health concerns including:      Adrenal insufficiency (H)   ? Schizophrenia (H)   ? Hypothyroidism   ? Thrombocytopenia (H)   ? Moderate persistent asthma   ? Pituitary microadenoma (H)     I do not have the expertise to fully characterize possible cognitive deficits and therefore had referred this patient to a clinical psychologist for further evaluation.      This evaluation will be helpful in determining the extent of any mental or cognitive impairements Brooke Peterson may have and assist in the ongoing care of this patient.      If you have any questions or concerns, please don't hesitate to contact me.       Sincerely,        Electronically signed by Jose Rangel MD   5/10/2019, 12:12 PM

## 2021-06-19 NOTE — PROGRESS NOTES
7-18-18  Attempt 1: Care Guide called patient.  If this patient is returning my call, please transfer to [phone].  Reach out 7-25-18

## 2021-06-19 NOTE — LETTER
Letter by Moisés Schmidt MD at      Author: Moisés Schmidt MD Service: -- Author Type: --    Filed:  Encounter Date: 6/4/2019 Status: (Other)         [facility]  [address]  [place] [address]                 June 4, 2019      Dear Toe:    This letter is to let you know that your appointment on June 17, 2019 at 1:00 pm and June 24, 2019 at 12:30 pm will be cancel. Dr. Schmidt is on a medical leaves of absence and we don't know when he will return. Please call our office at [phone] for further assistance.       Sincerely,        Moisés Schmidt MD

## 2021-06-19 NOTE — LETTER
Letter by Jose Rangel MD at      Author: Jose Rangel MD Service: -- Author Type: --    Filed:  Encounter Date: 9/17/2019 Status: (Other)         [facility]  [address]  [place] [address]             September 19, 2019         Dear Mr. Peterson,    Below are the results from your recent visit:    Resulted Orders   Basic Metabolic Panel   Result Value Ref Range    Sodium 140 136 - 145 mmol/L    Potassium 4.1 3.5 - 5.0 mmol/L    Chloride 109 (H) 98 - 107 mmol/L    CO2 20 (L) 22 - 31 mmol/L    Anion Gap, Calculation 11 5 - 18 mmol/L    Glucose 84 70 - 125 mg/dL    Calcium 9.8 8.5 - 10.5 mg/dL    BUN 12 8 - 22 mg/dL    Creatinine 0.94 0.70 - 1.30 mg/dL    GFR MDRD Af Amer >60 >60 mL/min/1.73m2    GFR MDRD Non Af Amer >60 >60 mL/min/1.73m2    Narrative    Fasting Glucose reference range is 70-99 mg/dL per  American Diabetes Association (ADA) guidelines.   Hepatic Profile   Result Value Ref Range    Bilirubin, Total 0.9 0.0 - 1.0 mg/dL    Bilirubin, Direct 0.3 <=0.5 mg/dL    Protein, Total 7.5 6.0 - 8.0 g/dL    Albumin 4.5 3.5 - 5.0 g/dL    Alkaline Phosphatase 80 45 - 120 U/L    AST 46 (H) 0 - 40 U/L    ALT 71 (H) 0 - 45 U/L   HM2(CBC w/o Differential)   Result Value Ref Range    WBC 4.9 4.0 - 11.0 thou/uL    RBC 5.71 4.40 - 6.20 mill/uL    Hemoglobin 17.2 14.0 - 18.0 g/dL    Hematocrit 50.2 40.0 - 54.0 %    MCV 88 80 - 100 fL    MCH 30.0 27.0 - 34.0 pg    MCHC 34.2 32.0 - 36.0 g/dL    RDW 11.1 11.0 - 14.5 %    Platelets 130 (L) 140 - 440 thou/uL    MPV 10.1 (H) 7.0 - 10.0 fL   Parathyroid Hormone Intact   Result Value Ref Range    PTH 81 10 - 86 pg/mL   Phosphorus   Result Value Ref Range    Phosphorus 3.0 2.5 - 4.5 mg/dL   Thyroid Stimulating Hormone (TSH)   Result Value Ref Range    TSH 3.71 0.30 - 5.00 uIU/mL   T3, Total   Result Value Ref Range    T3, Total 72 45 - 175 ng/dL   T4, Free   Result Value Ref Range    Free T4 1.0 0.7 - 1.8 ng/dL   Calcium, Ionized, Measured   Result Value Ref Range     Calcium, Ionized Measured 1.16 1.11 - 1.30 mmol/L    Calcium, Ionized pH 7.4 1.16 1.11 - 1.30 mmol/L    pH 7.39 7.35 - 7.45        Labs look better today.    Please call with questions or contact us using My[employer].     Sincerely,        Electronically signed by Jose Rangel MD

## 2021-06-19 NOTE — PROGRESS NOTES
8-9-18  Met with patient briefly and follow up on goals.   had to leave.    Plan:   follow up on [facility] Services referral,   medical waiver -where was referral sent to for medical waiver.   follow up 8-28-18

## 2021-06-20 NOTE — PROGRESS NOTES
9-21-18  Spoke with patient through Angela : [facility]Language [facility] and follow up on goals.  Patient reported:  -doing okay.  -no one called and no ARMHS worker. don't need anyone to help.  Conference to [facility] [phone] and spoke to staff regarding referral status for ARM.   She stated to call Mikki on her cell [phone] who handles the refugee referral/program.  Called Mikki on [phone] and left voice message to call Care Guide back at [phone].    Patient stated he does not need help or ARMHS worker and that he will do everything himself.  Deleted goal for ARM Services.  -ask if he has anyone to support with reading mail, fill out paperwork, apply for county benefit or renew insurance.  Stated he has  that will help him.     Instructed patient if he has any questions about his citizenship to contact his  Georgina Brannon at [facility] [phone]. Completed citizenship goal. Patient connected with  Georgina Branonn at [facility]  If he needs help with mails, apply for county benefits or renew insurance he can go to [facility].  Explained to patient about transition to maintenance and graduate from [facility].  Patient stated he understood.   Okay to transition to maintenance.      Patient has support from father and family members.  -father is PCA.  -MA active  -Skilled Nursing Service for medication set up .  -connected with  at [facility] for citizenship  -has SSI benefit    Transition to Maintenance.    Plan:   RN Update Emergency plan.  Notify PCP  Wellness Plan 10-5-18

## 2021-06-20 NOTE — LETTER
Letter by Jose Rangel MD at      Author: Jose Rangel MD Service: -- Author Type: --    Filed:  Encounter Date: 7/7/2020 Status: (Other)         July 7, 2020     Patient: Brooke Peterson   YOB: 1990   Date of Visit: 7/7/2020          To Whom It May Concern:     Mr.Toe SILVANO Peterson has been a patient of mine since August 17, 2017.  I have seen him numerous times for multiple medical issues including adrenal insufficiency and hypothyroidism.  He also has multiple psychiatric issues including posttraumatic stress disorder, schizophrenia, and has been diagnosed with an neurodevelopmental disorder.  This was demonstrated by very low scores on the Rodriguez-Gestalt Visual Motor Test-Koppitz Scoring System and the Comprehensive Test of Nonverbal Intelligence.  It was determined that he has a low likelihood of being able to understand and retain new facts.     Consequently, I think a requirement of an oath of allegiance is somewhat meaningless to him.  Even if he was to be able to participate in a ceremony, I do not believe that he has the mental capacity to functionally understand the significance of an oath.     For these reasons, I support waving the requirement of an Oath of Allegiance.     If you have any questions or concerns, please don't hesitate to call.     Sincerely,                    Sincerely,        Electronically signed by Jose Rangel MD   State Board License Number [id number]

## 2021-06-20 NOTE — LETTER
Letter by Jose Rangel MD at      Author: Jose Rangel MD Service: -- Author Type: --    Filed:  Encounter Date: 7/1/2020 Status: (Other)         July 1, 2020     Patient: Brooke Peterson   YOB: 1990       To Whom It May Concern:    Mr.Toe SILVANO Peterson has been a patient of mine since August 17, 2017.  I have seen him numerous times for multiple medical issues including adrenal insufficiency and hypothyroidism.  He also has multiple psychiatric issues including posttraumatic stress disorder, schizophrenia, and has been diagnosed with an neurodevelopmental disorder.  This was demonstrated by very low scores on the Rodriguez-Gestalt Visual Motor Test-Koppitz Scoring System and the Comprehensive Test of Nonverbal Intelligence.  It was determined that he has a low likelihood of being able to understand and retain new facts.    Consequently, I think a requirement of an oath of allegiance is somewhat meaningless to him.  Even if he was to be able to participate in a ceremony, I do not believe that he has the mental capacity to functionally understand the significance of an oath.    For these reasons, I support waving the requirement of an Oath of Allegiance.    If you have any questions or concerns, please don't hesitate to call.    Sincerely,          Electronically signed by Jose Rangel MD   7/1/2020, 3:51 PM

## 2021-06-20 NOTE — PROGRESS NOTES
9-7-18  Attempt 1: Care Guide called patient. No answer.  If this patient is returning my call, please transfer to [phone].  No upcoming appts at this time  Reach out 9-13-18    Plan:   follow up on [facility] Services referral,   medical waiver -where was referral sent to for medical waiver.

## 2021-06-20 NOTE — LETTER
Letter by Scarlett Varma CHW at      Author: Scarlett Varma CHW Service: -- Author Type: --    Filed:  Encounter Date: 9/17/2020 Status: (Other)       CARE COORDINATION  [facility] [address] [place] [address]    September 17, 2020    [facility]  [address]  [place] [address]      Dear Brooke,                                                                         You enrolled with the [facility] Care Coordination Services.    In order for us to follow up on your goals and health we need to connect on a monthly bases.    We have tried calling you 2 times in the last month and have been unsuccessful in reaching you.     Please call me at [phone] at your convenience.     If you reach my voicemail, please leave a message with your daytime telephone number and a date and time that I can return your call.                                                                              Sincerely,            GAVIOTA Mijares                                                                     Clinic Care Coordination                                          [facility]

## 2021-06-20 NOTE — PROGRESS NOTES
10-8-18  Called and spoke to patient's father through Angela  [facility]Language Line to verify if patient wants ARMHS Worker for support in the community.  Informed father that his son said he wants ARMHS worker.  Re-explained to patient's father about ARM Services and roles of ARMHS Worker.  Father is confused stated that there too many people coming and don't know who.  Explained Clinic Care Coordination and Care Guide roles and the different between Care Guide and ARMHS Worker.  Educated ARM Services and support.   Father in agreement for his son/patient to have ARMHS worker.  Father stated  already help with citizenship.   Informed father that he can get help from ARMHS worker to follow up on citizenship status and other forms or paperwork.    Informed him [facility] staff will be contacting him to reassess for ARMHS.  Father confirmed information.  Postponed transitioning to maintenance until he has ARMHS worker    Plan:   Follow up 11-7-18 to follow up ARMHS

## 2021-06-21 NOTE — PROGRESS NOTES
Subjective: This 28-year-old male comes in for evaluation.  He is here with his father    Patient's had diarrhea for about a week about twice a day sometimes not able to control it.    Patient has a history of adrenal insufficiency and is seen endocrine in the past    Also schizophrenia he is on Zyprexa and hypothyroid he is on levothyroxine 88 mcg a day    There is some labs ordered from endocrine as future labs and these were drawn today also check a CBC with differential please see below    There is been no fever no chills no blood in the stool.  The    Regarding the adrenal insufficiency he is on hydrocortisone and Florinef.    His blood pressure is maintained today there is been no symptoms of dizziness or dehydration.  We discussed stopping milk but continuing to stay hydrated continue to avoid other dairy products until diarrhea stops.    Tobacco status: He  reports that  has never smoked. he has never used smokeless tobacco.    Patient Active Problem List    Diagnosis Date Noted     Moderate persistent asthma 03/27/2018     Thrombocytopenia (H) 02/07/2018     Hypothyroidism 09/27/2017     Schizophrenia (H) 08/25/2017     Adrenal insufficiency (H) 07/28/2017     Hypotension 07/24/2017       Current Outpatient Medications   Medication Sig Dispense Refill     albuterol (PROAIR HFA;PROVENTIL HFA;VENTOLIN HFA) 90 mcg/actuation inhaler Inhale 2 puffs every 6 (six) hours as needed for wheezing. 1 each 0     ARNUITY ELLIPTA 200 mcg/actuation DsDv INHALE 1 PUFF BY MOUTH EVERY DAY 30 each 10     cetirizine (ZYRTEC) 10 MG tablet Take 1 tablet (10 mg total) by mouth daily. 30 tablet 0     fexofenadine (ALLEGRA) 180 MG tablet Take 1 tablet (180 mg total) by mouth daily. 30 tablet 6     fludrocortisone (FLORINEF) 0.1 mg tablet TAKE 2 TABLETS (0.2 MG TOTAL) BY MOUTH DAILY. 120 tablet 10     fluticasone (FLONASE) 50 mcg/actuation nasal spray 1 spray into each nostril daily. 16 g 2     hydrocortisone (CORTEF) 10 MG tablet  "TAKE TWO TABLETS (20MG) EVERY MORNING WITH BREAKFAST AND ONE TABLET (10MG) EVERY AFTERNOON AROUND 2PM 90 tablet 0     hydrocortisone (CORTEF) 10 MG tablet TAKE 2 PILLS (20MG) BY MOUTH EVERY MORNING WITH BREAKFAST AND 1 PILL (10MG) EVERY AFTERNOON AROUND 2PM 270 tablet 10     levothyroxine (SYNTHROID, LEVOTHROID) 88 MCG tablet TAKE 1 TABLET (88 MCG TOTAL) BY MOUTH DAILY FOR THYROID 90 tablet 1     loperamide (IMODIUM A-D) 2 mg tablet Take 1 tablet (2 mg total) by mouth 3 (three) times a day as needed for diarrhea. 15 tablet 0     OLANZapine (ZYPREXA) 10 MG tablet TAKE 1 TABLET (10 MG TOTAL) BY MOUTH AT BEDTIME FOR DEPRESSION 30 tablet 10     No current facility-administered medications for this visit.        ROS: 10 point review of systems negative other than as outlined above.  There is been no cramping no pain.    Objective:    /76 (Patient Site: Left Arm, Patient Position: Sitting, Cuff Size: Adult Regular)   Pulse 68   Temp 97.4  F (36.3  C) (Oral)   Resp 19   Ht 5' 2.56\" (1.589 m)   Wt 136 lb 4 oz (61.8 kg)   SpO2 99%   BMI 24.48 kg/m    Body mass index is 24.48 kg/m .      General appearance no acute distress    HEENT neck is negative no adenopathy oropharynx was clear, well hydrated    His lungs are clear throughout no rales or rhonchi    Heart was regular rate and 60s O2 sat was 99%    Abdomen soft bowel sounds are normal no guarding or rebound no masses.  No tenderness.    Extremities without edema skin was normal no rashes.  No joint redness warmth or swelling.    Labs White count 5700 with 49% neutrophils 41% lymphocytes hemoglobin look good at 16.5    Additional labs all look good as well including electrolyte panel, PTH calcium and thyroid function.    Results for orders placed or performed in visit on 11/13/18   HM1 (CBC with Diff)   Result Value Ref Range    WBC 5.7 4.0 - 11.0 thou/uL    RBC 5.64 4.40 - 6.20 mill/uL    Hemoglobin 16.5 14.0 - 18.0 g/dL    Hematocrit 50.4 40.0 - 54.0 %    " MCV 89 80 - 100 fL    MCH 29.3 27.0 - 34.0 pg    MCHC 32.8 32.0 - 36.0 g/dL    RDW 11.9 11.0 - 14.5 %    Platelets 123 (L) 140 - 440 thou/uL    MPV 9.5 7.0 - 10.0 fL    Neutrophils % 49 (L) 50 - 70 %    Lymphocytes % 41 (H) 20 - 40 %    Monocytes % 7 2 - 10 %    Eosinophils % 3 0 - 6 %    Basophils % 1 0 - 2 %    Neutrophils Absolute 2.8 2.0 - 7.7 thou/uL    Lymphocytes Absolute 2.3 0.8 - 4.4 thou/uL    Monocytes Absolute 0.4 0.0 - 0.9 thou/uL    Eosinophils Absolute 0.2 0.0 - 0.4 thou/uL    Basophils Absolute 0.0 0.0 - 0.2 thou/uL   Creatinine   Result Value Ref Range    Creatinine 0.85 0.70 - 1.30 mg/dL    GFR MDRD Af Amer >60 >60 mL/min/1.73m2    GFR MDRD Non Af Amer >60 >60 mL/min/1.73m2   Electrolyte Profile   Result Value Ref Range    Sodium 142 136 - 145 mmol/L    Potassium 3.9 3.5 - 5.0 mmol/L    CO2 26 22 - 31 mmol/L    Chloride 105 98 - 107 mmol/L    Anion Gap, Calculation 11 5 - 18 mmol/L   Thyroid Stimulating Hormone (TSH)   Result Value Ref Range    TSH 1.03 0.30 - 5.00 uIU/mL   T4, Free   Result Value Ref Range    Free T4 1.2 0.7 - 1.8 ng/dL   Calcium   Result Value Ref Range    Calcium 9.9 8.5 - 10.5 mg/dL   Parathyroid Hormone Intact   Result Value Ref Range    PTH 82 10 - 86 pg/mL       Assessment:  1. Diarrhea, unspecified type  HM1(CBC and Differential)    HM1 (CBC with Diff)    loperamide (IMODIUM A-D) 2 mg tablet   2. Adrenal insufficiency (H)     3. Schizophrenia, unspecified type (H)     4. Hypothyroidism  Creatinine    Electrolyte Profile    Thyroid Stimulating Hormone (TSH)    T4, Free    Calcium    Vitamin D, Total (25-Hydroxy)    Parathyroid Hormone Intact     Diarrhea likely viral.  Treat with Imodium 2 mg 1 twice daily avoid dairy products if not improving follow-up otherwise follow-up with his primary physician in a couple weeks.    Continue same other medications.  Therapeutic thyroid level.  His adrenal insufficiency seems to be appropriately treated, I do not see any follow-up  appointments with endocrine.    Schizophrenia, continue on the Zyprexa    Plan: As outlined above may need stool cultures if not improved with above    This transcription uses voice recognition software, which may contain typographical errors.

## 2021-06-21 NOTE — PROGRESS NOTES
11-13-18  Missed patient in clinic today.  Attempt 1: Care Guide called patient.  If this patient is returning my call, please transfer to [phone].    Follow up in clinic 11-27-18

## 2021-06-22 NOTE — PROGRESS NOTES
12-19-18  Met with patient and patient's father in clinic and follow up on goals and action steps.  Joint visit with Guevara Her, therapist.  Spoke to patient through Angela  Celestino Wheeler  Patient requested help with citiezensio.  Guevara Her will do another referral for [facility] worker to [facility].  Informed patient that once he has ARMHS worker can help with citizenship process and coordinate to next with agency or organization.  Reviewed letter from [facility] and explained to patient SSI increased to $771 and that [place] Supplemental Aid ( [place] is the same .    Provided patient and father Care Guide direct number.  Encouraged patient to answer the phone if staff calls from [facility].  Father stated he will support his son with it.      Has RN from Home Care come out home set up medications.  Plan:   follow up 1-24-19

## 2021-06-22 NOTE — TELEPHONE ENCOUNTER
Patient is needing a ride for their appointment on:      Date: 1/11/19  Time: 8AM    Name of Location/Address/Phone #:   [address] [address]  PH: [phone]      Name of  patient is seeing & 's specialty:   DARIN MASCORRO/ PCP    Passenger (s):   1    Special considerations: None    Is the patient able to walk to the transportation vehicle?    Yes     Do they need DOOR to DOOR service? (company person comes knock on the door and walks them to car)     No    Please call patient back to confirm the ride details. Thanks!

## 2021-06-22 NOTE — TELEPHONE ENCOUNTER
I spoke with Marely from [employer].    Patient has been scheduled with Paul PuckettNigsjeety-[phone] for a  time of 12:15pm round trip.   TRIP ID#:  [id number]    Used Language Line/:  Name: NA  ID: N/A      Patient is informed of the message and has no further questions. Completing task.

## 2021-06-22 NOTE — TELEPHONE ENCOUNTER
Used Language Line/  Name: Celestino Belcher  ID: [id number]      Patient is informed of the message and has no further questions.    Completing task.

## 2021-06-22 NOTE — TELEPHONE ENCOUNTER
"Roxana    From 12.21 note: \"decrease hydrocortisone to, 10 mg in the morning 5 in early afternoon and decrease Florinef to, 0.1 mg daily.\"    But RXs were not sent.    Please send RXs to: [facility] - [place], [place] - [address]     Any questions, please call, Home care nurse, Angela @ [phone].  "

## 2021-06-22 NOTE — PROGRESS NOTES
12-5-18  Called and spoke to patient's father Joey Montero through Angela  Vivian and follow up on goal.  Informed father that therapist will send referral to [facility] again for ARMHS worker.  Father stated he was so confused because there are so many people coming to his home.  Stated he understands now and feels that his son needs ARMHS worker.   Explained to father that when [facility] call that he has to agreed to have ARMH Services.  Father stated he knows to say yes.  Thanked father for his support.    Met and consulted with  Guevara Her, therapist and requested if she could put in a new referral to [facility] for ARMHS worker.  Informed her that father was confused and now he feels son needs ARMHS worker.  Guevara will contact Mikki at [facility] to check.      Plan:  Follow up 12-28-18 ARMHS at [facility]

## 2021-06-22 NOTE — PROGRESS NOTES
Subjective:  28 y.o. male with concerns of follow up.  Had diarrhea last week.  Seemed to be infectious (viral).  Had loperamide and this resolved.    Labs drawn by endocrinology were reviewed.  Patient has quite low vitamin D level.  We discussed the replacement that has been ordered.  History of microadenoma in the pituitary gland.  Seems to be nonfunctioning.  Is not had a prolactin level for about 1 year.  Was trying to get an appointment with endocrinology but he needs a ride set up which we are equipped to arrange here, but I do not think there clinic is.    History of thrombocytopenia.  Recently had a CBC.  Platelet level stable at 123,000.  He denies any bleeding or bruising problems.    Patient with history of schizophrenia and on olanzapine.  Has not had lipids checked.  He is fasting today.    Patient also concerned for 2-3 days of runny nose and cough.  Had some mild headache.  Denies fever.  No shortness of breath.  Appetite somewhat decreased.  Denies ear pain or sore throat.    Outpatient Medications Prior to Visit   Medication Sig Dispense Refill     albuterol (PROAIR HFA;PROVENTIL HFA;VENTOLIN HFA) 90 mcg/actuation inhaler Inhale 2 puffs every 6 (six) hours as needed for wheezing. 1 each 0     ARNUITY ELLIPTA 200 mcg/actuation DsDv INHALE 1 PUFF BY MOUTH EVERY DAY 30 each 10     cetirizine (ZYRTEC) 10 MG tablet Take 1 tablet (10 mg total) by mouth daily. 30 tablet 0     cholecalciferol, vitamin D3, (VITAMIN D3) 1,000 unit capsule Take 2 capsules (2,000 Units total) by mouth daily. 180 capsule 1     fexofenadine (ALLEGRA) 180 MG tablet Take 1 tablet (180 mg total) by mouth daily. 30 tablet 6     fludrocortisone (FLORINEF) 0.1 mg tablet TAKE 2 TABLETS (0.2 MG TOTAL) BY MOUTH DAILY. 120 tablet 10     fluticasone (FLONASE) 50 mcg/actuation nasal spray 1 spray into each nostril daily. 16 g 2     hydrocortisone (CORTEF) 10 MG tablet TAKE TWO TABLETS (20MG) EVERY MORNING WITH BREAKFAST AND ONE TABLET  (10MG) EVERY AFTERNOON AROUND 2PM 90 tablet 0     hydrocortisone (CORTEF) 10 MG tablet TAKE 2 PILLS (20MG) BY MOUTH EVERY MORNING WITH BREAKFAST AND 1 PILL (10MG) EVERY AFTERNOON AROUND 2PM 270 tablet 10     levothyroxine (SYNTHROID, LEVOTHROID) 88 MCG tablet TAKE 1 TABLET (88 MCG TOTAL) BY MOUTH DAILY FOR THYROID 90 tablet 1     loperamide (IMODIUM A-D) 2 mg tablet Take 1 tablet (2 mg total) by mouth 3 (three) times a day as needed for diarrhea. 15 tablet 0     OLANZapine (ZYPREXA) 10 MG tablet TAKE 1 TABLET (10 MG TOTAL) BY MOUTH AT BEDTIME FOR DEPRESSION 30 tablet 10     No facility-administered medications prior to visit.       Social History     Tobacco Use   Smoking Status Never Smoker   Smokeless Tobacco Never Used   Tobacco Comment    no second hand smoke exposure      Objective:  /60 (Patient Site: Right Arm, Patient Position: Sitting, Cuff Size: Adult Regular)   Pulse 64   Wt 139 lb (63 kg)   BMI 24.97 kg/m    GENERAL: alert, not distressed  EARS: normal tympanic membranes and external auditory canals bilaterally  PHARYNX: no erythema or exudates  MOUTH: well hydrated mucosa, no lesions  NECK: no lymphadenopathy or thyroid nodules  CHEST: clear, no rales, rhonchi, or wheezes  CARDIAC: regular without murmur, gallop, or rub  ABDOMEN: soft, non tender, non distended, normal bowel sounds      Assessment and Plan:   1. Thrombocytopenia (H)  Stable on most recent test.  No blood loss apparent.  To need to monitor unless symptomatic.    2. Pituitary microadenoma (H)  Maintenance check a prolactin advised.  He is not been able to make it to endocrinology appointments.  - Prolactin    3. Adrenal insufficiency (H)  - Basic Metabolic Panel    4. Acute URI  Mild appearing symptoms.  Symptom medic treatments.  Follow-up as needed.  - guaiFENesin (ROBITUSSIN) 100 mg/5 mL syrup; Take 5 mL (100 mg total) by mouth 3 (three) times a day as needed for cough.  Dispense: 120 mL; Refill: 0    5. Schizophrenia,  unspecified type (H)  - Lipid [place] FASTING

## 2021-06-22 NOTE — TELEPHONE ENCOUNTER
1- 2-19Caller: Geeta Mojica RN [employer] [facility]   Patient's Skilled Nursing from [facility]   [phone]  Re: need new script for Olanzapine    Received call from DENIS Connor and she is requesting Care Guide to help talk to the doctor to address med olanzapine  Stated there are no refill for Olanzapine. He has been out of med for 2 weeks.       Stated she contacted Tatiana Vale CNP to send new script for Olanzapine and that she does not want to refill it because she has not seen him for awhile.  She helped to schedule appt with PCP on 1-8-19 at 10:45am to discuss about med next week.

## 2021-06-22 NOTE — PROGRESS NOTES
Progress Note    Reason for Visit:      Progress Note:    HPI:     [place] disease the patient is currently on hydrocortisone 20 mg in the morning 10 in the afternoon he feels better with that he also is on Florinef 0.2 mg daily I did advise him to continue with that.     Initially his sodium was 129 potassium 4.7.     More recently sodium 142 potassium 3.7 creatinine is normal at 0.7 TSH is 2.17 prolactin is 7.     Hypothyroidism on Synthroid 75 mcg because he feels fatigued and tired I did advise him to increase that to 88 mcg.     Again the patient feeling fatigued and tired I did ask him to gradually discontinue the Minipress    Component      Latest Ref Rng & Units 2/6/2018 11/13/2018 12/4/2018   Sodium      136 - 145 mmol/L 143  146 (H)   Potassium      3.5 - 5.0 mmol/L 4.0  3.9   Chloride      98 - 107 mmol/L 108 (H)  108 (H)   CO2      22 - 31 mmol/L 25  27   Anion Gap, Calculation      5 - 18 mmol/L 10  11   Glucose      70 - 125 mg/dL 84  81   Calcium      8.5 - 10.5 mg/dL 9.1  9.5   BUN      8 - 22 mg/dL 11  8   Creatinine      0.70 - 1.30 mg/dL 0.77  0.78   GFR MDRD Af Amer      >60 mL/min/1.73m2 >60  >60   GFR MDRD Non Af Amer      >60 mL/min/1.73m2 >60  >60   Cholesterol      <=199 mg/dL   168   Triglycerides      <=149 mg/dL   47   HDL Cholesterol      >=40 mg/dL   55   LDL Calculated      <=129 mg/dL   104   Patient Fasting > 8hrs?         Yes   TSH      0.30 - 5.00 uIU/mL 0.85 1.03    Prolactin      0.0 - 15.0 ng/mL   21.3 (H)   Free T4      0.7 - 1.8 ng/dL 1.2 1.2    Vitamin D, Total (25-Hydroxy)      30.0 - 80.0 ng/mL  5.8 (L)    PTH      10 - 86 pg/mL  82      Review of Systems:    Nervous System: No headache, dizziness, fainting or memory loss. No tingling sensation of hand or feet.  Ears: No hearing loss or ringing in the ears  Eyes: No blurring of vision, redness, itching or dryness.  Nose: No nosebleed or loss of smell  Mouth: No mouth sores or loss of taste  Throat: No hoarseness or  difficulty swallowing  Neck: No enlarged thyroid or lymph nodes.  Heart: No chest pain, palpitation or irregular heartbeat. No swelling of hands or feet  Lungs: No shortness of breath, cough, night sweats, wheezing or hemoptysis.  Gastrointestinal: No nausea or vomiting, constipation or diarrhea.  No acid reflux, abdominal pain or blood in stools.  Kidney/Bladdr: No polyuria, polydipsia, nocturia or hematuria.  Genital/Sexual: No loss of libido  Skin: No rash, hair loss or hirsutism.  No abnormal striae  Muscles/Joints/Bones: No morning stiffness, muscle aches and pain or loss of height.    Current Medications:  Current Outpatient Medications   Medication Sig     albuterol (PROAIR HFA;PROVENTIL HFA;VENTOLIN HFA) 90 mcg/actuation inhaler Inhale 2 puffs every 6 (six) hours as needed for wheezing.     ARNUITY ELLIPTA 200 mcg/actuation DsDv INHALE 1 PUFF BY MOUTH EVERY DAY     cetirizine (ZYRTEC) 10 MG tablet Take 1 tablet (10 mg total) by mouth daily.     cholecalciferol, vitamin D3, (VITAMIN D3) 1,000 unit capsule Take 2 capsules (2,000 Units total) by mouth daily.     fexofenadine (ALLEGRA) 180 MG tablet TAKE 1 TABLET (180 MG TOTAL) BY MOUTH DAILY FOR ALLERGIES     fludrocortisone (FLORINEF) 0.1 mg tablet TAKE 2 TABLETS (0.2 MG TOTAL) BY MOUTH DAILY.     fluticasone (FLONASE) 50 mcg/actuation nasal spray 1 spray into each nostril daily.     hydrocortisone (CORTEF) 10 MG tablet TAKE TWO TABLETS (20MG) EVERY MORNING WITH BREAKFAST AND ONE TABLET (10MG) EVERY AFTERNOON AROUND 2PM     hydrocortisone (CORTEF) 10 MG tablet TAKE 2 PILLS (20MG) BY MOUTH EVERY MORNING WITH BREAKFAST AND 1 PILL (10MG) EVERY AFTERNOON AROUND 2PM     levothyroxine (SYNTHROID, LEVOTHROID) 88 MCG tablet TAKE 1 TABLET (88 MCG TOTAL) BY MOUTH DAILY FOR THYROID     loperamide (IMODIUM A-D) 2 mg tablet Take 1 tablet (2 mg total) by mouth 3 (three) times a day as needed for diarrhea.     OLANZapine (ZYPREXA) 10 MG tablet TAKE 1 TABLET (10 MG TOTAL) BY  MOUTH AT BEDTIME FOR DEPRESSION       Patients Active Problems:  Patient Active Problem List   Diagnosis     Hypotension     Adrenal insufficiency (H)     Schizophrenia (H)     Hypothyroidism     Thrombocytopenia (H)     Moderate persistent asthma     Pituitary microadenoma (H)       History:   reports that  has never smoked. he has never used smokeless tobacco. He reports that he does not drink alcohol or use drugs.   reports that  has never smoked. he has never used smokeless tobacco. He reports that he does not drink alcohol or use drugs.  Social History     Tobacco Use   Smoking Status Never Smoker   Smokeless Tobacco Never Used   Tobacco Comment    no second hand smoke exposure      reports that  has never smoked. he has never used smokeless tobacco. He reports that he does not drink alcohol or use drugs.  Social History     Substance and Sexual Activity   Sexual Activity Not on file     Past Medical History:   Diagnosis Date     Asthma      No family history on file.  Past Medical History:   Diagnosis Date     Asthma      No past surgical history on file.    Vitals   vitals were not taken for this visit.         Exam  General appearance: The patient looked well, not in acute distress.  Eyes: no evidence of thyroid eye disease.   Retinal exam: No evidence of diabetic retinopathy.  Mouth and Throat: Normal  Neck: No evidence of thyromegaly, enlarged lymph node or tenderness  Chest: Trachea is central. Chest is clear to auscultation and percussion. Breat sounds are normal.  Cardiovascular exam: JVP is not raised. Heart sounds are normal, no murmurs or rub  Peripheral pulses are palpable.   Abdomen: No masses or tenderness.    Back: No vertebral tenderness or kyphosis.  Extremities: No evidence of leg edema.   Skin: Normal to touch.  No abnormal striae  Neurologic exam:  Visual fields are intact by confrontation, grossly intact. No evidence of peripheral neuropathy.  Detailed foot exam  normal.        Diagnosis:  No diagnosis found.    Orders:   No orders of the defined types were placed in this encounter.        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    Is currently on Florinef 0.2 mg daily hydrocortisone 20 mg in the morning 10 in the afternoon.    His sodium is high at 146 potassium 3.9 creatinine 0.78.    The patient clinically looks cushingoid.    He still feeling nausea that is long-standing symptom.    I did advise him to decrease hydrocortisone to, 10 mg in the morning 5 in early afternoon and decrease Florinef to, 0.1 mg daily.    He is taking Zyprexa 10 mg and that caused his prolactin to go up to, 21.3 previously was 7.    He takes vitamin D 2000 units a day his vitamin D was 5.8 we will monitor that.    He has hypothyroidism on Synthroid 88 mcg daily TSH 1.03 we will monitor that as well calcium is normal at 9.5 patient will return to clinic in 6 months heart sounds are normal.    I have reviewed and ordered clinical lab test    I have reviewed and ordered her medication as required.    I have reviewed her test results and advised with the performing physician.    I have reviewed the patient's old records.    I have reviewed and summarized the patient old records.

## 2021-06-22 NOTE — TELEPHONE ENCOUNTER
1-2-19  Caller: Geeta Mojica RN [facility]   Patient's Skilled Nursing from [facility]  [phone]  Re: need new script for Olanzapine    Received call from DENIS Connor that she met with patient today and completed his recertifcation for services.  She reported patient said that his father does everything for him -brushing his teeth, bathing.  Requested Care Guide to help talk to the doctor to address med olanzapine  No refill for Olanzapine.       Stated she contacted  Tatiana Vale CNP to send new script for Olanzaping and that she does not want to refill it because she has not seen him for awhile.  She helped to schedule appt with PCP on 1-8-19 at 10:45am.  Patient needed help to set up transportation to appt.  Thanked Geeta for her support.    Send telephone message to PCP team regarding medication refill

## 2021-06-22 NOTE — TELEPHONE ENCOUNTER
I spoke with Marely from [employer].    Patient has been scheduled with Paul [phone] for a  time of 7:15am round trip.   TRIP ID#:  [id number]

## 2021-06-23 NOTE — PROGRESS NOTES
2-7-19  Called and spoke to patient through Angela  [facility] and follow up on goals.  Reminded of appt tomorrow at 10:30am to [facility] for medical waiver.   [employer] transportation will  between 9:30am-10am.  Provided patient  [employer] # [phone]    Patient stated he met with Mikki once.  Stated after 2 weeks she will send an assigned [facility] worker out to see him.    Explained to patient that he has  at [facility] already supporting him with citizenship application and that we are waiting to get the medical waiver completed by Monserrat.  Explained to patient that he has 3 appts with [facility] to complete the medical waiver.  When medical waiver completed then we can mail the original medical waiver to his  at [facility].  Patient stated he understood.   Stressed the important to attending the appt at [facility].  Patient appreciated the support    Plan:  Follow up 3-7-19

## 2021-06-23 NOTE — PROGRESS NOTES
Spoke to Brooke Peterson's father, Joey Montero with  [facility] [id number], I attempted to ask if an anyone had come out to do an [facility] assessment or if he had heard anything from his  regarding his citizen application but his father wasn't understanding my questions,  tried to rephrase but he was still having a hard time understanding. Offered to make an appointment with the  to go over the information in person but pt's father disconnected the call.

## 2021-06-23 NOTE — TELEPHONE ENCOUNTER
Patient is needing a ride for their appointment on:        Date:  February 12, 2019  Time: 11:00 AM    Name of Location/Address/Phone #:   [address] [address]  PH: [phone]      Name of  patient is seeing & 's specialty:   Nathaly Lira    Passenger (s):   2    Special considerations: None    Is the patient able to walk to the transportation vehicle?    Yes     Do they need DOOR to DOOR service? (company person comes knock on the door and walks them to car)     No    Please call patient back to confirm the ride details. Thanks!

## 2021-06-23 NOTE — TELEPHONE ENCOUNTER
2-7-19  Called: [employer]  [phone]  Re: Status of medical transportation to [facility] 02/08/@10:30 for talk therapy      Called and spoke to Susy. Provided member ID# and verify demographic.  She stated patient has medical transportation set up for tomorrow.   at 9:30-10am with [employer]  Confirmation# [id number]  Thanked Susy for her support.

## 2021-06-23 NOTE — PROGRESS NOTES
1-16-19  Patient requested to see Care Guide regarding his citizenship application.  Met with patient and patient's father in the clinic.  Spoke to patient through Angela : Celestino Miller.  Read and explained letter from [facility] to patient that Flaquita Bryant will be his  taking over his case for Georgina Brannon who is on leave of absence.  Provided patient and father the contact number for Flaquita Bryant to call about his citizenship application.  Called to Flaquita Bryant [phone] with patient and father and left voice message for Flaquita to call patient/ father to update on citizenship application.    Father stated he still waiting for the medical waiver form to be completed.  Not sure if they have appt with Dr Brooks or not.    Father reported that Mikki from [employer] will come out next week to assess for [facility].   Explained to patient and father if they declined again [employer] will not come out to assess for [facility] Services.  Patient and father understand and they will say yes and agreed to have [facility] for support.    Met and consulted with Guevara Her therapist briefly to update on the [facility] referral and citizenship.    Plan:   follow up in 2 weeks 2-1-19  Check with speciality  status referral on N-648 form medical waiver

## 2021-06-23 NOTE — PROGRESS NOTES
"Mental Health Visit Note    12/19/2018    Start time: 4:00pm    Stop Time: 4:35pm   Session # 11    Session Type: Patient is presenting for an Individual session.    Brooke Peterson is a 28 y.o. male is being seen today for    Chief Complaint   Patient presents with     MH Follow Up     Pt f/u to address symptoms of depression.   .     New symptoms or complaints: ongoing cold symptoms    Functional Impairment:   Personal: 4  Family: 3  Work: 3  Social:4    Clinical assessment of mental status:   Grooming: Well groomed  Attire: Appropriate  Age: Appears Stated  Behavior Towards Examiner: Cooperative and Sullen  Motor Activity: Within normal   Eye Contact: Appropriate  Mood: Euthymic  Affect: Blunted  Speech/Language: Within normal  Attention: Within normal  Concentration: Within normal  Thought Process: Within normal  Thought Content: Hallucinations: Within noraml  Delusions: Within normal  Orientation: X 3  Memory: Impairment  Judgement: No Evidence of Impairment  Estimated Intelligence: Average  Demonstrated Insight: Adequate  Fund of Knowledge: inadequate      Suicidal/Homicidal Ideation present: None Reported This Session    Patient's impression of their current status: Patient reported having \"a little cold today.\" Patient reports being bothered by his ongoing cold symptoms. He will schedule appointment to see PCP if symptoms persist. Patient reports taking his medications. He denied any major concerns aside from wondering about where things are at with his citizenship process. Met with care guide whom reports an [facility] worker could assist with citizenship process. Patient expressed interest in [facility] services and therapist assisted with making referral.    Therapist impression of patients current state: Patient presented on time for scheduled appointment today. He was accompanied by his father/PCA and a professional . Patient continues to report ongoing cold symptoms and has been seen about it and will " "schedule another appointment if needed. Patient father provided collateral information and shared that some days patient does well and some days not so well. Father identified patient as \"handicapped\" so father reports he has to do everything for patient. Discussed citizenship and [facility] with patient and care guide. Patient signed SAW for [employer] and completed intake form, form was faxed for [facility] referral.    Type of psychotherapeutic technique provided: Client centered and Solution-focused    Progress toward short term goals:Progress as expected, patient attended follow-up and expressed interest in ongoing psychotherapy.    Review of long term goals: Not done at today's visit    Diagnosis:   1. Schizophrenia, unspecified type (H)        Plan and Follow up: Patient scheduled follow-up visit for ongoing psychotherapy.      Discharge Criteria/Planning: Patient will continue with follow-up until therapy can be discontinued without return of signs and symptoms.    Guevara Her 12/19/2018  "

## 2021-06-23 NOTE — TELEPHONE ENCOUNTER
I spoke with EILEEN from [email or web address].    Patient has been scheduled with Paul PuckettDibmiqkkh-[phone] for a  time of 7:00-7:30AM round trip.   TRIP ID#:  [id number]

## 2021-06-23 NOTE — TELEPHONE ENCOUNTER
Patient is needing a ride for their appointment on:        Date: 02/08/2019  Time: 1030    Name of Location/Address/Phone #:   [facility]  [address]  [place] [address]  Phone: [phone]    Name of  patient is seeing & 's specialty:   N/A    Passenger (s):   2    Special considerations: None    Is the patient able to walk to the transportation vehicle?    Yes     Do they need DOOR to DOOR service? (company person comes knock on the door and walks them to car)     No    Please call patient back to confirm the ride details. Thanks!

## 2021-06-23 NOTE — TELEPHONE ENCOUNTER
Patient is needing a ride for their appointment on:      Date: 6/17/2019  Time: 1:00PM    Name of Location/Address/Phone #:   [address] [address]  PH: [phone]      Name of  patient is seeing & 's specialty:   N/A-LAB ONLY    Passenger (s):   1    Special considerations: None    Is the patient able to walk to the transportation vehicle?    Yes     Do they need DOOR to DOOR service? (company person comes knock on the door and walks them to car)     No    Please call patient back to confirm the ride details. Thanks!

## 2021-06-23 NOTE — TELEPHONE ENCOUNTER
Fax received from [facility], they have started the Prior Authorization Process via Cover My Meds    CoverMyMeds Key: [id number]     Medication Name: Arnuity Ellipta 200 mcg/ ACT aerosol powder     Insurance Plan: ALLYSON NOGUERA   PBM: [employer]   Patient ID: [id number]    Please complete the PA process

## 2021-06-23 NOTE — TELEPHONE ENCOUNTER
I spoke with EILEEN from [employer].    Patient has been scheduled with Paul PuckettTplxaszzx-[phone] for a  time of 9:30-10:00AM round trip.   TRIP ID#:  [id number]

## 2021-06-23 NOTE — TELEPHONE ENCOUNTER
Patient is needing a ride for their appointment on:        Date: FEB. 25, 2019  Time: 8:00AM    Name of Location/Address/Phone #:   [address] [address]  PH: [phone]      Name of  patient is seeing & 's specialty:   DR. CASTELNA    Passenger (s):   1    Special considerations: None    Is the patient able to walk to the transportation vehicle?    No     Do they need DOOR to DOOR service? (company person comes knock on the door and walks them to car)     No    Please call patient back to confirm the ride details. Thanks!

## 2021-06-23 NOTE — TELEPHONE ENCOUNTER
I spoke with EILEEN from [email or web address]    Patient has been scheduled with Paul PuckettEqmxninfj-[phone] for a  time of 10:00-10:30AM round trip.   TRIP ID#:  [id number]

## 2021-06-23 NOTE — PROGRESS NOTES
Subjective:    Brooke Peterson is a 28 y.o. male adrenal insufficiency who presents for evaluation of cough and cold symptoms.  He has had symptoms for 2 weeks.  He feels like he is getting slightly worse.  He is having sneezing, runny nose, tactile fevers, headache, cough.  His most bothersome symptom is nasal congestion.  He reports he is taking his asthma medications as directed.  He is using his nasal spray, but he does not think is helping.  He describes headaches as mainly pain in his forehead.  He is taking medications for adrenal insufficiency.    Patient Active Problem List   Diagnosis     Hypotension     Adrenal insufficiency (H)     Schizophrenia (H)     Hypothyroidism     Thrombocytopenia (H)     Moderate persistent asthma     Pituitary microadenoma (H)       Current Outpatient Medications:      albuterol (PROAIR HFA;PROVENTIL HFA;VENTOLIN HFA) 90 mcg/actuation inhaler, Inhale 2 puffs every 6 (six) hours as needed for wheezing., Disp: 1 each, Rfl: 0     ARNUITY ELLIPTA 200 mcg/actuation DsDv, INHALE 1 PUFF BY MOUTH EVERY DAY, Disp: 30 each, Rfl: 10     cetirizine (ZYRTEC) 10 MG tablet, Take 1 tablet (10 mg total) by mouth daily., Disp: 30 tablet, Rfl: 0     cholecalciferol, vitamin D3, (VITAMIN D3) 1,000 unit capsule, Take 2 capsules (2,000 Units total) by mouth daily., Disp: 180 capsule, Rfl: 1     fexofenadine (ALLEGRA) 180 MG tablet, TAKE 1 TABLET (180 MG TOTAL) BY MOUTH DAILY FOR ALLERGIES, Disp: 30 tablet, Rfl: 10     fludrocortisone (FLORINEF) 0.1 mg tablet, TAKE 1TABLET BY MOUTH DAILY., Disp: 90 tablet, Rfl: 2     fluticasone (FLONASE) 50 mcg/actuation nasal spray, 1 spray into each nostril daily., Disp: 16 g, Rfl: 2     hydrocortisone (CORTEF) 5 MG tablet, Take 2 tabs in the morning, 1 tab in the early afternoon, Disp: 270 tablet, Rfl: 1     levothyroxine (SYNTHROID, LEVOTHROID) 88 MCG tablet, TAKE 1 TABLET (88 MCG TOTAL) BY MOUTH DAILY FOR THYROID, Disp: 45 tablet, Rfl: 0     loperamide (IMODIUM A-D)  2 mg tablet, Take 1 tablet (2 mg total) by mouth 3 (three) times a day as needed for diarrhea., Disp: 15 tablet, Rfl: 0     OLANZapine (ZYPREXA) 10 MG tablet, TAKE 1 TABLET (10 MG TOTAL) BY MOUTH AT BEDTIME FOR DEPRESSION, Disp: 90 tablet, Rfl: 1     amoxicillin (AMOXIL) 500 MG tablet, Take 1 tablet (500 mg total) by mouth 2 (two) times a day for 10 days., Disp: 20 tablet, Rfl: 0     Objective:   Allergies:  Patient has no known allergies.    Vitals:  Vitals:    01/11/19 0809   BP: 90/72   Patient Site: Right Arm   Patient Position: Sitting   Cuff Size: Adult Regular   Pulse: (!) 102   Resp: 16   Temp: 98.8  F (37.1  C)   TempSrc: Oral   SpO2: 99%   Weight: 139 lb 2 oz (63.1 kg)     Body mass index is 24.99 kg/m .    Vital signs reviewed.  General: Patient is alert and oriented x 3, in no apparent distress  Ears: TMs are non-erythematous with good light reflex bilaterally  Nose: Minimal erythema and edema on the right nares, left nares exam is grossly normal  Face: No pain with percussion of frontal or maxillary sinuses bilaterally  Throat: no erythema, edema or exudate noted  Lymphatic: no anterior cervical lymph node enlargement  Cardiac: regular rate and rhythm, no murmurs  Pulmonary: lungs clear to auscultation bilaterally, no crackles, rales, rhonchi, or wheezing noted    Assessment and Plan:   1.  Sinusitis.  Given 2 weeks without improvement, and chronic health conditions, prescription sent for amoxicillin to treat possible infection today.  He will continue with other medications as directed.  See #2.    2.  Adrenal insufficiency.  In the past it has been recommended that he take stress dose steroids for illnesses.  Right now he is taking hydrocortisone, current med list states 10 mg in the morning and 5 mg at noon.  Recommendation would be to double the dose for 3 days.  Patient has his pill bottles with him today, but not his pillbox.  His nurse does not come to set up his pillbox until Monday.  I reviewed  previous notes from Dr. Rangel from 1/5/2018 that discussed difficulty explaining stress dose steroids to patient.  I am reluctant to write down changes in these medications, as I do not think patient or any family members will be able to make these changes.  Nurse could when she comes on Monday, but I am hoping he will be improving by then.  Therefore, continue current hydrocortisone doses, no changes made.  Patient will contact us next week if he is not feeling better.    This dictation uses voice recognition software, which may contain typographical errors.

## 2021-06-23 NOTE — PROGRESS NOTES
Mental Health Visit Note    1/16/2019    Start time: 1:00pm    Stop Time: 1:45pm   Session # 1    Session Type: Patient is presenting for an Individual session.    Brooke Peterson is a 28 y.o. male is being seen today for    Chief Complaint   Patient presents with     MH Follow Up     Pt f/u to address symptoms of depression.   .     New symptoms or complaints: None    Functional Impairment:   Personal: 4  Family: 3  Work: 3  Social:4    Clinical assessment of mental status:   Grooming: Well groomed  Attire: Appropriate  Age: Appears Stated  Behavior Towards Examiner: Cooperative  Motor Activity: Within normal   Eye Contact: Appropriate  Mood: Depressed  Affect: Blunted  Speech/Language: Within normal  Attention: Distractible  Concentration: Brief  Thought Process: Within normal  Thought Content: Hallucinations: Within noraml  Delusions: Within normal  Orientation: X 3  Memory: Impairment  Judgement: No Evidence of Impairment  Estimated Intelligence: Average  Demonstrated Insight: Adequate  Fund of Knowledge: adequate      Suicidal/Homicidal Ideation present: None Reported This Session    Patient's impression of their current status: Patient reported he continues to have cold symptoms though he did see a provider last week and his symptoms have improved some since last week. He endorses depressed mood due to never feeling 100%. Patient reports he is sleeping well but continues to have poor appetite. Patient reports things are going well at home, no concerns or issues with anyone. He expressed ongoing concerns regarding citizenship, brought a letter from [facility] today. Patient reports not being sure if he had applied for citizenship yet or not. Will consult with care guide. Patient also reports he has not received an [facility] worker and asked therapist to follow-up with agency, [facility].    Therapist impression of patients current state: Patient presented on time for scheduled appointment today. Patient was accompanied by his  father/PCA and professional . Patient was engaged and expressive today. Patient had concerns about citizenship, therapist assisted with reviewing mail and contacting care guide to consult. Therapist also called [facility] to inquire about status of [facility] referral. Therapist was informed that [facility] has attempted to go out to do initial assessment 3x and patient and his father has declined services all times. [facility] stated they would be willing to go out again if patient is still interested. Informed patient that [facility] would be contacting patient to set up intake session and they must say they want and agree to services in order to receive [facility] worker. Patient and his father expressed understanding. Therapist informed patient that therapist will be leaving clinic and provided referral options. Patient reports he would like to see other in-clinic therapist if possible. Therapist will facilitate transfer to Nathaly Lira [facility].    Type of psychotherapeutic technique provided: Client centered and Solution-focused    Progress toward short term goals:Progress as expected, patient was engaged and expressive.    Review of long term goals: Not done at today's visit    Diagnosis:   1. Schizophrenia, unspecified type (H)        Plan and Follow up: Patient will be transferred to Nathaly Lira [facility] and scheduled for 2/12/19      Discharge Criteria/Planning: Patient will continue with follow-up until therapy can be discontinued without return of signs and symptoms.    AMANDA Mac 1/16/2019

## 2021-06-23 NOTE — TELEPHONE ENCOUNTER
Used Language Line/  Name: DESHAUN CRUZ  ID: [id number]      Patient is informed of the message and has no further questions.    Completing task.

## 2021-06-24 NOTE — TELEPHONE ENCOUNTER
Patient is needing a ride for their appointment on:        Date: Tuesday Feb. 19, 2019  Time: 10:00am    Name of Location/Address/Phone #:   [address] [address]  PH: [phone]      Name of  patient is seeing & DrRupesh's specialty:   DR. Sy    Passenger (s):   2    Special considerations: None    Is the patient able to walk to the transportation vehicle?    Yes     Do they need DOOR to DOOR service? (company person comes knock on the door and walks them to car)     No    Please call patient back to confirm the ride details. Thanks!

## 2021-06-24 NOTE — TELEPHONE ENCOUNTER
I spoke with Angelic from [employer].    Patient has been scheduled with Paul PuckettJujazzliz-[phone] for a  time of 9:15a, round trip.   TRIP ID#:  [id number]

## 2021-06-24 NOTE — PROGRESS NOTES
2 days emesis.  Weak.  Dizzy.  No blood or black.  Last emesis this morning. Tired.  Not eating.  No diarrhea.  Feels like fever.  Chills for a minute.    Runny nose    No cough    some shortness of breath    Urine good.    No rash  No joint pain    On thyroid med  Inhaler once a day    Sees psychiatrist.  [facility]   For depression.  Back in [place] heard voices but in [place] a lot less.   Started on med couple years.         OBJECTIVE:   Vitals:    03/08/19 1042   BP: 100/80   Pulse: 74   Resp: 20   Temp: 98.9  F (37.2  C)      Wt is noted.  No diaphoresis  Eyes: nl eom, anicteric   External ears, nose: nl    Neck: nl nodes, supple, thyroid normal   Lungs: clear to ausc   Heart: regular rhythm  Abd: soft nontender     No cva (renal) tenderness  Neuro: no weakness  Skin no rash  Joints: uninflamed   No ketotic breath odor noted  Mental: flat  Poor eye contact   Decreased speech  Ext: nontender calves   Gait: normal    Body mass index is 25.24 kg/m .     ASSESSMENT/PLAN:    1. Disorganized schizophrenia (H)     2. Other specified hypothyroidism     3. Gastroenteritis  ondansetron (ZOFRAN-ODT) 4 MG disintegrating tablet     Sx rx  Chronic issues stable/ same treatment.  Anticipate resolution otherwise return.  Return sooner if symptoms worsen.

## 2021-06-24 NOTE — PROGRESS NOTES
"Mental Health Visit Note    3/8/2019    Start time: 10:05am    Stop Time: 10:32am   Session # 3      Session Type: Patient is presenting for an Individual session.    Brooke Peterson is a 28 y.o. male is being seen today for    Chief Complaint   Patient presents with     MH Follow Up     PAtient presented for follow up psychotherapy to address coping with somatic symptoms such as fatigue       New symptoms or complaints: vomitting    Functional Impairment:   Personal: 4  Family: 3  Work: 4  Social:4    Clinical assessment of mental status: Reviewed. MSE is current effective 3/8/2019  Grooming: groomed  Attire: Appropriate  Age: Appears Stated  Behavior Towards Examiner: Sullen and Guarded/Evasive   Motor Activity: Retarded   Eye Contact: Avoidant  Mood: Depressed  Affect: Congruent w/content of speech, Blunted and Flat  Speech/Language: Delayed/Hesitant and Soft  Attention: Distractible  Concentration: Brief  Thought Process: Slow  Thought Content: Hallucinations: Auditory  Delusions: Within normal  Orientation: Disoriented. Dissociated at times  Memory: No Evidence of Impairment  Judgement: Impairment  Estimated Intelligence: Below Average  Demonstrated Insight: Diminished  Fund of Knowledge: adequate      Suicidal/Homicidal Ideation present: None Reported This Session     Patient's impression of their current status: Patient reports he is \"not good.\"   Patient endorses the following symptoms: tired, dizzy, nausea/vomiting, cold, weakness. He has been experiencing the symptoms for 2 days. He reports poor sleep. He is taking his medications as prescribed. He has 2 more visits at [facility] for the medical waiver for citizenship. Other than appointments, he does not get out of the house or engage in activities. He prefers to be home and by himself.       Therapist impression of patients current state: The patient presented for an individual psychotherapy session with this provider. A professional Angela  was present " for the visit due to the language complexity. He presents with flat/blunted affect, his head is down/avoidant eye contact, and minimal and hesitant speech. His father was present with him and he engages in frequent conversation. Session requires redirection and prompting from provider to empower patient to engage in the conversation and therapeutic intervention. His negative symptoms of schizophrenia impact his engagement in session- alogia, flattening, withdrawal. He requires the support of his father to assist with basic living skills due to schizophrenia symptoms impacting functioning. Therapist and patient explored long-term goals for psychotherapy services.   Prior to next session: Follow up with medical physician to address presenting concerns of nausea.     Type of psychotherapeutic technique provided: Insight oriented, Client centered and Solution-focused    Progress toward short term goals:Progress as expected, explored long-term goals for comprehensive treatment     Review of long term goals: Treatment Plan updated.  Effective 3/8/2019-06/08/2019    Diagnosis:   1. Schizophrenia, unspecified type (H)      R/O Schizoaffective Disorder    Plan and Follow up: Patient is scheduled for follow up psychotherapy on 3/22/19.  Medical transportation was requested for appointment      Discharge Criteria/Planning: Client has chronic symptoms and ongoing therapy for maintenance stability recommended.    AMANDA Cast 3/8/2019

## 2021-06-24 NOTE — TELEPHONE ENCOUNTER
Patient is needing a ride for their appointment on:        Date: March 26, 2019  Time: 9:30am    Name of Location/Address/Phone #:   [address] [address]  PH: [phone]      Name of  patient is seeing & 's specialty:   Dr. Rangel    Passenger (s):   2    Special considerations: None    Is the patient able to walk to the transportation vehicle?    Yes     Do they need DOOR to DOOR service? (company person comes knock on the door and walks them to car)     No    Please call patient back to confirm the ride details. Thanks!

## 2021-06-24 NOTE — PROGRESS NOTES
"Response from [facility] that works with NP for psychotropic medication management at [facility]: \"Due to patient's non-compliance with keeping appointments and following treatment plan discussed at initial consult on 11/06/2017, [facility] Kavin is discharging patient due to non-compliance. Pt had a bad number and was unreachable. No contact till a medication request was sent and denied back in November 2018.\"     He can see other providers who are open in [facility] psychiatry clinic with a new referral. [facility] is not accepting new patients at this time.     Therefore, therapist will need to work with patient to find a different psychiatrist as therapist is recommending regular psychiatry services due to diagnosis of schizophrenia and presenting depression symptoms.     Nathaly Lira [facility] 02/13/2019  "

## 2021-06-24 NOTE — PROGRESS NOTES
Mental Health Visit Note    2/12/2019    Start time: 11:16am    Stop Time: 12:06pm   Session # 2    *Initial Session with this provider- [facility]    Session Type: Patient is presenting for an Individual session.    Brooke Peterson is a 28 y.o. male is being seen today for    Chief Complaint   Patient presents with     MH Follow Up     Patient presented for follow up psychotherapy to transfer care to different therapist and address symptoms of heavy heart, low energy, poor appetite and nightmares.        New symptoms or complaints: auditory hallucinations, depressed mood, low energy, poor appetite.     Functional Impairment:   Personal: 4  Family: 3  Work: 4  Social:4    Clinical assessment of mental status:   Grooming: Well groomed  Attire: Appropriate  Age: Appears Stated  Behavior Towards Examiner: Cooperative, Guarded at times.   Motor Activity: Retarded   Eye Contact: Avoidant  Mood: Depressed  Affect: Congruent w/content of speech, Blunted and Flat  Speech/Language: Within normal  Attention: Distractible  Concentration: Brief  Thought Process: Slow  Thought Content: Hallucinations: Auditory  Delusions: Within normal  Orientation: X 3. Appeared to have moments of dissociation  Memory: No Evidence of Impairment  Judgement: Impairment  Estimated Intelligence: Below Average  Demonstrated Insight: Diminished  Fund of Knowledge: adequate      Suicidal/Homicidal Ideation present: None Reported This Session Denies SI/ HI.   Completed C-SSRS in session. No ideation. No self-harm. No preparatory behaviors. No past attempts. Low risk for suicide.     Patient's impression of their current status: Patient reports a goal to obtain citizenship.   Patient endorses the following symptoms: Hallucinations- unable to understand the sounds.   Sleep: good. Medications help. No more nightmares.   Depressed mood: yes- heavy heart daily. No good energy.   Anxiety/Worries: No concerns  reported.   Pain: None reported  Appetite: poor.   Engagement in activities:watch movies. Decreased activities due to winter weather. Enjoys fishing with friends in the summer.   Medication Adherence: Good- has [facility] for medication set up every 2 weeks. Quinn remidns him to take medications.   Other services: [facility] for medical waiver for citizenship, Clinic Care Coordination (CG- A[facility]), [facility] through [facility]- just completed intake appointment.   Uses medical transportation.   PCP is Dr. Rangel.         Therapist impression of patients current state: The patient presented for an initial session with this provider. He is transferring psychotherapy services from my colleauge AMANDA Mac who left the clinic.  Patient is a 28 year old Angela male. Patient has been diagnosed by AMANDA Mac with Schizophrenia, unspecified.  The session started late as we did not have an  present. Therapist eventually met patient and utilized [employer] Angela  #[id number]. By 11:37am, the in-person , Celestino with [employer] agency, presented for the appointment. He is the preferred  for the patient, but was running behind from another appointment. The patient was guarded and appeared catatonic. Therapist and patient reviewed consent and privacy policy. Patient reported understanding and signed document- sent to be scanned into EMR. Therapist and patient completed C-SSRS together in session. No concerns about patient's safety or the safety of others per assessment today. Patient's father reports worried about if patient would engage in cutting behaviors. He never has. It appears his father provides a lot of support and care for patient and he is his PCA. Patient presents with depressed mood, catatonic features, and reported auditory hallucinations. He appeared to have periods of dissociation. His father provided feedback in session.  Goals for treatment will be updated after the 2nd  or 3rd follow-up session with this provider. The patient plans to follow-up with psychotropic medication management with her PCP. Patient is no longer established with [facility] as it has been over a year since he has attended. He has not established care with another psychiatry provider. Therapist is recommending patient establish care with psychiatry for medication management and further assessment/monitoring of his mental health symptoms. Therapist is waiting for a response back to see if his previous psychiatric medication provider will see him again at [facility]. It is unclear if patient is still taking Prazosin, which was prescribed by [facility] NP. It is no longer listed on his medication list. Clarification of current medications would be helpful. Coordinate care with Care guideScarlett.     PHQ-9 = 17    Current Outpatient Medications   Medication Sig Dispense Refill     albuterol (PROAIR HFA;PROVENTIL HFA;VENTOLIN HFA) 90 mcg/actuation inhaler Inhale 2 puffs every 6 (six) hours as needed for wheezing. 1 each 0     ARNUITY ELLIPTA 200 mcg/actuation DsDv INHALE 1 PUFF BY MOUTH EVERY DAY 30 each 10     cetirizine (ZYRTEC) 10 MG tablet Take 1 tablet (10 mg total) by mouth daily. 30 tablet 0     cholecalciferol, vitamin D3, (VITAMIN D3) 1,000 unit capsule Take 2 capsules (2,000 Units total) by mouth daily. 180 capsule 1     fexofenadine (ALLEGRA) 180 MG tablet TAKE 1 TABLET (180 MG TOTAL) BY MOUTH DAILY FOR ALLERGIES 30 tablet 10     fludrocortisone (FLORINEF) 0.1 mg tablet TAKE 1TABLET BY MOUTH DAILY. 90 tablet 2     fluticasone (FLONASE) 50 mcg/actuation nasal spray 1 spray into each nostril daily. 16 g 2     hydrocortisone (CORTEF) 5 MG tablet Take 2 tabs in the morning, 1 tab in the early afternoon 270 tablet 1     levothyroxine (SYNTHROID, LEVOTHROID) 88 MCG tablet TAKE 1 TABLET (88 MCG TOTAL) BY MOUTH DAILY FOR THYROID 90 tablet 1     loperamide (IMODIUM A-D) 2 mg tablet Take 1 tablet (2 mg  total) by mouth 3 (three) times a day as needed for diarrhea. 15 tablet 0     OLANZapine (ZYPREXA) 10 MG tablet TAKE 1 TABLET (10 MG TOTAL) BY MOUTH AT BEDTIME FOR DEPRESSION 90 tablet 1     No current facility-administered medications for this visit.         Patient Active Problem List   Diagnosis     Hypotension     Adrenal insufficiency (H)     Schizophrenia (H)     Hypothyroidism     Thrombocytopenia (H)     Moderate persistent asthma     Pituitary microadenoma (H)         Type of psychotherapeutic technique provided: Insight oriented, Client centered, Solution-focused and motivational interviewing    Progress toward short term goals:Progress as expected, patient presented for session to transfer care to therapist. He was open to continuing with this provider.     Review of long term goals: Not done at today's visit. Last created on 5/17/18 by previous therapist- this needs to be updated. Today was our initial visit together. Time was spent establishing care. Discussed the need to update treatment plan in the next 1-2 sessions. He shared his long-term goal of obtaining citizenship.     Diagnosis:   1. Schizophrenia, unspecified type (H)    2. Nightmares    3. Depression, major      R/O Schizoaffective Disorder    Plan and Follow up: Patient is scheduled for follow up psychotherapy on 2/22/19, 3/8/19, 3/22/19.  Medical transportation was requested for these appointments.       Discharge Criteria/Planning: Client has chronic symptoms and ongoing therapy for maintenance stability recommended.    Nathaly Lira [facility] 2/12/2019

## 2021-06-24 NOTE — TELEPHONE ENCOUNTER
Used Language Line/  Name: KYLE  ID: [id number]      Patient is informed of the message and has no further questions.    Completing task.

## 2021-06-24 NOTE — TELEPHONE ENCOUNTER
Patient is needing a ride for their appointment on:        Date: 3/8/2019  Time: 10:00am    Name of Location/Address/Phone #:   [address] [address]  PH: [phone]      Name of  patient is seeing & 's specialty:   Nathaly PATEL    Passenger (s):   2    Special considerations: None    Is the patient able to walk to the transportation vehicle?    Yes     Do they need DOOR to DOOR service? (company person comes knock on the door and walks them to car)     No    Please call patient back to confirm the ride details. Thanks!

## 2021-06-24 NOTE — TELEPHONE ENCOUNTER
2-13-19  [employer]  [phone]  Re: med ride for 2-27-19 at 3pm to [facility]  2nd appt for Medical Waiver Form    Called and spoke to Magy.     Provided member ID and verify demographics information.  Provided address of clinic, clinic telephone, named of provider, and type of appt.    Transportation set up with: [employer] [phone]   time: 2:15pm   1 passenger  Confirmation#: [id number]  Will return ride.

## 2021-06-24 NOTE — PROGRESS NOTES
2-12-19  Patient requested to see Care Guide.  Joint visit with Nathaly Lira, therapist.   Met with patient and patient's father and follow up on goals.  Angela : Celestino Ye-KTTS    Patient reported:  -has another appt at [facility] 2-27-19 at 3pm for 2nd session-medical waiver. Needs help with setting up transportation to appt.  -still waiting for [facility] worker from [facility].    Father brought in same mails and explained to him it is from [facility] and that Flaquita Bryant, that will be helping with citizenship application. Goal completed.  New Goal: support to get Medical Waiver form through an organization.  New Goal: re-establish psychiatry services with [facility] for med management.    New insurance card from [employer].   scanned card into chart.    Per Nathaly new goal to re-establish psychiatry services with [facility].    Plan:  Follow up 3-21-19

## 2021-06-24 NOTE — TELEPHONE ENCOUNTER
Patient is needing a ride for their appointment on:        Date: 3/22/2019  Time: 10:00am    Name of Location/Address/Phone #:   [address] [address]  PH: [phone]      Name of  patient is seeing & 's specialty:   Nathaly PATEL    Passenger (s):   2    Special considerations: None    Is the patient able to walk to the transportation vehicle?    Yes     Do they need DOOR to DOOR service? (company person comes knock on the door and walks them to car)     No    Please call patient back to confirm the ride details. Thanks!

## 2021-06-24 NOTE — PROGRESS NOTES
Amilcar Rangel  Since [facility] Vale, CNP not accepting new patient at this time.  She noted for PCP to send a new referral for psychiatry to other providers in [facility].  Would you able to put in a new referral/order for psychiatry services?  Thank you.  Scarlett

## 2021-06-24 NOTE — PROGRESS NOTES
Outpatient Mental Health Treatment Plan     Name:  Brooke Peterson  :  1990  MRN:  [id number]     Treatment Plan:  Updated Treatment Plan  Intake/initial treatment plan date:  10/24/17  Benefit and risks and alternatives have been discussed: Yes  Is this treatment appropriate with minimal intrusion/restrictions: Yes  Estimated duration of treatment:  Approximately 20 sessions.  Anticipated frequency of services:  Every 2 weeks  Necessity for frequency: This frequency is needed to establish therapeutic goals and for continuity of care in order to monitor progress.  Necessity for treatment: To address cognitive, behavioral, and/or emotional barriers in order to work toward goals and to improve quality of life.     Plan:                 Goal: Schizophrenia  Goal: Decrease average impact of symptoms from 4 to 1                        Strategies:   [x] Learn and Implement CBT skills.                                             [x] Develop insight into mental health concerns.                                            [x] Psychoeducation                                            [x] Establish care with psychiatrist and maintain appointments                                            [x] Continue compliance with medical treatment plan (or explore barriers)                                            [x] Consider Neuropsych Evaluation or other psychological testing                                                                              ?  Degree to which this is a problem: 4  Degree to which goal is met: 1  Date of Review: 2019-2019       ? Depression    Goal:  Decrease average depression level from 3 to 1.   Strategies:    ?[x] Decrease social isolation     [x] Increase involvement in meaningful activities     ?[x] Discuss sleep hygiene     ?[x] Explore thoughts and expectations about self and others     ?[x] Process grief (loss of significant person, independence, role,        etc.)     ?[x] Assess for  suicide risk     ?[x] Implement physical activity routine (with physician approval)     [x] Consider introduction of bibliotherapy and/or videos     [x] Continue compliance with medical treatment plan (or explore         barriers)    Degree to which this is a problem: 3  Degree to which goal is met: 1  Date of Review: 03/08/2019-06/08/2019       Goal: Improve relationships and initiate social interactions              Strategies:       ?[x] Decrease social isolation                                      [x] Increase involvement in meaningful activities                                      ?[x] Practice basic social skills  ?                                    [x] Explore thoughts and expectations about self and others                          ?  Degree to which this is a problem: 3  Degree to which goal is met: 1  Date of Review: 03/08/2019-06/08/2019                                          Functional Impairment:    Personal : 3  Family : 2  Social : 4  Work/school : 4     Diagnosis:  1. Schizophrenia, unspecified type          WHODAS 2.0 score: 33/48=69% Severe disability     Clinical assessments and measures completed:. RADHA-7 and PHQ-9, CAGE, C-SSRS  PHQ-9 = 17  C-SSRS= Low Risk. Denies SI  CAGE = 0/4  RADHA-7 = 3    Strengths:  Patient has a supportive family and is medication compliant and managing symptoms well. Enrolled in clinic care coordination.  Limitations:  Patient has poor memory, poor attention, and some cognitive delays that may impact his ability and/or willingness to try new things and push his limits. Discharged from [facility] psychiatry due to failed appointments. Needs to be connected with new psychiatric medication provider.  Cultural Considerations: Patient is [place] and the understanding of Schizophrenia as a mental illness is limited, more psychoeducation may be needed. He requires the use of a Angela  to communicate with providers.     Persons responsible for this plan: Patient and  Provider. Coordinate with PCP (Dr. Rangel), clinic care guide (Piotr) and psychiatry as needed.            Psychotherapist Signature           Patient Signature:              Guardian Signature             Provider: Performed and documented by AMANDA Cast   Date:  3/8/2019

## 2021-06-24 NOTE — TELEPHONE ENCOUNTER
I spoke with CESAR  from [employer].    Patient has been scheduled with [phone] for a  time of 915 AM WILL CALL  round trip.   TRIP ID#:  [id number]

## 2021-06-24 NOTE — PROGRESS NOTES
Assessment/plan  1. Viral syndrome  2. Adrenal insufficiency (H)  3. Schizophrenia, unspecified type (H)  4. Hypothyroidism, unspecified type  Patient is a poor historian.   Afebrile. Appears well hydrated. Exam is unremarkable.  EHR reviewed. Past medical history and evaluation reviewed. Defer treatment with increased doses of steroid as was recommended in the past.   Reassured patient no signs of bacterial infection. We discussed possible etiology of upper respiratory symptoms including viral or allergic since this seems to be a chronic or ongoing complaint. He completed antibiotics one month ago.   Encouraged use of antihistamine or intranasal steroid that was prescribed before. This was discussed with patient who will hopefully be able to tell family or visiting RN for assistance.   Supportive care discussed. Consider tylenol as needed. This was prescribed as requested.   Advised on reasons to follow up or be seen urgently.   Advised to follow up with PCP for chronic medical problems.         Subjective  [age] year old male here with his .   He complains of nasal congestion, runny nose since two days. Also feels tired. He had a cough but says this is better. He denies ear pain. No chest pain. No fever. Is tolerating diet. Has not taken any medication for this.   Patient does not make eye contact and is not able to recall his medications at home. EHR reviewed. History of adrenal insufficiency, schizophrenia, hypothyroid. Has frequent complains of upper respiratory symptoms, fatigue. Was prescribed amoxicillin one month ago for similar symptoms. He says he completed all that medication.     ROS: 12 systems reviewed, all negative except for what is mentioned in HPI.     Past Medical History:   Diagnosis Date     Asthma      Patient Active Problem List   Diagnosis     Hypotension     Adrenal insufficiency (H)     Schizophrenia (H)     Hypothyroidism     Thrombocytopenia (H)     Moderate persistent  asthma     Pituitary microadenoma (H)     No past surgical history on file.  No family history on file.  Social History     Socioeconomic History     Marital status:      Spouse name: Mikki Lloyd     Number of children: 4     Years of education: Not on file     Highest education level: Not on file   Occupational History     Not on file   Social Needs     Financial resource strain: Not on file     Food insecurity:     Worry: Not on file     Inability: Not on file     Transportation needs:     Medical: Not on file     Non-medical: Not on file   Tobacco Use     Smoking status: Never Smoker     Smokeless tobacco: Never Used     Tobacco comment: no second hand smoke exposure   Substance and Sexual Activity     Alcohol use: No     Drug use: No     Sexual activity: Not on file   Lifestyle     Physical activity:     Days per week: Not on file     Minutes per session: Not on file     Stress: Not on file   Relationships     Social connections:     Talks on phone: Not on file     Gets together: Not on file     Attends Alevism service: Not on file     Active member of club or organization: Not on file     Attends meetings of clubs or organizations: Not on file     Relationship status: Not on file     Intimate partner violence:     Fear of current or ex partner: Not on file     Emotionally abused: Not on file     Physically abused: Not on file     Forced sexual activity: Not on file   Other Topics Concern     Not on file   Social History Narrative     Not on file     Current Outpatient Medications on File Prior to Visit   Medication Sig Dispense Refill     albuterol (PROAIR HFA;PROVENTIL HFA;VENTOLIN HFA) 90 mcg/actuation inhaler Inhale 2 puffs every 6 (six) hours as needed for wheezing. 1 each 0     ARNUITY ELLIPTA 200 mcg/actuation DsDv INHALE 1 PUFF BY MOUTH EVERY DAY 30 each 10     cetirizine (ZYRTEC) 10 MG tablet Take 1 tablet (10 mg total) by mouth daily. 30 tablet 0     cholecalciferol, vitamin D3, (VITAMIN D3)  1,000 unit capsule Take 2 capsules (2,000 Units total) by mouth daily. 180 capsule 1     fexofenadine (ALLEGRA) 180 MG tablet TAKE 1 TABLET (180 MG TOTAL) BY MOUTH DAILY FOR ALLERGIES 30 tablet 10     fludrocortisone (FLORINEF) 0.1 mg tablet TAKE 1TABLET BY MOUTH DAILY. 90 tablet 2     fluticasone (FLONASE) 50 mcg/actuation nasal spray 1 spray into each nostril daily. 16 g 2     hydrocortisone (CORTEF) 5 MG tablet Take 2 tabs in the morning, 1 tab in the early afternoon 270 tablet 1     levothyroxine (SYNTHROID, LEVOTHROID) 88 MCG tablet TAKE 1 TABLET (88 MCG TOTAL) BY MOUTH DAILY FOR THYROID 90 tablet 1     loperamide (IMODIUM A-D) 2 mg tablet Take 1 tablet (2 mg total) by mouth 3 (three) times a day as needed for diarrhea. 15 tablet 0     OLANZapine (ZYPREXA) 10 MG tablet TAKE 1 TABLET (10 MG TOTAL) BY MOUTH AT BEDTIME FOR DEPRESSION 90 tablet 1     No current facility-administered medications on file prior to visit.      Objective  Vitals:    02/19/19 1017   BP: 102/76   Pulse: 100   SpO2: 98%       General Appearance:  Alert, cooperative, no distress, appears stated age   Head:  Normocephalic, without obvious abnormality, atraumatic   Eyes:  PERRL, conjunctiva/corneas clear, EOM's intact   Ears:  Normal TM's and external ear canals, both ears   Nose: Nares normal, septum midline,mucosa normal, no drainage    Throat: Lips, mucosa, and tongue normal; teeth and gums normal   Neck: Supple, symmetrical, trachea midline, no adenopathy;  thyroid: not enlarged, symmetric, no tenderness/mass/nodules; no carotid bruit or JVD   Lungs:   Clear to auscultation bilaterally, respirations unlabored   Heart:  Regular rate and rhythm, S1 and S2 normal, no murmur, rub, or gallop   Extremities: Extremities normal, atraumatic, no cyanosis or edema   Skin: Skin color, texture, turgor normal, no rashes or lesions   Neurologic: Normal

## 2021-06-24 NOTE — TELEPHONE ENCOUNTER
I spoke with Angelic from [employer].    Patient has been scheduled with [phone] for a  time of 9:15am round trip.   TRIP ID#:  [id number]

## 2021-06-24 NOTE — PROGRESS NOTES
Chief Complaint   Patient presents with     has a cold x 1 month     Subjective:  28 y.o. male with concerns long-term rhinorrhea and stuffy nose.  He is sneezing fairly frequently.  Also, requesting allergy medicine.  Upon review of his medications he is taking fexofenadine and has been for quite a while.  He does have the medication available.  Reports also using Flonase.    Feels energy has been somewhat low.  TSH was normal in November.    Vitamin D has been very low and was also last checked in November.    His chronic adrenal insufficiency and recent potassium was normal in December.    Prolactin somewhat increased and may be due to olanzapine use.    Outpatient Medications Prior to Visit   Medication Sig Dispense Refill     acetaminophen (TYLENOL) 325 MG tablet Take 2 tablets (650 mg total) by mouth every 6 (six) hours as needed for pain. 1-2 tablets as needed for pain 30 tablet 0     albuterol (PROAIR HFA;PROVENTIL HFA;VENTOLIN HFA) 90 mcg/actuation inhaler Inhale 2 puffs every 6 (six) hours as needed for wheezing. 1 each 0     ARNUITY ELLIPTA 200 mcg/actuation DsDv INHALE 1 PUFF BY MOUTH EVERY DAY 30 each 10     cholecalciferol, vitamin D3, (VITAMIN D3) 1,000 unit capsule Take 2 capsules (2,000 Units total) by mouth daily. 180 capsule 1     fludrocortisone (FLORINEF) 0.1 mg tablet TAKE 1TABLET BY MOUTH DAILY. 90 tablet 2     fluticasone (FLONASE) 50 mcg/actuation nasal spray 1 spray into each nostril daily. 16 g 2     hydrocortisone (CORTEF) 5 MG tablet Take 2 tabs in the morning, 1 tab in the early afternoon 270 tablet 1     levothyroxine (SYNTHROID, LEVOTHROID) 88 MCG tablet TAKE 1 TABLET (88 MCG TOTAL) BY MOUTH DAILY FOR THYROID 90 tablet 1     loperamide (IMODIUM A-D) 2 mg tablet Take 1 tablet (2 mg total) by mouth 3 (three) times a day as needed for diarrhea. 15 tablet 0     OLANZapine (ZYPREXA) 10 MG tablet TAKE 1 TABLET (10 MG TOTAL) BY MOUTH AT BEDTIME FOR DEPRESSION 90 tablet 1     cetirizine  (ZYRTEC) 10 MG tablet Take 1 tablet (10 mg total) by mouth daily. 30 tablet 0     fexofenadine (ALLEGRA) 180 MG tablet TAKE 1 TABLET (180 MG TOTAL) BY MOUTH DAILY FOR ALLERGIES 30 tablet 10     No facility-administered medications prior to visit.       Social History     Tobacco Use   Smoking Status Never Smoker   Smokeless Tobacco Never Used   Tobacco Comment    no second hand smoke exposure      Objective:  /60 (Patient Site: Left Arm, Patient Position: Sitting, Cuff Size: Adult Regular)   Pulse 64   Temp 98.1  F (36.7  C) (Oral)   Wt 148 lb (67.1 kg)   BMI 26.59 kg/m    GENERAL: alert, not distressed  EARS: normal tympanic membranes and external auditory canals bilaterally  PHARYNX: no erythema or exudates  MOUTH: well hydrated mucosa, no lesions  NECK: no lymphadenopathy or thyroid nodules  CHEST: clear, no rales, rhonchi, or wheezes  CARDIAC: regular without murmur, gallop, or rub  ABDOMEN: soft, non tender, non distended, normal bowel sounds    Assessment and Plan:   1. Nasal congestion  Did not think fexofenadine was particularly effective.  He wanted to have a liquid-based medicine for treatment.  We discussed the possibilities.  Will prescribe cetirizine as noted below.  - cetirizine (ZYRTEC) 1 mg/mL syrup; Take 10 mL (10 mg total) by mouth daily for 30 doses.  Dispense: 300 mL; Refill: 0     Low vitamin D, hypothyroidism, and [place]'s disease all seem to be relatively stable.  We discussed rechecking labs now that was a bit early.  Patient and father were in agreement that we could wait for another month and have a follow-up then.  Continue vitamin D supplementation as we have been.  No current changes to thyroid medicine or fludrocortisone or hydrocortisone.    This visit was conducted with the aid of a professional .

## 2021-06-24 NOTE — TELEPHONE ENCOUNTER
Per provider's directive: Pt is discharged from [facility] due to non-compliance and not seen since 11/06/2017.     Letter mailed via USPS certified mail. Letter is in Letter's tab in chart.    Pt had only 1 appointment 11/06/2017 w/provider.

## 2021-06-25 NOTE — TELEPHONE ENCOUNTER
RN cannot approve Refill Request    RN can NOT refill this medication med is not covered by policy/route to provider. Last office visit: 4/11/2019 Artie Booker MD Last Physical: Visit date not found Last MTM visit: Visit date not found Last visit same specialty: 3/1/2021 Karyn Guzmán PA-C.  Next visit within 3 mo: Visit date not found  Next physical within 3 mo: Visit date not found      Makenzie Panchal, [facility] Connection Triage/Med Refill 6/2/2021    Requested Prescriptions   Pending Prescriptions Disp Refills     ondansetron (ZOFRAN-ODT) 4 MG disintegrating tablet [Pharmacy Med Name: ONDANSETRON 4 MG TBDP 4 Tablet] 15 tablet 0     Sig: DISSOLVE 1 TABLET (4 MG TOTAL) BY MOUTH EVERY 8 (EIGHT) HOURS AS NEEDED FOR NAUSEA.       There is no refill protocol information for this order

## 2021-06-25 NOTE — PROGRESS NOTES
Subjective: Patient follows up on his nausea vomiting fatigue dizziness.  He was seen on 3/8/2019 by Dr. George at the [facility].  He was given some Zofran but never got that.  He states it never was delivered from the pharmacy.  He ended up going to the emergency room later that day.  Additional workup with CMP, TSH, CBC all of which was normal.    Patient has a history of schizophrenia he is on multiple medications for that.  Also has a history of asthma and uses inhaler.    Patient states that at times he feels like he has a little blurry vision.  He has seen an ophthalmologist I did offer to refer him back for eye exam he did not want to do that    He does see a psychiatrist in the next week or 2.  He sees a psychiatrist every 3 weeks.  I question whether some of his symptoms are related to his schizophrenia and possibly medication.  He will discuss that with his psychiatrist.    Patient is on levothyroxine 88 mcg, the TSH was normal as outlined on 3/8/2019.    Patient denies any diarrhea or constipation or fever.  He did want some Zofran in case he has some increased nausea again and I did prescribe that to be sent to the patient's home from the pharmacy.    Also has vitamin D deficiency and needs a refill  Tobacco status: He  reports that  has never smoked. he has never used smokeless tobacco.    Patient Active Problem List    Diagnosis Date Noted     Pituitary microadenoma (H) 12/04/2018     Moderate persistent asthma 03/27/2018     Thrombocytopenia (H) 02/07/2018     Hypothyroidism 09/27/2017     Schizophrenia (H) 08/25/2017     Adrenal insufficiency (H) 07/28/2017       Current Outpatient Medications   Medication Sig Dispense Refill     albuterol (PROAIR HFA;PROVENTIL HFA;VENTOLIN HFA) 90 mcg/actuation inhaler Inhale 2 puffs every 6 (six) hours as needed for wheezing. 1 each 0     ARNUITY ELLIPTA 200 mcg/actuation DsDv INHALE 1 PUFF BY MOUTH EVERY DAY 30 each 10     cetirizine (ZYRTEC) 1 mg/mL syrup  Take 10 mL (10 mg total) by mouth daily for 30 doses. 300 mL 0     fludrocortisone (FLORINEF) 0.1 mg tablet TAKE 1TABLET BY MOUTH DAILY. 90 tablet 2     hydrocortisone (CORTEF) 5 MG tablet Take 2 tabs in the morning, 1 tab in the early afternoon 270 tablet 1     levothyroxine (SYNTHROID, LEVOTHROID) 88 MCG tablet TAKE 1 TABLET (88 MCG TOTAL) BY MOUTH DAILY FOR THYROID 90 tablet 1     OLANZapine (ZYPREXA) 10 MG tablet TAKE 1 TABLET (10 MG TOTAL) BY MOUTH AT BEDTIME FOR DEPRESSION 90 tablet 1     ondansetron (ZOFRAN-ODT) 4 MG disintegrating tablet Take 1 tablet (4 mg total) by mouth every 8 (eight) hours as needed for nausea. 15 tablet 0     acetaminophen (TYLENOL) 325 MG tablet Take 2 tablets (650 mg total) by mouth every 6 (six) hours as needed for pain. 1-2 tablets as needed for pain 30 tablet 0     cholecalciferol, vitamin D3, (VITAMIN D3) 1,000 unit capsule 2 po qd 60 capsule 5     fluticasone (FLONASE) 50 mcg/actuation nasal spray 1 spray into each nostril daily. 16 g 2     loperamide (IMODIUM A-D) 2 mg tablet Take 1 tablet (2 mg total) by mouth 3 (three) times a day as needed for diarrhea. 15 tablet 0     No current facility-administered medications for this visit.        ROS: 10 point review of systems positive as outlined above otherwise negative    Objective:    BP 94/64 (Patient Site: Left Arm, Patient Position: Sitting, Cuff Size: Adult Regular)   Pulse 68   Resp 12   Wt 136 lb (61.7 kg)   BMI 24.43 kg/m    Body mass index is 24.43 kg/m .      General appearance quiet    HEENT neck without JVD oropharynx is clear nose was clear  Pupils react normally extraocular movements were full    Cranial nerves intact.  Lungs are clear no rales or rhonchi heart was regular rate at 60-70.    Abdomen nontender no guarding or rebound    Skin was normal no rashes.    Patient had a normal gait no focal weakness or numbness.        Results for orders placed or performed during the hospital encounter of 03/08/19    Comprehensive Metabolic Panel   Result Value Ref Range    Sodium 137 136 - 145 mmol/L    Potassium 4.1 3.5 - 5.0 mmol/L    Chloride 105 98 - 107 mmol/L    CO2 23 22 - 31 mmol/L    Anion Gap, Calculation 9 5 - 18 mmol/L    Glucose 105 70 - 125 mg/dL    BUN 9 8 - 22 mg/dL    Creatinine 0.82 0.70 - 1.30 mg/dL    GFR MDRD Af Amer >60 >60 mL/min/1.73m2    GFR MDRD Non Af Amer >60 >60 mL/min/1.73m2    Bilirubin, Total 0.4 0.0 - 1.0 mg/dL    Calcium 9.5 8.5 - 10.5 mg/dL    Protein, Total 7.1 6.0 - 8.0 g/dL    Albumin 4.2 3.5 - 5.0 g/dL    Alkaline Phosphatase 81 45 - 120 U/L    AST 26 0 - 40 U/L    ALT 30 0 - 45 U/L   Thyroid Stimulating Hormone (TSH)   Result Value Ref Range    TSH 4.95 0.30 - 5.00 uIU/mL   HM1 (CBC with Diff)   Result Value Ref Range    WBC 5.2 4.0 - 11.0 thou/uL    RBC 5.55 4.40 - 6.20 mill/uL    Hemoglobin 16.6 14.0 - 18.0 g/dL    Hematocrit 47.5 40.0 - 54.0 %    MCV 86 80 - 100 fL    MCH 29.9 27.0 - 34.0 pg    MCHC 34.9 32.0 - 36.0 g/dL    RDW 12.0 11.0 - 14.5 %    Platelets 156 140 - 440 thou/uL    MPV 11.5 8.5 - 12.5 fL    Neutrophils % 47 (L) 50 - 70 %    Lymphocytes % 40 20 - 40 %    Monocytes % 7 2 - 10 %    Eosinophils % 5 0 - 6 %    Basophils % 1 0 - 2 %    Neutrophils Absolute 2.4 2.0 - 7.7 thou/uL    Lymphocytes Absolute 2.1 0.8 - 4.4 thou/uL    Monocytes Absolute 0.4 0.0 - 0.9 thou/uL    Eosinophils Absolute 0.3 0.0 - 0.4 thou/uL    Basophils Absolute 0.0 0.0 - 0.2 thou/uL       Assessment:  1. Gastroenteritis  ondansetron (ZOFRAN-ODT) 4 MG disintegrating tablet   2. Disorganized schizophrenia (H)     3. Other specified hypothyroidism  cholecalciferol, vitamin D3, (VITAMIN D3) 1,000 unit capsule   4. Vitamin D deficiency       Gastroenteritis with some lingering symptoms use the Zofran as needed    Continue to follow with psychiatry please see above discussion    Therapeutic level regarding thyroid    Vitamin D deficiency, refill on the vitamin D, level was 5.8 on 11/2018    Plan: As  outlined above follow-up with primary physician in the next 2-3 months or earlier if symptoms persist    This transcription uses voice recognition software, which may contain typographical errors.

## 2021-06-25 NOTE — ED TRIAGE NOTES
Triage delayed d/t , pt presents to the ED with c/o fever, shortness of breath and weakness that started over the last couple days, denies any exposure to covid, isn't vaccinated @ this time, denies any diarrhea, nausea or vomiting.

## 2021-06-25 NOTE — PROGRESS NOTES
3-21-19  Called patient with [place]  Manual and follow up on goals and ER follow up   Spoke to patient's father and he reported:  -has [employer] Work[employer] now. She came to visit 2x. Father does not know the name of [employer] worker. Stated he will get it next time she visits. Goal Completed.  -saw the doctor yesterday for ER follow up.  -went to [facility] yesterday 3-20-19 for the testing. Father does not remember the next appt.  -did not get any calls to schedule for psychiatry.  Thanked father for his support.    Follow up: 4-22-19    Called [facility] [phone] and spoke to scheduling staff.   Stated patient came in yesterday 3-20-19 for the testing and has another appt on 4-3-19 at 9:30am for the feedback and will have another appt for the signing.

## 2021-06-25 NOTE — PROGRESS NOTES
3-18-19  Attempt #1  ER follow up   Called patient. No answer.  Patient has follow up appt with Dr Booker 3-20-19 at 8:30am  Change appt to ER Follow up for 3-20-19    Plan: follow up in clinic 3-20-19 at 8:30am

## 2021-06-26 NOTE — ED PROVIDER NOTES
EMERGENCY DEPARTMENT ENCOUNTER     NAME: Brooke Peterson   AGE: 30 y.o. male   YOB: 1990   MRN: [id number]   EVALUATION DATE & TIME: 6/2/2021  2:43 AM   PCP: Jose Rangel MD     Chief Complaint   Patient presents with     Fever     Fatigue   :    FINAL IMPRESSION     1. Fever, unspecified fever cause    2. Viral syndrome       ED COURSE & MEDICAL DECISION MAKING      Pertinent Labs & Imaging studies reviewed. (See chart for details)   30 y.o. male  presents to the Emergency Department for evaluation of 1 day of fever, fatigue, nausea, description of heavy breathing. Initial Vitals Reviewed. Initial exam notable for generally well-appearing male who has clear lungs, fever and mild tachycardia but who is nontoxic-appearing.  No nuchal rigidity, no abdominal tenderness or pain that would suggest something like appendicitis.  No urinary symptoms that would suggest UTI.  I did get a chest x-ray that does not show pneumonia and Covid screening here is negative.  He is very early in the course of illness, and it is definitely possible that this still represents COVID-19.  We discussed instructions for home supportive management, return precautions, and home isolation.  He was given Motrin here for fever and is quite nontoxic appearing.  He will now be discharged with return precautions.        2:48 AM Met with patient for initial interview and exam. Discussed initial plan for care for their stay in the emergency department. Proper PPE worn while evaluating the patient in the room including N95 mask, goggles, face shield, gown.    3:31 AM We discussed plans for discharge including supportive cares, symptomatic treatment, outpatient follow up, and reasons to return to the emergency department.    At the conclusion of the encounter I discussed the results of all of the tests and the disposition. The questions were answered. The patient or family acknowledged understanding and was agreeable with the care plan.          MEDICATIONS GIVEN IN THE EMERGENCY:   Medications   ibuprofen tablet 600 mg (ADVIL,MOTRIN) (600 mg Oral Given 6/2/21 0322)      NEW PRESCRIPTIONS STARTED AT TODAY'S ER VISIT   Current Discharge Medication List      START taking these medications    Details   ondansetron (ZOFRAN) 4 MG tablet Take 1-2 tablets (4-8 mg total) by mouth every 8 (eight) hours as needed.  Qty: 20 tablet, Refills: 0    Associated Diagnoses: Fever, unspecified fever cause         CONTINUE these medications which have NOT CHANGED    Details   acetaminophen (TYLENOL) 325 MG tablet TAKE 1-2 TABLETS (650 MG TOTAL) BY MOUTH EVERY 6 (SIX) HOURS AS NEEDED FOR PAIN.  Qty: 30 tablet, Refills: 0    Associated Diagnoses: Moderate persistent asthma without complication      acetaminophen-codeine (TYLENOL #3) 300-30 mg per tablet Take 1 tablet by mouth every 6 (six) hours as needed for pain.  Qty: 20 tablet, Refills: 0    Associated Diagnoses: Paronychia of great toe of left foot      albuterol (PROAIR HFA;PROVENTIL HFA;VENTOLIN HFA) 90 mcg/actuation inhaler INHALE 2 PUFFS EVERY 6 (SIX) HOURS AS NEEDED FOR WHEEZING.  Qty: 18 g, Refills: 4    Associated Diagnoses: Moderate persistent asthma without complication      cetirizine (ZYRTEC) 10 MG tablet Take 1 tablet (10 mg total) by mouth daily.  Qty: 30 tablet, Refills: 2    Associated Diagnoses: Chronic rhinitis      cholecalciferol, vitamin D3, (VITAMIN D3) 25 mcg (1,000 unit) capsule TAKE 2 PILLS BY MOUTH EVERYDAY FOR BONE HEALTH  Qty: 60 capsule, Refills: 5    Associated Diagnoses: Other specified hypothyroidism      famotidine (PEPCID) 20 MG tablet TAKE 1 TABLET (20 MG TOTAL) BY MOUTH 2 (TWO) TIMES A DAY FOR STOMACH  Qty: 180 tablet, Refills: 3    Associated Diagnoses: Cough      fludrocortisone (FLORINEF) 0.1 mg tablet TAKE 1 TABLET BY MOUTH DAILY.  Qty: 90 tablet, Refills: 0    Comments: Please cx RX sent under Dr Schmidt  Associated Diagnoses: Adrenal insufficiency (H)      fluticasone  propion-salmeteroL (ADVAIR HFA) 115-21 mcg/actuation inhaler Inhale 2 puffs 2 (two) times a day.  Qty: 1 Inhaler, Refills: 12    Associated Diagnoses: Moderate persistent asthma without complication      fluticasone propionate (FLONASE) 50 mcg/actuation nasal spray USE 1 SPRAY IN EACH NOSTRIL EVERY DAY  Qty: 48 g, Refills: 3    Associated Diagnoses: Nasal congestion      hydrocortisone (CORTEF) 5 MG tablet Take 2 tablets (10 mg total) by mouth every morning AND 1 tablet (5 mg total) every evening.  Qty: 270 tablet, Refills: 1    Associated Diagnoses: Adrenal insufficiency (H)      levothyroxine (SYNTHROID, LEVOTHROID) 75 MCG tablet TAKE 1 TABLET (75 MCG TOTAL) BY MOUTH DAILY FOR THYROID  Qty: 90 tablet, Refills: 3    Associated Diagnoses: Hypothyroidism      meclizine (ANTIVERT) 32 MG chewable tablet TAKE 1 TABLET (25 MG TOTAL) BY MOUTH 3 (THREE) TIMES A DAY AS NEEDED FOR DIZZINESS OR NAUSEA.  Qty: 30 tablet, Refills: 6    Associated Diagnoses: Dizziness      multivitamin (ONE A DAY) per tablet TAKE 1 TABLET BY MOUTH DAILY.  Qty: 120 tablet, Refills: 2    Associated Diagnoses: Adrenal insufficiency (H)      OLANZapine (ZYPREXA) 5 MG tablet TAKE 1 PILL BY MOUTH IN THE MORNING FOR ANXIETY  Qty: 30 tablet, Refills: 3    Associated Diagnoses: Auditory hallucination      paliperidone (INVEGA) 6 MG 24 hr tablet Take 1 tablet (6 mg total) by mouth at bedtime.  Qty: 30 tablet, Refills: 3    Associated Diagnoses: Auditory hallucination      triamcinolone (KENALOG) 0.1 % cream Apply thin layer to affected areas twice daily as needed  Qty: 45 g, Refills: 0    Associated Diagnoses: Eczema, unspecified type            ================================================================   HISTORY OF PRESENT ILLNESS     Patient information was obtained from: Patient   Use of Intrepreter: Yes (Caren) - Language Angela Peterson is a 30 y.o. male with history of asthma, schizophrenia, [place]'s disease, who presents for fever and  "fatigue.   Patient presents with one day of fever, nausea, \"heaviness in breathing\", generalized fatigue, and lightheadedness. He denies any known sick contact; patient is not vaccinated for COVID-19. No reported palliating or provoking factors. He denies any other concerns including abdominal pain, vomiting, cough, or urinary symptoms. He has not taken any medications at home.  ================================================================    REVIEW OF SYSTEMS     Review of Systems   Constitutional: Positive for fatigue and fever. Negative for chills.   HENT: Negative for ear pain and sore throat.    Eyes: Negative for visual disturbance.   Respiratory: Negative for cough and shortness of breath.         Positive for \"heaviness in breathing\"   Gastrointestinal: Positive for nausea. Negative for abdominal pain and vomiting.   Genitourinary: Negative for decreased urine volume, dysuria and hematuria.   Musculoskeletal: Negative for gait problem.   Skin: Negative for rash.   Neurological: Positive for light-headedness. Negative for headaches.   Psychiatric/Behavioral: Negative for self-injury.   All other systems reviewed and are negative.      PAST HISTORY     PAST MEDICAL HISTORY:   Past Medical History:   Diagnosis Date     Asthma       PAST SURGICAL HISTORY:   No past surgical history on file.   CURRENT MEDICATIONS:   No current facility-administered medications on file prior to encounter.      Current Outpatient Medications on File Prior to Encounter   Medication Sig     acetaminophen (TYLENOL) 325 MG tablet TAKE 1-2 TABLETS (650 MG TOTAL) BY MOUTH EVERY 6 (SIX) HOURS AS NEEDED FOR PAIN.     acetaminophen-codeine (TYLENOL #3) 300-30 mg per tablet Take 1 tablet by mouth every 6 (six) hours as needed for pain.     albuterol (PROAIR HFA;PROVENTIL HFA;VENTOLIN HFA) 90 mcg/actuation inhaler INHALE 2 PUFFS EVERY 6 (SIX) HOURS AS NEEDED FOR WHEEZING.     cetirizine (ZYRTEC) 10 MG tablet Take 1 tablet (10 mg total) by " mouth daily.     cholecalciferol, vitamin D3, (VITAMIN D3) 25 mcg (1,000 unit) capsule TAKE 2 PILLS BY MOUTH EVERYDAY FOR BONE HEALTH     famotidine (PEPCID) 20 MG tablet TAKE 1 TABLET (20 MG TOTAL) BY MOUTH 2 (TWO) TIMES A DAY FOR STOMACH     fludrocortisone (FLORINEF) 0.1 mg tablet TAKE 1 TABLET BY MOUTH DAILY.     fluticasone propion-salmeteroL (ADVAIR HFA) 115-21 mcg/actuation inhaler Inhale 2 puffs 2 (two) times a day.     fluticasone propionate (FLONASE) 50 mcg/actuation nasal spray USE 1 SPRAY IN EACH NOSTRIL EVERY DAY     hydrocortisone (CORTEF) 5 MG tablet Take 2 tablets (10 mg total) by mouth every morning AND 1 tablet (5 mg total) every evening.     levothyroxine (SYNTHROID, LEVOTHROID) 75 MCG tablet TAKE 1 TABLET (75 MCG TOTAL) BY MOUTH DAILY FOR THYROID     meclizine (ANTIVERT) 32 MG chewable tablet TAKE 1 TABLET (25 MG TOTAL) BY MOUTH 3 (THREE) TIMES A DAY AS NEEDED FOR DIZZINESS OR NAUSEA.     multivitamin (ONE A DAY) per tablet TAKE 1 TABLET BY MOUTH DAILY.     OLANZapine (ZYPREXA) 5 MG tablet TAKE 1 PILL BY MOUTH IN THE MORNING FOR ANXIETY     paliperidone (INVEGA) 6 MG 24 hr tablet Take 1 tablet (6 mg total) by mouth at bedtime.     triamcinolone (KENALOG) 0.1 % cream Apply thin layer to affected areas twice daily as needed      ALLERGIES:   No Known Allergies   FAMILY HISTORY:   No family history on file.   SOCIAL HISTORY:   Social History     Socioeconomic History     Marital status: Single     Spouse name: Mikki Lloyd     Number of children: 4     Years of education: Not on file     Highest education level: Not on file   Occupational History     Not on file   Social Needs     Financial resource strain: Not on file     Food insecurity     Worry: Not on file     Inability: Not on file     Transportation needs     Medical: Not on file     Non-medical: Not on file   Tobacco Use     Smoking status: Never Smoker     Smokeless tobacco: Never Used     Tobacco comment: no second hand smoke exposure  "  Substance and Sexual Activity     Alcohol use: No     Drug use: No     Sexual activity: Not on file   Lifestyle     Physical activity     Days per week: Not on file     Minutes per session: Not on file     Stress: Not on file   Relationships     Social connections     Talks on phone: Not on file     Gets together: Not on file     Attends [employer]ism service: Not on file     Active member of club or organization: Not on file     Attends meetings of clubs or organizations: Not on file     Relationship status: Not on file     Intimate partner violence     Fear of current or ex partner: Not on file     Emotionally abused: Not on file     Physically abused: Not on file     Forced sexual activity: Not on file   Other Topics Concern     Not on file   Social History Narrative     Not on file      VITALS  Patient Vitals for the past 24 hrs:   BP Temp Temp src Pulse Resp SpO2 Height Weight   06/02/21 0331 -- -- -- (!) 107 -- 94 % -- --   06/02/21 0324 114/67 -- -- -- -- -- -- --   06/02/21 0202 126/60 (!) 102.1  F (38.9  C) Temporal (!) 137 (!) 32 96 % 5' 7\" (1.702 m) 175 lb (79.4 kg)      ================================================================    PHYSICAL EXAM     VITAL SIGNS: /67   Pulse (!) 107   Temp (!) 102.1  F (38.9  C) (Temporal)   Resp (!) 32   Ht 5' 7\" (1.702 m)   Wt 175 lb (79.4 kg)   SpO2 94%   BMI 27.41 kg/m     Constitutional:  Awake, no acute distress, nontoxic appearing  HENT:  Atraumatic, oropharynx without exudate or erythema, membranes moist, no nuchal rigidity  Lymph:  No adenopathy  Eyes: EOM intact, PERRL, no injection  Neck: Supple  Respiratory:  Clear to auscultation bilaterally, no wheezes or crackles   Cardiovascular:  Regular rate and rhythm, single S1 and S2   GI:  Soft, nontender, nondistended, no rebound or guarding   Musculoskeletal:  Moves all extremities, no lower extremity edema, no deformities    Skin:  Warm, dry  Neurologic:  Alert and oriented x3, no focal deficits " noted     ==============================================================  LAB     All pertinent labs reviewed and interpreted.   Results for orders placed or performed during the hospital encounter of 06/02/21   Symptomatic SARS-CoV-2 (COVID-19)-PCR    Specimen: Respiratory   Result Value Ref Range    SARS-CoV-2 PCR Result Negative Negative, Invalid      ===============================================================  RADIOLOGY     Reviewed all pertinent imaging. Please see official radiology report.   Xr Chest 1 View Portable    Result Date: 6/2/2021  EXAM: XR CHEST 1 VIEW PORTABLE LOCATION: [facility] DATE/TIME: 6/2/2021 3:16 AM INDICATION: Fever COMPARISON: 10/08/2020     Negative chest.     ================================================================  EKG       I have independently reviewed and interpreted the EKG(s) documented above.     ================================================================  PROCEDURES   None    I, Racheal Henry, am serving as a scribe to document services personally performed by Dr. Shea based on my observation and the provider's statements to me. I, Ailyn Shea MD attest that Racheal Henry is acting in a scribe capacity, has observed my performance of the services and has documented them in accordance with my direction.   Ailyn Shea M.D.   Emergency Medicine   [facility]  [address]  [facility] [address]  Dept: [phone]  Loc: [phone]      Ailyn Shea MD  06/02/21 034

## 2021-06-26 NOTE — PROGRESS NOTES
Patient has a future F2F appointment on 7/12/21 with Dr. Rangel. Informed pt that clinic does not schedule transportation, pt stated pt will contact pt Care Guide to help schedule ride for pt. Completing task.

## 2021-06-26 NOTE — PROGRESS NOTES
"Brooke Peterson is a 30 y.o. male who is being evaluated via a billable telephone visit.      What phone number would you like to be contacted at? [phone]  How would you like to obtain your AVS? AVS Preference: Mail a copy.    Assessment & Plan     Fever, unspecified fever cause  Very vague history given mental health disorders language barrier.  Reports he is generally fine.  Recommend follow-up in clinic in about a month.    BMI:   Estimated body mass index is 27.41 kg/m  as calculated from the following:    Height as of 6/2/21: 5' 7\" (1.702 m).    Weight as of 6/2/21: 175 lb (79.4 kg).     Return in about 1 month (around 7/8/2021) for Recheck.    Jose Rangel MD  [facility]    Subjective   Brooke Peterson is 30 y.o. and presents today for the following health issues   HPI   Phone visit for follow-up from urgency room visit June 2.  Notes reviewed.  Visit was for fever and fatigue.  Covid test negative.  Patient states most symptoms had resolved.  States he has not got a Covid vaccine yet and does not want to get one yet.    Has chronic dizziness.  That never changes.  Is fairly chronic fatigue as well.    Today, on 10 point review of systems complains that he has some body aches.  Also has muscle aches.        Objective       Vitals:  No vitals were obtained today due to virtual visit.    Physical Exam  Telephone visit        Start 9:48  End 9:59  Phone call duration: 11 minutes    "

## 2021-06-26 NOTE — PROGRESS NOTES
Clinic Care Coordination Contact    Situation: Patient chart reviewed by care coordinator.    Background: SHIRA completed chart review    Assessment: Patient was in ED, attended post hospital visit with PCP. Has another follow up scheduled     Plan/Recommendations: Standard Outreach

## 2021-06-26 NOTE — PROGRESS NOTES
6/11/2021   Clinic Care Coordination Contact    Community Health Worker Follow Up    Goals:   Goals Addressed                 This Visit's Progress       Patient Stated      Medical (pt-stated)   20%     Goal Statement: I want support to schedule my appointments with my referrals that I was given within 2 months  Date Goal set: 02/03/21 Updated: 6-11-21  Barriers: Language, Transportation  Strengths:   Date to Achieve By: 8-  Patient expressed understanding of goal: Yes  Action steps to achieve this goal:  1. I am still waiting to hear from the clinic to schedule neurology appointment.  2 I will call [facility] 1 week before the neurology appt for support to set up medical transportation    Updated: 6-11 AL          COMPLETED: Mental Health Management (pt-stated)   100%     Goal Statement- I have connected with new psychiatrist at [facility] as of 5-17-21. to manage my mental health medications.    Personal Plan   I will continue to follow up with psychiatrist at [address]  [place] [address]   [phone]  Fax: [phone]            Other (pt-stated)   50%     Goal Statement: I would like support to set up medical ride to attend 80% of medical appointments in the next 3 months to address health concerns and needs.  Date Goal set: 4/9/2021 updated. 6-11-21  Barriers: language, lack transportation  Strengths: medical transportation with [employer]  Date to Achieve By: 9-11-21  Patient expressed understanding of goal: Yes  Action steps to achieve this goal:  1. I will call [phone] on 6-30-21 at 9am for support to set up medical ride through [employer] for 7-12-21 at 8:40am to [facility].  2 I will ask my sister for support if she is available and if she can help to call [facility] to notify of appt and need transportation.  3. I will update CCC team at next outreach.    Updated: 6-11-21 ARACELIS Miller : Elaine  ID#  [id number]  Called and spoke to patient through Angela  and follow up on goals.  Patient reported:   -attended appt with psychiatrist at [facility] over the phone 5-17-21  -no one call for neurology appt  CHW sent message to clinic regarding neurology appt    Scheduled with CHW on 6-30-21 at 9am to support to set up ride for appt on 7-12-21 at 8:40am      CHW Follow up: Monthly    CHW Plan: Follow up on goals    CHW Next Follow Up: 6-30-21

## 2021-06-28 NOTE — PROGRESS NOTES
Progress Notes by Scarlett Varma CHW at 10/11/2019 11:39 AM     Author: Scarlett Varma CHW Service: -- Author Type: Community Health Worker    Filed: 10/11/2019 12:12 PM Encounter Date: 10/11/2019 Status: Signed    : Scarlett Varma CHW (Community Health Worker)       10-11-19  My Clinic Care Coordination Wellness Plan  This Graduation Wellness Plan provides private information in regard to the work I have done with my Care Team at my Primary Care Clinic.  This document provides insight on the goals I have accomplished.  My Care Team congratulates me on my journey to maintain wellness.  This document will help guide me on my journey to maintain a healthy lifestyle.  I will use this to help me overcome any barriers I may encounter.  If I should have any questions or concerns, I will contact the members of my Care Team or contact my Primary Care Clinic for assistance.     [address]  [address]  [phone]  Fax: [phone]    My Preferred Method of Contact:  Phone: [phone]    My Primary/Preferred Language:  Angela    Preferred Learning Style:  Reading information, Face to face discussion, Pictures/Diagrams and Hands on teaching    Emergency Contact: Extended Emergency Contact Information  Primary Emergency Contact: Ashley Lloyda   [facility]  Home Phone: [phone]  Relation: Mother  Contact is visually and hearing impaired.  Preferred language: Angela   needed? Yes  Secondary Emergency Contact: Joey Montero   [facility]  Home Phone: [phone]  Relation: Father  Contact is visually and hearing impaired.  Preferred language: Angela   needed? Yes     PCP:  Jose Rangel MD  Care Team            Jose Rangel MD   [phone]  [address] [address]  Fax: [phone] PCP - General, Family Medicine    Joey Montero (Father/PCA)   [phone]         [facility]   [phone]     Member ID:  [id number]        Paul Protestantclover Mojica, DENIS [facility]    [phone] Registered Nurse, Home Health Services    Skilled Nursing Services    medication set up every 2 weeks     [phone]    Pankaj NathalyTOM -therapy services  [phone] Licensed Clinical     [facility]    [facility]  [address] [place] [address]       Yola Holcomb PA-C psychiatric physicians assistant certified.   [phone] Psychiatry Services    [facility].  [address]  [place] [address]  [phone]  Fax: [phone]           Niles Dyson- ARM Worker  [phone]     [facility]    [address]  [place] [address]  [phone]  Fax: [phone]        Accomplishments:  Goals        Patient Stated    ? COMPLETED: I have connected with [facility] and have a  Flaquita Bryant for support support with citizenship application and  process. (pt-stated)      Personal Plan:  I will ask [facility] worker Jose Dyson [phone]  to help to call lawyer Patsy at [facility] [phone], if I have any questions about my citizenship process and application.           ? COMPLETED: I have established psychiatry services with Sarah Haile CNP at [facility] as of 5-8-19. (pt-stated)      Personal Plan:   My father and I will continue to follow up with Caprice Haile CNP for psychiatry services as scheduled.    [facility]   [address] [place]. [address]    [facility] [address]  [phone]       ? COMPLETED: I have food stamp from the [facility]. (pt-stated)      Personal Plan:  I received $20 for food stamp from the [facility].   If I pay rent, then I will ask [facility] worker Jose Dyson [phone] for help to reapply for food stamp.  My father and I can go down to [facility] to reapply for food stamps.  [address] [place] [address]          ? COMPLETED: I have PCA Services now 4 hours  a day.  (pt-stated)      Personal Plan:   My father is my PCA and ask him for support.       ? COMPLETED: I have Skilled Nurse Services now for support to set up medications.  (pt-stated)      Personal Plan:  I will continue to meet with Geeta Mojica RN from [facility] [phone] or [phone] for medication set up every 2 weeks.       ? COMPLETED: I have support from  ( Georgina Brannon) at [facility] for support with citizenship.  (pt-stated)      Personal Plan:   I have lawyer Patsy at [facility] for support with citizenship process.   If I have any questions I will call Georgina Brannon at [facility] [phone].        ? COMPLETED: I now have [facility] Worker for support in the community. (pt-stated)      Personal Plan:  Father and I will ask for support from [facility] worker Jose Dyson [phone] with mails and forms.  I will continue to meet with [facility] worker Jose Dyson [phone]    as schedules to work on my goals.       ? COMPLETED: I have established psychiatry services at [facility] in [place] as of 7-23-19.  Personal Plan:   My father and I will attend follow up appt with Yola Holcomb PA-C psychiatrist as scheduled at [facility].  [address]  [place], [place] [address] [phone]  Fax: [phone]              ? COMPLETED: I have the Medical Waiver form updated and completed on 6-11-19 at [facility].      Personal Plan:  My father and I will wait to get notification from immigration office for finger print.  If I have any questions, I will ask [facility] worker Jose Dyson [phone] to help call to Jayme Blackburn lawyer [phone]  [address]  [place] [address] [phone]              Advanced Directive/Living Will: The patient was given information regarding Adanced Directives/Living Will     When a Loved One Has a Mental  Illness   Its hard to watch a loved one deal with mental illness. You want to help. Yet you may not know what to do. Your loved one may even push you away. But dont give up. Your support is needed now more than ever. Talk to your loved ones health care provider. Or contact a group for families of people with mental illness. They can help give you the guidance you need.  What you can do  Living with mental illness can be overwhelming. Your loved one may say or do things that shock or frighten you. Sometimes your loved one may resist treatment. Knowing what to do can help you cope:    Help your loved one get proper care. Often people with a mental illness deny theres a problem. Or they may not be able to seek help on their own.    Encourage your loved one to stick with treatment. This may be your most crucial job. Medicines that treat mental illness can have side effects. As a result, your loved one may stop taking them. But this will likely cause symptoms to come back. You might also want to go to healthcare provider visits with your loved one to discuss medicine and other issues.    Provide emotional support. Encourage your loved one to share his or her feelings. Listen and dont . Let your loved one know he or she can count on you.    Be patient. The healing process takes time. In some cases, your loved one may never fully recover. But his or her symptoms will likely improve.    Ask them to join in. Invite your loved one to take part in activities. But dont push.    Take care of yourself. Helping your loved one can be very stressful. Take time to care for yourself. Youll have more patience and will be better able to cope.       Seeking help    If you are worried your loved one may harm themselves or attempt suicide, ask him or her. Asking doesnt cause suicide.    If the suicide risk is not immediate, call the [facility] healthcare provider, go to a local mental health clinic, or call the National Suicide  Prevention Lifeline at 396-585-DDKR (7875). It is open 24 hours a day, every day. They speak English and Burkinan. This resource provides immediate crisis intervention and information on local resources. It is free and confidential.     Dont ignore a person's talk of suicide or think its just an attention-seeking behavior. As hard as it is, talking can save lives.  Call 911    Call 911 if the person is at immediate risk of suicide (he or she has a suicide plan and access to a method such as guns or drugs). Or take the person to the nearest emergency room. Dont leave the person alone. Remove any guns and medicines.   Resources    National [place] on Mental Illness 083-284-5322 www.milton.org    National [place] of Mental Health 792-723-2700 www.[facility].nih.gov    Mental Health [place] 748-825-0943 www.[facility].org    National Suicide Prevention Lifeline 335-274-XMBM (7409) www.suicidepreventionlifeline.org           [facility] Mental [facility] Crisis Lines:  [facility] [phone]  [facility] [phone]  [facility] [phone]  [facility] [phone]  [facility], Adults [phone]  [facility], Children [phone]  [facility], Adults [phone]  [facility], Children [phone]          Your Asthma: Flare-Ups  When you have asthma, the airways in your lungs are swollen. This narrows the airways, making it hard to breathe. During an asthma flare-up (asthma attack) the lining of the airways swells even more and makes extra mucus. This makes the airways even narrower. The muscles around the airways also tighten. This makes it even harder for air to get in and out of the lungs.  What causes flare-ups?  Flare-ups occur when the airways with asthma react to a trigger. These are things that make asthma worse. Triggers can include smoke, odors, chemicals, pollen, pets, mold, cockroaches, and dust. Other things can also trigger a flare-up. These include exercise, having a cold or the  flu, and changes in the weather.  What are the symptoms of a flare-up?  You are having might be having a flare up if you have any of the following:    Trouble breathing    Breathing faster than usual    Wheezing. This is a whistling noise when breathing out.    Feeling tightness or pain in the chest    Coughing, especially at night    Trouble sleeping    Getting tired or out of breath easily    Having trouble talking  What to do during a flare-up  When you are starting to have symptoms, dont wait! Follow your Asthma Action Plan. It should tell you exactly what symptoms signal a flare-up . It should also tell you what to do. This may include doing the following:    Use quick-relief (rescue) medicine. Quick-relief medicines to ease your breathing right away.    Measure your flow if you use peak flow monitoring. If peak flow is less than 50%, your flare-up is severe. You need to call your healthcare provider right away. You should also call 911 if you are having any of the symptoms listed in the box below.  If you do not have an Asthma Action Plan or if the plan is not up to date, talk with your healthcare provider.      When to call 911  Call 911 right away if you have any of the following symptoms. They could mean you are having severe difficulty breathing:    Very fast or hard breathing    Sinking in between the ribs and above and below the breastbone (chest retractions)    Can't walk or talk    Lips or fingers turning blue    Peak flow reading less than 50% of normal best    Not acting as normal or seems confused    Not responding to asthma treatments    Preventing worsening symptoms and flare-ups  To help control asthma, you should help have help with the following:    Work together with your healthcare provider. Controlling asthma takes teamwork. Keep all appointments with your healthcare provider. Dont just make an appointment when you have a flare-up. Follow your Asthma Action Plan.    Use controller medicines  as instructed. Make sure you use your long-term controller medicines. These may include corticosteroids and other anti-inflammatory medicines. A person with asthma can have inflamed airways any time, not just when he or she has symptoms. Controller medicines must be taken every day, even when you feels well.    Identify and manage flare-ups right away. Learn to recognize early symptoms and to act quickly. Start quick-relief medicines as instructed if you begin to have symptoms of a respiratory infection and respiratory infections trigger his or her symptoms.     Control triggers. Stay away from things that cause asthma symptoms is another important way to control asthma. Once you know the triggers, take steps to control them. For example, if someone in your household smokes, he or she should think about quitting. Many excellent stop-smoking programs and medicines can help. Also don't allow anyone to smoke near your child, including in your home and car.    0268-3500 [employer]. [address] [place] [address]. All rights reserved. This information is not intended as a substitute for professional medical care. Always follow your healthcare professional's instructions.                 Emergency Plan Recommendation:     When to Use the Emergency Department (ED)  An emergency means you could die if you dont get care quickly. Or you could be hurt permanently (disabled). Read below to know when to use -- and when not to use -- an emergency department (also called ED).     Dangers to your life  Here are examples of emergencies. These need immediate care:  A hard time breathing  Severe chest pain  Choking  Severe bleeding  Suddenly not able to move or speak  Blacking out (fainting)`  Poisoning     Dangers of permanent injuries  Here are other emergencies. These also need immediate care:  Deep cuts or severe burns  Broken bones, or sudden severe pain and swelling in a joint     When its an  emergency  If you have an emergency, follow these steps:     1. Go to the nearest emergency department  If you can, go to the hospital ED closest to you right away.  If you cannot get there right away, or if it is not safe to take yourself, call 911 or your police emergency number.  2. Call your primary care doctor  Tell your doctor about the emergency. Call within 24 hours of going to the ED.  If you cannot call, have someone call for you.  Go to your doctor (not the ED) for any follow-up care.     When its not an emergency  If a problem is not an emergency, follow these steps:     1. Call your primary care doctor  If you dont know the name of your doctor, call your health plan.  If you cannot call, have someone call for you.  2. Follow instructions  Your doctor will tell you what you should do.  You may be told to see your doctor right away. You may be told to go to the ED. Or you may be told to go to an urgent care center.  Follow your doctors advice.    Clinical Emergency Plan    All [facility] clinic patients have access to a Nurse 24 hours a day, 7 days a week.  If you have questions or want advice from a Nurse, please know [facility] is here for you.  You can call your clinic and they will connect you or you can call Care Connection at [phone].  [facility] also has Walk In Care clinics in multiple locations.  Call the number listed above for more information about our Walk In Care clinics or visit the [facility] website at [email or web address].

## 2021-06-28 NOTE — PROGRESS NOTES
Progress Notes by Cecile Montana RN at 9/30/2019  4:15 PM     Author: Cecile Montana RN Service: -- Author Type: Registered Nurse    Filed: 9/30/2019  4:17 PM Encounter Date: 9/30/2019 Status: Signed    : Cecile Montana RN (Registered Nurse)       Clinic Care Coordination Contact    Assessment: Care Coordinator contacted patient for 2 month follow up.  Patient has continued to follow the plan of care and assessment is negative for any new needs or concerns.    Enrollment status: Graduated.      Plan: No further outreaches at this time.  Patient will continue to follow the plan of care.  If new needs arise a new Care Coordination referral may be placed.  FYI to PCP    When a Loved One Has a Mental Illness   Its hard to watch a loved one deal with mental illness. You want to help. Yet you may not know what to do. Your loved one may even push you away. But dont give up. Your support is needed now more than ever. Talk to your loved ones health care provider. Or contact a group for families of people with mental illness. They can help give you the guidance you need.  What you can do  Living with mental illness can be overwhelming. Your loved one may say or do things that shock or frighten you. Sometimes your loved one may resist treatment. Knowing what to do can help you cope:  Help your loved one get proper care. Often people with a mental illness deny theres a problem. Or they may not be able to seek help on their own.  Encourage your loved one to stick with treatment. This may be your most crucial job. Medicines that treat mental illness can have side effects. As a result, your loved one may stop taking them. But this will likely cause symptoms to come back. You might also want to go to healthcare provider visits with your loved one to discuss medicine and other issues.  Provide emotional support. Encourage your loved one to share his or her feelings. Listen and dont . Let your loved one know he  or she can count on you.  Be patient. The healing process takes time. In some cases, your loved one may never fully recover. But his or her symptoms will likely improve.  Ask them to join in. Invite your loved one to take part in activities. But dont push.  Take care of yourself. Helping your loved one can be very stressful. Take time to care for yourself. Youll have more patience and will be better able to cope.    Seeking help  If you are worried your loved one may harm themselves or attempt suicide, ask him or her. Asking doesnt cause suicide.  If the suicide risk is not immediate, call the [facility] healthcare provider, go to a local mental health clinic, or call the National Suicide Prevention Lifeline at 713-495-DPUS (5845). It is open 24 hours a day, every day. They speak English and Frisian. This resource provides immediate crisis intervention and information on local resources. It is free and confidential.   Dont ignore a person's talk of suicide or think its just an attention-seeking behavior. As hard as it is, talking can save lives.  Call 911  Call 911 if the person is at immediate risk of suicide (he or she has a suicide plan and access to a method such as guns or drugs). Or take the person to the nearest emergency room. Dont leave the person alone. Remove any guns and medicines.   Resources  National [place] on Mental Illness 624-543-1777 www.milton.org  National [place] of Mental Health 914-450-8717 www.nimh.nih.gov  Mental Health [place] 540-653-6150 www.nmha.org  National Suicide Prevention Lifeline 204-351-NIBP (5709) www.suicidepreventionlifeline.org        [facility] Mental Health Crisis Lines:  [facility] [phone]  [facility] [phone]  [facility] [phone]  [facility] [phone]  [facility], Adults [phone]  [facility], Children [phone]  [facility], Adults [phone]  [facility], Children [phone]    Your Asthma: Flare-Ups  When  you have asthma, the airways in your lungs are swollen. This narrows the airways, making it hard to breathe. During an asthma flare-up (asthma attack) the lining of the airways swells even more and makes extra mucus. This makes the airways even narrower. The muscles around the airways also tighten. This makes it even harder for air to get in and out of the lungs.  What causes flare-ups?  Flare-ups occur when the airways with asthma react to a trigger. These are things that make asthma worse. Triggers can include smoke, odors, chemicals, pollen, pets, mold, cockroaches, and dust. Other things can also trigger a flare-up. These include exercise, having a cold or the flu, and changes in the weather.  What are the symptoms of a flare-up?  You are having might be having a flare up if you have any of the following:  Trouble breathing  Breathing faster than usual  Wheezing. This is a whistling noise when breathing out.  Feeling tightness or pain in the chest  Coughing, especially at night  Trouble sleeping  Getting tired or out of breath easily  Having trouble talking  What to do during a flare-up  When you are starting to have symptoms, dont wait! Follow your Asthma Action Plan. It should tell you exactly what symptoms signal a flare-up . It should also tell you what to do. This may include doing the following:  Use quick-relief (rescue) medicine. Quick-relief medicines to ease your breathing right away.  Measure your flow if you use peak flow monitoring. If peak flow is less than 50%, your flare-up is severe. You need to call your healthcare provider right away. You should also call 911 if you are having any of the symptoms listed in the box below.  If you do not have an Asthma Action Plan or if the plan is not up to date, talk with your healthcare provider.     When to call 911  Call 911 right away if you have any of the following symptoms. They could mean you are having severe difficulty breathing:  Very fast or hard  breathing  Sinking in between the ribs and above and below the breastbone (chest retractions)  Can't walk or talk  Lips or fingers turning blue  Peak flow reading less than 50% of normal best  Not acting as normal or seems confused  Not responding to asthma treatments   Preventing worsening symptoms and flare-ups  To help control asthma, you should help have help with the following:  Work together with your healthcare provider. Controlling asthma takes teamwork. Keep all appointments with your healthcare provider. Dont just make an appointment when you have a flare-up. Follow your Asthma Action Plan.  Use controller medicines as instructed. Make sure you use your long-term controller medicines. These may include corticosteroids and other anti-inflammatory medicines. A person with asthma can have inflamed airways any time, not just when he or she has symptoms. Controller medicines must be taken every day, even when you feels well.  Identify and manage flare-ups right away. Learn to recognize early symptoms and to act quickly. Start quick-relief medicines as instructed if you begin to have symptoms of a respiratory infection and respiratory infections trigger his or her symptoms.   Control triggers. Stay away from things that cause asthma symptoms is another important way to control asthma. Once you know the triggers, take steps to control them. For example, if someone in your household smokes, he or she should think about quitting. Many excellent stop-smoking programs and medicines can help. Also don't allow anyone to smoke near your child, including in your home and car.    9768-2967 [employer]. [address] [facility], [place] [address]. All rights reserved. This information is not intended as a substitute for professional medical care. Always follow your healthcare professional's instructions.            Emergency Plan Recommendation:    When to Use the Emergency Department (ED)  An emergency means  you could die if you dont get care quickly. Or you could be hurt permanently (disabled). Read below to know when to use -- and when not to use -- an emergency department (also called ED).    Dangers to your life  Here are examples of emergencies. These need immediate care:  A hard time breathing  Severe chest pain  Choking  Severe bleeding  Suddenly not able to move or speak  Blacking out (fainting)`  Poisoning    Dangers of permanent injuries  Here are other emergencies. These also need immediate care:  Deep cuts or severe burns  Broken bones, or sudden severe pain and swelling in a joint    When its an emergency  If you have an emergency, follow these steps:    1. Go to the nearest emergency department  If you can, go to the hospital ED closest to you right away.  If you cannot get there right away, or if it is not safe to take yourself, call 911 or your police emergency number.  2. Call your primary care doctor  Tell your doctor about the emergency. Call within 24 hours of going to the ED.  If you cannot call, have someone call for you.  Go to your doctor (not the ED) for any follow-up care.    When its not an emergency  If a problem is not an emergency, follow these steps:    1. Call your primary care doctor  If you dont know the name of your doctor, call your health plan.  If you cannot call, have someone call for you.  2. Follow instructions  Your doctor will tell you what you should do.  You may be told to see your doctor right away. You may be told to go to the ED. Or you may be told to go to an urgent care center.  Follow your doctors advice.

## 2021-06-30 NOTE — PROGRESS NOTES
Progress Notes by Scarlett Varma CHW at 5/12/2021 11:02 AM     Author: Scarlett Varma CHW Service: -- Author Type: Community Health Worker    Filed: 5/13/2021 12:50 PM Encounter Date: 5/12/2021 Status: Addendum    : Scarlett Varma CHW (Community Health Worker)    Related Notes: Original Note by Scarlett Varma CHW (Community Health Worker) filed at 5/13/2021 10:00 AM       5/13/2021  Clinic Care Coordination Contact  Care Team Conversations   Message from   Maritza Woods Aun, CHW             Hi Scarlett,   pt no showed  x2 appts at [facility]. And 03/23 ibuprofen  have him scheduled 06/16/@10:15 with . Please let him know if he cant go to appt he needs to cancel appt.or he will not beable to be seen there . Thanks Scarlett Garces [facility] [address] [place] [phone]

## 2021-06-30 NOTE — PROGRESS NOTES
Progress Notes by Joaquín Jimenez LGSW at 12/8/2020 10:00 AM     Author: Joaquín Jimenez LGSW Service: -- Author Type:     Filed: 12/8/2020 10:26 AM Encounter Date: 12/8/2020 Status: Signed    : Joaquín Jimenez LGSW ()       Clinic Care Coordination Contact  CCC SW completed an updated assessment with patient and  by phone.    Patient no longer interested in [facility]. Ok to schedule for psychiatry still.    SW contacted [facility]. They scheduled for an updated DA on 12/10 at 5:00pm, by phone. After DA is completed they will refer for psychiatry.      Clinic Care Coordination Contact  OUTREACH    Referral Information:  Referral Source: PCP    Primary Diagnosis: Psychosocial    Chief Complaint   Patient presents with   ? Clinic Care Coordination - Initial        Universal Utilization:   Clinic Utilization  Difficulty keeping appointments:: No  Compliance Concerns: No  No-Show Concerns: No  No PCP office visit in Past Year: No  Utilization    Last refreshed: 12/8/2020 12:58 AM: Hospital Admissions 0           Last refreshed: 12/8/2020 12:58 AM: ED Visits 1           Last refreshed: 12/8/2020 12:58 AM: No Show Count (past year) 1              Current as of: 12/8/2020 12:58 AM              Clinical Concerns:  Current Medical Concerns:  Still feeling dizzy, saw provider about this at [facility].    Current Behavioral Concerns: None reporteed    Education Provided to patient: Role of CCC   Pain  Pain (GOAL):: No  Health Maintenance Reviewed: Not assessed  Clinical Pathway: None     Medication Management:  No concerns, Still has nurse helping setup     Functional Status:  Dependent ADLs:: Bathing, Dressing, Grooming  Dependent IADLs:: Cleaning, Cooking, Laundry, Shopping, Meal Preparation  Bed or wheelchair confined:: No  Mobility Status: Independent w/Device  Fallen 2 or more times in the past year?: No  Any fall with injury in the past year?: No    Living  "Situation:  Current living arrangement:: I live in a private home with family(with parents and siblings)  Type of residence:: Apartment    Lifestyle & Psychosocial Needs:        Diet:: Regular(Diet/ Appetite: \"Fair\", have enough food to eat, Have SNAP)  Inadequate nutrition (GOAL):: No  Tube Feeding: No  Inadequate activity/exercise (GOAL):: No  Significant changes in sleep pattern (GOAL): No  Transportation means:: Medical transport     Worship or spiritual beliefs that impact treatment:: No  Mental health DX:: Yes  Mental health DX how managed:: Medication, Outpatient Counseling  Mental health management concern (GOAL):: Yes  Informal Support system:: Family, Parent   Socioeconomic History   ? Marital status: Single     Spouse name: Mikki Thin   ? Number of children: 4   ? Years of education: Not on file   ? Highest education level: Not on file     Tobacco Use   ? Smoking status: Never Smoker   ? Smokeless tobacco: Never Used   ? Tobacco comment: no second hand smoke exposure   Substance and Sexual Activity   ? Alcohol use: No   ? Drug use: No                Resources and Interventions:  Current Resources:   Skilled Home Care Services: Skilled Nursing  Community Resources: [facility], [facility] Worker,  Mental Health, County Programs, Home Care, Other (see comment), PCA([facility] worker from [facility], Mental health providers, Home Care-Skilled Nursing Services -med management)  Supplies Currently Used at Home: None  Equipment Currently Used at Home: cane, straight  Type of Employment: Disability       Advance Care Plan/Directive  Advanced Care Plans/Directives on file:: No  Advanced Care Plan/Directive Status: Not Applicable          Goals:   Goals        General    Mental Health Management (pt-stated)     Notes - Note edited  12/8/2020 10:24 AM by Joaquín Jimenez, SHIRA    Goal Statement- I want to be connected to a provider to manage my mental health medications within one month    Action steps to achieve " this goal  1.  I will attend my phone Diagnostic Assessment appointment on 12/10 at 5:00pm  2. This  will put in a referral for psychiatry at [facility]  3. I will update [facility] team     [facility]  [address] [place] [address], [place]  [place] [address]   [phone]  Fax: [phone]      Updated: 12-1-20 AL  Date goal set:  12/6/2019 BC                Patient/Caregiver understanding: Reported understanding           Plan: Standard Outreach

## 2021-07-03 NOTE — ADDENDUM NOTE
Addendum Note by Jose Castelan MD at 5/30/2019 10:00 AM     Author: Jose Castelan MD Service: -- Author Type: Physician    Filed: 6/10/2019  8:01 AM Encounter Date: 5/30/2019 Status: Signed    : Jose Castelan MD (Physician)    Addended by: JOSE CASTELAN on: 6/10/2019 08:01 AM        Modules accepted: Orders

## 2021-07-03 NOTE — ADDENDUM NOTE
Addendum Note by Jose Castelan MD at 5/30/2019 10:00 AM     Author: Jose Castelan MD Service: -- Author Type: Physician    Filed: 5/31/2019  2:41 PM Encounter Date: 5/30/2019 Status: Signed    : Jose Castelan MD (Physician)    Addended by: JOSE CASTELAN on: 5/31/2019 02:41 PM        Modules accepted: Orders

## 2021-07-03 NOTE — ADDENDUM NOTE
Addendum Note by Jose Castelan MD at 9/16/2019  9:15 AM     Author: Jose Castelan MD Service: -- Author Type: Physician    Filed: 9/17/2019  8:01 AM Encounter Date: 9/16/2019 Status: Signed    : Jose Castelan MD (Physician)    Addended by: JOSE CASTELAN on: 9/17/2019 08:01 AM        Modules accepted: Orders

## 2021-07-03 NOTE — ADDENDUM NOTE
Addendum Note by Margarita Wild RN at 10/9/2019  2:21 PM     Author: Margarita Wild RN Service: -- Author Type: Registered Nurse    Filed: 10/9/2019  2:21 PM Encounter Date: 10/9/2019 Status: Signed    : Margarita Wild RN (Registered Nurse)    Addended by: MARGARITA WILD on: 10/9/2019 02:21 PM        Modules accepted: Orders

## 2021-07-03 NOTE — ADDENDUM NOTE
Addendum Note by Jose Castelan MD at 2/13/2019 12:07 PM     Author: Jose Castelan MD Service: -- Author Type: Physician    Filed: 3/15/2019 10:13 AM Encounter Date: 2/13/2019 Status: Signed    : Jose Castelan MD (Physician)    Addended by: JOSE CASTELAN on: 3/15/2019 10:13 AM        Modules accepted: Orders

## 2021-07-06 VITALS — WEIGHT: 175 LBS | HEIGHT: 67 IN | BODY MASS INDEX: 27.47 KG/M2

## 2021-07-12 ENCOUNTER — OFFICE VISIT (OUTPATIENT)
Dept: FAMILY MEDICINE | Facility: CLINIC | Age: 31
End: 2021-07-12
Payer: COMMERCIAL

## 2021-07-12 VITALS
HEART RATE: 90 BPM | SYSTOLIC BLOOD PRESSURE: 114 MMHG | WEIGHT: 179 LBS | BODY MASS INDEX: 28.04 KG/M2 | DIASTOLIC BLOOD PRESSURE: 80 MMHG

## 2021-07-12 DIAGNOSIS — D35.2 PITUITARY MICROADENOMA (H): ICD-10-CM

## 2021-07-12 DIAGNOSIS — E27.1 ADDISON'S DISEASE (H): Primary | ICD-10-CM

## 2021-07-12 DIAGNOSIS — R42 DIZZINESS: ICD-10-CM

## 2021-07-12 DIAGNOSIS — E27.40 ADRENAL INSUFFICIENCY (H): ICD-10-CM

## 2021-07-12 DIAGNOSIS — R26.89 WIDEBASED GAIT: ICD-10-CM

## 2021-07-12 PROBLEM — G47.00 INSOMNIA, UNSPECIFIED TYPE: Status: ACTIVE | Noted: 2020-03-01

## 2021-07-12 LAB
ALBUMIN SERPL-MCNC: 4.6 G/DL (ref 3.5–5)
ALP SERPL-CCNC: 83 U/L (ref 45–120)
ALT SERPL W P-5'-P-CCNC: 106 U/L (ref 0–45)
ANION GAP SERPL CALCULATED.3IONS-SCNC: 15 MMOL/L (ref 5–18)
AST SERPL W P-5'-P-CCNC: 57 U/L (ref 0–40)
BILIRUB SERPL-MCNC: 0.7 MG/DL (ref 0–1)
BUN SERPL-MCNC: 12 MG/DL (ref 8–22)
CALCIUM SERPL-MCNC: 10.3 MG/DL (ref 8.5–10.5)
CHLORIDE BLD-SCNC: 103 MMOL/L (ref 98–107)
CO2 SERPL-SCNC: 21 MMOL/L (ref 22–31)
CREAT SERPL-MCNC: 0.84 MG/DL (ref 0.7–1.3)
ERYTHROCYTE [DISTWIDTH] IN BLOOD BY AUTOMATED COUNT: 12.8 % (ref 10–15)
GFR SERPL CREATININE-BSD FRML MDRD: >90 ML/MIN/1.73M2
GLUCOSE BLD-MCNC: 107 MG/DL (ref 70–125)
HCT VFR BLD AUTO: 54.1 % (ref 40–53)
HGB BLD-MCNC: 18.3 G/DL (ref 13.3–17.7)
MCH RBC QN AUTO: 28.6 PG (ref 26.5–33)
MCHC RBC AUTO-ENTMCNC: 33.8 G/DL (ref 31.5–36.5)
MCV RBC AUTO: 85 FL (ref 78–100)
PLATELET # BLD AUTO: 190 10E3/UL (ref 150–450)
POTASSIUM BLD-SCNC: 4.2 MMOL/L (ref 3.5–5)
PROLACTIN SERPL-MCNC: 11.6 NG/ML (ref 0–15)
PROT SERPL-MCNC: 7.8 G/DL (ref 6–8)
RBC # BLD AUTO: 6.39 10E6/UL (ref 4.4–5.9)
SODIUM SERPL-SCNC: 139 MMOL/L (ref 136–145)
T4 FREE SERPL-MCNC: 1.14 NG/DL (ref 0.7–1.8)
VIT B12 SERPL-MCNC: 515 PG/ML (ref 213–816)
WBC # BLD AUTO: 5.9 10E3/UL (ref 4–11)

## 2021-07-12 PROCEDURE — 99214 OFFICE O/P EST MOD 30 MIN: CPT | Performed by: FAMILY MEDICINE

## 2021-07-12 PROCEDURE — 84439 ASSAY OF FREE THYROXINE: CPT | Performed by: FAMILY MEDICINE

## 2021-07-12 PROCEDURE — 80061 LIPID PANEL: CPT | Performed by: FAMILY MEDICINE

## 2021-07-12 PROCEDURE — 84146 ASSAY OF PROLACTIN: CPT | Performed by: FAMILY MEDICINE

## 2021-07-12 PROCEDURE — 84443 ASSAY THYROID STIM HORMONE: CPT | Performed by: FAMILY MEDICINE

## 2021-07-12 PROCEDURE — 82607 VITAMIN B-12: CPT | Performed by: FAMILY MEDICINE

## 2021-07-12 PROCEDURE — 80053 COMPREHEN METABOLIC PANEL: CPT | Performed by: FAMILY MEDICINE

## 2021-07-12 PROCEDURE — 36415 COLL VENOUS BLD VENIPUNCTURE: CPT | Performed by: FAMILY MEDICINE

## 2021-07-12 PROCEDURE — 85027 COMPLETE CBC AUTOMATED: CPT | Performed by: FAMILY MEDICINE

## 2021-07-12 ASSESSMENT — PATIENT HEALTH QUESTIONNAIRE - PHQ9: SUM OF ALL RESPONSES TO PHQ QUESTIONS 1-9: 13

## 2021-07-12 NOTE — PROGRESS NOTES
Subjective:  30 year old male with was asked to follow up.  History remains difficult.  He reports concerns with dizziness, which is long standing.  Missed visit with Neurology.  No transportation.  Took transportation to get here.  By his address, should have been about 10 blocks.  Patient not able to state how long this has been     Walking with cane-gate seems widened today.    Outpatient Medications Prior to Visit   Medication Sig Dispense Refill     acetaminophen (TYLENOL) 325 MG tablet TAKE 1-2 TABLETS (650 MG TOTAL) BY MOUTH EVERY 6 (SIX) HOURS AS NEEDED FOR PAIN.       albuterol (PROAIR HFA/PROVENTIL HFA/VENTOLIN HFA) 108 (90 Base) MCG/ACT inhaler Inhale 2 puffs into the lungs       Ergocalciferol 50 MCG (2000 UT) TABS Take 2,000 Units by mouth daily       fexofenadine (ALLEGRA) 180 MG tablet Take 1 tablet (180 mg) by mouth daily       fludrocortisone (FLORINEF) 0.1 MG tablet Take 1 tablet (0.1 mg) by mouth daily 90 tablet 4     fluticasone (FLONASE) 50 MCG/ACT nasal spray Spray 1 spray into both nostrils daily as needed for rhinitis or allergies       guaiFENesin-dextromethorphan (ROBITUSSIN DM) 100-10 MG/5ML syrup Take 5 mLs by mouth 3 times daily as needed for cough       hydrocortisone (CORTEF) 10 MG tablet Take 1 tablet (10 mg) by mouth 2 times daily PM  dose should be approx 8 hours after  AM dose. Take 20 mg twice/day for 3 days if illness. 210 tablet 4     levothyroxine (SYNTHROID/LEVOTHROID) 75 MCG tablet Take 1 tablet (75 mcg) by mouth daily 90 tablet 1     loperamide (IMODIUM) 2 MG capsule Take 1 capsule (2 mg) by mouth as needed for diarrhea Take 1 capsule up to 3 times a day as needed       loratadine (CLARITIN) 10 MG tablet Take 10 mg by mouth       meclizine (ANTIVERT) 25 MG tablet Take 1 tablet (25 mg) by mouth 3 times daily as needed for dizziness       mometasone (ASMANEX, 60 METERED DOSES,) 220 MCG/INH inhaler INHALE 1 PUFF BY MOUTH 2 (TWO) TIMES A DAY.       Multiple Vitamin  (MULTI-VITAMINS) TABS Take 1 tablet by mouth daily       OLANZapine (ZYPREXA) 5 MG tablet Take 1 tablet (5 mg) by mouth every morning       ondansetron (ZOFRAN-ODT) 4 MG ODT tab DISSOLVE 1 TABLET (4 MG TOTAL) BY MOUTH EVERY 8 (EIGHT) HOURS AS NEEDED FOR NAUSEA.       paliperidone ER (INVEGA) 6 MG 24 hr tablet Take 1 tablet (6 mg) by mouth At Bedtime       TRAVEL SICKNESS 25 MG CHEW        triamcinolone (KENALOG) 0.1 % external cream Apply thin layer to affected areas twice daily as needed       No facility-administered medications prior to visit.      History   Smoking Status     Never Smoker   Smokeless Tobacco     Never Used      Objective:  /80 (BP Location: Left arm, Patient Position: Sitting, Cuff Size: Adult Regular)   Pulse 90   Wt 81.2 kg (179 lb)   BMI 28.04 kg/m        Wt Readings from Last 3 Encounters:   07/12/21 81.2 kg (179 lb)   03/01/21 79.4 kg (175 lb)   02/01/21 77.9 kg (171 lb 12 oz)      GENERAL: alert, not distressed--appearing a little more cushinoid  CHEST: clear, no rales, rhonchi, or wheezes  CARDIAC: regular without murmur, gallop, or rub  ABDOMEN: soft, non tender, non distended, normal bowel sounds  SKIN bronze.    Assessment and Plan:     [place]'s disease (H)  Adrenal insufficiency (H)    - Lipid Profile; Future  - CBC with platelets; Future  - TSH; Future  - Prolactin; Future  - T4, free; Future  - Comprehensive metabolic panel (BMP + Alb, Alk Phos, ALT, AST, Total. Bili, TP); Future  - Lipid Profile  - CBC with platelets  - TSH  - Prolactin  - T4, free  - Comprehensive metabolic panel (BMP + Alb, Alk Phos, ALT, AST, Total. Bili, TP)    Dizziness  Widebased gait  Would like him to see neurology.  Refer to clinic care guides for help in coordinating, as he is not able to manage this himself.  - Vitamin B12; Future  - Adult Neurology Referral; Future    Pituitary microadenoma (H)  Not sure if this needs follow up  - Prolactin; Future    COVID vaccine recommended.  He was  tentative.  Did not want to schedule it.      Future Appointments   Date Time Provider Department Center   [facility]   9/22/2021  3:00 PM Maddison Witt MD [facility]

## 2021-07-14 ENCOUNTER — MEDICAL CORRESPONDENCE (OUTPATIENT)
Dept: HEALTH INFORMATION MANAGEMENT | Facility: CLINIC | Age: 31
End: 2021-07-14

## 2021-07-14 PROBLEM — I95.9 HYPOTENSION: Status: RESOLVED | Noted: 2017-07-24 | Resolved: 2019-02-25

## 2021-07-15 LAB
CHOLEST SERPL-MCNC: 232 MG/DL
HDLC SERPL-MCNC: 38 MG/DL
LDLC SERPL CALC-MCNC: 165 MG/DL
NONHDLC SERPL-MCNC: 194 MG/DL
PROLACTIN SERPL-MCNC: 12 UG/L
TRIGL SERPL-MCNC: 145 MG/DL
TSH SERPL DL<=0.005 MIU/L-ACNC: 2.18 MU/L (ref 0.4–4)

## 2021-07-16 ENCOUNTER — PATIENT OUTREACH (OUTPATIENT)
Dept: NURSING | Facility: CLINIC | Age: 31
End: 2021-07-16

## 2021-07-16 NOTE — PROGRESS NOTES
Clinic Care Coordination Contact  Care Team Conversations    CCC SW contacted [facility] to attempt to support with rescheduling. Registrar reported that since patient has no showed three times she would need to message provider for approval. SW explained CCC can now support with transportation, which was likely the cause of no show. She will get back to .

## 2021-07-26 RX ORDER — FAMOTIDINE 20 MG/1
TABLET, FILM COATED ORAL
COMMUNITY
Start: 2021-07-14 | End: 2022-01-26

## 2021-07-28 ENCOUNTER — PATIENT OUTREACH (OUTPATIENT)
Dept: CARE COORDINATION | Facility: CLINIC | Age: 31
End: 2021-07-28

## 2021-07-28 NOTE — PROGRESS NOTES
Clinic Care Coordination Contact  Care Team Conversations    CCC SW received voicemail from Nick at [employer]   SSA Claim #- [id number]  Nick: [phone]    He needed help getting [facility] providers information.    SW left voicemail with PCP information, neurology information, and [facility] information

## 2021-07-29 DIAGNOSIS — J31.0 CHRONIC RHINITIS: Primary | ICD-10-CM

## 2021-07-30 DIAGNOSIS — Z53.9 DIAGNOSIS NOT YET DEFINED: Primary | ICD-10-CM

## 2021-07-30 PROCEDURE — 99207 PR MD RECERTIFICATION HHA PT: CPT | Performed by: FAMILY MEDICINE

## 2021-08-02 RX ORDER — LORATADINE 10 MG/1
10 TABLET ORAL DAILY
Qty: 30 TABLET | Refills: 0 | Status: SHIPPED | OUTPATIENT
Start: 2021-08-02 | End: 2021-09-21

## 2021-08-02 NOTE — TELEPHONE ENCOUNTER
"Routing refill request to provider for review/approval because:  Drug not active on patient's medication list in [facility]. Medication was last prescribed in [facility] epic on 12/18/19  Medication is listed as historical     Last Written Prescription Date:  12/18/19  Last Fill Quantity: Historical,  # refills: Historical   Last office visit provider:   6/8/21    Requested Prescriptions   Pending Prescriptions Disp Refills     loratadine (CLARITIN) 10 MG tablet       Sig: Take 1 tablet (10 mg) by mouth       Antihistamines Protocol Passed - 7/29/2021 10:41 AM        Passed - Patient is 3-64 years of age     Apply weight-based dosing for peds patients age 3 - 12 years of age.    Forward request to provider for patients under the age of 3 or over the age of 64.          Passed - Recent (12 mo) or future (30 days) visit within the authorizing provider's specialty     Patient has had an office visit with the authorizing provider or a provider within the authorizing providers department within the previous 12 mos or has a future within next 30 days. See \"Patient Info\" tab in inbasket, or \"Choose Columns\" in Meds & Orders section of the refill encounter.              Passed - Medication is active on med list             Crissy Godinez RN 08/02/21 2:43 PM  "

## 2021-08-03 ENCOUNTER — PATIENT OUTREACH (OUTPATIENT)
Dept: NURSING | Facility: CLINIC | Age: 31
End: 2021-08-03

## 2021-08-03 PROBLEM — D69.6 THROMBOCYTOPENIA (H): Status: RESOLVED | Noted: 2018-02-07 | Resolved: 2019-09-13

## 2021-08-04 NOTE — PROGRESS NOTES
8/3/2021  Clinic Care Coordination Contact  Community Health Worker Follow Up    Goals:   Goals Addressed as of 8/4/2021 at 9:07 AM                    8/3/21       Medical (pt-stated)   10%    Added 7/28/21 by Joaquín Jimenez [facility]      Goal Statement: I want support to schedule my appointments with my referrals that I was given within 2 months   Date Goal set: 02/03/21 Updated: 6-11-21   Barriers: Language, Transportation   Strengths:   Date to Achieve By: 8-   Patient expressed understanding of goal: Yes   Action steps to achieve this goal:   1. I will wait to hear from the clinic to call and schedule neurology appointment.   2. I will call CHW 1 week before the neurology appt for support to set up medical transportation     Updated: 8-3-21 AL         Other (pt-stated)   90%    Added 7/28/21 by Joaquín Jimenez, [facility]      Goal Statement: I would like support to set up medical ride to attend 80% of medical appointments in the next 3 months to address health concerns and needs.   Date Goal set: 4/9/2021 updated. 6-11-21   Barriers: language, lack transportation   Strengths: medical transportation with [employer]   Date to Achieve By: 9-11-21   Patient expressed understanding of goal: Yes   Action steps to achieve this goal:   1. I attended 7-12-21 appt.  2. I  will ask my sister for support if she is available to call CHW to notify of appt and need of transportation.   3  I will update CCC team at next outreach.     Updated: 8-3-21 AL          Intervention and Education during outreach:      [facility] [place] [facility] : Nazario  Called and spoke to patient through Angela  and follow up on goals.  Patient reported:   -attended appt on 7-12-21 with PCP  -no appt to see neurologist yet  CHW will follow up with regarding rescheduling neurology appt.  Consult with CC SW      CHW Follow up: Monthly    CHW Plan: Follow up on goals    CHW Next Follow Up: 9-8-21

## 2021-08-18 ENCOUNTER — TELEPHONE (OUTPATIENT)
Dept: ENDOCRINOLOGY | Facility: CLINIC | Age: 31
End: 2021-08-18

## 2021-08-18 DIAGNOSIS — R05.9 COUGH: Primary | ICD-10-CM

## 2021-08-18 DIAGNOSIS — J45.40 MODERATE PERSISTENT ASTHMA WITHOUT COMPLICATION: ICD-10-CM

## 2021-08-18 DIAGNOSIS — E03.9 HYPOTHYROIDISM, UNSPECIFIED TYPE: ICD-10-CM

## 2021-08-18 RX ORDER — LEVOTHYROXINE SODIUM 75 UG/1
75 TABLET ORAL DAILY
Qty: 90 TABLET | Refills: 1 | Status: CANCELLED | OUTPATIENT
Start: 2021-08-18

## 2021-08-18 NOTE — TELEPHONE ENCOUNTER
"03/09/2021 Clinic notes:\"Hydrocortisone 10 mg AM and 10 mg 8 PM started 2/10/2020\"  Pharm NIKKY Hurt confirms orders from Dr Witt as written and does not see discrepancy noted from PCP.   Jeri Cowan, RN on 8/18/2021 at 1:55 PM         RE    Prescription Clarification  Name of Medication: hydrocortisone (CORTEF) 10 MG tablet  Prescribing Provider: Eduar              Pharmacy: N/a              What on the order needs clarification? Shanna would like a call back regarding this medication.  PCP and Eduar have conflicting dosage instructions.  Please have Nurse Triplett call to follow up, per Shanna.  [phone]    hydrocortisone (CORTEF) 10 MG tablet 210 tablet 4 2/15/2021  No   Sig: Take 1 tablet (10 mg) by mouth 2 times daily PM  dose should be approx 8 hours after  AM dose. Take 20 mg twice/day for 3 days if illness.     fludrocortisone (FLORINEF) 0.1 MG tablet 90 tablet 4 3/29/2021  No   Sig - Route: Take 1 tablet (0.1 mg) by mouth daily - Oral   Sent to pharmacy as: Fludrocortisone Acetate 0.1 MG Oral Tablet (FLORINEF)       "

## 2021-08-18 NOTE — TELEPHONE ENCOUNTER
Reason for Call:  Medication or medication refill:    Do you use a [facility]?  Name of the pharmacy and phone number for the current request:  [facility]    Name of the medication requested: levothyroxine (SYNTHROID, LEVOTHROID) 75 MCG tablet, cholecalciferol, vitamin D3, (VITAMIN D3) 25 mcg (1,000 unit) capsule, albuterol (PROAIR HFA;PROVENTIL HFA;VENTOLIN HFA) 90 mcg/actuation inhaler,     Other request: Patient called to request a refill of these medications. Please send refills to pharmacy on file.    Can we leave a detailed message on this number? YES    Phone number patient can be reached at: Home number on file [phone] (home)    Best Time: any    Call taken on 8/18/2021 at 1:12 PM by Diallo Salinas

## 2021-08-18 NOTE — TELEPHONE ENCOUNTER
[facility] Call Center    Phone Message    May a detailed message be left on voicemail: yes     Reason for Call: Medication Question or concern regarding medication   Prescription Clarification  Name of Medication: hydrocortisone (CORTEF) 10 MG tablet  Prescribing Provider: Eduar   Pharmacy: N/a   What on the order needs clarification? Shanna would like a call back regarding this medication.  PCP and Eduar have conflicting dosage instructions.  Please have Nurse Triplett call to follow up, per Shanna.  [phone]          Action Taken: Message routed to:  [facility]o    Travel Screening: Not Applicable

## 2021-08-19 ENCOUNTER — TELEPHONE (OUTPATIENT)
Dept: FAMILY MEDICINE | Facility: CLINIC | Age: 31
End: 2021-08-19

## 2021-08-22 RX ORDER — ALBUTEROL SULFATE 90 UG/1
2 AEROSOL, METERED RESPIRATORY (INHALATION) EVERY 6 HOURS PRN
Qty: 18 G | Refills: 0 | Status: SHIPPED | OUTPATIENT
Start: 2021-08-22 | End: 2022-01-26

## 2021-08-22 NOTE — TELEPHONE ENCOUNTER
"Routing refill request to provider for review/approval because:  Drug not on the [facility] refill protocol for Vit D.   Refill request for levothyroxine too soon.    Last Written Prescriptions:          Last office visit provider:  7/12/21     Requested Prescriptions   Pending Prescriptions Disp Refills     levothyroxine (SYNTHROID/LEVOTHROID) 75 MCG tablet 90 tablet 1     Sig: Take 1 tablet (75 mcg) by mouth daily       Thyroid Protocol Passed - 8/19/2021  6:30 PM        Passed - Patient is 12 years or older        Passed - Recent (12 mo) or future (30 days) visit within the authorizing provider's specialty     Patient has had an office visit with the authorizing provider or a provider within the authorizing providers department within the previous 12 mos or has a future within next 30 days. See \"Patient Info\" tab in inbasket, or \"Choose Columns\" in Meds & Orders section of the refill encounter.              Passed - Medication is active on med list        Passed - Normal TSH on file in past 12 months     Recent Labs   Lab Test 07/12/21  0920   TSH 2.18                 Ergocalciferol 50 MCG (2000 UT) TABS       Sig: Take 2,000 Units by mouth daily       There is no refill protocol information for this order        albuterol (PROAIR HFA/PROVENTIL HFA/VENTOLIN HFA) 108 (90 Base) MCG/ACT inhaler       Sig: Inhale 2 puffs into the lungs every 6 hours as needed for shortness of breath / dyspnea or wheezing       Asthma Maintenance Inhalers - Anticholinergics Passed - 8/19/2021  6:30 PM        Passed - Patient is age 12 years or older        Passed - Asthma control assessment score within normal limits in last 6 months     Please review ACT score.           Passed - Medication is active on med list        Passed - Recent (6 mo) or future (30 days) visit within the authorizing provider's specialty     Patient had office visit in the last 6 months or has a visit in the next 30 days with authorizing provider or within the " "authorizing provider's specialty.  See \"Patient Info\" tab in inbasket, or \"Choose Columns\" in Meds & Orders section of the refill encounter.           Short-Acting Beta Agonist Inhalers Protocol  Passed - 8/19/2021  6:30 PM        Passed - Patient is age 12 or older        Passed - Asthma control assessment score within normal limits in last 6 months     Please review ACT score.           Passed - Medication is active on med list        Passed - Recent (6 mo) or future (30 days) visit within the authorizing provider's specialty     Patient had office visit in the last 6 months or has a visit in the next 30 days with authorizing provider or within the authorizing provider's specialty.  See \"Patient Info\" tab in inbasket, or \"Choose Columns\" in Meds & Orders section of the refill encounter.                 Renata Mc RN 08/22/21 12:06 PM  "

## 2021-09-01 DIAGNOSIS — E27.1 ADDISON'S DISEASE (H): ICD-10-CM

## 2021-09-01 DIAGNOSIS — E27.40 ADRENAL INSUFFICIENCY (H): ICD-10-CM

## 2021-09-01 RX ORDER — HYDROCORTISONE 10 MG/1
TABLET ORAL
Qty: 60 TABLET | Refills: 11 | Status: ON HOLD | OUTPATIENT
Start: 2021-09-01 | End: 2022-05-17

## 2021-09-01 NOTE — TELEPHONE ENCOUNTER
They have been following your instructions from 3/26/21 10 mg AM and 5 mg PM . The pharmacy just had the wrong order. Ioana Mendoza RN on 9/1/2021 at 2:18 PM

## 2021-09-01 NOTE — TELEPHONE ENCOUNTER
[facility]    Phone Message    May a detailed message be left on voicemail: yes     Reason for Call: Other: Jazmin is calling back stating she left a msg for Ioana to give her a call back on 08/18 and she said she has not received a return call as of yet.  It's regarding the medication below, she asked to get a call back today if Ioana is in .     Action Taken: Message routed to:  [facility] Endo    Travel Screening: Not Applicable

## 2021-09-01 NOTE — TELEPHONE ENCOUNTER
I spoke with Jazmin Home care nurse . Back on 3/26/21  Dr Witt ordered a dose change on Hydrocortisone from 10 mg tabs  2 BID changed 3/26/21 to 10 mg AM and 5 mg PM and the start of Florinef 0.1 mg daily  .  The new orders for hydrocortisone  and Florinef went to the home care staff to fill the pill box  and the Florinef went to the pharmacy  but the new dose Hydrocortisone was not sent to the pharmacy .  Shanna called 8/18/21  due to discrepancy on what the pharmacy was filling versus their instructions .    Jeri noted the order that went in 2/20/21  that the pharmacy had for 10 MG BID was correct .  She was not aware of the note 3/26/21 of the  changed dose.     Correct  Hydrocortisone dose routed to Dr Witt today  So they records match . Hydrocortisone  10 mg tablets at 1 tablet AM and 1/2 tablet PM . He can only get a 30 day supply at a time . Ioana Mendoza RN on 9/1/2021 at 1:56 PM  .

## 2021-09-07 ENCOUNTER — PATIENT OUTREACH (OUTPATIENT)
Dept: NURSING | Facility: CLINIC | Age: 31
End: 2021-09-07

## 2021-09-07 NOTE — PROGRESS NOTES
Clinic Care Coordination Contact    Follow Up Progress Note      Assessment: [facility] SW contacted [facility] to get an update on scheduling. SW spoke with Taya, care team member, she reported Toe Po will not be able to schedule with Dr. Rubio or Dr. Ferrera due to no shows. He can schedule with Dr. Ward. SW scheduled an appointment on 12/10/2021 at 10:20AM for Toe Po.     SW attempted to reach Toe Po with an  twice to inform him of this, no answer and not able to leave a voicemail. CCC Team will support with setting up transportation in December to prevent another no show to neurology.     Care Gaps:    Health Maintenance Due   Topic Date Due     PREVENTIVE CARE VISIT  Never done     ASTHMA ACTION PLAN  Never done     ADVANCE CARE PLANNING  Never done     DEPRESSION ACTION PLAN  Never done     Pneumococcal Vaccine: Pediatrics (0 to 5 Years) and At-Risk Patients (6 to 64 Years) (1 of 2 - PPSV23) Never done     INFLUENZA VACCINE (1) 09/01/2021     COVID-19 Vaccine (2 - Pfizer 2-dose series) 09/02/2021       Not addressed    Goals addressed this encounter:   Goals Addressed                    This Visit's Progress       Medical (pt-stated)         Goal Statement: I want support to schedule my appointments with my referrals that I was given within 2 months   Date Goal set: 02/03/21 Updated: 6-11-21   Barriers: Language, Transportation   Strengths:   Date to Achieve By: 12-   Patient expressed understanding of goal: Yes   Action steps to achieve this goal:   1. I will ask for support to setup transportation to my Neurology appointment on 12/10/2021  2. I will attend my Neurology appointment on 12/10/2021 with Dr. Ward     Updated: 9/7/2021            Intervention/Education provided during outreach: See above     Outreach Frequency: monthly    Plan:   Standard Outreach

## 2021-09-08 ENCOUNTER — PATIENT OUTREACH (OUTPATIENT)
Dept: NURSING | Facility: CLINIC | Age: 31
End: 2021-09-08

## 2021-09-08 NOTE — PROGRESS NOTES
9/8/2021  Clinic Care Coordination Contact    Community Health Worker Follow Up    Care Gaps:     Health Maintenance Due   Topic Date Due     PREVENTIVE CARE VISIT  Never done     ASTHMA ACTION PLAN  Never done     ADVANCE CARE PLANNING  Never done     DEPRESSION ACTION PLAN  Never done     Pneumococcal Vaccine: Pediatrics (0 to 5 Years) and At-Risk Patients (6 to 64 Years) (1 of 2 - PPSV23) Never done     INFLUENZA VACCINE (1) 09/01/2021     COVID-19 Vaccine (2 - Pfizer 2-dose series) 09/02/2021       Care Gaps Last addressed on 9-8-21    Goals:   Goals Addressed as of 9/8/2021 at 11:03 AM                    Today    8/3/21       Medical (pt-stated)   50%  10%    Added 7/28/21 by Joaquín Jimenez, [facility]      Goal Statement: I want support to schedule my appointments with my referrals that I was given within 2 months   Date Goal set: 02/03/21 Updated: 6-11-21   Barriers: Language, Transportation   Strengths:   Date to Achieve By: 12-   Patient expressed understanding of goal: Yes   Action steps to achieve this goal:   1. I will talk to CHW on 11-22-21 at 9am for support to set up transportation to my Neurology appointment on 12/10/2021 at 10:20AM with Dr. Fagan .  2. I will attend my Neurology appointment on 12/10/2021 at with Dr. Sylvia FAGAN, [facility]  [address]   [facility] [address]  [phone]  Fax: [phone]      Updated: 9/8/2021 AL          Other (pt-stated)   90%  90%    Added 7/28/21 by Joaquín Jimenez, [facility]      Goal Statement: I would like support to set up medical ride to attend 80% of medical appointments in the next 3 months to address health concerns and needs.   Date Goal set: 4/9/2021 updated. 6-11-21   Barriers: language, lack transportation   Strengths: medical transportation with [employer]   Date to Achieve By: 9-11-21   Patient expressed understanding of goal: Yes   Action steps to achieve this goal:   1.  I  will talk to CHW on 11-22-21 at 9am for support to set up transportation to my Neurology appointment on 12/10/2021 at 10:20AM with Dr. Ward .  2. I  will ask my sister for support if she is available to call CHW to notify of appt and need of transportation.   3  I will update CCC team at next outreach.     [facility]  [address]   [facility] [address]  [phone]  Fax: [phone]    Updated:9-8-21              Intervention and Education during outreach:   Bioxodesamandeep Miller :Gissell Hummel  Patient reported:  -move to a house   New address:  [address] [place] [id number]  Updated in chart of new address    Informed that he has neurology appt with  [facility] on 12-10-21 at 10:20am   [address]   [facility] [address]  [phone]  Fax: [phone]    Scheduled appt with CHW on 11-22-21 at 9am to support with setting up medical transportation   Patient confirmed the appt and appt information for 12-10-21    Reminded of telephone appt with Endo on 9-22-21 at 3pm no transportation telephone visit.  Patient confirmed appt.      CHW Follow up: Monthly    CHW Plan: Follow up on goals    CHW Next Follow Up: 10-13-21

## 2021-09-21 DIAGNOSIS — R44.0 AUDITORY HALLUCINATION: ICD-10-CM

## 2021-09-21 DIAGNOSIS — E27.40 ADRENAL INSUFFICIENCY (H): Primary | ICD-10-CM

## 2021-09-21 DIAGNOSIS — J31.0 CHRONIC RHINITIS: ICD-10-CM

## 2021-09-21 RX ORDER — OLANZAPINE 5 MG/1
5 TABLET ORAL EVERY MORNING
Qty: 90 TABLET | Refills: 0 | Status: SHIPPED | OUTPATIENT
Start: 2021-09-21 | End: 2021-09-30

## 2021-09-21 RX ORDER — DIPHENOXYLATE HYDROCHLORIDE AND ATROPINE SULFATE 2.5; .025 MG/1; MG/1
1 TABLET ORAL DAILY
Qty: 90 TABLET | Refills: 2 | Status: SHIPPED | OUTPATIENT
Start: 2021-09-21 | End: 2022-01-26

## 2021-09-21 RX ORDER — LORATADINE 10 MG/1
10 TABLET ORAL DAILY
Qty: 90 TABLET | Refills: 2 | Status: SHIPPED | OUTPATIENT
Start: 2021-09-21 | End: 2022-01-26

## 2021-09-21 NOTE — TELEPHONE ENCOUNTER
Reason for Call:  Medication or medication refill:    Do you use a [facility]?  Name of the pharmacy and phone number for the current request[facility]    Name of the medication requested:   olanzapine 5 mg  Loratadine 10 mg  Multiple vitamin     Other request: pt is out of Olanzapine RN would like a rush order placed please pharmacy states they sent request 09/01     Can we leave a detailed message on this number? YES    Phone number patient can be reached at: Home number on file [phone] (home)    Best Time: asap   Please     Call taken on 9/21/2021 at 3:45 PM by Maritza Woods

## 2021-09-21 NOTE — TELEPHONE ENCOUNTER
Last office visit provider:  7/12/2021 Dr. Rangel     Olanzapine Last Written Prescription Date:  4/22/2021  Last Fill Quantity: 30,  # refills: 3     Loratadine Last Written Prescription Date:  8/2/2021  Last Fill Quantity: 30,  # refills: 0     Mulit-vitamin Last Written Prescription Date:  8/13/2020  Last Fill Quantity: 120,  # refills: 2     OLANZapine (ZYPREXA) 5 MG tablet 30 tablet 3 4/22/2021  No   Sig: TAKE 1 PILL BY MOUTH IN THE MORNING FOR ANXIETY   Sent to pharmacy as: OLANZapine 5 mg tablet (zyPREXA)   E-Prescribing Status: Receipt confirmed by pharmacy (4/22/2021 12:53 PM CDT     loratadine (CLARITIN) 10 MG tablet 30 tablet 0 8/2/2021  No   Sig - Route: Take 1 tablet (10 mg) by mouth daily - Oral   Sent to pharmacy as: Loratadine 10 MG Oral Tablet (CLARITIN)   Class: E-Prescribe   Order: [id number]   E-Prescribing Status: Receipt confirmed by pharmacy (8/2/2021  6:52 PM CDT     multivitamin (ONE A DAY) per [id number] ovptyc0728/13/2020NoSig - Route: TAKE 1 TABLET BY MOUTH DAILY. - OralSent to pharmacy as: multivitamin tablet (ONE A DAY)E-Prescribing Status: Receipt confirmed by pharmacy (8/13/2020  4:34 PM CDT)       Requested Prescriptions   Pending Prescriptions Disp Refills     OLANZapine (ZYPREXA) 5 MG tablet       Sig: Take 1 tablet (5 mg) by mouth every morning       Antipsychotic Medications Passed - 9/21/2021  4:25 PM        Passed - Blood pressure under 140/90 in past 12 months     BP Readings from Last 3 Encounters:   07/12/21 114/80   03/01/21 111/77   02/01/21 110/77                 Passed - Patient is 12 years of age or older        Passed - Lipid panel on file within the past 12 months     Recent Labs   Lab Test 07/12/21  0920   CHOL 232*   TRIG 145   HDL 38*   *   NHDL 194*               Passed - CBC on file in past 12 months     Recent Labs   Lab Test 07/12/21  0920   WBC 5.9   RBC 6.39*   HGB 18.3*   HCT 54.1*                    Passed - Heart Rate on file within past 12  "months     Pulse Readings from Last 3 Encounters:   07/12/21 90   03/01/21 86   02/01/21 108               Passed - A1c or Glucose on file in past 12 months     Recent Labs   Lab Test 07/12/21  0920          Please review patients last 3 weights. If a weight gain of >10 lbs exists, you may refill the prescription once after instructing the patient to schedule an appointment within the next 30 days.    Wt Readings from Last 3 Encounters:   07/12/21 81.2 kg (179 lb)   03/01/21 79.4 kg (175 lb)   02/01/21 77.9 kg (171 lb 12 oz)             Passed - Medication is active on med list        Passed - Recent (6 mo) or future (30 days) visit within the authorizing provider's specialty     Patient had office visit in the last 6 months or has a visit in the next 30 days with authorizing provider or within the authorizing provider's specialty.  See \"Patient Info\" tab in inbasket, or \"Choose Columns\" in Meds & Orders section of the refill encounter.               loratadine (CLARITIN) 10 MG tablet 30 tablet 0     Sig: Take 1 tablet (10 mg) by mouth daily       Antihistamines Protocol Passed - 9/21/2021  4:25 PM        Passed - Patient is 3-64 years of age     Apply weight-based dosing for peds patients age 3 - 12 years of age.    Forward request to provider for patients under the age of 3 or over the age of 64.          Passed - Recent (12 mo) or future (30 days) visit within the authorizing provider's specialty     Patient has had an office visit with the authorizing provider or a provider within the authorizing providers department within the previous 12 mos or has a future within next 30 days. See \"Patient Info\" tab in inbasket, or \"Choose Columns\" in Meds & Orders section of the refill encounter.              Passed - Medication is active on med list           Multiple Vitamin (MULTI-VITAMINS) TABS       Sig: Take 1 tablet by mouth daily       Vitamin Supplements (Adult) Protocol Passed - 9/21/2021  4:25 PM        " "Passed - High dose Vitamin D not ordered        Passed - Recent (12 mo) or future (30 days) visit within the authorizing provider's specialty     Patient has had an office visit with the authorizing provider or a provider within the authorizing providers department within the previous 12 mos or has a future within next 30 days. See \"Patient Info\" tab in inbasket, or \"Choose Columns\" in Meds & Orders section of the refill encounter.              Passed - Medication is active on med list             Trish Cruz RN 09/21/21 4:27 PM  "

## 2021-09-22 ENCOUNTER — VIRTUAL VISIT (OUTPATIENT)
Dept: ENDOCRINOLOGY | Facility: CLINIC | Age: 31
End: 2021-09-22
Payer: COMMERCIAL

## 2021-09-22 DIAGNOSIS — D75.1 ERYTHROCYTOSIS: Primary | ICD-10-CM

## 2021-09-22 PROCEDURE — 99215 OFFICE O/P EST HI 40 MIN: CPT | Mod: 95

## 2021-09-22 NOTE — LETTER
9/22/2021       RE: Brooke Peterson  [address] [place] [address]     Dear Colleague,    Thank you for referring your patient, Brooke Peterson, to the [facility] ENDOCRINOLOGY [facility]IC [facility]. Please see a copy of my visit note below.    Pituitary  Arvind Powell CMA    Brooke is a 30 year old who is being evaluated via a billable telephone visit.      What phone number would you like to be contacted at? [phone]  How would you like to obtain your AVS? Mail a copy        Endocrine video visit note-    Attending Assessment/Plan :     Adrenal insufficiency, primary with history of high ACTH, hyperpigmentation, cushingoid appearance in the past.      Medication management is a major issue despite having homecare.  Once again I don't have the home care MAR and the patient is unable to tell me what he is getting.     Immigrant with language barrier. He is dependent on the care of others , unable to advocate and protect himself.      Hypothyroidism by history.  Normal TFTs 7/12/2021 on current regimen      Pituitary -nodule seen on 2017 MRI, not on 2019.  Neither was pituitary study.      High Hgb /hematocrit on recent testing - I am ordering CBC .  If high Hgb is confirmed he will need further evaluation for this.     Due to the COVID 19 pandemic this visit was a video visit. The patient gave verbal consent for the visit today.    I have independently reviewed and interpreted labs, imaging as indicated.     Chart review/prep time 1  [id number]   Visit Start time doximity video invite 1500;  call to Angela  [id number]; telephone nudge ; his father answered. Brooke is in the bathroom;  Multiple text video links sent; I can hear many people having conversations; Eventually To appeared.  Visit Stop time 152  45__ minutes spent on the date of the encounter doing chart review, history and exam, documentation and further activities as noted  above.      Maddison Witt MD    Chief complaint/ HISTORY OF PRESENT ILLNESS  Toe presents for follow up of primary adrenal insufficiency and hypothyroidism.    He was seen today along with Angela blue.      The original diagnosis of hypothyroidism is not well documented on care everywhere.   The original diagnosis of adrenal insufficiency was 7/2017. He had high ACTH with low cortisol, failure to respond to cortrosyn stimulation testing.    Dr Moisés Schmidt note that Toe looked cushingoid on 12/21/18. At that time he was on HC 20/10 and florinef 0.2, LT4 88 mcg/day.    He was advised to decrease HC to 10/5 and decrease florinef to 0.1 .  2/3/2020. I changed LT4 f4om 100 to 88 mcg/day.   2/5/2020 I changed hydrocortisone to 10 mg bid when well and 20 bid  x3 days with illness.    2/11/2020 I filled Rx for  A year supply of florinef 0.1 mg/day   On 3/27/2021 I recommended he start florinef 0.1 mg/day and reduce HC to 10/5.      We have had a lot of difficulty working with his home care team relative to getting an accurate medication list and also possibly relative to our medication rx orders being followed.  This is an issue at every visit and between visits.  Most recently on 9/1 we were informed of a home care/pharmacy Rx discrepancy.  I tried to clarify what they had been giving him at the time (not what was ordered, but what was being given ) and never got an answer.  Today once again, I do not have a copy of the MAR  From the home care.    Today he says he takes pills in the morning and in the evening when getting dark.  I am unable to get a straight answer on what he is taking.  The nurses set it up for me (he showed me his pill box which has 2 rows and each day of the week).        3/1/2021 175 lbs, 111/77, HR 86/minute; florinef 0.1; HC 20/5 on the med list - appt with Dr Rangel  7/12/2021 179 lbs, 114/80, HR 90/minute ; appt with Dr Rangel - reporting dizziness.  Wide based gate described.      We  have the following selected and  more recent data  7/12/17: Na 129, K 4.7, Cl 97, C02 18, creatinine 1.47, lactate 1.9  7/24/17: ACTH 1845  7/25/17 cortrosyn stimulatin test : cortisol < 1 -- 1.4-- 1.8  2/3/2020 ACTH > 1250, TSH 0.18, free T4 1.16, renin activity 9.3, Na 139, K 3.8, Ca 9.4 , phos 3.4  10/8/2020 TSH 0.77, prolactin 8.8, ACTH 106, renin activity 3.3  2/1/2021 TSH 0.02, Hgb 16.8, platelets 164K  3/10/2021  free T4 1.1, TSH 1.62, T3 75, Na 138, K 4, creatinine 0.82;   3/11/2021 renin 7.4  4/12/2021 TSH 2.34, T3 73, free T4 1.2, renin 4.4, Na 139, K 4, creatinine 0.87  7/12/201 TSH 2.18, free T4 1.14, prolactin 11.6, Na 139, K 4.2, creatinine 0.84, B12 515, glucose 107, Hgb 18.3, hematocrit 54.1      Imaging-- review of PAC by me today - we have no outside images on PACS  7/24/17 CT abdomen: atrophic adrenal glands.   7/24/17 Brain MRI: 7 mm nodule within pituitary, hypoenhancing  6/24/19: MRI brain: images as reviewed by me today on PACS: pituitary flat/rounded upper contour on sagittal; flat on limited coronal; this is not a pituitary study and there are only limited pituitary images.     REVIEW OF SYSTEMS  I don't know how to read the time  Tired; sleep at night is not good ; bedtime: not sure cna't read clock.   Weight ??  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      Medications    Current Outpatient Medications   Medication Sig Dispense Refill     acetaminophen (TYLENOL) 325 MG tablet TAKE 1-2 TABLETS (650 MG TOTAL) BY MOUTH EVERY 6 (SIX) HOURS AS NEEDED FOR PAIN.       albuterol (PROAIR HFA/PROVENTIL HFA/VENTOLIN HFA) 108 (90 Base) MCG/ACT inhaler Inhale 2 puffs into the lungs every 6 hours as needed for shortness of breath / dyspnea or wheezing 18 g 0     Ergocalciferol 50 MCG (2000 UT) TABS Take 2,000 Units by mouth daily 60 tablet 0     famotidine (PEPCID) 20 MG tablet TAKE 1 TABLET (20 MG TOTAL) BY MOUTH 2 (TWO) TIMES A DAY FOR STOMACH       fludrocortisone (FLORINEF) 0.1 MG tablet Take 1 tablet (0.1 mg) by mouth daily 90 tablet 4     fluticasone (FLONASE) 50 MCG/ACT nasal spray Spray 1 spray into both nostrils daily as needed for rhinitis or allergies       hydrocortisone (CORTEF) 10 MG tablet Take 1 tablet 10 mg by mouth in AM and 1/2 tablet in PM   May take Take 20 mg twice/day for 3 days if illness. 60 tablet 11     levothyroxine (SYNTHROID/LEVOTHROID) 75 MCG tablet Take 1 tablet (75 mcg) by mouth daily 90 tablet 1     loratadine (CLARITIN) 10 MG tablet Take 1 tablet (10 mg) by mouth daily 90 tablet 2     meclizine (ANTIVERT) 25 MG tablet Take 1 tablet (25 mg) by mouth 3 times daily as needed for dizziness       mometasone (ASMANEX, 60 METERED DOSES,) 220 MCG/INH inhaler INHALE 1 PUFF BY MOUTH 2 (TWO) TIMES A DAY.       Multiple Vitamin (MULTI-VITAMINS) TABS Take 1 tablet by mouth daily 90 tablet 2     OLANZapine (ZYPREXA) 5 MG tablet Take 1 tablet (5 mg) by mouth every morning 90 tablet 0     paliperidone ER (INVEGA) 6 MG 24 hr tablet Take 1 tablet (6 mg) by mouth At Bedtime         Allergies  No Known Allergies    Family History  family history includes Diabetes in his mother.  Toe doesn't know family history    Social History  Social History     Tobacco Use     Smoking status: Never Smoker     Smokeless tobacco: Never Used   Substance Use  "Topics     Alcohol use: Not Currently     Drug use: Never     Lives with parents (mom, dad, 4 siblings- dad and sibs leave the home);.   Nurse sets up the medication every 2 weeks -     [facility] to [facility] to   Spends the day  Watching TV     Physical Exam  There were no vitals taken for this visit.  There is no height or weight on file to calculate BMI.   BP Readings from Last 1 Encounters:   07/12/21 114/80      Pulse Readings from Last 1 Encounters:   07/12/21 90      Resp Readings from Last 1 Encounters:   12/04/20 16      Temp Readings from Last 1 Encounters:   02/01/21 97.7  F (36.5  C) (Temporal)      SpO2 Readings from Last 1 Encounters:   12/28/20 97%      Wt Readings from Last 1 Encounters:   07/12/21 81.2 kg (179 lb)      Ht Readings from Last 1 Encounters:   12/28/20 1.6 m (5' 3\")   GENERAL: no distress ; not obviously cushingoid; he walked through the house on camera without using cane; His gait was not obviously abnormal from the vantage point of the camera.    SKIN: moustache; no acne; Visible skin clear, appears normal color. No significant rash, abnormal pigmentation or lesions.  EYES: Eyes grossly normal to inspection.  .  NECK: no visible masses; no grossly visible goiter  RESP: No audible wheeze, cough, or visible cyanosis.  No visible retractions or increased work of breathing.    NEURO: Awake, alert, responds appropriately to questions.  Mentation and speech fluent.  PSYCH:affect normal,  appearance well-groomed.      "

## 2021-09-22 NOTE — PROGRESS NOTES
Pituitary  Arvind Powell CMA    Toe is a 30 year old who is being evaluated via a billable telephone visit.      What phone number would you like to be contacted at? [phone]  How would you like to obtain your AVS? Mail a copy

## 2021-09-22 NOTE — PATIENT INSTRUCTIONS
See me in 8-12 months     ORDERS FOR HOME CARE:  1.  Please measure orthostatic heart rate and blood pressure and report back to me  2.  Get me your current MAR to review.       Information for patients on hydrocortisone for treatment of adrenal insufficiency      There are two forms of adrenal insufficiency  Primary adrenal insufficiency is due to primary failure of the adrenal gland. This is the type you have .  Secondary (or central) adrenal insufficiency is due to failure of the adrenal gland to make normal levels of hormone, but because of failure of the pituitary to supply the proper instructions.    Adrenal insufficiency is treated with adrenal hormone (usually hydrocortisone or prednisone) to replace the deficiency.  The treatment is designed to reproduce what the body would do in a healthy state.  This usually includes taking a slightly higher dose of hormone in the morning and a smaller dose later in the day.    For patients with adrenal insufficiency, steroid treatment is necessary to maintain a healthy state of life.  Too much or too little steroid treatment can have serious consequences, however.      During minor illness, the body normally makes more adrenal steroid and therefore you should also increase your dose of adrenal replacement medication as directed by your physician.    During major illness, or if you seek medical care because of your illness, be sure to tell the treating physician that you have  adrenal insufficiency  and that you require higher doses of steroids to compensate for the illness.        Your dose of during good health:  hydrocortisone 10 mg AM, 5 mg noon   Florinef 0.1 mg/day    Your dose for minor illness (colds, flu)  for 2-3 days, then back to usual maintenance dose: 20 mg three times/day    If you are unable to take your medication because of vomiting, it is important to receive the medication intravenously.      Patients with adrenal insufficiency often wear a medical ID  alert bracelet with the diagnosis noted  adrenal insufficiency

## 2021-09-22 NOTE — PROGRESS NOTES
Endocrine video visit note-    Attending Assessment/Plan :     Adrenal insufficiency, primary with history of high ACTH, hyperpigmentation, cushingoid appearance in the past.      Medication management is a major issue despite having homecare.  Once again I don't have the home care MAR and the patient is unable to tell me what he is getting.     Immigrant with language barrier. He is dependent on the care of others , unable to advocate and protect himself.      Hypothyroidism by history.  Normal TFTs 7/12/2021 on current regimen      Pituitary -nodule seen on 2017 MRI, not on 2019.  Neither was pituitary study.      High Hgb /hematocrit on recent testing - I am ordering CBC .  If high Hgb is confirmed he will need further evaluation for this.     Due to the COVID 19 pandemic this visit was a video visit. The patient gave verbal consent for the visit today.    I have independently reviewed and interpreted labs, imaging as indicated.     Chart review/prep time 1  [id number]   Visit Start time doximity video invite 1500;  call to Angela  [id number]; telephone nudge ; his father answered. Toe is in the bathroom;  Multiple text video links sent; I can hear many people having conversations; Eventually To appeared.  Visit Stop time 1522  45__ minutes spent on the date of the encounter doing chart review, history and exam, documentation and further activities as noted above.      Maddison Witt MD    Chief complaint/ HISTORY OF PRESENT ILLNESS  Toe presents for follow up of primary adrenal insufficiency and hypothyroidism.    He was seen today along with Angela .      The original diagnosis of hypothyroidism is not well documented on care everywhere.   The original diagnosis of adrenal insufficiency was 7/2017. He had high ACTH with low cortisol, failure to respond to cortrosyn stimulation testing.    Dr Moisés Schmidt note that Toe looked cushingoid on 12/21/18. At that time he was on HC 20/10 and [facility]  0.2, LT4 88 mcg/day.    He was advised to decrease HC to 10/5 and decrease florinef to 0.1 .  2/3/2020. I changed LT4 f4om 100 to 88 mcg/day.   2/5/2020 I changed hydrocortisone to 10 mg bid when well and 20 bid  x3 days with illness.    2/11/2020 I filled Rx for  A year supply of florinef 0.1 mg/day   On 3/27/2021 I recommended he start florinef 0.1 mg/day and reduce HC to 10/5.      We have had a lot of difficulty working with his home care team relative to getting an accurate medication list and also possibly relative to our medication rx orders being followed.  This is an issue at every visit and between visits.  Most recently on 9/1 we were informed of a home care/pharmacy Rx discrepancy.  I tried to clarify what they had been giving him at the time (not what was ordered, but what was being given ) and never got an answer.  Today once again, I do not have a copy of the MAR  From the home care.    Today he says he takes pills in the morning and in the evening when getting dark.  I am unable to get a straight answer on what he is taking.  The nurses set it up for me (he showed me his pill box which has 2 rows and each day of the week).        3/1/2021 175 lbs, 111/77, HR 86/minute; florinef 0.1; HC 20/5 on the med list - appt with Dr Rangel  7/12/2021 179 lbs, 114/80, HR 90/minute ; appt with Dr Rangel - reporting dizziness.  Wide based gate described.      We have the following selected and  more recent data  7/12/17: Na 129, K 4.7, Cl 97, C02 18, creatinine 1.47, lactate 1.9  7/24/17: ACTH 1845  7/25/17 cortrosyn stimulatin test : cortisol < 1 -- 1.4-- 1.8  2/3/2020 ACTH > 1250, TSH 0.18, free T4 1.16, renin activity 9.3, Na 139, K 3.8, Ca 9.4 , phos 3.4  10/8/2020 TSH 0.77, prolactin 8.8, ACTH 106, renin activity 3.3  2/1/2021 TSH 0.02, Hgb 16.8, platelets 164K  3/10/2021  free T4 1.1, TSH 1.62, T3 75, Na 138, K 4, creatinine 0.82;   3/11/2021 renin 7.4  4/12/2021 TSH 2.34, T3 73, free T4 1.2, renin 4.4, Na  139, K 4, creatinine 0.87  7/12/201 TSH 2.18, free T4 1.14, prolactin 11.6, Na 139, K 4.2, creatinine 0.84, B12 515, glucose 107, Hgb 18.3, hematocrit 54.1      Imaging-- review of PAC by me today - we have no outside images on PACS  7/24/17 CT abdomen: atrophic adrenal glands.   7/24/17 Brain MRI: 7 mm nodule within pituitary, hypoenhancing  6/24/19: MRI brain: images as reviewed by me today on PACS: pituitary flat/rounded upper contour on sagittal; flat on limited coronal; this is not a pituitary study and there are only limited pituitary images.     REVIEW OF SYSTEMS  I don't know how to read the time  Tired; sleep at night is not good ; bedtime: not sure cna't read clock.   Weight ??  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      Medications    Current Outpatient Medications   Medication Sig Dispense Refill     acetaminophen (TYLENOL) 325 MG tablet TAKE 1-2 TABLETS (650 MG TOTAL) BY MOUTH EVERY 6 (SIX) HOURS AS NEEDED FOR PAIN.       albuterol (PROAIR HFA/PROVENTIL HFA/VENTOLIN HFA) 108 (90 Base) MCG/ACT inhaler Inhale 2 puffs into the lungs every 6 hours as needed for shortness of breath / dyspnea or wheezing 18 g 0     Ergocalciferol 50 MCG (2000 UT) TABS Take 2,000 Units by mouth daily 60 tablet 0     famotidine (PEPCID) 20 MG tablet TAKE 1 TABLET (20 MG  TOTAL) BY MOUTH 2 (TWO) TIMES A DAY FOR STOMACH       fludrocortisone (FLORINEF) 0.1 MG tablet Take 1 tablet (0.1 mg) by mouth daily 90 tablet 4     fluticasone (FLONASE) 50 MCG/ACT nasal spray Spray 1 spray into both nostrils daily as needed for rhinitis or allergies       hydrocortisone (CORTEF) 10 MG tablet Take 1 tablet 10 mg by mouth in AM and 1/2 tablet in PM   May take Take 20 mg twice/day for 3 days if illness. 60 tablet 11     levothyroxine (SYNTHROID/LEVOTHROID) 75 MCG tablet Take 1 tablet (75 mcg) by mouth daily 90 tablet 1     loratadine (CLARITIN) 10 MG tablet Take 1 tablet (10 mg) by mouth daily 90 tablet 2     meclizine (ANTIVERT) 25 MG tablet Take 1 tablet (25 mg) by mouth 3 times daily as needed for dizziness       mometasone (ASMANEX, 60 METERED DOSES,) 220 MCG/INH inhaler INHALE 1 PUFF BY MOUTH 2 (TWO) TIMES A DAY.       Multiple Vitamin (MULTI-VITAMINS) TABS Take 1 tablet by mouth daily 90 tablet 2     OLANZapine (ZYPREXA) 5 MG tablet Take 1 tablet (5 mg) by mouth every morning 90 tablet 0     paliperidone ER (INVEGA) 6 MG 24 hr tablet Take 1 tablet (6 mg) by mouth At Bedtime         Allergies  No Known Allergies    Family History  family history includes Diabetes in his mother.  Toe doesn't know family history    Social History  Social History     Tobacco Use     Smoking status: Never Smoker     Smokeless tobacco: Never Used   Substance Use Topics     Alcohol use: Not Currently     Drug use: Never     Lives with parents (mom, dad, 4 siblings- dad and sibs leave the home);.   Nurse sets up the medication every 2 weeks -     [facility] to [facility] to   Spends the day  Watching TV     Physical Exam  There were no vitals taken for this visit.  There is no height or weight on file to calculate BMI.   BP Readings from Last 1 Encounters:   07/12/21 114/80      Pulse Readings from Last 1 Encounters:   07/12/21 90      Resp Readings from Last 1 Encounters:   12/04/20 16      Temp Readings from  "Last 1 Encounters:   02/01/21 97.7  F (36.5  C) (Temporal)      SpO2 Readings from Last 1 Encounters:   12/28/20 97%      Wt Readings from Last 1 Encounters:   07/12/21 81.2 kg (179 lb)      Ht Readings from Last 1 Encounters:   12/28/20 1.6 m (5' 3\")   GENERAL: no distress ; not obviously cushingoid; he walked through the house on camera without using cane; His gait was not obviously abnormal from the vantage point of the camera.    SKIN: moustache; no acne; Visible skin clear, appears normal color. No significant rash, abnormal pigmentation or lesions.  EYES: Eyes grossly normal to inspection.  .  NECK: no visible masses; no grossly visible goiter  RESP: No audible wheeze, cough, or visible cyanosis.  No visible retractions or increased work of breathing.    NEURO: Awake, alert, responds appropriately to questions.  Mentation and speech fluent.  PSYCH:affect normal,  appearance well-groomed.      "

## 2021-09-27 ENCOUNTER — PATIENT OUTREACH (OUTPATIENT)
Dept: CARE COORDINATION | Facility: CLINIC | Age: 31
End: 2021-09-27

## 2021-09-28 DIAGNOSIS — R44.0 AUDITORY HALLUCINATION: ICD-10-CM

## 2021-09-29 ENCOUNTER — TELEPHONE (OUTPATIENT)
Dept: ENDOCRINOLOGY | Facility: CLINIC | Age: 31
End: 2021-09-29

## 2021-09-29 NOTE — TELEPHONE ENCOUNTER
Spoke w/ RN, Renetta [phone] states she will see Pt in 2 weeks for B/P and MAR request.   Lab orders reviewed. Number provided to schedule.   AVS reviewed.   Jeri Cowan RN on 9/29/2021 at 1:57 PM         [facility] Call Center    Phone Message    May a detailed message be left on voicemail: yes     Reason for Call: Other: Nurse from facility is requesting a call back to go over notes from visit for patient on 9/22/20201 to update their records      Action Taken: Other: Endo    Travel Screening: Not Applicable             Instructions       Return in about 8 months (around 5/22/2022).  See me in 8-12 months     ORDERS FOR HOME CARE:  1.  Please measure orthostatic heart rate and blood pressure and report back to me  2.  Get me your current MAR to review.         Information for patients on hydrocortisone for treatment of adrenal insufficiency        There are two forms of adrenal insufficiency  Primary adrenal insufficiency is due to primary failure of the adrenal gland. This is the type you have .  Secondary (or central) adrenal insufficiency is due to failure of the adrenal gland to make normal levels of hormone, but because of failure of the pituitary to supply the proper instructions.     Adrenal insufficiency is treated with adrenal hormone (usually hydrocortisone or prednisone) to replace the deficiency.  The treatment is designed to reproduce what the body would do in a healthy state.  This usually includes taking a slightly higher dose of hormone in the morning and a smaller dose later in the day.     For patients with adrenal insufficiency, steroid treatment is necessary to maintain a healthy state of life.  Too much or too little steroid treatment can have serious consequences, however.       During minor illness, the body normally makes more adrenal steroid and therefore you should also increase your dose of adrenal replacement medication as directed by your physician.    During major  illness, or if you seek medical care because of your illness, be sure to tell the treating physician that you have  adrenal insufficiency  and that you require higher doses of steroids to compensate for the illness.          Your dose of during good health:  hydrocortisone 10 mg AM, 5 mg noon   Florinef 0.1 mg/day     Your dose for minor illness (colds, flu)  for 2-3 days, then back to usual maintenance dose: 20 mg three times/day     If you are unable to take your medication because of vomiting, it is important to receive the medication intravenously.       Patients with adrenal insufficiency often wear a medical ID alert bracelet with the diagnosis noted  adrenal insufficiency

## 2021-09-30 ENCOUNTER — PATIENT OUTREACH (OUTPATIENT)
Dept: NURSING | Facility: CLINIC | Age: 31
End: 2021-09-30

## 2021-09-30 ENCOUNTER — TELEPHONE (OUTPATIENT)
Dept: CARE COORDINATION | Facility: CLINIC | Age: 31
End: 2021-09-30

## 2021-09-30 DIAGNOSIS — J45.40 MODERATE PERSISTENT ASTHMA, UNSPECIFIED WHETHER COMPLICATED: Primary | ICD-10-CM

## 2021-09-30 RX ORDER — OLANZAPINE 5 MG/1
5 TABLET ORAL EVERY MORNING
Qty: 90 TABLET | Refills: 0 | Status: SHIPPED | OUTPATIENT
Start: 2021-09-30 | End: 2021-10-12

## 2021-09-30 RX ORDER — FLUTICASONE PROPIONATE AND SALMETEROL XINAFOATE 115; 21 UG/1; UG/1
2 AEROSOL, METERED RESPIRATORY (INHALATION) 2 TIMES DAILY
Qty: 12 G | Refills: 3 | Status: SHIPPED | OUTPATIENT
Start: 2021-09-30 | End: 2022-01-26

## 2021-09-30 NOTE — TELEPHONE ENCOUNTER
"Routing refill request to provider for review/approval because:  Early refill request.    Last Written Prescription Date:  9/21/21  Last Fill Quantity: 90,  # refills: 0   Last office visit provider:  7/12/21    Requested Prescriptions   Pending Prescriptions Disp Refills     OLANZapine (ZYPREXA) 5 MG tablet 90 tablet 0     Sig: Take 1 tablet (5 mg) by mouth every morning       Antipsychotic Medications Passed - 9/28/2021  2:38 PM        Passed - Blood pressure under 140/90 in past 12 months     BP Readings from Last 3 Encounters:   07/12/21 114/80   03/01/21 111/77   02/01/21 110/77                 Passed - Patient is 12 years of age or older        Passed - Lipid panel on file within the past 12 months     Recent Labs   Lab Test 07/12/21  0920   CHOL 232*   TRIG 145   HDL 38*   *   NHDL 194*               Passed - CBC on file in past 12 months     Recent Labs   Lab Test 07/12/21  0920   WBC 5.9   RBC 6.39*   HGB 18.3*   HCT 54.1*                    Passed - Heart Rate on file within past 12 months     Pulse Readings from Last 3 Encounters:   07/12/21 90   03/01/21 86   02/01/21 108               Passed - A1c or Glucose on file in past 12 months     Recent Labs   Lab Test 07/12/21  0920          Please review patients last 3 weights. If a weight gain of >10 lbs exists, you may refill the prescription once after instructing the patient to schedule an appointment within the next 30 days.    Wt Readings from Last 3 Encounters:   07/12/21 81.2 kg (179 lb)   03/01/21 79.4 kg (175 lb)   02/01/21 77.9 kg (171 lb 12 oz)             Passed - Medication is active on med list        Passed - Recent (6 mo) or future (30 days) visit within the authorizing provider's specialty     Patient had office visit in the last 6 months or has a visit in the next 30 days with authorizing provider or within the authorizing provider's specialty.  See \"Patient Info\" tab in inbasket, or \"Choose Columns\" in Meds & Orders " section of the refill encounter.                 Thais Garner RN 09/30/21 7:34 AM

## 2021-09-30 NOTE — PROGRESS NOTES
9/30/2021  Clinic Care Coordination Contact  Care Team Conversations    Received VM from [facility] 9-29-21  Stated patient needs help with transportation to appt in Dec and that she is struggling to get his inhaler refill.  Would like support from CHW call back #[phone]      Called and spoke to [facility] [phone]  Requested support from CHW regarding transportation for Dec.  Stated that Endocrinologist wants him to schedule for lab appt for blood draw and that he can go any clinic for labe.  Patient preferred to go to [facility]  Will need ride for lab appt  -did not get the Advir. Would like PCP to send refill  for the Advir     -need refill on Olanzapine prescribed by Caprice Haile NP but does not know the contact or what clinic she is from.  Provided [facility] to contact either [facility] or [facility] regarding the refills.    CHW sent telephone message to PCP Care Team to refill Advir     CHW sent message to  for lab appt.      CHW Follow up: Monthly    CHW Plan: Follow up on goals,   CHW will coordinate with  to schedule lab appt    CHW Next Follow Up: 10-13-21

## 2021-09-30 NOTE — TELEPHONE ENCOUNTER
9/30/2021  [facility]  [phone]  Requested new refill for Advir    Please call [facility] if any there any questions

## 2021-10-01 ENCOUNTER — PATIENT OUTREACH (OUTPATIENT)
Dept: NURSING | Facility: CLINIC | Age: 31
End: 2021-10-01

## 2021-10-01 NOTE — PROGRESS NOTES
10/1/2021  Clinic Care Coordination Contact    Community Health Worker Follow Up    Care Gaps:     Health Maintenance Due   Topic Date Due     PREVENTIVE CARE VISIT  Never done     ASTHMA ACTION PLAN  Never done     ADVANCE CARE PLANNING  Never done     DEPRESSION ACTION PLAN  Never done     Pneumococcal Vaccine: Pediatrics (0 to 5 Years) and At-Risk Patients (6 to 64 Years) (1 of 2 - PPSV23) Never done     INFLUENZA VACCINE (1) 09/01/2021       Care Gaps Last addressed on 10-1-21 will discuss with the doctor at next office visit.    Goals:   Goals Addressed as of 10/1/2021 at 9:42 AM        Other (pt-stated)     Added 10/1/21 by Scarlett Varma      Goal Statement: I would like resources and information on where to attend ESL classes within 2 months.  Date goal set: 10/1/2021    Measure of Success: information on  ESL program  Barriers: language  Strengths: support from sister and family members,PCA  Date to Achieve By: 11-1-21  Patient expressed understanding of goal: Yes    Action steps to achieve this goal  1. My sister Kell Marinelli will support me to set up to online classes if needed.  2. My sister Kell Marinelli will support to register for ESL class  3. My sister will update [facility] team next follow up                 Intervention and Education during outreach:   Received call from patient and his sister Kell Marinelli [phone].    Sister stated that he is interesting in attending ESL classes preferred in person but whiling to do online ESL classes if available. She will help to set up online- Zoom classes.  Will need transportation to attend ESL classes.  Sister stated to call her with the information so she can help him.    Suggested to check with  [facility] [employer] [place] ([employer]), [facility] [employer] [facility], [facility] [employer] ([employer])     CHW will research for resources and consult with [facility] SW and mail out the information and resources.      CHW Follow up: Monthly    CHW Plan: Follow up on goals    CHW Next  Follow Up: 11-3-21

## 2021-10-04 ENCOUNTER — TELEPHONE (OUTPATIENT)
Dept: CARE COORDINATION | Facility: CLINIC | Age: 31
End: 2021-10-04

## 2021-10-04 NOTE — TELEPHONE ENCOUNTER
Pt is requesting to have labs that were ordered from Endocrinologist drawn here for a lab only visit, can he have this done?

## 2021-10-04 NOTE — TELEPHONE ENCOUNTER
10/4/2021  Patient wondering if he can get a lab appt at [facility].  Endocrinologist put in an order for blood draw and stated he can go any clinics to get a lab appt.  Does he need another order from PCP?    Please call patient to advise regarding lab order from speciality provider and do lab appt at [facility].

## 2021-10-05 NOTE — TELEPHONE ENCOUNTER
He sees a [facility] endocrinologist so if his labs are ordered they can be done here.

## 2021-10-05 NOTE — PROGRESS NOTES
9-27-21  Clinic Care Coordination Contact  Care Team Conversations    Received VM from patient's sister Kell Marinelli  9-27-21  She would like information about adult school for [employer] and to call her back at [phone]     CHW scheduled time with CALOS SILVA to mail resources or information on adult school.   Updated goal.    CHW follow up: 9-30-21

## 2021-10-06 NOTE — TELEPHONE ENCOUNTER
Left message #1 at [phone]. Postponing task out to a week and will try again. If patient returns call back, please help patient schedule an appointment per message below. Thanks!

## 2021-10-12 ENCOUNTER — PATIENT OUTREACH (OUTPATIENT)
Dept: NURSING | Facility: CLINIC | Age: 31
End: 2021-10-12

## 2021-10-12 ENCOUNTER — TELEPHONE (OUTPATIENT)
Dept: CARE COORDINATION | Facility: CLINIC | Age: 31
End: 2021-10-12

## 2021-10-12 DIAGNOSIS — R44.0 AUDITORY HALLUCINATION: ICD-10-CM

## 2021-10-12 RX ORDER — OLANZAPINE 5 MG/1
5 TABLET ORAL EVERY MORNING
Qty: 90 TABLET | Refills: 0 | Status: SHIPPED | OUTPATIENT
Start: 2021-10-12 | End: 2022-01-26

## 2021-10-12 NOTE — PROGRESS NOTES
10/12/2021  Clinic Care Coordination Contact  Care Team Conversations    Called and spoke to Renetta RN at [facility]  [phone]  Informed that patient has an appt on 10-20-21 at 1:40pm with Dr Nye and lab appt same day.  Father reported that patient fell down last Thursday and feeling weak.    Stated he needs new refill for Olanzapine.   CHW sent telephone message to PCP Care Team pool for new script

## 2021-10-12 NOTE — PROGRESS NOTES
10/12/2021  Clinic Care Coordination Contact    Community Health Worker Follow Up    Care Gaps:     Health Maintenance Due   Topic Date Due     PREVENTIVE CARE VISIT  Never done     ASTHMA ACTION PLAN  Never done     ADVANCE CARE PLANNING  Never done     DEPRESSION ACTION PLAN  Never done     Pneumococcal Vaccine: Pediatrics (0 to 5 Years) and At-Risk Patients (6 to 64 Years) (1 of 2 - PPSV23) Never done     INFLUENZA VACCINE (1) 09/01/2021       Care Gaps Last addressed on 10-12-21 will discuss with doctor at next appt 10-20-21 flu shot     Goals:   Goals Addressed as of 10/12/2021 at 3:59 PM                    Today    9/8/21       Medical (pt-stated)   50%  50%    Added 7/28/21 by Joaquín Jimenez, [facility]      Goal Statement: I want support to schedule my appointments with my referrals that I was given within 2 months   Date Goal set: 02/03/21 Updated: 6-11-21   Barriers: Language, Transportation   Strengths:   Date to Achieve By: 12-   Patient expressed understanding of goal: Yes   Action steps to achieve this goal:   1. I will talk to CHW on 11-22-21 at 9am for support to set up transportation to my Neurology appointment on 12/10/2021 at 10:20AM with Dr. Fagan .  2. I will attend my Neurology appointment on 12/10/2021 at with Dr. Sylvia FAGAN, [facility]  [address]   [facility] [address]  [phone]  Fax: [phone]      Updated: 10/12/2021 AL          Other (pt-stated)   90%  90%    Added 7/28/21 by Joaquín Jimenez, [facility]      Goal Statement: I would like support to set up medical ride to attend 80% of medical appointments in the next 3 months to address health concerns and needs.   Date Goal set: 4/9/2021 updated. 6-11-21   Barriers: language, lack transportation   Strengths: medical transportation with [employer]   Date to Achieve By: 9-11-21   Patient expressed understanding of goal: Yes   Action steps to achieve this goal:    1.  I will talk to CHW on 11-22-21 at 9am for support to set up transportation to my Neurology appointment on 12/10/2021 at 10:20AM with Dr. Ward .  2. I  will ask my sister for support if she is available to call CHW to notify of appt and need of transportation.   3  I will update [facility] team at next outreach.     LAWRENCE HAIR   [facility]  [address]   [facility] [address]  [phone]  Fax: [phone]    Updated:10-12-21           Other (pt-stated)   10%      Added 10/1/21 by Scarlett Varma      Goal Statement: I would like resources and information on where to attend ESL classes within 2 months.  Date goal set: 10/1/2021    Measure of Success: information on  ESL program  Barriers: language  Strengths: support from sister and family members,PCA  Date to Achieve By: 11-1-21  Patient expressed understanding of goal: Yes    Action steps to achieve this goal  1. My sister Kell Marinelli will support me to set up to online classes if needed.  2. My sister Kell Marinelli will support to register for ESL class  3. My sister will update CCC team next follow up     Update: 10-12-21              Intervention and Education during outreach:     Received VM from DENIS Jackson at [facility] 10-12-21 that patient fell down and that he was feeling weak would like for patient to see the doctor and also lab appt.    [facility] : Gissell Hummel    Called and spoke to patient's father through [employer]  to schedule appt with PCP and lab appt.    Conference demetrio with [facility] front dest staff Diane Greer for support to schedule appt with provider and lab appt  Appt scheduled with Dr Nye on 10-20-20 at 1:20pm.  Father confirmed appt.    Requested help to set up ride for 10-20-21 appt to [facility]       CHW Follow up: Monthly    CHW Plan: Follow up on goals    CHW Next Follow Up: 11-12-21

## 2021-10-12 NOTE — TELEPHONE ENCOUNTER
Medication refill requested. Please authorize medication if appropriate.   Last refilled on 9/30/21 90 tablets dispensed.      10/12/2021  [facility] at [facility] [phone]  Stated patient needs new refill for Olanzapine.     If you have any questions to call skilled nurse DENIS Jackson  [phone]      Patient has appt with Dr Nye on 10-20-21 at 1:40pm

## 2021-10-19 ENCOUNTER — PATIENT OUTREACH (OUTPATIENT)
Dept: NURSING | Facility: CLINIC | Age: 31
End: 2021-10-19

## 2021-10-19 DIAGNOSIS — R05.9 COUGH: ICD-10-CM

## 2021-10-19 NOTE — PROGRESS NOTES
10/19/2021  Clinic Care Coordination Contact    Community Health Worker Follow Up    Care Gaps:     Health Maintenance Due   Topic Date Due     PREVENTIVE CARE VISIT  Never done     ASTHMA ACTION PLAN  Never done     ADVANCE CARE PLANNING  Never done     DEPRESSION ACTION PLAN  Never done     Pneumococcal Vaccine: Pediatrics (0 to 5 Years) and At-Risk Patients (6 to 64 Years) (1 of 2 - PPSV23) Never done     INFLUENZA VACCINE (1) 09/01/2021     ASTHMA CONTROL TEST  11/13/2021       Care Gaps Last addressed on 10-19-21 will discuss tomorrow with PCP 10-20-21       Intervention and Education during outreach:     Called and spoke to patient's sister Fermín and informed that transportation set up for tomorrow 10-20-21 to [facility]  [phone]    time: 12:55pm  2 Passengers-[facility]  Will call   Sister confirmed transportation information for tomorrow.      CHW Follow up: Monthly    CHW Plan: Follow up on goals    CHW Next Follow Up: 11-12-21

## 2021-10-19 NOTE — TELEPHONE ENCOUNTER
Pending Prescriptions:                       Disp   Refills    Ergocalciferol 50 MCG (2000 UT) TABS      60 tab*0            Sig: Take 2,000 Units by mouth daily

## 2021-10-19 NOTE — PROGRESS NOTES
10/19/2021  Clinic Care Coordination Contact  Care Team Conversations    Call: Harsh Rascon  [phone]  Re: Transportation 10-20-21 at 1:40pm to [facility]    Called and spoke to rep.   Provided patient's member ID#. Verified patient's name, ,  address, telephone, appointment date and time and addressof clinic/facility.     [facility] transportation   [phone]    time: 12:55pm  2 Passengers-[employer]  Will call

## 2021-10-20 ENCOUNTER — PATIENT OUTREACH (OUTPATIENT)
Dept: CARE COORDINATION | Facility: CLINIC | Age: 31
End: 2021-10-20

## 2021-10-20 ENCOUNTER — OFFICE VISIT (OUTPATIENT)
Dept: FAMILY MEDICINE | Facility: CLINIC | Age: 31
End: 2021-10-20
Payer: COMMERCIAL

## 2021-10-20 VITALS
OXYGEN SATURATION: 99 % | BODY MASS INDEX: 28.19 KG/M2 | SYSTOLIC BLOOD PRESSURE: 118 MMHG | WEIGHT: 180 LBS | RESPIRATION RATE: 16 BRPM | DIASTOLIC BLOOD PRESSURE: 77 MMHG | HEART RATE: 84 BPM

## 2021-10-20 DIAGNOSIS — R42 DIZZINESS: Primary | ICD-10-CM

## 2021-10-20 PROCEDURE — 90732 PPSV23 VACC 2 YRS+ SUBQ/IM: CPT | Performed by: FAMILY MEDICINE

## 2021-10-20 PROCEDURE — 99214 OFFICE O/P EST MOD 30 MIN: CPT | Mod: 25 | Performed by: FAMILY MEDICINE

## 2021-10-20 PROCEDURE — 90472 IMMUNIZATION ADMIN EACH ADD: CPT | Performed by: FAMILY MEDICINE

## 2021-10-20 PROCEDURE — 90686 IIV4 VACC NO PRSV 0.5 ML IM: CPT | Performed by: FAMILY MEDICINE

## 2021-10-20 PROCEDURE — 90471 IMMUNIZATION ADMIN: CPT | Performed by: FAMILY MEDICINE

## 2021-10-20 NOTE — TELEPHONE ENCOUNTER
Routing refill request to provider for review/approval because:  Drug not on the G refill protocol     Last Written Prescription Date:  8/22/21  Last Fill Quantity: 60,  # refills: 0   Last office visit provider:  10/20/21     Requested Prescriptions   Pending Prescriptions Disp Refills     Ergocalciferol 50 MCG (2000 UT) TABS 60 tablet 0     Sig: Take 2,000 Units by mouth daily       There is no refill protocol information for this order          Thais Garner RN 10/20/21 2:07 PM

## 2021-10-20 NOTE — PROGRESS NOTES
Clinic Care Coordination Contact    Situation: Patient chart reviewed by care coordinator.    Background: SW completed chart review    Assessment: CHW has been supporting patient in connecting to care team    Plan/Recommendations: Standard outreach

## 2021-10-21 NOTE — PROGRESS NOTES
Assessment/ Plan  1. Dizziness  Chronic complaint-very odd description of problem.  Patient with history of adrenal insufficiency but does not have orthostatic complaint    Has been referred to neurology in a couple of occasions and not made it to appointment  We will attempt referral again, ask care guide for assistance in arranging transportation  - Adult Neurology Referral; Future     Body mass index is 28.19 kg/m .    Subjective  CC:  chief complaint  HPI:  30-year-old, history of schizophrenia, Ariel's disease, hypothyroidism, moderate persistent asthma and chronic dizziness, cause unknown    Here again for dizziness    A difficult historian  Indicates that he has dizziness when he lays down, it is better when he sitting up and almost gone when he standing  Indicates that the dizziness is bad immediately when he lays down but then it persists and does not go away  Has not found anything that helps  Has difficulty sleeping because of the dizziness  Denies any ringing in his ears  This is been going on for years  Patient Active Problem List   Diagnosis     Immigrant with language difficulty     Colquitt's disease (H)     History of hypercalcemia     Adrenal insufficiency (H)     Medication management     Low TSH level     Hypothyroidism, unspecified type     Insomnia, unspecified type     PTSD (post-traumatic stress disorder)     Schizoaffective disorder, depressive type, with catatonia (H)     Schizophrenia (H)     Vitamin D deficiency     Moderate persistent asthma     Pituitary microadenoma (H)     Current medications reviewed as follows:  acetaminophen (TYLENOL) 325 MG tablet, TAKE 1-2 TABLETS (650 MG TOTAL) BY MOUTH EVERY 6 (SIX) HOURS AS NEEDED FOR PAIN.  albuterol (PROAIR HFA/PROVENTIL HFA/VENTOLIN HFA) 108 (90 Base) MCG/ACT inhaler, Inhale 2 puffs into the lungs every 6 hours as needed for shortness of breath / dyspnea or wheezing  Ergocalciferol 50 MCG (2000 UT) TABS, Take 2,000 Units by mouth  daily  famotidine (PEPCID) 20 MG tablet, TAKE 1 TABLET (20 MG TOTAL) BY MOUTH 2 (TWO) TIMES A DAY FOR STOMACH  fludrocortisone (FLORINEF) 0.1 MG tablet, Take 1 tablet (0.1 mg) by mouth daily  fluticasone (FLONASE) 50 MCG/ACT nasal spray, Spray 1 spray into both nostrils daily as needed for rhinitis or allergies  fluticasone-salmeterol (ADVAIR-HFA) 115-21 MCG/ACT inhaler, Inhale 2 puffs into the lungs 2 times daily  hydrocortisone (CORTEF) 10 MG tablet, Take 1 tablet 10 mg by mouth in AM and 1/2 tablet in PM   May take Take 20 mg twice/day for 3 days if illness.  levothyroxine (SYNTHROID/LEVOTHROID) 75 MCG tablet, Take 1 tablet (75 mcg) by mouth daily  loratadine (CLARITIN) 10 MG tablet, Take 1 tablet (10 mg) by mouth daily  meclizine (ANTIVERT) 25 MG tablet, Take 1 tablet (25 mg) by mouth 3 times daily as needed for dizziness  Multiple Vitamin (MULTI-VITAMINS) TABS, Take 1 tablet by mouth daily  OLANZapine (ZYPREXA) 5 MG tablet, Take 1 tablet (5 mg) by mouth every morning  paliperidone ER (INVEGA) 6 MG 24 hr tablet, Take 1 tablet (6 mg) by mouth At Bedtime    No current facility-administered medications on file prior to visit.     History   Smoking Status     Never Smoker   Smokeless Tobacco     Never Used     Social History     Social History Narrative     Not on file     Patient Care Team:  Jose Rangel MD as PCP - General (Family Practice)  Maddison Witt MD as MD (INTERNAL MEDICINE - ENDOCRINOLOGY, DIABETES & METABOLISM)  Scarlett Varma as Community Health Worker (Primary Care - CC)  Ioana Mendoza, RN as Specialty Care Coordinator (INTERNAL MEDICINE - ENDOCRINOLOGY, DIABETES & METABOLISM)  Joaquín Jimenez LICSW as Lead Care Coordinator (Primary Care - CC)  Maddison Witt MD as Assigned Endocrinology Provider  Jose Rangel MD as Assigned PCP  Christiano Beckwith DPM as Assigned Musculoskeletal Provider  Nithya Nathan MD as MD (Neurology)  Caprice Halie, NP  [facility]  Care RN  as Registered Nurse ([facility] ([facility] ([place]))  [employer]   ROS  Negative headache, orthostatic changes, palpitation      Objective  Physical Exam  Vitals:    10/20/21 1322   BP: 118/77   BP Location: Left arm   Patient Position: Sitting   Cuff Size: Adult Large   Pulse: 84   Resp: 16   SpO2: 99%   Weight: 81.6 kg (180 lb)     Flat affect  Poor eye contact  Good color  Chest clear to auscultation, cardiac exam with regular rate and rhythm normal tones no murmurs, gallops, rubs.  Cranial nerves II through XII are intact.  Normal extraocular motion, no nystagmus  No nystagmus when laying back, and describing severe dizziness.  No nystagmus when turning his head from left to right.  TMs appear normal  Diagnostics  None    Please note: Voice recognition software was used in this dictation.  It may therefore contain typographical errors.

## 2021-10-25 DIAGNOSIS — Z53.9 DIAGNOSIS NOT YET DEFINED: Primary | ICD-10-CM

## 2021-10-26 PROCEDURE — G0179 MD RECERTIFICATION HHA PT: HCPCS | Performed by: FAMILY MEDICINE

## 2021-11-01 ENCOUNTER — TELEPHONE (OUTPATIENT)
Dept: ENDOCRINOLOGY | Facility: CLINIC | Age: 31
End: 2021-11-01

## 2021-11-01 NOTE — TELEPHONE ENCOUNTER
After visit summary from 9/22/21 Endocrine Dr Witt faxed to [facility] [phone] Ioana Mendoza RN on 11/1/2021 at 11:33 AM

## 2021-11-15 ENCOUNTER — PATIENT OUTREACH (OUTPATIENT)
Dept: NURSING | Facility: CLINIC | Age: 31
End: 2021-11-15
Payer: COMMERCIAL

## 2021-11-15 NOTE — PROGRESS NOTES
11/15/2021  Clinic Care Coordination Contact    Community Health Worker Follow Up    Care Gaps:     Health Maintenance Due   Topic Date Due     PREVENTIVE CARE VISIT  Never done     ASTHMA ACTION PLAN  Never done     ADVANCE CARE PLANNING  Never done     DEPRESSION ACTION PLAN  Never done     ASTHMA CONTROL TEST  11/13/2021       Care Gaps Last addressed on 11-15-21 will discuss with the doctor at next office visit.    Goals:   Goals Addressed as of 11/26/2021 at 11:07 AM                    11/22/21    11/15/21       Medical (pt-stated)   70%  50%    Added 7/28/21 by Joaquín Jimenez, [facility]      Goal Statement: I want support to schedule my appointments with my referrals that I was given within 2 months   Date Goal set: 02/03/21 Updated: 6-11-21   Barriers: Language, Transportation   Strengths:   Date to Achieve By: 12-   Patient expressed understanding of goal: Yes   Action steps to achieve this goal:   1.  I will attedn appt on 12-10-21.  [employer] [phone]   9:20am on 12-10-21 for 10:40am appt for neurology appt  [address] [place] [address]  [place] [address]  will call for return ride  2 passengers- PCA    GAURAV FAGAN, LAWRENCE   [facility]  [address]   [facility] [address]  [phone]  Fax: [phone]    Updated:11-22-21 AL           Other (pt-stated)   90%  90%    Added 7/28/21 by Joaquín Jimenez, [facility]      Goal Statement: I would like support to set up medical ride to attend 80% of medical appointments in the next 3 months to address health concerns and needs.   Date Goal set: 4/9/2021 updated. 6-11-21   Barriers: language, lack transportation   Strengths: medical transportation with [employer]   Date to Achieve By: 12-2021Patient expressed understanding of goal: Yes   Action steps to achieve this goal:   1.  I will attednd appt on 12-10-21.  [employer] [phone]   9:20am on 12-10-21 for 10:40am appt for  neurology appt  [address] [place] [address]  [place] [address]  will call for return ride  2 passengers- PCA      COLLETTE SHETTYVALE [facility]  [address]   [facility] [address]  [phone]  Fax: [phone]    Updated:11-22-21 AL           Other (pt-stated)     50%    Added 10/1/21 by Scarlett Varma      Goal Statement: I would like resources and information on where to attend ESL classes within 2 months.  Date goal set: 10/1/2021    Measure of Success: information on  ESL program  Barriers: language  Strengths: support from sister and family members,PCA  Date to Achieve By: 11-1-21  Patient expressed understanding of goal: Yes    Action steps to achieve this goal  1. I will talk to my sister Kell Marinelli will support me to set up to online classes if needed.  2. My sister Kell Marinelli will support to register for ESL class  3. My sister will update CCC team next follow up     Update: 11-15-21            Intervention and Education during outreach:   [place] : [place] [id number]  Called and spoke to patient through [facility] :       Language and follow up on goals.  Patient reported:   will talk to sister about ELS classes  Unable to set up ride today with [employer] too early    Will call next Monday to set up ride for 12-10-21      CHW Follow up: Monthly    CHW Plan: Follow up on goals. Set up rid for  12-10-21 for neurology appt    CHW Next Follow Up: 11-22-21

## 2021-12-06 ENCOUNTER — PATIENT OUTREACH (OUTPATIENT)
Dept: CARE COORDINATION | Facility: CLINIC | Age: 31
End: 2021-12-06
Payer: COMMERCIAL

## 2021-12-06 NOTE — PROGRESS NOTES
Clinic Care Coordination Contact    Situation: Patient chart reviewed by care coordinator.    Background: SW completed chart review    Assessment: CHW supporting patient with goal progression    Plan/Recommendations: Standard outreach

## 2021-12-10 ENCOUNTER — TELEPHONE (OUTPATIENT)
Dept: ENDOCRINOLOGY | Facility: CLINIC | Age: 31
End: 2021-12-10
Payer: COMMERCIAL

## 2021-12-10 NOTE — TELEPHONE ENCOUNTER
----- Message from Marylu Farrar RN sent at 12/4/2021  9:46 AM CST -----  Regarding: Check out  Please see check out note from 9/22.  Can you please help arrange follow up?      Check Out Comments: Change visit type to video; To clinic roomers and/or nurse triage team: Please contact his home care and get me accurate med list of what they are currently giving him. Patient needs transportation assistance and help with scheduling lab appt. He probably also needs supervision to get to the appointment. FTF PAN OK for future visit  [CER [id number]]   Disposition: Return in about 8 months (around 5/22    Thank you,  Marylu Farrar RN, BSN, CNOR, RNFA, CBCN  [facility]

## 2021-12-10 NOTE — TELEPHONE ENCOUNTER
[facility] home care - [phone] Fax [phone] RN     Nurse Renetta [phone]   Spoke w/ Celia: Need to reach out to new nurse Kylie: [phone]  Spoke w/ Kylie: they will fax MAR today or next week.   Jeri Cowan, RN on 12/10/2021 at 10:42 AM     RE    Please contact his home care and get me accurate med list of what they are currently giving him

## 2021-12-10 NOTE — TELEPHONE ENCOUNTER
Hi nurses,    This is a checkout from quite a while ago so I'm not sure if you've already contacted the home care team or not. Could you please look into this.    Thank you!

## 2021-12-22 ENCOUNTER — PATIENT OUTREACH (OUTPATIENT)
Dept: NURSING | Facility: CLINIC | Age: 31
End: 2021-12-22
Payer: COMMERCIAL

## 2022-01-01 ENCOUNTER — ALLIED HEALTH/NURSE VISIT (OUTPATIENT)
Dept: OTHER | Facility: CLINIC | Age: 32
End: 2022-01-01
Payer: COMMERCIAL

## 2022-01-01 ENCOUNTER — PATIENT OUTREACH (OUTPATIENT)
Dept: CARE COORDINATION | Facility: CLINIC | Age: 32
End: 2022-01-01

## 2022-01-01 ENCOUNTER — OFFICE VISIT (OUTPATIENT)
Dept: FAMILY MEDICINE | Facility: CLINIC | Age: 32
End: 2022-01-01
Payer: COMMERCIAL

## 2022-01-01 ENCOUNTER — APPOINTMENT (OUTPATIENT)
Dept: CT IMAGING | Facility: HOSPITAL | Age: 32
End: 2022-01-01
Attending: EMERGENCY MEDICINE
Payer: COMMERCIAL

## 2022-01-01 ENCOUNTER — HOSPITAL ENCOUNTER (EMERGENCY)
Facility: HOSPITAL | Age: 32
Discharge: HOME | End: 2022-12-16
Attending: EMERGENCY MEDICINE | Admitting: EMERGENCY MEDICINE
Payer: COMMERCIAL

## 2022-01-01 VITALS
RESPIRATION RATE: 12 BRPM | TEMPERATURE: 98.1 F | OXYGEN SATURATION: 95 % | DIASTOLIC BLOOD PRESSURE: 65 MMHG | SYSTOLIC BLOOD PRESSURE: 94 MMHG | HEART RATE: 87 BPM

## 2022-01-01 VITALS
SYSTOLIC BLOOD PRESSURE: 121 MMHG | DIASTOLIC BLOOD PRESSURE: 79 MMHG | OXYGEN SATURATION: 98 % | BODY MASS INDEX: 34.37 KG/M2 | TEMPERATURE: 98.7 F | WEIGHT: 176 LBS | RESPIRATION RATE: 16 BRPM | HEART RATE: 73 BPM

## 2022-01-01 VITALS
HEART RATE: 75 BPM | OXYGEN SATURATION: 97 % | TEMPERATURE: 98.1 F | RESPIRATION RATE: 12 BRPM | SYSTOLIC BLOOD PRESSURE: 98 MMHG | DIASTOLIC BLOOD PRESSURE: 80 MMHG

## 2022-01-01 VITALS
OXYGEN SATURATION: 99 % | TEMPERATURE: 98.4 F | RESPIRATION RATE: 18 BRPM | DIASTOLIC BLOOD PRESSURE: 81 MMHG | HEART RATE: 71 BPM | SYSTOLIC BLOOD PRESSURE: 113 MMHG

## 2022-01-01 DIAGNOSIS — E71.529 X LINKED ADRENOLEUKODYSTROPHY (H): Primary | ICD-10-CM

## 2022-01-01 DIAGNOSIS — E86.0 DEHYDRATION: ICD-10-CM

## 2022-01-01 DIAGNOSIS — G47.00 INSOMNIA, UNSPECIFIED TYPE: ICD-10-CM

## 2022-01-01 DIAGNOSIS — E71.529 X LINKED ADRENOLEUKODYSTROPHY (H): ICD-10-CM

## 2022-01-01 DIAGNOSIS — F89 NEURODEVELOPMENTAL DISORDER: ICD-10-CM

## 2022-01-01 DIAGNOSIS — J45.40 MODERATE PERSISTENT ASTHMA WITHOUT COMPLICATION: ICD-10-CM

## 2022-01-01 DIAGNOSIS — R42 DIZZINESS: ICD-10-CM

## 2022-01-01 DIAGNOSIS — Z00.00 HEALTHCARE MAINTENANCE: ICD-10-CM

## 2022-01-01 DIAGNOSIS — R13.10 SWALLOWING DYSFUNCTION: ICD-10-CM

## 2022-01-01 DIAGNOSIS — Z76.0 ENCOUNTER FOR MEDICATION REFILL: Primary | ICD-10-CM

## 2022-01-01 DIAGNOSIS — R26.9 GAIT DISTURBANCE: Primary | ICD-10-CM

## 2022-01-01 DIAGNOSIS — E27.40 ADRENAL INSUFFICIENCY (H): ICD-10-CM

## 2022-01-01 DIAGNOSIS — R26.9 GAIT DISTURBANCE: ICD-10-CM

## 2022-01-01 DIAGNOSIS — Z76.0 ENCOUNTER FOR MEDICATION REFILL: ICD-10-CM

## 2022-01-01 DIAGNOSIS — R29.898 WEAKNESS OF RIGHT LEG: Primary | ICD-10-CM

## 2022-01-01 LAB
ALBUMIN SERPL BCG-MCNC: 5 G/DL (ref 3.5–5.2)
ALBUMIN UR-MCNC: NEGATIVE MG/DL
ALP SERPL-CCNC: 83 U/L (ref 40–129)
ALT SERPL W P-5'-P-CCNC: 55 U/L (ref 10–50)
ANION GAP SERPL CALCULATED.3IONS-SCNC: 12 MMOL/L (ref 7–15)
APPEARANCE UR: CLEAR
AST SERPL W P-5'-P-CCNC: 31 U/L (ref 10–50)
BACTERIA #/AREA URNS HPF: ABNORMAL /HPF
BASOPHILS # BLD AUTO: 0 10E3/UL (ref 0–0.2)
BASOPHILS NFR BLD AUTO: 1 %
BILIRUB SERPL-MCNC: 0.4 MG/DL
BILIRUB UR QL STRIP: NEGATIVE
BUN SERPL-MCNC: 14.8 MG/DL (ref 6–20)
CALCIUM SERPL-MCNC: 10.1 MG/DL (ref 8.6–10)
CHLORIDE SERPL-SCNC: 99 MMOL/L (ref 98–107)
COLOR UR AUTO: ABNORMAL
CREAT SERPL-MCNC: 0.82 MG/DL (ref 0.67–1.17)
DEPRECATED HCO3 PLAS-SCNC: 25 MMOL/L (ref 22–29)
EOSINOPHIL # BLD AUTO: 0.2 10E3/UL (ref 0–0.7)
EOSINOPHIL NFR BLD AUTO: 3 %
ERYTHROCYTE [DISTWIDTH] IN BLOOD BY AUTOMATED COUNT: 13.2 % (ref 10–15)
FLUAV RNA SPEC QL NAA+PROBE: NEGATIVE
FLUBV RNA RESP QL NAA+PROBE: NEGATIVE
GFR SERPL CREATININE-BSD FRML MDRD: >90 ML/MIN/1.73M2
GLUCOSE SERPL-MCNC: 98 MG/DL (ref 70–99)
GLUCOSE UR STRIP-MCNC: NEGATIVE MG/DL
HCT VFR BLD AUTO: 55.9 % (ref 40–53)
HGB BLD-MCNC: 18.9 G/DL (ref 13.3–17.7)
HGB UR QL STRIP: NEGATIVE
IMM GRANULOCYTES # BLD: 0 10E3/UL
IMM GRANULOCYTES NFR BLD: 0 %
KETONES UR STRIP-MCNC: NEGATIVE MG/DL
LEUKOCYTE ESTERASE UR QL STRIP: NEGATIVE
LYMPHOCYTES # BLD AUTO: 2.1 10E3/UL (ref 0.8–5.3)
LYMPHOCYTES NFR BLD AUTO: 38 %
MAGNESIUM SERPL-MCNC: 2.1 MG/DL (ref 1.7–2.3)
MCH RBC QN AUTO: 29 PG (ref 26.5–33)
MCHC RBC AUTO-ENTMCNC: 33.8 G/DL (ref 31.5–36.5)
MCV RBC AUTO: 86 FL (ref 78–100)
MONOCYTES # BLD AUTO: 0.4 10E3/UL (ref 0–1.3)
MONOCYTES NFR BLD AUTO: 7 %
MUCOUS THREADS #/AREA URNS LPF: PRESENT /LPF
NEUTROPHILS # BLD AUTO: 2.9 10E3/UL (ref 1.6–8.3)
NEUTROPHILS NFR BLD AUTO: 51 %
NITRATE UR QL: NEGATIVE
NRBC # BLD AUTO: 0 10E3/UL
NRBC BLD AUTO-RTO: 0 /100
PH UR STRIP: 6 [PH] (ref 5–7)
PLATELET # BLD AUTO: 199 10E3/UL (ref 150–450)
POTASSIUM SERPL-SCNC: 4.3 MMOL/L (ref 3.4–5.3)
PROT SERPL-MCNC: 8.1 G/DL (ref 6.4–8.3)
RBC # BLD AUTO: 6.52 10E6/UL (ref 4.4–5.9)
RBC URINE: 1 /HPF
RSV RNA SPEC NAA+PROBE: NEGATIVE
SARS-COV-2 RNA RESP QL NAA+PROBE: NEGATIVE
SODIUM SERPL-SCNC: 136 MMOL/L (ref 136–145)
SP GR UR STRIP: 1.02 (ref 1–1.03)
TSH SERPL DL<=0.005 MIU/L-ACNC: 1.47 UIU/ML (ref 0.3–4.2)
UROBILINOGEN UR STRIP-MCNC: <2 MG/DL
WBC # BLD AUTO: 5.7 10E3/UL (ref 4–11)
WBC URINE: <1 /HPF

## 2022-01-01 PROCEDURE — 81001 URINALYSIS AUTO W/SCOPE: CPT | Performed by: EMERGENCY MEDICINE

## 2022-01-01 PROCEDURE — 83735 ASSAY OF MAGNESIUM: CPT | Performed by: EMERGENCY MEDICINE

## 2022-01-01 PROCEDURE — 96360 HYDRATION IV INFUSION INIT: CPT

## 2022-01-01 PROCEDURE — C9803 HOPD COVID-19 SPEC COLLECT: HCPCS

## 2022-01-01 PROCEDURE — 87637 SARSCOV2&INF A&B&RSV AMP PRB: CPT | Performed by: EMERGENCY MEDICINE

## 2022-01-01 PROCEDURE — 99207 PR COMMUNITY PARAMEDIC-PATIENT MEETS CRITERIA: CPT

## 2022-01-01 PROCEDURE — 85025 COMPLETE CBC W/AUTO DIFF WBC: CPT | Performed by: EMERGENCY MEDICINE

## 2022-01-01 PROCEDURE — 36415 COLL VENOUS BLD VENIPUNCTURE: CPT | Performed by: EMERGENCY MEDICINE

## 2022-01-01 PROCEDURE — 90686 IIV4 VACC NO PRSV 0.5 ML IM: CPT | Performed by: FAMILY MEDICINE

## 2022-01-01 PROCEDURE — 258N000003 HC RX IP 258 OP 636: Performed by: EMERGENCY MEDICINE

## 2022-01-01 PROCEDURE — 70450 CT HEAD/BRAIN W/O DYE: CPT

## 2022-01-01 PROCEDURE — 80053 COMPREHEN METABOLIC PANEL: CPT | Performed by: EMERGENCY MEDICINE

## 2022-01-01 PROCEDURE — 84443 ASSAY THYROID STIM HORMONE: CPT | Performed by: EMERGENCY MEDICINE

## 2022-01-01 PROCEDURE — 90471 IMMUNIZATION ADMIN: CPT | Performed by: FAMILY MEDICINE

## 2022-01-01 PROCEDURE — 82040 ASSAY OF SERUM ALBUMIN: CPT | Performed by: EMERGENCY MEDICINE

## 2022-01-01 PROCEDURE — 250N000013 HC RX MED GY IP 250 OP 250 PS 637: Performed by: EMERGENCY MEDICINE

## 2022-01-01 PROCEDURE — 90472 IMMUNIZATION ADMIN EACH ADD: CPT | Performed by: FAMILY MEDICINE

## 2022-01-01 PROCEDURE — 90677 PCV20 VACCINE IM: CPT | Performed by: FAMILY MEDICINE

## 2022-01-01 PROCEDURE — 99284 EMERGENCY DEPT VISIT MOD MDM: CPT | Mod: 25

## 2022-01-01 PROCEDURE — 99214 OFFICE O/P EST MOD 30 MIN: CPT | Mod: 25 | Performed by: FAMILY MEDICINE

## 2022-01-01 RX ORDER — DIPHENOXYLATE HYDROCHLORIDE AND ATROPINE SULFATE 2.5; .025 MG/1; MG/1
1 TABLET ORAL DAILY
Qty: 90 TABLET | Refills: 3 | Status: SHIPPED | OUTPATIENT
Start: 2022-01-01 | End: 2022-01-01

## 2022-01-01 RX ORDER — HYDROXYZINE HYDROCHLORIDE 25 MG/1
50 TABLET, FILM COATED ORAL
Qty: 30 TABLET | Refills: 3 | Status: SHIPPED | OUTPATIENT
Start: 2022-01-01 | End: 2023-01-01

## 2022-01-01 RX ORDER — MULTIVITAMIN WITH FOLIC ACID 400 MCG
TABLET ORAL
Qty: 28 TABLET | Refills: 11 | Status: SHIPPED | OUTPATIENT
Start: 2022-01-01

## 2022-01-01 RX ORDER — FLUTICASONE PROPIONATE AND SALMETEROL XINAFOATE 115; 21 UG/1; UG/1
2 AEROSOL, METERED RESPIRATORY (INHALATION) 2 TIMES DAILY
Qty: 36 G | Refills: 3 | Status: SHIPPED | OUTPATIENT
Start: 2022-01-01

## 2022-01-01 RX ORDER — MECLIZINE HCL 12.5 MG 12.5 MG/1
12.5 TABLET ORAL 3 TIMES DAILY PRN
Qty: 270 TABLET | Refills: 1 | Status: SHIPPED | OUTPATIENT
Start: 2022-01-01 | End: 2022-01-01

## 2022-01-01 RX ORDER — MECLIZINE HCL 12.5 MG 12.5 MG/1
TABLET ORAL
Qty: 90 TABLET | Refills: 11 | Status: ON HOLD | OUTPATIENT
Start: 2022-01-01 | End: 2023-01-01

## 2022-01-01 RX ORDER — HYDROXYZINE HYDROCHLORIDE 25 MG/1
25 TABLET, FILM COATED ORAL 3 TIMES DAILY PRN
Qty: 10 TABLET | Refills: 0 | Status: SHIPPED | OUTPATIENT
Start: 2022-01-01 | End: 2022-01-01

## 2022-01-01 RX ORDER — HYDROXYZINE HYDROCHLORIDE 25 MG/1
25 TABLET, FILM COATED ORAL
Qty: 10 TABLET | Refills: 0 | Status: SHIPPED | OUTPATIENT
Start: 2022-01-01 | End: 2023-01-01

## 2022-01-01 RX ORDER — HYDROXYZINE HYDROCHLORIDE 25 MG/1
50 TABLET, FILM COATED ORAL ONCE
Status: COMPLETED | OUTPATIENT
Start: 2022-01-01 | End: 2022-01-01

## 2022-01-01 RX ADMIN — HYDROXYZINE HYDROCHLORIDE 50 MG: 25 TABLET, FILM COATED ORAL at 01:04

## 2022-01-01 RX ADMIN — SODIUM CHLORIDE 500 ML: 9 INJECTION, SOLUTION INTRAVENOUS at 19:39

## 2022-01-01 ASSESSMENT — ASTHMA QUESTIONNAIRES
ACT_TOTALSCORE: 14
QUESTION_4 LAST FOUR WEEKS HOW OFTEN HAVE YOU USED YOUR RESCUE INHALER OR NEBULIZER MEDICATION (SUCH AS ALBUTEROL): TWO OR THREE TIMES PER WEEK
QUESTION_4 LAST FOUR WEEKS HOW OFTEN HAVE YOU USED YOUR RESCUE INHALER OR NEBULIZER MEDICATION (SUCH AS ALBUTEROL): TWO OR THREE TIMES PER WEEK
QUESTION_2 LAST FOUR WEEKS HOW OFTEN HAVE YOU HAD SHORTNESS OF BREATH: ONCE OR TWICE A WEEK
QUESTION_1 LAST FOUR WEEKS HOW MUCH OF THE TIME DID YOUR ASTHMA KEEP YOU FROM GETTING AS MUCH DONE AT WORK, SCHOOL OR AT HOME: MOST OF THE TIME
QUESTION_2 LAST FOUR WEEKS HOW OFTEN HAVE YOU HAD SHORTNESS OF BREATH: ONCE OR TWICE A WEEK
QUESTION_1 LAST FOUR WEEKS HOW MUCH OF THE TIME DID YOUR ASTHMA KEEP YOU FROM GETTING AS MUCH DONE AT WORK, SCHOOL OR AT HOME: SOME OF THE TIME
QUESTION_3 LAST FOUR WEEKS HOW OFTEN DID YOUR ASTHMA SYMPTOMS (WHEEZING, COUGHING, SHORTNESS OF BREATH, CHEST TIGHTNESS OR PAIN) WAKE YOU UP AT NIGHT OR EARLIER THAN USUAL IN THE MORNING: TWO OR THREE NIGHTS A WEEK
ACT_TOTALSCORE: 14
QUESTION_5 LAST FOUR WEEKS HOW WOULD YOU RATE YOUR ASTHMA CONTROL: SOMEWHAT CONTROLLED
ACT_TOTALSCORE: 14
QUESTION_3 LAST FOUR WEEKS HOW OFTEN DID YOUR ASTHMA SYMPTOMS (WHEEZING, COUGHING, SHORTNESS OF BREATH, CHEST TIGHTNESS OR PAIN) WAKE YOU UP AT NIGHT OR EARLIER THAN USUAL IN THE MORNING: FOUR OR MORE NIGHTS A WEEK
QUESTION_5 LAST FOUR WEEKS HOW WOULD YOU RATE YOUR ASTHMA CONTROL: SOMEWHAT CONTROLLED

## 2022-01-01 ASSESSMENT — ACTIVITIES OF DAILY LIVING (ADL)
ADLS_ACUITY_SCORE: 35

## 2022-01-01 ASSESSMENT — PATIENT HEALTH QUESTIONNAIRE - PHQ9
SUM OF ALL RESPONSES TO PHQ QUESTIONS 1-9: 25
10. IF YOU CHECKED OFF ANY PROBLEMS, HOW DIFFICULT HAVE THESE PROBLEMS MADE IT FOR YOU TO DO YOUR WORK, TAKE CARE OF THINGS AT HOME, OR GET ALONG WITH OTHER PEOPLE: EXTREMELY DIFFICULT
SUM OF ALL RESPONSES TO PHQ QUESTIONS 1-9: 25

## 2022-01-03 ENCOUNTER — MEDICAL CORRESPONDENCE (OUTPATIENT)
Dept: HEALTH INFORMATION MANAGEMENT | Facility: CLINIC | Age: 32
End: 2022-01-03

## 2022-01-10 DIAGNOSIS — Z53.9 DIAGNOSIS NOT YET DEFINED: Primary | ICD-10-CM

## 2022-01-10 PROCEDURE — G0179 MD RECERTIFICATION HHA PT: HCPCS | Performed by: FAMILY MEDICINE

## 2022-01-12 ENCOUNTER — TELEPHONE (OUTPATIENT)
Dept: FAMILY MEDICINE | Facility: CLINIC | Age: 32
End: 2022-01-12

## 2022-01-12 ENCOUNTER — TELEPHONE (OUTPATIENT)
Dept: CARE COORDINATION | Facility: CLINIC | Age: 32
End: 2022-01-12

## 2022-01-12 ENCOUNTER — PATIENT OUTREACH (OUTPATIENT)
Dept: NURSING | Facility: CLINIC | Age: 32
End: 2022-01-12

## 2022-01-12 SDOH — ECONOMIC STABILITY: FOOD INSECURITY: WITHIN THE PAST 12 MONTHS, THE FOOD YOU BOUGHT JUST DIDN'T LAST AND YOU DIDN'T HAVE MONEY TO GET MORE.: NEVER TRUE

## 2022-01-12 SDOH — ECONOMIC STABILITY: INCOME INSECURITY: IN THE LAST 12 MONTHS, WAS THERE A TIME WHEN YOU WERE NOT ABLE TO PAY THE MORTGAGE OR RENT ON TIME?: NO

## 2022-01-12 SDOH — ECONOMIC STABILITY: TRANSPORTATION INSECURITY
IN THE PAST 12 MONTHS, HAS LACK OF TRANSPORTATION KEPT YOU FROM MEETINGS, WORK, OR FROM GETTING THINGS NEEDED FOR DAILY LIVING?: NO

## 2022-01-12 SDOH — ECONOMIC STABILITY: FOOD INSECURITY: WITHIN THE PAST 12 MONTHS, YOU WORRIED THAT YOUR FOOD WOULD RUN OUT BEFORE YOU GOT MONEY TO BUY MORE.: NEVER TRUE

## 2022-01-12 SDOH — ECONOMIC STABILITY: TRANSPORTATION INSECURITY
IN THE PAST 12 MONTHS, HAS THE LACK OF TRANSPORTATION KEPT YOU FROM MEDICAL APPOINTMENTS OR FROM GETTING MEDICATIONS?: YES

## 2022-01-12 SDOH — HEALTH STABILITY: PHYSICAL HEALTH: ON AVERAGE, HOW MANY DAYS PER WEEK DO YOU ENGAGE IN MODERATE TO STRENUOUS EXERCISE (LIKE A BRISK WALK)?: 4 DAYS

## 2022-01-12 SDOH — HEALTH STABILITY: PHYSICAL HEALTH: ON AVERAGE, HOW MANY MINUTES DO YOU ENGAGE IN EXERCISE AT THIS LEVEL?: 10 MIN

## 2022-01-12 ASSESSMENT — SOCIAL DETERMINANTS OF HEALTH (SDOH)
HOW OFTEN DO YOU ATTEND CHURCH OR RELIGIOUS SERVICES?: NEVER
WITHIN THE LAST YEAR, HAVE YOU BEEN AFRAID OF YOUR PARTNER OR EX-PARTNER?: NO
DO YOU BELONG TO ANY CLUBS OR ORGANIZATIONS SUCH AS CHURCH GROUPS UNIONS, FRATERNAL OR ATHLETIC GROUPS, OR SCHOOL GROUPS?: NO
IN A TYPICAL WEEK, HOW MANY TIMES DO YOU TALK ON THE PHONE WITH FAMILY, FRIENDS, OR NEIGHBORS?: NEVER
HOW OFTEN DO YOU ATTENT MEETINGS OF THE CLUB OR ORGANIZATION YOU BELONG TO?: NEVER
ARE YOU MARRIED, WIDOWED, DIVORCED, SEPARATED, NEVER MARRIED, OR LIVING WITH A PARTNER?: NEVER MARRIED
WITHIN THE LAST YEAR, HAVE YOU BEEN KICKED, HIT, SLAPPED, OR OTHERWISE PHYSICALLY HURT BY YOUR PARTNER OR EX-PARTNER?: NO
WITHIN THE LAST YEAR, HAVE TO BEEN RAPED OR FORCED TO HAVE ANY KIND OF SEXUAL ACTIVITY BY YOUR PARTNER OR EX-PARTNER?: NO
WITHIN THE LAST YEAR, HAVE YOU BEEN HUMILIATED OR EMOTIONALLY ABUSED IN OTHER WAYS BY YOUR PARTNER OR EX-PARTNER?: NO
HOW HARD IS IT FOR YOU TO PAY FOR THE VERY BASICS LIKE FOOD, HOUSING, MEDICAL CARE, AND HEATING?: SOMEWHAT HARD
HOW OFTEN DO YOU GET TOGETHER WITH FRIENDS OR RELATIVES?: MORE THAN THREE TIMES A WEEK

## 2022-01-12 ASSESSMENT — LIFESTYLE VARIABLES
HOW OFTEN DO YOU HAVE A DRINK CONTAINING ALCOHOL: NEVER
HOW OFTEN DO YOU HAVE SIX OR MORE DRINKS ON ONE OCCASION: NEVER

## 2022-01-12 ASSESSMENT — ACTIVITIES OF DAILY LIVING (ADL): DEPENDENT_IADLS:: CLEANING;COOKING;LAUNDRY;SHOPPING;MEAL PREPARATION

## 2022-01-12 NOTE — PROGRESS NOTES
Clinic Care Coordination Contact  CCC SHIRA contacted Brooke Peterson by phone with an  to complete an updated assessment for continued enrollment in [facility].    Brooke Peterson reported he has fell at home a couple of times. He has been able to continue to use his cane to get around, but he is needing help. SW messaged scheduling pool to support getting an appointment with PCP>    He reported he has had a fever, it is improving. No loss of smell or taste reported.     Clinic Care Coordination Contact  OUTREACH    Referral Information:  Referral Source: PCP    Primary Diagnosis: Psychosocial    Chief Complaint   Patient presents with     Clinic Care Coordination - Initial     Re-assessment completed 1/12/2022        [place] Utilization:   Clinic Utilization  Difficulty keeping appointments:: No  Compliance Concerns: No  No-Show Concerns: Yes  No PCP office visit in Past Year: No  Utilization    Hospital Admissions  0             ED Visits  1             No Show Count (past year)  11                Current as of: 1/12/2022  9:45 AM              Clinical Concerns:  Current Medical Concerns:  Fell at home     Current Behavioral Concerns: None reported    Education Provided to patient: Role of CCC      Health Maintenance Reviewed: Not assessed  Clinical Pathway: None    Medication Management:  Medication review status: Medications reviewed and no changes reported per patient.             Functional Status:  Dependent ADLs:: Bathing,Dressing,Grooming  Dependent IADLs:: Cleaning,Cooking,Laundry,Shopping,Meal Preparation  Bed or wheelchair confined:: No  Mobility Status: Independent w/Device  Fallen 2 or more times in the past year?: Yes  Any fall with injury in the past year?: Yes    Living Situation:  Current living arrangement:: I live in a private home with family (with parents and siblings)  Type of residence:: Apartment    Lifestyle & Psychosocial Needs:    Social Determinants of Health     Tobacco Use: Low Risk      Smoking  Tobacco Use: Never Smoker     Smokeless Tobacco Use: Never Used   Alcohol Use: Not At Risk     Frequency of Alcohol Consumption: Never     Average Number of Drinks: Not asked     Frequency of Binge Drinking: Never   Financial Resource Strain: Medium Risk     Difficulty of Paying Living Expenses: Somewhat hard   Food Insecurity: No Food Insecurity     Worried About Running Out of Food in the Last Year: Never true     Ran Out of Food in the Last Year: Never true   Transportation Needs: Unmet Transportation Needs     Lack of Transportation (Medical): Yes     Lack of Transportation (Non-Medical): No   Physical Activity: Insufficiently Active     Days of Exercise per Week: 4 days     Minutes of Exercise per Session: 10 min   Stress: No Stress Concern Present     Feeling of Stress : Only a little   Social Connections: Socially Isolated     Frequency of Communication with Friends and Family: Never     Frequency of Social Gatherings with Friends and Family: More than three times a week     Attends Roman Catholic Services: Never     Active Member of Clubs or Organizations: No     Attends Club or Organization Meetings: Never     Marital Status: Never    Intimate Partner Violence: Not At Risk     Fear of Current or Ex-Partner: No     Emotionally Abused: No     Physically Abused: No     Sexually Abused: No   Depression: At risk     PHQ-2 Score: 4   Housing Stability: Low Risk      Unable to Pay for Housing in the Last Year: No     Number of Places Lived in the Last Year: 1     Unstable Housing in the Last Year: No     Diet:: Regular  Inadequate nutrition (GOAL):: No  Tube Feeding: No  Inadequate activity/exercise (GOAL):: No  Significant changes in sleep pattern (GOAL): No  Transportation means:: Medical transport     Roman Catholic or spiritual beliefs that impact treatment:: No  Mental health DX:: Yes  Mental health DX how managed:: Medication,Outpatient Counseling  Mental health management concern (GOAL):: Yes  Informal Support  system:: Family,Parent             Resources and Interventions:  Current Resources:   Skilled Home Care Services: Skilled Nursing  Community Resources: County Worker,OP Mental Health,County Programs,Home Care,Other (see comment),PCA ([facility] worker from General acute hospital, Mental health providers, Home Care-Skilled Nursing Services -med management)  Supplies used at home:: None  Equipment Currently Used at Home: cane, straight  Employment Status: disabled         Advance Care Plan/Directive  Advanced Care Plans/Directives on file:: No  Advanced Care Plan/Directive Status: Not Applicable          Goals:   Goals        General     Medical (pt-stated)      Notes - Note edited  12/22/2021  2:14 PM by Scarlett Varma     Goal Statement: I want support to schedule my appointments with my referrals that I was given within 2 months   Date Goal set: 02/03/21 Updated: 6-11-21   Barriers: Language, Transportation   Strengths:   Date to Achieve By: 12-   Patient expressed understanding of goal: Yes   Action steps to achieve this goal:   1.  I will attend appt on 3-17-22 at 1:40pm.  [facility]  [address]  [facility] [address]  [phone]  Fax: [phone]  LAWRENCE HAIR     Updated:12-22-21 AL         Medical (pt-stated)      Notes - Note created  1/12/2022 10:28 AM by Joaquín Jimenez, [facility]     Goal Statement: I want to attend a follow up with PCP within two weeks  Date Goal set: 01/12/22  Barriers: Language  Strengths:   Date to Achieve By: 1/30/2022  Patient expressed understanding of goal: Yes  Action steps to achieve this goal:  1. I will wait to hear from [facility] scheduling to schedule a follow up   2. [facility] team will help schedule transportation          Other (pt-stated)      Notes - Note edited  12/22/2021  2:48 PM by Scarlett Varma     Goal Statement: I would like support to set up medical ride to attend 80% of medical appointments in the next 3 months to address health concerns and needs.    Date Goal set: 4/9/2021 updated. 6-11-21   Barriers: language, lack transportation   Strengths: medical transportation with [employer]   Date to Achieve By: 4-2022  Patient expressed understanding of goal: Yes   Action steps to achieve this goal:   1.  I will talk to CHW on 2-25-22 for support to set up medical ride to appt on 3-17-22 at 1:40pm to   [facility]  [address]  [facility] [address]  [phone]  Fax: [phone]  LAWRENCE HAIR     Updated:12-22-21 AL              Patient/Caregiver understanding: Reported understanding     Outreach Frequency: monthly  Future Appointments              In 2 months Lawrence Hair MD [facility]          Plan: SW will support scheduling transportation once appointment is scheduled

## 2022-01-12 NOTE — LETTER
[facility]  Patient Centered Plan of Care  About Me:        Patient Name:  Brooke Peterson    YOB: 1990  Age:         31 year old   Dez MRN:    [id number] Telephone Information:  Home Phone [phone]   Mobile [phone]       Address:  [address] [place] [address] Email address:  No e-mail address on record      Emergency Contact(s)    Name Relationship Lgl Grd Work Phone Home Phone Mobile Phone   1. FERNIE PYA Sister    [phone]   2. PETE SOUSA Father   [phone]   3. THIN,NADIRA Mother   [phone]            Primary language:  Angela     needed? Yes   [facility]:  [phone] op. 1  Other communication barriers:Language barrier    Preferred Method of Communication:     Current living arrangement: I live in a private home with family (with parents and siblings)    Mobility Status/ Medical Equipment: Independent w/Device        Health Maintenance  Health Maintenance Reviewed: Not assessed      My Access Plan  Medical Emergency 911   Primary Clinic Line [facility] - [phone]   24 Hour Appointment Line [phone] or  [phone] ([phone]) (toll-free)   24 Hour Nurse Line [phone] (toll-free)   Preferred Urgent Care [facility], [phone]     [facility]  [phone]     Preferred Pharmacy [facility] - [place], [place] - [address]     Behavioral Health Crisis Line The National Suicide Prevention Lifeline at 1-497.573.9159 or 911             My Care Team Members  Patient Care Team       Relationship Specialty Notifications Start End    Jose Rangel MD PCP - General Family Practice  9/16/19     Phone: [phone] Fax: [phone]         [address] [place] [address]    Maddison Witt MD MD INTERNAL MEDICINE - ENDOCRINOLOGY, DIABETES & METABOLISM  10/9/19     Phone: [phone] Fax: [phone]          [address] [address]    Scarlett Varma Community Health Worker Primary Care - CC Admissions 12/6/19     Phone: [phone] Fax: [phone]         [address] [place] [address]    Ioana Mendoza RN Specialty Care Coordinator INTERNAL MEDICINE - ENDOCRINOLOGY, DIABETES & METABOLISM  2/11/20     Phone: [phone] Pager: [phone]        Joaquín Jimenez, [facility] Lead Care Coordinator Primary Care - CC Admissions 7/29/20     Maddison Witt MD Assigned Endocrinology Provider   10/23/20     Phone: [phone] Fax: [phone]         [address] [address]    Jose Rangel MD Assigned PCP   6/16/21     Phone: [phone] Fax: [phone]         [address] [place] [address]    Christiano Beckwith DPM Assigned Musculoskeletal Provider   7/16/21     Phone: [phone] Fax: [phone]         [address] [facility] [address]    Nithya Nathan MD MD Neurology  9/8/21     appt 12-10-21 at 10:20am [address], [facility] [address]    Phone: [phone] Fax: [phone]         [address] [facility] [address]    Caprice Haile, NP    9/30/21     Phone: [phone] Fax: [phone]         [facility] [address] [facility] [address]    [facility] RN  Registered Nurse [facility] HEALTH AGENCY ([facility] ([place])  9/30/21     Phone: [phone]                 My Care Plans  Self Management and Treatment Plan  Goals and (Comments)  Goals        General     Medical (pt-stated)      Notes - Note edited  12/22/2021  2:14 PM by Scarlett Varma     Goal Statement: I want support to schedule my appointments with my referrals that I was given within 2 months   Date Goal set: 02/03/21 Updated: 6-11-21   Barriers: Language, Transportation   Strengths:   Date to Achieve By: 12-   Patient expressed understanding of goal: Yes   Action steps to achieve this goal:   1.  I will attend appt on 3-17-22 at  1:40pm.  [facility]  [address]  [facility] [address]  [phone]  Fax: [phone]  [place]     Updated:12-22-21 AL         Medical (pt-stated)      Notes - Note created  1/12/2022 10:28 AM by Joaquín Jimenez [facility]     Goal Statement: I want to attend a follow up with PCP within two weeks  Date Goal set: 01/12/22  Barriers: Language  Strengths:   Date to Achieve By: 1/30/2022  Patient expressed understanding of goal: Yes  Action steps to achieve this goal:  1. I will wait to hear from [facility] scheduling to schedule a follow up   2. CCC team will help schedule transportation          Other (pt-stated)      Notes - Note edited  12/22/2021  2:48 PM by Scarlett Varma     Goal Statement: I would like support to set up medical ride to attend 80% of medical appointments in the next 3 months to address health concerns and needs.   Date Goal set: 4/9/2021 updated. 6-11-21   Barriers: language, lack transportation   Strengths: medical transportation with [employer]   Date to Achieve By: 4-2022  Patient expressed understanding of goal: Yes   Action steps to achieve this goal:   1.  I will talk to CHW on 2-25-22 for support to set up medical ride to appt on 3-17-22 at 1:40pm to   [facility]  [address]  [facility] [address]  [phone]  Fax: [phone]  [place]WR[place] HAIR     Updated:12-22-21 AL               Action Plans on File:                       Advance Care Plans/Directives Type:   No data recorded    My Medical and Care Information  Problem List   Patient Active Problem List   Diagnosis     Immigrant with language difficulty     Piatt's disease (H)     History of hypercalcemia     Adrenal insufficiency (H)     Medication management     Low TSH level     Hypothyroidism, unspecified type     Insomnia, unspecified type     PTSD (post-traumatic stress disorder)     Schizoaffective disorder, depressive type, with catatonia (H)      Schizophrenia (H)     Vitamin D deficiency     Moderate persistent asthma     Pituitary microadenoma (H)      Current Medications and Allergies:  See printed Medication Report.    Care Coordination Start Date: 12/8/2020   Frequency of Care Coordination: monthly     Form Last Updated: 01/12/2022

## 2022-01-12 NOTE — TELEPHONE ENCOUNTER
Hello,    Patient would like a follow up with PCP to address concerns with dizziness and he recently fell twice at home.     Thank you!

## 2022-01-12 NOTE — TELEPHONE ENCOUNTER
PT has upcoming appt on 1.26.2022 at 11:40 am at the [facility] PT would like assistance to set up round trip ride. Please assist.

## 2022-01-18 NOTE — TELEPHONE ENCOUNTER
Hi Dr Witt,     We were unable to find any in person openings. All of the available in person spaces are templated as RCA or new spaces, there aren't any MICHELE spots on your in person days. Please advise how you'd like us to schedule this patient.    Thank you!  Shawanda LLANOS

## 2022-01-19 ENCOUNTER — PATIENT OUTREACH (OUTPATIENT)
Dept: NURSING | Facility: CLINIC | Age: 32
End: 2022-01-19

## 2022-01-19 ENCOUNTER — TELEPHONE (OUTPATIENT)
Dept: CARE COORDINATION | Facility: CLINIC | Age: 32
End: 2022-01-19

## 2022-01-19 ENCOUNTER — PATIENT OUTREACH (OUTPATIENT)
Dept: CARE COORDINATION | Facility: CLINIC | Age: 32
End: 2022-01-19

## 2022-01-19 NOTE — TELEPHONE ENCOUNTER
1/19/2022  Patient reported he does not have skilled nursing services anymore and that nurse don't come out to set up meds because patient has new insurance through [employer] [facility].    Received VM from [employer] DENIS Barriga that patient  no longer has [employer] for insurance. Patient switched to [employer] insurance and they are not contract with them.  Stated to send order to new home care for skilled nursing services through [facility] [phone]   Member [phone]    Patient has appt with PCP on 1-26-22 at 11:20am would PCP or CA able to help with med set up until home care nurse comes out    Please advise

## 2022-01-19 NOTE — PROGRESS NOTES
1/19/2022  Clinic Care Coordination Contact    Community Health Worker Follow Up    Care Gaps:     Health Maintenance Due   Topic Date Due     PREVENTIVE CARE VISIT  Never done     ASTHMA ACTION PLAN  Never done     ADVANCE CARE PLANNING  Never done     DEPRESSION ACTION PLAN  Never done     ASTHMA CONTROL TEST  11/13/2021     PHQ-9  01/12/2022       Care Gaps Last addressed on will discuss on 1-26-22 with the doctor.     Goals:   Goals Addressed as of 1/19/2022 at 2:56 PM                    Today    12/22/21       Medical (pt-stated)     70%    Added 7/28/21 by Joaquín Jimenez, [facility]      Goal Statement: I want support to schedule my appointments with my referrals that I was given within 2 months   Date Goal set: 02/03/21 Updated: 6-11-21   Barriers: Language, Transportation   Strengths:   Date to Achieve By: 12-   Patient expressed understanding of goal: Yes   Action steps to achieve this goal:   1.  I will attend appt on 3-17-22 at 1:40pm.  [facility]  [address]  [facility] [address]  [phone]  Fax: [phone]  [place]WR[place] HAIR     Updated:1-19-22 AL           Medical (pt-stated)   50%      Added 1/12/22 by Joaquín Jimenez, LICSW      Goal Statement: I want to attend a follow up with PCP within two weeks  Date Goal set: 01/12/22  Barriers: Language  Strengths:   Date to Achieve By: 1/30/2022  Patient expressed understanding of goal: Yes  Action steps to achieve this goal:  1. I will meet with the doctor on 1-26-22 at 10:30am to follow up   2. [facility] team will help schedule transportation   [employer]  will call an hour before appt for     Updated: 1-19-22         Other (pt-stated)     90%    Added 7/28/21 by Joaquín Jimenez, LICSW      Goal Statement: I would like support to set up medical ride to attend 80% of medical appointments in the next 3 months to address health concerns and needs.   Date Goal set: 4/9/2021  updated. 6-11-21   Barriers: language, lack transportation   Strengths: medical transportation with [employer]   Date to Achieve By: 4-2022  Patient expressed understanding of goal: Yes   Action steps to achieve this goal:   1.  I will talk to CHW on 2-25-22 for support to set up medical ride to appt on 3-17-22 at 1:40pm to   [facility]  [address]  [facility] [address]  [phone]  Fax: [phone]  LAWR[place] HAIR     Updated:1-19-21 AL           Other (pt-stated)         Added 1/19/22 by Scarlett Varma      Goal Statement: I want support to switch to [employer] insurance as soon as possible.  Date goal set: 1/19/2022  Measure of Success: switch to [employer]  Barriers: language  Strengths: support from CCC  Date to Achieve By: 3-2022  Patient expressed understanding of goal: yes    Action steps to achieve this goal  1. I will talk to CC SW on 1-27-22 at 3pm for support on how to switch to [employer].  2. I will update at next follow up           Other (pt-stated)         Added 1/19/22 by Scarlett Varma      Goal Statement: I want support to connect with new home care agency contract with [facility] for skilled nursing services for medication set up as soon as possible  Date goal set: 1/19/2022  Measure of Success: skilled nursing services  Barriers: language, switch to [employer]  Strengths: support from CCC   Date to Achieve By: 3-2022  Patient expressed understanding of goal: yes    Action steps to achieve this goal  1. I will bring all my medications to appt on 1-26-22 for support to set up medications.  2. I will follow up with CC RN for medications                      Intervention and Education during outreach:   Angela : Breanna ID#[id number]  Called and spoke to patient through Angela  and follow up on goals.  Patient reported:   -dont have [employer] card. Stated he wants [employer] and requested help to switch to [employer].  Explained to patient for  now he has [facility] and will have to set up medical ride with them until we able to switch to [facility].    Research [facility] customer service line and  transportation line [phone]  Conference call with patient to connect with [facility]  Spoke to RAQUEL, and provided NPI #  Patient demographics and updated patient new address and telephone with [facility].  She state to call [phone] transportation line   Patient ID# is [phone]    Conference call to Transportation Line and spoke to Alfa.   Provided ID# and appt and address.  Stated that information is sent to their dispatcher and they will patient when they will pick  Up  Stated to be ready an hour between 10:20-11am.  He does not know the transportation vendor that will .    Informed patient to answer the phone when transportation call on 1-26-22  -no medications for a month. No nurse coming out anymore to set up meds.  Educated patient that he was switched to [facility] so we have to find new skilled nursing services that contract with [facility].  In the meantime bring his all his medications to the appt on 1-26-22 to see if PC or CA will help with med set up.    Does not know the cadi  name    CHW will coordinate with CC RN regarding med set up for 1-26-22.      CHW Follow up: Monthly    CHW Plan: Follow up on goals    CHW Next Follow Up: 2-23-22

## 2022-01-19 NOTE — PROGRESS NOTES
Clinic Care Coordination Contact  Care Team Conversations    RN CC spoke with covering RN CC on status of support for review of patient coverage for medication therapy support    Discussed at case review - pt does not have waiver and has new carrier   [facility] which does not contact with current home care provider so need to look at alternative options    Pt has visit with PCP next week, review following visit for new home care order or bridge with CP or RN CC to support patient med set up.

## 2022-01-20 NOTE — PROGRESS NOTES
1/9/22  Clinic Care Coordination Contact  Care Team Conversations  VM from [employer]  RN     Received 2 VMs from patient's skilled nurse 1-3-22 and 1-9-22   DENIS Giron and Ilene [phone] [employer] [facility]   They will not be able to help set up his meds anymore because his insurance switched to [employer] and they are not contracted with them.  [facility] [employer] [facility]   They are asking CC Team to help find new skilled nurse agency with [facility]   They will set up as much meds as they can and will be closing his case.

## 2022-01-21 DIAGNOSIS — E27.1 ADDISON'S DISEASE (H): ICD-10-CM

## 2022-01-21 DIAGNOSIS — Z79.899 MEDICATION MANAGEMENT: ICD-10-CM

## 2022-01-21 DIAGNOSIS — F20.1 DISORGANIZED SCHIZOPHRENIA (H): ICD-10-CM

## 2022-01-21 DIAGNOSIS — E27.40 ADRENAL INSUFFICIENCY (H): Primary | ICD-10-CM

## 2022-01-21 DIAGNOSIS — F20.9 SCHIZOPHRENIA, UNSPECIFIED TYPE (H): ICD-10-CM

## 2022-01-21 DIAGNOSIS — F06.1 SCHIZOAFFECTIVE DISORDER, DEPRESSIVE TYPE, WITH CATATONIA (H): ICD-10-CM

## 2022-01-21 DIAGNOSIS — Z60.3 IMMIGRANT WITH LANGUAGE DIFFICULTY: ICD-10-CM

## 2022-01-21 DIAGNOSIS — F25.1 SCHIZOAFFECTIVE DISORDER, DEPRESSIVE TYPE, WITH CATATONIA (H): ICD-10-CM

## 2022-01-21 SDOH — SOCIAL STABILITY - SOCIAL INSECURITY: ACCULTURATION DIFFICULTY: Z60.3

## 2022-01-26 ENCOUNTER — OFFICE VISIT (OUTPATIENT)
Dept: FAMILY MEDICINE | Facility: CLINIC | Age: 32
End: 2022-01-26
Payer: COMMERCIAL

## 2022-01-26 ENCOUNTER — PATIENT OUTREACH (OUTPATIENT)
Dept: CARE COORDINATION | Facility: CLINIC | Age: 32
End: 2022-01-26

## 2022-01-26 VITALS
RESPIRATION RATE: 14 BRPM | BODY MASS INDEX: 29.77 KG/M2 | WEIGHT: 168 LBS | DIASTOLIC BLOOD PRESSURE: 67 MMHG | HEIGHT: 63 IN | HEART RATE: 98 BPM | SYSTOLIC BLOOD PRESSURE: 101 MMHG

## 2022-01-26 DIAGNOSIS — E03.9 HYPOTHYROIDISM, UNSPECIFIED TYPE: ICD-10-CM

## 2022-01-26 DIAGNOSIS — R29.898 LEG WEAKNESS, BILATERAL: ICD-10-CM

## 2022-01-26 DIAGNOSIS — R44.0 AUDITORY HALLUCINATION: ICD-10-CM

## 2022-01-26 DIAGNOSIS — F43.10 PTSD (POST-TRAUMATIC STRESS DISORDER): ICD-10-CM

## 2022-01-26 DIAGNOSIS — E55.9 VITAMIN D DEFICIENCY: ICD-10-CM

## 2022-01-26 DIAGNOSIS — E27.40 ADRENAL INSUFFICIENCY (H): Primary | ICD-10-CM

## 2022-01-26 DIAGNOSIS — J45.40 MODERATE PERSISTENT ASTHMA WITHOUT COMPLICATION: ICD-10-CM

## 2022-01-26 DIAGNOSIS — E27.1 ADDISON'S DISEASE (H): ICD-10-CM

## 2022-01-26 DIAGNOSIS — D75.1 ERYTHROCYTOSIS: ICD-10-CM

## 2022-01-26 DIAGNOSIS — K59.00 CONSTIPATION, UNSPECIFIED CONSTIPATION TYPE: ICD-10-CM

## 2022-01-26 DIAGNOSIS — R51.9 NONINTRACTABLE HEADACHE, UNSPECIFIED CHRONICITY PATTERN, UNSPECIFIED HEADACHE TYPE: ICD-10-CM

## 2022-01-26 DIAGNOSIS — F25.1 SCHIZOAFFECTIVE DISORDER, DEPRESSIVE TYPE, WITH CATATONIA (H): ICD-10-CM

## 2022-01-26 DIAGNOSIS — F20.9 SCHIZOPHRENIA, UNSPECIFIED TYPE (H): ICD-10-CM

## 2022-01-26 DIAGNOSIS — F06.1 SCHIZOAFFECTIVE DISORDER, DEPRESSIVE TYPE, WITH CATATONIA (H): ICD-10-CM

## 2022-01-26 DIAGNOSIS — J45.40 MODERATE PERSISTENT ASTHMA, UNSPECIFIED WHETHER COMPLICATED: ICD-10-CM

## 2022-01-26 LAB
ALBUMIN SERPL-MCNC: 4.9 G/DL (ref 3.5–5)
ALP SERPL-CCNC: 84 U/L (ref 45–120)
ALT SERPL W P-5'-P-CCNC: 46 U/L (ref 0–45)
ANION GAP SERPL CALCULATED.3IONS-SCNC: 11 MMOL/L (ref 5–18)
AST SERPL W P-5'-P-CCNC: 27 U/L (ref 0–40)
BASOPHILS # BLD AUTO: 0 10E3/UL (ref 0–0.2)
BASOPHILS NFR BLD AUTO: 0 %
BILIRUB SERPL-MCNC: 1 MG/DL (ref 0–1)
BUN SERPL-MCNC: 10 MG/DL (ref 8–22)
CALCIUM SERPL-MCNC: 10.7 MG/DL (ref 8.5–10.5)
CHLORIDE BLD-SCNC: 99 MMOL/L (ref 98–107)
CHOLEST SERPL-MCNC: 255 MG/DL
CO2 SERPL-SCNC: 26 MMOL/L (ref 22–31)
CREAT SERPL-MCNC: 0.91 MG/DL (ref 0.7–1.3)
EOSINOPHIL # BLD AUTO: 0.3 10E3/UL (ref 0–0.7)
EOSINOPHIL NFR BLD AUTO: 4 %
ERYTHROCYTE [DISTWIDTH] IN BLOOD BY AUTOMATED COUNT: 12.8 % (ref 10–15)
FASTING STATUS PATIENT QL REPORTED: NO
GFR SERPL CREATININE-BSD FRML MDRD: >90 ML/MIN/1.73M2
GLUCOSE BLD-MCNC: 88 MG/DL (ref 70–125)
HCT VFR BLD AUTO: 53.9 % (ref 40–53)
HDLC SERPL-MCNC: 34 MG/DL
HGB BLD-MCNC: 18.1 G/DL (ref 13.3–17.7)
IMM GRANULOCYTES # BLD: 0 10E3/UL
IMM GRANULOCYTES NFR BLD: 0 %
LDLC SERPL CALC-MCNC: 178 MG/DL
LYMPHOCYTES # BLD AUTO: 3 10E3/UL (ref 0.8–5.3)
LYMPHOCYTES NFR BLD AUTO: 39 %
MCH RBC QN AUTO: 28.8 PG (ref 26.5–33)
MCHC RBC AUTO-ENTMCNC: 33.6 G/DL (ref 31.5–36.5)
MCV RBC AUTO: 86 FL (ref 78–100)
MONOCYTES # BLD AUTO: 0.5 10E3/UL (ref 0–1.3)
MONOCYTES NFR BLD AUTO: 7 %
NEUTROPHILS # BLD AUTO: 3.8 10E3/UL (ref 1.6–8.3)
NEUTROPHILS NFR BLD AUTO: 50 %
PLATELET # BLD AUTO: 189 10E3/UL (ref 150–450)
POTASSIUM BLD-SCNC: 4.4 MMOL/L (ref 3.5–5)
PROT SERPL-MCNC: 8.6 G/DL (ref 6–8)
RBC # BLD AUTO: 6.29 10E6/UL (ref 4.4–5.9)
SODIUM SERPL-SCNC: 136 MMOL/L (ref 136–145)
T4 FREE SERPL-MCNC: 1.16 NG/DL (ref 0.7–1.8)
TRIGL SERPL-MCNC: 213 MG/DL
TSH SERPL DL<=0.005 MIU/L-ACNC: 2.97 UIU/ML (ref 0.3–5)
WBC # BLD AUTO: 7.6 10E3/UL (ref 4–11)

## 2022-01-26 PROCEDURE — 36415 COLL VENOUS BLD VENIPUNCTURE: CPT | Performed by: FAMILY MEDICINE

## 2022-01-26 PROCEDURE — 82668 ASSAY OF ERYTHROPOIETIN: CPT | Performed by: FAMILY MEDICINE

## 2022-01-26 PROCEDURE — 99000 SPECIMEN HANDLING OFFICE-LAB: CPT | Performed by: FAMILY MEDICINE

## 2022-01-26 PROCEDURE — 99214 OFFICE O/P EST MOD 30 MIN: CPT | Performed by: FAMILY MEDICINE

## 2022-01-26 PROCEDURE — 80061 LIPID PANEL: CPT | Performed by: FAMILY MEDICINE

## 2022-01-26 PROCEDURE — 80050 GENERAL HEALTH PANEL: CPT | Performed by: FAMILY MEDICINE

## 2022-01-26 PROCEDURE — 84439 ASSAY OF FREE THYROXINE: CPT | Performed by: FAMILY MEDICINE

## 2022-01-26 RX ORDER — LORATADINE 10 MG/1
10 TABLET ORAL DAILY
Qty: 90 TABLET | Refills: 2 | Status: ON HOLD | OUTPATIENT
Start: 2022-01-26 | End: 2023-01-01

## 2022-01-26 RX ORDER — OLANZAPINE 5 MG/1
5 TABLET ORAL EVERY MORNING
Qty: 90 TABLET | Refills: 0 | Status: SHIPPED | OUTPATIENT
Start: 2022-01-26 | End: 2022-04-12

## 2022-01-26 RX ORDER — ACETAMINOPHEN 325 MG/1
650 TABLET ORAL EVERY 6 HOURS PRN
Qty: 90 TABLET | Refills: 3 | Status: SHIPPED | OUTPATIENT
Start: 2022-01-26 | End: 2023-01-01

## 2022-01-26 RX ORDER — LEVOTHYROXINE SODIUM 75 UG/1
75 TABLET ORAL DAILY
Qty: 90 TABLET | Refills: 1 | Status: CANCELLED | OUTPATIENT
Start: 2022-01-26

## 2022-01-26 RX ORDER — DIPHENOXYLATE HYDROCHLORIDE AND ATROPINE SULFATE 2.5; .025 MG/1; MG/1
1 TABLET ORAL DAILY
Qty: 90 TABLET | Refills: 2 | Status: SHIPPED | OUTPATIENT
Start: 2022-01-26 | End: 2022-01-01

## 2022-01-26 RX ORDER — ONDANSETRON 4 MG/1
4 TABLET, FILM COATED ORAL EVERY 8 HOURS PRN
Qty: 30 TABLET | Refills: 1 | Status: SHIPPED | OUTPATIENT
Start: 2022-01-26 | End: 2022-02-25

## 2022-01-26 RX ORDER — ONDANSETRON 4 MG/1
TABLET, FILM COATED ORAL
COMMUNITY
Start: 2021-06-04 | End: 2022-01-26

## 2022-01-26 RX ORDER — FLUTICASONE PROPIONATE AND SALMETEROL XINAFOATE 115; 21 UG/1; UG/1
2 AEROSOL, METERED RESPIRATORY (INHALATION) 2 TIMES DAILY
Qty: 12 G | Refills: 3 | Status: SHIPPED | OUTPATIENT
Start: 2022-01-26 | End: 2022-05-05

## 2022-01-26 RX ORDER — HYDROCORTISONE 10 MG/1
TABLET ORAL
Qty: 60 TABLET | Refills: 11 | Status: CANCELLED | OUTPATIENT
Start: 2022-01-26

## 2022-01-26 RX ORDER — FAMOTIDINE 20 MG/1
20 TABLET, FILM COATED ORAL 2 TIMES DAILY
Qty: 180 TABLET | Refills: 3 | Status: SHIPPED | OUTPATIENT
Start: 2022-01-26 | End: 2022-04-26

## 2022-01-26 RX ORDER — ALBUTEROL SULFATE 90 UG/1
2 AEROSOL, METERED RESPIRATORY (INHALATION) EVERY 6 HOURS PRN
Qty: 18 G | Refills: 0 | Status: SHIPPED | OUTPATIENT
Start: 2022-01-26 | End: 2022-04-06

## 2022-01-26 RX ORDER — FLUDROCORTISONE ACETATE 0.1 MG/1
0.1 TABLET ORAL DAILY
Qty: 90 TABLET | Refills: 4 | Status: CANCELLED | OUTPATIENT
Start: 2022-01-26

## 2022-01-26 RX ORDER — POLYETHYLENE GLYCOL 3350 17 G/17G
17 POWDER, FOR SOLUTION ORAL DAILY
Qty: 510 G | Refills: 1 | Status: SHIPPED | OUTPATIENT
Start: 2022-01-26 | End: 2022-06-14

## 2022-01-26 ASSESSMENT — ASTHMA QUESTIONNAIRES
QUESTION_3 LAST FOUR WEEKS HOW OFTEN DID YOUR ASTHMA SYMPTOMS (WHEEZING, COUGHING, SHORTNESS OF BREATH, CHEST TIGHTNESS OR PAIN) WAKE YOU UP AT NIGHT OR EARLIER THAN USUAL IN THE MORNING: ONCE OR TWICE
QUESTION_4 LAST FOUR WEEKS HOW OFTEN HAVE YOU USED YOUR RESCUE INHALER OR NEBULIZER MEDICATION (SUCH AS ALBUTEROL): THREE OR MORE TIMES PER DAY
QUESTION_1 LAST FOUR WEEKS HOW MUCH OF THE TIME DID YOUR ASTHMA KEEP YOU FROM GETTING AS MUCH DONE AT WORK, SCHOOL OR AT HOME: A LITTLE OF THE TIME
ACT_TOTALSCORE: 16
QUESTION_5 LAST FOUR WEEKS HOW WOULD YOU RATE YOUR ASTHMA CONTROL: SOMEWHAT CONTROLLED
QUESTION_2 LAST FOUR WEEKS HOW OFTEN HAVE YOU HAD SHORTNESS OF BREATH: ONCE OR TWICE A WEEK
ACT_TOTALSCORE: 16

## 2022-01-26 ASSESSMENT — MIFFLIN-ST. JEOR: SCORE: 1605.78

## 2022-01-26 ASSESSMENT — PATIENT HEALTH QUESTIONNAIRE - PHQ9: SUM OF ALL RESPONSES TO PHQ QUESTIONS 1-9: 22

## 2022-01-26 NOTE — PROGRESS NOTES
Subjective:  31 year old male with concerns of follow up.  He is a nearly impossible historian due to mental health, language barrier, illiteracy, and fund of knowledge.    A known issue today is about his home care.  Needs RN to set up meds.  Insurance change and home care agency no longer contracted with his insurance.  NP student working with me set up his pill boxes today, and they should be set for two weeks.    Referred to neurology to address chronic dizziness, which he still has, today.  No changes that I can appreciate.  Most recent MRI brain June 2019 was normal.  No showed appointment with Neurology.  Rescheduled for March.    Old home care notes suggested he was taking Invega.  In this EHR, only listed as a reported medicine.  Patient does not know if he is receiving care from a psychiatrist or not.  I suspect he is not.  I would like him to access care within our system.    Outpatient Medications Prior to Visit   Medication Sig Dispense Refill     fludrocortisone (FLORINEF) 0.1 MG tablet Take 1 tablet (0.1 mg) by mouth daily 90 tablet 4     hydrocortisone (CORTEF) 10 MG tablet Take 1 tablet 10 mg by mouth in AM and 1/2 tablet in PM   May take Take 20 mg twice/day for 3 days if illness. 60 tablet 11     levothyroxine (SYNTHROID/LEVOTHROID) 75 MCG tablet Take 1 tablet (75 mcg) by mouth daily 90 tablet 1     fluticasone (FLONASE) 50 MCG/ACT nasal spray Spray 1 spray into both nostrils daily as needed for rhinitis or allergies (Patient not taking: Reported on 1/26/2022)       meclizine (ANTIVERT) 25 MG tablet Take 1 tablet (25 mg) by mouth 3 times daily as needed for dizziness (Patient not taking: Reported on 1/26/2022)       paliperidone ER (INVEGA) 6 MG 24 hr tablet Take 1 tablet (6 mg) by mouth At Bedtime (Patient not taking: Reported on 1/26/2022)       acetaminophen (TYLENOL) 325 MG tablet TAKE 1-2 TABLETS (650 MG TOTAL) BY MOUTH EVERY 6 (SIX) HOURS AS NEEDED FOR PAIN.       albuterol (PROAIR  "HFA/PROVENTIL HFA/VENTOLIN HFA) 108 (90 Base) MCG/ACT inhaler Inhale 2 puffs into the lungs every 6 hours as needed for shortness of breath / dyspnea or wheezing 18 g 0     Ergocalciferol 50 MCG (2000 UT) TABS Take 2,000 Units by mouth daily 60 tablet 0     famotidine (PEPCID) 20 MG tablet TAKE 1 TABLET (20 MG TOTAL) BY MOUTH 2 (TWO) TIMES A DAY FOR STOMACH       fluticasone-salmeterol (ADVAIR-HFA) 115-21 MCG/ACT inhaler Inhale 2 puffs into the lungs 2 times daily 12 g 3     loratadine (CLARITIN) 10 MG tablet Take 1 tablet (10 mg) by mouth daily 90 tablet 2     Multiple Vitamin (MULTI-VITAMINS) TABS Take 1 tablet by mouth daily 90 tablet 2     OLANZapine (ZYPREXA) 5 MG tablet Take 1 tablet (5 mg) by mouth every morning 90 tablet 0     ondansetron (ZOFRAN) 4 MG tablet TAKE 1 TO 2 TABLETS BY MOUTH EVERY 8 HOURS AS NEEDED       No facility-administered medications prior to visit.      History   Smoking Status     Never Smoker   Smokeless Tobacco     Never Used      Objective:  /67 (BP Location: Left arm, Patient Position: Sitting, Cuff Size: Adult Regular)   Pulse 98   Resp 14   Ht 1.59 m (5' 2.6\")   Wt 76.2 kg (168 lb)   BMI 30.14 kg/m    GENERAL: alert, not distressed  CHEST: clear, no rales, rhonchi, or wheezes  CARDIAC: regular without murmur, gallop, or rub  ABDOMEN: soft, non tender, non distended, normal bowel sounds  NEURO: no TD type facial movements.   Gait wide, with cane  SKIN: bronze, no ecchymosis    Recent Results (from the past 240 hour(s))   TSH    Collection Time: 01/26/22 12:27 PM   Result Value Ref Range    TSH 2.97 0.30 - 5.00 uIU/mL   T4, free    Collection Time: 01/26/22 12:27 PM   Result Value Ref Range    Free T4 1.16 0.70 - 1.80 ng/dL   Comprehensive metabolic panel (BMP + Alb, Alk Phos, ALT, AST, Total. Bili, TP)    Collection Time: 01/26/22 12:27 PM   Result Value Ref Range    Sodium 136 136 - 145 mmol/L    Potassium 4.4 3.5 - 5.0 mmol/L    Chloride 99 98 - 107 mmol/L    Carbon " Dioxide (CO2) 26 22 - 31 mmol/L    Anion Gap 11 5 - 18 mmol/L    Urea Nitrogen 10 8 - 22 mg/dL    Creatinine 0.91 0.70 - 1.30 mg/dL    Calcium 10.7 (H) 8.5 - 10.5 mg/dL    Glucose 88 70 - 125 mg/dL    Alkaline Phosphatase 84 45 - 120 U/L    AST 27 0 - 40 U/L    ALT 46 (H) 0 - 45 U/L    Protein Total 8.6 (H) 6.0 - 8.0 g/dL    Albumin 4.9 3.5 - 5.0 g/dL    Bilirubin Total 1.0 0.0 - 1.0 mg/dL    GFR Estimate >90 >60 mL/min/1.73m2   Lipid panel reflex to direct LDL Fasting    Collection Time: 01/26/22 12:27 PM   Result Value Ref Range    Cholesterol 255 (H) <=199 mg/dL    Triglycerides 213 (H) <=149 mg/dL    Direct Measure HDL 34 (L) >=40 mg/dL    LDL Cholesterol Calculated 178 (H) <=129 mg/dL    Patient Fasting > 8hrs? No    CBC with platelets and differential    Collection Time: 01/26/22 12:27 PM   Result Value Ref Range    WBC Count 7.6 4.0 - 11.0 10e3/uL    RBC Count 6.29 (H) 4.40 - 5.90 10e6/uL    Hemoglobin 18.1 (H) 13.3 - 17.7 g/dL    Hematocrit 53.9 (H) 40.0 - 53.0 %    MCV 86 78 - 100 fL    MCH 28.8 26.5 - 33.0 pg    MCHC 33.6 31.5 - 36.5 g/dL    RDW 12.8 10.0 - 15.0 %    Platelet Count 189 150 - 450 10e3/uL    % Neutrophils 50 %    % Lymphocytes 39 %    % Monocytes 7 %    % Eosinophils 4 %    % Basophils 0 %    % Immature Granulocytes 0 %    Absolute Neutrophils 3.8 1.6 - 8.3 10e3/uL    Absolute Lymphocytes 3.0 0.8 - 5.3 10e3/uL    Absolute Monocytes 0.5 0.0 - 1.3 10e3/uL    Absolute Eosinophils 0.3 0.0 - 0.7 10e3/uL    Absolute Basophils 0.0 0.0 - 0.2 10e3/uL    Absolute Immature Granulocytes 0.0 <=0.4 10e3/uL   Erythropoietin    Collection Time: 01/26/22 12:27 PM   Result Value Ref Range    Erythropoietin 4 4 - 27 mU/mL      Assessment and Plan:     Adrenal insufficiency (H)  Lecompte's disease (H)  Normal Na/K  Follow up with endocrinology scheduled July  Continue fludrocortisone and hydrocortisone at current levels.  -     famotidine (PEPCID) 20 MG tablet; Take 1 tablet (20 mg) by mouth 2 times daily  -      Multiple Vitamin (MULTI-VITAMINS) TABS; Take 1 tablet by mouth daily  -     Care Coordination Referral; Future    Schizoaffective disorder, depressive type, with catatonia (H)  Schizophrenia, unspecified type (H)  Auditory hallucination  PTSD (post-traumatic stress disorder)  Try to establish care in our system.  Continue zyprexa for now  Screen lipids  -     Adult Mental Health  Referral; Future  -     Care Coordination Referral; Future  -     OLANZapine (ZYPREXA) 5 MG tablet; Take 1 tablet (5 mg) by mouth every morning    Erythrocytosis  Epo normal to suppressed with increased Hgb.  Referred to hematololgy  -     Adult Oncology/Hematology Referral; Future    Hypothyroidism, unspecified type  Appears appropriately replaced  No dose changes    Moderate persistent asthma, unspecified whether complicated  -     fluticasone-salmeterol (ADVAIR-HFA) 115-21 MCG/ACT inhaler; Inhale 2 puffs into the lungs 2 times daily    Leg weakness, bilateral  Very difficult to ascertain whether or not this is changing.  Symptoms are vague and history is vague.  We will try to schedule follow-up to further evaluate.    Constipation, unspecified constipation type  -     ondansetron (ZOFRAN) 4 MG tablet; Take 1 tablet (4 mg) by mouth every 8 hours as needed for nausea  -     polyethylene glycol (MIRALAX) 17 GM/Dose powder; Take 17 g by mouth daily    Moderate persistent asthma without complication  -     albuterol (PROAIR HFA/PROVENTIL HFA/VENTOLIN HFA) 108 (90 Base) MCG/ACT inhaler; Inhale 2 puffs into the lungs every 6 hours as needed for shortness of breath / dyspnea or wheezing    Nonintractable headache, unspecified chronicity pattern, unspecified headache type  -     acetaminophen (TYLENOL) 325 MG tablet; Take 2 tablets (650 mg) by mouth every 6 hours as needed for mild pain    Vitamin D deficiency  -     Vitamin D Deficiency; Future    Other orders  -     loratadine (CLARITIN) 10 MG tablet; Take 1 tablet (10 mg) by mouth  daily  -     Ergocalciferol 50 MCG (2000 UT) TABS; Take 2,000 Units by mouth daily

## 2022-01-27 ENCOUNTER — PATIENT OUTREACH (OUTPATIENT)
Dept: NURSING | Facility: CLINIC | Age: 32
End: 2022-01-27

## 2022-01-27 DIAGNOSIS — F20.9 SCHIZOPHRENIA, UNSPECIFIED TYPE (H): ICD-10-CM

## 2022-01-27 DIAGNOSIS — E27.40 ADRENAL INSUFFICIENCY (H): ICD-10-CM

## 2022-01-27 DIAGNOSIS — F06.1 SCHIZOAFFECTIVE DISORDER, DEPRESSIVE TYPE, WITH CATATONIA (H): ICD-10-CM

## 2022-01-27 DIAGNOSIS — F25.1 SCHIZOAFFECTIVE DISORDER, DEPRESSIVE TYPE, WITH CATATONIA (H): ICD-10-CM

## 2022-01-27 NOTE — PROGRESS NOTES
1/26/2022  Clinic Care Coordination Contact  Care Team Conversations  Patient already enrolled with CCC Team.    Patient was referred to CCC by PCP 1-26-22  Reasons: needs new home care agency, insurance no longer covers previous one, [facility] declined him, high needs with medication support    Will consult with CCC Team on 1-27-22 regarding plan for medication support

## 2022-01-27 NOTE — PROGRESS NOTES
Clinic Care Coordination Contact    Follow Up Progress Note      Assessment: [facility] SW consulted with [facility] POD about Home Care Orders. [facility] is at capacity. No indication of attempts to other Home Care Agencies noted. SW messaged [facility] inquiring if they are going to complete attempts to other agencies. Waiting on response.    SW contacted Brooke Peterson with an . He reported he is running low on medications. SW will complete a Community Paramedic referral to support with medications until we can resolve the home care concern.    SW contacted [facility] team to help support switching to [employer]. They said the Managed Care Line [phone] needs to be contacted. SW called them and left a voicemail with Brooke Peterson's information along with SW's and the request to switch to [employer].     Care Gaps:    Health Maintenance Due   Topic Date Due     PREVENTIVE CARE VISIT  Never done     ASTHMA ACTION PLAN  Never done     ADVANCE CARE PLANNING  Never done     DEPRESSION ACTION PLAN  Never done     COVID-19 Vaccine (3 - Booster for Pfizer series) 02/03/2022       Deferred addressing care gaps due to SW follow up to discuss goal.      Goals addressed this encounter:   Goals Addressed                    This Visit's Progress       COMPLETED: Medical (pt-stated)         I attended my appointment with PCP on 1/26         Other (pt-stated)         Goal Statement: I want support to switch to [employer] insurance as soon as possible.  Date goal set: 1/19/2022  Measure of Success: switch to [employer]  Barriers: language  Strengths: support from CCC  Date to Achieve By: 3-2022  Patient expressed understanding of goal: yes    Action steps to achieve this goal  1. I will wait to receive a confirmation letter that my insurance has changed  2. SW will monitor MNITS for the change as well            Other (pt-stated)         Goal Statement: I want support to connect with new home care agency contract with [facility]  for skilled nursing services for medication set up as soon as possible  Date goal set: 1/19/2022  Measure of Success: skilled nursing services  Barriers: language, switch to [facility]  Strengths: support from CCC   Date to Achieve By: 3-2022  Patient expressed understanding of goal: yes    Action steps to achieve this goal  1. I will work with Community Paramedic for medication setup  2. I will work with [facility] and/or [facility] team for Home Care                       Intervention/Education provided during outreach: See above     Outreach Frequency: monthly    Plan:   Standard outreach

## 2022-01-27 NOTE — PROGRESS NOTES
1/27/2022  Clinic Care Coordination Contact  Care Team Conversations    Consulted with CC team regarding support for med set up referral from PCP.[facility] declined patient,  Plan: CC SHIRA will connect with patient today and check with [facility] if they refer out for home care nursing services  If possible order Community Paramedic for temporary support     CALOS SILVA 1-27-22  CHW Follow up: Monthly    CHW Plan: Follow up on goals    CHW Next Follow Up: 2-23-22

## 2022-01-28 ENCOUNTER — PATIENT OUTREACH (OUTPATIENT)
Dept: CARE COORDINATION | Facility: CLINIC | Age: 32
End: 2022-01-28

## 2022-01-28 LAB — EPO SERPL-ACNC: 4 MU/ML

## 2022-01-28 NOTE — PROGRESS NOTES
"Community Paramedic Program  Community Health Worker Initial Outreach    Referral source: Pt's PCP & CC SW  Referral reason:   Reason(s) for visit: Med Check/Setup     Goals for visit(s): Medication Compliance     How often should patient be seen: Other As needed for medications   Preference on when patient should be seen: 1-2 Days     Comments   PCP: Jose Rangel  Other info that would be helpful: Patient had homecare setup to help with mental health medications. Insurance switched and homecare is no longer covered. We are working on getting new homecare in place, but patient is running low on medications now. Needs support with setting them up.     Initial visit: Monday, January 31st / 1 pm / ENOC Lai (date/time/CP)       Additional information:   Spoke with pt with [facility]. Confirmed that pt's home care has stopped due to his insurance changing. He agreed that he needs help reviewing and setting up his medications at home. Accepted my offer to schedule an initial visit with ENOC Lai.    Confirmed pt's address. Pt asked a family member to share it with me on the phone. He asked that the CP call when he arrives and confirmed the best phone number ([phone]). Pt said some of his family will be home during the visit.    Pt also shared that he's been feeling dizzy and light headed. He said \"my leg is hurting, and I'm not doing so good.\" Encouraged pt to call his clinic right away if he starts feeling worse. Confirmed that he has the phone number.        "

## 2022-01-28 NOTE — PROGRESS NOTES
Left message #1 at [phone]. Postponing task out to a week and will try again. If patient returns call back, please help patient schedule an appointment per message below. Thanks!

## 2022-01-31 ENCOUNTER — ALLIED HEALTH/NURSE VISIT (OUTPATIENT)
Dept: OTHER | Facility: CLINIC | Age: 32
End: 2022-01-31
Attending: FAMILY MEDICINE
Payer: COMMERCIAL

## 2022-01-31 VITALS
HEART RATE: 90 BPM | OXYGEN SATURATION: 99 % | DIASTOLIC BLOOD PRESSURE: 74 MMHG | RESPIRATION RATE: 14 BRPM | TEMPERATURE: 99.3 F | SYSTOLIC BLOOD PRESSURE: 102 MMHG

## 2022-01-31 DIAGNOSIS — F06.1 SCHIZOAFFECTIVE DISORDER, DEPRESSIVE TYPE, WITH CATATONIA (H): ICD-10-CM

## 2022-01-31 DIAGNOSIS — E27.40 ADRENAL INSUFFICIENCY (H): ICD-10-CM

## 2022-01-31 DIAGNOSIS — F20.9 SCHIZOPHRENIA, UNSPECIFIED TYPE (H): ICD-10-CM

## 2022-01-31 DIAGNOSIS — F25.1 SCHIZOAFFECTIVE DISORDER, DEPRESSIVE TYPE, WITH CATATONIA (H): ICD-10-CM

## 2022-01-31 PROCEDURE — 99207 PR COMMUNITY PARAMEDIC-PATIENT MEETS CRITERIA: CPT

## 2022-01-31 ASSESSMENT — ACTIVITIES OF DAILY LIVING (ADL): DEPENDENT_IADLS:: CLEANING;COOKING;LAUNDRY;SHOPPING;MEAL PREPARATION;MEDICATION MANAGEMENT;TRANSPORTATION

## 2022-01-31 NOTE — PROGRESS NOTES
Community Paramedics Initial Visit  January 31, 2022 TIME:13:30    Brooke Peterson is a 31 year old male being seen at home for a Community Paramedic Home visit.    Present at appointment: Patient,Parents    Primary Diagnosis: Psychosocial    Chief Complaint   Patient presents with     Outreach     Community Paramedic in home visit       [place] Utilization:   Clinic Utilization  Difficulty keeping appointments:: No  Compliance Concerns: No  No-Show Concerns: Yes  No PCP office visit in Past Year: No  Utilization    Hospital Admissions  0             ED Visits  1             No Show Count (past year)  11                Current as of: 1/28/2022  3:32 PM              Clinical Concerns:  Current Medical Concerns:      Current Behavioral Concerns: medication compliance    Education Provided to patient: went through and talked about mediations     Vitals: /74   Pulse 90   Temp 99.3  F (37.4  C)   Resp 14   SpO2 99%   BMI: There is no height or weight on file to calculate BMI.       Health Maintenance Reviewed:      Clinical Pathway: None    Review of Symptoms/PE    Skin: negative  Eyes: negative  Ears/Nose/Throat: negative  Respiratory: No shortness of breath, dyspnea on exertion, cough, or hemoptysis  Cardiovascular: negative  Gastrointestinal: negative  Genitourinary: negative  Musculoskeletal: negative  Neurologic: negative  Psychiatric: negative    Medication Management:  yes  Medications need to be refilled:Levothyroxine     Medications set up: Yes  Pill Box issued: No  Scale issued: No  Flu Shot given: No  COVID Vaccine Given: No  Lab draw or specimen collection: No  Food box issued: No  Collaborative visit with PCP: No  Wound Care: No    Functional Status:  Dependent ADLs:: Bathing,Dressing,Grooming  Dependent IADLs:: Cleaning,Cooking,Laundry,Shopping,Meal Preparation,Medication Management,Transportation  Bed or wheelchair confined:: No  Mobility Status: Independent w/Device  Fallen 2 or more times in the  past year?: Yes  Any fall with injury in the past year?: No    Living Situation:  Current living arrangement:: I live in a private home with family (with parents and siblings)  [facility]    Community Paramedics Home Safety Assessment  Floors:  Are there throw rugs on the floor?: Yes  Are there papers, books, towels, shoes, magazines on the floor?: Yes  Are there loose wires and cords you need to walk around? : No  Stairs and Steps  Are there papers, books, towels, shoes, magazines on the stairs?: No  Are some steps broken or uneven?: No  Has the stairway bulb burned out?: No  Is the carpet on the steps loose or torn?: No  Are the handrails loose or broken?: No  Kitchen:  Are the things you use often on high shelves?: No  Is your step stool unsteady?: No  Bathrooms:  Is the tub or shower floor slippery?: Yes  Do you need support when you get in and out of the tub?: Yes  Bedrooms:  Is the light near the bed hard to reach?: No  Is the path from your bed to the bathroom dark?: No       Diet/Exercise/Sleep:  Diet:: Regular  Inadequate nutrition (GOAL):: No  Tube Feeding: No  Inadequate activity/exercise (GOAL):: No  Significant changes in sleep pattern (GOAL): No    Transportation:     Transportation means:: Medical transport     Psychosocial:  Caodaism or spiritual beliefs that impact treatment:: No  Mental health DX:: Yes  Mental health DX how managed:: Medication,Outpatient Counseling  Mental health management concern (GOAL):: Yes  Informal Support system:: Family,Parent    Core Healthy Days Survey - Healthy Days Survey - (Centers for Disease Control and Prevention - Used with Permission.)  Would you say that in general your health is: : Poor  Now thinking about your physical health, which includes physical illness and injury, for how many days during the past 30 days was your physical health not good?: 30  Now thinking about your mental health, which includes stress, depression, and  problems with emotions, for how many days during the past 30 days was your mental health not good?: 30  During the past 30 days, for about how many days did poor physical or mental health keep you from doing your usual activities, such as self-care, work, or recreation?: 30  CHDS SCORE: 60    No  Face to Face in Home / Community    Today's PHQ-2 Score:      Today's PHQ-9 Score:   PHQ-9 SCORE 1/26/2022   PHQ-9 Total Score 22        Today's GAD7 score:   RADHA-7 SCORE 10/2/2019   Total Score 6            Financial/Insurance:           Resources and Interventions:  Current Resources:    ;   Community Resources: [place] Worker,[facility] Programs,PCA ([facility] worker from [facility], Mental health providers, Home Care-Skilled Nursing Services -med management)  Supplies used at home:: None  Equipment Currently Used at Home: cane, straight    Advance Care Plan/Directive  Advanced Care Plans/Directives on file:: No  Advanced Care Plan/Directive Status: Not Applicable          HEALTH CARE GOALS:  Goals Addressed as of 1/31/2022 at 5:33 PM                    Today       Medication 1 (pt-stated)   On track    Added 1/31/22 by Ming Harris, NRP, CP      Goal Statement: I w ould like to take my medications as prescribed  Date Goal set: 01/31/2022  Barriers: Language  Strengths: Motivated  Date to Achieve By: 04/02/2022  Patient expressed understanding of goal: yes  Action steps to achieve this goal:  1. I will refill my medications  2. I will take my mediations as prescribed  3. I will try to learn how to fill my pill box            Patient Active Problem List   Diagnosis     Immigrant with language difficulty     Ariel's disease (H)     History of hypercalcemia     Adrenal insufficiency (H)     Medication management     Low TSH level     Hypothyroidism, unspecified type     Insomnia, unspecified type     PTSD (post-traumatic stress disorder)     Schizoaffective disorder, depressive type, with catatonia (H)      Schizophrenia (H)     Vitamin D deficiency     Moderate persistent asthma     Pituitary microadenoma (H)         Current Outpatient Medications:      acetaminophen (TYLENOL) 325 MG tablet, Take 2 tablets (650 mg) by mouth every 6 hours as needed for mild pain, Disp: 90 tablet, Rfl: 3     albuterol (PROAIR HFA/PROVENTIL HFA/VENTOLIN HFA) 108 (90 Base) MCG/ACT inhaler, Inhale 2 puffs into the lungs every 6 hours as needed for shortness of breath / dyspnea or wheezing, Disp: 18 g, Rfl: 0     Ergocalciferol 50 MCG (2000 UT) TABS, Take 2,000 Units by mouth daily, Disp: 60 tablet, Rfl: 0     famotidine (PEPCID) 20 MG tablet, Take 1 tablet (20 mg) by mouth 2 times daily, Disp: 180 tablet, Rfl: 3     fludrocortisone (FLORINEF) 0.1 MG tablet, Take 1 tablet (0.1 mg) by mouth daily, Disp: 90 tablet, Rfl: 4     fluticasone (FLONASE) 50 MCG/ACT nasal spray, Spray 1 spray into both nostrils daily as needed for rhinitis or allergies , Disp: , Rfl:      fluticasone-salmeterol (ADVAIR-HFA) 115-21 MCG/ACT inhaler, Inhale 2 puffs into the lungs 2 times daily, Disp: 12 g, Rfl: 3     hydrocortisone (CORTEF) 10 MG tablet, Take 1 tablet 10 mg by mouth in AM and 1/2 tablet in PM   May take Take 20 mg twice/day for 3 days if illness., Disp: 60 tablet, Rfl: 11     loratadine (CLARITIN) 10 MG tablet, Take 1 tablet (10 mg) by mouth daily, Disp: 90 tablet, Rfl: 2     Multiple Vitamin (MULTI-VITAMINS) TABS, Take 1 tablet by mouth daily, Disp: 90 tablet, Rfl: 2     OLANZapine (ZYPREXA) 5 MG tablet, Take 1 tablet (5 mg) by mouth every morning, Disp: 90 tablet, Rfl: 0     ondansetron (ZOFRAN) 4 MG tablet, Take 1 tablet (4 mg) by mouth every 8 hours as needed for nausea, Disp: 30 tablet, Rfl: 1     polyethylene glycol (MIRALAX) 17 GM/Dose powder, Take 17 g by mouth daily, Disp: 510 g, Rfl: 1     levothyroxine (SYNTHROID/LEVOTHROID) 75 MCG tablet, Take 1 tablet (75 mcg) by mouth daily, Disp: 90 tablet, Rfl: 1     meclizine (ANTIVERT) 25 MG  tablet, Take 1 tablet (25 mg) by mouth 3 times daily as needed for dizziness (Patient not taking: Reported on 2022), Disp: , Rfl:      paliperidone ER (INVEGA) 6 MG 24 hr tablet, Take 1 tablet (6 mg) by mouth At Bedtime (Patient not taking: Reported on 2022), Disp: , Rfl:     Plan:        Time spent with patient: 90    The patient meets one or more of the following criteria:  * Requires services to prevent readmission to a nursing home or hospital    Acute concern/Follow-up recommendations: follow care plan    Next CP visit scheduled: 2022    Issues for Provider to follow up on: Met with pt and his father in their home. Home safety assessment completed, no obvious hazards noted.  Pt brought a large bin filled with pt's medications, some empty some .  Pt did have all medications needed with the exception of Levothyroxine. Pharmacy called and agreed to send Levothyroxine to pt this week. Pt's 2nd week pill box was still full and had Levothyroxine. Through  explained to pt that he should use week 2 and fill week one with Levothyroxine when it arrives, pt and father showed understanding.  All medications that pt is currently using placed in one bag and empty or medications no longer using placed in a second bag. Pt thanked CP for  the medications.  Pt had no further questions and had no new complaints.      Provider follow up visit needed: PADDY

## 2022-02-02 ENCOUNTER — TELEPHONE (OUTPATIENT)
Dept: ONCOLOGY | Facility: HOSPITAL | Age: 32
End: 2022-02-02

## 2022-02-02 NOTE — TELEPHONE ENCOUNTER
I called Toe via  Angela  #[phone] to schedule a hematology appointment at [facility] with Dr Louise LOCKHART. Toe stated he is not aware of the referral and would like a call from the referring provider (Dr Rangel) to discuss. Toe also stated that he does not have any form of transportation and no technology for a video visit. Routed to Dr Rangel for follow-up call to patient to discuss referral. IB sent to Nurse Keyon (Cecile KELLY) with update also.

## 2022-02-04 ENCOUNTER — PATIENT OUTREACH (OUTPATIENT)
Dept: NURSING | Facility: CLINIC | Age: 32
End: 2022-02-04

## 2022-02-04 NOTE — PROGRESS NOTES
2/4/2022  Clinic Care Coordination Contact    Community Health Worker Follow Up    Care Gaps:     Health Maintenance Due   Topic Date Due     PREVENTIVE CARE VISIT  Never done     ASTHMA ACTION PLAN  Never done     ADVANCE CARE PLANNING  Never done     DEPRESSION ACTION PLAN  Never done     COVID-19 Vaccine (3 - Booster for Pfizer series) 02/03/2022       Care Gaps Last addressed on will discuss care gaps on 2-17-22 with PCP.    Goals:   Goals Addressed as of 2/4/2022 at 2:29 PM                    Today    1/31/22       Medical (pt-stated)   70%      Added 7/28/21 by Joaquín Jimenez, [facility]      Goal Statement: I want support to schedule my appointments with my referrals that I was given within 2 months   Date Goal set: 02/03/21 Updated: 6-11-21   Barriers: Language, Transportation   Strengths:   Date to Achieve By: 12-   Patient expressed understanding of goal: Yes   Action steps to achieve this goal:   1.  I will call to CHW on 3-3-22 for support to set up medical ride so I can attend neurology appt on 3-17-22 at 1:40pm.  [facility]  [address]  [facility] [address]  [phone]  Fax: [phone]  [place]     Updated:2-4-22 AL           Medication 1 (pt-stated)     On track    Added 1/31/22 by Ming Harris, NRP, CP      Goal Statement: I would like to take my medications as prescribed  Date Goal set: 01/31/2022  Barriers: Language  Strengths: Motivated  Date to Achieve By: 04/02/2022  Patient expressed understanding of goal: yes  Action steps to achieve this goal:  1. I will refill my medications  2. I will take my mediations as prescribed  3. I will try to learn how to fill my pill box           Other (pt-stated)   90%      Added 7/28/21 [facility]      Goal Statement: I would like support to set up medical ride to attend 80% of medical appointments in the next 3 months to address health concerns and needs.   Date Goal set: 4/9/2021  updated. 6-11-21   Barriers: language, lack transportation   Strengths: medical transportation with [employer]   Date to Achieve By: 4-2022  Patient expressed understanding of goal: Yes   Action steps to achieve this goal:   1.  I will talk to CHW on 2-10-22 for support to set up medical ride to appt on 2-17-22 at 2:20 pm .  [facility].  [address] [place] [address]    Updated: 2-4-22 AL           Other (pt-stated)   20%      Added 1/19/22 by Scarlett Varma      Goal Statement: I want support to switch to [employer] insurance as soon as possible.  Date goal set: 1/19/2022  Measure of Success: switch to [employer]  Barriers: language  Strengths: support from CCC  Date to Achieve By: 3-2022  Patient expressed understanding of goal: yes    Action steps to achieve this goal  1. I will provide MA/PMI# [id number] for now until I received [employer] insurance card.  2. I will wait to receive a confirmation letter that my insurance has changed In process   3 SW will monitor [place] for [place] as well     Updated 2-4-22 AL         Other (pt-stated)   10%      Added 1/19/22 by Scarlett Varma      Goal Statement: I want support to connect with new home care agency contract with [facility] for skilled nursing services for medication set up as soon as possible  Date goal set: 1/19/2022  Measure of Success: skilled nursing services  Barriers: language, switch to [employer]  Strengths: support from CCC   Date to Achieve By: 3-2022  Patient expressed understanding of goal: yes    Action steps to achieve this goal  1. I will continue to work with [facility]amedic for medication setup until I have connect with home care agency for nursing services.  2. I will wait to hear from  [facility] and/or [facility] team for Home Care     Updated: 2-4-22 AL          Intervention and Education during outreach:   [facility] : Way    Received call from patient and his father.  Father worry  because they don't have insurance card to show when they to the hospital or clinic.  CHW explained that they met with CC SW already to switch to [facility] and will have to wait until county process.  In the meantime he can provided his PMI/MA # to use when they gcheck in.  Patient does not know his  MA number.  Provided patient's PMI/MA# [id number]  Father wrote down the MA#    Patient requested support with setting up ride for appt on 2-17-22 to [facility]  Suggested to call CHW next Thursday 2-10-22 to set up ride.      CHW Follow up: Monthly    CHW Plan: Follow up on goals  CHW will call 2-10-22 or wait for patient to all 2-10-22 set up ride  CHW Next Follow Up: 3-3-22

## 2022-02-11 ENCOUNTER — PATIENT OUTREACH (OUTPATIENT)
Dept: NURSING | Facility: CLINIC | Age: 32
End: 2022-02-11

## 2022-02-11 NOTE — PROGRESS NOTES
2022  Call: [facility]  [phone]  RE: Ride for 22 at 12:20pm                     Spoke to Courtney said to to call the [phone] for transportation  Called and was disconnected 2x    Called again connected with Tino     Provided member PMI# and  verified patient demographics, destination address appointment date and time.    Don't know the vendor their dispatcher will set up the ride and to be ready one hour   Vendor will call pt when they are ready to .  Be ready at 12pm    Called and left VM with pt's sister that transportation has been set up to be ready around 12pm on 22 going to [facility].    CHW consult with CC SHIRA if pt eligible for [facility] worker or any support with setting medical ride for patient.    Community Paramedic visit on 22     Routed to GAVIOTA Manjarrez Community Paramedic relay message about transportation    CHW Follow up: Monthly    CHW Plan: Follow up on goals    CHW Next Follow Up: 3-11-22

## 2022-02-14 ENCOUNTER — ALLIED HEALTH/NURSE VISIT (OUTPATIENT)
Dept: OTHER | Facility: CLINIC | Age: 32
End: 2022-02-14
Payer: COMMERCIAL

## 2022-02-14 VITALS
TEMPERATURE: 98 F | OXYGEN SATURATION: 99 % | HEART RATE: 73 BPM | DIASTOLIC BLOOD PRESSURE: 88 MMHG | RESPIRATION RATE: 14 BRPM | SYSTOLIC BLOOD PRESSURE: 118 MMHG

## 2022-02-14 DIAGNOSIS — Z79.899 MEDICATION MANAGEMENT: Primary | ICD-10-CM

## 2022-02-14 PROCEDURE — 99207 PR COMMUNITY PARAMEDIC-PATIENT MEETS CRITERIA: CPT

## 2022-02-14 ASSESSMENT — ACTIVITIES OF DAILY LIVING (ADL): DEPENDENT_IADLS:: CLEANING;COOKING;LAUNDRY;SHOPPING;MEAL PREPARATION;MEDICATION MANAGEMENT;TRANSPORTATION

## 2022-02-14 NOTE — PROGRESS NOTES
Community Paramedics Follow-up Visit  February 14, 2022  TIME: 13:15    Brooke Peterson is a 31 year old male being seen at home for a follow-up visit.    Present at appointment: Patient,Parents    Primary Diagnosis: Psychosocial    Chief Complaint   Patient presents with     Outreach     Community Paramedic home visit       [place] Utilization:   Clinic Utilization  Difficulty keeping appointments:: No  Compliance Concerns: No  No-Show Concerns: Yes  No PCP office visit in Past Year: No  Utilization    Hospital Admissions  0             ED Visits  1             No Show Count (past year)  15                Current as of: 2/11/2022 12:00 PM              /88 (BP Location: Left arm, Patient Position: Sitting)   Pulse 73   Temp 98  F (36.7  C)   Resp 14   SpO2 99%     Clinical Concerns:  Current Medical Concerns:      Current Behavioral Concerns: medication compliance    Education Provided to patient: medication set up   Medication set up? Yes  Pill Box issued: No  Scale issued: No  Flu Shot given: No  COVID Vaccine Given: No  Lab draw or specimen collection: No  Food box issued: No  Collaborative visit with PCP: No  Wound Care: No    Health Maintenance Reviewed:      Clinical Pathway: None    No  Face to Face in Home / Community    Recent blood sugars:     Review of Symptoms/PE    Skin: negative  Eyes: negative  Ears/Nose/Throat: negative  Respiratory: No shortness of breath, dyspnea on exertion, cough, or hemoptysis  Cardiovascular: negative  Gastrointestinal: negative  Genitourinary: negative  Musculoskeletal: negative  Neurologic: negative  Psychiatric: negative    Pain Management::       Medication Review  Medication adherence problem (GOAL):: No  Knowledgeable about how to use meds:: No  Medication side effects suspected:: No    Diet/Exercise/Sleep  Diet:: Regular  Inadequate nutrition (GOAL):: No  Tube Feeding: No  Inadequate activity/exercise (GOAL):: No  Significant changes in sleep pattern (GOAL):  No    Plan:     Time spent with patient: 120    The patient meets one or more of the following criteria:  * Requires services to prevent readmission to a nursing home or hospital    Acute concern/Follow-up recommendations: follow care plan    Next CP visit scheduled: 02/28/2022    Issues for Provider to follow up on: met with pt and his father in their home. Both pill boxes were empty as they should have been. Set up pill boxes for two weeks, pt had enough medications to fill both boxes, four medications had less than enough to fill the next two boxes on 02/28 pharmacy was called and they agreed to send those medications as soon as they are available for refill.  Pt was laying on couch when vitals initially taken, 92/68 with sister translating I asked if he ever gets dizzy upon standing and he responded yes and said he fell one time. 2nd set of vitals sitting, 118/68.    Provider follow up visit needed: PADDY

## 2022-02-15 ENCOUNTER — PATIENT OUTREACH (OUTPATIENT)
Dept: CARE COORDINATION | Facility: CLINIC | Age: 32
End: 2022-02-15

## 2022-02-17 ENCOUNTER — OFFICE VISIT (OUTPATIENT)
Dept: FAMILY MEDICINE | Facility: CLINIC | Age: 32
End: 2022-02-17
Payer: COMMERCIAL

## 2022-02-17 ENCOUNTER — PATIENT OUTREACH (OUTPATIENT)
Dept: NURSING | Facility: CLINIC | Age: 32
End: 2022-02-17

## 2022-02-17 VITALS
WEIGHT: 170 LBS | SYSTOLIC BLOOD PRESSURE: 114 MMHG | RESPIRATION RATE: 18 BRPM | BODY MASS INDEX: 30.5 KG/M2 | HEART RATE: 97 BPM | DIASTOLIC BLOOD PRESSURE: 77 MMHG

## 2022-02-17 DIAGNOSIS — F20.9 SCHIZOPHRENIA, UNSPECIFIED TYPE (H): ICD-10-CM

## 2022-02-17 DIAGNOSIS — E27.1 ADDISON'S DISEASE (H): ICD-10-CM

## 2022-02-17 DIAGNOSIS — R26.9 ABNORMAL GAIT: Primary | ICD-10-CM

## 2022-02-17 DIAGNOSIS — F43.10 PTSD (POST-TRAUMATIC STRESS DISORDER): ICD-10-CM

## 2022-02-17 DIAGNOSIS — R26.89 WIDEBASED GAIT: ICD-10-CM

## 2022-02-17 DIAGNOSIS — D75.1 ERYTHROCYTOSIS: ICD-10-CM

## 2022-02-17 PROCEDURE — 99214 OFFICE O/P EST MOD 30 MIN: CPT | Performed by: FAMILY MEDICINE

## 2022-02-17 NOTE — PROGRESS NOTES
Subjective:  31 year old male with concerns of follow up.   He is an unreliable historian due to mental health, language barrier, illiteracy, and poor health literacy.    Dizziness- chronic issue for patient. He says he has been taking meclizine but it does not work. He was given a referral for neurology but missed his appointment.  He has another appointment set up for March.  He doesn't know how to get there and does not now how to set up transportation    Poor appetite- on going for a long time. Not a new issue.   Weight stable to increased.    Cold- on going for 2-3 weeks, primary symptom is nasal congestion. He is currently prescribed flonase. When asked if he's still taking it, he said he didn't know how to and that it doesn't work. When he was told that it may not work if it is not being used correctly or at all, Toe then said he does know how to use it but it did not work.    Erythrocytosis- Noted on CBC done on 1/26/22; normal epo. Hematology referral made.  Attempts made to schedule, but patient didn't schedule.    Behavioral health- referral placed at last visit. He presented with a letter from [facility] that they had tried reaching him to schedule an appointment, but he does not know how to read the letter or set it up.    Transportation is an on-going issue for Toe. He needs transportation set up for every appointment he has.    He is currently receiving [facility]amedics home visits to set up medications two weeks at a time.    Home care was previously involved, but as of 1.1.2022, his previous agency could not longer work with his insurance.    ACT Total Scores 3/1/2021 5/13/2021 1/26/2022   ACT TOTAL SCORE (Goal Greater than or Equal to 20) 15 24 16   In the past 12 months, how many times did you visit the emergency room for your asthma without being admitted to the hospital? - 0 0   In the past 12 months, how many times were you hospitalized overnight because of your asthma? - 0 0        Outpatient Medications Prior to Visit   Medication Sig Dispense Refill     acetaminophen (TYLENOL) 325 MG tablet Take 2 tablets (650 mg) by mouth every 6 hours as needed for mild pain 90 tablet 3     albuterol (PROAIR HFA/PROVENTIL HFA/VENTOLIN HFA) 108 (90 Base) MCG/ACT inhaler Inhale 2 puffs into the lungs every 6 hours as needed for shortness of breath / dyspnea or wheezing 18 g 0     Ergocalciferol 50 MCG (2000 UT) TABS Take 2,000 Units by mouth daily 60 tablet 0     famotidine (PEPCID) 20 MG tablet Take 1 tablet (20 mg) by mouth 2 times daily 180 tablet 3     fludrocortisone (FLORINEF) 0.1 MG tablet Take 1 tablet (0.1 mg) by mouth daily 90 tablet 4     fluticasone (FLONASE) 50 MCG/ACT nasal spray Spray 1 spray into both nostrils daily as needed for rhinitis or allergies        fluticasone-salmeterol (ADVAIR-HFA) 115-21 MCG/ACT inhaler Inhale 2 puffs into the lungs 2 times daily 12 g 3     hydrocortisone (CORTEF) 10 MG tablet Take 1 tablet 10 mg by mouth in AM and 1/2 tablet in PM   May take Take 20 mg twice/day for 3 days if illness. 60 tablet 11     levothyroxine (SYNTHROID/LEVOTHROID) 75 MCG tablet Take 1 tablet (75 mcg) by mouth daily 90 tablet 1     loratadine (CLARITIN) 10 MG tablet Take 1 tablet (10 mg) by mouth daily 90 tablet 2     meclizine (ANTIVERT) 25 MG tablet Take 25 mg by mouth 3 times daily as needed for dizziness        Multiple Vitamin (MULTI-VITAMINS) TABS Take 1 tablet by mouth daily 90 tablet 2     OLANZapine (ZYPREXA) 5 MG tablet Take 1 tablet (5 mg) by mouth every morning 90 tablet 0     ondansetron (ZOFRAN) 4 MG tablet Take 1 tablet (4 mg) by mouth every 8 hours as needed for nausea 30 tablet 1     paliperidone ER (INVEGA) 6 MG 24 hr tablet Take 6 mg by mouth At Bedtime        polyethylene glycol (MIRALAX) 17 GM/Dose powder Take 17 g by mouth daily 510 g 1     No facility-administered medications prior to visit.      History   Smoking Status     Never Smoker   Smokeless Tobacco      Never Used      Objective:  /77 (BP Location: Right arm, Patient Position: Sitting, Cuff Size: Adult Regular)   Pulse 97   Resp 18   Wt 77.1 kg (170 lb)   BMI 30.50 kg/m    Wt Readings from Last 3 Encounters:   02/17/22 77.1 kg (170 lb)   01/26/22 76.2 kg (168 lb)   10/20/21 81.6 kg (180 lb)      Exam:  Constitutional: healthy, alert and no distress  ENT: nasal mucosa swollen  Cardiovascular: S1 and S2 auscultated, no murmur, rubs, or clicks  Respiratory: Lung sounds clear, normal respiratory effort  Skin: bronze, no ecchymosis  Neurologic: wide slow gait with cane, patient able to lift his feet off the ground, negative pronator drift test    Assessment and Plan:     Dizziness- Patient encouraged to keep his current neurology appointment. This would likely be beneficial for his abnormal gait as well.  RPR negative two years ago.    Nasal congestion- high suspicion for non adherence with flonase use due to swollen, inflamed nasal passages. He is already on Albuterol, Advair, and Claritin.  Could be viral vs. Irritant (anticipate poor housing situation given his and his family's difficulty in everyday tasks), vs. Mild Uri.    Care Coordination Referral placed- Patient will need someone to help him schedule appointments for behavioral health and hematology. He also needs someone to help him arrange transportation to and from those appointments.     Maria Patton, Student NP    Physician Attestation   I, Jose Rangel, was present with the NP student who participated in the service and in the documentation of the note.  I have verified the history and personally performed the physical exam and medical decision making.  I agree with the assessment and plan of care as documented in the note.       My time gathering history, formulating plan, medical decision making, coordination of care, and documentation exceeded 30 minutes.    Jose Rangel MD

## 2022-02-18 NOTE — PROGRESS NOTES
2/17/2022  Clinic Care Coordination Contact    Community Health Worker Follow Up    Care Gaps:     Health Maintenance Due   Topic Date Due     PREVENTIVE CARE VISIT  Never done     ASTHMA ACTION PLAN  Never done     ADVANCE CARE PLANNING  Never done     COVID-19 Vaccine (3 - Booster for Pfizer series) 02/03/2022       Care Gaps Last addressed on will discuss today at the doctor appt 2-17-22    Goals:   Goals Addressed as of 2/18/2022 at 11:12 AM                    2/17/22 2/4/22       Medical (pt-stated)   70%  70%    Added 7/28/21 by Joaquín Jimenez, [facility]      Goal Statement: I want support to schedule my appointments with my referrals that I was given within 2 months   Date Goal set: 02/03/21 Updated: 6-11-21   Barriers: Language, Transportation   Strengths:   Date to Achieve By: 12-   Patient expressed understanding of goal: Yes   Action steps to achieve this goal:   1.  I will call to CHW on 3-3-22 for support to set up medical ride so I can attend neurology appt on 3-17-22 at 1:40pm.  [facility]  [address]  [facility] [address]  [phone]  Fax: [phone]  [place]     Updated:2-17-22 AL           Other (pt-stated)   90%  90%    Added 7/28/21 by Joaquín Jimenez, [facility]      Goal Statement: I would like support to set up medical ride to attend 80% of medical appointments in the next 3 months to address health concerns and needs.   Date Goal set: 4/9/2021 updated. 6-11-21   Barriers: language, lack transportation   Strengths: medical transportation with [employer]   Date to Achieve By: 4-2022  Patient expressed understanding of goal: Yes   Action steps to achieve this goal:   1.  I will attend appt on 2-17-22 at 12:20 pm .  [facility].  [address] [place] [address]    Transportation  at 12pm    Updated: 2-17-22 AL           Other (pt-stated)   30%  20%    Added 1/19/22 by Scarlett Varma      Goal Statement: I  want support to switch to [employer] insurance as soon as possible.  Date goal set: 1/19/2022  Measure of Success: switch to [employer]  Barriers: language  Strengths: support from CCC  Date to Achieve By: 3-2022  Patient expressed understanding of goal: yes    Action steps to achieve this goal  1. I will provide MA/PMI# [id number] for now until I received [employer] insurance card.  2. I will wait to receive a confirmation letter that my insurance has changed In process   3 SW will monitor [place] for [place] change as well     Updated 2-17-22 AL         Other (pt-stated)   30%  10%    Added 1/19/22 by Scarlett Varma      Goal Statement: I want support to connect with new home care agency contract with [facility] for skilled nursing services for medication set up as soon as possible  Date goal set: 1/19/2022  Measure of Success: skilled nursing services  Barriers: language, switch to [employer]  Strengths: support from CCC   Date to Achieve By: 3-2022  Patient expressed understanding of goal: yes    Action steps to achieve this goal  1. I will continue to work with [facility]ic for medication setup until I have connect with home care agency for nursing services.  2. I will wait to hear from [facility] and/or [facility] team for Home Care     Updated: 2-17-22 AL                      Intervention and Education during outreach:   Received call from patient and patient's sister  Requested information about transportation and insurance information.  Informed patient that transportation will  at 12pm today to [facility] for 12:20pm.  Explained that [facility] still processing the information to switch to [employer]. Continue to use the PMI#/MA# for now until he received letter from the [facility] that his insurance been switched to [employer]  Explained to sister and pt and he has insurance it is currently [employer]   Wait to hear from the [facility].    Pt and sister verbalized understanding of information.      CHW Follow up:  Monthly    CHW Plan: Follow up on goals    CHW Next Follow Up: 3-4-22 set up ride

## 2022-02-21 ENCOUNTER — PATIENT OUTREACH (OUTPATIENT)
Dept: CARE COORDINATION | Facility: CLINIC | Age: 32
End: 2022-02-21

## 2022-02-21 NOTE — PROGRESS NOTES
2/21/2022  Clinic Care Coordination Contact  Care Team Conversations    PCP placed referral  to [facility] 2-17-22 for support  Comment: Follow up issues:   1. Behavioral health appointment   2. Hematology appointment (and transportation)   3. Home health--does he have an agency yet?   4. Neurology appointment transportation     Patient enrolled in [facility]    CHW will consult with CC Team 2-22-22      CHW Follow up: Monthly     CHW Plan: Follow up on goals     CHW Next Follow Up: 3-4-22 set up ride

## 2022-02-22 NOTE — PROGRESS NOTES
2/22/2022  Clinic Care Coordination Contact  Care Team Conversations    Consulted with CC Team   Plan:  CC SW will talk to patient tomorrow 2-23-22 regarding mental health providers  -wait to connect with home care agency until insurance switched to [facility]  Currently seeing Community Paramedic for medication management.    CC RN will follow up on the hematology appt    CHW will connect with pt 3-4-22 to set  Up medical ride for neurology appt on 3-16-22 check if transportation is active with [facility] or [place]

## 2022-02-23 ENCOUNTER — PATIENT OUTREACH (OUTPATIENT)
Dept: NURSING | Facility: CLINIC | Age: 32
End: 2022-02-23

## 2022-02-23 NOTE — PROGRESS NOTES
Clinic Care Coordination Contact  Care Team Conversations    CCC SHIRA contacted [facility] to determine if Toe Po is still receiving services from them for psychiatry. They confirmed he has not been seen since approx. May 2021.     SHIRA called [facility] intake and scheduled an appointment for Toe Po. He is scheduled on 3/22/2022 for a phone appointment.     SHIRA called the hematology and oncology clinic to schedule. He is scheduled on April 5th at 1:00pm. This will be in the [facility], second floor.  [address] [place] [address]    SW attempted to contact patient with an  to inform him of this. Patient's father answered, he is not with Toe Po at the time and is at the store. SW will call later.

## 2022-02-28 ENCOUNTER — PATIENT OUTREACH (OUTPATIENT)
Dept: CARE COORDINATION | Facility: CLINIC | Age: 32
End: 2022-02-28

## 2022-03-04 ENCOUNTER — PATIENT OUTREACH (OUTPATIENT)
Dept: CARE COORDINATION | Facility: CLINIC | Age: 32
End: 2022-03-04
Payer: COMMERCIAL

## 2022-03-04 ENCOUNTER — PATIENT OUTREACH (OUTPATIENT)
Dept: NURSING | Facility: CLINIC | Age: 32
End: 2022-03-04
Payer: COMMERCIAL

## 2022-03-04 NOTE — PROGRESS NOTES
3/4/2022   Clinic Care Coordination Contact  Care Team Conversations  Call: [employer] Transportation Ride  [phone]  RE: Ride for  3-17-22 at 1:20pm            Rep: Ruma  PMI# [id number]    Provided PMI#, verified patient demographics, destination address appointment date and time.    Stated patient was switched to [employer] as of 3-1-22.    Call: [facility] Transportation Line  Member Line:[phone]  Provider line [phone]  RE: Ride for 3-17-22 at 1:25pm At [facility]   [address] [place] [address], [place]                      Rep:  Edgar              Provided member MA ID#, verified patient demographics, destination address appointment date and time.    [employer]  [phone]  : between 12:50-1:10pm  will call return

## 2022-03-04 NOTE — PROGRESS NOTES
3/4/2022  Clinic Care Coordination Contact    Community Health Worker Follow Up    Care Gaps:     Health Maintenance Due   Topic Date Due     PREVENTIVE CARE VISIT  Never done     ASTHMA ACTION PLAN  Never done     ADVANCE CARE PLANNING  Never done     COVID-19 Vaccine (3 - Booster for Pfizer series) 02/03/2022       Care Gaps Last addressed on will discuss at next office visit     Goals:   Goals Addressed as of 3/4/2022 at 11:47 AM                    Today    2/17/22       Medical (pt-stated)   90%  70%    Added 7/28/21 by Joaquín Jimenez, [facility]      Goal Statement: I want support to schedule my appointments with my referrals that I was given within 2 months   Date Goal set: 02/03/21 Updated: 6-11-21   Barriers: Language, Transportation   Strengths:   Date to Achieve By: 12-   Patient expressed understanding of goal: Yes   Action steps to achieve this goal:   1.  [employer]  on 3-17-22 at 12:50-1:10 pm to neurology appt.  [employer]  [phone]  : between 12:50-1:10pm  will call return   2 passengers  Will call return      [facility]  [address]  [facility] [address]  [phone]  Fax: [phone]  [facility]   2. I will attend my phone visit for psychiatry on 3/22/2022  3. I will attend my hematology appointment on 4/5/2022    Updated:3-4-22 AL           Other (pt-stated)   90%  90%    Added 7/28/21 by Joaquín Jimenez, [facility]      Goal Statement: I would like support to set up medical ride to attend 80% of medical appointments in the next 3 months to address health concerns and needs.   Date Goal set: 4/9/2021 updated. 6-11-21   Barriers: language, lack transportation   Strengths: medical transportation with [employer]   Date to Achieve By: 4-2022  Patient expressed understanding of goal: Yes   Action steps to achieve this goal:   1.  I will  call CHW 1 week  before appt for support to set up ride with [facility]  [phone]    Updated: 3-4-22 AL           Other (pt-stated)   30%  30%    Added 1/19/22 by Scarlett Varma      Goal Statement: I want support to connect with new home care agency contract with [facility] for skilled nursing services for medication set up as soon as possible  Date goal set: 1/19/2022  Measure of Success: skilled nursing services  Barriers: language, switch to [facility]  Strengths: support from CCC   Date to Achieve By: 3-2022  Patient expressed understanding of goal: yes    Action steps to achieve this goal  1. I will meet and continue to work with Community Paramedic for medication setup until I have connect with home care agency for nursing services.  2. I will wait to hear from [facility] and/or [facility] team for Home Care     Updated: 3-4-22 AL          Intervention and Education during outreach:   Received call from patient and his sister Yves Castorena  Stated that no one came on Monday to set up his medications and need to set up ride. He needs help    Connected with GAVIOTA Manjarrez Community Paramedic regarding appt on Monday for med set up  Stated Ming and Piedad were out sick.  She was able to help  reschedule to 3-8-22 at 12pm with Ming,    Informed patient and sister that Ming will be there next week Tuesday 3-8-22 at 12pm.  Sister confirmed he has all his medications.    Informed patient and sister that transportation will pick him up on 3-17-22 to neurology appt  between 12:50-1:10pm with [employer]  [phone]      CHW Follow up: Monthly    CHW Plan: Follow up on goals    CHW Next Follow Up: 4-6-22

## 2022-03-04 NOTE — PROGRESS NOTES
Community Paramedic Program  Patient Follow Up    Received a message from pt's clinic CC CHW regarding pt's canceled CP visit earlier this week. I confirmed that CP2 was out sick and was unable to visit pt. Shared CP's availability with the CC CHW, who said she was on the phone with pt's sister. Confirmed date and time of rescheduled visit for medication set up.     Visit rescheduled: Yes; Tuesday, March 8th at noon at pt's home in [place].    Plan:  1. Pt will visit with the CP on March 8th at noon.  2. Pt will contact the CP CHW with questions, updates or to reschedule if needed.

## 2022-03-08 ENCOUNTER — PATIENT OUTREACH (OUTPATIENT)
Dept: NURSING | Facility: CLINIC | Age: 32
End: 2022-03-08
Payer: COMMERCIAL

## 2022-03-08 NOTE — PROGRESS NOTES
3/8/2022  Clinic Care Coordination Contact  Care Team Conversations    Received call from patient's sister   She reported no one came today at 12pm to help set up his medications.  She would to see if he can come in the clinic to get his meds set up.    CHW will consult with Community Paramedic and [facility] team CC RN best option    Will call patient's sister back tomorrow    CHW routed to CCC RN and GAVIOTA Bedolla CP for support.    .

## 2022-03-11 ENCOUNTER — ALLIED HEALTH/NURSE VISIT (OUTPATIENT)
Dept: OTHER | Facility: CLINIC | Age: 32
End: 2022-03-11
Payer: COMMERCIAL

## 2022-03-11 VITALS
OXYGEN SATURATION: 99 % | TEMPERATURE: 98.8 F | HEART RATE: 82 BPM | SYSTOLIC BLOOD PRESSURE: 108 MMHG | DIASTOLIC BLOOD PRESSURE: 50 MMHG

## 2022-03-11 DIAGNOSIS — Z79.899 MEDICATION MANAGEMENT: Primary | ICD-10-CM

## 2022-03-11 PROCEDURE — 99207 PR COMMUNITY PARAMEDIC-PATIENT MEETS CRITERIA: CPT

## 2022-03-11 ASSESSMENT — ACTIVITIES OF DAILY LIVING (ADL): DEPENDENT_IADLS:: CLEANING;COOKING;LAUNDRY;SHOPPING;MEAL PREPARATION;MEDICATION MANAGEMENT;TRANSPORTATION

## 2022-03-11 NOTE — PROGRESS NOTES
RECORDS STATUS - ALL OTHER DIAGNOSIS      RECORDS RECEIVED FROM:    DATE RECEIVED:    NOTES STATUS DETAILS   OFFICE NOTE from referring provider     OFFICE NOTE from medical oncologist     DISCHARGE SUMMARY from hospital     DISCHARGE REPORT from the ER     OPERATIVE REPORT     MEDICATION LIST     CLINICAL TRIAL TREATMENTS TO DATE     LABS     PATHOLOGY REPORTS     ANYTHING RELATED TO DIAGNOSIS     GENONOMIC TESTING     TYPE:     IMAGING (NEED IMAGES & REPORT)     CT SCANS     MRI     MAMMO     ULTRASOUND     PET

## 2022-03-12 NOTE — PROGRESS NOTES
Community Paramedics Follow-up Visit  March 11, 2022  TIME: 14:30    Brooke Peterson is a 31 year old male being seen at home for a follow-up visit.    Present at appointment: Patient, Parents    Primary Diagnosis: Psychosocial    Chief Complaint   Patient presents with     Outreach     Community Paramedic home visit       [place] Utilization:   Clinic Utilization  Difficulty keeping appointments:: No  Compliance Concerns: No  No-Show Concerns: Yes  No PCP office visit in Past Year: No  Utilization    Hospital Admissions  0             ED Visits  1             No Show Count (past year)  17                Current as of: 3/11/2022  6:18 PM              /50   Pulse 82   Temp 98.8  F (37.1  C)   SpO2 99%     Clinical Concerns:  Current Medical Concerns:      Current Behavioral Concerns: medication compliance    Education Provided to patient: went through pt's medications   Medication set up? Yes  Pill Box issued: No  Scale issued: No  Flu Shot given: No  COVID Vaccine Given: No  Lab draw or specimen collection: No  Food box issued: No  Collaborative visit with PCP: No  Wound Care: No    Health Maintenance Reviewed:      Clinical Pathway: None    No  Face to Face in Home / Community      Review of Symptoms/PE    Skin: negative  Eyes: negative  Ears/Nose/Throat: negative  Respiratory: No shortness of breath, dyspnea on exertion, cough, or hemoptysis  Cardiovascular: negative  Gastrointestinal: negative  Genitourinary: negative  Musculoskeletal: negative  Neurologic: negative  Psychiatric: negative    Pain Management::       Medication Review  Medication adherence problem (GOAL):: No  Knowledgeable about how to use meds:: No  Medication side effects suspected:: No    Diet/Exercise/Sleep  Diet:: Regular  Inadequate nutrition (GOAL):: No  Tube Feeding: No  Inadequate activity/exercise (GOAL):: No  Significant changes in sleep pattern (GOAL): No    Plan:     Time spent with patient: 90    The patient meets one or more of  the following criteria:  * Requires services to prevent readmission to a nursing home or hospital    Acute concern/Follow-up recommendations: follow care plan    Next CP visit scheduled: 03/25/2022    Issues for Provider to follow up on: Pt laid on his couch throughout visit. I went through his medications, set up two pill boxes and called pharmacy for needed refills. Pt did not talk until the end of the medication set up. He said he needed medication for his dizziness. CP showed pt his meclizine and read the instructions, pt showed understanding and was thankful.     Provider follow up visit needed: PADDY

## 2022-03-14 ENCOUNTER — PATIENT OUTREACH (OUTPATIENT)
Dept: NURSING | Facility: CLINIC | Age: 32
End: 2022-03-14
Payer: COMMERCIAL

## 2022-03-14 NOTE — PROGRESS NOTES
Clinic Care Coordination Contact  [facility]/Voicemail       Clinical Data: Care Coordinator Outreach  Outreach attempted x 1.  Left message on patient's voicemail with call back information and requested return call.  Plan: CCC to outreach per standard work and updated on goal progression

## 2022-03-18 NOTE — PROGRESS NOTES
"Collaborative Care Psychiatry Service  Provider Name: Makenzie Bowles MSW [facility]    PATIENT'S NAME: Brooke SCHAEFER Po  PREFERRED NAME: Toe  PREFERRED PRONOUNS:    MRN:   [id number]  :   1990   ACCT. NUMBER: [id number]  DATE OF SERVICE: 3/22/22  START TIME: 138pm  END TIME: 207pm    BRIEF ADULT DIAGNOSTIC ASSESSMENT    Telemedicine Visit: The patient's condition can be safely assessed and treated via synchronous audio and visual telemedicine encounter.      Reason for Telemedicine Visit: Services only offered telehealth    Originating Site (Patient Location): Patient's home    Distant Site (Provider Location): Provider Remote Setting- Home Office    Consent:  The patient/guardian has verbally consented to: the potential risks and benefits of telemedicine (video visit) versus in person care; bill my insurance or make self-payment for services provided; and responsibility for payment of non-covered services.     Mode of Communication:  phone    As the provider I attest to compliance with applicable laws and regulations related to telemedicine.    Identifying Information:  Patient is a 31 year old, Angela.  The pronoun use throughout this assessment reflects the patient's chosen pronoun.  Patient was referred for an assessment by primary care provider.  Patient attended the session with .     Chief Complaint:   The reason for seeking services at this time is: \"dizzy, nauseous, hear feels heavy \"   The problem(s) began \"feels like a very long time\". Patient has not attempted to resolve these concerns in the past. Physical health conditions: asthma, dizzy, tired, falling. Reports that he saw his PCP for dizzyness but he couldn't remember what the PCP said.     Does the client have any condition that is currently presenting as a potential to harm themselves or others (severe withdrawal, serious medical condition, severe emotional/behavioral problem)? No.  Proceed with assessment.    Review of Symptoms per " patient report:  Depression: Lack of interest, Feeling sad, down, or depressed and sleep is poor, energy is low, appetite is poor  Cassi:  No Symptoms  Psychosis: Auditory hallucinations, Visual hallucinations and but couldn't clarify  Anxiety: Excessive worry, Nervousness and Sleep disturbance  Panic:  No symptoms  Post Traumatic Stress Disorder:  No Symptoms   Eating Disorder: No Symptoms  ADD / ADHD:  No symptoms  Conduct Disorder: No symptoms  Autism Spectrum Disorder: No symptoms  Obsessive Compulsive Disorder: No Symptoms    Sleep: poor    Caffeine: unremarkable  Tobacco: No    Current alcohol use: No  Current drug use: No    Rating Scales:  PHQ-9:   [facility] Follow-up to PHQ 7/12/2021 1/26/2022 3/22/2022   PHQ-9 9. Suicide Ideation past 2 weeks Not at all Not at all Not at all   Some encounter information is confidential and restricted. Go to Review Flowsheets activity to see all data.      GAD7:    RADHA-7 SCORE 3/22/2022   Total Score 9   Some encounter information is confidential and restricted. Go to Review Flowsheets activity to see all data.     CGI:  First:  No data recorded  Most recent:  No data recorded    WHODAS: No flowsheet data found.     CAGE:  No flowsheet data found.      Personal Medical History:  Past Medical History:   Diagnosis Date     [place]'s disease (H) 07/2017     Asthma      Asthma      Hypercalcemia 09/2019     Hyperprolactinemia (H) 12/2018     Hypothyroidism      Hypovitaminosis D 11/2018     Pituitary microadenoma (H)      PTSD (post-traumatic stress disorder)      Schizophrenia (H)        Patient has not received mental health services in the past: NA.  Psychiatric Hospitalizations: None.  Patient denies a history of civil commitment. Currently, patient is not receiving other mental health services.  These include none.     Patient does not report a history of head injury / trauma / cognitive impairment / seizures.      Current Medications:  Current Outpatient Medications  "  Medication Sig Dispense Refill     acetaminophen (TYLENOL) 325 MG tablet Take 2 tablets (650 mg) by mouth every 6 hours as needed for mild pain 90 tablet 3     albuterol (PROAIR HFA/PROVENTIL HFA/VENTOLIN HFA) 108 (90 Base) MCG/ACT inhaler Inhale 2 puffs into the lungs every 6 hours as needed for shortness of breath / dyspnea or wheezing 18 g 0     Ergocalciferol 50 MCG (2000 UT) TABS Take 2,000 Units by mouth daily 60 tablet 0     famotidine (PEPCID) 20 MG tablet Take 1 tablet (20 mg) by mouth 2 times daily 180 tablet 3     fludrocortisone (FLORINEF) 0.1 MG tablet Take 1 tablet (0.1 mg) by mouth daily 90 tablet 4     fluticasone (FLONASE) 50 MCG/ACT nasal spray Spray 1 spray into both nostrils daily as needed for rhinitis or allergies        fluticasone-salmeterol (ADVAIR-HFA) 115-21 MCG/ACT inhaler Inhale 2 puffs into the lungs 2 times daily 12 g 3     hydrocortisone (CORTEF) 10 MG tablet Take 1 tablet 10 mg by mouth in AM and 1/2 tablet in PM   May take Take 20 mg twice/day for 3 days if illness. 60 tablet 11     levothyroxine (SYNTHROID/LEVOTHROID) 75 MCG tablet Take 1 tablet (75 mcg) by mouth daily 90 tablet 1     loratadine (CLARITIN) 10 MG tablet Take 1 tablet (10 mg) by mouth daily 90 tablet 2     Multiple Vitamin (MULTI-VITAMINS) TABS Take 1 tablet by mouth daily 90 tablet 2     OLANZapine (ZYPREXA) 5 MG tablet Take 1 tablet (5 mg) by mouth every morning 90 tablet 0     polyethylene glycol (MIRALAX) 17 GM/Dose powder Take 17 g by mouth daily 510 g 1        Allergies:  No Known Allergies    Family Psychiatric History:  Patient did not report a family history of mental health concerns.     Family History     Problem (# of Occurrences) Relation (Name,Age of Onset)    Diabetes (1) Mother       Negative family history of: Cancer          Social/Family History:  Patient reported they grew up in \"I don't know\".  They were raised by biological parents. Patient reported that   childhood was \"I don't know\".  " Patient denies experiencing childhood abuse/neglect. Patient described their current relationships with family of origin as good.      The patient has been  0 times and has no children.   described the relationship with   spouse as, NA. Patient reported having few good friends.     Cultural influences and impact on patient's life structure, values, norms, and healthcare: Racial or Ethnic Self-Identification Angela, Immigration History and Status: unknown, Level of Acculturation: fair, Time Orientation: US 12 hour clock CT, Social Orientation: unable to assess, Verbal / Non-verbal Communication Style: unremarkable, Locus of Control: unable to assess, Spiritual Beliefs: unknown, Health Beliefs and the endorsement of OR engagement in Culturally Specific Healing Practices: none and Cultural Bias none. Patient identified their preferred language to be Angela. Patient reported they does need the assistance of an  or other support involved in treatment.       Educational/Occupational History:  Patient reported   highest education level was pt reports that he's never attended school. The patient did not serve in the .  Patient is currently unemployed.      Social History     Socioeconomic History     Marital status: Single     Spouse name: Not on file     Number of children: Not on file     Years of education: Not on file     Highest education level: Not on file   Occupational History     Not on file   Tobacco Use     Smoking status: Never Smoker     Smokeless tobacco: Never Used   Substance and Sexual Activity     Alcohol use: Not Currently     Drug use: Never     Sexual activity: Not on file   Other Topics Concern     Not on file   Social History Narrative     Not on file     Social Determinants of Health     Financial Resource Strain: Medium Risk     Difficulty of Paying Living Expenses: Somewhat hard   Food Insecurity: No Food Insecurity     Worried About Running Out of Food in the Last Year:  Never true     Ran Out of Food in the Last Year: Never true   Transportation Needs: Unmet Transportation Needs     Lack of Transportation (Medical): Yes     Lack of Transportation (Non-Medical): No   Physical Activity: Insufficiently Active     Days of Exercise per Week: 4 days     Minutes of Exercise per Session: 10 min   Stress: No Stress Concern Present     Feeling of Stress : Only a little   Social Connections: Socially Isolated     Frequency of Communication with Friends and Family: Never     Frequency of Social Gatherings with Friends and Family: More than three times a week     Attends [employer] Services: Never     Active Member of Clubs or Organizations: No     Attends Club or Organization Meetings: Never     Marital Status: Never    Intimate Partner Violence: Not At Risk     Fear of Current or Ex-Partner: No     Emotionally Abused: No     Physically Abused: No     Sexually Abused: No   Housing Stability: Low Risk      Unable to Pay for Housing in the Last Year: No     Number of Places Lived in the Last Year: 1     Unstable Housing in the Last Year: No       Patient reported that they have not been involved with the legal system.   Patient denies being on probation / parole / under the jurisdiction of the court.    Current Mental Status Exam:   Appearance:   Appropriate    Eye Contact:   Good   Psychomotor:   Normal   Attitude / Demeanor:  Cooperative   Speech      Rate / Production:  Normal/ Responsive      Volume:   Normal  volume      Language:   intact  Mood:    Depressed   Affect:    unable to assess-phone    Thought Content:  Clear   Thought Process:  Coherent  Logical       Associations:  No loosening of associations  Insight:    Good   Judgment:   Intact   Orientation:   All  Attention/concentration: Good      Safety Assessment:   Current Safety Concerns:  [place] Suicide Severity Rating Scale (Lifetime/Recent)  [place] Suicide Severity Rating (Lifetime/Recent) 3/22/2022   1. Wish to be Dead  (Lifetime) 0   2. Non-Specific Active Suicidal Thoughts (Lifetime) 0   Actual Attempt (Lifetime) 0   Has subject engaged in non-suicidal self-injurious behavior? (Lifetime) 0   Interrupted Attempts (Lifetime) 0   Aborted or Self-Interrupted Attempt (Lifetime) 0   Preparatory Acts or Behavior (Lifetime) 0   Calculated C-SSRS Risk Score (Lifetime/Recent) No Risk Indicated   Some encounter information is confidential and restricted. Go to Review Flowsheets activity to see all data.     Patient denies current homicidal ideation and behaviors.  Patient denies current self-injurious ideation and behaviors.    Patient denied risk behaviors associated with substance use.  Patient denies any high risk behaviors associated with mental health symptoms.  Patient reports the following current concerns for their personal safety: None.  Patient reports there no firearms in the house. .     History of Safety Concerns:  Patient denied a history of homicidal ideation.     Patient denied a history of personal safety concerns.    Patient denied a history of assaultive behaviors.    Patient denied a history of sexual assault behaviors.     Patient denied a history of risk behaviors associated with substance use.  Patient denies any history of high risk behaviors associated with mental health symptoms.  Patient reports the following protective factors: positive relationships positive family connections    Risk Plan:  See Preliminary Treatment Plan for Safety and Risk Management Plan    Diagnosis:  Diagnostic Criteria:   Major Depressive Disorder  CRITERIA (A-C) REPRESENT A MAJOR DEPRESSIVE EPISODE - SELECT THESE CRITERIA  A) Recurrent episode(s) - symptoms have been present during the same 2-week period and represent a change from previous functioning 5 or more symptoms (required for diagnosis)   - Depressed mood. Note: In children and adolescents, can be irritable mood.     - Diminished interest or pleasure in all, or almost all,  activities.    - Decreased sleep.    - Fatigue or loss of energy.    - Diminished ability to think or concentrate, or indecisiveness.   B) The symptoms cause clinically significant distress or impairment in social, occupational, or other important areas of functioning  C) The episode is not attributable to the physiological effects of a substance or to another medical condition  D) The occurence of major depressive episode is not better explained by other thought / psychotic disorders  E) There has never been a manic episode or hypomanic episode    Diagnoses:  1. Depression, major, recurrent, moderate (H)        Patient's Strengths and Limitations:  Patient identified the following strengths or resources that will help them succeed in treatment: family support. Things that may interfere with the patient's success in treatment include: none identified.     Recommendations:     1. Plan for Safety and Risk Management:Recommended that patient call 911 or go to the local ED should there be a change in any of these risk factors..  Report to child / adult protection services was NA.      2. Resources/Service Plan:       services are not indicated.     Modifications to assist communication are not indicated.     Additional disability accommodations are not indicated.      3. Collaboration:  Collaboration / coordination of treatment will be initiated with the following support professionals: Collaborated with Lynn Rodgers, MSN, APRN, CNP, FMHNP-BC, [facility] CCPS.      4.  Referrals:   The following referral(s) will be initiated: NA.       Staff Name/Credentials:  Makenzie GUADALUPE [facility]  March 22, 2022

## 2022-03-22 ENCOUNTER — VIRTUAL VISIT (OUTPATIENT)
Dept: BEHAVIORAL HEALTH | Facility: CLINIC | Age: 32
End: 2022-03-22
Attending: FAMILY MEDICINE
Payer: COMMERCIAL

## 2022-03-22 ENCOUNTER — VIRTUAL VISIT (OUTPATIENT)
Dept: PSYCHIATRY | Facility: CLINIC | Age: 32
End: 2022-03-22
Attending: FAMILY MEDICINE
Payer: COMMERCIAL

## 2022-03-22 DIAGNOSIS — F06.1 SCHIZOAFFECTIVE DISORDER, DEPRESSIVE TYPE, WITH CATATONIA (H): ICD-10-CM

## 2022-03-22 DIAGNOSIS — F25.1 SCHIZOAFFECTIVE DISORDER, DEPRESSIVE TYPE, WITH CATATONIA (H): ICD-10-CM

## 2022-03-22 DIAGNOSIS — F33.1 DEPRESSION, MAJOR, RECURRENT, MODERATE (H): Primary | ICD-10-CM

## 2022-03-22 DIAGNOSIS — Z60.3 IMMIGRANT WITH LANGUAGE DIFFICULTY: Primary | ICD-10-CM

## 2022-03-22 DIAGNOSIS — Z79.899 HIGH RISK MEDICATION USE: ICD-10-CM

## 2022-03-22 DIAGNOSIS — F20.9 SCHIZOPHRENIA, UNSPECIFIED TYPE (H): ICD-10-CM

## 2022-03-22 DIAGNOSIS — F43.10 PTSD (POST-TRAUMATIC STRESS DISORDER): ICD-10-CM

## 2022-03-22 PROCEDURE — 99215 OFFICE O/P EST HI 40 MIN: CPT | Mod: 95 | Performed by: NURSE PRACTITIONER

## 2022-03-22 PROCEDURE — 90791 PSYCH DIAGNOSTIC EVALUATION: CPT | Mod: 52 | Performed by: SOCIAL WORKER

## 2022-03-22 SDOH — SOCIAL STABILITY - SOCIAL INSECURITY: ACCULTURATION DIFFICULTY: Z60.3

## 2022-03-22 ASSESSMENT — ANXIETY QUESTIONNAIRES
GAD7 TOTAL SCORE: 9
3. WORRYING TOO MUCH ABOUT DIFFERENT THINGS: SEVERAL DAYS
2. NOT BEING ABLE TO STOP OR CONTROL WORRYING: SEVERAL DAYS
6. BECOMING EASILY ANNOYED OR IRRITABLE: NOT AT ALL
7. FEELING AFRAID AS IF SOMETHING AWFUL MIGHT HAPPEN: SEVERAL DAYS
1. FEELING NERVOUS, ANXIOUS, OR ON EDGE: NEARLY EVERY DAY
5. BEING SO RESTLESS THAT IT IS HARD TO SIT STILL: NOT AT ALL

## 2022-03-22 ASSESSMENT — COLUMBIA-SUICIDE SEVERITY RATING SCALE - C-SSRS
ATTEMPT LIFETIME: NO
6. HAVE YOU EVER DONE ANYTHING, STARTED TO DO ANYTHING, OR PREPARED TO DO ANYTHING TO END YOUR LIFE?: NO
TOTAL  NUMBER OF ABORTED OR SELF INTERRUPTED ATTEMPTS LIFETIME: NO
TOTAL  NUMBER OF INTERRUPTED ATTEMPTS LIFETIME: NO
1. HAVE YOU WISHED YOU WERE DEAD OR WISHED YOU COULD GO TO SLEEP AND NOT WAKE UP?: NO
2. HAVE YOU ACTUALLY HAD ANY THOUGHTS OF KILLING YOURSELF?: NO

## 2022-03-22 ASSESSMENT — PATIENT HEALTH QUESTIONNAIRE - PHQ9
SUM OF ALL RESPONSES TO PHQ QUESTIONS 1-9: 11
5. POOR APPETITE OR OVEREATING: NEARLY EVERY DAY

## 2022-03-22 NOTE — PROGRESS NOTES
CCPS Referral from PCP     Name:  Brooke Peterson  : 1990    Brooke Peterson is a 31 year old male who is being evaluated via a billable phone visit.      Telemedicine Visit: The patient's condition can be safely assessed and treated via synchronous audio and visual telemedicine encounter.      Reason for Telemedicine Visit: COVID 19 pandemic and the social and physical recommendations by the CDC and [facility].      Originating Site (Patient Location): Patient's home    Distant Site (Provider Location): Provider Remote Setting    Consent:  The patient/guardian has verbally consented to: the potential risks and benefits of telemedicine (video visit or phone) versus in person care; bill my insurance or make self-payment for services provided; and responsibility for payment of non-covered services.     Mode of Communication:  Doximity phone call    As the provider I attest to compliance with applicable laws and regulations related to telemedicine.    IDENTIFICATION   Referred by: Jose Rangel MD [facility]   Patient Care Team:  Jose Rangel MD as PCP - General (Family Practice)  Maddison Witt MD as MD (INTERNAL MEDICINE - ENDOCRINOLOGY, DIABETES & METABOLISM)  Scarlett Varma as Community Health Worker (Primary Care - CC)  Ioana Mendoza, RN as Specialty Care Coordinator (INTERNAL MEDICINE - ENDOCRINOLOGY, DIABETES & METABOLISM)  Joaquín Jimenez LICSW as Lead Care Coordinator (Primary Care - CC)  Maddison Witt MD as Assigned Endocrinology Provider  Jose Rangel MD as Assigned PCP  Christiano Beckwith DPM as Assigned Musculoskeletal Provider  Nithya Nathan MD as MD (Neurology)  Caprice Haile, NP  Renetta, Home Care RN  as Registered Nurse (HOME HEALTH AGENCY (HH), (HI))  [employer] Transportation Line   Cecile Montana RN as Clinic Care Coordinator (Primary Care - CC)  Ming Harris, MARKP, CP as Community Paramedic  Felipe Silver MD (Internal  "Medicine)    History was provided by patient  who were poor historian(s).    Patient met with [facility] via telephone with an .  When this provider called with the  the father answered and indicated the patient had gone to bed.  Very challenging discussion.  The father did ultimately wake up patient who answered \"I do not know\" to all questions.  It became apparent that a video telephone call was not an appropriate way to assess patient's mental or physical health or any untoward side effects from the antipsychotic he is currently on    RECORDS AVAILABLE FOR REVIEW: EHR records through [facility] .  In addition, reviewed the assessment today completed by Makenzie Bowles [facility], Behavioral Health Consultant                                                 CHIEF COMPLAINT   Patient is a 31 year old,   Not  or  male  who presents for initial psychiatric evaluation. Referred by   their Primary Care Provider: Jose Rangel MD to the [facility]ry Service (CCPS) for evaluation of schizoaffective disorder versus schizophrenia.  Our psychiatry providers act as a specialty service for Primary Care Providers in the [facility] who seek to optimize medications for unstable patients.  Once medications have been optimized, our providers discharge the patient back to the referring Primary Care Provider for ongoing medication management.  This type of system allows our providers to serve a high volume of patients.      Note by PCP day of referral:  \"diagnostic clarity, assess for appropriate medications\"    He is a nearly impossible historian due to mental health, language barrier, illiteracy, and fund of knowledge.     A known issue today is about his home care.  Needs RN to set up meds.  Insurance change and home care agency no longer contracted with his insurance.  NP student working with me set up his pill boxes today, and they should be set for two weeks.   " "  Referred to neurology to address chronic dizziness, which he still has, today.  No changes that I can appreciate.  Most recent MRI brain June 2019 was normal.  No showed appointment with Neurology.  Rescheduled for March.     Old home care notes suggested he was taking Invega.  In this EHR, only listed as a reported medicine.  Patient does not know if he is receiving care from a psychiatrist or not.  I suspect he is not.  I would like him to access care within our system.    HISTORY OF PRESENT ILLNESS   Per [facility], Makenzie Bowles, during today's team-based visit:  \"\"The reason for seeking services at this time is: \"dizzy, nauseous, hear feels heavy \" The problem(s) began \"feels like a very long time\". Patient has not attempted to resolve these concerns in the past. Physical health conditions: asthma, dizzy, tired, falling. Reports that he saw his PCP for dizzyness but he couldn't remember what the PCP said.\"\"    PAST PSYCHOTROPIC MEDICATIONS:  Paliperidone, Caprice Mic, ARNP, PMHNP    VITALS   There were no vitals taken for this visit.     BP Readings from Last 1 Encounters:   03/11/22 108/50     Pulse Readings from Last 1 Encounters:   03/11/22 82     Wt Readings from Last 1 Encounters:   02/17/22 77.1 kg (170 lb)     Ht Readings from Last 1 Encounters:   01/26/22 1.59 m (5' 2.6\")     Estimated body mass index is 30.5 kg/m  as calculated from the following:    Height as of 1/26/22: 1.59 m (5' 2.6\").    Weight as of 2/17/22: 77.1 kg (170 lb).        LABS & IMAGING                                                                                                                   Recent Labs   Lab Test 01/26/22  1227   WBC 7.6   HGB 18.1*   HCT 53.9*   MCV 86        Recent Labs   Lab Test 01/26/22  1227      POTASSIUM 4.4   CHLORIDE 99   CO2 26   GLC 88   LASHANW 10.7*   BUN 10   CR 0.91   GFRESTIMATED >90   ALBUMIN 4.9   PROTTOTAL 8.6*   AST 27   ALT 46*   ALKPHOS 84   BILITOTAL 1.0     Recent Labs   Lab " Test 01/26/22  1227   CHOL 255*   *   HDL 34*   TRIG 213*     Recent Labs   Lab Test 01/26/22  1227   TSH 2.97   T4 1.16        ALLERGY & IMMUNIZATIONS     No Known Allergies     ASSESSMENT AND PLAN      Problem List Items Addressed This Visit        Behavioral    PTSD (post-traumatic stress disorder)    Relevant Orders    Adult Mental Health  Referral    Schizoaffective disorder, depressive type, with catatonia (H)    Relevant Orders    Adult Mental Health  Referral    Schizophrenia (H)    Relevant Orders    Adult Mental Health  Referral       Other    Immigrant with language difficulty - Primary    Relevant Orders    Adult Mental Health  Referral      Other Visit Diagnoses     High risk medication use        Relevant Orders    Adult Mental Health  Referral           Pt referred to CCPS but very poor historian due to many factors.  History of SPMI presumably, no records available.  Patient states that he does not know all how long he has been on olanzapine and he is unclear who initially had prescribed it.  He was recently provided 90 days in late January.  At this time do not feel comfortable making any medication changes due to limited assessment.  Able to articulate to patient that a referral would be placed for in person assessment within the [facility].  Patient has [employer] and does not have transportation so medical transportation will be necessary.  A referral for care coordination has been placed by his primary care    Time spent interviewing patient, reviewing referral documents, obtaining and reviewing outside records, communication with other health specialists, and preparing this report on today's date  Video/Phone Start Time: 2:00 pm  Video/Phone End Time: 2;50 pm        Signed:   Lynn Rodgers, MSN, APRN, FMHNP-[facility]   Chart documentation done in part with Dragon Voice Recognition software.   Although reviewed after completion, some word and grammatical errors may remain.

## 2022-03-22 NOTE — Clinical Note
Please read the [facility] assessment from today.  Referral made to transition clinic due to multiple barriers and need for in person assessment.  Thank you!     Lynn Rodgers, MSN, APRN, CNP, FMHNP-BC,

## 2022-03-23 ASSESSMENT — ANXIETY QUESTIONNAIRES: GAD7 TOTAL SCORE: 9

## 2022-03-24 ENCOUNTER — TELEPHONE (OUTPATIENT)
Dept: BEHAVIORAL HEALTH | Facility: CLINIC | Age: 32
End: 2022-03-24
Payer: COMMERCIAL

## 2022-03-24 NOTE — TELEPHONE ENCOUNTER
Please schedule this patient with a long term psych provider per Lynn Rodgers.   Thanks  Shilpi Stevens RN on 3/24/2022 at 2:55 PM

## 2022-03-25 ENCOUNTER — ALLIED HEALTH/NURSE VISIT (OUTPATIENT)
Dept: OTHER | Facility: CLINIC | Age: 32
End: 2022-03-25
Payer: COMMERCIAL

## 2022-03-25 VITALS
SYSTOLIC BLOOD PRESSURE: 104 MMHG | OXYGEN SATURATION: 72 % | RESPIRATION RATE: 14 BRPM | TEMPERATURE: 98 F | HEART RATE: 97 BPM | DIASTOLIC BLOOD PRESSURE: 80 MMHG

## 2022-03-25 DIAGNOSIS — Z79.899 MEDICATION MANAGEMENT: Primary | ICD-10-CM

## 2022-03-25 PROCEDURE — 99207 PR COMMUNITY PARAMEDIC-PATIENT MEETS CRITERIA: CPT

## 2022-03-25 ASSESSMENT — ACTIVITIES OF DAILY LIVING (ADL): DEPENDENT_IADLS:: CLEANING;COOKING;LAUNDRY;SHOPPING;MEAL PREPARATION;MEDICATION MANAGEMENT;TRANSPORTATION

## 2022-03-28 NOTE — PROGRESS NOTES
Community Paramedics Follow-up Visit  March 25, 2022  TIME: 1030    Brooke Peterson is a 31 year old male being seen at home for a follow-up visit.    Present at appointment: Patient, Parents    Primary Diagnosis: Psychosocial    Chief Complaint   Patient presents with     Outreach     Community Paramedic home visit       [place] Utilization:   Clinic Utilization  Difficulty keeping appointments:: No  Compliance Concerns: No  No-Show Concerns: Yes  No PCP office visit in Past Year: No  Utilization    Hospital Admissions  0             ED Visits  1             No Show Count (past year)  17                Current as of: 3/27/2022  7:21 PM              /80 (BP Location: Left arm, Patient Position: Supine)   Pulse 97   Temp 98  F (36.7  C)   Resp 14   SpO2 (!) 72%     Clinical Concerns:  Current Medical Concerns:      Current Behavioral Concerns: medication compliance    Education Provided to patient: went through medications   Medication set up? Yes  Pill Box issued: No  Scale issued: No  Flu Shot given: No  COVID Vaccine Given: No  Lab draw or specimen collection: No  Food box issued: No  Collaborative visit with PCP: No  Wound Care: No    Health Maintenance Reviewed:      Clinical Pathway: None    No  Face to Face in Home / Community      Review of Symptoms/PE    Skin: negative  Eyes: negative  Ears/Nose/Throat: negative  Respiratory: No shortness of breath, dyspnea on exertion, cough, or hemoptysis  Cardiovascular: negative  Gastrointestinal: negative  Genitourinary: negative  Musculoskeletal: negative  Neurologic: negative  Psychiatric: negative    Pain Management::       Medication Review  Medication adherence problem (GOAL):: No  Knowledgeable about how to use meds:: No  Medication side effects suspected:: No    Diet/Exercise/Sleep  Diet:: Regular  Inadequate nutrition (GOAL):: No  Tube Feeding: No  Inadequate activity/exercise (GOAL):: No  Significant changes in sleep pattern (GOAL): No    Plan:     Time  "spent with patient: 60    The patient meets one or more of the following criteria:  * Requires services to prevent readmission to a nursing home or hospital    Acute concern/Follow-up recommendations: follow care    Next CP visit scheduled: 04/07/2022    Issues for Provider to follow up on: set up medications for two weeks, called pharmacy and reordered medications that were close to empty. Pt was concerned that his Ventolin inhaler was empty. Advised pt that he was out of refills but a fax was sent to his PCP for a refill. Pt said she was short of breath and needed to see his PCP advised pt that if he was short of breath he would need to go to ED or Urgent Care. CP listened to lungs, told pt his lungs were clear and he responded with \"I feel fine right now.\" Advised pt again if he felt SOB to be seen in ED or Urgent Care to which pt agreed.    Provider follow up visit needed: TBD      "

## 2022-03-28 NOTE — PROGRESS NOTES
3/28/2022  Clinic Care Coordination Contact  Care Team Conversations    Received message from 3-22-22  Lynn Rodgers, APRN JUDITH Varma, Scarlett  Good afternoon! I had a referral to see patient within [facility].  However, I was not able to complete a full assessment.  He has been referred to [facility] as they can see him in person.  He stated he has to use the medical cab through insurance.  Once appointment is made, can you help navigate this?  Thank you!     Lynn Rodgers, MSN, APRN, CNP, FMHNP-BC,

## 2022-03-29 ENCOUNTER — PATIENT OUTREACH (OUTPATIENT)
Dept: CARE COORDINATION | Facility: CLINIC | Age: 32
End: 2022-03-29
Payer: COMMERCIAL

## 2022-03-29 ENCOUNTER — PATIENT OUTREACH (OUTPATIENT)
Dept: NURSING | Facility: CLINIC | Age: 32
End: 2022-03-29
Payer: COMMERCIAL

## 2022-03-29 DIAGNOSIS — J45.40 MODERATE PERSISTENT ASTHMA WITHOUT COMPLICATION: ICD-10-CM

## 2022-03-29 RX ORDER — ONDANSETRON 4 MG/1
4 TABLET, FILM COATED ORAL EVERY 8 HOURS PRN
COMMUNITY
Start: 2022-03-25 | End: 2022-09-09

## 2022-03-29 NOTE — TELEPHONE ENCOUNTER
3/29/2022  Patient's sister call stated patient needs new refills for 2 medications   Bentolin   Ondansetron 4mg    Sent to [facility]

## 2022-03-29 NOTE — PROGRESS NOTES
Clinic Care Coordination Contact    Situation: Patient chart reviewed by care coordinator.    Background: SW completed chart review    Assessment: CHW in contact with patient. Patient attended psych appointment, they are referring to in person provider. Waiting for scheduling.      Plan/Recommendations: Standard outreach

## 2022-03-29 NOTE — PROGRESS NOTES
3/29/2022  Clinic Care Coordination Contact  Community Health Worker Follow Up  Received call from patient and patient's sister.    Sister reported he needs new refill for    for 2 medications   Bentolin   Ondansetron 4mg.  Nurse call the pharmacy but they need the doctor to order new refill  [facility]  CHW sent telephone message to PCP Care Team pool new refills    Reminded of appt on 4-5-22 at 12:45pm to see oncology.  Sister requested help to set up ride for him    Educated and discussed with patient and sister if she can help to call to set up medical ride through [employer].  Sister opened to help to schedule to [facility] but not sure about speciality appts since she will not know the address or the place.  Sister took down the [employer] line number [phone].  Provided [employer] member ID# when calling .  Suggested sister to provide the [employer] Member ID# to [facility].    Reinforced and encouraged patient to call CHW 1 week before the appt to set up medical ride until sister confident to set up ride to speciality appt.      CHW Follow up: Monthly  CHW Plan: Follow up on goals  CHW Next Follow Up: 5-3-22  Clinic Care Coordination Contact    Community Health Worker Follow Up    Care Gaps:     Health Maintenance Due   Topic Date Due     PREVENTIVE CARE VISIT  Never done     ASTHMA ACTION PLAN  Never done     ADVANCE CARE PLANNING  Never done     DEPRESSION ACTION PLAN  Never done     COVID-19 Vaccine (3 - Booster for Pfizer series) 02/03/2022       Care Gap Goal set: Yes sister will call to schedule    Goals:   Goals Addressed as of 3/29/2022 at 3:20 PM                    Today    3/4/22       Other (pt-stated)   90%  90%    Added 7/28/21 by Joaquín Jimenez, [facility]      Goal Statement: I would like support to set up medical ride to attend 80% of medical appointments in the next 3 months to address health concerns and needs.   Date Goal set: 4/9/2021 updated. 6-11-21    Barriers: language, lack transportation   Strengths: medical transportation with [employer]   Date to Achieve By: 4-2022  Patient expressed understanding of goal: Yes   Action steps to achieve this goal:   1.  My sister will support to set up ride through [facility] Transportation Line [phone] to [facility].  2. My sister will help to call CHW 1 week  before appt for support to set up ride to speciality appointments through [employer] [phone]    Appt 4-5-22 at 12:45pm  [facility]  [address] [place] [address]  Dr. Felipe Silver    Updated: 3-29-22 AL           Other (pt-stated)   30%  30%    Added 1/19/22 by Scarlett Varma      Goal Statement: I want support to connect with new home care agency contract with [facility] insurance for skilled nursing services for medication set up as soon as possible  Date goal set: 1/19/2022 Updated 3-29-22  Measure of Success: skilled nursing services  Barriers: language  Strengths: support from CCC, [facility] insurance   Date to Achieve By: 6-2022  Patient expressed understanding of goal: yes  Action steps to achieve this goal  1. I will meet and continue to work with Community Paramedic for medication setup until I have connect with home care agency for nursing services.  2. I will wait to hear from [facility] and/or [facility] team for Home Care     Updated: 3-29-22 AL

## 2022-03-29 NOTE — PROGRESS NOTES
3/29/2022  Clinic Care Coordination Contact  Care Team Conversations    Call: [employer]  Member Line:[phone]  Provider line [phone]  RE: [facility]  Rep:  Tanesha             Provided member ID, verified patient demographics, destination address appointment date and time.    set up ride for 4-5-22 at 12:45pm  [employer]  [phone]   between 11:45am-12:15pm  will call  2 passengers

## 2022-04-01 NOTE — TELEPHONE ENCOUNTER
"Routing refill request to provider for review/approval because:  Labs not current:  ACT -Albuterol  Medication is reported/historical-Zofran  Medication for Zofran has not been prescribed by Dr. Rangel. No history of medication being prescribed in  and [facility].     Last Written Prescription Date:  1/26/22  Last Fill Quantity: 18g,  # refills: 0   Last office visit provider: 2/17/22     Last Written Prescription Date:  Historical (Zofran)  Last Fill Quantity: n/a,  # refills: n/a        Requested Prescriptions   Pending Prescriptions Disp Refills     albuterol (PROAIR HFA/PROVENTIL HFA/VENTOLIN HFA) 108 (90 Base) MCG/ACT inhaler 18 g 0     Sig: Inhale 2 puffs into the lungs every 6 hours as needed for shortness of breath / dyspnea or wheezing       Asthma Maintenance Inhalers - Anticholinergics Failed - 3/29/2022  3:33 PM        Failed - Asthma control assessment score within normal limits in last 6 months     Please review ACT score.           Failed - Recent (6 mo) or future (30 days) visit within the authorizing provider's specialty     Patient had office visit in the last 6 months or has a visit in the next 30 days with authorizing provider or within the authorizing provider's specialty.  See \"Patient Info\" tab in inbasket, or \"Choose Columns\" in Meds & Orders section of the refill encounter.            Passed - Patient is age 12 years or older        Passed - Medication is active on med list       Short-Acting Beta Agonist Inhalers Protocol  Failed - 3/29/2022  3:33 PM        Failed - Asthma control assessment score within normal limits in last 6 months     Please review ACT score.           Failed - Recent (6 mo) or future (30 days) visit within the authorizing provider's specialty     Patient had office visit in the last 6 months or has a visit in the next 30 days with authorizing provider or within the authorizing provider's specialty.  See \"Patient Info\" tab in inbasket, or \"Choose Columns\" in Meds & " "Orders section of the refill encounter.            Passed - Patient is age 12 or older        Passed - Medication is active on med list           ondansetron (ZOFRAN) 4 MG tablet       Sig: TAKE 1 TABLET (4 MG) BY MOUTH EVERY 8 HOURS AS NEEDED FOR NAUSEA        Antivertigo/Antiemetic Agents Passed - 3/29/2022  3:33 PM        Passed - Recent (12 mo) or future (30 days) visit within the authorizing provider's specialty     Patient has had an office visit with the authorizing provider or a provider within the authorizing providers department within the previous 12 mos or has a future within next 30 days. See \"Patient Info\" tab in inbasket, or \"Choose Columns\" in Meds & Orders section of the refill encounter.              Passed - Medication is active on med list        Passed - Patient is 18 years of age or older           Crissy Godinez RN 04/01/22 7:48 AM  "

## 2022-04-05 ENCOUNTER — ONCOLOGY VISIT (OUTPATIENT)
Dept: ONCOLOGY | Facility: HOSPITAL | Age: 32
End: 2022-04-05
Attending: FAMILY MEDICINE
Payer: COMMERCIAL

## 2022-04-05 ENCOUNTER — PRE VISIT (OUTPATIENT)
Dept: ONCOLOGY | Facility: HOSPITAL | Age: 32
End: 2022-04-05

## 2022-04-05 ENCOUNTER — LAB (OUTPATIENT)
Dept: INFUSION THERAPY | Facility: HOSPITAL | Age: 32
End: 2022-04-05
Attending: INTERNAL MEDICINE
Payer: COMMERCIAL

## 2022-04-05 VITALS
DIASTOLIC BLOOD PRESSURE: 79 MMHG | RESPIRATION RATE: 18 BRPM | HEIGHT: 63 IN | HEART RATE: 79 BPM | OXYGEN SATURATION: 95 % | BODY MASS INDEX: 29.68 KG/M2 | WEIGHT: 167.5 LBS | TEMPERATURE: 99.1 F | SYSTOLIC BLOOD PRESSURE: 117 MMHG

## 2022-04-05 DIAGNOSIS — D75.1 ERYTHROCYTOSIS: Primary | ICD-10-CM

## 2022-04-05 DIAGNOSIS — D75.1 ERYTHROCYTOSIS: ICD-10-CM

## 2022-04-05 LAB
ALBUMIN SERPL-MCNC: 4.2 G/DL (ref 3.5–5)
ALP SERPL-CCNC: 72 U/L (ref 45–120)
ALT SERPL W P-5'-P-CCNC: 42 U/L (ref 0–45)
ANION GAP SERPL CALCULATED.3IONS-SCNC: 12 MMOL/L (ref 5–18)
AST SERPL W P-5'-P-CCNC: 29 U/L (ref 0–40)
BASOPHILS # BLD AUTO: 0 10E3/UL (ref 0–0.2)
BASOPHILS NFR BLD AUTO: 1 %
BILIRUB SERPL-MCNC: 0.3 MG/DL (ref 0–1)
BUN SERPL-MCNC: 17 MG/DL (ref 8–22)
CALCIUM SERPL-MCNC: 9.5 MG/DL (ref 8.5–10.5)
CHLORIDE BLD-SCNC: 107 MMOL/L (ref 98–107)
CO2 SERPL-SCNC: 22 MMOL/L (ref 22–31)
CREAT SERPL-MCNC: 0.99 MG/DL (ref 0.7–1.3)
EOSINOPHIL # BLD AUTO: 0.2 10E3/UL (ref 0–0.7)
EOSINOPHIL NFR BLD AUTO: 2 %
ERYTHROCYTE [DISTWIDTH] IN BLOOD BY AUTOMATED COUNT: 12.7 % (ref 10–15)
GFR SERPL CREATININE-BSD FRML MDRD: >90 ML/MIN/1.73M2
GLUCOSE BLD-MCNC: 99 MG/DL (ref 70–125)
HCT VFR BLD AUTO: 49.5 % (ref 40–53)
HGB BLD-MCNC: 16.7 G/DL (ref 13.3–17.7)
IMM GRANULOCYTES # BLD: 0 10E3/UL
IMM GRANULOCYTES NFR BLD: 0 %
INTERPRETATION: NORMAL
LYMPHOCYTES # BLD AUTO: 2.3 10E3/UL (ref 0.8–5.3)
LYMPHOCYTES NFR BLD AUTO: 29 %
MCH RBC QN AUTO: 30 PG (ref 26.5–33)
MCHC RBC AUTO-ENTMCNC: 33.7 G/DL (ref 31.5–36.5)
MCV RBC AUTO: 89 FL (ref 78–100)
MONOCYTES # BLD AUTO: 0.4 10E3/UL (ref 0–1.3)
MONOCYTES NFR BLD AUTO: 5 %
NEUTROPHILS # BLD AUTO: 5.2 10E3/UL (ref 1.6–8.3)
NEUTROPHILS NFR BLD AUTO: 63 %
NRBC # BLD AUTO: 0 10E3/UL
NRBC BLD AUTO-RTO: 0 /100
PATH REPORT.COMMENTS IMP SPEC: NORMAL
PATH REPORT.COMMENTS IMP SPEC: NORMAL
PATH REPORT.FINAL DX SPEC: NORMAL
PATH REPORT.MICROSCOPIC SPEC OTHER STN: NORMAL
PATH REPORT.RELEVANT HX SPEC: NORMAL
PLATELET # BLD AUTO: 179 10E3/UL (ref 150–450)
POTASSIUM BLD-SCNC: 4.2 MMOL/L (ref 3.5–5)
PROT SERPL-MCNC: 7.7 G/DL (ref 6–8)
RBC # BLD AUTO: 5.56 10E6/UL (ref 4.4–5.9)
RETICS # AUTO: 0.09 10E6/UL (ref 0.01–0.11)
RETICS/RBC NFR AUTO: 1.6 % (ref 0.8–2.7)
SIGNIFICANT RESULTS: NORMAL
SODIUM SERPL-SCNC: 141 MMOL/L (ref 136–145)
SPECIMEN DESCRIPTION: NORMAL
TEST DETAILS, MDL: NORMAL
WBC # BLD AUTO: 8.1 10E3/UL (ref 4–11)

## 2022-04-05 PROCEDURE — 81270 JAK2 GENE: CPT

## 2022-04-05 PROCEDURE — 81219 CALR GENE COM VARIANTS: CPT | Performed by: INTERNAL MEDICINE

## 2022-04-05 PROCEDURE — G0463 HOSPITAL OUTPT CLINIC VISIT: HCPCS

## 2022-04-05 PROCEDURE — 99204 OFFICE O/P NEW MOD 45 MIN: CPT | Performed by: INTERNAL MEDICINE

## 2022-04-05 PROCEDURE — 85025 COMPLETE CBC W/AUTO DIFF WBC: CPT

## 2022-04-05 PROCEDURE — 85060 BLOOD SMEAR INTERPRETATION: CPT | Performed by: PATHOLOGY

## 2022-04-05 PROCEDURE — 80053 COMPREHEN METABOLIC PANEL: CPT

## 2022-04-05 PROCEDURE — G0452 MOLECULAR PATHOLOGY INTERPR: HCPCS | Mod: 26 | Performed by: PATHOLOGY

## 2022-04-05 PROCEDURE — 85045 AUTOMATED RETICULOCYTE COUNT: CPT

## 2022-04-05 PROCEDURE — 36415 COLL VENOUS BLD VENIPUNCTURE: CPT

## 2022-04-05 ASSESSMENT — PAIN SCALES - GENERAL: PAINLEVEL: NO PAIN (0)

## 2022-04-05 NOTE — PROGRESS NOTES
"Oncology Rooming Note    April 5, 2022 12:38 PM   Brooke Peterson is a 31 year old male who presents for:    Chief Complaint   Patient presents with     Oncology Clinic Visit     New Patient - Erythrocytosis     Initial Vitals: /79 (BP Location: Left arm, Patient Position: Sitting, Cuff Size: Adult Regular)   Pulse 79   Temp 99.1  F (37.3  C) (Oral)   Resp 18   Ht 1.6 m (5' 3\")   Wt 76 kg (167 lb 8 oz)   SpO2 95%   BMI 29.67 kg/m   Estimated body mass index is 29.67 kg/m  as calculated from the following:    Height as of this encounter: 1.6 m (5' 3\").    Weight as of this encounter: 76 kg (167 lb 8 oz). Body surface area is 1.84 meters squared.  No Pain (0) Comment: Data Unavailable   No LMP for male patient.  Allergies reviewed: Yes  Medications reviewed: Yes    Medications: Medication refills not needed today.  Pharmacy name entered into [facility]: [facility] - [place], [place] - [id number]    Clinical concerns: New Patient - Erythrocytosis.     Kayla Lanza CMA              "

## 2022-04-05 NOTE — LETTER
"    4/5/2022         RE: Brooke Peterson  [address] [place] [address]        Dear Colleague,    Thank you for referring your patient, Brooke Peterson, to the [facility]. Please see a copy of my visit note below.    Oncology Rooming Note    April 5, 2022 12:38 PM   Brooke Peterson is a 31 year old male who presents for:    Chief Complaint   Patient presents with     Oncology Clinic Visit     New Patient - Erythrocytosis     Initial Vitals: /79 (BP Location: Left arm, Patient Position: Sitting, Cuff Size: Adult Regular)   Pulse 79   Temp 99.1  F (37.3  C) (Oral)   Resp 18   Ht 1.6 m (5' 3\")   Wt 76 kg (167 lb 8 oz)   SpO2 95%   BMI 29.67 kg/m   Estimated body mass index is 29.67 kg/m  as calculated from the following:    Height as of this encounter: 1.6 m (5' 3\").    Weight as of this encounter: 76 kg (167 lb 8 oz). Body surface area is 1.84 meters squared.  No Pain (0) Comment: Data Unavailable   No LMP for male patient.  Allergies reviewed: Yes  Medications reviewed: Yes    Medications: Medication refills not needed today.  Pharmacy name entered into [facility]: [facility] - [place], [place] - [address]    Clinical concerns: New Patient - Erythrocytosis.     Kayla Lanza, [facility] Hematology and Oncology Consult Note      Patient: Brooke Peterson  MRN: [id number]  Date of Service: Apr 5, 2022      We have been asked by Dr. Rangel to evaluate Brooke Peterson for erythrocytosis    Assessment/Plan:    1.  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  Peripheral blood morphology was unremarkable.  Serum erythropoietin in January was at the lower limits of normal.  We will send for JAK2 testing with reflex to MPL and CALR.  If all is normal then I would consider renal ultrasound to make sure there is no evidence of renal cell carcinoma.  He is a non-smoker and has not been told that he snores loud or stops breathing during sleep.  No history of " COPD.    ECOG Performance  1    Staging History:    Cancer Staging  No matching staging information was found for the patient.    History:    Brooke Peterson is a 31-year-old gentleman who is referred for erythrocytosis.  Upon review of his medical record this has been present since July 2021.  Prior to that his hemoglobin and hematocrit have been normal.  He has no complaints of flushing or skin rash.  He complains of chronic headache, dizziness, and fatigue.  He has a history of primary adrenal insufficiency.  No acute complaints today.    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     Asthma      Asthma      Hypercalcemia 09/2019     Hyperprolactinemia (H) 12/2018     Hypothyroidism      Hypovitaminosis D 11/2018     Pituitary microadenoma (H)      PTSD (post-traumatic stress disorder)      Schizophrenia (H)     Family History   Problem Relation Age of Onset     Diabetes Mother      Cancer No family hx of       [unfilled] Social History     Socioeconomic History     Marital status: Single     Spouse name: Not on file     Number of children: Not on file     Years of education: Not on file     Highest education level: Not on file   Occupational History     Not on file   Tobacco Use     Smoking status: Never Smoker     Smokeless tobacco: Never Used   Substance and Sexual Activity     Alcohol use: Not Currently     Drug use: Never     Sexual activity: Not on file   Other Topics Concern     Not on file   Social History Narrative     Not on file     Social Determinants of Health     Financial Resource Strain: Medium Risk     Difficulty of Paying Living Expenses: Somewhat hard   Food Insecurity: No Food Insecurity     Worried About Running Out of Food in the Last Year: Never true     Ran Out of Food in the Last Year: Never true   Transportation Needs: Unmet Transportation Needs     Lack of Transportation (Medical): Yes     Lack of Transportation (Non-Medical): No   Physical  Activity: Insufficiently Active     Days of Exercise per Week: 4 days     Minutes of Exercise per Session: 10 min   Stress: No Stress Concern Present     Feeling of Stress : Only a little   Social Connections: Socially Isolated     Frequency of Communication with Friends and Family: Never     Frequency of Social Gatherings with Friends and Family: More than three times a week     Attends Judaism Services: Never     Active Member of Clubs or Organizations: No     Attends Club or Organization Meetings: Never     Marital Status: Never    Intimate Partner Violence: Not At Risk     Fear of Current or Ex-Partner: No     Emotionally Abused: No     Physically Abused: No     Sexually Abused: No   Housing Stability: Low Risk      Unable to Pay for Housing in the Last Year: No     Number of Places Lived in the Last Year: 1     Unstable Housing in the Last Year: No        Allergies:    No Known Allergies    Review of Systems:    As above in the history.     Review of Systems otherwise Negative for:  General: chills, fever or night sweats  Psychological: anxiety or depression  Ophthalmic: blurry vision, double vision or loss of vision, vision change  ENT: epistaxis, oral lesions, hearing changes  Hematological and Lymphatic: bleeding, bruising, jaundice, swollen lymph nodes  Endocrine: hot flashes, unexpected weight changes  Respiratory: cough, hemoptysis, orthopnea or shortness of breath/CONWAY  Cardiovascular: chest pain, edema, palpitations or PND  Gastrointestinal: abdominal pain, blood in stools, change in bowel habits, constipation, diarrhea or nausea/vomiting  Genito-Urinary: change in urinary stream, incontinence, frequency/urgency  Musculoskeletal: joint pain, stiffness, swelling, muscle pain  Neurological:   Dermatological: lumps and rash    Physical Exam:    /79 (BP Location: Left arm, Patient Position: Sitting, Cuff Size: Adult Regular)   Pulse 79   Temp 99.1  F (37.3  C) (Oral)   Resp 18   Ht 1.6 m  "(5' 3\")   Wt 76 kg (167 lb 8 oz)   SpO2 95%   BMI 29.67 kg/m      General: patient appears stated age of 31 year old. Nontoxic and in no distress.   HEENT: Head: atraumatic, normocephalic. Sclerae anicteric.  Chest:  Normal respiratory effort  Cardiac:  No edema.   Abdomen: abdomen is non-distended  Extremities: normal tone and muscle bulk.   Skin: no lesions or rash on visible skin. Warm and dry.   CNS: alert and oriented. Grossly non-focal.   Psychiatric: normal mood and affect.     Lab Results:    Recent Results (from the past 168 hour(s))   Comprehensive metabolic panel   Result Value Ref Range    Sodium 141 136 - 145 mmol/L    Potassium 4.2 3.5 - 5.0 mmol/L    Chloride 107 98 - 107 mmol/L    Carbon Dioxide (CO2) 22 22 - 31 mmol/L    Anion Gap 12 5 - 18 mmol/L    Urea Nitrogen 17 8 - 22 mg/dL    Creatinine 0.99 0.70 - 1.30 mg/dL    Calcium 9.5 8.5 - 10.5 mg/dL    Glucose 99 70 - 125 mg/dL    Alkaline Phosphatase 72 45 - 120 U/L    AST 29 0 - 40 U/L    ALT 42 0 - 45 U/L    Protein Total 7.7 6.0 - 8.0 g/dL    Albumin 4.2 3.5 - 5.0 g/dL    Bilirubin Total 0.3 0.0 - 1.0 mg/dL    GFR Estimate >90 >60 mL/min/1.73m2   CBC with platelets and differential   Result Value Ref Range    WBC Count 8.1 4.0 - 11.0 10e3/uL    RBC Count 5.56 4.40 - 5.90 10e6/uL    Hemoglobin 16.7 13.3 - 17.7 g/dL    Hematocrit 49.5 40.0 - 53.0 %    MCV 89 78 - 100 fL    MCH 30.0 26.5 - 33.0 pg    MCHC 33.7 31.5 - 36.5 g/dL    RDW 12.7 10.0 - 15.0 %    Platelet Count 179 150 - 450 10e3/uL    % Neutrophils 63 %    % Lymphocytes 29 %    % Monocytes 5 %    % Eosinophils 2 %    % Basophils 1 %    % Immature Granulocytes 0 %    NRBCs per 100 WBC 0 <1 /100    Absolute Neutrophils 5.2 1.6 - 8.3 10e3/uL    Absolute Lymphocytes 2.3 0.8 - 5.3 10e3/uL    Absolute Monocytes 0.4 0.0 - 1.3 10e3/uL    Absolute Eosinophils 0.2 0.0 - 0.7 10e3/uL    Absolute Basophils 0.0 0.0 - 0.2 10e3/uL    Absolute Immature Granulocytes 0.0 <=0.4 10e3/uL    Absolute NRBCs 0.0 " 10e3/uL   Bld morphology pathology review   Result Value Ref Range    Final Diagnosis       PERIPHERAL BLOOD SMEAR MORPHOLOGY:        1.  UNREMARKABLE ERYTHROCYTE MORPHOLOGY              a.  INCREASED RETICULOCYTOSIS NOT IDENTIFIED        2.  UNREMARKABLE LEUKOCYTE DIFFERENTIAL AND MORPHOLOGY        3.  UNREMARKABLE PLATELET MORPHOLOGY          Comment       KIM-2 analysis-pending      Clinical Information       ERYTHROCYTOSIS      Peripheral Smear       Erythrocytes are normal in number, normochromic and normocytic.  Increased anisopoikilocytosis, increased polychromasia, basophilic stippling and nucleated red blood cells are not identified.    Leukocytes are normal in number and show a normal differential distribution, without a significant neutrophilic left shift or toxic changes.  Megaloblastic changes, dysplastic features and blasts are not identified.  Lymphocytes are mature and polymorphic in appearance.    Platelets are normal in number and morphology.      Performing Labs       The technical component of this testing was completed at the [facility] laboratories     Reticulocyte count   Result Value Ref Range    % Reticulocyte 1.6 0.8 - 2.7 %    Absolute Reticulocyte 0.090 0.010 - 0.110 10e6/uL       Signed by: Felipe Silver MD        Again, thank you for allowing me to participate in the care of your patient.        Sincerely,        Felipe Silver MD

## 2022-04-06 RX ORDER — ONDANSETRON 4 MG/1
TABLET, FILM COATED ORAL
OUTPATIENT
Start: 2022-04-06

## 2022-04-06 RX ORDER — ALBUTEROL SULFATE 90 UG/1
2 AEROSOL, METERED RESPIRATORY (INHALATION) EVERY 6 HOURS PRN
Qty: 18 G | Refills: 0 | Status: SHIPPED | OUTPATIENT
Start: 2022-04-06 | End: 2022-05-05

## 2022-04-07 ENCOUNTER — ALLIED HEALTH/NURSE VISIT (OUTPATIENT)
Dept: OTHER | Facility: CLINIC | Age: 32
End: 2022-04-07
Payer: COMMERCIAL

## 2022-04-07 VITALS
RESPIRATION RATE: 14 BRPM | HEART RATE: 66 BPM | DIASTOLIC BLOOD PRESSURE: 80 MMHG | SYSTOLIC BLOOD PRESSURE: 102 MMHG | OXYGEN SATURATION: 99 % | TEMPERATURE: 99.7 F

## 2022-04-07 DIAGNOSIS — E27.1 ADDISON'S DISEASE (H): ICD-10-CM

## 2022-04-07 DIAGNOSIS — Z79.899 MEDICATION MANAGEMENT: Primary | ICD-10-CM

## 2022-04-07 PROCEDURE — 99207 PR COMMUNITY PARAMEDIC-PATIENT MEETS CRITERIA: CPT

## 2022-04-07 ASSESSMENT — ACTIVITIES OF DAILY LIVING (ADL): DEPENDENT_IADLS:: CLEANING;COOKING;LAUNDRY;SHOPPING;MEAL PREPARATION;MEDICATION MANAGEMENT;TRANSPORTATION

## 2022-04-07 NOTE — PROGRESS NOTES
Community Paramedics Follow-up Visit  April 7, 2022  TIME: 10:30    Brooke Peterson is a 31 year old male being seen at home for a follow-up visit.    Present at appointment: Patient, Parents    Primary Diagnosis: Psychosocial    Chief Complaint   Patient presents with     Outreach     Community Paramedic home visit       [place] Utilization:   Clinic Utilization  Difficulty keeping appointments:: No  Compliance Concerns: No  No-Show Concerns: Yes  No PCP office visit in Past Year: No  Utilization    Hospital Admissions  0             ED Visits  0             No Show Count (past year)  17                Current as of: 4/7/2022  9:45 AM              /80 (BP Location: Left arm, Patient Position: Supine)   Pulse 66   Temp 99.7  F (37.6  C)   Resp 14   SpO2 99%     Clinical Concerns:  Current Medical Concerns:      Current Behavioral Concerns: medication compliance    Education Provided to patient: went through medications   Medication set up? Yes  Pill Box issued: No  Scale issued: No  Flu Shot given: No  COVID Vaccine Given: No  Lab draw or specimen collection: No  Food box issued: No  Collaborative visit with PCP: No  Wound Care: No    Health Maintenance Reviewed:      Clinical Pathway: None    No  Face to Face in Home / Community      Review of Symptoms/PE    Skin: negative  Eyes: negative  Ears/Nose/Throat: negative  Respiratory: No shortness of breath, dyspnea on exertion, cough, or hemoptysis  Cardiovascular: negative  Gastrointestinal: negative  Genitourinary: negative  Musculoskeletal: negative  Neurologic: negative  Psychiatric: negative    Pain Management::       Medication Review  Medication adherence problem (GOAL):: No  Knowledgeable about how to use meds:: No  Medication side effects suspected:: No    Diet/Exercise/Sleep  Diet:: Regular  Inadequate nutrition (GOAL):: No  Tube Feeding: No  Inadequate activity/exercise (GOAL):: No  Significant changes in sleep pattern (GOAL): No    Plan:     Time spent  with patient: 90    The patient meets one or more of the following criteria:  * Requires services to prevent readmission to a nursing home or hospital    Acute concern/Follow-up recommendations: follow care plan.    Next CP visit scheduled: 04/14/2022    Issues for Provider to follow up on: Met with pt and his father in their home. Pt laid on the couch during the visit as he has at the previous two visits.  Medications set up. Pt out of Levothyroxine, 3 days of Vitamin D3 and 7 days of hydrocortisone. Pharmacy called, they reported Levothyroxine and Multi vitamin were delivered last week and they were still waiting on approval from Dr. Rangel on a couple medications, pharmacy agreed to fax request to MD.. CP asked pt and father to look around the house for the prescription and we agreed to another visit next Thursday to go through medications again.    Provider follow up visit needed: TBD

## 2022-04-10 NOTE — PROGRESS NOTES
[facility] Hematology and Oncology Consult Note      Patient: Brooke Peterson  MRN: [id number]  Date of Service: Apr 5, 2022      We have been asked by Dr. Rangel to evaluate Brooke Peterson for erythrocytosis    Assessment/Plan:    1.  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  Peripheral blood morphology was unremarkable.  Serum erythropoietin in January was at the lower limits of normal.  We will send for JAK2 testing with reflex to MPL and CALR.  If all is normal then I would consider renal ultrasound to make sure there is no evidence of renal cell carcinoma.  He is a non-smoker and has not been told that he snores loud or stops breathing during sleep.  No history of COPD.    ECOG Performance  1    Staging History:    Cancer Staging  No matching staging information was found for the patient.    History:    Brooke Peterson is a 31-year-old gentleman who is referred for erythrocytosis.  Upon review of his medical record this has been present since July 2021.  Prior to that his hemoglobin and hematocrit have been normal.  He has no complaints of flushing or skin rash.  He complains of chronic headache, dizziness, and fatigue.  He has a history of primary adrenal insufficiency.  No acute complaints today.    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     Asthma      Asthma      Hypercalcemia 09/2019     Hyperprolactinemia (H) 12/2018     Hypothyroidism      Hypovitaminosis D 11/2018     Pituitary microadenoma (H)      PTSD (post-traumatic stress disorder)      Schizophrenia (H)     Family History   Problem Relation Age of Onset     Diabetes Mother      Cancer No family hx of       [unfilled] Social History     Socioeconomic History     Marital status: Single     Spouse name: Not on file     Number of children: Not on file     Years of education: Not on file     Highest education level: Not on file   Occupational History     Not on file   Tobacco Use     Smoking status: Never  Smoker     Smokeless tobacco: Never Used   Substance and Sexual Activity     Alcohol use: Not Currently     Drug use: Never     Sexual activity: Not on file   Other Topics Concern     Not on file   Social History Narrative     Not on file     Social Determinants of Health     Financial Resource Strain: Medium Risk     Difficulty of Paying Living Expenses: Somewhat hard   Food Insecurity: No Food Insecurity     Worried About Running Out of Food in the Last Year: Never true     Ran Out of Food in the Last Year: Never true   Transportation Needs: Unmet Transportation Needs     Lack of Transportation (Medical): Yes     Lack of Transportation (Non-Medical): No   Physical Activity: Insufficiently Active     Days of Exercise per Week: 4 days     Minutes of Exercise per Session: 10 min   Stress: No Stress Concern Present     Feeling of Stress : Only a little   Social Connections: Socially Isolated     Frequency of Communication with Friends and Family: Never     Frequency of Social Gatherings with Friends and Family: More than three times a week     Attends [employer] Services: Never     Active Member of Clubs or Organizations: No     Attends Club or Organization Meetings: Never     Marital Status: Never    Intimate Partner Violence: Not At Risk     Fear of Current or Ex-Partner: No     Emotionally Abused: No     Physically Abused: No     Sexually Abused: No   Housing Stability: Low Risk      Unable to Pay for Housing in the Last Year: No     Number of Places Lived in the Last Year: 1     Unstable Housing in the Last Year: No        Allergies:    No Known Allergies    Review of Systems:    As above in the history.     Review of Systems otherwise Negative for:  General: chills, fever or night sweats  Psychological: anxiety or depression  Ophthalmic: blurry vision, double vision or loss of vision, vision change  ENT: epistaxis, oral lesions, hearing changes  Hematological and Lymphatic: bleeding, bruising, jaundice,  "swollen lymph nodes  Endocrine: hot flashes, unexpected weight changes  Respiratory: cough, hemoptysis, orthopnea or shortness of breath/CONWAY  Cardiovascular: chest pain, edema, palpitations or PND  Gastrointestinal: abdominal pain, blood in stools, change in bowel habits, constipation, diarrhea or nausea/vomiting  Genito-Urinary: change in urinary stream, incontinence, frequency/urgency  Musculoskeletal: joint pain, stiffness, swelling, muscle pain  Neurological:   Dermatological: lumps and rash    Physical Exam:    /79 (BP Location: Left arm, Patient Position: Sitting, Cuff Size: Adult Regular)   Pulse 79   Temp 99.1  F (37.3  C) (Oral)   Resp 18   Ht 1.6 m (5' 3\")   Wt 76 kg (167 lb 8 oz)   SpO2 95%   BMI 29.67 kg/m      General: patient appears stated age of 31 year old. Nontoxic and in no distress.   HEENT: Head: atraumatic, normocephalic. Sclerae anicteric.  Chest:  Normal respiratory effort  Cardiac:  No edema.   Abdomen: abdomen is non-distended  Extremities: normal tone and muscle bulk.   Skin: no lesions or rash on visible skin. Warm and dry.   CNS: alert and oriented. Grossly non-focal.   Psychiatric: normal mood and affect.     Lab Results:    Recent Results (from the past 168 hour(s))   Comprehensive metabolic panel   Result Value Ref Range    Sodium 141 136 - 145 mmol/L    Potassium 4.2 3.5 - 5.0 mmol/L    Chloride 107 98 - 107 mmol/L    Carbon Dioxide (CO2) 22 22 - 31 mmol/L    Anion Gap 12 5 - 18 mmol/L    Urea Nitrogen 17 8 - 22 mg/dL    Creatinine 0.99 0.70 - 1.30 mg/dL    Calcium 9.5 8.5 - 10.5 mg/dL    Glucose 99 70 - 125 mg/dL    Alkaline Phosphatase 72 45 - 120 U/L    AST 29 0 - 40 U/L    ALT 42 0 - 45 U/L    Protein Total 7.7 6.0 - 8.0 g/dL    Albumin 4.2 3.5 - 5.0 g/dL    Bilirubin Total 0.3 0.0 - 1.0 mg/dL    GFR Estimate >90 >60 mL/min/1.73m2   CBC with platelets and differential   Result Value Ref Range    WBC Count 8.1 4.0 - 11.0 10e3/uL    RBC Count 5.56 4.40 - 5.90 10e6/uL "    Hemoglobin 16.7 13.3 - 17.7 g/dL    Hematocrit 49.5 40.0 - 53.0 %    MCV 89 78 - 100 fL    MCH 30.0 26.5 - 33.0 pg    MCHC 33.7 31.5 - 36.5 g/dL    RDW 12.7 10.0 - 15.0 %    Platelet Count 179 150 - 450 10e3/uL    % Neutrophils 63 %    % Lymphocytes 29 %    % Monocytes 5 %    % Eosinophils 2 %    % Basophils 1 %    % Immature Granulocytes 0 %    NRBCs per 100 WBC 0 <1 /100    Absolute Neutrophils 5.2 1.6 - 8.3 10e3/uL    Absolute Lymphocytes 2.3 0.8 - 5.3 10e3/uL    Absolute Monocytes 0.4 0.0 - 1.3 10e3/uL    Absolute Eosinophils 0.2 0.0 - 0.7 10e3/uL    Absolute Basophils 0.0 0.0 - 0.2 10e3/uL    Absolute Immature Granulocytes 0.0 <=0.4 10e3/uL    Absolute NRBCs 0.0 10e3/uL   Bld morphology pathology review   Result Value Ref Range    Final Diagnosis       PERIPHERAL BLOOD SMEAR MORPHOLOGY:        1.  UNREMARKABLE ERYTHROCYTE MORPHOLOGY              a.  INCREASED RETICULOCYTOSIS NOT IDENTIFIED        2.  UNREMARKABLE LEUKOCYTE DIFFERENTIAL AND MORPHOLOGY        3.  UNREMARKABLE PLATELET MORPHOLOGY          Comment       KIM-2 analysis-pending      Clinical Information       ERYTHROCYTOSIS      Peripheral Smear       Erythrocytes are normal in number, normochromic and normocytic.  Increased anisopoikilocytosis, increased polychromasia, basophilic stippling and nucleated red blood cells are not identified.    Leukocytes are normal in number and show a normal differential distribution, without a significant neutrophilic left shift or toxic changes.  Megaloblastic changes, dysplastic features and blasts are not identified.  Lymphocytes are mature and polymorphic in appearance.    Platelets are normal in number and morphology.      Performing Labs       The technical component of this testing was completed at the [facility] and [facility] laboratories     Reticulocyte count   Result Value Ref Range    % Reticulocyte 1.6 0.8 - 2.7 %    Absolute Reticulocyte 0.090 0.010 -  0.110 10e6/uL       Signed by: Felipe Silver MD

## 2022-04-11 LAB
INTERPRETATION: NORMAL
SIGNIFICANT RESULTS: NORMAL
SPECIMEN DESCRIPTION: NORMAL
TEST DETAILS, MDL: NORMAL

## 2022-04-11 RX ORDER — FLUDROCORTISONE ACETATE 0.1 MG/1
0.1 TABLET ORAL DAILY
Qty: 90 TABLET | Refills: 4 | Status: SHIPPED | OUTPATIENT
Start: 2022-04-11 | End: 2022-08-02

## 2022-04-11 NOTE — TELEPHONE ENCOUNTER
FLUDROCORTISONE 0.1 MG TAB 0.1 Tablet     Last Written Prescription Date:  3/29/91  Last Fill Quantity: 90,   # refills: 4  Last Office Visit : 9/22/21  Future Office visit:  7/27/22    Routing refill request to provider for review/approval because:  Drug not on the Endocrine refill protocol   Thank-you, Madison VENTURA RN Medication Refill Team

## 2022-04-12 ENCOUNTER — VIRTUAL VISIT (OUTPATIENT)
Dept: FAMILY MEDICINE | Facility: CLINIC | Age: 32
End: 2022-04-12
Payer: COMMERCIAL

## 2022-04-12 DIAGNOSIS — E03.9 HYPOTHYROIDISM, UNSPECIFIED TYPE: ICD-10-CM

## 2022-04-12 DIAGNOSIS — F89 NEURODEVELOPMENTAL DISORDER: ICD-10-CM

## 2022-04-12 DIAGNOSIS — R44.0 AUDITORY HALLUCINATION: ICD-10-CM

## 2022-04-12 DIAGNOSIS — F20.9 SCHIZOPHRENIA, UNSPECIFIED TYPE (H): Primary | ICD-10-CM

## 2022-04-12 PROCEDURE — 99214 OFFICE O/P EST MOD 30 MIN: CPT | Mod: 95 | Performed by: FAMILY MEDICINE

## 2022-04-12 RX ORDER — LEVOTHYROXINE SODIUM 75 UG/1
75 TABLET ORAL DAILY
Qty: 90 TABLET | Refills: 0 | Status: SHIPPED | OUTPATIENT
Start: 2022-04-12 | End: 2022-06-14

## 2022-04-12 RX ORDER — OLANZAPINE 5 MG/1
5 TABLET ORAL EVERY MORNING
Qty: 90 TABLET | Refills: 0 | Status: SHIPPED | OUTPATIENT
Start: 2022-04-12 | End: 2022-11-07

## 2022-04-12 ASSESSMENT — ASTHMA QUESTIONNAIRES
QUESTION_1 LAST FOUR WEEKS HOW MUCH OF THE TIME DID YOUR ASTHMA KEEP YOU FROM GETTING AS MUCH DONE AT WORK, SCHOOL OR AT HOME: A LITTLE OF THE TIME
QUESTION_2 LAST FOUR WEEKS HOW OFTEN HAVE YOU HAD SHORTNESS OF BREATH: ONCE OR TWICE A WEEK
ACT_TOTALSCORE: 17
ACT_TOTALSCORE: 17
QUESTION_3 LAST FOUR WEEKS HOW OFTEN DID YOUR ASTHMA SYMPTOMS (WHEEZING, COUGHING, SHORTNESS OF BREATH, CHEST TIGHTNESS OR PAIN) WAKE YOU UP AT NIGHT OR EARLIER THAN USUAL IN THE MORNING: ONCE OR TWICE
QUESTION_5 LAST FOUR WEEKS HOW WOULD YOU RATE YOUR ASTHMA CONTROL: SOMEWHAT CONTROLLED
QUESTION_4 LAST FOUR WEEKS HOW OFTEN HAVE YOU USED YOUR RESCUE INHALER OR NEBULIZER MEDICATION (SUCH AS ALBUTEROL): ONE OR TWO TIMES PER DAY

## 2022-04-12 NOTE — PROGRESS NOTES
"Brooke is a 31 year old who is being evaluated via a billable telephone visit.      What phone number would you like to be contacted at? [phone]   How would you like to obtain your AVS? Mail a copy    Assessment & Plan     Hypothyroidism, unspecified type  Potentially off med for 1 month, though all history is suspect.  Trying to get refills established.  Community paramedics to see soon, to help with med set up and adherence.  I will refill med, but pharmacy says they already delivered, which is entirely possiuble  - levothyroxine (SYNTHROID/LEVOTHROID) 75 MCG tablet  Dispense: 90 tablet; Refill: 0    Auditory hallucination  Will refill antipsychotic.  Unsure what his follow up plan with Monserrat is.  Will ask care coordinators to assess.  Also, should see me in clinic.  Question need for spine imaging.  - OLANZapine (ZYPREXA) 5 MG tablet  Dispense: 90 tablet; Refill: 0      He lives just 10 blocks away from clinic.  Concerns with walking   Has been referred to neurology  Missed appointment.    Was able to see hematology.  6}     Future Appointments   Date Time Provider Department Center   4/14/2022  3:00 PM  COMMUNITY PARAMEDIC 2 \[facility] [place] [facility]""[facility] [place] [facility]   7/27/2022  3:00 PM Maddison Witt MD [facility]        BMI:   Estimated body mass index is 29.67 kg/m  as calculated from the following:    Height as of 4/5/22: 1.6 m (5' 3\").    Weight as of 4/5/22: 76 kg (167 lb 8 oz).           No follow-ups on file.    Jose Rangel MD  [facility]    Subjective   Brooke is a 31 year old who presents for the following health issues   He is met for a phone call visit.  He is occasionally accompanied by his sister who helps manage his medications and life.    HPI   Patient complains of feeling dizzy.  He has felt this way for a number of years.  Does state that he ran out of medications recently.  States levothyroxine, olanzapine, and Ventolin inhaler not present right now.    Recent community paramedic " visit for medication management noted the patient is out of levothyroxine, and only a few days left of vitamin D and hydrocortisone.  Patient relies on delivery of medications from pharmacy.  Thinks he has been out of levothyroxine for about a month.    Per paramedics note:  Pharmacy called, they reported Levothyroxine and Multi vitamin were delivered last week           Objective           Vitals:  No vitals were obtained today due to virtual visit.    Physical Exam     PSYCH:  His affect is flat.  Thoughts are somewhat tangential.  He presents as completely unable to care for himself or manage any part of his life  RESP: No cough, no audible wheezing, able to talk in full sentences  Remainder of exam unable to be completed due to telephone visits    Start time 0832  End time 0854      Phone call duration: 22 minutes

## 2022-04-13 PROBLEM — E55.9 VITAMIN D DEFICIENCY: Status: ACTIVE | Noted: 2019-10-09

## 2022-04-13 PROBLEM — F89 NEURODEVELOPMENTAL DISORDER: Status: ACTIVE | Noted: 2022-04-13

## 2022-04-13 PROBLEM — F20.9 SCHIZOPHRENIA (H): Status: ACTIVE | Noted: 2017-08-25

## 2022-04-13 PROBLEM — R79.89 LOW TSH LEVEL: Status: RESOLVED | Noted: 2021-03-09 | Resolved: 2022-04-13

## 2022-04-14 ENCOUNTER — PATIENT OUTREACH (OUTPATIENT)
Dept: CARE COORDINATION | Facility: CLINIC | Age: 32
End: 2022-04-14

## 2022-04-14 ENCOUNTER — ALLIED HEALTH/NURSE VISIT (OUTPATIENT)
Dept: OTHER | Facility: CLINIC | Age: 32
End: 2022-04-14
Payer: COMMERCIAL

## 2022-04-14 DIAGNOSIS — Z79.899 MEDICATION MANAGEMENT: Primary | ICD-10-CM

## 2022-04-14 PROCEDURE — 99207 PR COMMUNITY PARAMEDIC-PATIENT MEETS CRITERIA: CPT

## 2022-04-14 ASSESSMENT — ACTIVITIES OF DAILY LIVING (ADL): DEPENDENT_IADLS:: CLEANING;COOKING;LAUNDRY;SHOPPING;MEAL PREPARATION;MEDICATION MANAGEMENT;TRANSPORTATION

## 2022-04-14 NOTE — PROGRESS NOTES
4/14/2022  Clinic Care Coordination Contact  Care Team Conversations    Received CC C referral from PCP 4-14-2  Mental Wellness (Health) (Mental Illness/Chemical Dependency)      Mental Wellness: Resources of Behavioral Health Services     Clinical Staff have discussed the Care Coordination Referral with the patient and/or caregiver: Yes     Additional Information: does he have follow up planned with [facility]--they saw him in 2019      Patient already enrolled in [facility].  CHW will consult with CC SHIRA today regarding mental health services and support  Possible TCM services    Consult with CC SHIRA on 4-19-22      CHW Follow up: Monthly  CHW Plan: Follow up on goals  CHW Next Follow Up: 5-3-22    Scarlett Varma  Community Health Worker  [facility] Care Coordination  [email or web address]   Office: [phone]  Fax: [phone]

## 2022-04-17 VITALS
RESPIRATION RATE: 14 BRPM | TEMPERATURE: 99.5 F | SYSTOLIC BLOOD PRESSURE: 108 MMHG | OXYGEN SATURATION: 99 % | HEART RATE: 100 BPM | DIASTOLIC BLOOD PRESSURE: 80 MMHG

## 2022-04-17 NOTE — PROGRESS NOTES
Community Paramedics Follow-up Visit  April 14, 2022  TIME: 03:00pm    Brooke Peterson is a 31 year old male being seen at home for a follow-up visit.    Present at appointment: Patient, Parents    Primary Diagnosis: Psychosocial    Chief Complaint   Patient presents with     Outreach     Community Paramedic home visit       [place] Utilization:   Clinic Utilization  Difficulty keeping appointments:: No  Compliance Concerns: No  No-Show Concerns: Yes  No PCP office visit in Past Year: No  Utilization    Hospital Admissions  0             ED Visits  0             No Show Count (past year)  17                Current as of: 4/17/2022 11:10 AM              /80 (BP Location: Left arm, Patient Position: Supine)   Pulse 100   Temp 99.5  F (37.5  C)   Resp 14   SpO2 99%     Clinical Concerns:  Current Medical Concerns:      Current Behavioral Concerns: medication compliance    Education Provided to patient: medication set up.   Medication set up? Yes  Pill Box issued: Yes  Scale issued: No  Flu Shot given: No  COVID Vaccine Given: No  Lab draw or specimen collection: No  Food box issued: No  Collaborative visit with PCP: No  Wound Care: No    Health Maintenance Reviewed:      Clinical Pathway: None    No  Face to Face in Home / Community      Review of Symptoms/PE    Skin: negative  Eyes: negative  Ears/Nose/Throat: negative  Respiratory: No shortness of breath, dyspnea on exertion, cough, or hemoptysis  Cardiovascular: negative  Gastrointestinal: negative  Genitourinary: negative  Musculoskeletal: negative  Neurologic: negative  Psychiatric: negative    Pain Management::       Medication Review  Medication adherence problem (GOAL):: No  Knowledgeable about how to use meds:: No  Medication side effects suspected:: No    Diet/Exercise/Sleep  Diet:: Regular  Inadequate nutrition (GOAL):: No  Tube Feeding: No  Inadequate activity/exercise (GOAL):: No  Significant changes in sleep pattern (GOAL): No    Plan:     Time spent  with patient: 60    The patient meets one or more of the following criteria:  * Requires services to prevent readmission to a nursing home or hospital    Acute concern/Follow-up recommendations: follow care plan    Next CP visit scheduled: 04/28/2022    Issues for Provider to follow up on: pt and family set out medications along with new medications from pharmacy in a large bin. Current med boxes were in tough shape, two new boxes were given. Went through and set up medications, the only medication pt was still missing was Vitamin D3. When pharmacy was called they said they were still waiting for approval. Pt had no new complaints.    Provider follow up visit needed: TBD

## 2022-04-20 ENCOUNTER — PATIENT OUTREACH (OUTPATIENT)
Dept: NURSING | Facility: CLINIC | Age: 32
End: 2022-04-20
Payer: COMMERCIAL

## 2022-04-20 ENCOUNTER — TELEPHONE (OUTPATIENT)
Dept: CARE COORDINATION | Facility: CLINIC | Age: 32
End: 2022-04-20

## 2022-04-20 NOTE — PROGRESS NOTES
4/20/2022  Clinic Care Coordination Contact  Community Health Worker Follow Up    Intervention and Education during outreach:   Angela : [facility]  ID: #[id number]    Called and spoke to patient to support set up ride for 5-5-22.    Conference call with patient to [facility] Transportation Line  Provider Line [phone]  RE: Appt 5-5-22 at 9:20am to [facility]    Rep: Falguni                Provided member ID, verified patient demographics, destination address appointment date and time.    5-5-22 at 9:20am to [facility]  [employer] [phone]   between: 8:20-8:50am  Uses a cane  2 passengers:  father/PCA accompany to appt  Will call   Patient confirmed transportation information   Patient has [employer] contact.    CHW Follow up: Monthly  CHW Plan: Follow up on goals  CHW Next Follow Up: 5-20-22    Scarlett Varma  Community Health Worker  [facility] Care Coordination  [email or web address]   Office: [phone]  Fax: [phone]     Clinic Care Coordination Contact    Community Health Worker Follow Up    Care Gaps:     Health Maintenance Due   Topic Date Due     PREVENTIVE CARE VISIT  Never done     ASTHMA ACTION PLAN  Never done     ADVANCE CARE PLANNING  Never done     DEPRESSION ACTION PLAN  Never done     COVID-19 Vaccine (3 - Booster for Pfizer series) 02/03/2022       Scheduled 5-5-22 with PCP       Goals:    Goals Addressed as of 4/20/2022 at 11:46 AM                    Today    3/29/22       Other (pt-stated)     90%    Added 7/28/21 by [facility]      Goal Statement: I would like support to set up medical ride to attend 80% of medical appointments in the next 3 months to address health concerns and needs.   Date Goal set: 4/9/2021 updated. 6-11-21   Barriers: language, lack transportation   Strengths: medical transportation with [employer]   Date to Achieve By: 4-2022  Patient expressed  understanding of goal: Yes   Action steps to achieve this goal:   1.  My sister will support to set up ride through [employer] Line [phone] to [facility].  2. My sister will help to call CHW 1 week  before appt for support to set up ride to speciality appointments through [employer] [phone]    set up ride for 4-5-22 at 12:45pm completed  [employer]  [phone]   between 11:55am-12:15pm  will call  2 passengers    Updated: 3-29-22 AL             Other (pt-stated)     30%    Added 1/19/22 by Scarlett Varma      Goal Statement: I want support to connect with new home care agency contract with [facility] insurance for skilled nursing services for medication set up as soon as possible  Date goal set: 1/19/2022 Updated 3-29-22  Measure of Success: skilled nursing services  Barriers: language  Strengths: support from CCC, [facility] insurance   Date to Achieve By: 6-2022  Patient expressed understanding of goal: yes  Action steps to achieve this goal  1. I will continue to work with Community Paramedic for medication setup until I have connect with home care agency for nursing services.  2. I will wait to hear from [facility] and/or [facility] team for Home Care     Updated: 4-20-22 AL         Other (pt-stated)   50%      Added 3/29/22 by Scarlett Varma      Goal Statement: I will complete my annual wellness visit 5-5-22.  Date Goal set: 3/29/2022  Barriers: language  Strengths: support from CCC team, sister  Date to Achieve By: 6-2022  Patient expressed understanding of goal: yes  Action steps to achieve this goal:  1. I will attend my annual wellness visit on 5-5-22 at 9:20 am.  2. I will contact my Care Management or clinic team if I have barriers to attending my annual wellness visit.     Updated: 4-20-22 AL

## 2022-04-20 NOTE — TELEPHONE ENCOUNTER
4/20/2022  Patient has upcoming appt with PCP on 5-5-22 at 9:20am and he is due to Preventative Care Visit. He would like to know if he could do it on 5-5-22.    Please advise

## 2022-04-21 DIAGNOSIS — E55.9 VITAMIN D DEFICIENCY: Primary | ICD-10-CM

## 2022-04-22 ENCOUNTER — TELEPHONE (OUTPATIENT)
Dept: ONCOLOGY | Facility: HOSPITAL | Age: 32
End: 2022-04-22
Payer: COMMERCIAL

## 2022-04-22 NOTE — TELEPHONE ENCOUNTER
Called [facility] to inform him lab results are all normal and that no follow up at this clinic is needed.

## 2022-04-27 ENCOUNTER — PATIENT OUTREACH (OUTPATIENT)
Dept: CARE COORDINATION | Facility: CLINIC | Age: 32
End: 2022-04-27

## 2022-04-27 ENCOUNTER — TELEPHONE (OUTPATIENT)
Dept: PSYCHIATRY | Facility: CLINIC | Age: 32
End: 2022-04-27

## 2022-04-27 ENCOUNTER — PATIENT OUTREACH (OUTPATIENT)
Dept: NURSING | Facility: CLINIC | Age: 32
End: 2022-04-27
Payer: COMMERCIAL

## 2022-04-27 NOTE — PROGRESS NOTES
Clinic Care Coordination Contact  Care Team Conversations    CCC SW contacted [facility] with an  by phone to support with scheduling mental health appointment.    SW contacted [facility] to support scheduling. Provider entered referral on 3/22 because they felt he should be seen in clinic for further evaluation. The  wouldn't schedule because they do not have in-person Angela interpreters. SW suggested this appointment was to be done in person so the new provider would be able to see [facility] to have better communication and a telephone  could be used like a typical in clinic appointment. They still would not schedule an appointment.     SW will message referring provider to see if there are other suggestions.

## 2022-04-27 NOTE — TELEPHONE ENCOUNTER
"Tangela, I am forwarding this to you only because you are at [facility] and maybe can help identify how to go forward on this referral.  I seen him on 3/22 and this was my assessment and plan:    \"Pt referred to [facility] but very poor historian due to many factors.  History of SPMI presumably, no records available.  Patient states that he does not know all how long he has been on olanzapine and he is unclear who initially had prescribed it.  He was recently provided 90 days in late January.  At this time do not feel comfortable making any medication changes due to limited assessment.  Able to articulate to patient that a referral would be placed for in person assessment within the [facility].  Patient has [employer] and does not have transportation so medical transportation will be necessary.  A referral for care coordination has been placed by his primary care\"     I dont know how to move forward in helping him get what he needs.  Can you forward to the appropriate person at [facility] for getting him in clinic?      Thank you for any help,  Lynn Rodgers, MSN, APRN, CNP, FMHNP-BC, "

## 2022-04-27 NOTE — TELEPHONE ENCOUNTER
Per provider, this pt needs a long-term psych provider at [facility]; wtr routed to A - active order from 3/22/22.    Marlon Islas RN on 4/27/2022 at 1:28 PM

## 2022-04-28 NOTE — PROGRESS NOTES
Community Paramedic Program  Community Health Worker Outreach    UTC/Voicemail    Outreach attempted x 1.      Left message on patient's voicemail with Angela  with call back information and requested return call.    CHW Follow-up Plan: will try to reach patient again in 1-2 business days.    Notified pt that CP is out this week and we need to cancel the 2 pm visit today. Left my direct dial and asked pt to call back so I can help reschedule the visit.    Note: Available with CP2 on 5/5 at 1 pm?

## 2022-05-02 NOTE — PROGRESS NOTES
Community Paramedic Program  Community Health Worker Outreach    UTC/Voicemail    Outreach attempted x 2.      Left message on patient's voicemail with Angela  with call back information and requested return call.    CHW Follow-up Plan: will try to reach patient again in 1-2 business days.    Left another message with my contact information and offered to help reschedule CP visit from last week.
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
## 2022-05-03 NOTE — TELEPHONE ENCOUNTER
Mei, this is the patient we discussed in team meeting today.  Joaquín, it sounds like he has an appointment at the transitions clinic.  Can you help coordinate getting him there?  Thank you for all your help getting him what he needs.    Lynn Rodgers, MSN, APRN, CNP, FMHNP-BC,

## 2022-05-03 NOTE — PROGRESS NOTES
Community Paramedic Program    CHW Community/Care Team Outreach    5/4/22 addendum: CP called back and let me know that pt and his sister reached out to him directly to reschedule their visit. CP asked me to put pt visit on his schedule for Friday, May 6th at 10 am as we previously discussed.     Huddled with CP2 regarding this pt. Confirmed that I plan to attempt to reach pt once more to reschedule CP visit from last week. Discussed that CP would like to see pt on Friday, May 6th at 10 am if pt is available.    Will outreach to pt once more tomorrow with Angela  to attempt to reschedule CP visit.

## 2022-05-04 NOTE — TELEPHONE ENCOUNTER
Mental Health &Addiction (MH&A)Transition Clinic (TC):     NURSING Post-Consultation Referral Review  _________________________________________    This RN has consulted with provider & this Medication Management referral to the Transition Clinic is deemed   [x] Appropriate  [] Inappropriate     Based on the following additional information gathered:   Mei West, DANGELO CNP  You; Lynn Rodgers APRN CNP; Joaquín Jimenez, [facility] 17 hours ago (5:07 PM)     AK    Yes let's get him schedule for an in person visit at the Transition Clinic.     Marlon, after I meet with the patient I think I'll have a better sense of if he should be referred out into the community or if sticking with the October appointment and continuing at the TC in the interim is the better option.     Thanks everyone for your teamwork and care coordination.     Mei     Message text         Contact w/ patient/parent: next available in-person appts: Mon 5/9 at 9am, Mon 5/16 at 8am or 2:30pm, Tue 5/17 at  10:30am, 12:30 or 2:30pm - Wtr made 1st attempt to reach pt via Angela  but unable to access  at this time. Will try again later.    Marlon Isals, DENIS on May 4, 2022 at 10:51 AM

## 2022-05-05 ENCOUNTER — OFFICE VISIT (OUTPATIENT)
Dept: FAMILY MEDICINE | Facility: CLINIC | Age: 32
End: 2022-05-05
Payer: COMMERCIAL

## 2022-05-05 ENCOUNTER — TELEPHONE (OUTPATIENT)
Dept: FAMILY MEDICINE | Facility: CLINIC | Age: 32
End: 2022-05-05

## 2022-05-05 VITALS
RESPIRATION RATE: 16 BRPM | TEMPERATURE: 97.6 F | HEART RATE: 81 BPM | WEIGHT: 167 LBS | SYSTOLIC BLOOD PRESSURE: 113 MMHG | DIASTOLIC BLOOD PRESSURE: 79 MMHG | BODY MASS INDEX: 29.58 KG/M2

## 2022-05-05 DIAGNOSIS — R26.89 WIDEBASED GAIT: ICD-10-CM

## 2022-05-05 DIAGNOSIS — J45.40 MODERATE PERSISTENT ASTHMA WITHOUT COMPLICATION: ICD-10-CM

## 2022-05-05 DIAGNOSIS — E27.40 ADRENAL INSUFFICIENCY (H): ICD-10-CM

## 2022-05-05 DIAGNOSIS — R26.9 ABNORMAL GAIT: ICD-10-CM

## 2022-05-05 DIAGNOSIS — E03.9 HYPOTHYROIDISM, UNSPECIFIED TYPE: ICD-10-CM

## 2022-05-05 DIAGNOSIS — E27.1 ADDISON'S DISEASE (H): ICD-10-CM

## 2022-05-05 DIAGNOSIS — Z00.00 ROUTINE GENERAL MEDICAL EXAMINATION AT A HEALTH CARE FACILITY: ICD-10-CM

## 2022-05-05 DIAGNOSIS — D35.2 PITUITARY MICROADENOMA (H): Primary | ICD-10-CM

## 2022-05-05 DIAGNOSIS — R29.898 LEG WEAKNESS, BILATERAL: ICD-10-CM

## 2022-05-05 LAB
ALBUMIN SERPL-MCNC: 4.6 G/DL (ref 3.5–5)
ALP SERPL-CCNC: 84 U/L (ref 45–120)
ALT SERPL W P-5'-P-CCNC: 53 U/L (ref 0–45)
ANION GAP SERPL CALCULATED.3IONS-SCNC: 14 MMOL/L (ref 5–18)
AST SERPL W P-5'-P-CCNC: 45 U/L (ref 0–40)
BILIRUB SERPL-MCNC: 0.7 MG/DL (ref 0–1)
BUN SERPL-MCNC: 15 MG/DL (ref 8–22)
CALCIUM SERPL-MCNC: 9.9 MG/DL (ref 8.5–10.5)
CHLORIDE BLD-SCNC: 104 MMOL/L (ref 98–107)
CO2 SERPL-SCNC: 19 MMOL/L (ref 22–31)
CREAT SERPL-MCNC: 0.94 MG/DL (ref 0.7–1.3)
ERYTHROCYTE [DISTWIDTH] IN BLOOD BY AUTOMATED COUNT: 11.9 % (ref 10–15)
GFR SERPL CREATININE-BSD FRML MDRD: >90 ML/MIN/1.73M2
GLUCOSE BLD-MCNC: 88 MG/DL (ref 70–125)
HCT VFR BLD AUTO: 52.7 % (ref 40–53)
HGB BLD-MCNC: 18 G/DL (ref 13.3–17.7)
MCH RBC QN AUTO: 29.5 PG (ref 26.5–33)
MCHC RBC AUTO-ENTMCNC: 34.2 G/DL (ref 31.5–36.5)
MCV RBC AUTO: 86 FL (ref 78–100)
PLATELET # BLD AUTO: 198 10E3/UL (ref 150–450)
POTASSIUM BLD-SCNC: 4.2 MMOL/L (ref 3.5–5)
PROLACTIN SERPL-MCNC: 9 NG/ML (ref 0–15)
PROT SERPL-MCNC: 8.2 G/DL (ref 6–8)
RBC # BLD AUTO: 6.1 10E6/UL (ref 4.4–5.9)
SODIUM SERPL-SCNC: 137 MMOL/L (ref 136–145)
T4 FREE SERPL-MCNC: 1.07 NG/DL (ref 0.7–1.8)
TSH SERPL DL<=0.005 MIU/L-ACNC: 2.69 UIU/ML (ref 0.3–5)
VIT B12 SERPL-MCNC: 359 PG/ML (ref 213–816)
WBC # BLD AUTO: 6.9 10E3/UL (ref 4–11)

## 2022-05-05 PROCEDURE — 82607 VITAMIN B-12: CPT | Performed by: FAMILY MEDICINE

## 2022-05-05 PROCEDURE — 86780 TREPONEMA PALLIDUM: CPT | Performed by: FAMILY MEDICINE

## 2022-05-05 PROCEDURE — 84443 ASSAY THYROID STIM HORMONE: CPT | Performed by: FAMILY MEDICINE

## 2022-05-05 PROCEDURE — 85027 COMPLETE CBC AUTOMATED: CPT | Performed by: FAMILY MEDICINE

## 2022-05-05 PROCEDURE — 84439 ASSAY OF FREE THYROXINE: CPT | Performed by: FAMILY MEDICINE

## 2022-05-05 PROCEDURE — 80053 COMPREHEN METABOLIC PANEL: CPT | Performed by: FAMILY MEDICINE

## 2022-05-05 PROCEDURE — 99213 OFFICE O/P EST LOW 20 MIN: CPT | Mod: 25 | Performed by: FAMILY MEDICINE

## 2022-05-05 PROCEDURE — 36415 COLL VENOUS BLD VENIPUNCTURE: CPT | Performed by: FAMILY MEDICINE

## 2022-05-05 PROCEDURE — 84146 ASSAY OF PROLACTIN: CPT | Performed by: FAMILY MEDICINE

## 2022-05-05 PROCEDURE — 99395 PREV VISIT EST AGE 18-39: CPT | Performed by: FAMILY MEDICINE

## 2022-05-05 RX ORDER — FLUTICASONE PROPIONATE AND SALMETEROL XINAFOATE 115; 21 UG/1; UG/1
2 AEROSOL, METERED RESPIRATORY (INHALATION) 2 TIMES DAILY
Qty: 12 G | Refills: 3 | Status: SHIPPED | OUTPATIENT
Start: 2022-05-05 | End: 2022-06-14

## 2022-05-05 RX ORDER — ALBUTEROL SULFATE 90 UG/1
2 AEROSOL, METERED RESPIRATORY (INHALATION) EVERY 6 HOURS PRN
Qty: 18 G | Refills: 0 | Status: SHIPPED | OUTPATIENT
Start: 2022-05-05 | End: 2022-06-14

## 2022-05-05 NOTE — Clinical Note
Needs to set up MRIs Needs to see Neurology Patient needs transportation set up as well Please involve clinic care coordination team.

## 2022-05-05 NOTE — PROGRESS NOTES
Patient is scheduled for MRI on 06/28/2022 at 8:30 and Neurology on 09/15/2022 at 10:30 am with Dr. Rubio. Completing task.

## 2022-05-05 NOTE — TELEPHONE ENCOUNTER
Reason for Call:  Other Transportation    Detailed comments: Please help assist patient in scheduling transportation for his upcoming appt for MRI on 06/28/2022 and Neurology on 09/15/2022.    Phone Number Patient can be reached at: Home number on file [phone] (home)    Best Time: any    Can we leave a detailed message on this number? YES    Call taken on 5/5/2022 at 4:22 PM by Diallo Salinas

## 2022-05-05 NOTE — PROGRESS NOTES
SUBJECTIVE:   CC: Brooke Peterson is an 31 year old male who presents for preventative health visit.     Patient has been advised of split billing requirements and indicates understanding: Yes  HPI  Patient is here for preventive visit. He faces multiple barriers, including language, mental health problems, and transportation. He is known to this provider as a poor historian. Other concerns:    Dizziness- chronic problem for patient, not getting better or worse. Has taken meclizine in the past.   Abnormal gait- noted in January. Today he is in a wheelchair for the visit. He can bear weight but needs staff assistance to get in/out of w/c and on/off the exam table. He is requesting a medication to make his leg weakness better.  He was previously referred to a neurologist for both issues but was unable to get transportation.    Constipation- daily, finds stools a little difficult to pass. He would like Miralax refilled. No blood in stool. No N&V.    Today's PHQ-2 Score:   PHQ-2 ( 1999 Pfizer) 5/5/2022   Q1: Little interest or pleasure in doing things (No Data)   Q2: Feeling down, depressed or hopeless (No Data)   PHQ-2 Score -   PHQ-2 Total Score (12-17 Years)- Positive if 3 or more points; Administer PHQ-A if positive -   Q1: Little interest or pleasure in doing things Not at all   Q2: Feeling down, depressed or hopeless Not at all   PHQ-2 Score 0       Have you ever done Advance Care Planning? (For example, a Health Directive, POLST, or a discussion with a medical provider or your loved ones about your wishes): patient would like father Joey Montero    Social History     Tobacco Use     Smoking status: Never Smoker     Smokeless tobacco: Never Used   Substance Use Topics     Alcohol use: Not Currently       Alcohol Use 5/5/2022   Prescreen: >3 drinks/day or >7 drinks/week? 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      Asthma      Asthma      Hypercalcemia 09/2019     Hyperprolactinemia (H) 12/2018     Hypothyroidism      Hypovitaminosis D 11/2018     Pituitary microadenoma (H)      PTSD (post-traumatic stress disorder)      Schizophrenia (H)       Current Outpatient Medications   Medication     acetaminophen (TYLENOL) 325 MG tablet     albuterol (PROAIR HFA/PROVENTIL HFA/VENTOLIN HFA) 108 (90 Base) MCG/ACT inhaler     Ergocalciferol 50 MCG (2000 UT) TABS     fludrocortisone (FLORINEF) 0.1 MG tablet     fluticasone (FLONASE) 50 MCG/ACT nasal spray     fluticasone-salmeterol (ADVAIR HFA) 115-21 MCG/ACT inhaler     hydrocortisone (CORTEF) 10 MG tablet     levothyroxine (SYNTHROID/LEVOTHROID) 75 MCG tablet     loratadine (CLARITIN) 10 MG tablet     Multiple Vitamin (MULTI-VITAMINS) TABS     OLANZapine (ZYPREXA) 5 MG tablet     ondansetron (ZOFRAN) 4 MG tablet     polyethylene glycol (MIRALAX) 17 GM/Dose powder     No current facility-administered medications for this visit.     Patient reports no known family history of heart disease, diabetes, or cancer.    Review of Systems  GEN: no fevers or chills   ENT: no sinus concerns, normal hearing and vision   RESP: no cough or wheezing   CV: no dyspnea, palpitations, edema or chest pain   GI: no nausea, vomiting, diarrhea, positive for constipation   : normal urination   ENDO: no hot or cold intolerance, no polydipsia or polyuria   MS: no myalgias or arthralgias   DERM: no rash or bruising   PSYCH: reports poor mood especially as it relates to his legs      OBJECTIVE:   /79 (BP Location: Left arm, Patient Position: Sitting, Cuff Size: Adult Regular)   Pulse 81   Temp 97.6  F (36.4  C) (Oral)   Resp 16   Wt 75.8 kg (167 lb)   BMI 29.58 kg/m      Physical Exam  GENERAL: healthy, alert and no distress  EYES: Eyes grossly normal to inspection, PERRL and conjunctivae and sclerae normal  HENT: ear canals and TM's normal, nose and mouth without ulcers or lesions  NECK: no  adenopathy, no asymmetry, masses, or scars and thyroid normal to palpation  RESP: lungs clear to auscultation - no rales, rhonchi or wheezes  CV: regular rate and rhythm, normal S1 S2, no S3 or S4, no murmur, click or rub, no peripheral edema and peripheral pulses strong  ABDOMEN: soft, nontender, no hepatosplenomegaly, no masses and bowel sounds normal  MS: no gross musculoskeletal defects noted, no edema  SKIN: no suspicious lesions or rashes  NEURO: difficulty climbing on/off exam table but able to do so with staff assistance, CN exam negative, right leg and foot weaker that left side when elevating knee and with dorsiflexion against provider's hand,--difficulty determining effort?  PSYCH: flat affect, answered questions appropriately with , cooperative during exam    Recent Results (from the past 24 hour(s))   CBC with platelets    Collection Time: 05/05/22 11:00 AM   Result Value Ref Range    WBC Count 6.9 4.0 - 11.0 10e3/uL    RBC Count 6.10 (H) 4.40 - 5.90 10e6/uL    Hemoglobin 18.0 (H) 13.3 - 17.7 g/dL    Hematocrit 52.7 40.0 - 53.0 %    MCV 86 78 - 100 fL    MCH 29.5 26.5 - 33.0 pg    MCHC 34.2 31.5 - 36.5 g/dL    RDW 11.9 10.0 - 15.0 %    Platelet Count 198 150 - 450 10e3/uL       Wt Readings from Last 3 Encounters:   05/05/22 75.8 kg (167 lb)   04/05/22 76 kg (167 lb 8 oz)   02/17/22 77.1 kg (170 lb)         ASSESSMENT/PLAN:   Toe was seen today for physical.    Diagnoses and all orders for this visit:    Pituitary microadenoma (H)  -     MR Pituitary w/o & w Contrast; Future  -     Prolactin; Future  -     Prolactin  -     MR Brain w/o & w Contrast; Future    Abnormal gait  Widebased gait  Leg weakness, bilateral  -     MR Lumbar Spine w/o Contrast; Future  -     Adult Neurology  Referral; Future  -     Vitamin B12  -     Treponema Abs w Reflex to RPR and Titer  -     CBC with platelets  -     MR Brain w/o & w Contrast; Future    Rock's disease (H)  -     Comprehensive metabolic  "panel (BMP + Alb, Alk Phos, ALT, AST, Total. Bili, TP); Future  -     Vitamin B12; Future  -     Comprehensive metabolic panel (BMP + Alb, Alk Phos, ALT, AST, Total. Bili, TP)  -     Vitamin B12    Adrenal insufficiency (H)  -     Comprehensive metabolic panel (BMP + Alb, Alk Phos, ALT, AST, Total. Bili, TP)  -     Vitamin B12    Hypothyroidism, unspecified type  -     TSH  -     T4, free    Moderate persistent asthma without complication  -     albuterol (PROAIR HFA/PROVENTIL HFA/VENTOLIN HFA) 108 (90 Base) MCG/ACT inhaler; Inhale 2 puffs into the lungs every 6 hours as needed for shortness of breath / dyspnea or wheezing  -     fluticasone-salmeterol (ADVAIR HFA) 115-21 MCG/ACT inhaler; Inhale 2 puffs into the lungs 2 times daily    Inhalers refilled. Lab work ordered to rule out possible etiologies for leg weakness and abnormal gait. Patient agreeable during visit to MRI; however, when he was called to schedule it, he refused. Will have clinic scheduling staff reach out to him to help him schedule it.    Patient has been advised of split billing requirements and indicates understanding: Yes    COUNSELING:   Reviewed preventive health counseling, as reflected in patient instructions  Special attention given to:        Immunizations    Declined: COVID booster- \"not today\".    Estimated body mass index is 29.58 kg/m  as calculated from the following:    Height as of 4/5/22: 1.6 m (5' 3\").    Weight as of this encounter: 75.8 kg (167 lb).      He reports that he has never smoked. He has never used smokeless tobacco.      Counseling Resources:  ATP IV Guidelines  Pooled Cohorts Equation Calculator  FRAX Risk Assessment  ICSI Preventive Guidelines  Dietary Guidelines for Americans, 2010  USDA's MyPlate  ASA Prophylaxis  Lung CA Screening    Maria Patton, Student NP      Physician Attestation   I, Jose Rangel MD, was present with the NP student who participated in the service and in the documentation of the note.  " I have verified the history and personally performed the physical exam and medical decision making.  I agree with the assessment and plan of care as documented in the note.      Cranial Nerves:  II-normal visual fields  III, IV, VI-normal extraocular movements  V-normal facial sensation  VII-normal facial power  VIII-hearing normal at conversational level  IX, X-palate/uvula symmetric  XI-shoulder shrug antigravity  XII-tongue midline  Able to bear weight with lower extremities but only enough to get from chair to exam table.  Seems to be fatiguing to exhaustion with that.  DTRs brisk at knees 3+ without clonus, Achilles 2+   Either doesn't participate with strength testing or has deficits    Difficult situation in patient and family with many difficulties with compliance and barriers to accessing care.  Definitely seems to have decrease in lower extremity strength over the course of months, now riding in wheel chair.  Need objective data as psychiatric overlay complicates, therefor imaging is needed.    Jose Rangel MD   [facility]

## 2022-05-06 ENCOUNTER — ALLIED HEALTH/NURSE VISIT (OUTPATIENT)
Dept: OTHER | Facility: CLINIC | Age: 32
End: 2022-05-06
Payer: COMMERCIAL

## 2022-05-06 VITALS
DIASTOLIC BLOOD PRESSURE: 60 MMHG | TEMPERATURE: 100.1 F | HEART RATE: 69 BPM | RESPIRATION RATE: 14 BRPM | SYSTOLIC BLOOD PRESSURE: 96 MMHG | OXYGEN SATURATION: 99 %

## 2022-05-06 DIAGNOSIS — Z79.899 MEDICATION MANAGEMENT: Primary | ICD-10-CM

## 2022-05-06 LAB — T PALLIDUM AB SER QL: NONREACTIVE

## 2022-05-06 PROCEDURE — 99207 PR COMMUNITY PARAMEDIC-PATIENT MEETS CRITERIA: CPT

## 2022-05-06 ASSESSMENT — ACTIVITIES OF DAILY LIVING (ADL): DEPENDENT_IADLS:: CLEANING;COOKING;LAUNDRY;SHOPPING;MEAL PREPARATION;MEDICATION MANAGEMENT;TRANSPORTATION

## 2022-05-06 NOTE — PROGRESS NOTES
Community Paramedics Follow-up Visit  May 6, 2022  TIME: 10:00    Brooke Peterson is a 31 year old male being seen at home for a follow-up visit.    Present at appointment: Patient, Parents    Primary Diagnosis: Psychosocial    Chief Complaint   Patient presents with     Outreach     Community Paramedic home visit       [place] Utilization:   Clinic Utilization  Difficulty keeping appointments:: No  Compliance Concerns: No  No-Show Concerns: Yes  No PCP office visit in Past Year: No  Utilization    Hospital Admissions  0             ED Visits  0             No Show Count (past year)  18                Current as of: 5/6/2022  2:15 PM              BP 96/60 (BP Location: Left arm, Patient Position: Supine)   Pulse 69   Temp 100.1  F (37.8  C)   Resp 14   SpO2 99%     Clinical Concerns:  Current Medical Concerns:      Current Behavioral Concerns: medication compliance    Education Provided to patient: medication set up   Medication set up? Yes  Pill Box issued: Yes  Scale issued: No  Flu Shot given: No  COVID Vaccine Given: No  Lab draw or specimen collection: No  Food box issued: No  Collaborative visit with PCP: No  Wound Care: No    Health Maintenance Reviewed:      Clinical Pathway: None    No  Face to Face in Home / Community      Review of Symptoms/PE    Skin: negative  Eyes: negative  Ears/Nose/Throat: negative  Respiratory: No shortness of breath, dyspnea on exertion, cough, or hemoptysis  Cardiovascular: negative  Gastrointestinal: negative  Genitourinary: negative  Musculoskeletal: negative  Neurologic: negative  Psychiatric: negative    Pain Management::       Medication Review  Medication adherence problem (GOAL):: No  Knowledgeable about how to use meds:: No  Medication side effects suspected:: No    Diet/Exercise/Sleep  Diet:: Regular  Inadequate nutrition (GOAL):: No  Tube Feeding: No  Inadequate activity/exercise (GOAL):: No  Significant changes in sleep pattern (GOAL): No    Plan:     Time spent with  "patient: 60    The patient meets one or more of the following criteria:  * Requires services to prevent readmission to a nursing home or hospital    Acute concern/Follow-up recommendations: follow care plan    Next CP visit scheduled: 05/20/2022    Issues for Provider to follow up on: Met with Brooke and his family. Brooke was laying on the couch which is where he normally is upon my arrival. Medications and pill boxes were set out. Two medication boxes were filled, noted that Famotidine 20 mg 1 am 1 pm was removed from pt's medication list so it was not placed in pill box.  Through  pt asked that I re order his \"inhaler and dizzy medication.\" Pharmacy called re ordered medications that were getting low, they advised Advair is ready to be delivered as well, I asked pharmacy if pt had Meclizine was available for refill, they said it was not but faxed PCP for approval. Medications will be delivered when ready, possibly this afternoon.      Provider follow up visit needed: TBD    "

## 2022-05-09 ENCOUNTER — TELEPHONE (OUTPATIENT)
Dept: BEHAVIORAL HEALTH | Facility: CLINIC | Age: 32
End: 2022-05-09
Payer: COMMERCIAL

## 2022-05-09 NOTE — TELEPHONE ENCOUNTER
Wtr made 1st attempt to reach pt (with assistance of Angela blue) to schedule [facility] Psychiatry appt to bridge care to 10/19/22 Outpt Psych appt; Next available in-person appt is 5/23/22 and we scheduled for 12:30pm - provided address & location within [facility] - reviewed we would see him to refill the psychiatric medications. Wtr reviewed provider would like to see him in-person.    There was much discussion in the background of what sounded like family members.    Wtr routed to [facility] to facilitate scheduling.    Marlon Islas RN on May 9, 2022 at 12:28 PM

## 2022-05-10 ENCOUNTER — APPOINTMENT (OUTPATIENT)
Dept: MRI IMAGING | Facility: HOSPITAL | Age: 32
End: 2022-05-10
Attending: PHYSICIAN ASSISTANT
Payer: COMMERCIAL

## 2022-05-10 ENCOUNTER — APPOINTMENT (OUTPATIENT)
Dept: MRI IMAGING | Facility: HOSPITAL | Age: 32
End: 2022-05-10
Attending: EMERGENCY MEDICINE
Payer: COMMERCIAL

## 2022-05-10 ENCOUNTER — HOSPITAL ENCOUNTER (OUTPATIENT)
Facility: HOSPITAL | Age: 32
Setting detail: OBSERVATION
Discharge: NURSING, REHAB OR LONG-TERM CARE | End: 2022-05-19
Attending: EMERGENCY MEDICINE | Admitting: RADIOLOGY
Payer: COMMERCIAL

## 2022-05-10 DIAGNOSIS — R42 DIZZINESS: Primary | ICD-10-CM

## 2022-05-10 DIAGNOSIS — F89 NEURODEVELOPMENTAL DISORDER: ICD-10-CM

## 2022-05-10 DIAGNOSIS — R29.898 WEAKNESS OF RIGHT LEG: ICD-10-CM

## 2022-05-10 DIAGNOSIS — E27.40 ADRENAL INSUFFICIENCY (H): ICD-10-CM

## 2022-05-10 DIAGNOSIS — E27.1 ADDISON'S DISEASE (H): ICD-10-CM

## 2022-05-10 LAB
ANION GAP SERPL CALCULATED.3IONS-SCNC: 9 MMOL/L (ref 5–18)
BUN SERPL-MCNC: 12 MG/DL (ref 8–22)
CALCIUM SERPL-MCNC: 9.6 MG/DL (ref 8.5–10.5)
CHLORIDE BLD-SCNC: 103 MMOL/L (ref 98–107)
CK SERPL-CCNC: 129 U/L (ref 30–190)
CO2 SERPL-SCNC: 23 MMOL/L (ref 22–31)
CREAT SERPL-MCNC: 0.88 MG/DL (ref 0.7–1.3)
ERYTHROCYTE [DISTWIDTH] IN BLOOD BY AUTOMATED COUNT: 12.1 % (ref 10–15)
GFR SERPL CREATININE-BSD FRML MDRD: >90 ML/MIN/1.73M2
GLUCOSE BLD-MCNC: 96 MG/DL (ref 70–125)
HCT VFR BLD AUTO: 36.3 % (ref 40–53)
HGB BLD-MCNC: 12.1 G/DL (ref 13.3–17.7)
HOLD SPECIMEN: NORMAL
MCH RBC QN AUTO: 30.1 PG (ref 26.5–33)
MCHC RBC AUTO-ENTMCNC: 33.3 G/DL (ref 31.5–36.5)
MCV RBC AUTO: 90 FL (ref 78–100)
PLATELET # BLD AUTO: 133 10E3/UL (ref 150–450)
POTASSIUM BLD-SCNC: 3.9 MMOL/L (ref 3.5–5)
PROLACTIN SERPL-MCNC: 13.2 NG/ML (ref 0–15)
RBC # BLD AUTO: 4.02 10E6/UL (ref 4.4–5.9)
SARS-COV-2 RNA RESP QL NAA+PROBE: NEGATIVE
SODIUM SERPL-SCNC: 135 MMOL/L (ref 136–145)
TSH SERPL DL<=0.005 MIU/L-ACNC: 1.59 UIU/ML (ref 0.3–5)
WBC # BLD AUTO: 5.2 10E3/UL (ref 4–11)

## 2022-05-10 PROCEDURE — 96372 THER/PROPH/DIAG INJ SC/IM: CPT | Performed by: INTERNAL MEDICINE

## 2022-05-10 PROCEDURE — 99219 PR INITIAL OBSERVATION CARE,LEVEL II: CPT | Performed by: INTERNAL MEDICINE

## 2022-05-10 PROCEDURE — 80048 BASIC METABOLIC PNL TOTAL CA: CPT | Performed by: EMERGENCY MEDICINE

## 2022-05-10 PROCEDURE — C9803 HOPD COVID-19 SPEC COLLECT: HCPCS

## 2022-05-10 PROCEDURE — 84146 ASSAY OF PROLACTIN: CPT | Performed by: PHYSICIAN ASSISTANT

## 2022-05-10 PROCEDURE — 72158 MRI LUMBAR SPINE W/O & W/DYE: CPT

## 2022-05-10 PROCEDURE — 87635 SARS-COV-2 COVID-19 AMP PRB: CPT | Performed by: EMERGENCY MEDICINE

## 2022-05-10 PROCEDURE — 82550 ASSAY OF CK (CPK): CPT | Performed by: INTERNAL MEDICINE

## 2022-05-10 PROCEDURE — G0378 HOSPITAL OBSERVATION PER HR: HCPCS

## 2022-05-10 PROCEDURE — 250N000011 HC RX IP 250 OP 636: Performed by: INTERNAL MEDICINE

## 2022-05-10 PROCEDURE — 36415 COLL VENOUS BLD VENIPUNCTURE: CPT | Performed by: EMERGENCY MEDICINE

## 2022-05-10 PROCEDURE — 85027 COMPLETE CBC AUTOMATED: CPT | Performed by: EMERGENCY MEDICINE

## 2022-05-10 PROCEDURE — 255N000002 HC RX 255 OP 636: Performed by: EMERGENCY MEDICINE

## 2022-05-10 PROCEDURE — 72157 MRI CHEST SPINE W/O & W/DYE: CPT

## 2022-05-10 PROCEDURE — 250N000013 HC RX MED GY IP 250 OP 250 PS 637: Performed by: PHYSICIAN ASSISTANT

## 2022-05-10 PROCEDURE — A9585 GADOBUTROL INJECTION: HCPCS | Performed by: EMERGENCY MEDICINE

## 2022-05-10 PROCEDURE — 84443 ASSAY THYROID STIM HORMONE: CPT | Performed by: EMERGENCY MEDICINE

## 2022-05-10 PROCEDURE — 250N000013 HC RX MED GY IP 250 OP 250 PS 637: Performed by: INTERNAL MEDICINE

## 2022-05-10 PROCEDURE — 99285 EMERGENCY DEPT VISIT HI MDM: CPT | Mod: 25

## 2022-05-10 PROCEDURE — 51798 US URINE CAPACITY MEASURE: CPT

## 2022-05-10 RX ORDER — VITAMIN B COMPLEX
50 TABLET ORAL DAILY
Status: DISCONTINUED | OUTPATIENT
Start: 2022-05-11 | End: 2022-05-19 | Stop reason: HOSPADM

## 2022-05-10 RX ORDER — ONDANSETRON 4 MG/1
4 TABLET, FILM COATED ORAL EVERY 8 HOURS PRN
Status: DISCONTINUED | OUTPATIENT
Start: 2022-05-10 | End: 2022-05-19 | Stop reason: HOSPADM

## 2022-05-10 RX ORDER — ALBUTEROL SULFATE 90 UG/1
2 AEROSOL, METERED RESPIRATORY (INHALATION) EVERY 6 HOURS PRN
Status: DISCONTINUED | OUTPATIENT
Start: 2022-05-10 | End: 2022-05-11

## 2022-05-10 RX ORDER — LORATADINE 10 MG/1
10 TABLET ORAL DAILY
Status: DISCONTINUED | OUTPATIENT
Start: 2022-05-11 | End: 2022-05-19 | Stop reason: HOSPADM

## 2022-05-10 RX ORDER — MULTIVITAMIN,THERAPEUTIC
1 TABLET ORAL DAILY
Status: DISCONTINUED | OUTPATIENT
Start: 2022-05-11 | End: 2022-05-19 | Stop reason: HOSPADM

## 2022-05-10 RX ORDER — HYDROCORTISONE 5 MG/1
10 TABLET ORAL EVERY MORNING
Status: DISCONTINUED | OUTPATIENT
Start: 2022-05-11 | End: 2022-05-19 | Stop reason: HOSPADM

## 2022-05-10 RX ORDER — POLYETHYLENE GLYCOL 3350 17 G/17G
17 POWDER, FOR SOLUTION ORAL DAILY
Status: DISCONTINUED | OUTPATIENT
Start: 2022-05-11 | End: 2022-05-19 | Stop reason: HOSPADM

## 2022-05-10 RX ORDER — LORAZEPAM 0.5 MG/1
0.5 TABLET ORAL ONCE
Status: COMPLETED | OUTPATIENT
Start: 2022-05-10 | End: 2022-05-10

## 2022-05-10 RX ORDER — CHOLECALCIFEROL (VITAMIN D3) 50 MCG
1 TABLET ORAL DAILY
COMMUNITY
Start: 2022-04-21 | End: 2022-06-14

## 2022-05-10 RX ORDER — HEPARIN SODIUM 5000 [USP'U]/.5ML
5000 INJECTION, SOLUTION INTRAVENOUS; SUBCUTANEOUS EVERY 12 HOURS
Status: DISCONTINUED | OUTPATIENT
Start: 2022-05-10 | End: 2022-05-19 | Stop reason: HOSPADM

## 2022-05-10 RX ORDER — LIDOCAINE 40 MG/G
CREAM TOPICAL
Status: DISCONTINUED | OUTPATIENT
Start: 2022-05-10 | End: 2022-05-19 | Stop reason: HOSPADM

## 2022-05-10 RX ORDER — GADOBUTROL 604.72 MG/ML
7 INJECTION INTRAVENOUS ONCE
Status: COMPLETED | OUTPATIENT
Start: 2022-05-10 | End: 2022-05-10

## 2022-05-10 RX ORDER — FLUDROCORTISONE ACETATE 0.1 MG/1
0.1 TABLET ORAL DAILY
Status: DISCONTINUED | OUTPATIENT
Start: 2022-05-11 | End: 2022-05-19 | Stop reason: HOSPADM

## 2022-05-10 RX ORDER — LEVOTHYROXINE SODIUM 25 UG/1
75 TABLET ORAL
Status: DISCONTINUED | OUTPATIENT
Start: 2022-05-11 | End: 2022-05-19 | Stop reason: HOSPADM

## 2022-05-10 RX ORDER — HYDROCORTISONE 5 MG/1
5 TABLET ORAL EVERY EVENING
Status: DISCONTINUED | OUTPATIENT
Start: 2022-05-10 | End: 2022-05-19 | Stop reason: HOSPADM

## 2022-05-10 RX ORDER — FLUTICASONE PROPIONATE 50 MCG
1 SPRAY, SUSPENSION (ML) NASAL DAILY PRN
Status: DISCONTINUED | OUTPATIENT
Start: 2022-05-10 | End: 2022-05-19 | Stop reason: HOSPADM

## 2022-05-10 RX ORDER — OLANZAPINE 2.5 MG/1
5 TABLET, FILM COATED ORAL EVERY MORNING
Status: DISCONTINUED | OUTPATIENT
Start: 2022-05-11 | End: 2022-05-19 | Stop reason: HOSPADM

## 2022-05-10 RX ORDER — HYDROCORTISONE 10 MG/1
5 TABLET ORAL EVERY EVENING
COMMUNITY
End: 2022-06-14

## 2022-05-10 RX ORDER — ACETAMINOPHEN 325 MG/1
650 TABLET ORAL EVERY 6 HOURS PRN
Status: DISCONTINUED | OUTPATIENT
Start: 2022-05-10 | End: 2022-05-19 | Stop reason: HOSPADM

## 2022-05-10 RX ADMIN — GADOBUTROL 7 ML: 604.72 INJECTION INTRAVENOUS at 16:12

## 2022-05-10 RX ADMIN — ACETAMINOPHEN 650 MG: 325 TABLET ORAL at 21:50

## 2022-05-10 RX ADMIN — HEPARIN SODIUM 5000 UNITS: 5000 INJECTION, SOLUTION INTRAVENOUS; SUBCUTANEOUS at 20:52

## 2022-05-10 RX ADMIN — HYDROCORTISONE 5 MG: 5 TABLET ORAL at 20:49

## 2022-05-10 RX ADMIN — LORAZEPAM 0.5 MG: 0.5 TABLET ORAL at 14:57

## 2022-05-10 NOTE — ED NOTES
" at beside. Pt having a hard time standing and c/o his \"knees being weak\". States this has been going on for 2 years, worsening over the past 2 days. MRI is wnl. Also c/o numbness to right foot  "

## 2022-05-10 NOTE — ED PROVIDER NOTES
EMERGENCY DEPARTMENT ENCOUNTER      NAME: Brooke Peterson  AGE: 31 year old male  YOB: 1990  MRN: [id number]  EVALUATION DATE & TIME: No admission date for patient encounter.    PCP: Jose Rangel    ED PROVIDER: Marcello Stacy PA-C      Chief Complaint   Patient presents with     Leg Pain     FINAL IMPRESSION:  No diagnosis found.      ED COURSE & MEDICAL DECISION MAKING:    Pertinent Labs & Imaging studies reviewed. (See chart for details)  1:53 PM I met the patient and performed my initial interview and exam.  2:15 PM  Staffed with Dr. Banegas    31 year old male presents to the Emergency Department for evaluation of right leg weakness    ED Course as of 05/10/22 1559   Tue May 10, 2022   1408 Examination and history significantly limited due to language, and neurocognitive status.  Patient is a 31-year-old male with past medical history of gets of hernia, neurocognitive disability, hypothyroidism who presents to the ED for evaluation of right-sided leg weakness which is been progressively worsening for 3 days.  Patient notes that he has chronic right-sided leg weakness, however the last 3 days has become significantly worse and he is unable to walk.  On chart review it looks like he has been referred to neurology multiple times, however it is unclear whether or not he is actually gone to any of these appointments.  Additionally is unclear why he was initially referred to neurology.  On examination he has significant right-sided weakness, deep tendon reflexes are intact at both the knee, ankle, and elbow, however he has 1 out of 5 strength in the right lower leg, no strength with dorsiflexion or plantarflexion.  No midline tenderness or pain.  No chest pain or shortness of breath, bowel sounds are normal, no abdominal tenderness.  Patient notes chronic incontinence of stool, unclear how long this has been going on.  Remainder of his examination is relatively normal. He has appropriate strength in  his upper extremities. Negative pronator drift.  This seems to be an acute on chronic progressive weakness from the history that I can gather.  He had work-up scheduled with his primary care provider to see a neurologist in the past, however has missed these appointments, is unclear whether or not he has ever seen neurology.  At this point seems to be acute on chronic weakness of the right side, right lower extremity.  Will obtain MR's of his thoracic spine, and lumbar spine.  Additionally we will complete some basic laboratory studies including BMP, CBC, TSH.  Unlikely the patient will require neurology follow-up on a more timely manner than initially scheduled.   1546 CBC shows no elevation in white blood cell count, hemoglobin is decreased from 18 5 days ago down to 12.  Platelet count has decreased to 133.   1555 Patient will be signed out to Dr. Banegas pending final disposition, completion of MRI.          At the conclusion of the encounter I discussed the results of all of the tests and the disposition. The questions were answered. The patient or family acknowledged understanding and was agreeable with the care plan.       0 minutes of critical care time     MEDICATIONS GIVEN IN THE EMERGENCY:  Medications - No data to display    NEW PRESCRIPTIONS STARTED AT TODAY'S ER VISIT  New Prescriptions    No medications on file        =================================================================    HPI    Patient information was obtained from: patient, father    Use of : Yes (Language Line) - Language Angela Peterson is a 31 year old male with a pertinent history of Warrick's disease, PTSD, schizophrenia who presents to this ED by walk in for evaluation of leg pain.    History significantly limited secondary to language barrier    Patient is reporting weakness in all 4 extremities. He is having pain in his right leg that is making it so he is unable to walk. Patient says these symptoms have been  going on for 2+ years. He is supposed to see a neurologist but neither the patient nor the father know when or why. Patient saw PCP 4 days ago. Patient and father are unsure of what plan of care is. Patient notes some chronic bowel incontinence that is not new for him. His leg pain has been worse over the past 3 days. Denies fever, cough, cold, chills, back pain, or any other complaints at this time.     Per chart review, patient was seen in clinic on 5/5 by his PCP.  He was scheduled for an MR Lumbar Spine w/o contrast and a Neurology referral secondary to leg weakness and abnormal gait. Patient had labs scheduled for multiple lab checks. Patient frequently has issues getting transportation to healthcare visits.     REVIEW OF SYSTEMS   Review of Systems   Unable to perform ROS: Other (very difficult historian)     PAST MEDICAL HISTORY:  Past Medical History:   Diagnosis Date     [place]'s disease (H) 07/2017     Asthma      Asthma      Hypercalcemia 09/2019     Hyperprolactinemia (H) 12/2018     Hypothyroidism      Hypovitaminosis D 11/2018     Pituitary microadenoma (H)      PTSD (post-traumatic stress disorder)      Schizophrenia (H)        PAST SURGICAL HISTORY:  History reviewed. No pertinent surgical history.        CURRENT MEDICATIONS:    acetaminophen (TYLENOL) 325 MG tablet  albuterol (PROAIR HFA/PROVENTIL HFA/VENTOLIN HFA) 108 (90 Base) MCG/ACT inhaler  Ergocalciferol 50 MCG (2000 UT) TABS  fludrocortisone (FLORINEF) 0.1 MG tablet  fluticasone (FLONASE) 50 MCG/ACT nasal spray  fluticasone-salmeterol (ADVAIR HFA) 115-21 MCG/ACT inhaler  hydrocortisone (CORTEF) 10 MG tablet  levothyroxine (SYNTHROID/LEVOTHROID) 75 MCG tablet  loratadine (CLARITIN) 10 MG tablet  Multiple Vitamin (MULTI-VITAMINS) TABS  OLANZapine (ZYPREXA) 5 MG tablet  ondansetron (ZOFRAN) 4 MG tablet  polyethylene glycol (MIRALAX) 17 GM/Dose powder         ALLERGIES:  No Known Allergies    FAMILY HISTORY:  Family History   Problem Relation  Age of Onset     Diabetes Mother      Cancer No family hx of        SOCIAL HISTORY:   Social History     Socioeconomic History     Marital status: Single   Tobacco Use     Smoking status: Never Smoker     Smokeless tobacco: Never Used   Substance and Sexual Activity     Alcohol use: Not Currently     Drug use: Never     Social Determinants of Health     Financial Resource Strain: Medium Risk     Difficulty of Paying Living Expenses: Somewhat hard   Food Insecurity: No Food Insecurity     Worried About Running Out of Food in the Last Year: Never true     Ran Out of Food in the Last Year: Never true   Transportation Needs: Unmet Transportation Needs     Lack of Transportation (Medical): Yes     Lack of Transportation (Non-Medical): No   Physical Activity: Insufficiently Active     Days of Exercise per Week: 4 days     Minutes of Exercise per Session: 10 min   Stress: No Stress Concern Present     Feeling of Stress : Only a little   Social Connections: Socially Isolated     Frequency of Communication with Friends and Family: Never     Frequency of Social Gatherings with Friends and Family: More than three times a week     Attends Yazidi Services: Never     Active Member of Clubs or Organizations: No     Attends Club or Organization Meetings: Never     Marital Status: Never    Intimate Partner Violence: Not At Risk     Fear of Current or Ex-Partner: No     Emotionally Abused: No     Physically Abused: No     Sexually Abused: No   Housing Stability: Low Risk      Unable to Pay for Housing in the Last Year: No     Number of Places Lived in the Last Year: 1     Unstable Housing in the Last Year: No       VITALS:  /67   Pulse 79   Temp 98.2  F (36.8  C) (Temporal)   Resp 18   Wt 75.8 kg (167 lb)   SpO2 100%   BMI 29.58 kg/m      PHYSICAL EXAM    Physical Exam  Constitutional:       General: He is not in acute distress.     Appearance: Normal appearance. He is not toxic-appearing or diaphoretic.   HENT:       Head: Normocephalic and atraumatic.   Eyes:      General: No visual field deficit.     Extraocular Movements: Extraocular movements intact.      Pupils: Pupils are equal, round, and reactive to light.   Cardiovascular:      Rate and Rhythm: Normal rate and regular rhythm.      Pulses: Normal pulses.      Heart sounds: Normal heart sounds. No murmur heard.    No friction rub. No gallop.   Pulmonary:      Effort: Pulmonary effort is normal. No respiratory distress.      Breath sounds: Normal breath sounds. No wheezing or rales.   Chest:      Chest wall: No tenderness.   Abdominal:      General: Abdomen is flat. There is no distension.      Palpations: Abdomen is soft.      Tenderness: There is no abdominal tenderness. There is no guarding.   Musculoskeletal:         General: No swelling, tenderness or deformity.   Neurological:      Mental Status: He is alert.      Cranial Nerves: No facial asymmetry.      Sensory: No sensory deficit.      Motor: Weakness present. No tremor or pronator drift.      Gait: Gait abnormal.      Deep Tendon Reflexes: Reflexes normal.      Reflex Scores:       Bicep reflexes are 3+ on the right side and 3+ on the left side.       Patellar reflexes are 4+ on the right side and 3+ on the left side.       Achilles reflexes are 3+ on the right side and 3+ on the left side.     Comments: Right Patellar hyper reflexive compared to left. Significant weakness of the right leg, unable to dorsiflex or plantar flex. Unclear whether this is a true weakness or patient unwilling to perform the testing.          LAB:  All pertinent labs reviewed and interpreted.  Labs Ordered and Resulted from Time of ED Arrival to Time of ED Departure - No data to display    RADIOLOGY:  Reviewed all pertinent imaging. Please see official radiology report.  MR Lumbar Spine w/o & w Contrast    (Results Pending)   MR Thoracic Spine w/o & w Contrast    (Results Pending)       Santos PARKER, am serving as a scribe to  document services personally performed by Marcello Stacy PA-C, based on my observation and the provider's statements to me. I, Marcello Stacy PA-C, attest that Santos Tenorio is acting in a scribe capacity, has observed my performance of the services and has documented them in accordance with my direction.    Marcello Stacy PA-C  Emergency Medicine  [facility] EMERGENCY DEPARTMENT  [facility] [facility] [address]  [phone]  Dept: [phone]     Marcello Stacy PA-C  05/10/22 8842

## 2022-05-10 NOTE — ED TRIAGE NOTES
Pt has low back pain radiating to right leg, pt states if I walk I fall. Language barrier unsure how long going on ,      Triage Assessment     Row Name 05/10/22 1311       Triage Assessment (Adult)    Airway WDL WDL

## 2022-05-10 NOTE — ED NOTES
Bladder scan completed, 313 mL. Attempted to urinate after scan but was unable to void. Patient reports feeling urge to void but that it has been difficult recently.   Patient wanted to stand to void and reported dizziness once up. Patient stated that dizziness improved after sitting back down, but not complete resolution.

## 2022-05-10 NOTE — ED PROVIDER NOTES
ED Provider In Triage Note  [facility]  Encounter Date: May 10, 2022    Chief Complaint   Patient presents with     Leg Pain       Brief HPI:   Brooke Peterson is a 31 year old male presenting to the Emergency Department with a chief complaint of back pain radiating to the right lower extremity with associated RLE for the past couple of days. Denies urinary retention, but reports incontinence of urine and stool. No fevers.  Reports that if he walks he falls.    Difficult history (even with use of professional [employer] ).     Brief Physical Exam:  There were no vitals taken for this visit.  General: Non-toxic appearing  HEENT: Atraumatic  Resp: No respiratory distress; clear lungs  Cardiac: RRR  Abdomen: soft, non-tender  Neuro: Alert, oriented, answers questions appropriately; cranial nerves grossly intact; 1/5 stregnth RLE (hip flexion, knee extension / flexion, plantarflexion / dorsiflexion ankle); hyperreflexive patellar DTR right compared to left   Psych: Behavior appropriate      Plan Initiated in Triage:  Basic labs  MRI lumbar spine    PIT Dispo:   Room when able - probable admit    Missy Duron MD on 5/10/2022 at 12:57 PM    Patient was evaluated by the Physician in Triage due to a limitation of available rooms in the Emergency Department. A plan of care was discussed based on the information obtained on the initial evaluation and patient was consuled to return back to the Emergency Department lobby after this initial evalutaiton until results were obtained or a room became available in the Emergency Department. Patient was counseled not to leave prior to receiving the results of their workup.        Missy Duron MD  05/10/22 1583       Missy Duron MD  05/10/22 4545

## 2022-05-10 NOTE — ED NOTES
MRI called, will be a little while before they are ready for him. MRI to call when ativan should be administered.

## 2022-05-10 NOTE — ED PROVIDER NOTES
EMERGENCY DEPARTMENT NOTE     Name: Brooke Peterson    Age/Sex: 31 year old male   MRN: [id number]   Evaluation Date & Time:  No admission date for patient encounter.    PCP:    Jose Rangel   ED Provider: Viet Banegas D.O.       CHIEF COMPLAINT    Leg Pain       DIAGNOSIS & DISPOSITION     1. Weakness of right leg      DISPOSITION: Admit to [facility]    At the conclusion of the encounter I discussed the results of all of the tests and the disposition. The questions were answered. The patient or family acknowledged understanding and was agreeable with the care plan.    TOTAL CRITICAL CARE TIME (EXCLUDING PROCEDURES): Not applicable    PROCEDURES:   None    EMERGENCY DEPARTMENT COURSE/MEDICAL DECISION MAKING   I personally saw the patient and performed a substantive portion of the visit including all aspects of the medical decision making    2:16 PM Marcello Stacy PA-C, staffed the patient with me.     2:30 PM I met with the patient to gather history and to perform my initial exam.  We discussed treatment options and the plan for care while in the Emergency Department.  5:00 PM and further examined the patient.  He appears to have right leg weakness is difficult concern whether this is pulmonary or has pulmonary involvement.  Attempted to ambulate in the patient with crouch down because he felt like he was going to fall.  He was able to stay in a crouching position still has some proximal strength.  But he required assistance to stand up straight and to get back into the chair.  The deep tendon reflexes are present at the patella and Achilles on both sides.  He reports decreased sensation to the right leg but normal sensation to the left leg.  In reviewing the patient's chart he had primary care visit on May 5 2022.  He identifies what I am identifying here in the emergency department a significant language barrier.   said it was hard to understand him even though his speech appeared to be fluent.  I  think the patient would have great difficulty with outpatient follow-up with neurology given underlying mental health problems, language barrier and will admit with neurology consultation in the a.m.  With history of pituitary adenoma will add MRI to initial work-up.  5:59 PM I spoke with Dr. Paige, Hospitalist.    6:23 PM I spoke with Dr. Ward, Neurology.    Brooke Peterson is a 31 year old male with relevant past history of [place]'s  Disease, PTSD, and schizophrenia who presents to the emergency department for evaluation of leg pain. Patient is reporting weakness in all 4 extremities. He is having pain in his right leg that is making it so he is unable to walk. Patient says these symptoms have been going on for 2+ years. He is supposed to see a neurologist but neither the patient nor the father know when or why. Patient saw PCP 4 days ago. Patient and father are unsure of what plan of care is. Patient notes some chronic bowel incontinence that is not new for him. His leg pain has been worse over the past 3 days.     Triage note reviewed:  Pt has low back pain radiating to right leg, pt states if I walk I fall. Language barrier unsure how long going on ,      Triage Assessment     Row Name 05/10/22 1311       Triage Assessment (Adult)    Airway WDL WDL                Vital signs:/75   Pulse 80   Temp 98.1  F (36.7  C) (Oral)   Resp 16   Wt 75.8 kg (167 lb)   SpO2 99%   BMI 29.58 kg/m    Pertinent physical exam findings:  Neuro: Cranial nerves III through XII intact.  Patient has 5 out of 5 motor strength of the upper extremities.  His right leg has a foot drop and is weak with an out of 5 motor activity on the right and 4-5 on the left.  He reports decreased sensation to the right lower extremity.  Deep tendon reflexes were present at the patella and Achilles but diminished on the right compared to left  Diagnostic studies:  Imaging:  MR Thoracic Spine w/o & w Contrast   Final Result   IMPRESSION:    THORACIC SPINE MRI:   1.  Normal thoracic spine MRI.      LUMBAR SPINE MRI:   1.  Normal lumbar spine MRI.      MR Lumbar Spine w/o & w Contrast   Final Result   IMPRESSION:   THORACIC SPINE MRI:   1.  Normal thoracic spine MRI.      LUMBAR SPINE MRI:   1.  Normal lumbar spine MRI.      MR Brain w/o Contrast    (Results Pending)      Lab:  Labs Ordered and Resulted from Time of ED Arrival to Time of ED Departure   CBC WITH PLATELETS - Abnormal       Result Value    WBC Count 5.2      RBC Count 4.02 (*)     Hemoglobin 12.1 (*)     Hematocrit 36.3 (*)     MCV 90      MCH 30.1      MCHC 33.3      RDW 12.1      Platelet Count 133 (*)    BASIC METABOLIC PANEL - Abnormal    Sodium 135 (*)     Potassium 3.9      Chloride 103      Carbon Dioxide (CO2) 23      Anion Gap 9      Urea Nitrogen 12      Creatinine 0.88      Calcium 9.6      Glucose 96      GFR Estimate >90     TSH WITH FREE T4 REFLEX - Normal    TSH 1.59     PROLACTIN, PITUITARY MACROADENOMA   COVID-19 VIRUS (CORONAVIRUS) BY PCR      Interventions:  Medical decision making:    ED INTERVENTIONS     Medications   LORazepam (ATIVAN) tablet 0.5 mg (0.5 mg Oral Given 5/10/22 5997)   gadobutrol (GADAVIST) injection 7 mL (7 mLs Intravenous Given 5/10/22 1612)       DISCHARGE MEDICATIONS        Review of your medicines      UNREVIEWED medicines. Ask your doctor about these medicines      Dose / Directions   acetaminophen 325 MG tablet  Commonly known as: TYLENOL  Used for: Nonintractable headache, unspecified chronicity pattern, unspecified headache type      Dose: 650 mg  Take 2 tablets (650 mg) by mouth every 6 hours as needed for mild pain  Quantity: 90 tablet  Refills: 3     albuterol 108 (90 Base) MCG/ACT inhaler  Commonly known as: PROAIR HFA/PROVENTIL HFA/VENTOLIN HFA  Used for: Moderate persistent asthma without complication      Dose: 2 puff  Inhale 2 puffs into the lungs every 6 hours as needed for shortness of breath / dyspnea or wheezing  Quantity: 18  g  Refills: 0     Ergocalciferol 50 MCG (2000 UT) Tabs  Used for: Vitamin D deficiency      Dose: 2,000 Units  Take 2,000 Units by mouth daily  Quantity: 60 tablet  Refills: 0     fludrocortisone 0.1 MG tablet  Commonly known as: FLORINEF  Used for: [place]'s disease (H)      Dose: 0.1 mg  Take 1 tablet (0.1 mg) by mouth in the morning.  Quantity: 90 tablet  Refills: 4     fluticasone 50 MCG/ACT nasal spray  Commonly known as: FLONASE      Dose: 1 spray  Spray 1 spray into both nostrils daily as needed for rhinitis or allergies  Refills: 0     fluticasone-salmeterol 115-21 MCG/ACT inhaler  Commonly known as: ADVAIR HFA  Used for: Moderate persistent asthma without complication      Dose: 2 puff  Inhale 2 puffs into the lungs 2 times daily  Quantity: 12 g  Refills: 3     hydrocortisone 10 MG tablet  Commonly known as: CORTEF  Used for: Adrenal insufficiency (H), Ariel's disease (H)      Take 1 tablet 10 mg by mouth in AM and 1/2 tablet in PM   May take Take 20 mg twice/day for 3 days if illness.  Quantity: 60 tablet  Refills: 11     levothyroxine 75 MCG tablet  Commonly known as: SYNTHROID/LEVOTHROID  Used for: Hypothyroidism, unspecified type      Dose: 75 mcg  Take 1 tablet (75 mcg) by mouth in the morning.  Quantity: 90 tablet  Refills: 0     loratadine 10 MG tablet  Commonly known as: CLARITIN      Dose: 10 mg  Take 1 tablet (10 mg) by mouth daily  Quantity: 90 tablet  Refills: 2     Multi-Vitamins Tabs  Used for: Adrenal insufficiency (H)      Dose: 1 tablet  Take 1 tablet by mouth daily  Quantity: 90 tablet  Refills: 2     OLANZapine 5 MG tablet  Commonly known as: zyPREXA  Used for: Auditory hallucination      Dose: 5 mg  Take 1 tablet (5 mg) by mouth every morning  Quantity: 90 tablet  Refills: 0     ondansetron 4 MG tablet  Commonly known as: ZOFRAN      TAKE 1 TABLET (4 MG) BY MOUTH EVERY 8 HOURS AS NEEDED FOR NAUSEA  Refills: 0     polyethylene glycol 17 GM/Dose powder  Commonly known as: MIRALAX  Used  for: Constipation, unspecified constipation type      Dose: 17 g  Take 17 g by mouth daily  Quantity: 510 g  Refills: 1              INFORMATION SOURCE AND LIMITATIONS    History/Exam limitations: History significantly limited secondary to language barrier  Patient information was obtained from: Patient and patient's father  Use of : Yes (Phone) - Language Angela    HISTORY OF PRESENT ILLNESS   Brooke Peterson is a 31 year old year old male with a relevant past history of [place]'s disease, PTSD, and schizophrenia who presents to this ED by walk in for evaluation of leg pain.    Patient is reporting weakness in all 4 extremities. He is having pain in his right leg that is making it so he is unable to walk. Patient says these symptoms have been going on for 2+ years. He is supposed to see a neurologist but neither the patient nor the father know when or why. Patient saw PCP 4 days ago. Patient and father are unsure of what plan of care is. Patient notes some chronic bowel incontinence that is not new for him. His leg pain has been worse over the past 3 days. Denies fever, cough, cold, chills, back pain, or any other concerns at this time.       REVIEW OF SYSTEMS:   Constitutional: Negative for fever, any recent cold symptoms, or chills.   HENT: Negative for URI symptoms or sore throat.    Cardiac: Negative for  chest pain,palpitations, near syncope or syncope  Respiratory: Negative for cough and shortness of breath.    Gastrointestinal: Positive for chronic bowel incontinence. Negative for abdominal pain, nausea, vomiting, constipation, diarrhea, rectal bleeding or melena.  Genitourinary: Negative for dysuria, flank pain and hematuria.   Musculoskeletal: Positive for right leg pain. Negative for back pain.   Skin: Negative for  rash  Neurological: Positive for weakness to all 4 extremities. Negative for dizziness, headache, syncope, speech difficulty, or unilateral weakness.   Hematological: Negative for  adenopathy. Does not bruise/bleed easily.   Psychiatric/Behavioral: Negative for confusion.       PATIENT HISTORY     Past Medical History:   Diagnosis Date     [place]'s disease (H) 07/2017     Asthma      Asthma      Hypercalcemia 09/2019     Hyperprolactinemia (H) 12/2018     Hypothyroidism      Hypovitaminosis D 11/2018     Pituitary microadenoma (H)      PTSD (post-traumatic stress disorder)      Schizophrenia (H)      Patient Active Problem List   Diagnosis     Immigrant with language difficulty     Ariel's disease (H)     History of hypercalcemia     Adrenal insufficiency (H)     Medication management     Hypothyroidism, unspecified type     Insomnia, unspecified type     PTSD (post-traumatic stress disorder)     Schizoaffective disorder, depressive type, with catatonia (H)     Schizophrenia (H)     Vitamin D deficiency     Moderate persistent asthma     Pituitary microadenoma (H)     Neurodevelopmental disorder     Weakness of right leg     History reviewed. No pertinent surgical history.  Social Histrory  Smoking:none  Alcohol Use:none  No Known Allergies      OUTPATIENT MEDICATIONS     New Prescriptions    No medications on file      Vitals:    05/10/22 1311 05/10/22 1658   BP: 121/67 120/75   Pulse: 79 80   Resp: 18 16   Temp: 98.2  F (36.8  C) 98.1  F (36.7  C)   TempSrc: Temporal Oral   SpO2: 100% 99%   Weight: 75.8 kg (167 lb)        Physical Exam   Constitutional: Oriented to person, place, and time. Appears well-developed and well-nourished.   HEENT:    Head: Atraumatic.   Neck: Normal range of motion. Neck supple.   Cardiovascular: Normal rate, regular rhythm and normal heart sounds.    Pulmonary/Chest: Normal effort  and breath sounds normal.   Abdominal: Soft. Bowel sounds are normal.   Musculoskeletal: Normal range of motion.   Neurological: Alert and oriented to person, place, and time.  No cranial nerve deficit .  1 out of 5 motor strength on the right with 4-5 on the left.  Deep tendon  reflexes present right patella and Achilles but diminished compared to the left he reports decreased sensation to light touch and positional sense on the right   skin: Skin is warm and dry.   Psychiatric: Normal mood and affect. Behavior is normal. Thought content normal.       DIAGNOSTICS    LABORATORY FINDINGS (REVIEWED AND INTERPRETED):  Labs Ordered and Resulted from Time of ED Arrival to Time of ED Departure   CBC WITH PLATELETS - Abnormal       Result Value    WBC Count 5.2      RBC Count 4.02 (*)     Hemoglobin 12.1 (*)     Hematocrit 36.3 (*)     MCV 90      MCH 30.1      MCHC 33.3      RDW 12.1      Platelet Count 133 (*)    BASIC METABOLIC PANEL - Abnormal    Sodium 135 (*)     Potassium 3.9      Chloride 103      Carbon Dioxide (CO2) 23      Anion Gap 9      Urea Nitrogen 12      Creatinine 0.88      Calcium 9.6      Glucose 96      GFR Estimate >90     TSH WITH FREE T4 REFLEX - Normal    TSH 1.59     PROLACTIN, PITUITARY MACROADENOMA   COVID-19 VIRUS (CORONAVIRUS) BY PCR         IMAGING (REVIEWED AND INTERPRETED):  MR Thoracic Spine w/o & w Contrast   Final Result   IMPRESSION:   THORACIC SPINE MRI:   1.  Normal thoracic spine MRI.      LUMBAR SPINE MRI:   1.  Normal lumbar spine MRI.      MR Lumbar Spine w/o & w Contrast   Final Result   IMPRESSION:   THORACIC SPINE MRI:   1.  Normal thoracic spine MRI.      LUMBAR SPINE MRI:   1.  Normal lumbar spine MRI.      MR Brain w/o Contrast    (Results Pending)                 I, Derick Joe, am serving as a scribe to document services personally performed by Viet Banegas D.O., based on my observation and the provider s statements to me.    I, Viet Banegas D.O., attest that Derick Joe is acting in a scribe capacity, has observed my performance of the services and has documented them in accordance with my direction.    Viet Banegas D.O.  EMERGENCY MEDICINE   05/10/22  [facility] EMERGENCY DEPARTMENT  [address]  [facility] [id number]  [phone]  Dept: [phone]      Viet Banegas DO  05/10/22 0773

## 2022-05-11 ENCOUNTER — APPOINTMENT (OUTPATIENT)
Dept: OCCUPATIONAL THERAPY | Facility: HOSPITAL | Age: 32
End: 2022-05-11
Attending: INTERNAL MEDICINE
Payer: COMMERCIAL

## 2022-05-11 ENCOUNTER — APPOINTMENT (OUTPATIENT)
Dept: MRI IMAGING | Facility: HOSPITAL | Age: 32
End: 2022-05-11
Attending: EMERGENCY MEDICINE
Payer: COMMERCIAL

## 2022-05-11 ENCOUNTER — APPOINTMENT (OUTPATIENT)
Dept: PHYSICAL THERAPY | Facility: HOSPITAL | Age: 32
End: 2022-05-11
Attending: INTERNAL MEDICINE
Payer: COMMERCIAL

## 2022-05-11 LAB
ANION GAP SERPL CALCULATED.3IONS-SCNC: 12 MMOL/L (ref 5–18)
BUN SERPL-MCNC: 13 MG/DL (ref 8–22)
CALCIUM SERPL-MCNC: 10.1 MG/DL (ref 8.5–10.5)
CHLORIDE BLD-SCNC: 101 MMOL/L (ref 98–107)
CO2 SERPL-SCNC: 25 MMOL/L (ref 22–31)
CREAT SERPL-MCNC: 0.86 MG/DL (ref 0.7–1.3)
ERYTHROCYTE [DISTWIDTH] IN BLOOD BY AUTOMATED COUNT: 11.9 % (ref 10–15)
GFR SERPL CREATININE-BSD FRML MDRD: >90 ML/MIN/1.73M2
GLUCOSE BLD-MCNC: 90 MG/DL (ref 70–125)
HCT VFR BLD AUTO: 53.1 % (ref 40–53)
HGB BLD-MCNC: 17.8 G/DL (ref 13.3–17.7)
MCH RBC QN AUTO: 29.5 PG (ref 26.5–33)
MCHC RBC AUTO-ENTMCNC: 33.5 G/DL (ref 31.5–36.5)
MCV RBC AUTO: 88 FL (ref 78–100)
PLATELET # BLD AUTO: 180 10E3/UL (ref 150–450)
POTASSIUM BLD-SCNC: 3.7 MMOL/L (ref 3.5–5)
RBC # BLD AUTO: 6.03 10E6/UL (ref 4.4–5.9)
SODIUM SERPL-SCNC: 138 MMOL/L (ref 136–145)
WBC # BLD AUTO: 4.6 10E3/UL (ref 4–11)

## 2022-05-11 PROCEDURE — 96372 THER/PROPH/DIAG INJ SC/IM: CPT | Performed by: INTERNAL MEDICINE

## 2022-05-11 PROCEDURE — 70551 MRI BRAIN STEM W/O DYE: CPT

## 2022-05-11 PROCEDURE — 36415 COLL VENOUS BLD VENIPUNCTURE: CPT | Performed by: INTERNAL MEDICINE

## 2022-05-11 PROCEDURE — 97116 GAIT TRAINING THERAPY: CPT | Mod: GP

## 2022-05-11 PROCEDURE — 99225 PR SUBSEQUENT OBSERVATION CARE,LEVEL II: CPT | Performed by: INTERNAL MEDICINE

## 2022-05-11 PROCEDURE — 250N000011 HC RX IP 250 OP 636: Performed by: INTERNAL MEDICINE

## 2022-05-11 PROCEDURE — G0378 HOSPITAL OBSERVATION PER HR: HCPCS

## 2022-05-11 PROCEDURE — 97535 SELF CARE MNGMENT TRAINING: CPT | Mod: GO

## 2022-05-11 PROCEDURE — 250N000013 HC RX MED GY IP 250 OP 250 PS 637: Performed by: PSYCHIATRY & NEUROLOGY

## 2022-05-11 PROCEDURE — 97162 PT EVAL MOD COMPLEX 30 MIN: CPT | Mod: GP

## 2022-05-11 PROCEDURE — 250N000013 HC RX MED GY IP 250 OP 250 PS 637: Performed by: INTERNAL MEDICINE

## 2022-05-11 PROCEDURE — 97166 OT EVAL MOD COMPLEX 45 MIN: CPT | Mod: GO

## 2022-05-11 PROCEDURE — 85027 COMPLETE CBC AUTOMATED: CPT | Performed by: INTERNAL MEDICINE

## 2022-05-11 PROCEDURE — 99215 OFFICE O/P EST HI 40 MIN: CPT | Performed by: PSYCHIATRY & NEUROLOGY

## 2022-05-11 PROCEDURE — 80048 BASIC METABOLIC PNL TOTAL CA: CPT | Performed by: INTERNAL MEDICINE

## 2022-05-11 RX ORDER — ALBUTEROL SULFATE 90 UG/1
2 AEROSOL, METERED RESPIRATORY (INHALATION) EVERY 6 HOURS PRN
Status: DISCONTINUED | OUTPATIENT
Start: 2022-05-11 | End: 2022-05-11

## 2022-05-11 RX ORDER — ALBUTEROL SULFATE 90 UG/1
2 AEROSOL, METERED RESPIRATORY (INHALATION) EVERY 6 HOURS PRN
Status: DISCONTINUED | OUTPATIENT
Start: 2022-05-11 | End: 2022-05-19 | Stop reason: HOSPADM

## 2022-05-11 RX ORDER — FLUTICASONE PROPIONATE AND SALMETEROL XINAFOATE 115; 21 UG/1; UG/1
2 AEROSOL, METERED RESPIRATORY (INHALATION) EVERY 12 HOURS
Status: DISCONTINUED | OUTPATIENT
Start: 2022-05-11 | End: 2022-05-19 | Stop reason: HOSPADM

## 2022-05-11 RX ORDER — MECLIZINE HCL 12.5 MG 12.5 MG/1
12.5 TABLET ORAL 3 TIMES DAILY PRN
Status: DISCONTINUED | OUTPATIENT
Start: 2022-05-11 | End: 2022-05-19 | Stop reason: HOSPADM

## 2022-05-11 RX ORDER — CALCIUM CARBONATE/VITAMIN D3 600 MG-10
500 TABLET ORAL DAILY
Status: DISCONTINUED | OUTPATIENT
Start: 2022-05-11 | End: 2022-05-19 | Stop reason: HOSPADM

## 2022-05-11 RX ADMIN — LEVOTHYROXINE SODIUM 75 MCG: 0.03 TABLET ORAL at 08:56

## 2022-05-11 RX ADMIN — ACETAMINOPHEN 650 MG: 325 TABLET ORAL at 20:50

## 2022-05-11 RX ADMIN — POLYETHYLENE GLYCOL 3350 17 G: 17 POWDER, FOR SOLUTION ORAL at 09:04

## 2022-05-11 RX ADMIN — LORATADINE 10 MG: 10 TABLET ORAL at 08:56

## 2022-05-11 RX ADMIN — MECLIZINE HCL 12.5 MG 12.5 MG: 12.5 TABLET ORAL at 20:23

## 2022-05-11 RX ADMIN — Medication 50 MCG: at 08:56

## 2022-05-11 RX ADMIN — HEPARIN SODIUM 5000 UNITS: 5000 INJECTION, SOLUTION INTRAVENOUS; SUBCUTANEOUS at 08:57

## 2022-05-11 RX ADMIN — HYDROCORTISONE 10 MG: 5 TABLET ORAL at 08:56

## 2022-05-11 RX ADMIN — FLUTICASONE PROPIONATE AND SALMETEROL XINAFOATE 2 PUFF: 115; 21 AEROSOL, METERED RESPIRATORY (INHALATION) at 20:23

## 2022-05-11 RX ADMIN — MECLIZINE HCL 12.5 MG 12.5 MG: 12.5 TABLET ORAL at 10:04

## 2022-05-11 RX ADMIN — FLUTICASONE PROPIONATE AND SALMETEROL XINAFOATE 2 PUFF: 115; 21 AEROSOL, METERED RESPIRATORY (INHALATION) at 09:51

## 2022-05-11 RX ADMIN — Medication 500 MCG: at 08:57

## 2022-05-11 RX ADMIN — THERA TABS 1 TABLET: TAB at 09:01

## 2022-05-11 RX ADMIN — HYDROCORTISONE 5 MG: 5 TABLET ORAL at 20:23

## 2022-05-11 RX ADMIN — OLANZAPINE 5 MG: 2.5 TABLET, FILM COATED ORAL at 11:30

## 2022-05-11 RX ADMIN — HEPARIN SODIUM 5000 UNITS: 5000 INJECTION, SOLUTION INTRAVENOUS; SUBCUTANEOUS at 20:24

## 2022-05-11 RX ADMIN — FLUDROCORTISONE ACETATE 0.1 MG: 0.1 TABLET ORAL at 08:56

## 2022-05-11 ASSESSMENT — ACTIVITIES OF DAILY LIVING (ADL): DEPENDENT_IADLS:: CLEANING;COOKING;LAUNDRY;SHOPPING;MEAL PREPARATION;MEDICATION MANAGEMENT;TRANSPORTATION

## 2022-05-11 NOTE — PLAN OF CARE
"  Problem: Pain Acute  Goal: Acceptable Pain Control and Functional Ability  Outcome: Ongoing, Not Progressing  Intervention: Prevent or Manage Pain  Recent Flowsheet Documentation  Taken 5/11/2022 0845 by Usman Wakefield RN  Medication Review/Management: medications reviewed     Problem: Plan of Care - These are the overarching goals to be used throughout the patient stay.    Goal: Patient-Specific Goal (Individualized)  Description: You can add care plan individualizations to a care plan. Examples of Individualization might be:  \"Parent requests to be called daily at 9am for status\", \"I have a hard time hearing out of my right ear\", or \"Do not touch me to wake me up as it startles me\".  Outcome: Ongoing, Progressing   Goal Outcome Evaluation:      Pt reported dizziness and muscle weakness in right lower leg. Neurology consulted. Pt is able to step with assistance from staff to wheelchair to use the restroom. RN updated MD. PRN order of Meclizine given. Pt was unable to lift right leg during assessment. Pt stated that leg is usually weaker but has worsened in the last 2-3 days.   Usman Wakefield RN  5/11/2022  10:44 AM                  "

## 2022-05-11 NOTE — PROGRESS NOTES
[facility]      OUTPATIENT OCCUPATIONAL THERAPY  EVALUATION  PLAN OF TREATMENT FOR OUTPATIENT REHABILITATION  (COMPLETE FOR INITIAL CLAIMS ONLY)  Patient's Last Name, First Name, M.I.  YOB: 1990  Po,Toe  T                          Provider's Name  [facility] Medical Record No.  [id number]                               Onset Date:  05/10/22   Start of Care Date:        Type:     ___PT   _X_OT   ___SLP Medical Diagnosis:                           OT Diagnosis:      Visits from SOC:  1   _________________________________________________________________________________  Plan of Treatment/Functional Goals    Planned Interventions:     Goals: See Occupational Therapy Goals on Care Plan in [facility] electronic health record.    Therapy Frequency:    Predicted Duration of Therapy Intervention:    _________________________________________________________________________________    I CERTIFY THE NEED FOR THESE SERVICES FURNISHED UNDER        THIS PLAN OF TREATMENT AND WHILE UNDER MY CARE     (Physician co-signature of this document indicates review and certification of the therapy plan).                Certification date from: (P) 05/11/22,                   Initial Assessment        See Occupational Therapy evaluation dated   in Epic electronic health record.

## 2022-05-11 NOTE — H&P
ADMISSION HISTORY & PHYSICAL    CODE STATUS:  Full code    Jose Rangel, [phone]    Patient YOB: 1990  Admit date: 5/10/2022  Date of Service: 5/10/2022    ASSESSMENT AND PLAN:  31 year old male with PMH of PTSD, schizophrenia, asthma, addisons disease, pituitary microadenoma, hypothyroidism who was brought to our ED for evaluation of worsening fatigue, dizziness and leg weakness     Fatigue  Dizziness  Leg weakness  -- This has been going on for the last 2 years per patient/family. He used to walk with a cane, and then with a walker, now wheelchair bound for the last 3-4 days. Seen by PCP on 5/5/22 for the same. TSH, B12 level and syphilis titre were normal. Plan was to have him seen by neurologist as an outpatient, but family brought him to our ED as he worsened  -- Denies any h/o recent trauma. MRI thoracic and lumbar spine w/wo contrast done in ED have been negative. MRI brain w/wo contrast ordered  -- Consult neurology for further work up.     Hypothyroidsim  -- Cont with home meds once pharmacy verifies the dose    Oelrichs's disease  -- Cont cortef and florinef when med rec is complete    H/O pituitary microadenoma  -- Detail unclear at the moment. MRI was negative back in June 2019. Prolactin was normal in July 2021  -- MRI ordered. Prolactin ordered    H/O Schizophrenia:  -- Cont with home meds when verified by pharmacist    Anemia:  -- Hgb has dropped from 18 (5 days ago) to 12.1 today. Doesn't give h/o bleeding  -- Monitor. This could be a lab error.     Disposition:  -Anticipated Length of Stay in midnights and medical necessity (including a midnight in the Emergency Department after triage if applicable): >2      CHIEF COMPLAINT:  Fatigue, dizziness and leg weakness    HISTORY OF PRESENTING ILLNESS:  31 year old male with PMH of PTSD, schizophrenia, asthma, addisons disease, pituitary microadenoma, hypothyroidism who was brought to our ED for evaluation of worsening fatigue,  dizziness and leg weakness.    History taking is very difficult both from the patient and his dad (at bedside) due to language barrier and poor health literacy. What I could gather from them is that that patient has been having issues with dizziness and leg weakness for the last couple of years. Symptoms are progressively getting worse to the extent that he can not ambulate. He is now wheel chair dependent. The weakness is mor pronounced on the right leg. He reports no arm weakness. Patient father reports his speech is becoming slurred and hard even for them to understand sometimes. Patient denies any headaches, nausea, vomiting or vision changes. Denies having any recent fevers or chills.     PMH/PSH:  Patient Active Problem List   Diagnosis     Immigrant with language difficulty     Hemphill's disease (H)     History of hypercalcemia     Adrenal insufficiency (H)     Medication management     Hypothyroidism, unspecified type     Insomnia, unspecified type     PTSD (post-traumatic stress disorder)     Schizoaffective disorder, depressive type, with catatonia (H)     Schizophrenia (H)     Vitamin D deficiency     Moderate persistent asthma     Pituitary microadenoma (H)     Neurodevelopmental disorder     Weakness of right leg       Past Medical History:   Diagnosis Date     Hemphill's disease (H) 07/2017     Asthma      Asthma      Hypercalcemia 09/2019     Hyperprolactinemia (H) 12/2018     Hypothyroidism      Hypovitaminosis D 11/2018     Pituitary microadenoma (H)      PTSD (post-traumatic stress disorder)      Schizophrenia (H)         History reviewed. No pertinent surgical history.       ALLERGIES:  No Known Allergies    MEDICATIONS:  Reviewed.  No current facility-administered medications for this encounter.     Current Outpatient Medications   Medication     acetaminophen (TYLENOL) 325 MG tablet     albuterol (PROAIR HFA/PROVENTIL HFA/VENTOLIN HFA) 108 (90 Base) MCG/ACT inhaler     Ergocalciferol 50 MCG (2000  UT) TABS     fludrocortisone (FLORINEF) 0.1 MG tablet     fluticasone (FLONASE) 50 MCG/ACT nasal spray     fluticasone-salmeterol (ADVAIR HFA) 115-21 MCG/ACT inhaler     hydrocortisone (CORTEF) 10 MG tablet     levothyroxine (SYNTHROID/LEVOTHROID) 75 MCG tablet     loratadine (CLARITIN) 10 MG tablet     Multiple Vitamin (MULTI-VITAMINS) TABS     OLANZapine (ZYPREXA) 5 MG tablet     ondansetron (ZOFRAN) 4 MG tablet     polyethylene glycol (MIRALAX) 17 GM/Dose powder     VITAMIN D3 50 MCG (2000 UT) tablet     No current facility-administered medications for this encounter.    Current Outpatient Medications:      acetaminophen (TYLENOL) 325 MG tablet, Take 2 tablets (650 mg) by mouth every 6 hours as needed for mild pain, Disp: 90 tablet, Rfl: 3     albuterol (PROAIR HFA/PROVENTIL HFA/VENTOLIN HFA) 108 (90 Base) MCG/ACT inhaler, Inhale 2 puffs into the lungs every 6 hours as needed for shortness of breath / dyspnea or wheezing, Disp: 18 g, Rfl: 0     Ergocalciferol 50 MCG (2000 UT) TABS, Take 2,000 Units by mouth daily, Disp: 60 tablet, Rfl: 0     fludrocortisone (FLORINEF) 0.1 MG tablet, Take 1 tablet (0.1 mg) by mouth in the morning., Disp: 90 tablet, Rfl: 4     fluticasone (FLONASE) 50 MCG/ACT nasal spray, Spray 1 spray into both nostrils daily as needed for rhinitis or allergies , Disp: , Rfl:      fluticasone-salmeterol (ADVAIR HFA) 115-21 MCG/ACT inhaler, Inhale 2 puffs into the lungs 2 times daily, Disp: 12 g, Rfl: 3     hydrocortisone (CORTEF) 10 MG tablet, Take 1 tablet 10 mg by mouth in AM and 1/2 tablet in PM   May take Take 20 mg twice/day for 3 days if illness., Disp: 60 tablet, Rfl: 11     levothyroxine (SYNTHROID/LEVOTHROID) 75 MCG tablet, Take 1 tablet (75 mcg) by mouth in the morning., Disp: 90 tablet, Rfl: 0     loratadine (CLARITIN) 10 MG tablet, Take 1 tablet (10 mg) by mouth daily, Disp: 90 tablet, Rfl: 2     Multiple Vitamin (MULTI-VITAMINS) TABS, Take 1 tablet by mouth daily, Disp: 90 tablet,  Rfl: 2     OLANZapine (ZYPREXA) 5 MG tablet, Take 1 tablet (5 mg) by mouth every morning, Disp: 90 tablet, Rfl: 0     ondansetron (ZOFRAN) 4 MG tablet, TAKE 1 TABLET (4 MG) BY MOUTH EVERY 8 HOURS AS NEEDED FOR NAUSEA, Disp: , Rfl:      polyethylene glycol (MIRALAX) 17 GM/Dose powder, Take 17 g by mouth daily, Disp: 510 g, Rfl: 1     VITAMIN D3 50 MCG (2000 UT) tablet, TAKE 1 PILL (2,000 UNITS) BY MOUTH DAILY, Disp: , Rfl:    Scheduled Meds:  Continuous Infusions:  PRN Meds:.    SOCIAL HISTORY:  Social History     Socioeconomic History     Marital status: Single     Spouse name: Not on file     Number of children: Not on file     Years of education: Not on file     Highest education level: Not on file   Occupational History     Not on file   Tobacco Use     Smoking status: Never Smoker     Smokeless tobacco: Never Used   Substance and Sexual Activity     Alcohol use: Not Currently     Drug use: Never     Sexual activity: Not on file   Other Topics Concern     Not on file   Social History Narrative     Not on file     Social Determinants of Health     Financial Resource Strain: Medium Risk     Difficulty of Paying Living Expenses: Somewhat hard   Food Insecurity: No Food Insecurity     Worried About Running Out of Food in the Last Year: Never true     Ran Out of Food in the Last Year: Never true   Transportation Needs: Unmet Transportation Needs     Lack of Transportation (Medical): Yes     Lack of Transportation (Non-Medical): No   Physical Activity: Insufficiently Active     Days of Exercise per Week: 4 days     Minutes of Exercise per Session: 10 min   Stress: No Stress Concern Present     Feeling of Stress : Only a little   Social Connections: Socially Isolated     Frequency of Communication with Friends and Family: Never     Frequency of Social Gatherings with Friends and Family: More than three times a week     Attends Faith Services: Never     Active Member of Clubs or Organizations: No     Attends Club or  Organization Meetings: Never     Marital Status: Never    Intimate Partner Violence: Not At Risk     Fear of Current or Ex-Partner: No     Emotionally Abused: No     Physically Abused: No     Sexually Abused: No   Housing Stability: Low Risk      Unable to Pay for Housing in the Last Year: No     Number of Places Lived in the Last Year: 1     Unstable Housing in the Last Year: No       FAMILY HISTORY:  Family History   Problem Relation Age of Onset     Diabetes Mother      Cancer No family hx of         ROS:  12 point review of systems reviewed and is negative except for what has already been mentioned in HPI.       PHYSICAL EXAM:  GENRL:  Not in acute distress   /83   Pulse 73   Temp 98.1  F (36.7  C) (Oral)   Resp 16   Wt 75.8 kg (167 lb)   SpO2 99%   BMI 29.58 kg/m    No intake/output data recorded.  No intake/output data recorded.  HEENT: NC/AT      Eyes-  Pupil round and reactive to light bilaterally       Neck- supple, no JVP elevation,       Sclera- anicteric      Oropharynx- moist and pink  CHEST: Clear to auscultation bilateral anteriorly, no ronchi or wheezing  HEART: S1S2 normal, regular.   ABDMN: Soft. Non-tender, non-distended.  No guarding or rigidity. Bowel sounds- active  EXTRM: No pedal edema   INTGM: No skin rash, no cyanosis or clubbing  MUSCULOSKELETAL: No joint tenderness or swelling on upper and lower extremities  NEURO: Alert and awake. Follows commands. Speech is dysarthric. I didn't appreciate obvious dysdiadokokinesia. Cranial nerves II-XII grossly intact. He didn't move right leg when asked. Able to wiggle toes and move ankle on the left.       DIAGNOSTIC DATA:    Recent Labs   Lab 05/10/22  1518 05/05/22  1100   WBC 5.2 6.9   HGB 12.1* 18.0*   HCT 36.3* 52.7   * 198       Recent Labs   Lab 05/10/22  1704 05/05/22  1039   * 137   CO2 23 19*   BUN 12 15       No results for input(s): INR in the last 168 hours.    Recent Labs   Lab 05/10/22  1704  05/05/22  1039   * 137   CO2 23 19*   BUN 12 15   ALBUMIN  --  4.6   ALKPHOS  --  84   ALT  --  53*   AST  --  45*         MR Lumbar Spine w/o & w Contrast    Result Date: 5/10/2022  EXAM: MR THORACIC SPINE W/O and W CONTRAST, MR LUMBAR SPINE W/O and W CONTRAST LOCATION: [facility] DATE/TIME: 5/10/2022 3:27 PM EXAM: MR THORACIC SPINE W/O and W CONTRAST, MR LUMBAR SPINE W/O and W CONTRAST LOCATION: [facility] DATE/TIME: 5/10/2022 3:27 PM INDICATION: Motor neuron disease COMPARISON: None. CONTRAST: 7ml Gadavist TECHNIQUE: 1) Routine Thoracic Spine MRI without and with IV contrast. 2) Routine Lumbar Spine MRI without and with IV contrast. FINDINGS: THORACIC SPINE: Normal vertebral body heights, alignment and marrow signal. Normal disc heights. No herniation. Normal facets. No spinal canal or neural foraminal stenosis. No abnormal cord signal. No extraspinal abnormality. LUMBAR SPINE: Nomenclature is based on 5 lumbar type vertebral bodies. Normal vertebral body heights, alignment and marrow signal. Normal distal spinal cord and cauda equina with conus medullaris at L1. No extraspinal abnormality. Unremarkable visualized bony pelvis. T12-L1: Normal disc height and signal. No herniation. Normal facets. No spinal canal or neural foraminal stenosis. L1-L2: Normal disc height and signal. No herniation. Normal facets. No spinal canal or neural foraminal stenosis. L2-L3: Normal disc height and signal. No herniation. Normal facets. No spinal canal or neural foraminal stenosis. L3-L4: Normal disc height and signal. No herniation. Normal facets. No spinal canal or neural foraminal stenosis. L4-L5: Normal disc height and signal. No herniation. Normal facets. No spinal canal or neural foraminal stenosis. L5-S1: Normal disc height and signal. No herniation. Normal facets. No spinal canal or neural foraminal stenosis.     IMPRESSION: THORACIC SPINE MRI: 1.  Normal thoracic  spine MRI. LUMBAR SPINE MRI: 1.  Normal lumbar spine MRI.    MR Thoracic Spine w/o & w Contrast    Result Date: 5/10/2022  EXAM: MR THORACIC SPINE W/O and W CONTRAST, MR LUMBAR SPINE W/O and W CONTRAST LOCATION: [facility] DATE/TIME: 5/10/2022 3:27 PM EXAM: MR THORACIC SPINE W/O and W CONTRAST, MR LUMBAR SPINE W/O and W CONTRAST LOCATION: [facility] DATE/TIME: 5/10/2022 3:27 PM INDICATION: Motor neuron disease COMPARISON: None. CONTRAST: 7ml Gadavist TECHNIQUE: 1) Routine Thoracic Spine MRI without and with IV contrast. 2) Routine Lumbar Spine MRI without and with IV contrast. FINDINGS: THORACIC SPINE: Normal vertebral body heights, alignment and marrow signal. Normal disc heights. No herniation. Normal facets. No spinal canal or neural foraminal stenosis. No abnormal cord signal. No extraspinal abnormality. LUMBAR SPINE: Nomenclature is based on 5 lumbar type vertebral bodies. Normal vertebral body heights, alignment and marrow signal. Normal distal spinal cord and cauda equina with conus medullaris at L1. No extraspinal abnormality. Unremarkable visualized bony pelvis. T12-L1: Normal disc height and signal. No herniation. Normal facets. No spinal canal or neural foraminal stenosis. L1-L2: Normal disc height and signal. No herniation. Normal facets. No spinal canal or neural foraminal stenosis. L2-L3: Normal disc height and signal. No herniation. Normal facets. No spinal canal or neural foraminal stenosis. L3-L4: Normal disc height and signal. No herniation. Normal facets. No spinal canal or neural foraminal stenosis. L4-L5: Normal disc height and signal. No herniation. Normal facets. No spinal canal or neural foraminal stenosis. L5-S1: Normal disc height and signal. No herniation. Normal facets. No spinal canal or neural foraminal stenosis.     IMPRESSION: THORACIC SPINE MRI: 1.  Normal thoracic spine MRI. LUMBAR SPINE MRI: 1.  Normal lumbar spine  MRI.      Patient's new lab studies reviewed personally.  Patient's new radiology reports reviewed personally.      Note created using dragon voice recognition software.  Errors in spelling or words which seems out of context are unintentional.  Sounds alike errors may have escaped editing.     05/10/2022  Golden Paige MD  HOSPITALIST, [facility]  PAGER NO. [phone]

## 2022-05-11 NOTE — PLAN OF CARE
PRIMARY DIAGNOSIS: GENERALIZED WEAKNESS    OUTPATIENT/OBSERVATION GOALS TO BE MET BEFORE DISCHARGE  1. Orthostatic performed: N/A    2. Tolerating PO medications: Yes    3. Return to near baseline physical activity: No    4. Cleared for discharge by consultants (if involved): No    Discharge Planner Nurse   Safe discharge environment identified: No  Barriers to discharge: Yes       Entered by: Raquel Matthews RN 05/11/2022 3:02 PM     Please review provider order for any additional goals.   Nurse to notify provider when observation goals have been met and patient is ready for discharge.Goal Outcome Evaluation:

## 2022-05-11 NOTE — PHARMACY-ADMISSION MEDICATION HISTORY
Pharmacy Note - Admission Medication History    Patient was unable to provide any detail on medications, but stated he did take his medications this morning. List updated per Rx fill history.  ______________________________________________________________________    Prior To Admission (PTA) med list completed and updated in EMR.       PTA Med List   Medication Sig Note Last Dose     acetaminophen (TYLENOL) 325 MG tablet Take 2 tablets (650 mg) by mouth every 6 hours as needed for mild pain  Unknown at prn     albuterol (PROAIR HFA/PROVENTIL HFA/VENTOLIN HFA) 108 (90 Base) MCG/ACT inhaler Inhale 2 puffs into the lungs every 6 hours as needed for shortness of breath / dyspnea or wheezing  Unknown at prn     fludrocortisone (FLORINEF) 0.1 MG tablet Take 1 tablet (0.1 mg) by mouth in the morning.  5/10/2022 at am     fluticasone (FLONASE) 50 MCG/ACT nasal spray Spray 1 spray into both nostrils daily as needed for rhinitis or allergies  5/10/2022: No Rx on file - may be using OTC form Unknown at prn     fluticasone-salmeterol (ADVAIR HFA) 115-21 MCG/ACT inhaler Inhale 2 puffs into the lungs 2 times daily  5/10/2022 at am     hydrocortisone (CORTEF) 10 MG tablet Take 5 mg by mouth every evening Take 1 tablet 10 mg by mouth in AM and 1/2 tablet in PM   May take Take 20 mg twice/day for 3 days if illness.  5/9/2022 at pm     hydrocortisone (CORTEF) 10 MG tablet Take 1 tablet 10 mg by mouth in AM and 1/2 tablet in PM   May take Take 20 mg twice/day for 3 days if illness. (Patient taking differently: Take 10 mg by mouth every morning Take 1 tablet 10 mg by mouth in AM and 1/2 tablet in PM   May take Take 20 mg twice/day for 3 days if illness.)  5/10/2022 at am     levothyroxine (SYNTHROID/LEVOTHROID) 75 MCG tablet Take 1 tablet (75 mcg) by mouth in the morning.  5/10/2022 at am     loratadine (CLARITIN) 10 MG tablet Take 1 tablet (10 mg) by mouth daily  5/10/2022 at am     Multiple Vitamin (MULTI-VITAMINS) TABS Take 1 tablet  by mouth daily  5/10/2022 at am     OLANZapine (ZYPREXA) 5 MG tablet Take 1 tablet (5 mg) by mouth every morning  5/10/2022 at am     ondansetron (ZOFRAN) 4 MG tablet Take 4 mg by mouth every 8 hours as needed for nausea  Unknown at prn     polyethylene glycol (MIRALAX) 17 GM/Dose powder Take 17 g by mouth daily  5/10/2022 at am     VITAMIN D3 50 MCG (2000 UT) tablet Take 1 tablet by mouth daily  5/10/2022 at am       [facility](s[facility]  Method of interview communication: in-person    Summary of Changes to PTA Med List  New: vitamin D3  Discontinued: ergocalciferol  Changed: none    Patient was asked about OTC/herbal products specifically.  PTA med list reflects this.    In the past week, patient estimated taking medication this percent of the time:  Unable to assess.    Allergies were reviewed, assessed, and updated with the patient.      Patient did not bring any medications to the hospital and can't retrieve from home. No multi-dose medications are available for use during hospital stay.     The information provided in this note is only as accurate as the sources available at the time of the update(s).    Thank you,  Mikki Shi, [facility]  5/10/2022 7:54 PM

## 2022-05-11 NOTE — ED NOTES
MRI tech will call ordering doctor about head MRI since pt just had a thoracic MRI with contrast and policy is to not do another MRI with contrast for 24 hours

## 2022-05-11 NOTE — PROGRESS NOTES
05/11/22 1305   Quick Adds   Quick Adds Certification   Type of Visit Initial PT Evaluation       Present yes  (vocera language line)   Language Angela   Living Environment   People in Home parent(s);sibling(s)  (brother per chart dad id PCA for pt)   Current Living Arrangements house   Home Accessibility other (see comments)  (ramp and stairs)   Self-Care   Usual Activity Tolerance fair   Equipment Currently Used at Home walker, rolling;wheelchair, manual  (per chart last 3 days pt has been w/c bound due to weakness RLE)   Activity/Exercise/Self-Care Comment per pt need A with OOB, bathing , dressing and feeding self   General Information   Onset of Illness/Injury or Date of Surgery 05/10/22   Referring Physician Dr. Real Sepulveda   Patient/Family Therapy Goals Statement (PT) none stated   Pertinent History of Current Problem (include personal factors and/or comorbidities that impact the POC) admit RLE weakness, dizziness   General Observations pt in ER pending room in recliner   Cognition   Affect/Mental Status (Cognition) unable/difficult to assess   Cognitive Status Comments pt answering questions ivan ernandez   Pain Assessment   Patient Currently in Pain Yes, see Vital Sign flowsheet  (reports pain in RLE)   Range of Motion (ROM)   ROM Comment limited RUE and RLE   Strength (Manual Muscle Testing)   Strength Comments weakness noted RUE and RLE   Bed Mobility   Comment, (Bed Mobility) in recliner in ER   Transfers   Transfer Safety Concerns Noted other (see comments)  (decreased balance, RL, weak)   Impairments Contributing to Impaired Transfers impaired balance;impaired motor control;decreased strength;pain   Comment, (Transfers) pt mod/maxAx2 first stand mod/minAx2 second stand   Gait/Stairs (Locomotion)   Pine Level (Gait) moderate assist (50% patient effort)   Assistive Device (Gait) walker, front-wheeled   Distance in Feet (Required for LE Total Joints) 3 side steps  and standing wt shifting   Deviations/Abnormal Patterns (Gait) right sided deviations;antalgic;base of support, narrow;weight shifting decreased   Right Sided Gait Deviations   (limited wt shift to RLE)   Balance   Balance Comments unsteady   Clinical Impression   Criteria for Skilled Therapeutic Intervention Yes, treatment indicated   PT Diagnosis (PT) decreased mobility   Influenced by the following impairments pain ,weakness, decreased coordination and balance   Functional limitations due to impairments decreased trasnfers and ambulation   Clinical Presentation (PT Evaluation Complexity) Evolving/Changing   Clinical Presentation Rationale per medical diagnosis   Clinical Decision Making (Complexity) moderate complexity   Planned Therapy Interventions (PT) balance training;bed mobility training;gait training;transfer training;strengthening   Anticipated Equipment Needs at Discharge (PT)   (pt has FWW and w/c)   Risk & Benefits of therapy have been explained evaluation/treatment results reviewed   PT Discharge Planning   PT Discharge Recommendation (DC Rec) Transitional Care Facility;home with assist;home with home care physical therapy  (TCU vs 24 hr A and home PT)   PT Rationale for DC Rec pt modAx2, unsteady, no gait at this time transfer only   Therapy Certification   Start of care date 05/11/22   Certification date from 05/11/22   Certification date to 05/17/22   Medical Diagnosis admit RLE weakness, dizzy   Total Evaluation Time   Total Evaluation Time (Minutes) 12   Physical Therapy Goals   PT Frequency Daily   PT Predicted Duration/Target Date for Goal Attainment 05/17/22   PT Goals Bed Mobility;Transfers;Gait   PT: Bed Mobility Minimal assist;Supine to/from sit;Rolling   PT: Transfers Minimal assist;Bed to/from chair;Assistive device;Sit to/from stand   PT: Gait Moderate assist;25 feet;Rolling walker

## 2022-05-11 NOTE — CONSULTS
NEUROLOGY CONSULTATION NOTE     Brooke Peterson,  1990, MRN [id number] Date: 2022     [facility]   Code status:  Full Code   PCP: Jose Rangel, [phone]      ASSESSMENT & PLAN   Diagnosis code: Leg weakness.    31-year-old man with multiple medical and psychiatric comorbidities here for progressive leg weakness (R>L)      Thoracic and lumbar imaging unremarkable.    Brain MRI pending along with additional focus on pituitary gland.  Prolactin was 9 on 22.      We will see what the brain MRI shows, but next step might be to do a lumbar puncture.    CK normal at 129.    TSH normal.    B12 359 on 22.  Start supplement with goal of at least 500.    Physical therapy/OT.    Neurology will follow along.       Chief Complaint   Patient presents with     Leg Pain        HISTORY OF PRESENT ILLNESS     We have been requested by Dr. Paige to evaluate Brooke Peterson who is a 31 year old male for leg weakness.  He presented to the emergency room with complaints about the weakness, fatigue and dizziness.  He reported symptoms for 2 years, worsening over the past few days leading him to be wheelchair-bound.  He did have plans to see neurology as an outpatient but family became concerned about his progression.    Patient has significant medical and psychiatric history including PTSD, schizophrenia, Ariel's disease, pituitary microadenoma, hypothyroidism and asthma.    Toe is unable to tell me exactly when the weakness started. No interpretor available for the interview.      PAST MEDICAL & SURGICAL HISTORY     Medical History  Past Medical History:   Diagnosis Date     Darke's disease (H) 2017     Asthma      Asthma      Hypercalcemia 2019     Hyperprolactinemia (H) 2018     Hypothyroidism      Hypovitaminosis D 2018     Pituitary microadenoma (H)      PTSD (post-traumatic stress disorder)      Schizophrenia (H)      Surgical History  History reviewed. No pertinent surgical history.     SOCIAL  HISTORY     Social History      FAMILY HISTORY     Reviewed, and family history includes Diabetes in his mother.     ALLERGIES     No Known Allergies     REVIEW OF SYSTEMS     Review of systems not obtained due to patient factors.     HOME & HOSPITAL MEDICATIONS     Prior to Admission Medications  (Not in a hospital admission)      Hospital Medications    fludrocortisone  0.1 mg Oral Daily     fluticasone-vilanterol  1 puff Inhalation Daily     heparin ANTICOAGULANT  5,000 Units Subcutaneous Q12H     hydrocortisone  10 mg Oral QAM     hydrocortisone  5 mg Oral QPM     levothyroxine  75 mcg Oral QAM AC     loratadine  10 mg Oral Daily     multivitamin, therapeutic  1 tablet Oral Daily     OLANZapine  5 mg Oral QAM     polyethylene glycol  17 g Oral Daily     sodium chloride (PF)  3 mL Intracatheter Q8H     vitamin D3  50 mcg Oral Daily        PHYSICAL EXAM     Vital signs  Temp:  [98.1  F (36.7  C)-98.2  F (36.8  C)] 98.1  F (36.7  C)  Pulse:  [60-80] 66  Resp:  [15-18] 15  BP: ()/(55-86) 97/69  SpO2:  [96 %-100 %] 99 %    Weight:   [unfilled]    General Physical Exam: Vital signs were reviewed and are documented in EMR.   Neurological Exam:  Mental status: Alert, attentive.  Speech: Fluent, though limited due to language barrier.  Cranial nerves: EOM appear full.  Face symmetric.  Motor: Upper extremity strength is normal.  He is able to lift his legs against gravity momentarily but then they drop.  Reflexes: Patellar reflexes are normal.  Questionable upgoing toes versus withdrawal due to sensitivity.  Sensory: Intact light touch throughout.  Coordination: Normal fine motor movements of the hands.  Gait: Furred for safety.     DIAGNOSTIC STUDIES     Pertinent Radiology   Radiology Results: Personally reviewed image/s    MRI Thoracic and Lumbar Spine:  IMPRESSION:  THORACIC SPINE MRI:  1.  Normal thoracic spine MRI.     LUMBAR SPINE MRI:  1.  Normal lumbar spine MRI.    Pertinent Labs   Lab Results:  personally reviewed.   Recent Results (from the past 24 hour(s))   Prolactin, pituitary macroadenoma    Collection Time: 05/10/22  2:16 PM   Result Value Ref Range    Prolactin 13.2 0.0 - 15.0 ng/mL   CBC (+ platelets, no diff)    Collection Time: 05/10/22  3:18 PM   Result Value Ref Range    WBC Count 5.2 4.0 - 11.0 10e3/uL    RBC Count 4.02 (L) 4.40 - 5.90 10e6/uL    Hemoglobin 12.1 (L) 13.3 - 17.7 g/dL    Hematocrit 36.3 (L) 40.0 - 53.0 %    MCV 90 78 - 100 fL    MCH 30.1 26.5 - 33.0 pg    MCHC 33.3 31.5 - 36.5 g/dL    RDW 12.1 10.0 - 15.0 %    Platelet Count 133 (L) 150 - 450 10e3/uL   Basic metabolic panel    Collection Time: 05/10/22  5:04 PM   Result Value Ref Range    Sodium 135 (L) 136 - 145 mmol/L    Potassium 3.9 3.5 - 5.0 mmol/L    Chloride 103 98 - 107 mmol/L    Carbon Dioxide (CO2) 23 22 - 31 mmol/L    Anion Gap 9 5 - 18 mmol/L    Urea Nitrogen 12 8 - 22 mg/dL    Creatinine 0.88 0.70 - 1.30 mg/dL    Calcium 9.6 8.5 - 10.5 mg/dL    Glucose 96 70 - 125 mg/dL    GFR Estimate >90 >60 mL/min/1.73m2   TSH with free T4 reflex    Collection Time: 05/10/22  5:04 PM   Result Value Ref Range    TSH 1.59 0.30 - 5.00 uIU/mL   CK total    Collection Time: 05/10/22  5:04 PM   Result Value Ref Range     30 - 190 U/L   Extra Urine Collection    Collection Time: 05/10/22  5:05 PM   Result Value Ref Range    Hold Specimen JI    Asymptomatic COVID-19 Virus (Coronavirus) by PCR Nasopharyngeal    Collection Time: 05/10/22  6:53 PM    Specimen: Nasopharyngeal; Swab   Result Value Ref Range    SARS CoV2 PCR Negative Negative       Total time spent for face to face visit, reviewing labs/imaging studies, counseling and coordination of care was: 45 Minutes. More than 50% of this time was spent on counseling and coordination of care.    Dragon software used in the dictation of this note.    Nithya Ward MD  [facility] Neurology [facility] - [address], [address]  [place], [place]  [phone]

## 2022-05-11 NOTE — PROGRESS NOTES
[facility]      OUTPATIENT PHYSICAL THERAPY EVALUATION  PLAN OF TREATMENT FOR OUTPATIENT REHABILITATION  (COMPLETE FOR INITIAL CLAIMS ONLY)  Patient's Last Name, First Name, M.I.  YOB: 1990  Po,Toe  T                        Provider's Name  [facility] Medical Record No.  [id number]                               Onset Date:  05/10/22   Start of Care Date:  05/11/22      Type:     _X_PT   ___OT   ___SLP Medical Diagnosis:  admit RLE weakness, dizzy                        PT Diagnosis:  decreased mobility   Visits from SOC:  1   _________________________________________________________________________________  Plan of Treatment/Functional Goals    Planned Interventions: balance training, bed mobility training, gait training, transfer training, strengthening     Goals: See Physical Therapy Goals on Care Plan in [employer] electronic health record.    Therapy Frequency: Daily  Predicted Duration of Therapy Intervention: 05/17/22  _________________________________________________________________________________    I CERTIFY THE NEED FOR THESE SERVICES FURNISHED UNDER        THIS PLAN OF TREATMENT AND WHILE UNDER MY CARE     (Physician co-signature of this document indicates review and certification of the therapy plan).              Certification date from: 05/11/22, Certification date to: 05/17/22    Referring Physician: Dr. Real Sepulveda            Initial Assessment        See Physical Therapy evaluation dated 05/11/22 in Epic electronic health record.

## 2022-05-11 NOTE — PROGRESS NOTES
05/11/22 1306   Quick Adds   Quick Adds Certification   Type of Visit Initial Occupational Therapy Evaluation       Present yes   Language Angela   Living Environment   People in Home parent(s);sibling(s)   Current Living Arrangements house   Home Accessibility   (Pt has ramp and stairs)   Self-Care   Current Activity Tolerance fair   Activity/Exercise/Self-Care Comment Per pt, he needs assist with all ADLs and has had increased need for assist in last few days.   General Information   Onset of Illness/Injury or Date of Surgery 05/10/22   Referring Physician Grecia   Existing Precautions/Restrictions fall   Cognitive Status Examination   Cognitive Status Comments Difficult to assess with language barrier   Visual Perception   Visual Impairment/Limitations WFL   Sensory   Sensory Quick Adds No deficits were identified   Range of Motion Comprehensive   Comment, General Range of Motion limited shoulder motion R shoulder   Strength Comprehensive (MMT)   Comment, General Manual Muscle Testing (MMT) Assessment weakness in R UE, difficulty following MMT   Bed Mobility   Comment (Bed Mobility) NT   Transfers   Transfer Comments Mod A x 2   Balance   Balance Comments Mod A.2   Clinical Impression   Criteria for Skilled Therapeutic Interventions Met (OT) Yes, treatment indicated   OT Diagnosis Impaired ADL independence   OT Problem List-Impairments impacting ADL activity tolerance impaired;balance;cognition;flexibility;range of motion (ROM);strength   Assessment of Occupational Performance 5 or more Performance Deficits   Planned Therapy Interventions (OT) ADL retraining;balance training;bed mobility training;cognition;ROM;strengthening;transfer training   Clinical Decision Making Complexity (OT) moderate complexity   Risk & Benefits of therapy have been explained evaluation/treatment results reviewed;participants included;patient   Clinical Impression Comments Pt seen bedside for OT eval and  treatment.  Pt demonstrates decreased independence with ADLs and mobility as well as possible cognitive deficit.  OT to continue to address.  REcommend TCU at discharge.   OT Discharge Planning   OT Discharge Recommendation (DC Rec) Transitional Care Facility   OT Rationale for DC Rec Ax2   Therapy Certification   Start of Care Date 05/11/22   Certification date from 05/11/22   Certification date to 05/18/22   Medical Diagnosis weakness   Total Evaluation Time (Minutes)   Total Evaluation Time (Minutes) 10

## 2022-05-11 NOTE — PLAN OF CARE
Problem: Plan of Care - These are the overarching goals to be used throughout the patient stay.    Goal: Plan of Care Review/Shift Note  Description: The Plan of Care Review/Shift note should be completed every shift.  The Outcome Evaluation is a brief statement about your assessment that the patient is improving, declining, or no change.  This information will be displayed automatically on your shift note.  5/11/2022 1353 by Usman Wakefield, RN  Outcome: Ongoing, Progressing  5/11/2022 1040 by Usman Wakefield, RN  Outcome: Ongoing, Progressing   Goal Outcome Evaluation:        Report given to P1 RN. Pt up to bathroom once more. No pain noted. Will transfer to [facility] once room is ready.   Usman Wakefield RN  5/11/2022  1:54 PM

## 2022-05-11 NOTE — PROGRESS NOTES
Progress Note        Assessment/Plan  31 year old male with PMH of PTSD, schizophrenia, asthma, addisons disease, pituitary microadenoma, hypothyroidism who was brought to our ED for evaluation of worsening fatigue, dizziness and leg weakness      Leg weakness and generalized weakness  -- This has been going on for the last 2 years per patient/family but worse recently.  -- Denies any h/o recent trauma. MRI thoracic and lumbar spine w/wo contrast done in ED have been negative.  -- Consulted neurology for further work up.   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 MRI was negative back in June 2019. Prolactin was normal in July 2021  -- MRI ordered. Prolactin 13.2[within normal range]     H/O Schizophrenia:  -- Cont with home meds     Anemia:  Mild reduction in hemoglobin admission, monitor CBC closely       DVT PPX Heparin subcu    Barriers to discharge leg weakness    Anticipated Discharge date observation less than 2 midnight        Subjective  Spoke using Angela   Still has leg weakness and wondering about when he will leave the hospital  No recent falls no back pain    Objective  Vital signs in last 24 hours  Temp:  [98.1  F (36.7  C)-98.9  F (37.2  C)] 98.7  F (37.1  C)  Pulse:  [60-89] 89  Resp:  [15-18] 18  BP: ()/(55-86) 112/73  SpO2:  [96 %-100 %] 98 %    Input and Output in 24 hrs     Intake/Output Summary (Last 24 hours) at 5/11/2022 1412  Last data filed at 5/11/2022 1131  Gross per 24 hour   Intake 3 ml   Output --   Net 3 ml       GEN: Alert and oriented. Not in acute distress  HEENT: Atraumatic    Pupils- round and reactive to light bilaterally   Neck- supple, no JVP elevation, no lymphadenopathy or thyromegaly   Sclera- anicteric   Mucous membrane- moist and pink  CHEST: Clear to auscultation bilaterally  HEART: S1S2 regular. No murmurs, rubs or  gallops  ABDOMEN: Soft. Non-tender, non-distended. No organomegaly. No guarding or rigidity. Bowel sounds- active  Extremities: No pedal oedema  CNS: No focal neurological deficit. No involuntary movements  SKIN: No skin rash, no cyanosis or clubbing      Pertinent Labs         Recent Results (from the past 24 hour(s))   Prolactin, pituitary macroadenoma    Collection Time: 05/10/22  2:16 PM   Result Value Ref Range    Prolactin 13.2 0.0 - 15.0 ng/mL   CBC (+ platelets, no diff)    Collection Time: 05/10/22  3:18 PM   Result Value Ref Range    WBC Count 5.2 4.0 - 11.0 10e3/uL    RBC Count 4.02 (L) 4.40 - 5.90 10e6/uL    Hemoglobin 12.1 (L) 13.3 - 17.7 g/dL    Hematocrit 36.3 (L) 40.0 - 53.0 %    MCV 90 78 - 100 fL    MCH 30.1 26.5 - 33.0 pg    MCHC 33.3 31.5 - 36.5 g/dL    RDW 12.1 10.0 - 15.0 %    Platelet Count 133 (L) 150 - 450 10e3/uL   Basic metabolic panel    Collection Time: 05/10/22  5:04 PM   Result Value Ref Range    Sodium 135 (L) 136 - 145 mmol/L    Potassium 3.9 3.5 - 5.0 mmol/L    Chloride 103 98 - 107 mmol/L    Carbon Dioxide (CO2) 23 22 - 31 mmol/L    Anion Gap 9 5 - 18 mmol/L    Urea Nitrogen 12 8 - 22 mg/dL    Creatinine 0.88 0.70 - 1.30 mg/dL    Calcium 9.6 8.5 - 10.5 mg/dL    Glucose 96 70 - 125 mg/dL    GFR Estimate >90 >60 mL/min/1.73m2   TSH with free T4 reflex    Collection Time: 05/10/22  5:04 PM   Result Value Ref Range    TSH 1.59 0.30 - 5.00 uIU/mL   CK total    Collection Time: 05/10/22  5:04 PM   Result Value Ref Range     30 - 190 U/L   Extra Urine Collection    Collection Time: 05/10/22  5:05 PM   Result Value Ref Range    Hold Specimen JI    Asymptomatic COVID-19 Virus (Coronavirus) by PCR Nasopharyngeal    Collection Time: 05/10/22  6:53 PM    Specimen: Nasopharyngeal; Swab   Result Value Ref Range    SARS CoV2 PCR Negative Negative   Basic metabolic panel    Collection Time: 05/11/22  9:57 AM   Result Value Ref Range    Sodium 138 136 - 145 mmol/L    Potassium 3.7 3.5 - 5.0  mmol/L    Chloride 101 98 - 107 mmol/L    Carbon Dioxide (CO2) 25 22 - 31 mmol/L    Anion Gap 12 5 - 18 mmol/L    Urea Nitrogen 13 8 - 22 mg/dL    Creatinine 0.86 0.70 - 1.30 mg/dL    Calcium 10.1 8.5 - 10.5 mg/dL    Glucose 90 70 - 125 mg/dL    GFR Estimate >90 >60 mL/min/1.73m2   CBC with platelets    Collection Time: 05/11/22  9:57 AM   Result Value Ref Range    WBC Count 4.6 4.0 - 11.0 10e3/uL    RBC Count 6.03 (H) 4.40 - 5.90 10e6/uL    Hemoglobin 17.8 (H) 13.3 - 17.7 g/dL    Hematocrit 53.1 (H) 40.0 - 53.0 %    MCV 88 78 - 100 fL    MCH 29.5 26.5 - 33.0 pg    MCHC 33.5 31.5 - 36.5 g/dL    RDW 11.9 10.0 - 15.0 %    Platelet Count 180 150 - 450 10e3/uL       EKG Results reviewed       Advanced Care Planning      Real Sepulveda MD MRupeshD

## 2022-05-12 ENCOUNTER — APPOINTMENT (OUTPATIENT)
Dept: RADIOLOGY | Facility: HOSPITAL | Age: 32
End: 2022-05-12
Attending: PSYCHIATRY & NEUROLOGY
Payer: COMMERCIAL

## 2022-05-12 ENCOUNTER — APPOINTMENT (OUTPATIENT)
Dept: PHYSICAL THERAPY | Facility: HOSPITAL | Age: 32
End: 2022-05-12
Payer: COMMERCIAL

## 2022-05-12 LAB
GLUCOSE CSF-MCNC: 66 MG/DL (ref 40–75)
GRAM STAIN RESULT: NORMAL
PROT CSF-MCNC: 37 MG/DL (ref 15–45)

## 2022-05-12 PROCEDURE — 87070 CULTURE OTHR SPECIMN AEROBIC: CPT | Performed by: INTERNAL MEDICINE

## 2022-05-12 PROCEDURE — 250N000011 HC RX IP 250 OP 636: Performed by: INTERNAL MEDICINE

## 2022-05-12 PROCEDURE — 86617 LYME DISEASE ANTIBODY: CPT | Performed by: PSYCHIATRY & NEUROLOGY

## 2022-05-12 PROCEDURE — 96372 THER/PROPH/DIAG INJ SC/IM: CPT | Performed by: INTERNAL MEDICINE

## 2022-05-12 PROCEDURE — G0378 HOSPITAL OBSERVATION PER HR: HCPCS

## 2022-05-12 PROCEDURE — 84157 ASSAY OF PROTEIN OTHER: CPT | Performed by: PSYCHIATRY & NEUROLOGY

## 2022-05-12 PROCEDURE — 86780 TREPONEMA PALLIDUM: CPT | Performed by: PSYCHIATRY & NEUROLOGY

## 2022-05-12 PROCEDURE — 82945 GLUCOSE OTHER FLUID: CPT | Performed by: PSYCHIATRY & NEUROLOGY

## 2022-05-12 PROCEDURE — 250N000013 HC RX MED GY IP 250 OP 250 PS 637: Performed by: PSYCHIATRY & NEUROLOGY

## 2022-05-12 PROCEDURE — 99214 OFFICE O/P EST MOD 30 MIN: CPT | Performed by: PSYCHIATRY & NEUROLOGY

## 2022-05-12 PROCEDURE — 62270 DX LMBR SPI PNXR: CPT

## 2022-05-12 PROCEDURE — 87205 SMEAR GRAM STAIN: CPT | Performed by: INTERNAL MEDICINE

## 2022-05-12 PROCEDURE — 250N000013 HC RX MED GY IP 250 OP 250 PS 637: Performed by: INTERNAL MEDICINE

## 2022-05-12 PROCEDURE — 88112 CYTOPATH CELL ENHANCE TECH: CPT | Mod: TC | Performed by: PSYCHIATRY & NEUROLOGY

## 2022-05-12 PROCEDURE — 99225 PR SUBSEQUENT OBSERVATION CARE,LEVEL II: CPT | Performed by: INTERNAL MEDICINE

## 2022-05-12 PROCEDURE — 97116 GAIT TRAINING THERAPY: CPT | Mod: GP

## 2022-05-12 PROCEDURE — 36415 COLL VENOUS BLD VENIPUNCTURE: CPT | Performed by: PSYCHIATRY & NEUROLOGY

## 2022-05-12 PROCEDURE — 97530 THERAPEUTIC ACTIVITIES: CPT | Mod: GP

## 2022-05-12 RX ADMIN — FLUDROCORTISONE ACETATE 0.1 MG: 0.1 TABLET ORAL at 08:17

## 2022-05-12 RX ADMIN — LEVOTHYROXINE SODIUM 75 MCG: 0.03 TABLET ORAL at 06:36

## 2022-05-12 RX ADMIN — HEPARIN SODIUM 5000 UNITS: 5000 INJECTION, SOLUTION INTRAVENOUS; SUBCUTANEOUS at 08:26

## 2022-05-12 RX ADMIN — FLUTICASONE PROPIONATE AND SALMETEROL XINAFOATE 2 PUFF: 115; 21 AEROSOL, METERED RESPIRATORY (INHALATION) at 19:50

## 2022-05-12 RX ADMIN — LORATADINE 10 MG: 10 TABLET ORAL at 08:16

## 2022-05-12 RX ADMIN — THERA TABS 1 TABLET: TAB at 08:17

## 2022-05-12 RX ADMIN — HYDROCORTISONE 5 MG: 5 TABLET ORAL at 19:49

## 2022-05-12 RX ADMIN — Medication 50 MCG: at 08:16

## 2022-05-12 RX ADMIN — ACETAMINOPHEN 650 MG: 325 TABLET ORAL at 10:06

## 2022-05-12 RX ADMIN — FLUTICASONE PROPIONATE AND SALMETEROL XINAFOATE 2 PUFF: 115; 21 AEROSOL, METERED RESPIRATORY (INHALATION) at 08:23

## 2022-05-12 RX ADMIN — Medication 500 MCG: at 08:17

## 2022-05-12 RX ADMIN — OLANZAPINE 5 MG: 2.5 TABLET, FILM COATED ORAL at 08:16

## 2022-05-12 RX ADMIN — HEPARIN SODIUM 5000 UNITS: 5000 INJECTION, SOLUTION INTRAVENOUS; SUBCUTANEOUS at 19:51

## 2022-05-12 RX ADMIN — POLYETHYLENE GLYCOL 3350 17 G: 17 POWDER, FOR SOLUTION ORAL at 08:18

## 2022-05-12 RX ADMIN — ACETAMINOPHEN 650 MG: 325 TABLET ORAL at 16:51

## 2022-05-12 RX ADMIN — HYDROCORTISONE 10 MG: 5 TABLET ORAL at 08:17

## 2022-05-12 NOTE — TELEPHONE ENCOUNTER
Can you help organize this so he can come in person to the appointment?    Lynn Rodgers, MSN, APRN, CNP, [facility],

## 2022-05-12 NOTE — PLAN OF CARE
Goal Outcome Evaluation:        PRIMARY DIAGNOSIS: GENERALIZED WEAKNESS    OUTPATIENT/OBSERVATION GOALS TO BE MET BEFORE DISCHARGE  1. Orthostatic performed: No    2. Tolerating PO medications: Yes    3. Return to near baseline physical activity: No    4. Cleared for discharge by consultants (if involved): No    Discharge Planner Nurse   Safe discharge environment identified: Yes  Barriers to discharge: No       Entered by: Alberto Gottlieb RN 05/12/2022 10:27 AM   Generalized pain, received PRN Tylenol  Please review provider order for any additional goals.   Nurse to notify provider when observation goals have been met and patient is ready for discharge.

## 2022-05-12 NOTE — PLAN OF CARE
"PRIMARY DIAGNOSIS: \"GENERIC\" NURSING  OUTPATIENT/OBSERVATION GOALS TO BE MET BEFORE DISCHARGE:  1. ADLs back to baseline: Yes. Disoriented to date. Baseline numbness, tingling, and tender RLE. Increasing activity in right leg relieves pain/discomfort. Repositions self in bed and sitting at side of bed.    2. Activity and level of assistance: Stood up from bed w/walker and gait belt with Ax2 to WC for MRI. Tolerated well. Stated falls all the time when using walker but unable to remember last time pt fell, but think it was recently.     3. Pain status: Improved-controlled with oral pain medications. Pain in knee and ankle joint. Alluded to possible result of fall but unreliable historian.     4. Return to near baseline physical activity: Yes     Discharge Planner Nurse   Safe discharge environment identified: Yes  Barriers to discharge: Yes       Entered by: Milka Curtis RN 05/11/2022 11:20 PM       "

## 2022-05-12 NOTE — PLAN OF CARE
PRIMARY DIAGNOSIS: GENERALIZED WEAKNESS    OUTPATIENT/OBSERVATION GOALS TO BE MET BEFORE DISCHARGE  1. Orthostatic performed: N/A    2. Tolerating PO medications: Yes    3. Return to near baseline physical activity: No    4. Cleared for discharge by consultants (if involved): No    Discharge Planner Nurse   Safe discharge environment identified: Yes  Barriers to discharge: Yes       Entered by: Aaliyah Bean RN 05/12/2022 8:16 AM     Please review provider order for any additional goals.   Nurse to notify provider when observation goals have been met and patient is ready for discharge.Goal Outcome Evaluation:

## 2022-05-12 NOTE — PLAN OF CARE
PRIMARY DIAGNOSIS: GENERALIZED WEAKNESS    OUTPATIENT/OBSERVATION GOALS TO BE MET BEFORE DISCHARGE  1. Orthostatic performed: N/A    2. Tolerating PO medications: Yes    3. Return to near baseline physical activity: No    4. Cleared for discharge by consultants (if involved): No    Discharge Planner Nurse   Safe discharge environment identified: Yes  Barriers to discharge: Yes       Entered by: Vilma Del Rio RN 05/11/2022 10:27 PM   Pt transferred from P1 to  P4 this evening for a maintenance problem in his room. Arrived to floor after MRI of the brain and spine were obtained. Results pending. Medicated with prn Tylenol prior to MRI. Pt is Angela speaking however, Angela  was unable to communicate with patient and was questioning whether he was speaking Angela. Pt speaks only minimal English but is able to reiterate that his primary language is Angela. He is lying in bed comfortably. Pt has had frequent falls at home and came into hospital on 5/10 with right leg weakness. Currently has been using a w/c at home however, his baseline is ambulation with a walker. Lives with family and his father is his PCA. Neuro consulting. Appetite is adequate. Tolerating a regular diet. Using urinal at bedside.   Please review provider order for any additional goals.   Nurse to notify provider when observation goals have been met and patient is ready for discharge.Goal Outcome Evaluation:

## 2022-05-12 NOTE — PLAN OF CARE
PRIMARY DIAGNOSIS: GENERALIZED WEAKNESS    OUTPATIENT/OBSERVATION GOALS TO BE MET BEFORE DISCHARGE  1. Orthostatic performed: N/A    2. Tolerating PO medications: Yes    3. Return to near baseline physical activity: No    4. Cleared for discharge by consultants (if involved): No    Discharge Planner Nurse   Safe discharge environment identified: Yes  Barriers to discharge: Yes       Entered by: Aaliyah Bean RN 05/12/2022 8:17 AM     Please review provider order for any additional goals.   Nurse to notify provider when observation goals have been met and patient is ready for discharge.Goal Outcome Evaluation:

## 2022-05-12 NOTE — PROGRESS NOTES
NEUROLOGY PROGRESS NOTE     ASSESSMENT & PLAN     Diagnosis: Leg weakness.     31-year-old man with multiple medical and psychiatric comorbidities here for progressive leg weakness (R>L)       Thoracic and lumbar imaging unremarkable.    Brain MRI shows T2 signal hyperintensities, not new.  Neurodegenerative versus toxic.    LP to check protein, rule out Lyme.    Treponema antibodies.    Suspect the weakness is more likely due to deconditioning.    CK normal at 129.    TSH normal.    B12 359 on 5.5.22.  Started supplement with goal of at least 500.    Physical therapy/OT. Recommending TCU.    Neurology will follow along.  Will likely need EMG as outpatient if lumbar puncture findings are unremarkable.        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     Priority: Medium     History of hypercalcemia 02/04/2020     Priority: Medium     Vitamin D deficiency 10/09/2019     Priority: Medium     PTSD (post-traumatic stress disorder) 06/05/2019     Priority: Medium     Schizoaffective disorder, depressive type, with catatonia (H) 06/05/2019     Priority: Medium     Pituitary microadenoma (H) 12/04/2018     Priority: Medium     Moderate persistent asthma 03/27/2018     Priority: Medium     Hypothyroidism, unspecified type 09/27/2017     Priority: Medium     Schizophrenia (H) 08/25/2017     Priority: Medium     Adrenal insufficiency (H) 07/28/2017     Priority: Medium     Medical History  Past Medical History:   Diagnosis Date     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     OBJECTIVE     Vital signs in last 24 hours  Temp:  [98.1  F (36.7  C)-99.4  F (37.4  C)] 98.1  F (36.7  C)  Pulse:  [] 71  Resp:  [16-20] 18  BP: ()/(51-81) 95/64  SpO2:  [95 %-99 %] 99 %    Weight:   [unfilled]    Review of Systems   Review of systems not obtained due to patient factors.    General Physical Exam: Vital signs were reviewed and are documented in EMR.   Neurological Exam:  Mental status: Alert, attentive.  Speech: Fluent, though limited due to language barrier.  Cranial nerves: EOM appear full.  Face symmetric.  Motor: Upper extremity strength is normal.  He is able to lift his legs against gravity momentarily but then they drop.  Reflexes: Patellar reflexes are normal.  Questionable upgoing toes versus withdrawal due to sensitivity.  Sensory: Intact light touch throughout.  Coordination: Normal fine motor movements of the hands.  Gait: Furred for safety.     DIAGNOSTIC STUDIES     Pertinent Radiology   Radiology Results: Personally reviewed image/s    MRI Brain:  IMPRESSION:  1.  There are areas of T2 signal hyperintensity involving the cortical spinal tracts on the right and left left greater than right. Beginning at the level of the posterior limb of the internal capsule and ending inferiorly into the midbrain and emeka.   These changes were present on 06/24/2019 but are more apparent on current study. In addition there is mild T2 signal hyperintensity within the medial aspect of the right and left cerebellar hemispheres and right and left thalami. Differential diagnostic   considerations would include neurodegenerative disorders and toxic metabolic disease    Pertinent Labs   Lab Results: personally reviewed.   Recent Results (from the past 24 hour(s))   Basic metabolic panel     Collection Time: 05/11/22  9:57 AM   Result Value Ref Range    Sodium 138 136 - 145 mmol/L    Potassium 3.7 3.5 - 5.0 mmol/L    Chloride 101 98 - 107 mmol/L    Carbon Dioxide (CO2) 25 22 - 31 mmol/L    Anion Gap 12 5 - 18 mmol/L    Urea Nitrogen 13 8 - 22 mg/dL    Creatinine 0.86 0.70 - 1.30 mg/dL    Calcium 10.1 8.5 - 10.5 mg/dL    Glucose 90 70 - 125 mg/dL    GFR Estimate >90 >60 mL/min/1.73m2   CBC with platelets    Collection Time: 05/11/22  9:57 AM   Result Value Ref Range    WBC Count 4.6 4.0 - 11.0 10e3/uL    RBC Count 6.03 (H) 4.40 - 5.90 10e6/uL    Hemoglobin 17.8 (H) 13.3 - 17.7 g/dL    Hematocrit 53.1 (H) 40.0 - 53.0 %    MCV 88 78 - 100 fL    MCH 29.5 26.5 - 33.0 pg    MCHC 33.5 31.5 - 36.5 g/dL    RDW 11.9 10.0 - 15.0 %    Platelet Count 180 150 - 450 10e3/uL         HOSPITAL MEDICATIONS       fludrocortisone  0.1 mg Oral Daily     fluticasone-salmeterol  2 puff Inhalation Q12H     heparin ANTICOAGULANT  5,000 Units Subcutaneous Q12H     hydrocortisone  10 mg Oral QAM     hydrocortisone  5 mg Oral QPM     levothyroxine  75 mcg Oral QAM AC     loratadine  10 mg Oral Daily     multivitamin, therapeutic  1 tablet Oral Daily     OLANZapine  5 mg Oral QAM     polyethylene glycol  17 g Oral Daily     sodium chloride (PF)  3 mL Intracatheter Q8H     cyanocobalamin  500 mcg Oral Daily     vitamin D3  50 mcg Oral Daily        Total time spent for face to face visit, reviewing labs/imaging studies, counseling and coordination of care was: 30 Minutes. More than 50% of this time was spent on counseling and coordination of care.    Dragon software used in the dictation on this note.    Nithya Ward MD  [facility] Neurology [address], [address]  [place] [address]

## 2022-05-12 NOTE — PROCEDURES
Neurointerventional Surgery  Post-procedure note    Procedure: fluoro guided lumbar puncture    Radiologist: Felipe Hancock MD    Fluoro Time: .3 minutes  Number of images: one     EBL: minimal  Complications: none    Preliminary findings: (see dictation for full detail)  LP at L2-3.  12 ml of clear CSF obtained.     Assess/Plan:   Samples to lab  Bedrest for one hour    Felipe Hancock MD  Pager: [phone]  Emergency pager: [phone]  Office: [phone]

## 2022-05-12 NOTE — UTILIZATION REVIEW
Concurrent stay review; Secondary Review Determination     Under the authority of the Utilization Management Committee, the utilization review process indicated a secondary review on [place].  The review outcome is based on review of the medical records, discussions with staff, and applying clinical experience noted on the date of the review.        (x) Observation Status Appropriate - Concurrent stay review    RATIONALE FOR DETERMINATION   31 yr old male with multiple medical and psychiatric comorbidities presented with R>L LE weakness.  Imaging brain stable, T and L spine unremarkable.  LP being performed.  No other medical findings.  Suspected deconditioning.  Will likely need further outpatient evaluation including EMG. If LP reveals obvious abnormality that requires inpatient treatments would then consider transition to inpatient.    Patient is clinically improving and there is no clear indication to change patient's status to inpatient. The severity of illness, intensity of service provided, expected LOS and risk for adverse outcome make the care appropriate for observation.    The information on this document is developed by the utilization review team in order for the business office to ensure compliance.  This only denotes the appropriateness of proper admission status and does not reflect the quality of care rendered.         The definitions of Inpatient Status and Observation Status used in making the determination above are those provided in the CMS Coverage Manual, Chapter 1 and Chapter 6, section 70.4.      Sincerely,   Radha Laird MD  Utilization Review  Physician Advisor  [facility]

## 2022-05-12 NOTE — PROGRESS NOTES
Progress Note        Assessment/Plan  31 year old male with PMH of PTSD, schizophrenia, asthma, addisons disease, pituitary microadenoma, hypothyroidism who was brought to our ED for evaluation of worsening fatigue, dizziness and leg weakness      Leg weakness and generalized weakness  -- This has been going on for the last 2 years per patient/family but worse recently.  -- Denies any h/o recent trauma. MRI thoracic and lumbar spine w/wo contrast done in ED have been negative.  Mri brain with hyper intensities , not new per neuro   -- Consulted neurology for further work up. They feel this is due to weakness- LP with csf samples ordered to r/o lyme     PT and OT to see     Hypothyroidsim  -- Cont with home meds      [place]'s disease  -- Cont cortef and florinef      H/O pituitary microadenoma  -- Detail unclear at the moment. MRI was negative back in June 2019. Prolactin was normal in July 2021  -- MRI pending . Prolactin 13.2[within normal range]     H/O Schizophrenia:  -- Cont with home meds     Anemia:  Mild reduction in hemoglobin admission, monitor CBC closely       DVT PPX Heparin subcu    Barriers to discharge leg weakness    Anticipated Discharge date observation less than 2 midnight        Subjective  Spoke using Angela   Still difficulty in walking , no new issues overnight     Objective  Vital signs in last 24 hours  Temp:  [98.1  F (36.7  C)-99.4  F (37.4  C)] 98.1  F (36.7  C)  Pulse:  [] 71  Resp:  [16-20] 18  BP: ()/(51-81) 95/64  SpO2:  [95 %-99 %] 99 %    Input and Output in 24 hrs     Intake/Output Summary (Last 24 hours) at 5/11/2022 1412  Last data filed at 5/11/2022 1131  Gross per 24 hour   Intake 3 ml   Output --   Net 3 ml       GEN: Alert and oriented. Not in acute distress  HEENT: Atraumatic    Pupils- round and reactive to light bilaterally   Neck- supple, no JVP elevation, no lymphadenopathy or thyromegaly   Sclera- anicteric   Mucous membrane- moist and  pink  CHEST: Clear to auscultation bilaterally  HEART: S1S2 regular. No murmurs, rubs or gallops  ABDOMEN: Soft. Non-tender, non-distended. No organomegaly. No guarding or rigidity. Bowel sounds- active  Extremities: No pedal oedema  CNS: No focal neurological deficit. No involuntary movements  SKIN: No skin rash, no cyanosis or clubbing      Pertinent Labs       Advanced Care Planning      MD KEMI SofiaD

## 2022-05-13 LAB
C REACTIVE PROTEIN LHE: 2 MG/DL (ref 0–0.8)
ERYTHROCYTE [SEDIMENTATION RATE] IN BLOOD BY WESTERGREN METHOD: 6 MM/HR (ref 0–15)
PATH REPORT.COMMENTS IMP SPEC: NORMAL
PATH REPORT.FINAL DX SPEC: NORMAL
PATH REPORT.GROSS SPEC: NORMAL
PATH REPORT.MICROSCOPIC SPEC OTHER STN: NORMAL
PATH REPORT.RELEVANT HX SPEC: NORMAL
PLATELET # BLD AUTO: 153 10E3/UL (ref 150–450)
RHEUMATOID FACT SER NEPH-ACNC: <15 IU/ML (ref 0–30)
T PALLIDUM AB SER QL: NONREACTIVE

## 2022-05-13 PROCEDURE — 85652 RBC SED RATE AUTOMATED: CPT | Performed by: PSYCHIATRY & NEUROLOGY

## 2022-05-13 PROCEDURE — 86140 C-REACTIVE PROTEIN: CPT | Performed by: PSYCHIATRY & NEUROLOGY

## 2022-05-13 PROCEDURE — 250N000013 HC RX MED GY IP 250 OP 250 PS 637: Performed by: PSYCHIATRY & NEUROLOGY

## 2022-05-13 PROCEDURE — 250N000013 HC RX MED GY IP 250 OP 250 PS 637: Performed by: INTERNAL MEDICINE

## 2022-05-13 PROCEDURE — 36415 COLL VENOUS BLD VENIPUNCTURE: CPT | Performed by: PSYCHIATRY & NEUROLOGY

## 2022-05-13 PROCEDURE — 99214 OFFICE O/P EST MOD 30 MIN: CPT | Performed by: PSYCHIATRY & NEUROLOGY

## 2022-05-13 PROCEDURE — 96372 THER/PROPH/DIAG INJ SC/IM: CPT | Performed by: INTERNAL MEDICINE

## 2022-05-13 PROCEDURE — 36415 COLL VENOUS BLD VENIPUNCTURE: CPT | Performed by: INTERNAL MEDICINE

## 2022-05-13 PROCEDURE — 85049 AUTOMATED PLATELET COUNT: CPT | Performed by: INTERNAL MEDICINE

## 2022-05-13 PROCEDURE — 88112 CYTOPATH CELL ENHANCE TECH: CPT | Mod: 26 | Performed by: PATHOLOGY

## 2022-05-13 PROCEDURE — 99225 PR SUBSEQUENT OBSERVATION CARE,LEVEL II: CPT | Performed by: INTERNAL MEDICINE

## 2022-05-13 PROCEDURE — 86038 ANTINUCLEAR ANTIBODIES: CPT | Performed by: PSYCHIATRY & NEUROLOGY

## 2022-05-13 PROCEDURE — 86431 RHEUMATOID FACTOR QUANT: CPT | Performed by: PSYCHIATRY & NEUROLOGY

## 2022-05-13 PROCEDURE — 250N000013 HC RX MED GY IP 250 OP 250 PS 637

## 2022-05-13 PROCEDURE — 250N000011 HC RX IP 250 OP 636: Performed by: INTERNAL MEDICINE

## 2022-05-13 PROCEDURE — 87389 HIV-1 AG W/HIV-1&-2 AB AG IA: CPT | Performed by: INTERNAL MEDICINE

## 2022-05-13 PROCEDURE — G0378 HOSPITAL OBSERVATION PER HR: HCPCS

## 2022-05-13 RX ORDER — LIDOCAINE 4 G/G
1 PATCH TOPICAL
Status: DISCONTINUED | OUTPATIENT
Start: 2022-05-13 | End: 2022-05-19 | Stop reason: HOSPADM

## 2022-05-13 RX ORDER — HYDROXYZINE HYDROCHLORIDE 10 MG/1
10 TABLET, FILM COATED ORAL ONCE
Status: COMPLETED | OUTPATIENT
Start: 2022-05-13 | End: 2022-05-13

## 2022-05-13 RX ORDER — ACETAMINOPHEN 325 MG/1
650 TABLET ORAL ONCE
Status: COMPLETED | OUTPATIENT
Start: 2022-05-13 | End: 2022-05-13

## 2022-05-13 RX ADMIN — Medication 50 MCG: at 08:08

## 2022-05-13 RX ADMIN — FLUTICASONE PROPIONATE AND SALMETEROL XINAFOATE 2 PUFF: 115; 21 AEROSOL, METERED RESPIRATORY (INHALATION) at 20:13

## 2022-05-13 RX ADMIN — HYDROXYZINE HYDROCHLORIDE 10 MG: 10 TABLET ORAL at 21:57

## 2022-05-13 RX ADMIN — HYDROCORTISONE 5 MG: 5 TABLET ORAL at 20:12

## 2022-05-13 RX ADMIN — Medication 500 MCG: at 08:09

## 2022-05-13 RX ADMIN — OLANZAPINE 5 MG: 2.5 TABLET, FILM COATED ORAL at 08:08

## 2022-05-13 RX ADMIN — POLYETHYLENE GLYCOL 3350 17 G: 17 POWDER, FOR SOLUTION ORAL at 08:08

## 2022-05-13 RX ADMIN — ACETAMINOPHEN 650 MG: 325 TABLET ORAL at 13:12

## 2022-05-13 RX ADMIN — LIDOCAINE 1 PATCH: 560 PATCH PERCUTANEOUS; TOPICAL; TRANSDERMAL at 21:30

## 2022-05-13 RX ADMIN — FLUDROCORTISONE ACETATE 0.1 MG: 0.1 TABLET ORAL at 08:09

## 2022-05-13 RX ADMIN — ACETAMINOPHEN 650 MG: 325 TABLET ORAL at 07:16

## 2022-05-13 RX ADMIN — HEPARIN SODIUM 5000 UNITS: 5000 INJECTION, SOLUTION INTRAVENOUS; SUBCUTANEOUS at 08:09

## 2022-05-13 RX ADMIN — Medication 1 MG: at 22:37

## 2022-05-13 RX ADMIN — LORATADINE 10 MG: 10 TABLET ORAL at 08:08

## 2022-05-13 RX ADMIN — HYDROCORTISONE 10 MG: 5 TABLET ORAL at 08:08

## 2022-05-13 RX ADMIN — ACETAMINOPHEN 650 MG: 325 TABLET ORAL at 18:08

## 2022-05-13 RX ADMIN — LEVOTHYROXINE SODIUM 75 MCG: 0.03 TABLET ORAL at 06:53

## 2022-05-13 RX ADMIN — HEPARIN SODIUM 5000 UNITS: 5000 INJECTION, SOLUTION INTRAVENOUS; SUBCUTANEOUS at 20:14

## 2022-05-13 RX ADMIN — THERA TABS 1 TABLET: TAB at 08:08

## 2022-05-13 RX ADMIN — FLUTICASONE PROPIONATE AND SALMETEROL XINAFOATE 2 PUFF: 115; 21 AEROSOL, METERED RESPIRATORY (INHALATION) at 08:08

## 2022-05-13 NOTE — PLAN OF CARE
Problem: Plan of Care - These are the overarching goals to be used throughout the patient stay.    Goal: Plan of Care Review/Shift Note  Description: The Plan of Care Review/Shift note should be completed every shift.  The Outcome Evaluation is a brief statement about your assessment that the patient is improving, declining, or no change.  This information will be displayed automatically on your shift note.  Outcome: Ongoing, Progressing     Problem: Plan of Care - These are the overarching goals to be used throughout the patient stay.    Goal: Absence of Hospital-Acquired Illness or Injury  Intervention: Identify and Manage Fall Risk  Recent Flowsheet Documentation  Taken 5/13/2022 0800 by Alberto Gottlieb, RN  Safety Promotion/Fall Prevention: bed alarm on     Problem: Pain Acute  Goal: Acceptable Pain Control and Functional Ability  Intervention: Prevent or Manage Pain  Recent Flowsheet Documentation  Taken 5/13/2022 0800 by Alberto Gottlieb, RN  Medication Review/Management: medications reviewed   Goal Outcome Evaluation:      PRN Tylenol given for headache which was effective. SBA ambulation to bathroom.

## 2022-05-13 NOTE — PROGRESS NOTES
NEUROLOGY PROGRESS NOTE     ASSESSMENT & PLAN       Diagnosis: Leg weakness (R>L)     31-year-old man with multiple medical and psychiatric comorbidities here for progressive leg weakness (R>L)       Thoracic and lumbar imaging unremarkable.    Brain MRI shows T2 signal hyperintensities, not new.  Neurodegenerative versus toxic.    LP completed on 5.13.22.  Normal protein and glucose.  Negative Gram stain and culture.  Lyme pending.    Treponema antibodies pending (negative prev).    We will check ANGELIC, RF, ESR, CRP.    Suspect deconditioning is contributing to the weakness, though the unilateralality is strange.    CK normal at 129.    TSH normal.    B12 359 on 5.5.22.  Started supplement with goal of at least 500.    Physical therapy/OT. Recommending TCU.    Neurology will follow along.  Will need EMG as outpatient if lumbar puncture findings are unremarkable.     Neurology Discharge Planning:  TBD.     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     Priority: Medium     History of hypercalcemia 02/04/2020     Priority: Medium     Vitamin D deficiency 10/09/2019     Priority: Medium     PTSD (post-traumatic stress disorder) 06/05/2019     Priority: Medium     Schizoaffective disorder, depressive type, with catatonia (H) 06/05/2019     Priority: Medium     Pituitary microadenoma (H) 12/04/2018     Priority: Medium     Moderate persistent asthma 03/27/2018     Priority: Medium     Hypothyroidism, unspecified type 09/27/2017     Priority: Medium     Schizophrenia (H) 08/25/2017     Priority: Medium     Adrenal insufficiency (H) 07/28/2017     Priority: Medium      Medical History  Past Medical History:   Diagnosis Date     Ariel's disease (H) 2017     Asthma      Asthma      Hypercalcemia 2019     Hyperprolactinemia (H) 2018     Hypothyroidism      Hypovitaminosis D 2018     Pituitary microadenoma (H)      PTSD (post-traumatic stress disorder)      Schizophrenia (H)         SUBJECTIVE     Toe says he is still weak.  He feels like the arms are weak as well and he still cannot move the right leg.     OBJECTIVE     Vital signs in last 24 hours  Temp:  [97.8  F (36.6  C)-99.2  F (37.3  C)] 98.3  F (36.8  C)  Pulse:  [] 101  Resp:  [16-18] 16  BP: (102-112)/(55-69) 108/69  SpO2:  [96 %-97 %] 97 %    Weight:   [unfilled]    Review of Systems   Pertinent items are noted in HPI.    General Physical Exam: Vital signs were reviewed and are documented in EMR.   Neurological Exam:  Mental status: Alert, attentive.  More interactive today.  Speech: Fluent, though limited due to language barrier.  Cranial nerves: EOM appear full.  Face symmetric.  Motor: Upper extremity strength is normal.  Left le+/5, Right leg 3-/5 proximally, able to wiggle toes.    Reflexes: Patellar reflexes are normal.  Questionable upgoing toes versus withdrawal due to sensitivity (stable).  Sensory: Intact light touch throughout.  Coordination: Normal fine motor movements of the hands.  Gait: Deferred for safety.     DIAGNOSTIC STUDIES     Pertinent Radiology   Radiology Results: Personally reviewed image/s     Pertinent Labs   Lab Results: personally reviewed.   Recent Results (from the past 24 hour(s))   Protein total CSF:    Collection Time: 22  3:29 PM   Result Value Ref Range    Protein total CSF 37 15 - 45 mg/dL   Glucose CSF:    Collection Time: 22  3:29 PM   Result Value Ref Range    Glucose CSF 66 40 - 75 mg/dL   Gram Stain    Collection Time: 22  3:29 PM    Specimen: Lumbar Puncture; Cerebrospinal fluid   Result Value Ref Range    Gram Stain Result      Cerebrospinal fluid Aerobic Bacterial Culture Routine    Collection Time: 05/12/22  3:29 PM    Specimen: Lumbar Puncture; Cerebrospinal fluid   Result Value Ref Range    Gram Stain Result No organisms seen     Gram Stain Result No white blood cells seen    Platelet count    Collection Time: 05/13/22  6:11 AM   Result Value Ref Range    Platelet Count 153 150 - 450 10e3/uL         HOSPITAL MEDICATIONS       fludrocortisone  0.1 mg Oral Daily     fluticasone-salmeterol  2 puff Inhalation Q12H     heparin ANTICOAGULANT  5,000 Units Subcutaneous Q12H     hydrocortisone  10 mg Oral QAM     hydrocortisone  5 mg Oral QPM     levothyroxine  75 mcg Oral QAM AC     loratadine  10 mg Oral Daily     multivitamin, therapeutic  1 tablet Oral Daily     OLANZapine  5 mg Oral QAM     polyethylene glycol  17 g Oral Daily     sodium chloride (PF)  3 mL Intracatheter Q8H     cyanocobalamin  500 mcg Oral Daily     vitamin D3  50 mcg Oral Daily        Total time spent for face to face visit, reviewing labs/imaging studies, counseling and coordination of care was: 30 Minutes. More than 50% of this time was spent on counseling and coordination of care.    Dragon software used in the dictation on this note.    Nithya Ward MD  [facility] - [address], [address]  [place] [address]

## 2022-05-13 NOTE — PLAN OF CARE
PRIMARY DIAGNOSIS: GENERALIZED WEAKNESS    OUTPATIENT/OBSERVATION GOALS TO BE MET BEFORE DISCHARGE  1. Orthostatic performed: N/A    2. Tolerating PO medications: Yes    3. Return to near baseline physical activity: Yes    4. Cleared for discharge by consultants (if involved): No    Discharge Planner Nurse   Safe discharge environment identified: Yes  Barriers to discharge: Yes       Entered by: Vilma Del Rio RN 05/12/2022 11:43 PM   Pt admitted to hospital on 5/10 with right leg weakness. MRI of spine and brain unremarkable. Pt had lumbar puncture performed this afternoon. Results pending.  Band aid over injection site. He does have notable weakness to the right leg compared to left. Reports frequent falls at home. Received prn Tylenol 650 mg at 1650 for lower back and right leg pain, which pt reports is effective. Pt is Angela speaking. Requires use of interpretive services for communication. SBA with ambulation. Receiving PT and OT services. TCU placement is recommended. Affect is flat. Pt has dx of schizophrenia. Tolerating a regular diet. Appetite is adequate.   Please review provider order for any additional goals.   Nurse to notify provider when observation goals have been met and patient is ready for discharge.Goal Outcome Evaluation:

## 2022-05-13 NOTE — PLAN OF CARE
PRIMARY DIAGNOSIS: GENERALIZED WEAKNESS    OUTPATIENT/OBSERVATION GOALS TO BE MET BEFORE DISCHARGE  1. Orthostatic performed: N/A    2. Tolerating PO medications: Yes    3. Return to near baseline physical activity: Yes    4. Cleared for discharge by consultants (if involved): No    Discharge Planner Nurse   Safe discharge environment identified: Yes  Barriers to discharge: Yes       Entered by: Aaliyah Bean RN 05/13/2022 7:50 AM     Please review provider order for any additional goals.   Nurse to notify provider when observation goals have been met and patient is ready for discharge.Goal Outcome Evaluation:

## 2022-05-13 NOTE — PLAN OF CARE
PRIMARY DIAGNOSIS: GENERALIZED WEAKNESS    OUTPATIENT/OBSERVATION GOALS TO BE MET BEFORE DISCHARGE  1. Orthostatic performed: N/A    2. Tolerating PO medications: Yes    3. Return to near baseline physical activity: Yes    4. Cleared for discharge by consultants (if involved): No    Discharge Planner Nurse   Safe discharge environment identified: Yes  Barriers to discharge: Yes       Entered by: Vilma Del Rio RN 05/12/2022 11:42 PM     Please review provider order for any additional goals.   Nurse to notify provider when observation goals have been met and patient is ready for discharge.Goal Outcome Evaluation:

## 2022-05-13 NOTE — PROGRESS NOTES
Hospitalist Progress Note  ADMIT DATE: 5/10/2022     FACILITY: [facility]    PCP: Jose Rangel, [phone]    Assessment/Plan    Brooke Peterson is a 31 year old male with PMH of PTSD, schizophrenia, asthma, addisons disease, pituitary microadenoma, hypothyroidism who was brought to our ED for evaluation of worsening fatigue, dizziness and leg weakness      Leg weakness and generalized weakness  -- This has been going on for the last 2 years per patient/family but worse recently.  -- Denies any h/o recent trauma. MRI thoracic and lumbar spine w/wo contrast done in ED have been negative.  Mri brain with hyper intensities , not new per neuro   -- Consulted neurology for further work up. They feel this is due to weakness- LP with csf samples ordered to r/o lyme      PT and OT to see  SW for placement plan     Hypothyroidsim  -- Cont with home meds      Ariel's disease  -- Cont cortef and florinef      H/O pituitary microadenoma  -- Detail unclear at the moment. MRI was negative back in June 2019. Prolactin was normal in July 2021  -- MRI pending . Prolactin 13.2[within normal range]     H/O Schizophrenia:  -- Cont with home meds     Anemia:  Mild reduction in hemoglobin admission, monitor CBC closely        DVT PPX Heparin subcu     Barriers to discharge leg weakness; neuro to clear; disposition plan     Anticipated Discharge date 1-2 days once disposition plan and neuro clear       Subjective  A little better with strength in arms; still weak in right leg    Objective    Vital signs in last 24 hours  Temp:  [97.8  F (36.6  C)-99.2  F (37.3  C)] 98.3  F (36.8  C)  Pulse:  [] 101  Resp:  [16-18] 16  BP: (102-112)/(55-69) 108/69  SpO2:  [96 %-97 %] 97 % [unfilled] O2 Device: None (Room air)    Weight:   [unfilled] Weight change:     Intake/Output last 3 shifts  I/O last 3 completed shifts:  In: 850 [P.O.:850]  Out: 400 [Urine:400]  Body mass index is 28.91 kg/m .    Physical  Exam    Physical Exam  General appearance: not in acute distress  HEENT: PERRL, EOMI  Lungs: Clear breath sounds in bilateral lung fields  Cardiovascular: Regular rate and rhythm, normal S1-S2  Abdomen: Soft, non tender, no distension, normal bowel sound  Musculoskeletal: No joint swelling  Skin: No rash and no edema  Neurology:right leg weakness1-2/5, 4/5 on left leg      Pertinent Labs   Lab Results: personally reviewed.    Latest Reference Range & Units 05/13/22 10:34   CRP 0.0 - 0.8 mg/dL 2.0 (H)   Sed Rate 0 - 15 mm/hr 6   (H): Data is abnormally high    Medications  Scheduled Meds:    fludrocortisone  0.1 mg Oral Daily     fluticasone-salmeterol  2 puff Inhalation Q12H     heparin ANTICOAGULANT  5,000 Units Subcutaneous Q12H     hydrocortisone  10 mg Oral QAM     hydrocortisone  5 mg Oral QPM     levothyroxine  75 mcg Oral QAM AC     loratadine  10 mg Oral Daily     multivitamin, therapeutic  1 tablet Oral Daily     OLANZapine  5 mg Oral QAM     polyethylene glycol  17 g Oral Daily     sodium chloride (PF)  3 mL Intracatheter Q8H     cyanocobalamin  500 mcg Oral Daily     vitamin D3  50 mcg Oral Daily     Continuous Infusions:  PRN Meds:.acetaminophen, albuterol, fluticasone, lidocaine 4%, lidocaine (buffered or not buffered), meclizine, melatonin, ondansetron, sodium chloride (PF)    Pertinent Radiology   Radiology Results personally reviewed        Advanced Care Planning:  Discharge planning discussed with patient         [facility]  Nikolai Garcia

## 2022-05-13 NOTE — PLAN OF CARE
Goal Outcome Evaluation:  PRIMARY DIAGNOSIS: GENERALIZED WEAKNESS    OUTPATIENT/OBSERVATION GOALS TO BE MET BEFORE DISCHARGE  1. Orthostatic performed: N/A    2. Tolerating PO medications: Yes    3. Return to near baseline physical activity: Yes    4. Cleared for discharge by consultants (if involved): No    Discharge Planner Nurse   Safe discharge environment identified: Yes  Barriers to discharge: Yes       Entered by: Aaliyah Bean RN 05/13/2022 7:49 AM     Please review provider order for any additional goals.   Nurse to notify provider when observation goals have been met and patient is ready for discharge.

## 2022-05-14 ENCOUNTER — APPOINTMENT (OUTPATIENT)
Dept: PHYSICAL THERAPY | Facility: HOSPITAL | Age: 32
End: 2022-05-14
Payer: COMMERCIAL

## 2022-05-14 ENCOUNTER — APPOINTMENT (OUTPATIENT)
Dept: RADIOLOGY | Facility: HOSPITAL | Age: 32
End: 2022-05-14
Payer: COMMERCIAL

## 2022-05-14 LAB
ALBUMIN SERPL-MCNC: 3.9 G/DL (ref 3.5–5)
ALBUMIN UR-MCNC: 50 MG/DL
ALP SERPL-CCNC: 89 U/L (ref 45–120)
ALT SERPL W P-5'-P-CCNC: 103 U/L (ref 0–45)
ANION GAP SERPL CALCULATED.3IONS-SCNC: 12 MMOL/L (ref 5–18)
APPEARANCE UR: CLEAR
AST SERPL W P-5'-P-CCNC: 86 U/L (ref 0–40)
BACTERIA #/AREA URNS HPF: ABNORMAL /HPF
BASOPHILS # BLD AUTO: 0 10E3/UL (ref 0–0.2)
BASOPHILS NFR BLD AUTO: 1 %
BILIRUB SERPL-MCNC: 0.5 MG/DL (ref 0–1)
BILIRUB UR QL STRIP: NEGATIVE
BUN SERPL-MCNC: 24 MG/DL (ref 8–22)
CALCIUM SERPL-MCNC: 9.7 MG/DL (ref 8.5–10.5)
CAOX CRY #/AREA URNS HPF: ABNORMAL /HPF
CHLORIDE BLD-SCNC: 106 MMOL/L (ref 98–107)
CO2 SERPL-SCNC: 19 MMOL/L (ref 22–31)
COLOR UR AUTO: YELLOW
CREAT SERPL-MCNC: 0.98 MG/DL (ref 0.7–1.3)
EOSINOPHIL # BLD AUTO: 0.1 10E3/UL (ref 0–0.7)
EOSINOPHIL NFR BLD AUTO: 2 %
ERYTHROCYTE [DISTWIDTH] IN BLOOD BY AUTOMATED COUNT: 12.1 % (ref 10–15)
GFR SERPL CREATININE-BSD FRML MDRD: >90 ML/MIN/1.73M2
GLUCOSE BLD-MCNC: 110 MG/DL (ref 70–125)
GLUCOSE UR STRIP-MCNC: NEGATIVE MG/DL
HCT VFR BLD AUTO: 50.8 % (ref 40–53)
HGB BLD-MCNC: 17.7 G/DL (ref 13.3–17.7)
HGB UR QL STRIP: NEGATIVE
HIV 1+2 AB+HIV1 P24 AG SERPL QL IA: NEGATIVE
HOLD SPECIMEN: NORMAL
IMM GRANULOCYTES # BLD: 0 10E3/UL
IMM GRANULOCYTES NFR BLD: 1 %
KETONES UR STRIP-MCNC: ABNORMAL MG/DL
LACTATE SERPL-SCNC: 1 MMOL/L (ref 0.7–2)
LEUKOCYTE ESTERASE UR QL STRIP: NEGATIVE
LYMPHOCYTES # BLD AUTO: 1.5 10E3/UL (ref 0.8–5.3)
LYMPHOCYTES NFR BLD AUTO: 27 %
MCH RBC QN AUTO: 29.9 PG (ref 26.5–33)
MCHC RBC AUTO-ENTMCNC: 34.8 G/DL (ref 31.5–36.5)
MCV RBC AUTO: 86 FL (ref 78–100)
MONOCYTES # BLD AUTO: 0.6 10E3/UL (ref 0–1.3)
MONOCYTES NFR BLD AUTO: 10 %
NEUTROPHILS # BLD AUTO: 3.5 10E3/UL (ref 1.6–8.3)
NEUTROPHILS NFR BLD AUTO: 59 %
NITRATE UR QL: NEGATIVE
NRBC # BLD AUTO: 0 10E3/UL
NRBC BLD AUTO-RTO: 0 /100
PH UR STRIP: 6.5 [PH] (ref 5–7)
PLATELET # BLD AUTO: 135 10E3/UL (ref 150–450)
POTASSIUM BLD-SCNC: 4 MMOL/L (ref 3.5–5)
PROT SERPL-MCNC: 8.3 G/DL (ref 6–8)
RBC # BLD AUTO: 5.91 10E6/UL (ref 4.4–5.9)
RBC URINE: 1 /HPF
SODIUM SERPL-SCNC: 137 MMOL/L (ref 136–145)
SP GR UR STRIP: 1.03 (ref 1–1.03)
TRANSITIONAL EPI: <1 /HPF
UROBILINOGEN UR STRIP-MCNC: 3 MG/DL
WBC # BLD AUTO: 5.8 10E3/UL (ref 4–11)
WBC URINE: <1 /HPF

## 2022-05-14 PROCEDURE — 36415 COLL VENOUS BLD VENIPUNCTURE: CPT | Performed by: INTERNAL MEDICINE

## 2022-05-14 PROCEDURE — 250N000013 HC RX MED GY IP 250 OP 250 PS 637: Performed by: INTERNAL MEDICINE

## 2022-05-14 PROCEDURE — 97110 THERAPEUTIC EXERCISES: CPT | Mod: GP

## 2022-05-14 PROCEDURE — 36415 COLL VENOUS BLD VENIPUNCTURE: CPT

## 2022-05-14 PROCEDURE — 250N000013 HC RX MED GY IP 250 OP 250 PS 637

## 2022-05-14 PROCEDURE — 87040 BLOOD CULTURE FOR BACTERIA: CPT

## 2022-05-14 PROCEDURE — 71045 X-RAY EXAM CHEST 1 VIEW: CPT

## 2022-05-14 PROCEDURE — 99214 OFFICE O/P EST MOD 30 MIN: CPT | Performed by: PSYCHIATRY & NEUROLOGY

## 2022-05-14 PROCEDURE — 99225 PR SUBSEQUENT OBSERVATION CARE,LEVEL II: CPT | Performed by: INTERNAL MEDICINE

## 2022-05-14 PROCEDURE — 80053 COMPREHEN METABOLIC PANEL: CPT

## 2022-05-14 PROCEDURE — 81003 URINALYSIS AUTO W/O SCOPE: CPT

## 2022-05-14 PROCEDURE — 83605 ASSAY OF LACTIC ACID: CPT | Performed by: INTERNAL MEDICINE

## 2022-05-14 PROCEDURE — 85025 COMPLETE CBC W/AUTO DIFF WBC: CPT

## 2022-05-14 PROCEDURE — 96372 THER/PROPH/DIAG INJ SC/IM: CPT | Performed by: INTERNAL MEDICINE

## 2022-05-14 PROCEDURE — 82726 LONG CHAIN FATTY ACIDS: CPT | Performed by: PSYCHIATRY & NEUROLOGY

## 2022-05-14 PROCEDURE — G0378 HOSPITAL OBSERVATION PER HR: HCPCS

## 2022-05-14 PROCEDURE — 250N000013 HC RX MED GY IP 250 OP 250 PS 637: Performed by: PSYCHIATRY & NEUROLOGY

## 2022-05-14 PROCEDURE — 250N000011 HC RX IP 250 OP 636: Performed by: INTERNAL MEDICINE

## 2022-05-14 PROCEDURE — 97116 GAIT TRAINING THERAPY: CPT | Mod: GP,CQ

## 2022-05-14 RX ORDER — IBUPROFEN 200 MG
200 TABLET ORAL EVERY 6 HOURS PRN
Status: DISCONTINUED | OUTPATIENT
Start: 2022-05-14 | End: 2022-05-19 | Stop reason: HOSPADM

## 2022-05-14 RX ADMIN — ACETAMINOPHEN 650 MG: 325 TABLET ORAL at 07:49

## 2022-05-14 RX ADMIN — OLANZAPINE 5 MG: 2.5 TABLET, FILM COATED ORAL at 07:49

## 2022-05-14 RX ADMIN — IBUPROFEN 200 MG: 200 TABLET, FILM COATED ORAL at 12:43

## 2022-05-14 RX ADMIN — ACETAMINOPHEN 650 MG: 325 TABLET ORAL at 14:21

## 2022-05-14 RX ADMIN — POLYETHYLENE GLYCOL 3350 17 G: 17 POWDER, FOR SOLUTION ORAL at 07:50

## 2022-05-14 RX ADMIN — FLUTICASONE PROPIONATE AND SALMETEROL XINAFOATE 2 PUFF: 115; 21 AEROSOL, METERED RESPIRATORY (INHALATION) at 07:48

## 2022-05-14 RX ADMIN — THERA TABS 1 TABLET: TAB at 07:49

## 2022-05-14 RX ADMIN — Medication 50 MCG: at 07:49

## 2022-05-14 RX ADMIN — HEPARIN SODIUM 5000 UNITS: 5000 INJECTION, SOLUTION INTRAVENOUS; SUBCUTANEOUS at 07:50

## 2022-05-14 RX ADMIN — ACETAMINOPHEN 650 MG: 325 TABLET ORAL at 00:14

## 2022-05-14 RX ADMIN — LEVOTHYROXINE SODIUM 75 MCG: 0.03 TABLET ORAL at 07:08

## 2022-05-14 RX ADMIN — HYDROCORTISONE 5 MG: 5 TABLET ORAL at 20:17

## 2022-05-14 RX ADMIN — LORATADINE 10 MG: 10 TABLET ORAL at 07:49

## 2022-05-14 RX ADMIN — HEPARIN SODIUM 5000 UNITS: 5000 INJECTION, SOLUTION INTRAVENOUS; SUBCUTANEOUS at 20:25

## 2022-05-14 RX ADMIN — FLUDROCORTISONE ACETATE 0.1 MG: 0.1 TABLET ORAL at 07:49

## 2022-05-14 RX ADMIN — IBUPROFEN 200 MG: 200 TABLET, FILM COATED ORAL at 23:43

## 2022-05-14 RX ADMIN — ACETAMINOPHEN 650 MG: 325 TABLET ORAL at 20:17

## 2022-05-14 RX ADMIN — ONDANSETRON HYDROCHLORIDE 4 MG: 4 TABLET, FILM COATED ORAL at 00:20

## 2022-05-14 RX ADMIN — Medication 500 MCG: at 07:49

## 2022-05-14 RX ADMIN — HYDROCORTISONE 10 MG: 5 TABLET ORAL at 07:50

## 2022-05-14 RX ADMIN — FLUTICASONE PROPIONATE AND SALMETEROL XINAFOATE 2 PUFF: 115; 21 AEROSOL, METERED RESPIRATORY (INHALATION) at 20:34

## 2022-05-14 RX ADMIN — LIDOCAINE 1 PATCH: 560 PATCH PERCUTANEOUS; TOPICAL; TRANSDERMAL at 20:24

## 2022-05-14 NOTE — PROGRESS NOTES
This is a neurologic follow-up note    31-year-old admitted to hospital 5/10/2022  Original neurologic consult by Dr. Nithya Ward 5/11/2022      Chief complaint  Weakness of all extremities  Right leg weakness worse        HPI  31-year-old presented to the ER on May 10, 2022 with right leg weakness.  He reported that he was having difficulty with gait.    Patient complained of weakness of all 4 extremities but worse on the right leg  Has some chronic bowel incontinence but that was not necessarily new    Has had some leg pain worse over the last 3 days prior to admission    Patient may have had some difficulty with gait going on for 2 years        Past medical history  Ariel's disease  Pituitary microadenoma  Hypothyroidism  Asthma  Hypercalcemia  Hypovitamin ptosis D  PTSD/schizophrenia    Habits      Family history  Unknown      Work-up  THORACIC SPINE MRI: 5/10/2022  1.  Normal thoracic spine MRI.  LUMBAR SPINE MRI: 5/10/2022  1.  Normal lumbar spine MRI.  MRI brain 5/11/2022  1.  There are areas of T2 signal hyperintensity involving the cortical spinal tracts on the right and left left greater than right. Beginning at the level of the posterior limb of the internal capsule and ending inferiorly into the midbrain and emeka.   2.  These changes were present on 06/24/2019 but are more apparent on current study.  3.  In addition there is mild T2 signal hyperintensity within the medial aspect of the right and left cerebellar hemispheres and right and left thalami.  4.  Differential diagnostic, considerations would include neurodegenerative disorders and toxic metabolic disease.  CSF glucose 66, protein 37, no growth, cytology negative for malignancy  Treponemal antibody negative/HIV antigen negative,   AST 86/  CRP 2.0, rheumatoid factor less than 15, ESR 6,  B12 359, TSH 2.69, T4 free 1.07, prolactin level 9.0 (5/5/2022)  COVID negative on admission          Labs  Sodium 137 potassium  "4.0  BUN 24 creatinine 0.98  AST 86/  Glucose 110  White blood count 5.8, hemoglobin 17.7, platelets 135,000          Exam  Blood pressure 110/78, pulse 97, temperature 98.2  Blood pressure range 95/64- 126/77  Pulse range 70-1 03  T-max 102.5        Review of system  Had some post LP headache and lightheadedness and dizziness  Some concern that some of the lightheadedness and dizziness had been \"present for years\"  And then with the  it sounds like the headache may have been going on for a couple of days.        General exam per primary MD  Alert and attentive  HEENT normal  Lungs clear  Heart rate regular  Abdomen soft  Symmetrical pulses  No edema the feet    Neurologic exam  Alert and attentive  No neglect  Due to language barrier and psychiatric disease difficulty test memory and language    Cranials 2 through 12 normal      Upper extremities  Right arm slight drift compared to left arm    Lower extremities  Left leg 4+ out of 5  Right leg 3- out of 5 proximally  Distally seems to move better    Reflexes reported right over left  Biceps 2+/2+  Triceps 2+/2+  Knee 2+/2+  Ankle 2+/2+  Toes upgoing bilaterally      Gait  Getting therapy              Assessment/plan    1.   White matter changes in the CSF with CSF so far not showing any inflammation or elevated protein.        Some of the demyelination of the corticospinal tracts is somewhat worse on the left which could affect the right leg        Question if he has an Adrenal Leukodystrophy      2.  MRI scan brain abnormal bilateral corticospinal tracts T2 signal changes       There are areas of T2 signal hyperintensity involving the cortical spinal tracts on the right and left left greater than right.        Beginning at the level of the posterior limb of the internal capsule and ending inferiorly into the midbrain and emeka.         These changes were present on 06/24/2019 but are more apparent on current study.         In addition there is " "mild T2 signal hyperintensity within the medial aspect of the right and left cerebellar hemispheres and right and left thalami.       Question neurodegenerative disorder versus toxic/metabolic disease       Per history above have some [place]'s disease    3.  Adrenal corticotropin (106 a year ago, > 1250  2 years ago, should be <47)    ANGELIC pending  Lyme's titer pending  Check VLCFA    Will need a outpatient EMG    Patient may have more of the \"adult onset\" X-linked adrenal cortical leukodystrophy with a milder onset of difficulty with gait demyelination of central nervous system and some bowel and bladder difficulty.    Disposition per primary MD    35 minutes total care time today greater than 50% face-to-face patient care team discussing and evaluating the above extensive review past work-ups and labs above  "

## 2022-05-14 NOTE — SIGNIFICANT EVENT
Significant Event Note    Time of event: 4:26 AM May 14, 2022    Description of event:  House officer paged about headache, lightheadedness and dizziness in setting of new fever. Patient admitted for lower extremity weakness and had an LP done today. So far workup is unremarkable for cause of leg weakness -- concern for lyme's disease. Lactic acid was normal. When I went to evaluate patient with phone  he said his lightheadedness and dizziness has been present for years and he has had a headache for a couple days including today. Patient may be poor historian and nursing sign out had discussion about previous  saying the patient was not understanding him nor answering the questions appropriately.  used this visit did not mention this however -- so questionable history from him. Patient currently denies chest pain, shortness of breath, or abdominal pain. Continued to have R>L weakness. Is able to lift right leg but not as high as left -- roughly 3/5 strength right leg and 4/5 on left leg. Appears to be similar to previous exams. Patient is not on any antibiotics currently as day team awaiting LP results. At this point given that patient is non toxic appearing with stable BP and normal lactic acid will hold off on antibiotics. Will look for other sources to be safe.    /73   Pulse 107   Temp 98.3  F (36.8  C) (Oral)   Resp 22   Wt 74 kg (163 lb 3 oz)   SpO2 95%   BMI 28.91 kg/m        Plan:  -cbc and bmp  -ua/uc  -cxr  -blood cultures    Discussed with: bedside nurse and messaged night hospitalist Dr. Trung Brooks MD

## 2022-05-14 NOTE — PLAN OF CARE
Goal Outcome Evaluation:      Problem: Plan of Care - These are the overarching goals to be used throughout the patient stay.    Goal: Absence of Hospital-Acquired Illness or Injury  Outcome: Ongoing, Not Progressing  Intervention: Identify and Manage Fall Risk  Recent Flowsheet Documentation  Taken 5/14/2022 0348 by Armida Steinberg RN  Safety Promotion/Fall Prevention:   bed alarm on   nonskid shoes/slippers when out of bed  Taken 5/14/2022 0015 by Armida Steinberg RN  Safety Promotion/Fall Prevention:   bed alarm on   nonskid shoes/slippers when out of bed  Taken 5/13/2022 2008 by Armida Steinberg RN  Safety Promotion/Fall Prevention:   bed alarm on   nonskid shoes/slippers when out of bed  Intervention: Prevent Skin Injury  Recent Flowsheet Documentation  Taken 5/14/2022 0348 by Armida Steinberg RN  Body Position: position changed independently  Taken 5/14/2022 0015 by Armida Steinberg RN  Body Position: position changed independently  Taken 5/13/2022 2008 by Armida Steinberg RN  Body Position: position changed independently  Intervention: Prevent and Manage VTE (Venous Thromboembolism) Risk  Recent Flowsheet Documentation  Taken 5/14/2022 0348 by Armida Steinberg RN  Activity Management: activity adjusted per tolerance  Taken 5/14/2022 0015 by Armiad Steinberg RN  Activity Management: activity adjusted per tolerance  Taken 5/13/2022 2008 by Armida Steinberg RN  Activity Management: activity adjusted per tolerance      Problem: Pain Acute  Goal: Acceptable Pain Control and Functional Ability  Outcome: Ongoing, Not Progressing  Intervention: Develop Pain Management Plan  Recent Flowsheet Documentation  Taken 5/14/2022 0348 by Armida Steinberg RN  Pain Management Interventions: (quickly falls back asleep) --  Taken 5/13/2022 2008 by Armida Steinberg RN  Pain Management Interventions: MD notified (comment)  Intervention: Prevent or Manage Pain  Recent Flowsheet Documentation  Taken 5/14/2022 0348 by Armida Steinberg  "RN  Medication Review/Management: medications reviewed  Taken 5/14/2022 0015 by Armida Steinberg, RN  Medication Review/Management: medications reviewed  Taken 5/13/2022 2008 by Armida Steinberg, RN  Medication Review/Management: medications reviewed          Pt complaining of a headache whenever asked without relief from medication but told resident it had been going on for \"days\". Prn meds given see MAR. New fever with SIRS criteria firing, lactic sent, blood cults drawn and other labs ordered per resident. Pt slept well the rest of the shift.     Armida Steinberg RN    "

## 2022-05-14 NOTE — PLAN OF CARE
Problem: Plan of Care - These are the overarching goals to be used throughout the patient stay.    Goal: Plan of Care Review/Shift Note  Description: The Plan of Care Review/Shift note should be completed every shift.  The Outcome Evaluation is a brief statement about your assessment that the patient is improving, declining, or no change.  This information will be displayed automatically on your shift note.  Outcome: Ongoing, Progressing     Problem: Plan of Care - These are the overarching goals to be used throughout the patient stay.    Goal: Absence of Hospital-Acquired Illness or Injury  Outcome: Ongoing, Progressing     Problem: Plan of Care - These are the overarching goals to be used throughout the patient stay.    Goal: Absence of Hospital-Acquired Illness or Injury  Intervention: Prevent Skin Injury  Recent Flowsheet Documentation  Taken 5/14/2022 0800 by Alberto Gottlieb RN  Body Position: position changed independently   Goal Outcome Evaluation:      Patient received PRN tylenol for headache which was effective, also c/o being tired. Urine specimen sent.

## 2022-05-14 NOTE — PROGRESS NOTES
Hospitalist Progress Note  ADMIT DATE: 5/10/2022     FACILITY: [facility]    PCP: Jose Rangel, [phone]    Assessment/Plan    Brooke Peterson is a 31 year old male with PMH of PTSD, schizophrenia, asthma, addisons disease, pituitary microadenoma, hypothyroidism who was brought to our ED for evaluation of worsening fatigue, dizziness and leg weakness      Leg weakness and generalized weakness  -- This has been going on for the last 2 years per patient/family but worse recently.  -- Denies any h/o recent trauma. MRI thoracic and lumbar spine w/wo contrast done in ED have been negative.  Mri brain with hyper intensities , not new per neuro   -- Consulted neurology for further work up. They feel this is due to weakness- LP with csf samples ordered to r/o lyme -pending     PT and OT -recommended TCU  SW for placement plan     Hypothyroidsim  -- Cont with home meds      Ariel's disease  -- Cont cortef and florinef      H/O pituitary microadenoma  -- Detail unclear at the moment. MRI was negative back in June 2019. Prolactin was normal in July 2021  -- MRI pending . Prolactin 13.2[within normal range]     H/O Schizophrenia:  -- Cont with home meds     Anemia:  Mild reduction in hemoglobin admission, monitor CBC closely        DVT PPX Heparin subcu     Barriers to discharge leg weakness; neuro to clear; disposition plan     Anticipated Discharge date 1-2 days once disposition plan and neuro clear     Subjective  C/o same dizziness, weakness    Objective    Vital signs in last 24 hours  Temp:  [98.3  F (36.8  C)-102.5  F (39.2  C)] 99.6  F (37.6  C)  Pulse:  [107-128] 116  Resp:  [18-24] 20  BP: (106-126)/(60-77) 106/67  SpO2:  [95 %-97 %] 96 % [unfilled] O2 Device: None (Room air)    Weight:   [unfilled] Weight change:     Intake/Output last 3 shifts  I/O last 3 completed shifts:  In: 830 [P.O.:830]  Out: 600 [Urine:600]  Body mass index is 28.91 kg/m .    Physical Exam    Physical Exam  General  appearance: not in acute distress  HEENT: PERRL, EOMI  Lungs: Clear breath sounds in bilateral lung fields  Cardiovascular: Regular rate and rhythm, normal S1-S2  Abdomen: Soft, non tender, no distension, normal bowel sound  Musculoskeletal: No joint swelling  Skin: No rash and no edema  Neurology: AAO ×3.  Cranial nerves II - XII normal. Right leg weakness+      Pertinent Labs   Lab Results: personally reviewed.    Latest Reference Range & Units 05/14/22 04:46   Sodium 136 - 145 mmol/L 137   Potassium 3.5 - 5.0 mmol/L 4.0   Chloride 98 - 107 mmol/L 106   Carbon Dioxide 22 - 31 mmol/L 19 (L)   Urea Nitrogen 8 - 22 mg/dL 24 (H)   Creatinine 0.70 - 1.30 mg/dL 0.98   GFR Estimate >60 mL/min/1.73m2 >90 [1]   Calcium 8.5 - 10.5 mg/dL 9.7   Anion Gap 5 - 18 mmol/L 12   Albumin 3.5 - 5.0 g/dL 3.9   Protein Total 6.0 - 8.0 g/dL 8.3 (H)   Bilirubin Total 0.0 - 1.0 mg/dL 0.5   Alkaline Phosphatase 45 - 120 U/L 89   ALT 0 - 45 U/L 103 (H)   AST 0 - 40 U/L 86 (H)   Glucose 70 - 125 mg/dL 110   WBC 4.0 - 11.0 10e3/uL 5.8   Hemoglobin 13.3 - 17.7 g/dL 17.7   Hematocrit 40.0 - 53.0 % 50.8   Platelet Count 150 - 450 10e3/uL 135 (L)   RBC Count 4.40 - 5.90 10e6/uL 5.91 (H)   MCV 78 - 100 fL 86   MCH 26.5 - 33.0 pg 29.9   MCHC 31.5 - 36.5 g/dL 34.8   RDW 10.0 - 15.0 % 12.1   % Neutrophils % 59   % Lymphocytes % 27   % Monocytes % 10   % Eosinophils % 2   (L): Data is abnormally low  (H): Data is abnormally high  [1] Effective December 21, 2021 eGFRcr in adults is calculated using the 2021 CKD-EPI creatinine equation which includes age and gender (Courtney jean-baptiste al., NEJM, DOI: 10.1056/SBJTgr4169007)    Medications  Scheduled Meds:    fludrocortisone  0.1 mg Oral Daily     fluticasone-salmeterol  2 puff Inhalation Q12H     heparin ANTICOAGULANT  5,000 Units Subcutaneous Q12H     hydrocortisone  10 mg Oral QAM     hydrocortisone  5 mg Oral QPM     levothyroxine  75 mcg Oral QAM AC     lidocaine  1 patch Transdermal Q24h    And     lidocaine    Transdermal Q8H     loratadine  10 mg Oral Daily     multivitamin, therapeutic  1 tablet Oral Daily     OLANZapine  5 mg Oral QAM     polyethylene glycol  17 g Oral Daily     sodium chloride (PF)  3 mL Intracatheter Q8H     cyanocobalamin  500 mcg Oral Daily     vitamin D3  50 mcg Oral Daily     Continuous Infusions:  PRN Meds:.acetaminophen, albuterol, fluticasone, lidocaine 4%, lidocaine (buffered or not buffered), meclizine, melatonin, ondansetron, sodium chloride (PF)        Advanced Care Planning:  Discharge planning discussed with patient and [facility]  Nikolai Garcia

## 2022-05-15 PROCEDURE — 250N000013 HC RX MED GY IP 250 OP 250 PS 637

## 2022-05-15 PROCEDURE — G0378 HOSPITAL OBSERVATION PER HR: HCPCS

## 2022-05-15 PROCEDURE — 250N000013 HC RX MED GY IP 250 OP 250 PS 637: Performed by: INTERNAL MEDICINE

## 2022-05-15 PROCEDURE — 96372 THER/PROPH/DIAG INJ SC/IM: CPT | Performed by: INTERNAL MEDICINE

## 2022-05-15 PROCEDURE — 99214 OFFICE O/P EST MOD 30 MIN: CPT | Performed by: PSYCHIATRY & NEUROLOGY

## 2022-05-15 PROCEDURE — 250N000013 HC RX MED GY IP 250 OP 250 PS 637: Performed by: PSYCHIATRY & NEUROLOGY

## 2022-05-15 PROCEDURE — 250N000011 HC RX IP 250 OP 636: Performed by: INTERNAL MEDICINE

## 2022-05-15 PROCEDURE — 99225 PR SUBSEQUENT OBSERVATION CARE,LEVEL II: CPT | Performed by: INTERNAL MEDICINE

## 2022-05-15 RX ADMIN — THERA TABS 1 TABLET: TAB at 08:24

## 2022-05-15 RX ADMIN — FLUDROCORTISONE ACETATE 0.1 MG: 0.1 TABLET ORAL at 08:25

## 2022-05-15 RX ADMIN — MECLIZINE HCL 12.5 MG 12.5 MG: 12.5 TABLET ORAL at 21:18

## 2022-05-15 RX ADMIN — HYDROCORTISONE 10 MG: 5 TABLET ORAL at 08:24

## 2022-05-15 RX ADMIN — ACETAMINOPHEN 650 MG: 325 TABLET ORAL at 20:25

## 2022-05-15 RX ADMIN — HEPARIN SODIUM 5000 UNITS: 5000 INJECTION, SOLUTION INTRAVENOUS; SUBCUTANEOUS at 20:31

## 2022-05-15 RX ADMIN — LEVOTHYROXINE SODIUM 75 MCG: 0.03 TABLET ORAL at 06:47

## 2022-05-15 RX ADMIN — LIDOCAINE 1 PATCH: 560 PATCH PERCUTANEOUS; TOPICAL; TRANSDERMAL at 20:26

## 2022-05-15 RX ADMIN — HYDROCORTISONE 5 MG: 5 TABLET ORAL at 20:25

## 2022-05-15 RX ADMIN — ACETAMINOPHEN 650 MG: 325 TABLET ORAL at 06:47

## 2022-05-15 RX ADMIN — Medication 50 MCG: at 08:25

## 2022-05-15 RX ADMIN — Medication 500 MCG: at 08:24

## 2022-05-15 RX ADMIN — Medication 1 MG: at 20:36

## 2022-05-15 RX ADMIN — HEPARIN SODIUM 5000 UNITS: 5000 INJECTION, SOLUTION INTRAVENOUS; SUBCUTANEOUS at 08:25

## 2022-05-15 RX ADMIN — FLUTICASONE PROPIONATE AND SALMETEROL XINAFOATE 2 PUFF: 115; 21 AEROSOL, METERED RESPIRATORY (INHALATION) at 08:24

## 2022-05-15 RX ADMIN — LORATADINE 10 MG: 10 TABLET ORAL at 08:24

## 2022-05-15 RX ADMIN — FLUTICASONE PROPIONATE AND SALMETEROL XINAFOATE 2 PUFF: 115; 21 AEROSOL, METERED RESPIRATORY (INHALATION) at 20:31

## 2022-05-15 RX ADMIN — OLANZAPINE 5 MG: 2.5 TABLET, FILM COATED ORAL at 08:24

## 2022-05-15 RX ADMIN — Medication 1 MG: at 00:26

## 2022-05-15 RX ADMIN — ACETAMINOPHEN 650 MG: 325 TABLET ORAL at 13:30

## 2022-05-15 NOTE — PLAN OF CARE
PRIMARY DIAGNOSIS: GENERALIZED WEAKNESS    OUTPATIENT/OBSERVATION GOALS TO BE MET BEFORE DISCHARGE  1. Orthostatic performed: N/A    2. Tolerating PO medications: Yes    3. Return to near baseline physical activity: No    4. Cleared for discharge by consultants (if involved): No    Discharge Planner Nurse   Safe discharge environment identified: No  Barriers to discharge: Yes       Entered by: Hayden Cox RN 05/15/2022 6:55 AM     Please review provider order for any additional goals.   Nurse to notify provider when observation goals have been met and patient is ready for discharge.Goal Outcome Evaluation:

## 2022-05-15 NOTE — PLAN OF CARE
Problem: Plan of Care - These are the overarching goals to be used throughout the patient stay.    Goal: Plan of Care Review/Shift Note  Description: The Plan of Care Review/Shift note should be completed every shift.  The Outcome Evaluation is a brief statement about your assessment that the patient is improving, declining, or no change.  This information will be displayed automatically on your shift note.  Outcome: Ongoing, Progressing     Problem: Plan of Care - These are the overarching goals to be used throughout the patient stay.    Goal: Absence of Hospital-Acquired Illness or Injury  Outcome: Ongoing, Progressing     Problem: Plan of Care - These are the overarching goals to be used throughout the patient stay.    Goal: Absence of Hospital-Acquired Illness or Injury  Intervention: Identify and Manage Fall Risk  Recent Flowsheet Documentation  Taken 5/15/2022 0800 by Alberto Gottlieb RN  Safety Promotion/Fall Prevention: bed alarm on     Problem: Plan of Care - These are the overarching goals to be used throughout the patient stay.    Goal: Absence of Hospital-Acquired Illness or Injury  Intervention: Prevent Skin Injury  Recent Flowsheet Documentation  Taken 5/15/2022 0800 by Alberto Gottlieb, RN  Body Position: position changed independently     Problem: Plan of Care - These are the overarching goals to be used throughout the patient stay.    Goal: Absence of Hospital-Acquired Illness or Injury  Intervention: Prevent and Manage VTE (Venous Thromboembolism) Risk  Recent Flowsheet Documentation  Taken 5/15/2022 0800 by Alberto Gottlieb, RN  Activity Management:   activity adjusted per tolerance   activity encouraged   Goal Outcome Evaluation:      Pain managed with PRN analgesics, SBA with transfer belt, ambulates to bathroom, able to express needs, VS WDL.

## 2022-05-15 NOTE — PLAN OF CARE
PRIMARY DIAGNOSIS: GENERALIZED WEAKNESS    OUTPATIENT/OBSERVATION GOALS TO BE MET BEFORE DISCHARGE  1. Orthostatic performed: N/A    2. Tolerating PO medications: Yes    3. Return to near baseline physical activity: No    4. Cleared for discharge by consultants (if involved): No    Discharge Planner Nurse   Safe discharge environment identified: No  Barriers to discharge: Yes       Entered by: Hayden Cox RN 05/14/2022 9:59 PM     Please review provider order for any additional goals.   Nurse to notify provider when observation goals have been met and patient is ready for discharge.Goal Outcome Evaluation:

## 2022-05-15 NOTE — PLAN OF CARE
PRIMARY DIAGNOSIS: ACUTE PAIN  OUTPATIENT/OBSERVATION GOALS TO BE MET BEFORE DISCHARGE:  1. Pain Status: Improved-controlled with oral pain medications.    2. Return to near baseline physical activity: Yes    3. Cleared for discharge by consultants (if involved): Yes    Discharge Planner Nurse   Safe discharge environment identified: Yes  Barriers to discharge: No       Entered by: Alberto Gottlieb RN 05/15/2022 3:03 PM     Please review provider order for any additional goals.   Nurse to notify provider when observation goals have been met and patient is ready for discharge.Goal Outcome Evaluation:

## 2022-05-15 NOTE — PLAN OF CARE
PRIMARY DIAGNOSIS: ACUTE PAIN  OUTPATIENT/OBSERVATION GOALS TO BE MET BEFORE DISCHARGE:  1. Pain Status: Improved-controlled with oral pain medications.    2. Return to near baseline physical activity: Yes    3. Cleared for discharge by consultants (if involved): Yes    Discharge Planner Nurse   Safe discharge environment identified: Yes  Barriers to discharge: No       Entered by: Alberto Gottlieb RN 05/15/2022 3:01 PM   PRN tylenol given with relief  Please review provider order for any additional goals.   Nurse to notify provider when observation goals have been met and patient is ready for discharge.Goal Outcome Evaluation:

## 2022-05-15 NOTE — CONSULTS
Care Management Follow Up    Length of Stay (days): 0    Expected Discharge Date: 05/16/2022     Concerns to be Addressed: therapy recommendations, acute care, discharge planning   Patient plan of care discussed at interdisciplinary rounds: Yes    Anticipated Discharge Disposition:  Home with home RN/PT/OT vs TCU pending ongoing therapy recommendations     Anticipated Discharge Services:   Home with home RN/PT/OT vs TCU pending ongoing therapy recommendations  Anticipated Discharge DME:  pt has walker and wheelchair at home    Patient/family educated on Medicare website which has current facility and service quality ratings:  Yes  Education Provided on the Discharge Plan:  Yes  Patient/Family in Agreement with the Plan:  Yes    Referrals Placed by CM/SW:  See Below  Private pay costs discussed: Not applicable    Additional Information:  SW reviewed chart and spoke with interdisciplinary team. Per team, pt continues to require acute care with anticipated reassessment for PT/OT. When PT/OT initially assessed, pt had required moderate Ax2 for mobility however pt now SBA per nursing notes. PT/OT recommendations were either for TCU vs home with family assist and home PT/OT.     Pt resides at home with his father who is his PCA; home has ramp access and pt has walker and wheelchair at home; pt had also been working with [facility] and clinic care coordinator had been trying to secure home care services thru [facility].     Pt and SW met with assistance of [employer]  to discuss discharge planning. We discussed teams recommendations for either TCU or home with assist and home care. Pt notes that he would prefer to discharge to home with home care if possible; pt agreeable with home care referral to [facility]; referral sent. Pt notes if he continues to feel really weak and unable to return home that he would be open to TCU with preference for location closest to his home; SW provided TCU list and  Medicare.gov information and sent referrals according to pt's preferences. Pt declined any other questions at this time. Discharge transport TBD.     Plan A: Home Care  - [facility]    Plan B: [facility]  - [facility]  - [facility]  - [facility]  - [facility]  - [facility]   - [facility]   - [facility]    SW will continue to assess and follow for discharge planning needs.     Lamont GUADALUPE [facility]  Phone: [phone]

## 2022-05-15 NOTE — PROGRESS NOTES
"Hospitalist Progress Note  ADMIT DATE: 5/10/2022     FACILITY: [facility]    PCP: Jose Rangel, [phone]    Assessment/Plan    Brooke Peterson is a 31 year old male with PMH of PTSD, schizophrenia, asthma, addisons disease, pituitary microadenoma, hypothyroidism who was brought to our ED for evaluation of worsening fatigue, dizziness and leg weakness      Leg weakness and generalized weakness  -- This has been going on for the last 2 years per patient/family but worse recently.  -- Denies any h/o recent trauma. MRI thoracic and lumbar spine w/wo contrast done in ED have been negative.  -ANGELIC pending; Lyme's titer pending; Check VLCFA  -- Consulted neurology   -as per neuro:  Will need a outpatient EMG; Patient may have more of the \"adult onset\" X-linked adrenal cortical leukodystrophy with a milder onset of difficulty with gait demyelination of central nervous system and some bowel and bladder difficulty.  -if it is adrenal leukodystrophy, basically the treatment is supportive     PT and OT -recommended TCU, but pt prefer to go home.   SW for placement plan. I think home with [facility] might be the best option for pt.      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 MRI was negative back in June 2019. Prolactin was normal in July 2021  -- MRI pending . Prolactin 13.2[within normal range]     H/O Schizophrenia:  -- Cont with home meds     Anemia:  Mild reduction in hemoglobin admission, monitor CBC closely        DVT PPX Heparin subcu     Barriers to discharge leg weakness; neuro to clear; disposition plan     Anticipated Discharge date 1-2 days once disposition plan and neuro clear; 5/16?    Subjective  Still very weak, no energy    Objective    Vital signs in last 24 hours  Temp:  [97.5  F (36.4  C)-98.2  F (36.8  C)] 97.5  F (36.4  C)  Pulse:  [70-97] 70  Resp:  [16-20] 16  BP: " (100-113)/(55-78) 103/63  SpO2:  [95 %-98 %] 98 % [unfilled] O2 Device: None (Room air)    Weight:   [unfilled] Weight change:     Intake/Output last 3 shifts  I/O last 3 completed shifts:  In: 240 [P.O.:240]  Out: 500 [Urine:500]  Body mass index is 28.91 kg/m .    Physical Exam    Physical Exam  General appearance: not in acute distress  HEENT: PERRL, EOMI  Lungs: Clear breath sounds in bilateral lung fields  Cardiovascular: Regular rate and rhythm, normal S1-S2  Abdomen: Soft, non tender, no distension, normal bowel sound  Musculoskeletal: No joint swelling  Skin: No rash and no edema  Neurology: 4 extremities weakness, worst in right leg      Pertinent Labs   Lab Results: personally reviewed.       Medications  Scheduled Meds:    fludrocortisone  0.1 mg Oral Daily     fluticasone-salmeterol  2 puff Inhalation Q12H     heparin ANTICOAGULANT  5,000 Units Subcutaneous Q12H     hydrocortisone  10 mg Oral QAM     hydrocortisone  5 mg Oral QPM     levothyroxine  75 mcg Oral QAM AC     lidocaine  1 patch Transdermal Q24h    And     lidocaine   Transdermal Q8H     loratadine  10 mg Oral Daily     multivitamin, therapeutic  1 tablet Oral Daily     OLANZapine  5 mg Oral QAM     polyethylene glycol  17 g Oral Daily     sodium chloride (PF)  3 mL Intracatheter Q8H     cyanocobalamin  500 mcg Oral Daily     vitamin D3  50 mcg Oral Daily     Continuous Infusions:  PRN Meds:.acetaminophen, albuterol, fluticasone, ibuprofen, lidocaine 4%, lidocaine (buffered or not buffered), meclizine, melatonin, ondansetron, sodium chloride (PF)    Pertinent Radiology   Radiology Results personally reviewed      EKG Results: personally reviewed    Advanced Care Planning:  Discharge planning discussed with patient and [facility] Medicine Service  Nikolai Garcia

## 2022-05-15 NOTE — PLAN OF CARE
PRIMARY DIAGNOSIS: GENERALIZED WEAKNESS    OUTPATIENT/OBSERVATION GOALS TO BE MET BEFORE DISCHARGE  1. Orthostatic performed: N/A    2. Tolerating PO medications: Yes    3. Return to near baseline physical activity: No    4. Cleared for discharge by consultants (if involved): No    Discharge Planner Nurse   Safe discharge environment identified: No  Barriers to discharge: Yes       Entered by: Hayden Cox RN 05/15/2022 5:05 AM     Please review provider order for any additional goals.   Nurse to notify provider when observation goals have been met and patient is ready for discharge.Goal Outcome Evaluation:

## 2022-05-15 NOTE — PLAN OF CARE
PRIMARY DIAGNOSIS: GENERALIZED WEAKNESS    OUTPATIENT/OBSERVATION GOALS TO BE MET BEFORE DISCHARGE  1. Orthostatic performed: N/A    2. Tolerating PO medications: Yes    3. Return to near baseline physical activity: No    4. Cleared for discharge by consultants (if involved): No    Discharge Planner Nurse   Safe discharge environment identified: No  Barriers to discharge: Yes       Entered by: Hayden Cox RN 05/15/2022 3:00 AM     Please review provider order for any additional goals.   Nurse to notify provider when observation goals have been met and patient is ready for discharge.Goal Outcome Evaluation:

## 2022-05-15 NOTE — PLAN OF CARE
Problem: Plan of Care - These are the overarching goals to be used throughout the patient stay.    Goal: Plan of Care Review/Shift Note  Description: The Plan of Care Review/Shift note should be completed every shift.  The Outcome Evaluation is a brief statement about your assessment that the patient is improving, declining, or no change.  This information will be displayed automatically on your shift note.  Outcome: Ongoing, Progressing   Pt continues to report dizziness. Right lower extremity weakness noted. Disoriented to time.  Problem: Pain Acute  Goal: Acceptable Pain Control and Functional Ability  Outcome: Ongoing, Progressing   Goal Outcome Evaluation:      Pt complains of headache, lower back,  and RLE pain. Pain manageable with prn tylenol.

## 2022-05-16 ENCOUNTER — APPOINTMENT (OUTPATIENT)
Dept: PHYSICAL THERAPY | Facility: HOSPITAL | Age: 32
End: 2022-05-16
Payer: COMMERCIAL

## 2022-05-16 ENCOUNTER — APPOINTMENT (OUTPATIENT)
Dept: OCCUPATIONAL THERAPY | Facility: HOSPITAL | Age: 32
End: 2022-05-16
Payer: COMMERCIAL

## 2022-05-16 ENCOUNTER — PATIENT OUTREACH (OUTPATIENT)
Dept: CARE COORDINATION | Facility: CLINIC | Age: 32
End: 2022-05-16
Payer: COMMERCIAL

## 2022-05-16 LAB
ANA SER QL IF: NEGATIVE
B BURGDOR IGG CSF QL IB: NEGATIVE
B BURGDOR IGM CSF QL IB: NEGATIVE

## 2022-05-16 PROCEDURE — G0378 HOSPITAL OBSERVATION PER HR: HCPCS

## 2022-05-16 PROCEDURE — 99225 PR SUBSEQUENT OBSERVATION CARE,LEVEL II: CPT | Performed by: INTERNAL MEDICINE

## 2022-05-16 PROCEDURE — 250N000013 HC RX MED GY IP 250 OP 250 PS 637: Performed by: INTERNAL MEDICINE

## 2022-05-16 PROCEDURE — 250N000011 HC RX IP 250 OP 636: Performed by: INTERNAL MEDICINE

## 2022-05-16 PROCEDURE — 96372 THER/PROPH/DIAG INJ SC/IM: CPT | Performed by: INTERNAL MEDICINE

## 2022-05-16 PROCEDURE — 250N000013 HC RX MED GY IP 250 OP 250 PS 637: Performed by: PSYCHIATRY & NEUROLOGY

## 2022-05-16 PROCEDURE — 99213 OFFICE O/P EST LOW 20 MIN: CPT | Performed by: PSYCHIATRY & NEUROLOGY

## 2022-05-16 PROCEDURE — 250N000013 HC RX MED GY IP 250 OP 250 PS 637

## 2022-05-16 PROCEDURE — 97530 THERAPEUTIC ACTIVITIES: CPT | Mod: GP

## 2022-05-16 PROCEDURE — 97535 SELF CARE MNGMENT TRAINING: CPT | Mod: GO

## 2022-05-16 PROCEDURE — 97116 GAIT TRAINING THERAPY: CPT | Mod: GP

## 2022-05-16 RX ORDER — MECLIZINE HCL 12.5 MG 12.5 MG/1
12.5 TABLET ORAL 3 TIMES DAILY PRN
Qty: 30 TABLET | Refills: 1 | Status: SHIPPED | OUTPATIENT
Start: 2022-05-16 | End: 2022-05-17

## 2022-05-16 RX ADMIN — OLANZAPINE 5 MG: 2.5 TABLET, FILM COATED ORAL at 08:02

## 2022-05-16 RX ADMIN — IBUPROFEN 200 MG: 200 TABLET, FILM COATED ORAL at 23:54

## 2022-05-16 RX ADMIN — LIDOCAINE 1 PATCH: 560 PATCH PERCUTANEOUS; TOPICAL; TRANSDERMAL at 19:01

## 2022-05-16 RX ADMIN — HYDROCORTISONE 10 MG: 5 TABLET ORAL at 08:02

## 2022-05-16 RX ADMIN — LORATADINE 10 MG: 10 TABLET ORAL at 08:01

## 2022-05-16 RX ADMIN — HYDROCORTISONE 5 MG: 5 TABLET ORAL at 19:02

## 2022-05-16 RX ADMIN — ACETAMINOPHEN 650 MG: 325 TABLET ORAL at 08:01

## 2022-05-16 RX ADMIN — Medication 500 MCG: at 08:02

## 2022-05-16 RX ADMIN — HEPARIN SODIUM 5000 UNITS: 5000 INJECTION, SOLUTION INTRAVENOUS; SUBCUTANEOUS at 08:03

## 2022-05-16 RX ADMIN — HEPARIN SODIUM 5000 UNITS: 5000 INJECTION, SOLUTION INTRAVENOUS; SUBCUTANEOUS at 21:01

## 2022-05-16 RX ADMIN — IBUPROFEN 200 MG: 200 TABLET, FILM COATED ORAL at 17:23

## 2022-05-16 RX ADMIN — MECLIZINE HCL 12.5 MG 12.5 MG: 12.5 TABLET ORAL at 19:02

## 2022-05-16 RX ADMIN — FLUDROCORTISONE ACETATE 0.1 MG: 0.1 TABLET ORAL at 08:03

## 2022-05-16 RX ADMIN — FLUTICASONE PROPIONATE AND SALMETEROL XINAFOATE 2 PUFF: 115; 21 AEROSOL, METERED RESPIRATORY (INHALATION) at 08:03

## 2022-05-16 RX ADMIN — Medication 1 SPRAY: at 08:03

## 2022-05-16 RX ADMIN — LEVOTHYROXINE SODIUM 75 MCG: 0.03 TABLET ORAL at 06:38

## 2022-05-16 RX ADMIN — Medication 50 MCG: at 08:01

## 2022-05-16 RX ADMIN — THERA TABS 1 TABLET: TAB at 08:01

## 2022-05-16 RX ADMIN — IBUPROFEN 200 MG: 200 TABLET, FILM COATED ORAL at 00:16

## 2022-05-16 RX ADMIN — FLUTICASONE PROPIONATE AND SALMETEROL XINAFOATE 2 PUFF: 115; 21 AEROSOL, METERED RESPIRATORY (INHALATION) at 19:01

## 2022-05-16 RX ADMIN — POLYETHYLENE GLYCOL 3350 17 G: 17 POWDER, FOR SOLUTION ORAL at 08:01

## 2022-05-16 RX ADMIN — ACETAMINOPHEN 650 MG: 325 TABLET ORAL at 19:02

## 2022-05-16 NOTE — PROGRESS NOTES
Care Management Follow Up    Length of Stay (days): 0    Expected Discharge Date: 5/17/2022     Concerns to be Addressed: discharge planning    Patient plan of care discussed at interdisciplinary rounds: Yes    Anticipated Discharge Disposition:  Home with Home Care (pt preferred) vs TCU     Anticipated Discharge Services:  Rehab  Anticipated Discharge DME:  None    Patient/family educated on Medicare website which has current facility and service quality ratings:    Education Provided on the Discharge Plan:    Patient/Family in Agreement with the Plan:      Referrals Placed by CM/SW:  Home Care and TCU  Private pay costs discussed: not at this time    Additional Information:  Several referrals placed to Home Care agencies and follow-up calls made.  Unable to find accepting agency, primarily due to pt's insurance.    Met with pt and discussed discharge planning via assistance from professional .  Provided update regarding inability to find Home Care agency at this time.  Discussed TCU referrals and pt is reluctantly agreeable to placement.  Several TCU referrals are pending.    Ayse Johnson RN

## 2022-05-16 NOTE — PLAN OF CARE
PRIMARY DIAGNOSIS: GENERALIZED WEAKNESS    OUTPATIENT/OBSERVATION GOALS TO BE MET BEFORE DISCHARGE  1. Orthostatic performed: N/A    2. Tolerating PO medications: Yes    3. Return to near baseline physical activity: Yes    4. Cleared for discharge by consultants (if involved): N/A, follow up with neurology in 8-12 weeks    Discharge Planner Nurse   Safe discharge environment identified: Yes, Home preferabl or TCU  Barriers to discharge: Yes, needing home health care agency that accepts Pt's insurance       Entered by: Wil López RN 05/16/2022 6:17 PM     Pt having some dizziness, having pain in joints, knees, and back. PRN ibuprofen given with dinner. IV in right arm removed due to pain and leaking when flushed.

## 2022-05-16 NOTE — PROGRESS NOTES
Clinic Care Coordination Contact  Care Team Conversations    CCC SHIRA scheduled transportation for psych appointment on 5/23 at [facility].     Pickup between 11:30-12 with [employer] and a will call return.    SW was going to inform patient, but he is currently admitted to the hospital. Will forward to CHW and RN to inform once discharged.

## 2022-05-16 NOTE — PROGRESS NOTES
This is a neurologic follow-up note    31-year-old admitted to hospital 5/10/2022  Original neurologic consult by Dr. Nithya Ward 5/11/2022      Chief complaint  Weakness of all extremities  Right leg weakness worse  Seems to have right-sided arm and leg weakness greater than left (leg greater than arm)      HPI  31-year-old presented to the ER on May 10, 2022 with right leg weakness.  He reported that he was having difficulty with gait.    Patient complained of weakness of all 4 extremities but worse on the right leg  Has some chronic bowel incontinence but that was not necessarily new    Has had some leg pain worse over the last 3 days prior to admission    Patient may have had some difficulty with gait going on for 2 years    MRI scan with demyelination of the corticospinal tract see MRI report below.    Past medical history  Ariel's disease  Pituitary microadenoma  Hypothyroidism  Asthma  Hypercalcemia  Hypovitamin ptosis D  PTSD/schizophrenia    Habits      Family history  Unknown      Work-up  THORACIC SPINE MRI: 5/10/2022  1.  Normal thoracic spine MRI.  LUMBAR SPINE MRI: 5/10/2022  1.  Normal lumbar spine MRI.  MRI brain 5/11/2022  1.  There are areas of T2 signal hyperintensity involving the cortical spinal tracts on the right and left left greater than right. Beginning at the level of the posterior limb of the internal capsule and ending inferiorly into the midbrain and emeka.   2.  These changes were present on 06/24/2019 but are more apparent on current study.  3.  In addition there is mild T2 signal hyperintensity within the medial aspect of the right and left cerebellar hemispheres and right and left thalami.  4.  Differential diagnostic, considerations would include neurodegenerative disorders and toxic metabolic disease.  CSF glucose 66, protein 37, no growth, cytology negative for malignancy  Treponemal antibody negative/HIV antigen negative,   AST 86/  CRP 2.0, rheumatoid  "factor less than 15, ESR 6,  B12 359, TSH 2.69, T4 free 1.07, prolactin level 9.0 (5/5/2022)  COVID negative on admission          Labs  Sodium 137 potassium 4.0  BUN 24 creatinine 0.98  AST 86/  Glucose 110  White blood count 5.8, hemoglobin 17.7, platelets 135,000          Exam  Blood pressure 106/75, pulse 75, temperature 98.4  Blood pressure range 94/61- 138/62  Pulse range 70-1 16  T-max 99.6              Review of system  Had some post LP headache and lightheadedness and dizziness  Some concern that some of the lightheadedness and dizziness had been \"present for years\"  And then with the  it sounds like the headache may have been going on for a couple of days.        General exam per primary MD  Alert and attentive  HEENT normal  Lungs clear  Heart rate regular  Abdomen soft  Symmetrical pulses  No edema the feet    Neurologic exam  Alert and attentive  No neglect  Due to language barrier and psychiatric disease difficulty test memory and language    Cranials 2 through 12 normal      Upper extremities  Right arm slight drift compared to left arm    Lower extremities  Left leg 4+ out of 5  Right leg 3- out of 5 proximally  Distally seems to move better    Reflexes reported right over left  Biceps 2+/2+  Triceps 2+/2+  Knee 2+/2+  Ankle 2+/2+  Toes upgoing bilaterally      Gait  Getting therapy              Assessment/plan    1.   White matter changes in the CSF with CSF so far not showing any inflammation or elevated protein.        Some of the demyelination of the corticospinal tracts is somewhat worse on the left which could affect the right leg        Question if he has an Adrenal Leukodystrophy      2.  MRI scan brain abnormal bilateral corticospinal tracts T2 signal changes       There are areas of T2 signal hyperintensity involving the cortical spinal tracts on the right and left left greater than right.        Beginning at the level of the posterior limb of the internal capsule and " "ending inferiorly into the midbrain and emeka.         These changes were present on 2019 but are more apparent on current study.         In addition there is mild T2 signal hyperintensity within the medial aspect of the right and left cerebellar hemispheres and right and left thalami.       Question neurodegenerative disorder versus toxic/metabolic disease       Per history above have some Ariel's disease          difficult to get a good history with language barrier        patient may have had difficulty running when he was a teenager/younger        question if he has a chronic problem going on for years          Unclear if he could have had some type of  event versus hereditary leukodystrophy    3.  Adrenal corticotropin (106 a year ago, > 1250  2 years ago, should be <47)    ANGELIC pending  Lyme's titer pending  Check VLCFA    Will need a outpatient EMG    Patient may have more of the \"adult onset\" X-linked adrenal cortical leukodystrophy with a milder onset of difficulty with gait demyelination of central nervous system and some bowel and bladder difficulty.  There are some other \"leukodystrophies\" such as metachromatic leukodystrophy once again would have to have the adult onset with more of a \"mild presentation and the slightly atypical\" compared to the typical childhood presentation which tends to be much more severe and have much more CNS changes on MRI.    Disposition per primary MD  Discussed with nursing staff face-to-face    PT to maximize gait stability    Follow-up with neurology in 8-12 weeks    Disposition per primary MD    25 minutes total care time today greater than 50% face-to-face patient care team discussing and evaluating the above.  "

## 2022-05-16 NOTE — CARE PLAN
PRIMARY DIAGNOSIS: GENERALIZED WEAKNESS    OUTPATIENT/OBSERVATION GOALS TO BE MET BEFORE DISCHARGE  1. Orthostatic performed: N/A    2. Tolerating PO medications: Yes  Takes medication without difficulty  3. Return to near baseline physical activity: Yes  Up ambulating in hallway with physical therapy.   4. Cleared for discharge by consultants (if involved): No    Discharge Planner Nurse   Safe discharge environment identified: Yes  Barriers to discharge: Yes       Entered by: Carley Dukes RN 05/16/2022 11:47 AM     Please review provider order for any additional goals.   Nurse to notify provider when observation goals have been met and patient is ready for discharge.

## 2022-05-16 NOTE — PROGRESS NOTES
"Hospitalist Progress Note  ADMIT DATE: 5/10/2022     FACILITY: [facility]    PCP: Jose Rangel, [phone]    Assessment/Plan    Brooke Peterson is a 31 year old male with PMH of PTSD, schizophrenia, asthma, addisons disease, pituitary microadenoma, hypothyroidism who was brought to our ED for evaluation of worsening fatigue, dizziness and leg weakness      Leg weakness and generalized weakness  -- This has been going on for the last 2 years per patient/family but worse recently.  -- Denies any h/o recent trauma. MRI thoracic and lumbar spine w/wo contrast done in ED have been negative.  -ANGELIC pending; Lyme's titer pending; Check VLCFA  -- Consulted neurology   -as per neuro:  Will need a outpatient EMG; Patient may have more of the \"adult onset\" X-linked adrenal cortical leukodystrophy with a milder onset of difficulty with gait demyelination of central nervous system and some bowel and bladder difficulty.  There are some other \"leukodystrophies\" such as metachromatic leukodystrophy once again would have to have the adult onset with more of a \"mild presentation and the slightly atypical\" compared to the typical childhood presentation which tends to be much more severe and have much more CNS changes on MRI.  -if it is adrenal leukodystrophy, basically the treatment is supportive  -With Angela bermaner over the phone, I tried to explain to pt and family in the room. I explained that it is unlikely to improve his symptoms during this hospital course. His symptoms are most likely due to adrenal cortical leukodystrophy, and need to follow up with pcp and possible consultation with [facility] or  specialist.  -for now, the goal is to discharge pt to home with HHC or TCU, depending on SW     PT and OT -recommended TCU, but pt prefer to go home.    for placement plan. I think home with HHC might be the best option for pt.      Hypothyroidsim  -- Cont with home meds      Platter's disease  -- " Cont cortef and florinef      H/O pituitary microadenoma  -- Detail unclear at the moment. MRI was negative back in June 2019. Prolactin was normal in July 2021  -- MRI pending . Prolactin 13.2[within normal range]     H/O Schizophrenia:  -- Cont with home meds     Anemia:  Mild reduction in hemoglobin admission, monitor CBC closely        DVT PPX Heparin subcu     Barriers to discharge leg weakness; neuro to clear; disposition plan     Anticipated Discharge date 1-2 days once disposition plan and neuro clear; 5/17?     Subjective  Still dizzy and weakness    Objective    Vital signs in last 24 hours  Temp:  [97.5  F (36.4  C)-98.8  F (37.1  C)] 98.4  F (36.9  C)  Pulse:  [] 75  Resp:  [17-18] 18  BP: (102-138)/(62-75) 106/75  SpO2:  [96 %-100 %] 99 % [unfilled] O2 Device: None (Room air)    Weight:   [unfilled] Weight change:     Intake/Output last 3 shifts  I/O last 3 completed shifts:  In: 240 [P.O.:240]  Out: 200 [Urine:200]  Body mass index is 28.91 kg/m .    Physical Exam    Physical Exam  General appearance: not in acute distress  HEENT: PERRL, EOMI  Lungs: Clear breath sounds in bilateral lung fields  Cardiovascular: Regular rate and rhythm, normal S1-S2  Abdomen: Soft, non tender, no distension, normal bowel sound  Musculoskeletal: No joint swelling  Skin: No rash and no edema  Neurology: weakness in limbs, especially right leg      Pertinent Labs   Lab Results: none       Medications  Scheduled Meds:    fludrocortisone  0.1 mg Oral Daily     fluticasone-salmeterol  2 puff Inhalation Q12H     heparin ANTICOAGULANT  5,000 Units Subcutaneous Q12H     hydrocortisone  10 mg Oral QAM     hydrocortisone  5 mg Oral QPM     levothyroxine  75 mcg Oral QAM AC     lidocaine  1 patch Transdermal Q24h    And     lidocaine   Transdermal Q8H     loratadine  10 mg Oral Daily     multivitamin, therapeutic  1 tablet Oral Daily     OLANZapine  5 mg Oral QAM     polyethylene glycol  17 g Oral Daily     sodium chloride  (PF)  3 mL Intracatheter Q8H     cyanocobalamin  500 mcg Oral Daily     vitamin D3  50 mcg Oral Daily     Continuous Infusions:  PRN Meds:.acetaminophen, albuterol, fluticasone, ibuprofen, lidocaine 4%, lidocaine (buffered or not buffered), meclizine, melatonin, ondansetron, sodium chloride (PF)      Advanced Care Planning:  Discharge planning discussed with patient, family and [facility] Medicine Service  Nikolai Garcia

## 2022-05-16 NOTE — PLAN OF CARE
PRIMARY DIAGNOSIS: GENERALIZED WEAKNESS    OUTPATIENT/OBSERVATION GOALS TO BE MET BEFORE DISCHARGE  1. Orthostatic performed: No    2. Tolerating PO medications: Yes    3. Return to near baseline physical activity: No    4. Cleared for discharge by consultants (if involved): No    Discharge Planner Nurse   Safe discharge environment identified: Yes  Barriers to discharge: Yes       Entered by: Kayla Jimenez RN 05/16/2022 4:05 AM     Please review provider order for any additional goals.   Nurse to notify provider when observation goals have been met and patient is ready for discharge.Goal Outcome Evaluation:

## 2022-05-16 NOTE — PLAN OF CARE
Goal Outcome Evaluation:      Problem: Pain Acute  Goal: Acceptable Pain Control and Functional Ability  Outcome: Ongoing, Progressing  Intervention: Prevent or Manage Pain  Recent Flowsheet Documentation  Taken 5/15/2022 [id number] by Sebastien Juarez RN  Medication Review/Management: medications reviewed      Pt A& O x 3. Made his needs know. Angela speaking, Vital signs wdl. C/o headache. Prn Tylenol given and was effective. On RA. Was up to the bathroom with a walker.

## 2022-05-16 NOTE — PLAN OF CARE
PRIMARY DIAGNOSIS: GENERALIZED WEAKNESS    OUTPATIENT/OBSERVATION GOALS TO BE MET BEFORE DISCHARGE  1. Orthostatic performed: No    2. Tolerating PO medications: Yes    3. Return to near baseline physical activity:  Unknown    4. Cleared for discharge by consultants (if involved): No    Discharge Planner Nurse   Safe discharge environment identified: Yes  Barriers to discharge: Yes       Entered by: Kayla Jimenez RN 05/16/2022 3:22 AM     Please review provider order for any additional goals.   Nurse to notify provider when observation goals have been met and patient is ready for discharge.Goal Outcome Evaluation:

## 2022-05-17 ENCOUNTER — APPOINTMENT (OUTPATIENT)
Dept: PHYSICAL THERAPY | Facility: HOSPITAL | Age: 32
End: 2022-05-17
Payer: COMMERCIAL

## 2022-05-17 LAB
BACTERIA CSF CULT: NO GROWTH
GRAM STAIN RESULT: NORMAL
GRAM STAIN RESULT: NORMAL

## 2022-05-17 PROCEDURE — 99217 PR OBSERVATION CARE DISCHARGE: CPT | Performed by: INTERNAL MEDICINE

## 2022-05-17 PROCEDURE — 250N000013 HC RX MED GY IP 250 OP 250 PS 637: Performed by: PSYCHIATRY & NEUROLOGY

## 2022-05-17 PROCEDURE — 97116 GAIT TRAINING THERAPY: CPT | Mod: GP

## 2022-05-17 PROCEDURE — 97530 THERAPEUTIC ACTIVITIES: CPT | Mod: GP,CQ

## 2022-05-17 PROCEDURE — 250N000013 HC RX MED GY IP 250 OP 250 PS 637: Performed by: INTERNAL MEDICINE

## 2022-05-17 PROCEDURE — G0378 HOSPITAL OBSERVATION PER HR: HCPCS

## 2022-05-17 PROCEDURE — 99213 OFFICE O/P EST LOW 20 MIN: CPT | Performed by: PSYCHIATRY & NEUROLOGY

## 2022-05-17 PROCEDURE — 96372 THER/PROPH/DIAG INJ SC/IM: CPT | Performed by: INTERNAL MEDICINE

## 2022-05-17 PROCEDURE — 250N000013 HC RX MED GY IP 250 OP 250 PS 637

## 2022-05-17 PROCEDURE — 97110 THERAPEUTIC EXERCISES: CPT | Mod: GP

## 2022-05-17 PROCEDURE — 250N000011 HC RX IP 250 OP 636: Performed by: INTERNAL MEDICINE

## 2022-05-17 RX ORDER — HYDROCORTISONE 10 MG/1
10 TABLET ORAL EVERY MORNING
DISCHARGE
Start: 2022-05-17 | End: 2022-09-27

## 2022-05-17 RX ORDER — MECLIZINE HCL 12.5 MG 12.5 MG/1
12.5 TABLET ORAL 3 TIMES DAILY PRN
Qty: 30 TABLET | Refills: 1 | DISCHARGE
Start: 2022-05-17 | End: 2022-09-09

## 2022-05-17 RX ADMIN — Medication 50 MCG: at 08:08

## 2022-05-17 RX ADMIN — LEVOTHYROXINE SODIUM 75 MCG: 0.03 TABLET ORAL at 07:40

## 2022-05-17 RX ADMIN — HEPARIN SODIUM 5000 UNITS: 5000 INJECTION, SOLUTION INTRAVENOUS; SUBCUTANEOUS at 20:08

## 2022-05-17 RX ADMIN — LIDOCAINE 1 PATCH: 560 PATCH PERCUTANEOUS; TOPICAL; TRANSDERMAL at 20:08

## 2022-05-17 RX ADMIN — Medication 500 MCG: at 08:23

## 2022-05-17 RX ADMIN — FLUTICASONE PROPIONATE AND SALMETEROL XINAFOATE 2 PUFF: 115; 21 AEROSOL, METERED RESPIRATORY (INHALATION) at 08:10

## 2022-05-17 RX ADMIN — OLANZAPINE 5 MG: 2.5 TABLET, FILM COATED ORAL at 08:09

## 2022-05-17 RX ADMIN — MECLIZINE HCL 12.5 MG 12.5 MG: 12.5 TABLET ORAL at 20:07

## 2022-05-17 RX ADMIN — MECLIZINE HCL 12.5 MG 12.5 MG: 12.5 TABLET ORAL at 08:23

## 2022-05-17 RX ADMIN — ACETAMINOPHEN 650 MG: 325 TABLET ORAL at 17:29

## 2022-05-17 RX ADMIN — IBUPROFEN 200 MG: 200 TABLET, FILM COATED ORAL at 20:11

## 2022-05-17 RX ADMIN — HYDROCORTISONE 5 MG: 5 TABLET ORAL at 20:07

## 2022-05-17 RX ADMIN — ACETAMINOPHEN 650 MG: 325 TABLET ORAL at 08:09

## 2022-05-17 RX ADMIN — POLYETHYLENE GLYCOL 3350 17 G: 17 POWDER, FOR SOLUTION ORAL at 08:09

## 2022-05-17 RX ADMIN — HYDROCORTISONE 10 MG: 5 TABLET ORAL at 08:09

## 2022-05-17 RX ADMIN — FLUTICASONE PROPIONATE AND SALMETEROL XINAFOATE 2 PUFF: 115; 21 AEROSOL, METERED RESPIRATORY (INHALATION) at 20:07

## 2022-05-17 RX ADMIN — Medication 1 SPRAY: at 08:10

## 2022-05-17 RX ADMIN — LORATADINE 10 MG: 10 TABLET ORAL at 08:09

## 2022-05-17 RX ADMIN — THERA TABS 1 TABLET: TAB at 08:08

## 2022-05-17 RX ADMIN — FLUDROCORTISONE ACETATE 0.1 MG: 0.1 TABLET ORAL at 08:09

## 2022-05-17 RX ADMIN — HEPARIN SODIUM 5000 UNITS: 5000 INJECTION, SOLUTION INTRAVENOUS; SUBCUTANEOUS at 08:10

## 2022-05-17 NOTE — PLAN OF CARE
PRIMARY DIAGNOSIS: ACUTE PAIN  OUTPATIENT/OBSERVATION GOALS TO BE MET BEFORE DISCHARGE:  1. Pain Status: Improved-controlled with oral pain medications.    2. Return to near baseline physical activity: No    3. Cleared for discharge by consultants (if involved): Yes    Discharge Planner Nurse   Safe discharge environment identified: No  Barriers to discharge: Yes       Entered by: Chrystal Romano RN 05/17/2022 3:15 AM     Please review provider order for any additional goals.   Nurse to notify provider when observation goals have been met and patient is ready for discharge.Goal Outcome Evaluation:

## 2022-05-17 NOTE — PLAN OF CARE
PRIMARY DIAGNOSIS: GENERALIZED WEAKNESS    OUTPATIENT/OBSERVATION GOALS TO BE MET BEFORE DISCHARGE  1. Orthostatic performed: N/A    2. Tolerating PO medications: Yes    3. Return to near baseline physical activity: Yes    4. Cleared for discharge by consultants (if involved): N/A, follow up with neurology    Discharge Planner Nurse   Safe discharge environment identified: Yes, Home or TCU  Barriers to discharge: Yes, looking for agency that accept Pt insurance for home health care       Entered by: Wil López RN 05/16/2022 10:48 PM     Pt pain in back, joints/knees/legs improved with oral pain PRN medications, dizziness improved with PRN meclizine, Pt said IV fluids helped the most with dizziness

## 2022-05-17 NOTE — PROGRESS NOTES
Care Management Follow Up    Length of Stay (days): 0    Expected Discharge Date: 05/18/2022     Concerns to be Addressed: discharge planning       Patient plan of care discussed at interdisciplinary rounds: Yes    Anticipated Discharge Disposition: Transitional Care     Anticipated Discharge Services: Other (see comment) (therapy services)    Anticipated Discharge DME: None    Patient/family educated on Medicare website which has current facility and service quality ratings: yes    Education Provided on the Discharge Plan:  Yes    Patient/Family in Agreement with the Plan: yes    Referrals Placed by CM/SW: Post Acute Facilities    Private pay costs discussed: Not applicable    Additional Information: SW in receipt of voicemail message left last evening from Tabatha at [facility] stating that they could accept pt for TCU.  SW received call from Ning at [facility] this morning, who stated that can take pt today and there were no issues with pt's insurance.      SHIRA met with pt to discuss discharge planning.  SW utilized Angela  at language line on [employer].  SW updated pt regarding accepted at [facility] TCU for admission today.  Pt in agreement to go to TCU.  Pt requested that SW set up ride for him via [facility] for this afternoon.  SW set up ride for pt via [facility] at 2:30 PM today via [facility].    SHIRA spoke with Ning again and let her know that pt accepted bed and that ride arranged for 2:30 PM today.  Ning now stating that a staff person had to unexpectedly leave and so was not sure if could still take pt today.  She will check with manager and call SHIRA back.  SW in receipt of voicemail message from Ning a short time later stating that they could take pt today as long as he signs a 3543 form.  She stated that SHIRA should call someone named Angela at facility regarding this at [phone].      SHIRA spoke with Madison Mays in financial counseling regarding 3543 form.  She stated that this form is a request for  "payment of long term care services (strange since this would be TCU placement).  She stated that pt's Medical Assistance (and not [employer]) will pay for SNF so she is not sure about facility request.  She does not believe facility would need this form to take pt.      SHIRA tried to call Angela at facility several times to follow up regarding form and did leave her a voicemail message.  SHIRA spoke with Ning again at facility and SHIRA asked her to explain why form was needed and what was purpose of form so that SW could explain to pt.  She stated that Angela was at lunch right now but that she and Angela would call SW back shortly to go over form so that SW could explain to pt and get signature.  Ning e-mailed form to SHIRA.      SHIRA received call from Tabatha at [facility] a short time later.  Tabatha stated that due to \"active Covid\" in their facility and inability to find  for pt, they are not able to take pt now.      SHIRA updated charge nurse, pt's nurse, pt, [place], and Dr Garcia of cancelled discharge for pt for today.  SW cancelled pt's ride with [facility] as well.  SW to continue to follow up on TCU referrals.  Will need PAS.        ANAYELI Foreman, NONASW 05/17/22 6:20 PM              "

## 2022-05-17 NOTE — PLAN OF CARE
PRIMARY DIAGNOSIS: ACUTE PAIN  OUTPATIENT/OBSERVATION GOALS TO BE MET BEFORE DISCHARGE:  1. Pain Status: Improved-controlled with oral pain medications.    2. Return to near baseline physical activity: Yes, continued therapy recommended    3. Cleared for discharge by consultants (if involved): Yes    Discharge Planner Nurse   Safe discharge environment identified: No, TCU, needs new location due to no  and covid at previously planned facility  Barriers to discharge: Yes, needs TCU placement       Entered by: Wil López RN 05/17/2022 6:29 PM     Pt still reporting having some pain and dizziness,  noted that she was unable to understand some of the things Pt was saying when asking about pain.

## 2022-05-17 NOTE — PLAN OF CARE
Occupational Therapy Discharge Summary    Reason for therapy discharge:    Discharged to transitional care facility.    Progress towards therapy goal(s). See goals on Care Plan in [facility] electronic health record for goal details.  Goals not met.  Barriers to achieving goals:   discharge from facility.    Therapy recommendation(s):    Continued therapy is recommended.  Rationale/Recommendations:  Pt is going to a TCU to increase skills to highest level for a safe d/c plan..

## 2022-05-17 NOTE — PROGRESS NOTES
This is a neurologic follow-up note    31-year-old admitted to hospital 5/10/2022  Original neurologic consult by Dr. Nithya Ward 5/11/2022      Chief complaint  Weakness of all extremities  Right leg weakness worse  Seems to have right-sided arm and leg weakness greater than left (leg greater than arm)      HPI  31-year-old presented to the ER on May 10, 2022 with right leg weakness.  He reported that he was having difficulty with gait.    Patient complained of weakness of all 4 extremities but worse on the right leg  Has some chronic bowel incontinence but that was not necessarily new    Has had some leg pain worse over the last 3 days prior to admission    Patient may have had some difficulty with gait going on for 2 years    MRI scan with demyelination of the corticospinal tract see MRI report below.    Past medical history  Ariel's disease  Pituitary microadenoma  Hypothyroidism  Asthma  Hypercalcemia  Hypovitamin ptosis D  PTSD/schizophrenia    Habits      Family history  Unknown      Work-up  THORACIC SPINE MRI: 5/10/2022  1.  Normal thoracic spine MRI.  LUMBAR SPINE MRI: 5/10/2022  1.  Normal lumbar spine MRI.  MRI brain 5/11/2022  1.  There are areas of T2 signal hyperintensity involving the cortical spinal tracts on the right and left left greater than right. Beginning at the level of the posterior limb of the internal capsule and ending inferiorly into the midbrain and emeka.   2.  These changes were present on 06/24/2019 but are more apparent on current study.  3.  In addition there is mild T2 signal hyperintensity within the medial aspect of the right and left cerebellar hemispheres and right and left thalami.  4.  Differential diagnostic, considerations would include neurodegenerative disorders and toxic metabolic disease.  CSF glucose 66, protein 37, no growth, cytology negative for malignancy, CSF Lyme's negative  Treponemal antibody negative/HIV antigen negative,   AST 86/ALT  "103  CRP 2.0, rheumatoid factor less than 15, ESR 6,  ANGELIC negative  B12 359, TSH 2.69, T4 free 1.07, prolactin level 9.0 (5/5/2022)  COVID negative on admission      Labs  Sodium 137 potassium 4.0  BUN 24 creatinine 0.98  AST 86/  Glucose 110  White blood count 5.8, hemoglobin 17.7, platelets 135,000        Exam  Blood pressure 105/68, pulse 89, temperature 90.3  Blood pressure range 94//62  Pulse range 75-1 06  T-max 98.8      Review of system  Had some post LP headache and lightheadedness and dizziness  Some concern that some of the lightheadedness and dizziness had been \"present for years\"  And then with the  it sounds like the headache may have been going on for a couple of days.        General exam per primary MD  Alert and attentive  HEENT normal  Lungs clear  Heart rate regular  Abdomen soft  Symmetrical pulses  No edema the feet    Neurologic exam  Alert and attentive  No neglect  Due to language barrier and psychiatric disease difficulty test memory and language    Cranials 2 through 12 normal      Upper extremities  Right arm slight drift compared to left arm    Lower extremities  Left leg 4+ out of 5  Right leg 3- out of 5 proximally  Distally seems to move better    Reflexes reported right over left  Biceps 2+/2+  Triceps 2+/2+  Knee 2+/2+  Ankle 2+/2+  Toes upgoing bilaterally      Gait  Getting therapy      Difficulty getting coverage for home health care due to insurance problems  May need to go to TCU depending on insurance issues        Assessment/plan    1.   White matter changes in the CSF with CSF so far not showing any inflammation or elevated protein.        Some of the demyelination of the corticospinal tracts is somewhat worse on the left which could affect the right leg        Question if he has an Adrenal Leukodystrophy      2.  MRI scan brain abnormal bilateral corticospinal tracts T2 signal changes       There are areas of T2 signal hyperintensity involving the " "cortical spinal tracts on the right and left left greater than right.        Beginning at the level of the posterior limb of the internal capsule and ending inferiorly into the midbrain and emeka.         These changes were present on 2019 but are more apparent on current study.         In addition there is mild T2 signal hyperintensity within the medial aspect of the right and left cerebellar hemispheres and right and left thalami.       Question neurodegenerative disorder versus toxic/metabolic disease       Per history above have some Ariel's disease          difficult to get a good history with language barrier        patient may have had difficulty running when he was a teenager/younger        question if he has a chronic problem going on for years          Unclear if he could have had some type of  event versus hereditary leukodystrophy    3.  Adrenal corticotropin (106 a year ago, > 1250  2 years ago, should be <47)      Pending labs  Check VLCFA  Will need a outpatient EMG    Patient may have more of the \"adult onset\" X-linked adrenal cortical leukodystrophy with a milder onset of difficulty with gait demyelination of central nervous system and some bowel and bladder difficulty.  There are some other \"leukodystrophies\" such as metachromatic leukodystrophy once again would have to have the adult onset with more of a \"mild presentation and the slightly atypical\" compared to the typical childhood presentation which tends to be much more severe and have much more CNS changes on MRI.    Disposition per primary MD  Discussed with nursing staff face-to-face    PT to maximize gait stability    Follow-up with neurology in 8-12 weeks    Disposition per primary MD    Difficulty getting coverage for home health care due to insurance problems  May need to go to TCU depending on insurance issues    Discussed with nursing staff face-to-face    Neurology will sign off at this time set up for follow-up in " 8-12 weeks with EMG 1 arm 1 leg      25 minutes total care time today greater than 50% face-to-face patient care team discussing and evaluating the above.

## 2022-05-17 NOTE — CARE PLAN
PRIMARY DIAGNOSIS: GENERALIZED WEAKNESS    OUTPATIENT/OBSERVATION GOALS TO BE MET BEFORE DISCHARGE  1. Orthostatic performed: N/A    2. Tolerating PO medications: Yes    3. Return to near baseline physical activity: Yes    4. Cleared for discharge by consultants (if involved): Yes    Discharge Planner Nurse   Safe discharge environment identified: Yes  Barriers to discharge: No       Entered by: Carley Dukes RN 05/17/2022 12:18 PM   discharge to TCU today at 14:30.  Please review provider order for any additional goals.   Nurse to notify provider when observation goals have been met and patient is ready for discharge.

## 2022-05-17 NOTE — DISCHARGE SUMMARY
[facility] MEDICINE  DISCHARGE SUMMARY     Primary Care Physician: Jose Rangel              Admission Date: 5/10/2022   Discharge Provider: Nikolai Garcia Discharge Date: 5/17/2022   Diet:   Active Diet and Nourishment Order   Procedures     Combination Diet Regular Diet Adult     Diet         Activity: DCACTIVITY: Activity as tolerated  Fall precaution, ambulance with assists    Code Status: Full Code         Condition at Discharge: stable      REASON FOR PRESENTATION(See Admission Note for Details)   Dizziness and weakness    PRINCIPAL & ACTIVE DISCHARGE DIAGNOSES     Active Problems:    Weakness of right leg      SIGNIFICANT FINDINGS (Imaging, labs):   EXAM: MR BRAIN W/O CONTRAST  LOCATION: [facility]  DATE/TIME: 5/11/2022 9:06 PM     INDICATION: Right leg weakness.  COMPARISON: MRI brain dated 06/24/2019.                                                               IMPRESSION:  1.  There are areas of T2 signal hyperintensity involving the cortical spinal tracts on the right and left left greater than right. Beginning at the level of the posterior limb of the internal capsule and ending inferiorly into the midbrain and emeka.   These changes were present on 06/24/2019 but are more apparent on current study. In addition there is mild T2 signal hyperintensity within the medial aspect of the right and left cerebellar hemispheres and right and left thalami. Differential diagnostic   considerations would include neurodegenerative disorders and toxic metabolic disease.        PENDING LABS     CSF final results  VLCFA    PROCEDURES ( this hospitalization only)      Lumbar puncture    RECOMMENDATION FOR F/U VISIT     Follow up with  physician in TCU  Follow up with neurology (in 8 weeks) and endocrinology as needed  EMG outpatient    DISPOSITION     Skilled Nursing Facility    SUMMARY OF HOSPITAL COURSE:      Brooke Pteerson is a 31 year old male with PMH of PTSD, schizophrenia, asthma,  "addisons disease, pituitary microadenoma, hypothyroidism who was brought to our ED for evaluation of worsening fatigue, dizziness and leg weakness      Leg weakness and generalized weakness  -- This has been going on for the last 2 years per patient/family but worse recently.  -- Denies any h/o recent trauma. MRI thoracic and lumbar spine w/wo contrast done in ED have been negative.  -ANGELIC pending; Lyme's titer wnl; Check VLCFA  -- Consulted neurology   -as per neuro:  Will need a outpatient EMG; Patient may have more of the \"adult onset\" X-linked adrenal cortical leukodystrophy with a milder onset of difficulty with gait demyelination of central nervous system and some bowel and bladder difficulty.  There are some other \"leukodystrophies\" such as metachromatic leukodystrophy once again would have to have the adult onset with more of a \"mild presentation and the slightly atypical\" compared to the typical childhood presentation which tends to be much more severe and have much more CNS changes on MRI.  -if it is adrenal leukodystrophy, basically the treatment is supportive  -With Angela  over the phone, I tried to explain to pt and family in the room. I explained that it is unlikely to improve his symptoms during this hospital course. His symptoms are most likely due to adrenal cortical leukodystrophy, and need to follow up with pcp and possible consultation with [facility] or  specialist.  -for now, the goal is to discharge pt to TCU (cannot find C agent due to insurance)      Hypothyroidsim  -- Cont with home meds      Ariel's disease  -- Cont cortef and florinef      H/O pituitary microadenoma  -- Detail unclear at the moment. MRI was negative back in June 2019. Prolactin was normal in July 2021  -- Prolactin 13.2[within normal range]     H/O Schizophrenia:  -- Cont with home meds     Anemia:  Mild reduction in hemoglobin admission, monitor CBC closely       Patient's other chronic medical " conditions are stable and home medications were continued.    Discharge Medications with Med changes:       Current Discharge Medication List      START taking these medications    Details   meclizine (ANTIVERT) 12.5 MG tablet Take 1 tablet (12.5 mg) by mouth 3 times daily as needed for dizziness  Qty: 30 tablet, Refills: 1    Associated Diagnoses: Dizziness      vitamin B-12 (CYANOCOBALAMIN) 500 MCG tablet Take 1 tablet (500 mcg) by mouth daily  Qty: 30 tablet, Refills: 1    Associated Diagnoses: Neurodevelopmental disorder         CONTINUE these medications which have CHANGED    Details   !! hydrocortisone (CORTEF) 10 MG tablet Take 1 tablet (10 mg) by mouth every morning Take 1 tablet 10 mg by mouth in AM and 1/2 tablet in PM   May take Take 20 mg twice/day for 3 days if illness.    Associated Diagnoses: Adrenal insufficiency (H); Yancey's disease (H)       !! - Potential duplicate medications found. Please discuss with provider.      CONTINUE these medications which have NOT CHANGED    Details   acetaminophen (TYLENOL) 325 MG tablet Take 2 tablets (650 mg) by mouth every 6 hours as needed for mild pain  Qty: 90 tablet, Refills: 3    Associated Diagnoses: Nonintractable headache, unspecified chronicity pattern, unspecified headache type      albuterol (PROAIR HFA/PROVENTIL HFA/VENTOLIN HFA) 108 (90 Base) MCG/ACT inhaler Inhale 2 puffs into the lungs every 6 hours as needed for shortness of breath / dyspnea or wheezing  Qty: 18 g, Refills: 0    Comments: Pharmacy may dispense brand covered by insurance (Proair, or proventil or ventolin or generic albuterol inhaler)  Associated Diagnoses: Moderate persistent asthma without complication      fludrocortisone (FLORINEF) 0.1 MG tablet Take 1 tablet (0.1 mg) by mouth in the morning.  Qty: 90 tablet, Refills: 4    Associated Diagnoses: Yancey's disease (H)      fluticasone (FLONASE) 50 MCG/ACT nasal spray Spray 1 spray into both nostrils daily as needed for rhinitis or  allergies       fluticasone-salmeterol (ADVAIR HFA) 115-21 MCG/ACT inhaler Inhale 2 puffs into the lungs 2 times daily  Qty: 12 g, Refills: 3    Associated Diagnoses: Moderate persistent asthma without complication      !! hydrocortisone (CORTEF) 10 MG tablet Take 5 mg by mouth every evening Take 1 tablet 10 mg by mouth in AM and 1/2 tablet in PM   May take Take 20 mg twice/day for 3 days if illness.      levothyroxine (SYNTHROID/LEVOTHROID) 75 MCG tablet Take 1 tablet (75 mcg) by mouth in the morning.  Qty: 90 tablet, Refills: 0    Associated Diagnoses: Hypothyroidism, unspecified type      loratadine (CLARITIN) 10 MG tablet Take 1 tablet (10 mg) by mouth daily  Qty: 90 tablet, Refills: 2      Multiple Vitamin (MULTI-VITAMINS) TABS Take 1 tablet by mouth daily  Qty: 90 tablet, Refills: 2    Associated Diagnoses: Adrenal insufficiency (H)      OLANZapine (ZYPREXA) 5 MG tablet Take 1 tablet (5 mg) by mouth every morning  Qty: 90 tablet, Refills: 0    Associated Diagnoses: Auditory hallucination      ondansetron (ZOFRAN) 4 MG tablet Take 4 mg by mouth every 8 hours as needed for nausea      polyethylene glycol (MIRALAX) 17 GM/Dose powder Take 17 g by mouth daily  Qty: 510 g, Refills: 1    Associated Diagnoses: Constipation, unspecified constipation type      VITAMIN D3 50 MCG (2000 UT) tablet Take 1 tablet by mouth daily       !! - Potential duplicate medications found. Please discuss with provider.            Rationale for medication changes:      dizziness    Patient's long term medication were not changed during this hospitalization.    Consults   Neurologist      Discharge Procedure Orders   Reason for your hospital stay   Order Comments: Weakness, dizziness     General info for SNF   Order Comments: Length of Stay Estimate: Short Term Care: Estimated # of Days <30  Condition at Discharge: Stable  Level of care:skilled   Rehabilitation Potential: Fair  Admission H&P remains valid and up-to-date: Yes  Recent  Chemotherapy: N/A  Use Nursing Home Standing Orders: Yes     Mantoux instructions   Order Comments: Give two-step Mantoux (PPD) Per Facility Policy Yes     Activity - Up with assistive device     Order Specific Question Answer Comments   Is discharge order? Yes      Follow Up and recommended labs and tests   Order Comments: Follow up with Nursing home physician.  Cbc, bmp, magnesium    Follow up with Neurology and endocrinology as needed     Full Code     Order Specific Question Answer Comments   Code status determined by: Discussion with patient/ legal decision maker      Fall precautions     Diet   Order Comments: Follow this diet upon discharge: Orders Placed This Encounter      Combination Diet Regular Diet Adult     Order Specific Question Answer Comments   Is discharge order? Yes      Examination     Vital Signs in last 24 hours:   vss    General appearance: not in acute distress  HEENT: PERRL, EOMI  Lungs: Clear breath sounds in bilateral lung fields  Cardiovascular: Regular rate and rhythm, normal S1-S2  Abdomen: Soft, non tender, no distension, normal bowel sound  Musculoskeletal: No joint swelling  Skin: No rash and no edema  Neurology: weakness in limbs, especially right leg    Please see EMR for more detailed significant labs, imaging, consultant notes etc.    Total time spent on discharge: 50 minutes    Nikolai Bowser) MD Radha  [facility]: Ph:[phone]    CC:Jose Rangel    lymes igG, igM neg  Treponema ab neg  Cultures neg  Bettie neg  Sed rate 6  crp 2  HIV neg  CSF neg   Latest Reference Range & Units 05/13/22 10:34 05/14/22 00:18 05/14/22 04:46   Sodium 136 - 145 mmol/L   137   Potassium 3.5 - 5.0 mmol/L   4.0   Chloride 98 - 107 mmol/L   106   Carbon Dioxide 22 - 31 mmol/L   19 (L)   Urea Nitrogen 8 - 22 mg/dL   24 (H)   Creatinine 0.70 - 1.30 mg/dL   0.98   GFR Estimate >60 mL/min/1.73m2   >90 [1]   Calcium 8.5 - 10.5 mg/dL   9.7   Anion Gap 5 - 18 mmol/L   12   Albumin 3.5 -  5.0 g/dL   3.9   Protein Total 6.0 - 8.0 g/dL   8.3 (H)   Bilirubin Total 0.0 - 1.0 mg/dL   0.5   Alkaline Phosphatase 45 - 120 U/L   89   ALT 0 - 45 U/L   103 (H)   AST 0 - 40 U/L   86 (H)   CRP 0.0 - 0.8 mg/dL 2.0 (H)     Lactic Acid 0.7 - 2.0 mmol/L  1.0    Rheumatoid Factor 0 - 30 IU/mL <15     Glucose 70 - 125 mg/dL   110   WBC 4.0 - 11.0 10e3/uL   5.8   Hemoglobin 13.3 - 17.7 g/dL   17.7   Hematocrit 40.0 - 53.0 %   50.8   Platelet Count 150 - 450 10e3/uL   135 (L)   RBC Count 4.40 - 5.90 10e6/uL   5.91 (H)   MCV 78 - 100 fL   86   MCH 26.5 - 33.0 pg   29.9   MCHC 31.5 - 36.5 g/dL   34.8   RDW 10.0 - 15.0 %   12.1   % Neutrophils %   59   % Lymphocytes %   27   % Monocytes %   10   (L): Data is abnormally low  (H): Data is abnormally high  [1] Effective December 21, 2021 eGFRcr in adults is calculated using the 2021 CKD-EPI creatinine equation which includes age and gender (Courtney jean-baptiste al., NEJM, DOI: 10.1056/KIKBwb0632242)

## 2022-05-17 NOTE — PROGRESS NOTES
Patient was scheduled to be discharged to TCU. But this TCU would not take the pt due to covid in house and no available .     Discontinue discharge order. SW will work on finding another TCU.

## 2022-05-17 NOTE — PLAN OF CARE
Problem: Plan of Care - These are the overarching goals to be used throughout the patient stay.    Goal: Optimal Comfort and Wellbeing  Intervention: Monitor Pain and Promote Comfort  Recent Flowsheet Documentation  Taken 5/16/2022 [id number] by Chrystal Romano RN  Pain Management Interventions: medication (see MAR)   Goal Outcome Evaluation:      Toe had ibuprofen at midnight for back pain & was able to sleep well thru the night.

## 2022-05-18 ENCOUNTER — APPOINTMENT (OUTPATIENT)
Dept: PHYSICAL THERAPY | Facility: HOSPITAL | Age: 32
End: 2022-05-18
Payer: COMMERCIAL

## 2022-05-18 ENCOUNTER — APPOINTMENT (OUTPATIENT)
Dept: OCCUPATIONAL THERAPY | Facility: HOSPITAL | Age: 32
End: 2022-05-18
Payer: COMMERCIAL

## 2022-05-18 LAB — SARS-COV-2 RNA RESP QL NAA+PROBE: NEGATIVE

## 2022-05-18 PROCEDURE — 87635 SARS-COV-2 COVID-19 AMP PRB: CPT | Performed by: INTERNAL MEDICINE

## 2022-05-18 PROCEDURE — 250N000013 HC RX MED GY IP 250 OP 250 PS 637: Performed by: INTERNAL MEDICINE

## 2022-05-18 PROCEDURE — 97535 SELF CARE MNGMENT TRAINING: CPT | Mod: GO

## 2022-05-18 PROCEDURE — 96372 THER/PROPH/DIAG INJ SC/IM: CPT | Performed by: INTERNAL MEDICINE

## 2022-05-18 PROCEDURE — 250N000013 HC RX MED GY IP 250 OP 250 PS 637: Performed by: PSYCHIATRY & NEUROLOGY

## 2022-05-18 PROCEDURE — 250N000013 HC RX MED GY IP 250 OP 250 PS 637

## 2022-05-18 PROCEDURE — G0378 HOSPITAL OBSERVATION PER HR: HCPCS

## 2022-05-18 PROCEDURE — 97116 GAIT TRAINING THERAPY: CPT | Mod: GP

## 2022-05-18 PROCEDURE — 97110 THERAPEUTIC EXERCISES: CPT | Mod: GP

## 2022-05-18 PROCEDURE — 250N000011 HC RX IP 250 OP 636: Performed by: INTERNAL MEDICINE

## 2022-05-18 RX ADMIN — LEVOTHYROXINE SODIUM 75 MCG: 0.03 TABLET ORAL at 06:47

## 2022-05-18 RX ADMIN — FLUDROCORTISONE ACETATE 0.1 MG: 0.1 TABLET ORAL at 08:59

## 2022-05-18 RX ADMIN — LORATADINE 10 MG: 10 TABLET ORAL at 08:56

## 2022-05-18 RX ADMIN — FLUTICASONE PROPIONATE AND SALMETEROL XINAFOATE 2 PUFF: 115; 21 AEROSOL, METERED RESPIRATORY (INHALATION) at 09:04

## 2022-05-18 RX ADMIN — LIDOCAINE 1 PATCH: 560 PATCH PERCUTANEOUS; TOPICAL; TRANSDERMAL at 20:30

## 2022-05-18 RX ADMIN — HEPARIN SODIUM 5000 UNITS: 5000 INJECTION, SOLUTION INTRAVENOUS; SUBCUTANEOUS at 20:30

## 2022-05-18 RX ADMIN — FLUTICASONE PROPIONATE AND SALMETEROL XINAFOATE 2 PUFF: 115; 21 AEROSOL, METERED RESPIRATORY (INHALATION) at 20:30

## 2022-05-18 RX ADMIN — IBUPROFEN 200 MG: 200 TABLET, FILM COATED ORAL at 11:17

## 2022-05-18 RX ADMIN — ACETAMINOPHEN 650 MG: 325 TABLET ORAL at 09:11

## 2022-05-18 RX ADMIN — THERA TABS 1 TABLET: TAB at 08:56

## 2022-05-18 RX ADMIN — MECLIZINE HCL 12.5 MG 12.5 MG: 12.5 TABLET ORAL at 03:16

## 2022-05-18 RX ADMIN — Medication 50 MCG: at 08:56

## 2022-05-18 RX ADMIN — OLANZAPINE 5 MG: 2.5 TABLET, FILM COATED ORAL at 08:56

## 2022-05-18 RX ADMIN — HYDROCORTISONE 10 MG: 5 TABLET ORAL at 08:57

## 2022-05-18 RX ADMIN — POLYETHYLENE GLYCOL 3350 17 G: 17 POWDER, FOR SOLUTION ORAL at 08:55

## 2022-05-18 RX ADMIN — Medication 500 MCG: at 08:56

## 2022-05-18 RX ADMIN — HEPARIN SODIUM 5000 UNITS: 5000 INJECTION, SOLUTION INTRAVENOUS; SUBCUTANEOUS at 09:04

## 2022-05-18 RX ADMIN — IBUPROFEN 200 MG: 200 TABLET, FILM COATED ORAL at 03:16

## 2022-05-18 RX ADMIN — HYDROCORTISONE 5 MG: 5 TABLET ORAL at 20:30

## 2022-05-18 RX ADMIN — ACETAMINOPHEN 650 MG: 325 TABLET ORAL at 16:23

## 2022-05-18 NOTE — PLAN OF CARE
PRIMARY DIAGNOSIS: right leg weakness  OUTPATIENT/OBSERVATION GOALS TO BE MET BEFORE DISCHARGE:  1. Stable vital signs yes  2. Tolerating diet:yes  3. Pain controlled with oral pain medications:  yes  4. Positive bowel sounds:  yes  5. Voiding without difficulty:  yes  6. Able to ambulate:  yes  7. Provider specific discharge goals met:  yes    Discharge Planner Nurse   Safe discharge environment identified: yes, but unable to find TCU to take him.     Entered by: Claudia Romano RN 05/18/2022 2:06 PM     Please review provider order for any additional goals.   Nurse to notify provider when observation goals have been met and patient is ready for discharge.Goal Outcome Evaluation:     Ambulated with assist of one, walker and gait belt to the bathroom and in the hallway.  Angela  used, but patient does answer some questions asked in English.  Ate well at meals after help with set-up, will allow staff to do as much as they are willing even though he is capable.   Tylenol and ibuprofen given prn for chronic lower back pain.    Claudia Romano RN

## 2022-05-18 NOTE — PLAN OF CARE
PRIMARY DIAGNOSIS: ACUTE PAIN  OUTPATIENT/OBSERVATION GOALS TO BE MET BEFORE DISCHARGE:  1. Pain Status: Improved-controlled with oral pain medications. Tylenol and Ibuprofen    2. Return to near baseline physical activity: Yes, recommended to continue PT/OT    3. Cleared for discharge by consultants (if involved): Yes    Discharge Planner Nurse   Safe discharge environment identified: Yes, TCU  Barriers to discharge: Yes, TCU placement pending       Entered by: Wil López RN 05/17/2022 9:59 PM     Pt receiving PRN tylenol and Ibuprofen for pain in legs and back, receiving PRN meclizine for dizziness. Voiding with urinal

## 2022-05-18 NOTE — PLAN OF CARE
PRIMARY DIAGNOSIS: ACUTE PAIN  OUTPATIENT/OBSERVATION GOALS TO BE MET BEFORE DISCHARGE:  1. Pain Status: Improved-controlled with oral pain medications.    2. Return to near baseline physical activity: Yes. Pt needs additional therapy.     3. Cleared for discharge by consultants (if involved): Yes    Discharge Planner Nurse   Safe discharge environment identified: Yes  Barriers to discharge: Yes. Looking for TCU.        Entered by: Lily Mclain RN 05/18/2022 5:53 AM     Please review provider order for any additional goals.   Nurse to notify provider when observation goals have been met and patient is ready for discharge.Goal Outcome Evaluation:      pt given ibuprofen for pain and meclizine for dizziness. Slept well between cares. Will monitor.

## 2022-05-18 NOTE — PROGRESS NOTES
"Hospitalist Progress Note  ADMIT DATE: 5/10/2022     FACILITY: [facility]    PCP: Jose Rangel, [phone]    Assessment/Plan    Brooke Peterson is a 31 year old male with PMH of PTSD, schizophrenia, asthma, addisons disease, pituitary microadenoma, hypothyroidism who was brought to our ED for evaluation of worsening fatigue, dizziness and leg weakness      Leg weakness and generalized weakness  -- This has been going on for the last 2 years per patient/family but worse recently.  -- Denies any h/o recent trauma. MRI thoracic and lumbar spine w/wo contrast done in ED have been negative.  -ANGELIC pending; Lyme's titer wnl; Check VLCFA  -- Consulted neurology   -as per neuro:  Will need a outpatient EMG; Patient may have more of the \"adult onset\" X-linked adrenal cortical leukodystrophy with a milder onset of difficulty with gait demyelination of central nervous system and some bowel and bladder difficulty.  There are some other \"leukodystrophies\" such as metachromatic leukodystrophy once again would have to have the adult onset with more of a \"mild presentation and the slightly atypical\" compared to the typical childhood presentation which tends to be much more severe and have much more CNS changes on MRI.  -if it is adrenal leukodystrophy, basically the treatment is supportive  -With Angela bermaner over the phone, I tried to explain to pt and family in the room. I explained that it is unlikely to improve his symptoms during this hospital course. 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 MRI was negative back in June 2019. Prolactin " was normal in July 2021  -- Prolactin 13.2[within normal range]     H/O Schizophrenia:  -- Cont with home meds     Anemia:  Mild reduction in hemoglobin admission, monitor CBC closely              Barriers to Discharge:  Placement, insurance    Anticipated discharge date/Disposition: when TCU available      [facility] Medicine Service  Nikolai Garcia

## 2022-05-18 NOTE — PROGRESS NOTES
Care Management Follow Up    Length of Stay (days): 0    Expected Discharge Date: 05/19/2022     Concerns to be Addressed: discharge planning       Patient plan of care discussed at interdisciplinary rounds: yes    Anticipated Discharge Disposition: Transitional Care     Anticipated Discharge Services: Other (see comment) (therapy services)    Anticipated Discharge DME: None    Patient/family educated on Medicare website which has current facility and service quality ratings: yes    Education Provided on the Discharge Plan:  Yes    Patient/Family in Agreement with the Plan: yes    Referrals Placed by CM/SW: Post Acute Facilities    Private pay costs discussed: N/A    Additional Information: SHIRA following up on TCU referrals.  SW left messages at [facility], [facility], [facility], and [facility] inquiring as to status of referrals and requesting return calls.  SHIRA spoke with Shola Saldaña regarding referral to [facility].  Piotr stated that they can take pt to [facility] today if pt gets Covid booster shot.  SHIRA will meet with pt to discuss and then call Piotr back.      SHIRA met with pt to discuss discharge planning.  SHIRA utilized Angela  at language line on Voc[employer].  SHIRA updated pt regarding being accepted at [facility] pending his receipt of Covid booster shot today.  Pt stated that he did not want to take booster shot.  SW asked about this, considering that pt had taken first two initial doses. Pt reported that he got a fever and a cold from taking the shot.  Pt prefers to go home with home care services.  Discussed continued therapy recommendation for TCU for pt.  Pt stated that his father Joey Montero helps take care of him.  When SW asked what pt's father assists him with, pt stated that he receives help with brushing his teeth, taking showers, and with meals.  Pt added that his father and he take walks to the park. Pt okay with SW speaking  with pt's father further about discharge plan options.  Pt also reported that if he goes home he will need a wheelchair.  Pt cannot recall if he has a [facility] .  Discussed that doctor may be able to write DME script for wheelchair for pt at discharge.  SHIRA will follow up on what discussed and then will come back and meet with pt likely this afternoon.  Pt in agreement with plan.      Noted that SW in receipt of [facility] that could provide home PT services for pt but not OT.  Please call her back if pt still needs services at [phone].      SHIRA briefly spoke with PT Cong about if he thought pt might be able to go home with home care services.  Cong stated that he did not think that pt could go home at this time.  He does not think it would be safe for pt to return home.  Pt needs TCU.      SHIRA spoke with Piotr and updated her regarding that pt does not want to get Covid booster shot.  SHIRA asked [facility] if any other [facility] facilities had a private room available for pt.  [facility] will check with [facility] (specifically about  availability) and then call SHIRA back.      SHIRA spoke with Jessi at [facility].  She stated that pt previously declined back on 5/15 due to no beds, pt total cares, and pt has poor payor coverage.      SHIRA spoke with Taya at [facility].  She is taking over gT's position, but is not trained in admissions.  Tg and Shaquille are both in [place] at a Medicare conference.  They should be back tomorrow and can speak with SHIRA about pending referrals.      SHIRA in receipt of voicemail message from [facility] that [facility] has accepted all of their admissions for the day.  She anticipates that a couple of people will come off of Covid precautions tomorrow at [facility] and so would be able to take pt in a private room tomorrow afternoon (and therefore pt would not need Covid booster).      SHIRA left message back for Piotr  asking if she could confirm as soon as possible whether or not she would have private room for pt at [facility] tomorrow.     SW in receipt of return message from [facility] that yes she would have private room for pt tomorrow at [facility] and she requested that SW set up ride for pt tomorrow at 3 PM.      SW updated charge and pt's nurse regarding pt going to [facility] tomorrow at 3 PM.  Pt's nurse will update pt and pt's family (who are currently here visiting pt).    SHIRA set up ride for pt at 3:00 PM tomorrow via [facility].  Will need PAS.      ANAYELI Foreman, ANGUS 05/18/22 7:07 PM

## 2022-05-18 NOTE — PLAN OF CARE
PRIMARY DIAGNOSIS: GENERALIZED WEAKNESS    OUTPATIENT/OBSERVATION GOALS TO BE MET BEFORE DISCHARGE  1. Orthostatic performed: No    2. Tolerating PO medications: Yes    3. Return to near baseline physical activity: Yes    4. Cleared for discharge by consultants (if involved): Yes    Discharge Planner Nurse   Safe discharge environment identified: No  Barriers to discharge: Yes       Entered by: Alberto Knight RN 05/18/2022 6:22 PM     Pt alert and oriented. Pt c/o lower back pain and was given PRN acetaminophen at 1623, which was effective. Pt utilized call light this shift. Pt educated on call light use for transfers and cares. Pt able to ambulate w/ walker to the bathroom w/ stand by assist. Strength to right upper and lower extremities are moderate-weak compared to left upper and lower extremities. Call light is within reach.    Please review provider order for any additional goals.   Nurse to notify provider when observation goals have been met and patient is ready for discharge.

## 2022-05-18 NOTE — PLAN OF CARE
PRIMARY DIAGNOSIS: ACUTE PAIN  OUTPATIENT/OBSERVATION GOALS TO BE MET BEFORE DISCHARGE:  1. Pain Status: Improved-controlled with oral pain medications.    2. Return to near baseline physical activity: Yes    3. Cleared for discharge by consultants (if involved): Yes    Discharge Planner Nurse   Safe discharge environment identified: Yes  Barriers to discharge: Yes. Looking for TCU placement        Entered by: Lily Mclain RN 05/18/2022 3:50 AM     Please review provider order for any additional goals.   Nurse to notify provider when observation goals have been met and patient is ready for discharge.Goal Outcome Evaluation:      pt alert / cooperative with continued low back pain and right leg weakness. Pt waiting for TCU placement to get more PT OT.

## 2022-05-19 VITALS
DIASTOLIC BLOOD PRESSURE: 68 MMHG | WEIGHT: 161 LBS | RESPIRATION RATE: 18 BRPM | OXYGEN SATURATION: 98 % | HEART RATE: 77 BPM | BODY MASS INDEX: 28.52 KG/M2 | TEMPERATURE: 98.1 F | SYSTOLIC BLOOD PRESSURE: 111 MMHG

## 2022-05-19 LAB
BACTERIA BLD CULT: NO GROWTH
BACTERIA BLD CULT: NO GROWTH

## 2022-05-19 PROCEDURE — 96372 THER/PROPH/DIAG INJ SC/IM: CPT | Performed by: INTERNAL MEDICINE

## 2022-05-19 PROCEDURE — 99217 PR OBSERVATION CARE DISCHARGE: CPT | Performed by: INTERNAL MEDICINE

## 2022-05-19 PROCEDURE — 250N000013 HC RX MED GY IP 250 OP 250 PS 637: Performed by: INTERNAL MEDICINE

## 2022-05-19 PROCEDURE — G0378 HOSPITAL OBSERVATION PER HR: HCPCS

## 2022-05-19 PROCEDURE — 250N000011 HC RX IP 250 OP 636: Performed by: INTERNAL MEDICINE

## 2022-05-19 PROCEDURE — 250N000013 HC RX MED GY IP 250 OP 250 PS 637: Performed by: PSYCHIATRY & NEUROLOGY

## 2022-05-19 RX ADMIN — LEVOTHYROXINE SODIUM 75 MCG: 0.03 TABLET ORAL at 06:31

## 2022-05-19 RX ADMIN — Medication 500 MCG: at 08:38

## 2022-05-19 RX ADMIN — IBUPROFEN 200 MG: 200 TABLET, FILM COATED ORAL at 00:25

## 2022-05-19 RX ADMIN — HEPARIN SODIUM 5000 UNITS: 5000 INJECTION, SOLUTION INTRAVENOUS; SUBCUTANEOUS at 08:37

## 2022-05-19 RX ADMIN — MECLIZINE HCL 12.5 MG 12.5 MG: 12.5 TABLET ORAL at 00:25

## 2022-05-19 RX ADMIN — FLUDROCORTISONE ACETATE 0.1 MG: 0.1 TABLET ORAL at 08:37

## 2022-05-19 RX ADMIN — THERA TABS 1 TABLET: TAB at 08:37

## 2022-05-19 RX ADMIN — Medication 50 MCG: at 08:50

## 2022-05-19 RX ADMIN — HYDROCORTISONE 10 MG: 5 TABLET ORAL at 08:37

## 2022-05-19 RX ADMIN — FLUTICASONE PROPIONATE AND SALMETEROL XINAFOATE 2 PUFF: 115; 21 AEROSOL, METERED RESPIRATORY (INHALATION) at 08:50

## 2022-05-19 RX ADMIN — ACETAMINOPHEN 650 MG: 325 TABLET ORAL at 09:35

## 2022-05-19 RX ADMIN — POLYETHYLENE GLYCOL 3350 17 G: 17 POWDER, FOR SOLUTION ORAL at 08:38

## 2022-05-19 RX ADMIN — LORATADINE 10 MG: 10 TABLET ORAL at 08:37

## 2022-05-19 RX ADMIN — OLANZAPINE 5 MG: 2.5 TABLET, FILM COATED ORAL at 08:37

## 2022-05-19 NOTE — PLAN OF CARE
Occupational Therapy Discharge Summary    Reason for therapy discharge:    Discharged to transitional care facility.    Progress towards therapy goal(s). See goals on Care Plan in [facility] electronic health record for goal details.  Goals partially met.  Barriers to achieving goals:   discharge from facility.    Therapy recommendation(s):    Continued therapy is recommended.  Rationale/Recommendations:  decreased ADLs .    Goal Outcome Evaluation:        Mary Reyes, OTR/L  5/19/2022

## 2022-05-19 NOTE — PLAN OF CARE
PRIMARY DIAGNOSIS: GENERALIZED WEAKNESS    OUTPATIENT/OBSERVATION GOALS TO BE MET BEFORE DISCHARGE  1. Orthostatic performed: No    2. Tolerating PO medications: Yes    3. Return to near baseline physical activity: Yes    4. Cleared for discharge by consultants (if involved): Yes    Discharge Planner Nurse   Safe discharge environment identified: Yes  Barriers to discharge: No       Entered by: Lily Mclain RN 05/19/2022 5:55 AM     Please review provider order for any additional goals.   Nurse to notify provider when observation goals have been met and patient is ready for discharge.Goal Outcome Evaluation:        Pt slept well and uses call light appropriately. Right side continues to be weaker than left. Will monitor.

## 2022-05-19 NOTE — PLAN OF CARE
Physical Therapy Discharge Summary    Reason for therapy discharge:    Discharged to transitional care facility.    Progress towards therapy goal(s). See goals on Care Plan in [facility] electronic health record for goal details.  Goals partially met.  Barriers to achieving goals:   discharge from facility.    Therapy recommendation(s):    Recommend continued therapies to improve overall strength, balance and mobility.     Paulina Galeana, PT, DPT  5/19/2022

## 2022-05-19 NOTE — PLAN OF CARE
PRIMARY DIAGNOSIS: GENERALIZED WEAKNESS    OUTPATIENT/OBSERVATION GOALS TO BE MET BEFORE DISCHARGE  1. Orthostatic performed: No    2. Tolerating PO medications: Yes    3. Return to near baseline physical activity: Yes    4. Cleared for discharge by consultants (if involved): Yes    Discharge Planner Nurse   Safe discharge environment identified: Yes  Barriers to discharge: No       Entered by: Lily Mclain RN 05/19/2022 1:19 AM     Please review provider order for any additional goals.   Nurse to notify provider when observation goals have been met and patient is ready for discharge.Goal Outcome Evaluation:      pt had discharge plan in process to leave for TCU today, ride at 1500. Ibuprofen given for chronic back pain and meclizine given for dizziness. Will monitor.

## 2022-05-19 NOTE — DISCHARGE SUMMARY
"[facility]  Hospitalist Discharge Summary      Date of Admission:  5/10/2022  Date of Discharge:  5/19/2022  Discharging Provider: Tammy Branch MD  Discharge Service: Hospitalist Service    Discharge Diagnoses   Active Problems:    Weakness of right leg      Follow-ups Needed After Discharge   Follow-up Appointments     Follow Up and recommended labs and tests      Follow up with Nursing home physician.    Neurology in 8 weeks for EMG             Unresulted Labs Ordered in the Past 30 Days of this Admission     Date and Time Order Name Status Description    5/14/2022  7:38 PM Very long chain fatty acids In process     5/14/2022  1:02 AM Blood Culture Peripheral Blood Preliminary     5/14/2022  1:02 AM Blood Culture Peripheral Blood Preliminary       These results will be followed up by NH provider or Neurology     Discharge Disposition   Discharged to short-term care facility  Condition at discharge: Stable      Hospital Course   31-year-old presented to the ER on May 10, 2022 with right leg weakness.  He reported that he was having difficulty with gait. PMHX of PTSD, schizophrenia, asthma, addisons disease, pituitary microadenoma, hypothyroidism     Patient complained of weakness of all 4 extremities but worse on the right leg. Has had some leg pain worse over the last 3 days prior to admission  -MRI scan with demyelination of the corticospinal tract see MRI report below.  - This has been going on for the last 2 years per patient/family but worse recently.  -- Denies any h/o recent trauma. MRI thoracic and lumbar spine w/wo contrast done in ED have been negative.  -ANGELIC pending; Lyme's titer wnl; Check VLCFA  - Neuro consulted and so far work-up negative, recs f/u in neurology clinic in 8 weeks   -as per neuro:  Will need a outpatient EMG; Patient may have more of the \"adult onset\" X-linked adrenal cortical leukodystrophy with a milder onset of difficulty with gait demyelination of " "central nervous system and some bowel and bladder difficulty. There are some other \"leukodystrophies\" such as metachromatic leukodystrophy once again would have to have the adult onset with more of a \"mild presentation and the slightly atypical\" compared to the typical childhood presentation which tends to be much more severe and have much more CNS changes on MRI.  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 MRI was negative back in June 2019. Prolactin was normal in July 2021  -- Prolactin 13.2[within normal range]     H/O Schizophrenia:  -- Cont with home meds    Consultations This Hospital Stay   CARE MANAGEMENT / SOCIAL WORK IP CONSULT  PHYSICAL THERAPY ADULT IP CONSULT  OCCUPATIONAL THERAPY ADULT IP CONSULT  NEUROLOGY IP CONSULT  SOCIAL WORK IP CONSULT  PHYSICAL THERAPY ADULT IP CONSULT  PHYSICAL THERAPY ADULT IP CONSULT  OCCUPATIONAL THERAPY ADULT IP CONSULT    Code Status   Prior    Time Spent on this Encounter          Tammy Branch MD  [place] [id number]  Phone: [phone]  Fax: [phone]  ______________________________________________________________________    Physical Exam   Vital Signs: Temp: 98.1  F (36.7  C) Temp src: Oral BP: 111/68 Pulse: 77   Resp: 18 SpO2: 98 % O2 Device: None (Room air)    Weight: 161 lbs 0 oz  Physical Exam  Constitutional:       Appearance: Normal appearance.   Cardiovascular:      Rate and Rhythm: Normal rate and regular rhythm.   Pulmonary:      Effort: Pulmonary effort is normal.      Breath sounds: Normal breath sounds.   Abdominal:      General: Bowel sounds are normal.      Palpations: Abdomen is soft.   Musculoskeletal:         General: Normal range of motion.      Right lower leg: No " edema.      Left lower leg: No edema.   Skin:     General: Skin is warm and dry.      Capillary Refill: Capillary refill takes less than 2 seconds.   Neurological:      Mental Status: He is alert.      Cranial Nerves: No cranial nerve deficit.      Sensory: No sensory deficit.      Deep Tendon Reflexes: Reflexes normal.      Comments: BUE weakness R>L, weak hand grasp, slight right arm drift  BLE weakness R is > then left as well significant proximal weakness, toes are both upgoing              Primary Care Physician   Jose Rangel    Discharge Orders      General info for SNF    Length of Stay Estimate: Short Term Care: Estimated # of Days <30  Condition at Discharge: Stable  Level of care:skilled   Rehabilitation Potential: Good  Admission H&P remains valid and up-to-date: Yes  Recent Chemotherapy: N/A  Use Nursing Home Standing Orders: Yes     Mantoux instructions    Give two-step Mantoux (PPD) Per Facility Policy Yes     Reason for your hospital stay    Active Problems:    Weakness of right leg     Activity - Up with nursing assistance     Weight bearing status     Follow Up and recommended labs and tests    Follow up with Nursing home physician.    Neurology in 8 weeks for EMG     Physical Therapy Adult Consult    Evaluate and treat as clinically indicated.    Reason:  weakness     Occupational Therapy Adult Consult    Evaluate and treat as clinically indicated.    Reason:  weakness     Fall precautions     Diet    Follow this diet upon discharge: Orders Placed This Encounter      Combination Diet Regular Diet Adult      Diet       Significant Results and Procedures   Most Recent 3 CBC's:Recent Labs   Lab Test 05/14/22  0446 05/13/22  0611 05/11/22  0957 05/10/22  1518   WBC 5.8  --  4.6 5.2   HGB 17.7  --  17.8* 12.1*   MCV 86  --  88 90   * 153 180 133*     Most Recent 3 BMP's:Recent Labs   Lab Test 05/14/22  0446 05/11/22  0957 05/10/22  1704    138 135*   POTASSIUM 4.0 3.7 3.9   CHLORIDE 106  101 103   CO2 19* 25 23   BUN 24* 13 12   CR 0.98 0.86 0.88   ANIONGAP 12 12 9   LASHAWN 9.7 10.1 9.6    90 96   ,   Results for orders placed or performed during the hospital encounter of 05/10/22   MR Lumbar Spine w/o & w Contrast    Narrative    EXAM: MR THORACIC SPINE W/O and W CONTRAST, MR LUMBAR SPINE W/O and W CONTRAST  LOCATION: [facility]  DATE/TIME: 5/10/2022 3:27 PM    EXAM: MR THORACIC SPINE W/O and W CONTRAST, MR LUMBAR SPINE W/O and W CONTRAST  LOCATION: [facility]  DATE/TIME: 5/10/2022 3:27 PM    INDICATION: Motor neuron disease  COMPARISON: None.  CONTRAST: 7ml Gadavist  TECHNIQUE:   1) Routine Thoracic Spine MRI without and with IV contrast.  2) Routine Lumbar Spine MRI without and with IV contrast.    FINDINGS:    THORACIC SPINE:  Normal vertebral body heights, alignment and marrow signal. Normal disc heights. No herniation. Normal facets. No spinal canal or neural foraminal stenosis. No abnormal cord signal.     No extraspinal abnormality.    LUMBAR SPINE:   Nomenclature is based on 5 lumbar type vertebral bodies. Normal vertebral body heights, alignment and marrow signal. Normal distal spinal cord and cauda equina with conus medullaris at L1. No extraspinal abnormality. Unremarkable visualized bony pelvis.    T12-L1: Normal disc height and signal. No herniation. Normal facets. No spinal canal or neural foraminal stenosis.     L1-L2: Normal disc height and signal. No herniation. Normal facets. No spinal canal or neural foraminal stenosis.    L2-L3: Normal disc height and signal. No herniation. Normal facets. No spinal canal or neural foraminal stenosis.     L3-L4: Normal disc height and signal. No herniation. Normal facets. No spinal canal or neural foraminal stenosis.    L4-L5: Normal disc height and signal. No herniation. Normal facets. No spinal canal or neural foraminal stenosis.    L5-S1: Normal disc height and signal. No herniation. Normal  facets. No spinal canal or neural foraminal stenosis.      Impression    IMPRESSION:  THORACIC SPINE MRI:  1.  Normal thoracic spine MRI.    LUMBAR SPINE MRI:  1.  Normal lumbar spine MRI.   MR Thoracic Spine w/o & w Contrast    Narrative    EXAM: MR THORACIC SPINE W/O and W CONTRAST, MR LUMBAR SPINE W/O and W CONTRAST  LOCATION: [facility]  DATE/TIME: 5/10/2022 3:27 PM    EXAM: MR THORACIC SPINE W/O and W CONTRAST, MR LUMBAR SPINE W/O and W CONTRAST  LOCATION: [facility]  DATE/TIME: 5/10/2022 3:27 PM    INDICATION: Motor neuron disease  COMPARISON: None.  CONTRAST: 7ml Gadavist  TECHNIQUE:   1) Routine Thoracic Spine MRI without and with IV contrast.  2) Routine Lumbar Spine MRI without and with IV contrast.    FINDINGS:    THORACIC SPINE:  Normal vertebral body heights, alignment and marrow signal. Normal disc heights. No herniation. Normal facets. No spinal canal or neural foraminal stenosis. No abnormal cord signal.     No extraspinal abnormality.    LUMBAR SPINE:   Nomenclature is based on 5 lumbar type vertebral bodies. Normal vertebral body heights, alignment and marrow signal. Normal distal spinal cord and cauda equina with conus medullaris at L1. No extraspinal abnormality. Unremarkable visualized bony pelvis.    T12-L1: Normal disc height and signal. No herniation. Normal facets. No spinal canal or neural foraminal stenosis.     L1-L2: Normal disc height and signal. No herniation. Normal facets. No spinal canal or neural foraminal stenosis.    L2-L3: Normal disc height and signal. No herniation. Normal facets. No spinal canal or neural foraminal stenosis.     L3-L4: Normal disc height and signal. No herniation. Normal facets. No spinal canal or neural foraminal stenosis.    L4-L5: Normal disc height and signal. No herniation. Normal facets. No spinal canal or neural foraminal stenosis.    L5-S1: Normal disc height and signal. No herniation. Normal facets.  No spinal canal or neural foraminal stenosis.      Impression    IMPRESSION:  THORACIC SPINE MRI:  1.  Normal thoracic spine MRI.    LUMBAR SPINE MRI:  1.  Normal lumbar spine MRI.   MR Brain w/o Contrast    Narrative    EXAM: MR BRAIN W/O CONTRAST  LOCATION: [facility]  DATE/TIME: 5/11/2022 9:06 PM    INDICATION: Right leg weakness.  COMPARISON: MRI brain dated 06/24/2019.  TECHNIQUE: Routine multiplanar multisequence head MRI without intravenous contrast.    FINDINGS:  INTRACRANIAL CONTENTS: No acute or subacute infarct. No mass, acute hemorrhage, or extra-axial fluid collections. There are areas of T2 signal hyperintensity involving the cortical spinal tracts on the right and left left greater than right. Beginning at   the level of the posterior limb of the internal capsule and ending inferiorly into the midbrain and emeka. These changes were present on 06/24/2019 but are more apparent on current study. In addition there is mild T2 signal hyperintensity within the   medial aspect of the right and left cerebellar hemispheres and right and left thalami. Differential diagnostic considerations would include neurodegenerative disorders and toxic metabolic disease. Contrast-enhanced MRI could be performed to further   evaluate. Normal ventricles and sulci. Normal position of the cerebellar tonsils.     SELLA: No abnormality accounting for technique.    OSSEOUS STRUCTURES/SOFT TISSUES: Normal marrow signal. The major intracranial vascular flow voids are maintained.     ORBITS: No abnormality accounting for technique.     SINUSES/MASTOIDS: No paranasal sinus mucosal disease. No middle ear or mastoid effusion.       Impression    IMPRESSION:  1.  There are areas of T2 signal hyperintensity involving the cortical spinal tracts on the right and left left greater than right. Beginning at the level of the posterior limb of the internal capsule and ending inferiorly into the midbrain and emeka.   These  changes were present on 06/24/2019 but are more apparent on current study. In addition there is mild T2 signal hyperintensity within the medial aspect of the right and left cerebellar hemispheres and right and left thalami. Differential diagnostic   considerations would include neurodegenerative disorders and toxic metabolic disease.   XR Lumbar Puncture Spinal Tap Diag    Narrative    FLUOROSCOPICALLY-GUIDED LUMBAR PUNCTURE  5/12/2022 2:56 PM    INDICATION: 31-year-old patient with lower extremity weakness. Lumbar puncture has been requested to obtain cerebrospinal fluid (CSF) for analysis.    CONSENT: The procedure and its indications, major risks, benefits, and alternatives were discussed. Angela  used. Risks include, but are not limited to, pain, headache, hemorrhage, infection, nerve damage, spinal cord damage, and allergic   reaction. Understanding was acknowledged and informed consent was obtained.    FLUOROSCOPIC TIME: 0.3 minutes     NUMBER OF IMAGES: 1    ESTIMATED BLOOD LOSS: Trace    PROCEDURE: The patient was placed in prone position upon the fluoroscopic table. The skin of the back was prepped and draped in sterile fashion.  Using fluoroscopic guidance, the L2-L3 level was localized. The skin was infiltrated with 1% lidocaine.   Using direct fluoroscopic visualization, a 22 gauge spinal needle was advanced into the thecal sac at the L2-L3 level. 12 mL of clear CSF was passively obtained in total. This was divided between four labeled tubes. The needle was then removed. A   dressing was applied. The procedure appeared well-tolerated and was without apparent complication.      Impression    CONCLUSION:   Fluoroscopically-guided lumbar puncture yields 12 mL of clear cerebrospinal fluid.   XR Chest Port 1 View    Narrative    EXAM: XR CHEST PORT 1 VIEW  LOCATION: [facility]  DATE/TIME: 5/14/2022 1:35 AM    INDICATION: fever of unknown origin  COMPARISON: 06/02/2021       Impression    IMPRESSION: The chest is stable with no acute cardiopulmonary abnormalities.       Discharge Medications   Current Discharge Medication List      START taking these medications    Details   meclizine (ANTIVERT) 12.5 MG tablet Take 1 tablet (12.5 mg) by mouth 3 times daily as needed for dizziness  Qty: 30 tablet, Refills: 1    Associated Diagnoses: Dizziness      vitamin B-12 (CYANOCOBALAMIN) 500 MCG tablet Take 1 tablet (500 mcg) by mouth daily  Qty: 30 tablet, Refills: 1    Associated Diagnoses: Neurodevelopmental disorder         CONTINUE these medications which have CHANGED    Details   !! hydrocortisone (CORTEF) 10 MG tablet Take 1 tablet (10 mg) by mouth every morning Take 1 tablet 10 mg by mouth in AM and 1/2 tablet in PM   May take Take 20 mg twice/day for 3 days if illness.    Associated Diagnoses: Adrenal insufficiency (H); Marinette's disease (H)       !! - Potential duplicate medications found. Please discuss with provider.      CONTINUE these medications which have NOT CHANGED    Details   acetaminophen (TYLENOL) 325 MG tablet Take 2 tablets (650 mg) by mouth every 6 hours as needed for mild pain  Qty: 90 tablet, Refills: 3    Associated Diagnoses: Nonintractable headache, unspecified chronicity pattern, unspecified headache type      albuterol (PROAIR HFA/PROVENTIL HFA/VENTOLIN HFA) 108 (90 Base) MCG/ACT inhaler Inhale 2 puffs into the lungs every 6 hours as needed for shortness of breath / dyspnea or wheezing  Qty: 18 g, Refills: 0    Comments: Pharmacy may dispense brand covered by insurance (Proair, or proventil or ventolin or generic albuterol inhaler)  Associated Diagnoses: Moderate persistent asthma without complication      fludrocortisone (FLORINEF) 0.1 MG tablet Take 1 tablet (0.1 mg) by mouth in the morning.  Qty: 90 tablet, Refills: 4    Associated Diagnoses: Ariel's disease (H)      fluticasone (FLONASE) 50 MCG/ACT nasal spray Spray 1 spray into both nostrils daily as needed  for rhinitis or allergies       fluticasone-salmeterol (ADVAIR HFA) 115-21 MCG/ACT inhaler Inhale 2 puffs into the lungs 2 times daily  Qty: 12 g, Refills: 3    Associated Diagnoses: Moderate persistent asthma without complication      !! hydrocortisone (CORTEF) 10 MG tablet Take 5 mg by mouth every evening Take 1 tablet 10 mg by mouth in AM and 1/2 tablet in PM   May take Take 20 mg twice/day for 3 days if illness.      levothyroxine (SYNTHROID/LEVOTHROID) 75 MCG tablet Take 1 tablet (75 mcg) by mouth in the morning.  Qty: 90 tablet, Refills: 0    Associated Diagnoses: Hypothyroidism, unspecified type      loratadine (CLARITIN) 10 MG tablet Take 1 tablet (10 mg) by mouth daily  Qty: 90 tablet, Refills: 2      Multiple Vitamin (MULTI-VITAMINS) TABS Take 1 tablet by mouth daily  Qty: 90 tablet, Refills: 2    Associated Diagnoses: Adrenal insufficiency (H)      OLANZapine (ZYPREXA) 5 MG tablet Take 1 tablet (5 mg) by mouth every morning  Qty: 90 tablet, Refills: 0    Associated Diagnoses: Auditory hallucination      ondansetron (ZOFRAN) 4 MG tablet Take 4 mg by mouth every 8 hours as needed for nausea      polyethylene glycol (MIRALAX) 17 GM/Dose powder Take 17 g by mouth daily  Qty: 510 g, Refills: 1    Associated Diagnoses: Constipation, unspecified constipation type      VITAMIN D3 50 MCG (2000 UT) tablet Take 1 tablet by mouth daily       !! - Potential duplicate medications found. Please discuss with provider.        Allergies   No Known Allergies

## 2022-05-19 NOTE — PLAN OF CARE
PRIMARY DIAGNOSIS: GENERALIZED WEAKNESS    OUTPATIENT/OBSERVATION GOALS TO BE MET BEFORE DISCHARGE  1. Orthostatic performed: No    2. Tolerating PO medications: Yes    3. Return to near baseline physical activity: Yes    4. Cleared for discharge by consultants (if involved): Yes    Discharge Planner Nurse   Safe discharge environment identified: Yes  Barriers to discharge: No       Entered by: Alberto Knight RN 05/18/2022 9:26 PM     Pt alert and oriented. Lidocaine patch placed to mid lower back for chronic back pain. No PIV present. Pt had a good appetite and ate 75% of meals. Pt stable on his feet and is a stand by assist w/ a walker. Denies numbness or tingling. Pt to discharge to [facility]. Call light is within reach.    Please review provider order for any additional goals.   Nurse to notify provider when observation goals have been met and patient is ready for discharge.

## 2022-05-19 NOTE — PLAN OF CARE
PRIMARY DIAGNOSIS: GENERALIZED WEAKNESS    OUTPATIENT/OBSERVATION GOALS TO BE MET BEFORE DISCHARGE  1. Orthostatic performed: N/A    2. Tolerating PO medications: Yes    3. Return to near baseline physical activity: Yes    4. Cleared for discharge by consultants (if involved): Yes    Discharge Planner Nurse   Safe discharge environment identified: Yes  Barriers to discharge: No       Entered by: ERIN CLEMENTS RN 05/19/2022 12:41 PM    Pt discharging at 1500, pt aware.

## 2022-05-20 ENCOUNTER — PATIENT OUTREACH (OUTPATIENT)
Dept: OTHER | Facility: CLINIC | Age: 32
End: 2022-05-20
Payer: COMMERCIAL

## 2022-05-20 NOTE — PROGRESS NOTES
Community Paramedic had scheduled home visit with patient 05/20/2022 at 10:00am.  [place] from chart review that patient was admitted to TCU 05/19/2022.  Home visit was canceled.

## 2022-05-22 DIAGNOSIS — D75.1 ERYTHROCYTOSIS: Primary | ICD-10-CM

## 2022-05-23 ENCOUNTER — PATIENT OUTREACH (OUTPATIENT)
Dept: CARE COORDINATION | Facility: CLINIC | Age: 32
End: 2022-05-23
Payer: COMMERCIAL

## 2022-05-23 NOTE — PROGRESS NOTES
Clinic Care Coordination Contact  Care Coordination Transition Communication         Clinical Data: Patient was hospitalized form 5-10-22 from 5-19-22 AT [facility]  Active Problems:    Weakness of right leg         Transition to Facility:              Facility Name: [facility]                Plan: RN/SW Care Coordinator will await notification from facility staff informing RN/SW Care Coordinator of patient's discharge plans/needs. RN/SW Care Coordinator will review chart and outreach to facility staff every 4 weeks and as needed.

## 2022-05-24 ENCOUNTER — TELEPHONE (OUTPATIENT)
Dept: BEHAVIORAL HEALTH | Facility: CLINIC | Age: 32
End: 2022-05-24
Payer: COMMERCIAL

## 2022-05-24 NOTE — TELEPHONE ENCOUNTER
Provider requested this RN call pt in attempt to reschedule appt that was missed yesterday.     Wtr called pt w/ Angela  - the person who answered the phone indicated pt is currently hospitalized.    Wtr updated Care Everywhere and refreshed records/encounters. No hospitalization noted in chart for information available.    Wtr routed to provider as update.    Marlon Islas RN on 5/24/2022 at 1:49 PM

## 2022-05-26 LAB — SCANNED LAB RESULT: ABNORMAL

## 2022-05-27 ENCOUNTER — DOCUMENTATION ONLY (OUTPATIENT)
Dept: NEUROLOGY | Facility: CLINIC | Age: 32
End: 2022-05-27
Payer: COMMERCIAL

## 2022-06-02 ENCOUNTER — PATIENT OUTREACH (OUTPATIENT)
Dept: CARE COORDINATION | Facility: CLINIC | Age: 32
End: 2022-06-02
Payer: COMMERCIAL

## 2022-06-02 NOTE — PROGRESS NOTES
6/2/2022  Clinic Care Coordination Contact  Community Health Worker Follow Up    Review chart  Patient discharged to short-term care facility. TCU    Routed to CC RN and Norma Galeana, [facility] Paramedic CHW as FYI    CHW Plan: Follow up on goals  CHW follow up in 2 week 6-16-22      Scarlett Varma  Community Health Worker  [facility]  Clinic Care Coordination  [email or web address]   Office: [phone]  Fax: [phone]

## 2022-06-07 ENCOUNTER — TELEPHONE (OUTPATIENT)
Dept: CARE COORDINATION | Facility: CLINIC | Age: 32
End: 2022-06-07

## 2022-06-07 ENCOUNTER — PATIENT OUTREACH (OUTPATIENT)
Dept: OTHER | Facility: CLINIC | Age: 32
End: 2022-06-07
Payer: COMMERCIAL

## 2022-06-07 ENCOUNTER — PATIENT OUTREACH (OUTPATIENT)
Dept: NURSING | Facility: CLINIC | Age: 32
End: 2022-06-07
Payer: COMMERCIAL

## 2022-06-07 NOTE — TELEPHONE ENCOUNTER
6/7/2022  Received call from patient and family member  Stated he needs medication refills   Patient recently discharged from TCU  Patient was not able to provide the name of medications   Patient is scheduled with Dr Booker on 6-14-22 at 8:40am    Please advise  FYI patient was seeing Community Paramedic for medication set up prior to going to hospital and TCU

## 2022-06-07 NOTE — PROGRESS NOTES
Pt was hospitalized from 5/10/22 through 5/19/2022 and discharged to TCU.  If pt discharges to home will need long term medication management.  Graduated patient from the Community Paramedic program.

## 2022-06-07 NOTE — TELEPHONE ENCOUNTER
Called and left detailed message letting pt know to bring medication appt so medication can be refilled.

## 2022-06-07 NOTE — PROGRESS NOTES
6/7/2022  Clinic Care Coordination Contact  Community Health Worker Follow Up    Intervention and Education during outreach:   [facility] : Way  Received call from patient and patient's mother.  He reported he is almost out of medications and needs help to refill his medications.  Patient and mother were not able to provide the name of the medications for refills.    -CHW sent telephone message to PCP Care Team [place] for support with getting med refills.  -CHW sent message to [facility] Community Par[facility]ic if patient can be re enroll in  for medication set up until patient connect with home care agency for long term medication management.    Lost connection with patient and .    Reconnected with patient with [facility] #[id number]  Patient stated he needs help to get to appt on 6-23-22 but he does not know where it is. Not sure where it is and what is the appt is for.    Suggested patient to schedule follow up with PCP to review medications since he discharged from TCU.  Patient agreed to schedule.  Conference call with patient and connected with .CHW supported to schedule TCU follow up with PCP.  PCP booked out until July.  Patient okay to see different doctor.  Appt with Dr Booker 6-14-22 at 8:40am  Patient requested help to set up ride for 6-14-22 and the 6-23-22   He does not know the place on 6-23-22.  Explained to patient to focus on getting to appt on 6-14-22 and if he has information on 6-23-22 appt then call CHW for support to set up medical rides.  Informed patient that he has appt on 6-22-22 but not on 6-23-22.    Review patient's upcoming appts  6-14-22 with PCP at [facility]  6-22-22 at 12:30pm    6-28-22 at 8:30am   7-27-22 at 2:45pm  10-19-22 at 8:15am   Due to time constraint and needing to follow up with PCP to address medication refills  CHW will support setting up medical rides for month of June until we have identify someone that  can help to set up medical rides for future appts.  CHW consult with CC SW/CC RN regarding outside resources like [facility] worker, TCM to support patient.   Identify family member to support to bring to appt to [facility].    CHW set up ride 6-9-22 set up ride for 6-14-22 and 6-22-22  CHW Follow up: Monthly  CHW Plan: Follow up on goal (s)  CHW Next Follow Up: 7-20-22    Scarlett Varma  Community Health Worker  [facility]  Clinic Care Coordination  [email or web address]   Office: [phone]  Fax: [phone]    Clinic Care Coordination Contact    Community Health Worker Follow Up    Care Gaps:     Health Maintenance Due   Topic Date Due     ASTHMA ACTION PLAN  Never done     ADVANCE CARE PLANNING  Never done     DEPRESSION ACTION PLAN  Never done     COVID-19 Vaccine (3 - Booster for Pfizer series) 02/03/2022       Care Gap Goal set: Yes and Scheduled 6-14-22 discuss with pcp       Goals:    Goals Addressed as of 6/8/2022 at 3:13 PM                    6/7/22 5/5/22       Other (pt-stated)   10%  10%    Added 1/19/22 by Scarlett Varma      Goal Statement: I want support to connect with new home care agency contract with [facility] insurance for skilled nursing services for medication set up as soon as possible  Date goal set: 1/19/2022 Updated 3-29-22  Measure of Success: skilled nursing services  Barriers: language  Strengths: support from CCC, are insurance   Date to Achieve By: 6-2022  Patient expressed understanding of goal: yes  Action steps to achieve this goal  1. I will continue to work with Community Paramedic for medication setup until I have connect with home care agency for nursing services.  2. I will wait to hear from [facility] and/or [facility] team for Home Care     Updated: 6-7-22 AL                     Other (pt-stated)         Added 5/5/22 by Scarlett Varma      Goal Statement- I want to attend my appt on 7-27-22 at 2;45pm  Date Goal set 5-5-22  Barriers: language, lack  transportation   Strengths: medical transportation with [facility]  Date to Achieve By: 8-2022  Patient expressed understanding of goal: Yes   Action steps to achieve this goal:   1. My sister or I will call to CHW 1 week or call on 7-18-22 for support to set up ride to speciality appointments through [employer] [phone]    set up ride for 7-27-22 at 2:45pm   [employer]  [phone]   between   will call  2 passengers    Updated: 6-7-22 AL           Other (pt-stated)         Added 5/5/22 by Scarlett Varma      Goal Statement- I want to attend my appt on 6-28-22 at 8:30am.  Date Goal set 5-5-22  Barriers: language, lack transportation   Strengths: medical transportation with [facility]  Date to Achieve By: 7-2022  Patient expressed understanding of goal: Yes   Action steps to achieve this goal:   1. My sister or I will call to CHW 1 week or call on 6-20-22 for support to set up ride to speciality appointments through [employer] [phone]    set up ride for 6-28-22 at 8:30am  [employer]  [phone]   between   will call  2 passengers    Updated: 6-7-22 AL

## 2022-06-08 ENCOUNTER — PATIENT OUTREACH (OUTPATIENT)
Dept: CARE COORDINATION | Facility: CLINIC | Age: 32
End: 2022-06-08
Payer: COMMERCIAL

## 2022-06-08 ENCOUNTER — PATIENT OUTREACH (OUTPATIENT)
Dept: NURSING | Facility: CLINIC | Age: 32
End: 2022-06-08
Payer: COMMERCIAL

## 2022-06-08 ENCOUNTER — PATIENT OUTREACH (OUTPATIENT)
Dept: OTHER | Facility: CLINIC | Age: 32
End: 2022-06-08
Payer: COMMERCIAL

## 2022-06-08 DIAGNOSIS — F20.9 SCHIZOPHRENIA, UNSPECIFIED TYPE (H): ICD-10-CM

## 2022-06-08 DIAGNOSIS — J45.40 MODERATE PERSISTENT ASTHMA WITHOUT COMPLICATION: ICD-10-CM

## 2022-06-08 DIAGNOSIS — Z79.899 MEDICATION MANAGEMENT: Primary | ICD-10-CM

## 2022-06-08 NOTE — PROGRESS NOTES
Community Paramedic Program    CHW Community/Care Team Outreach    Huddled with CC CHW Scarlett and CP Ming regarding this pt. Pt was discharged home recently from a TCU and reached out to his clinic and the CP for assistance with his medication refills and set up.    Routing to pt's PCP and PCP care team pool to request new Community Paramedic referral. CP would like to visit pt today at home but needs a new referral in order to visit.

## 2022-06-08 NOTE — TELEPHONE ENCOUNTER
See message below.  Message routed to Dr Sy for Community Paramedic referral as PCP, Dr Rangel out of clinic    Rosanne Gray RN  [facility]        Good morning!   Could you please place and sign a new referral for the Community Paramedic program as soon as possible? Our Community Paramedic Ming will be going out to visit [place] today but needs a new referral in order to see him. He will be assisting with pt's medication set up.     Thank you in advance for your help! Please let me know if you have any questions.     Norma Galeana   Community Health Worker   Community Paramedic program   [phone]   [email or web address]

## 2022-06-08 NOTE — PROGRESS NOTES
6/8/2022  Clinic Care Coordination Contact  Care Team Conversations    Call: [employer] Line  Member Line:[phone]  Provider Line [phone]  RE: Appt 6-14-22 at 8:45a  to [facility]    Rep: Fal[place]i               Provided member ID, verified patient demographics, destination address appointment date and time.   6-14-22   [employer] [phone]   between: 7:45am-8:15  2 passengers  Will call     6-22-22 to [facility]  [employer] [phone]   bwt: 11:30am -12pm  will call  2 passengers    6-28-22 to [facility]   [employer] [phone]   bwt: 7:30am -8am  will call  2 passengers    Routed to Norma Galeana and Ming Harris CP to inform patient tomorrow 6-9-22 that transportation has been set up for 6-14-22, 6-22-22, 6-28-22 see appt desk appt notes.    Scarlett Varma  Community Health Worker  [facility] Care Coordination  [email or web address]   Office: [phone]  Fax: [phone]

## 2022-06-09 ENCOUNTER — ALLIED HEALTH/NURSE VISIT (OUTPATIENT)
Dept: OTHER | Facility: CLINIC | Age: 32
End: 2022-06-09
Payer: COMMERCIAL

## 2022-06-09 VITALS
HEART RATE: 60 BPM | TEMPERATURE: 98.6 F | DIASTOLIC BLOOD PRESSURE: 84 MMHG | OXYGEN SATURATION: 97 % | SYSTOLIC BLOOD PRESSURE: 110 MMHG | RESPIRATION RATE: 14 BRPM

## 2022-06-09 DIAGNOSIS — J45.40 MODERATE PERSISTENT ASTHMA WITHOUT COMPLICATION: ICD-10-CM

## 2022-06-09 DIAGNOSIS — Z79.899 MEDICATION MANAGEMENT: ICD-10-CM

## 2022-06-09 DIAGNOSIS — F20.9 SCHIZOPHRENIA, UNSPECIFIED TYPE (H): ICD-10-CM

## 2022-06-09 PROCEDURE — 99207 PR COMMUNITY PARAMEDIC-PATIENT MEETS CRITERIA: CPT

## 2022-06-09 ASSESSMENT — ACTIVITIES OF DAILY LIVING (ADL): DEPENDENT_IADLS:: CLEANING;COOKING;LAUNDRY;SHOPPING;MEAL PREPARATION;MEDICATION MANAGEMENT;TRANSPORTATION

## 2022-06-09 NOTE — PROGRESS NOTES
Community Paramedics Follow-up Visit  June 9, 2022  TIME: 12:00    Brooke Peterson is a 31 year old male being seen at home for a follow-up visit.    Present at appointment: Patient, Parents    Primary Diagnosis: Psychosocial    Chief Complaint   Patient presents with     Recheck Medication     Community Paramedic home visit       [place] Utilization:   Clinic Utilization  Difficulty keeping appointments:: No  Compliance Concerns: No  No-Show Concerns: Yes  No PCP office visit in Past Year: No  Utilization    Hospital Admissions  1             ED Visits  1             No Show Count (past year)  23                Current as of: 6/8/2022  5:38 PM              /84 (BP Location: Left arm, Patient Position: Supine)   Pulse 60   Temp 98.6  F (37  C)   Resp 14   SpO2 97%     Clinical Concerns:  Current Medical Concerns:      Current Behavioral Concerns: medication compliance    Education Provided to patient: went through and setup medications   Medication set up? Yes  Pill Box issued: No  Scale issued: No  Flu Shot given: No  COVID Vaccine Given: No  Lab draw or specimen collection: No  Food box issued: No  Collaborative visit with PCP: No  Wound Care: No    Health Maintenance Reviewed:      Clinical Pathway: None    No  Face to Face in Home / Community    Recent blood sugars:     Review of Symptoms/PE    Skin: negative  Eyes: negative  Ears/Nose/Throat: negative  Respiratory: No shortness of breath, dyspnea on exertion, cough, or hemoptysis  Cardiovascular: negative  Gastrointestinal: negative  Genitourinary: negative  Musculoskeletal: negative  Neurologic: negative  Psychiatric: negative    Pain Management::       Medication Review  Medication adherence problem (GOAL):: No  Knowledgeable about how to use meds:: No  Medication side effects suspected:: No    Diet/Exercise/Sleep  Diet:: Regular  Inadequate nutrition (GOAL):: No  Tube Feeding: No  Inadequate activity/exercise (GOAL):: No  Significant changes in sleep  pattern (GOAL): No    Plan:     Time spent with patient: 90    The patient meets one or more of the following criteria:  * Requires services to prevent readmission to a nursing home or hospital    Acute concern/Follow-up recommendations: follow care plan    Next CP visit scheduled: 06/22/2022    Issues for Provider to follow up on: Setup medications for two weeks. Pt had no new complaints    Provider follow up visit needed: TBD

## 2022-06-13 ENCOUNTER — PATIENT OUTREACH (OUTPATIENT)
Dept: NURSING | Facility: CLINIC | Age: 32
End: 2022-06-13
Payer: COMMERCIAL

## 2022-06-13 NOTE — PROGRESS NOTES
Clinic Care Coordination Contact    Situation: Patient chart reviewed by care coordinator.    Background: Patient recently discharged from TCU   Assessment:   enrolled with [facility] program  Patient has schedule visit with PCP tomorrow and CP later this week.    Plan/Recommendations: RN CC will review chart in 4-6 weeks, available sooner if needed

## 2022-06-14 ENCOUNTER — OFFICE VISIT (OUTPATIENT)
Dept: FAMILY MEDICINE | Facility: CLINIC | Age: 32
End: 2022-06-14
Payer: COMMERCIAL

## 2022-06-14 VITALS
RESPIRATION RATE: 24 BRPM | DIASTOLIC BLOOD PRESSURE: 75 MMHG | HEART RATE: 84 BPM | SYSTOLIC BLOOD PRESSURE: 105 MMHG | OXYGEN SATURATION: 99 %

## 2022-06-14 DIAGNOSIS — F25.1 SCHIZOAFFECTIVE DISORDER, DEPRESSIVE TYPE, WITH CATATONIA (H): ICD-10-CM

## 2022-06-14 DIAGNOSIS — Z71.85 IMMUNIZATION COUNSELING: ICD-10-CM

## 2022-06-14 DIAGNOSIS — E03.9 HYPOTHYROIDISM, UNSPECIFIED TYPE: ICD-10-CM

## 2022-06-14 DIAGNOSIS — E55.9 VITAMIN D DEFICIENCY: ICD-10-CM

## 2022-06-14 DIAGNOSIS — E27.1 ADDISON'S DISEASE (H): ICD-10-CM

## 2022-06-14 DIAGNOSIS — F89 NEURODEVELOPMENTAL DISORDER: ICD-10-CM

## 2022-06-14 DIAGNOSIS — J45.40 MODERATE PERSISTENT ASTHMA WITHOUT COMPLICATION: ICD-10-CM

## 2022-06-14 DIAGNOSIS — R29.898 WEAKNESS OF RIGHT LEG: Primary | ICD-10-CM

## 2022-06-14 DIAGNOSIS — F06.1 SCHIZOAFFECTIVE DISORDER, DEPRESSIVE TYPE, WITH CATATONIA (H): ICD-10-CM

## 2022-06-14 DIAGNOSIS — E27.40 ADRENAL INSUFFICIENCY (H): ICD-10-CM

## 2022-06-14 PROCEDURE — 99214 OFFICE O/P EST MOD 30 MIN: CPT | Performed by: FAMILY MEDICINE

## 2022-06-14 RX ORDER — CHOLECALCIFEROL (VITAMIN D3) 50 MCG
1 TABLET ORAL DAILY
Qty: 30 TABLET | Refills: 3 | Status: SHIPPED | OUTPATIENT
Start: 2022-06-14 | End: 2022-10-06

## 2022-06-14 RX ORDER — ALBUTEROL SULFATE 90 UG/1
2 AEROSOL, METERED RESPIRATORY (INHALATION) EVERY 6 HOURS PRN
Qty: 18 G | Refills: 3 | Status: SHIPPED | OUTPATIENT
Start: 2022-06-14

## 2022-06-14 RX ORDER — LEVOTHYROXINE SODIUM 75 UG/1
75 TABLET ORAL DAILY
Qty: 90 TABLET | Refills: 0 | Status: SHIPPED | OUTPATIENT
Start: 2022-06-14 | End: 2022-07-27

## 2022-06-14 RX ORDER — FAMOTIDINE 20 MG/1
20 TABLET, FILM COATED ORAL 2 TIMES DAILY
Status: ON HOLD | COMMUNITY
End: 2023-01-01

## 2022-06-14 RX ORDER — FLUTICASONE PROPIONATE AND SALMETEROL XINAFOATE 115; 21 UG/1; UG/1
2 AEROSOL, METERED RESPIRATORY (INHALATION) 2 TIMES DAILY
Qty: 12 G | Refills: 3 | Status: SHIPPED | OUTPATIENT
Start: 2022-06-14 | End: 2022-01-01

## 2022-06-14 ASSESSMENT — ASTHMA QUESTIONNAIRES
QUESTION_3 LAST FOUR WEEKS HOW OFTEN DID YOUR ASTHMA SYMPTOMS (WHEEZING, COUGHING, SHORTNESS OF BREATH, CHEST TIGHTNESS OR PAIN) WAKE YOU UP AT NIGHT OR EARLIER THAN USUAL IN THE MORNING: NOT AT ALL
ACT_TOTALSCORE: 20
ACT_TOTALSCORE: 20
QUESTION_1 LAST FOUR WEEKS HOW MUCH OF THE TIME DID YOUR ASTHMA KEEP YOU FROM GETTING AS MUCH DONE AT WORK, SCHOOL OR AT HOME: A LITTLE OF THE TIME
QUESTION_2 LAST FOUR WEEKS HOW OFTEN HAVE YOU HAD SHORTNESS OF BREATH: ONCE OR TWICE A WEEK
QUESTION_4 LAST FOUR WEEKS HOW OFTEN HAVE YOU USED YOUR RESCUE INHALER OR NEBULIZER MEDICATION (SUCH AS ALBUTEROL): ONCE A WEEK OR LESS
QUESTION_5 LAST FOUR WEEKS HOW WOULD YOU RATE YOUR ASTHMA CONTROL: SOMEWHAT CONTROLLED

## 2022-06-14 ASSESSMENT — PATIENT HEALTH QUESTIONNAIRE - PHQ9
SUM OF ALL RESPONSES TO PHQ QUESTIONS 1-9: 0
SUM OF ALL RESPONSES TO PHQ QUESTIONS 1-9: 0

## 2022-06-14 NOTE — PROGRESS NOTES
"    ICD-10-CM    1. Weakness of right leg  R29.898    2. 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 Adrenal insufficiency (H)  E27.40    4. Moderate persistent asthma without complication  J45.40 fluticasone-salmeterol (ADVAIR HFA) 115-21 MCG/ACT inhaler     albuterol (PROAIR HFA/PROVENTIL HFA/VENTOLIN HFA) 108 (90 Base) MCG/ACT inhaler   5. Hypothyroidism, unspecified type  E03.9 levothyroxine (SYNTHROID/LEVOTHROID) 75 MCG tablet   6. Schizoaffective disorder, depressive type, with catatonia (H)  F25.1     F06.1    7. Vitamin D deficiency  E55.9 vitamin D3 (VITAMIN D3) 50 mcg (2000 units) tablet   8. Neurodevelopmental disorder  F89    9. Immunization counseling  Z71.85      Ongoing right leg weakness.  Sees neurology in follow-up 6/22/2022 for EMG please see below regarding diagnoses.    Bakersfield's disease/adrenal insufficiency, sees endocrine in follow-up 7/27/2022    Hypothyroid therapeutic level on levothyroxine, refill given    Moderate persistent asthma stable on albuterol and Advair refills given    Continue to see psychiatry for schizoaffective disorder.    Immunization update with COVID booster was offered but patient wanted to wait and get it next time.    He will follow-up with his primary physician in early August as well.        Michelle Cox is a 31 year old who presents for the following health issues  accompanied by his friend.    HPI     Patient is a follow-up from the hospital.  He usually sees Dr. Rangel.    Patient was hospitalized at [facility] from May 10 through May 19.  He had symptoms of weakness in the right leg.  Unfortunately he had an MRI scan showing demyelination of the corticospinal tract.    There was concern regarding \"adult onset X-linked adrenal cortical leukodystrophy \"or possibly some other \"leukodystrophies \"    He was seen by neurology.  He needs an EMG done and will see Dr. Dejesus, from [facility] neurology [place] for follow-up on 6/22/2022.    Patient also " has Ariel's and follows with endocrine he has an appointment with Dr. Witt on 7/27/2022.  He continues on Florinef as well as Cortef.    He also will follow-up with his primary physician, Dr. Christopher on 8/9/2022.    We contacted his care guide to help with transportation for all of these appointments, and these were all written down for him.    He has stayed about the same regarding his strength he does use a walker to get around the right leg is ongoing week.    He does have asthma as well, that is stable when he uses Advair and albuterol.  I did refill those.    He is on levothyroxine 75 mcg once daily, for hypothyroid and TSH was therapeutic on 5/10/2022.    No new symptoms or problems      Hospital Follow-up Visit:    Hospital/Nursing Home/IP Rehab Facility: [facility]  Date of Admission: 5/10/22  Date of Discharge: 5/19/22  Reason(s) for Admission: Weakness of Right Leg    Was your hospitalization related to COVID-19? No   Problems taking medications regularly:  None  Medication changes since discharge: None  Problems adhering to non-medication therapy:  None      Post Discharge Medication Reconciliation: discharge medications reconciled, continue medications without change.  Plan of care communicated with patient and caregiver           Review of Systems   10 point review of systems positive as outlined above otherwise negative      Objective    /75 (BP Location: Right arm, Patient Position: Sitting, Cuff Size: Adult Regular)   Pulse 84   Resp 24   SpO2 99%   There is no height or weight on file to calculate BMI.  Physical Exam   General appearance no acute distress    He has neurodevelopmental delays and does not communicate very well there was an  present on the phone and friend.    Patient also has schizoaffective disorder and continues on olanzapine.    Vital signs were stable blood pressure 150/75 heart rate 84.  O2 sat 99%    Lungs clear no wheezes or  rales    Heart was regular    Weakness in the right leg as before.        Answers for HPI/ROS submitted by the patient on 6/14/2022  PHQ9 TOTAL SCORE: 0

## 2022-06-16 ENCOUNTER — TELEPHONE (OUTPATIENT)
Dept: FAMILY MEDICINE | Facility: CLINIC | Age: 32
End: 2022-06-16
Payer: COMMERCIAL

## 2022-06-16 NOTE — TELEPHONE ENCOUNTER
----- Message from Scarlett Varma sent at 6/16/2022  2:49 PM CDT -----  Regarding: RE: Transportation  FYI  I've already set up ride for 6-22-22 and 6-28-22  We can't set up ride yet for July appts  Will have to call in July.  Scarlett  ----- Message -----  From: Chiquita Granados CMA  Sent: 6/16/2022  11:16 AM CDT  To: Scarlett Varma  Subject: Transportation                                   Dr. Booker is wanting us to reach out to you so you can help the pt schedule transportation for the following appointments:    6/22/22-Neurology appt w/Dr. Dejesus    7/27/22-Endocrine appt w/Dr. Marques    8/9/22-PCP appt w/Dr. Rangel

## 2022-06-22 ENCOUNTER — ALLIED HEALTH/NURSE VISIT (OUTPATIENT)
Dept: OTHER | Facility: CLINIC | Age: 32
End: 2022-06-22
Payer: COMMERCIAL

## 2022-06-22 ENCOUNTER — OFFICE VISIT (OUTPATIENT)
Dept: NEUROLOGY | Facility: CLINIC | Age: 32
End: 2022-06-22
Attending: FAMILY MEDICINE
Payer: COMMERCIAL

## 2022-06-22 VITALS
RESPIRATION RATE: 14 BRPM | SYSTOLIC BLOOD PRESSURE: 106 MMHG | HEART RATE: 76 BPM | DIASTOLIC BLOOD PRESSURE: 46 MMHG | TEMPERATURE: 98.7 F | OXYGEN SATURATION: 99 %

## 2022-06-22 VITALS — DIASTOLIC BLOOD PRESSURE: 96 MMHG | SYSTOLIC BLOOD PRESSURE: 125 MMHG | RESPIRATION RATE: 18 BRPM | HEART RATE: 88 BPM

## 2022-06-22 DIAGNOSIS — G31.80 LEUKODYSTROPHY (H): ICD-10-CM

## 2022-06-22 DIAGNOSIS — R26.9 ABNORMAL GAIT: Primary | ICD-10-CM

## 2022-06-22 DIAGNOSIS — E71.529 X-LINKED ADRENOLEUKODYSTROPHY IN MALE (H): ICD-10-CM

## 2022-06-22 DIAGNOSIS — R29.898 LEG WEAKNESS, BILATERAL: ICD-10-CM

## 2022-06-22 DIAGNOSIS — R26.89 WIDEBASED GAIT: ICD-10-CM

## 2022-06-22 DIAGNOSIS — Z79.899 MEDICATION MANAGEMENT: Primary | ICD-10-CM

## 2022-06-22 PROCEDURE — 99215 OFFICE O/P EST HI 40 MIN: CPT | Performed by: PSYCHIATRY & NEUROLOGY

## 2022-06-22 PROCEDURE — 99207 PR COMMUNITY PARAMEDIC-PATIENT MEETS CRITERIA: CPT

## 2022-06-22 RX ORDER — BACLOFEN 10 MG/1
10 TABLET ORAL 2 TIMES DAILY
Qty: 60 TABLET | Refills: 11 | Status: SHIPPED | OUTPATIENT
Start: 2022-06-22 | End: 2023-01-01

## 2022-06-22 ASSESSMENT — ACTIVITIES OF DAILY LIVING (ADL): DEPENDENT_IADLS:: CLEANING;COOKING;LAUNDRY;SHOPPING;MEAL PREPARATION;MEDICATION MANAGEMENT;TRANSPORTATION

## 2022-06-22 NOTE — LETTER
"    6/22/2022         RE: Brooke Peterson  [address] [place] [address]        Dear Colleague,    Thank you for referring your patient, Brooke Peterson, to the [facility] [place]. Please see a copy of my visit note below.    In person evaluation      HPI  Original consultation by Dr. Debra Ward  5/14/2022-5/17/2022, hospital visit  6/22/2022, in person consultation        31-year-old being evaluated neurologically for:  Adrenal leukodystrophy X-linked genetic disorder       A.  Adrenal leukodystrophy X-linked genetic disorder       Weakness of all extremities, right leg worse       Seems to have right-sided arm and leg weakness greater than left (leg greater than arm)           onset unknown        Gait troubles going on for at least 2 years        When I interviewed patient and father with the  he said that he had trouble running when he was a child         but it was because of asthma          father does not know much about patient's mothers brothers history         although there were maternal uncles that may have had walking trouble when they were \"elderly\"           Has some chronic bowel incontinence but that was not necessarily new          MRI scan with demyelination of the corticospinal tract see MRI report below.        Very long chain fatty acid (5/14/2022)  C 26: Hexacosanoic    1.12 elevated  C26:1                           0.34 elevated  C 24/22 ratio                1.856 elevated  C 26/22 ratio                0.088 elevated  Results consistent with a defect in the  Perioxisomal fatty acid oxidation such as X-linked adrenal leukodystrophy or adrenal myeloneuropathy.  (X-linked ALD hemizygote versus X-linked ALD heterozygote)      B.  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  Normal thoracic spine MRI.  LUMBAR SPINE MRI: 5/10/2022  1.  Normal lumbar spine MRI.  MRI brain 5/11/2022  1.  There are areas of T2 signal hyperintensity involving the cortical spinal tracts on the right and left left greater than right. Beginning at the level of the posterior limb of the internal capsule and ending inferiorly into the midbrain and emeka.   2.  These changes were present on 06/24/2019 but are more apparent on current study.  3.  In addition there is mild T2 signal hyperintensity within the medial aspect of the right and left cerebellar hemispheres and right and left thalami.  4.  Differential diagnostic, considerations would include neurodegenerative disorders and toxic metabolic disease  CSF glucose 66, protein 37, no growth, cytology negative for malignancy, CSF Lyme's negative  Treponemal antibody negative/HIV antigen negative,   AST 86/  CRP 2.0, rheumatoid factor less than 15, ESR 6,  ANGELIC negative  B12 359, TSH 2.69, T4 free 1.07, prolactin level 9.0 (5/5/2022)  Very long chain fatty acid (5/14/2022)  C 26: Hexacosanoic    1.12 elevated  C26:1                           0.34 elevated  C 24/22 ratio                1.856 elevated  C 26/22 ratio                0.088 elevated  Results consistent with a defect in the  Perioxisomal fatty acid oxidation such as X-linked adrenal leukodystrophy or adrenal myeloneuropathy.  (X-linked ALD hemizygote versus X-linked ALD heterozygote)      Exam    Review of systems  Difficulty  with gait  Spastic gait  Cognitive difficulties  Denies diplopia  Has some dysarthria  No visual changes  Some constipation and difficulty with bladder incontinence chronic  Schizophrenia         General exam   Blood pressure 125/96, pulse 88  Alert and attentive  HEENT normal  Lungs clear  Heart rate regular  Abdomen soft  Symmetrical pulses  No edema the feet     Neurologic exam  Alert and attentive  No neglect  Due to language barrier and psychiatric disease difficulty test memory and language  Through the  answer some questions    Cranials 2 through 12 normal  May have some thick speech difficult with language barrier    Upper extremities  Right arm slight drift compared to left arm     Lower extremities  Left leg 4+ out of 5  Right leg 3- out of 5 proximally  Distally seems to move better     Reflexes reported right over left  Biceps 2+/2+  Triceps 2+/2+  Knee 2+/2+  Ankle 2+/2+  Toes upgoing bilaterally        Gait  Pushes off with his hands can ambulate with a walker but has stiff legs right worse than left           Assessment/plan     1.   Adrenal leukodystrophy X-linked genetic disorder        White matter changes in the CSF with CSF so far not showing any inflammation or elevated protein.        Some of the demyelination of the corticospinal tracts is somewhat worse on the left which could affect the right leg        Has adrenal difficulties        2.  MRI scan brain abnormal bilateral corticospinal tracts T2 signal changes       There are areas of T2 signal hyperintensity involving the cortical spinal tracts on the right and left left greater than right.        Beginning at the level of the posterior limb of the internal capsule and ending inferiorly into the midbrain and emeka.         These changes were present on 06/24/2019 but are more apparent on current study.         In addition there is mild T2 signal hyperintensity within the medial aspect of the right and left cerebellar hemispheres  and right and left thalami.            difficult to get a good history with language barrier        patient may have had difficulty running when he was a teenager/younger        question if he has a chronic problem going on for years           Unclear if he could have had some type of  event versus hereditary leukodystrophy     3.  Adrenal corticotropin (106 a year ago, > 1250  2 years ago, should be <47)       Diagnosis  Adrenal leukodystrophy X-linked genetic disorder       Patient age 31 possibly has had this for decades since it is a genetic disease hard to know when symptom onset was this.  Not a candidate for any type of bone marrow transplant at this age  Difficult also due to fact that when I talk with his dad he denied that this could be genetic and was not interested in pursuing that avenue of diagnosis.    Treatment  Baclofen 10 mg p.o. twice daily  Continue using the walker    In the future he may need a wheelchair but fear that once he sits in the wheelchair he is not going to continue to ambulate and he will lose more ground    Assessment and treatment complicated by his psychiatric disease    Adrenal treatment per primary MD    Follow-up in 3 months to assess the baclofen      Discussed at length with patient and his father through the  today    Total care time today 42 minutes          Again, thank you for allowing me to participate in the care of your patient.        Sincerely,        Fritz Dejesus MD

## 2022-06-22 NOTE — PROGRESS NOTES
"In person evaluation      HPI  Original consultation by Dr. Debra Ward  5/14/2022-5/17/2022, hospital visit  6/22/2022, in person consultation        31-year-old being evaluated neurologically for:  Adrenal leukodystrophy X-linked genetic disorder       A.  Adrenal leukodystrophy X-linked genetic disorder       Weakness of all extremities, right leg worse       Seems to have right-sided arm and leg weakness greater than left (leg greater than arm)           onset unknown        Gait troubles going on for at least 2 years        When I interviewed patient and father with the  he said that he had trouble running when he was a child         but it was because of asthma          father does not know much about patient's mothers brothers history         although there were maternal uncles that may have had walking trouble when they were \"elderly\"           Has some chronic bowel incontinence but that was not necessarily new          MRI scan with demyelination of the corticospinal tract see MRI report below.        Very long chain fatty acid (5/14/2022)  C 26: Hexacosanoic    1.12 elevated  C26:1                           0.34 elevated  C 24/22 ratio                1.856 elevated  C 26/22 ratio                0.088 elevated  Results consistent with a defect in the  Perioxisomal fatty acid oxidation such as X-linked adrenal leukodystrophy or adrenal myeloneuropathy.  (X-linked ALD hemizygote versus X-linked ALD heterozygote)      B.  Patient with schizo affective disorder on psych meds which complicates both history taking and gait eval       patient also has language barrier and even with the  and his father here was difficult had childhood history      Past medical history  Pituitary adenoma  Hypothyroidism  Asthma  Adrenal insufficiency/Ariel's disease  Adrenal leukodystrophy X-linked genetic disorder diagnosed May 2022  Hypercalcemia  Schizophrenia  PTSD  Insomnia   "     Habits  Non-smoker   Does not drink alcohol        Family history  Unknown unable even with father present in the       Work-up  MRI scan brain 6/25/2019 normal  TSH 2.34, (4/12/2021)    Work-up  THORACIC SPINE MRI: 5/10/2022  1.  Normal thoracic spine MRI.  LUMBAR SPINE MRI: 5/10/2022  1.  Normal lumbar spine MRI.  MRI brain 5/11/2022  1.  There are areas of T2 signal hyperintensity involving the cortical spinal tracts on the right and left left greater than right. Beginning at the level of the posterior limb of the internal capsule and ending inferiorly into the midbrain and emeka.   2.  These changes were present on 06/24/2019 but are more apparent on current study.  3.  In addition there is mild T2 signal hyperintensity within the medial aspect of the right and left cerebellar hemispheres and right and left thalami.  4.  Differential diagnostic, considerations would include neurodegenerative disorders and toxic metabolic disease  MRI scan lumbar spine 6/28/2022 normal.  MRI head pituitary with and without contrast see official report 6/28/2022 compared to 5/11/2022  1.  Focal region in the mid posterior adenohypophysis of hypoenhancement may reflect small pituitary lesion such as microadenoma.  2.  Relatively stable appearing foci of T2/FLAIR signal hyperintensity involving the cortical spinal tracts bilaterally, but more conspicuous on the left. Additional involvement involving the bilateral thalami and the paramedian cerebellar hemispheres.   Associated mild enhancement as detailed above. Again, findings nonspecific but can be seen in neurodegenerative disorders, toxic metabolic etiologies. The findings in the cortical spinal tracts have been described with adrenoleukodystrophy.    CSF glucose 66, protein 37, no growth, cytology negative for malignancy, CSF Lyme's negative  Treponemal antibody negative/HIV antigen negative,   AST 86/  CRP 2.0, rheumatoid factor less than 15, ESR 6,  ANGELIC  negative  B12 359, TSH 2.69, T4 free 1.07, prolactin level 9.0 (5/5/2022)  Very long chain fatty acid (5/14/2022)  C 26: Hexacosanoic    1.12 elevated  C26:1                           0.34 elevated  C 24/22 ratio                1.856 elevated  C 26/22 ratio                0.088 elevated  Results consistent with a defect in the  Perioxisomal fatty acid oxidation such as X-linked adrenal leukodystrophy or adrenal myeloneuropathy.  (X-linked ALD hemizygote versus X-linked ALD heterozygote)      Exam    Review of systems  Difficulty with gait  Spastic gait  Cognitive difficulties  Denies diplopia  Has some dysarthria  No visual changes  Some constipation and difficulty with bladder incontinence chronic  Schizophrenia         General exam   Blood pressure 125/96, pulse 88  Alert and attentive  HEENT normal  Lungs clear  Heart rate regular  Abdomen soft  Symmetrical pulses  No edema the feet     Neurologic exam  Alert and attentive  No neglect  Due to language barrier and psychiatric disease difficulty test memory and language  Through the  answer some questions    Cranials 2 through 12 normal  May have some thick speech difficult with language barrier    Upper extremities  Right arm slight drift compared to left arm     Lower extremities  Left leg 4+ out of 5  Right leg 3- out of 5 proximally  Distally seems to move better     Reflexes reported right over left  Biceps 2+/2+  Triceps 2+/2+  Knee 2+/2+  Ankle 2+/2+  Toes upgoing bilaterally        Gait  Pushes off with his hands can ambulate with a walker but has stiff legs right worse than left           Assessment/plan     1.   Adrenal leukodystrophy X-linked genetic disorder        White matter changes in the CSF with CSF so far not showing any inflammation or elevated protein.        Some of the demyelination of the corticospinal tracts is somewhat worse on the left which could affect the right leg        Has adrenal difficulties        2.  MRI scan brain  abnormal bilateral corticospinal tracts T2 signal changes       There are areas of T2 signal hyperintensity involving the cortical spinal tracts on the right and left left greater than right.        Beginning at the level of the posterior limb of the internal capsule and ending inferiorly into the midbrain and emeka.         These changes were present on 2019 but are more apparent on current study.         In addition there is mild T2 signal hyperintensity within the medial aspect of the right and left cerebellar hemispheres and right and left thalami.            difficult to get a good history with language barrier        patient may have had difficulty running when he was a teenager/younger        question if he has a chronic problem going on for years           Unclear if he could have had some type of  event versus hereditary leukodystrophy     3.  Adrenal corticotropin (106 a year ago, > 1250  2 years ago, should be <47)       Diagnosis  Adrenal leukodystrophy X-linked genetic disorder       Patient age 31 possibly has had this for decades since it is a genetic disease hard to know when symptom onset was this.  Not a candidate for any type of bone marrow transplant at this age  Difficult also due to fact that when I talk with his dad he denied that this could be genetic and was not interested in pursuing that avenue of diagnosis.    Treatment  Baclofen 10 mg p.o. twice daily  Continue using the walker    In the future he may need a wheelchair but fear that once he sits in the wheelchair he is not going to continue to ambulate and he will lose more ground    Assessment and treatment complicated by his psychiatric disease    Adrenal treatment per primary MD    Follow-up in 3 months to assess the baclofen      Discussed at length with patient and his father through the  today    Total care time today 42 minutes    Addendum 2022  MRI scan lumbar spine 2022 normal.  MRI head  pituitary with and without contrast see official report 6/28/2022 compared to 5/11/2022  1.  Focal region in the mid posterior adenohypophysis of hypoenhancement may reflect small pituitary lesion such as microadenoma.  2.  Relatively stable appearing foci of T2/FLAIR signal hyperintensity involving the cortical spinal tracts bilaterally, but more conspicuous on the left. Additional involvement involving the bilateral thalami and the paramedian cerebellar hemispheres.   Associated mild enhancement as detailed above. Again, findings nonspecific but can be seen in neurodegenerative disorders, toxic metabolic etiologies. The findings in the cortical spinal tracts have been described with adrenoleukodystrophy.  Scans ordered by primary MD, follows with endocrinology for his pituitary/adrenal dysfunction

## 2022-06-28 ENCOUNTER — HOSPITAL ENCOUNTER (OUTPATIENT)
Dept: MRI IMAGING | Facility: HOSPITAL | Age: 32
Discharge: HOME | End: 2022-06-28
Attending: FAMILY MEDICINE
Payer: COMMERCIAL

## 2022-06-28 ENCOUNTER — HOSPITAL ENCOUNTER (OUTPATIENT)
Dept: ULTRASOUND IMAGING | Facility: HOSPITAL | Age: 32
Discharge: HOME | End: 2022-06-28
Attending: INTERNAL MEDICINE
Payer: COMMERCIAL

## 2022-06-28 DIAGNOSIS — D75.1 ERYTHROCYTOSIS: ICD-10-CM

## 2022-06-28 DIAGNOSIS — R26.89 WIDEBASED GAIT: ICD-10-CM

## 2022-06-28 DIAGNOSIS — R29.898 LEG WEAKNESS, BILATERAL: ICD-10-CM

## 2022-06-28 DIAGNOSIS — D35.2 PITUITARY MICROADENOMA (H): ICD-10-CM

## 2022-06-28 DIAGNOSIS — R26.9 ABNORMAL GAIT: ICD-10-CM

## 2022-06-28 PROCEDURE — 70543 MRI ORBT/FAC/NCK W/O &W/DYE: CPT

## 2022-06-28 PROCEDURE — 255N000002 HC RX 255 OP 636: Performed by: FAMILY MEDICINE

## 2022-06-28 PROCEDURE — 76770 US EXAM ABDO BACK WALL COMP: CPT

## 2022-06-28 PROCEDURE — A9585 GADOBUTROL INJECTION: HCPCS | Performed by: FAMILY MEDICINE

## 2022-06-28 PROCEDURE — 72148 MRI LUMBAR SPINE W/O DYE: CPT

## 2022-06-28 RX ORDER — GADOBUTROL 604.72 MG/ML
7 INJECTION INTRAVENOUS ONCE
Status: COMPLETED | OUTPATIENT
Start: 2022-06-28 | End: 2022-06-28

## 2022-06-28 RX ADMIN — GADOBUTROL 7 ML: 604.72 INJECTION INTRAVENOUS at 09:43

## 2022-07-01 NOTE — PROGRESS NOTES
Community Paramedics Follow-up Visit  June 22, 2022  TIME: 11:00    Brooke Peterson is a 31 year old male being seen at home for a follow-up visit.    Present at appointment: Patient, Parents    Primary Diagnosis: Psychosocial    Chief Complaint   Patient presents with     Outreach     Community Paramedic home visit       [place] Utilization:   Clinic Utilization  Difficulty keeping appointments:: No  Compliance Concerns: No  No-Show Concerns: Yes  No PCP office visit in Past Year: No  Utilization    Hospital Admissions  1             ED Visits  1             No Show Count (past year)  25                Current as of: 6/30/2022  9:06 PM              /46 (BP Location: Left arm, Patient Position: Supine)   Pulse 76   Temp 98.7  F (37.1  C)   Resp 14   SpO2 99%     Clinical Concerns:  Current Medical Concerns:      Current Behavioral Concerns: medication compliance    Education Provided to patient: went through and set up medications   Medication set up? Yes  Pill Box issued: No  Scale issued: No  Flu Shot given: No  COVID Vaccine Given: No  Lab draw or specimen collection: No  Food box issued: No  Collaborative visit with PCP: No  Wound Care: No    Health Maintenance Reviewed:      Clinical Pathway: None    No  Face to Face in Home / Community    Recent blood sugars:     Review of Symptoms/PE    Skin: negative  Eyes: negative  Ears/Nose/Throat: negative  Respiratory: No shortness of breath, dyspnea on exertion, cough, or hemoptysis  Cardiovascular: negative  Gastrointestinal: negative  Genitourinary: negative  Musculoskeletal: negative  Neurologic: negative  Psychiatric: negative    Pain Management::       Medication Review  Medication adherence problem (GOAL):: No  Knowledgeable about how to use meds:: No  Medication side effects suspected:: No    Diet/Exercise/Sleep  Diet:: Regular  Inadequate nutrition (GOAL):: No  Tube Feeding: No  Inadequate activity/exercise (GOAL):: No  Significant changes in sleep pattern  (GOAL): No    Plan:     Time spent with patient: 60    The patient meets one or more of the following criteria:  * Requires services to prevent readmission to a nursing home or hospital    Acute concern/Follow-up recommendations: follow care plan    Next CP visit scheduled: 07/06/2022    Issues for Provider to follow up on: Community Paramedic met with patient and his dad at their home. Medications were set up in pill box for two weeks.    Provider follow up visit needed: PADDY

## 2022-07-06 ENCOUNTER — ALLIED HEALTH/NURSE VISIT (OUTPATIENT)
Dept: OTHER | Facility: CLINIC | Age: 32
End: 2022-07-06
Payer: COMMERCIAL

## 2022-07-06 VITALS
DIASTOLIC BLOOD PRESSURE: 88 MMHG | OXYGEN SATURATION: 99 % | RESPIRATION RATE: 14 BRPM | TEMPERATURE: 98.3 F | HEART RATE: 100 BPM | SYSTOLIC BLOOD PRESSURE: 118 MMHG

## 2022-07-06 DIAGNOSIS — Z79.899 MEDICATION MANAGEMENT: Primary | ICD-10-CM

## 2022-07-06 PROCEDURE — 99207 PR COMMUNITY PARAMEDIC-PATIENT MEETS CRITERIA: CPT

## 2022-07-07 ASSESSMENT — ACTIVITIES OF DAILY LIVING (ADL): DEPENDENT_IADLS:: CLEANING;COOKING;LAUNDRY;SHOPPING;MEAL PREPARATION;MEDICATION MANAGEMENT;TRANSPORTATION

## 2022-07-18 ENCOUNTER — PATIENT OUTREACH (OUTPATIENT)
Dept: CARE COORDINATION | Facility: CLINIC | Age: 32
End: 2022-07-18

## 2022-07-18 ENCOUNTER — OFFICE VISIT (OUTPATIENT)
Dept: FAMILY MEDICINE | Facility: CLINIC | Age: 32
End: 2022-07-18
Payer: COMMERCIAL

## 2022-07-18 VITALS
HEART RATE: 76 BPM | WEIGHT: 171 LBS | DIASTOLIC BLOOD PRESSURE: 79 MMHG | RESPIRATION RATE: 20 BRPM | BODY MASS INDEX: 30.29 KG/M2 | SYSTOLIC BLOOD PRESSURE: 119 MMHG

## 2022-07-18 DIAGNOSIS — R26.9 ABNORMAL GAIT: ICD-10-CM

## 2022-07-18 DIAGNOSIS — E71.529 X LINKED ADRENOLEUKODYSTROPHY (H): ICD-10-CM

## 2022-07-18 DIAGNOSIS — R26.89 WIDEBASED GAIT: ICD-10-CM

## 2022-07-18 DIAGNOSIS — R29.898 LEG WEAKNESS, BILATERAL: ICD-10-CM

## 2022-07-18 DIAGNOSIS — E71.529 ADRENOLEUKODYSTROPHY (H): Primary | ICD-10-CM

## 2022-07-18 PROBLEM — Z79.899 MEDICATION MANAGEMENT: Status: RESOLVED | Noted: 2021-03-09 | Resolved: 2022-07-18

## 2022-07-18 PROBLEM — Z86.39 HISTORY OF HYPERCALCEMIA: Status: RESOLVED | Noted: 2020-02-04 | Resolved: 2022-07-18

## 2022-07-18 PROCEDURE — 99214 OFFICE O/P EST MOD 30 MIN: CPT | Performed by: FAMILY MEDICINE

## 2022-07-18 NOTE — PROGRESS NOTES
"  Assessment & Plan     Adrenoleukodystrophy (H)  X linked adrenoleukodystrophy (H)  Will send prescriptions to [facility] for incontinent supplies and wheelchair.  Hopefully they will be able to deliver.  I confirmed the patient's address.  We will ask care guides to help support this process as well as family has a lot of difficulties with language and acculturation barriers.  They also have no means of transportation.  - Wheelchair Order  - Incontinence Supplies Order      Future Appointments   Date Time Provider Department Center   7/18/2022 10:20 AM Jose Rangel MD I[facility]   7/20/2022 12:00 PM [facility] PARAMEDIC 2 [facility]   7/27/2022  3:00 PM Maddison Witt MD [facility]   8/9/2022 10:00 AM Jose Rangel MD [facility]   9/27/2022 10:15 AM Fritz Dejesus MD [facility]   10/19/2022  8:30 AM Paulina Howard NP [facility]     Total time on the day of service exceeded 30 minutes including record review, examination room time, coordination of care, and documentation.     BMI:   Estimated body mass index is 30.29 kg/m  as calculated from the following:    Height as of 4/5/22: 1.6 m (5' 3\").    Weight as of this encounter: 77.6 kg (171 lb).       Return in about 3 months (around 10/18/2022) for Follow up.    Jose Rangel MD  [facility]    Michelle Cox is a 31 year old presenting for the following health issues:  Dme (Wheelchair and Adult Diaper Request)      History of Present Illness       Reason for visit:  Wheelchair and Adult Diaper Request    He eats 0-1 servings of fruits and vegetables daily.He consumes 3 sweetened beverage(s) daily.He exercises with enough effort to increase his heart rate 9 or less minutes per day.  He exercises with enough effort to increase his heart rate 3 or less days per week.   He is taking medications regularly.     Requesting wheelchair.  Very difficult to get around due to leg weakness.  Per visit " with neurology and testing of very long chain fatty acids they believe this is X-linked adrenal leukodystrophy.  Patient now living at home again.  Had been in  TCU following hospital stay.    Also, difficulty getting to the bathroom.  Has accidents.  Needs briefs.        Objective    /79 (BP Location: Right arm, Patient Position: Sitting, Cuff Size: Adult Large)   Pulse 76   Resp 20   Wt 77.6 kg (171 lb)   BMI 30.29 kg/m    Body mass index is 30.29 kg/m .  Physical Exam   GENERAL: alert, not distressed  CHEST: clear, no rales, rhonchi, or wheezes  CARDIAC: regular without murmur, gallop, or rub  PSYCH: repeating concerns about getting insurance card  NEURO: gait with 4 point walker, unsteady      .  ..  DME (Durable Medical Equipment) Orders and Documentation  Orders Placed This Encounter   Procedures     Wheelchair Order     Incontinence Supplies Order      The patient was assessed and it was determined the patient is in need of the following listed DME Supplies/Equipment. Please complete supporting documentation below to demonstrate medical necessity.      Incontinence Supplies Documentation  Incontinence supplies are needed due to mobility limitation.  .    Wheelchair Documentation  1. The patient has mobility limitations that impairs their ability to participate in one or more mobility related activities: Toileting, Feeding, Grooming and Bathing.  2. The patient's mobility limitations cannot be safely resolved by using a cane/walker:No  3. The patients home has adequate access to use a manual wheelchair:Yes  4. The use of a manual wheelchair on a regular basis will improve the patients ability to participate in mobility related ADL's at home:Yes  5. The patient is willing to use a manual wheelchair at home:Yes  6. The patient has adequate upper body strength and the mental capability to safely use a manual wheelchair and/or has a caregiver that is able to assist: Yes  7. Does the patient have a lower  extremity injury or edema?No    Reason for Type of Wheelchair  Patient weight: 171 lbs 0 oz  Standard weight chair.    **Use of a manual wheelchair will significantly improve the patient's ability to participate in MRADLs and the patient will use it on a regular basis in the home. The patient has not expressed an unwillingness to use the manual wheelchair that is provided in the home.**

## 2022-07-18 NOTE — PROGRESS NOTES
7/18/2022  Clinic Care Coordination Contact  Care Team Conversations    Received referral from PCP to [facility] 7-18-22 for support with DME wheelchair and incontinence supply  Reason: please help pt and family with DME process to [facility]    Patient already enrolled in [facility] team  Duplicate order/referral to [facility]    CHW will follow up with [facility] [employer] regarding orders  [facility] [employer]  [address] [place] [address]  Phone: [phone]  Fax: [phone]    CHW Follow up: Monthly  CHW Plan: Follow up on goal (s)  CHW Next Follow Up: 7-20-22     Scarlett Varma  Community Health Worker  [facility] Care Coordination  [email or web address]   Office: [phone]  Fax: [phone]

## 2022-07-19 ENCOUNTER — PATIENT OUTREACH (OUTPATIENT)
Dept: CARE COORDINATION | Facility: CLINIC | Age: 32
End: 2022-07-19

## 2022-07-19 NOTE — PROGRESS NOTES
7/19/2022  Clinic Care Coordination Contact  Community Health Worker Follow Up    Intervention and Education during outreach:   Angela : Ye ID#[id number]  Returning phone call from patient today.     Called the number and spoke to patient's mother    and that this is his father's number. He is not with her.  Left message with mother to call CHW back       CHW Follow up: Monthly  CHW Plan: Follow up on goal (s)  CHW Next Follow Up: 7-22-22  Scarlett Varma  Community Health Worker  [facility] Care Coordination  [email or web address]   Office: [phone]  Fax: [phone]

## 2022-07-20 ENCOUNTER — PATIENT OUTREACH (OUTPATIENT)
Dept: CARE COORDINATION | Facility: CLINIC | Age: 32
End: 2022-07-20

## 2022-07-20 NOTE — PROGRESS NOTES
Community Paramedic Program  Community Health Worker Outreach    Called patient with Angela  to reschedule appointment scheduled for today, July 20th.    Visit rescheduled for: Friday, July 29th / 12 pm / CP Ming (date/time/CP)    Additional information:   Spoke with pt. Notified him that the CP is out today and won't be visiting. Pt agreed to reschedule for next Friday, July 29th at 12 pm. Before I could confirm that pt had my and the CP's contact information, he hung up the phone. Called pt back with  and left a voicemail with my contact information and rescheduled visit details.

## 2022-07-20 NOTE — PROGRESS NOTES
7/20/2022  Clinic Care Coordination Contact  Care Team Conversations    Call: [employer]  Member Line:[phone]  Provider Line [phone]  RE: Appt 7-27-22 at 2:45pm to [facility] and 8-9-22 to [facility]  Rep: Falguni                Provided member ID, verified patient demographics, destination address appointment date and time.  Medical rides completed for 7-27-22 and 8-9-22    Appt on 7-27-22 at 2;45pm  [employer] [phone]   between: 1:45pm to 2:15pm  2 passengers  Will call     Appt on 8-9-22 at 9:40   [employer] [phone]   between:8:40am -9:10  2 passengers  Will call     Routed to GAVIOTA Lea CP as FYI to inform patient if CP sees patient today.    Scarlett Varma  Community Health Worker  [facility] Care Coordination  [email or web address]   Office: [phone]  Fax: [phone]

## 2022-07-20 NOTE — PROGRESS NOTES
2022  Clinic Care Coordination Contact  Community Health Worker Follow Up    Intervention and Education during outreach:   Angela :  Becky ID #[id number]    Called and spoke to patient and mother and inform of medical transportation  for 22 and 22  with [employer] [phone]  Patient his father will go with to appt.  Patient's mother requested to have CHW call to remind of appt.  Informed patient that we can send a text message to remind of appt.  Requested patient to find a family to write down the transportation information.  Patient gave verbal permission to talk to his brother Pah  Spoke to brother and provided transportation  information for 22 and 22    Requested if he would be able to help set up medical ride for his brother for future appts.  Brother stated he can help to set up ride  Provided brother the number to [facility] Transportation Line [phone] and line open from 7 am to 8pm to set up medical rides.  Suggested to call 2-3 business days before appt and have following information ready before calling: [facility] member ID# on insurance card, verify name, , address, telephone, type of appt,  appt date and time and address of locatiion.  Will mail upcoming appt to patient  Offered to do conference call with him to learn how to set up medical ride  Brother declined offered and stated he can do it.     CHW will mail list of coming appts in the mail tomorrow with Transportation tip sheets.    CHW Follow up: Monthly  CHW Plan: Follow up on goal(s)  CHW Next Follow Up: 22    Scarlett Varma  Community Health Worker  [facility]  Clinic Care Coordination  [email or web address]   Office: [phone]  Fax: [phone]    Clinic Care Coordination Contact    Community Health Worker Follow Up    Care Gaps:     Health Maintenance Due   Topic Date Due     ASTHMA ACTION PLAN  Never done     ADVANCE CARE PLANNING  Never  done     DEPRESSION ACTION PLAN  Never done     COVID-19 Vaccine (3 - Booster for Pfizer series) 02/03/2022       Care Gap Goal set: Yes 7-27-22     Goals:    Goals Addressed as of 7/21/2022 at 5:04 PM                    7/20/22       Other (pt-stated)   50%    Added 5/5/22 by Scarlett Varma      Goal Statement- I want to attend my appt on 7-27-22 at 2:45pm  Date Goal set 5-5-22  Barriers: language, lack transportation   Strengths: medical transportation with [facility]  Date to Achieve By: 8-2022  Patient expressed understanding of goal: Yes   Action steps to achieve this goal:   1. I will ask my brother Pah for support to set up ride through [employer]  to speciality appointments through [employer] [phone]    Appt on 7-27-22 at 2;45pm  [employer] [phone]   between: 1:45pm to 2:15pm  2 passengers  Will call     Appt on 8-9-22 at 9:40   [employer] [phone]   between:8:40am -9:10  2 passengers  Will call      Updated: 7-20-22 AL           Other (pt-stated)   30%    Added 5/5/22 by Scarlett Varma      Goal Statement- I want to attend my appt on 9-15-22 at 10:15am.  Date Goal set 5-5-22  Barriers: language, lack transportation   Strengths: medical transportation with [facility]  Date to Achieve By: 9-2022  Patient expressed understanding of goal: Yes   Action steps to achieve this goal:   1. My brother will call to [employer] [phone] to set up ride for appt 9-15-22 at 10:15am    set up ride for 9-15-22 at 10:15am   [employer]  [phone]   between   will call  2 passengers    Updated:7-20-22 AL

## 2022-07-21 NOTE — PROGRESS NOTES
Community Paramedics Follow-up Visit  July 7, 2022  TIME: Sukhdev Peterson is a 31 year old male being seen at home for a follow-up visit.    Present at appointment: Patient, Parents    Primary Diagnosis: Psychosocial    Chief Complaint   Patient presents with     Outreach     Community Paramedic home visit       Jber Utilization:   Clinic Utilization  Difficulty keeping appointments:: No  Compliance Concerns: No  No-Show Concerns: Yes  No PCP office visit in Past Year: No  Utilization    Hospital Admissions  1             ED Visits  1             No Show Count (past year)  23                Current as of: 7/20/2022  8:35 PM              /88 (BP Location: Left arm, Patient Position: Sitting)   Pulse 100   Temp 98.3  F (36.8  C)   Resp 14   SpO2 99%     Clinical Concerns:  Current Medical Concerns:      Current Behavioral Concerns: medication compliance    Education Provided to patient: went through and setup medications for two weeks.   Medication set up? Yes  Pill Box issued: No  Scale issued: No  Flu Shot given: No  COVID Vaccine Given: No  Lab draw or specimen collection: No  Food box issued: No  Collaborative visit with PCP: No  Wound Care: No    Health Maintenance Reviewed:      Clinical Pathway: None    No  Face to Face in Home / Community      Review of Symptoms/PE    Skin: negative  Eyes: negative  Ears/Nose/Throat: negative  Respiratory: No shortness of breath, dyspnea on exertion, cough, or hemoptysis  Cardiovascular: negative  Gastrointestinal: negative  Genitourinary: negative  Musculoskeletal: negative  Neurologic: negative  Psychiatric: negative    Pain Management::       Medication Review  Medication adherence problem (GOAL):: No  Knowledgeable about how to use meds:: No  Medication side effects suspected:: No    Diet/Exercise/Sleep  Diet:: Regular  Inadequate nutrition (GOAL):: No  Tube Feeding: No  Inadequate activity/exercise (GOAL):: No  Significant changes in sleep pattern (GOAL):  [facility]  TELEMEDICINE Progress Note  Elmer Castro DO      The risks and benefits of converting to a virtual visit were discussed in light of the current infectious disease epidemic. Patient also understood that insurance coverage and co-pays are up to their individual insurance plans. Patient identification was verified at the start of the visit. Mora Dancer  1980 05/29/20    Patient location: [place] work site in [place]. Provider location: Physician's home    Chief Complaint:   Mora Dancer is a 44 y.o. patient who is here for         HPI:   Doing well with ADD medication. Denies palpitations, headaches or insomnia. Denies increased anxiety while on medication. Wants to quit smoking. Has been on Wellbutrin in past.  Excited about moving into home off [address]. 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 At the end of the visit he asks for referral to GI. Denies BRBPR but states he had a dx of IBS a few years back and it is getting worse. Experiences abdominal bloating at times. Has not had to leave work this year for this. ROS negative for headache, vision changes, chest pain, shortness of breath,  urinary sx, bowel changes. Past medical, surgical, and social history reviewed. Medications and allergies reviewed. Allergies   Allergen Reactions    Lactose Intolerance (Gi) Nausea Only     Prior to Visit Medications    Medication Sig Taking? Authorizing Provider   amphetamine-dextroamphetamine (ADDERALL) 30 MG tablet Take 1 tab po qAM and 1 tab po mid-day. Yes Indy Montemayor, DO   amphetamine-dextroamphetamine (ADDERALL) 30 MG tablet Take 1 tab po qAM and 1 tab po mid-day. Yes Indy Montemayor, DO   amphetamine-dextroamphetamine (ADDERALL, 30MG,) 30 MG tablet Take 1 tab po qAM and 1 tab po mid-day. Yes Indy Montemayor, DO   gabapentin (NEURONTIN) 300 MG capsule Take 1 capsule po TID.  Yes No    Plan:     Time spent with patient: 60    The patient meets one or more of the following criteria:  * Requires services to prevent readmission to a nursing home or hospital    Acute concern/Follow-up recommendations: follow care plan.    Next CP visit scheduled: 07/29/2022    Issues for Provider to follow up on: met with Po in his home, medications were set up in pill boxes for two weeks. Medications that were low were called into pharmacy for refill.    Provider follow up visit needed: PADDY       asked to follow-up if condition(s) do not improve as anticipated. #6: Encouraged cessation. Discussed with patient treatment options, and programs to help pt quit. Pt verbalizes understanding, aware of risks of persistent tobacco usage. #5: The current medical regimen is effective;  continue present plan and medications. #4: obtain opinion. PLAN          If applicable, see additional patient information and instructions under \"Patient Instructions. \"    Return in about 3 months (around 2020). There are no Patient Instructions on file for this visit. TOTAL TIME (in minutes) SPENT ON CONFERENCIN    Please note a portion of this chart was generated using dragon dictation software. Although every effort was made to ensure the accuracy of this automated transcription, some errors in transcription may have occurred. Pursuant to the emergency declaration under the [facility], [facility] waiver authority and the [employer] Act, this Virtual  Visit was conducted, with patient's consent, to reduce the patient's risk of exposure to COVID-19 and provide continuity of care for an established patient. Services were provided through a video synchronous discussion virtually to substitute for in-person clinic visit. Patient was instructed that the AVS is available on My Chart or was emailed to the patient if not on My Chart. Lab orders were emailed to patient if they do not use a [facility] lab. Any work notes were sent to patient through My Chart or email.

## 2022-07-21 NOTE — PROGRESS NOTES
7/21/2022  Mailed upcoming appts to patient and brother to support set up ride for 9-15-22 appt    Scarlett Varma  Community Health Worker  [facility] Care Coordination  [email or web address]   Office: [phone]  Fax: [phone]

## 2022-07-25 ENCOUNTER — HOSPITAL ENCOUNTER (EMERGENCY)
Facility: HOSPITAL | Age: 32
Discharge: HOME | End: 2022-07-25
Attending: EMERGENCY MEDICINE | Admitting: EMERGENCY MEDICINE
Payer: COMMERCIAL

## 2022-07-25 VITALS
RESPIRATION RATE: 22 BRPM | HEIGHT: 62 IN | HEART RATE: 81 BPM | WEIGHT: 165 LBS | BODY MASS INDEX: 30.36 KG/M2 | SYSTOLIC BLOOD PRESSURE: 129 MMHG | OXYGEN SATURATION: 98 % | DIASTOLIC BLOOD PRESSURE: 74 MMHG | TEMPERATURE: 97.4 F

## 2022-07-25 DIAGNOSIS — R53.82 CHRONIC FATIGUE: ICD-10-CM

## 2022-07-25 LAB
ALBUMIN UR-MCNC: 50 MG/DL
ANION GAP SERPL CALCULATED.3IONS-SCNC: 7 MMOL/L (ref 5–18)
APPEARANCE UR: CLEAR
BASOPHILS # BLD AUTO: 0 10E3/UL (ref 0–0.2)
BASOPHILS NFR BLD AUTO: 1 %
BILIRUB UR QL STRIP: NEGATIVE
BUN SERPL-MCNC: 9 MG/DL (ref 8–22)
CALCIUM SERPL-MCNC: 10.1 MG/DL (ref 8.5–10.5)
CHLORIDE BLD-SCNC: 101 MMOL/L (ref 98–107)
CO2 SERPL-SCNC: 28 MMOL/L (ref 22–31)
COLOR UR AUTO: ABNORMAL
CREAT SERPL-MCNC: 1.14 MG/DL (ref 0.7–1.3)
EOSINOPHIL # BLD AUTO: 0.2 10E3/UL (ref 0–0.7)
EOSINOPHIL NFR BLD AUTO: 3 %
ERYTHROCYTE [DISTWIDTH] IN BLOOD BY AUTOMATED COUNT: 12.2 % (ref 10–15)
GFR SERPL CREATININE-BSD FRML MDRD: 88 ML/MIN/1.73M2
GLUCOSE BLD-MCNC: 104 MG/DL (ref 70–125)
GLUCOSE UR STRIP-MCNC: NEGATIVE MG/DL
HCT VFR BLD AUTO: 58.9 % (ref 40–53)
HGB BLD-MCNC: 20.1 G/DL (ref 13.3–17.7)
HGB UR QL STRIP: NEGATIVE
IMM GRANULOCYTES # BLD: 0 10E3/UL
IMM GRANULOCYTES NFR BLD: 0 %
KETONES UR STRIP-MCNC: 10 MG/DL
LEUKOCYTE ESTERASE UR QL STRIP: NEGATIVE
LYMPHOCYTES # BLD AUTO: 2.5 10E3/UL (ref 0.8–5.3)
LYMPHOCYTES NFR BLD AUTO: 42 %
MCH RBC QN AUTO: 29.3 PG (ref 26.5–33)
MCHC RBC AUTO-ENTMCNC: 34.1 G/DL (ref 31.5–36.5)
MCV RBC AUTO: 86 FL (ref 78–100)
MONOCYTES # BLD AUTO: 0.6 10E3/UL (ref 0–1.3)
MONOCYTES NFR BLD AUTO: 10 %
NEUTROPHILS # BLD AUTO: 2.6 10E3/UL (ref 1.6–8.3)
NEUTROPHILS NFR BLD AUTO: 44 %
NITRATE UR QL: NEGATIVE
NRBC # BLD AUTO: 0 10E3/UL
NRBC BLD AUTO-RTO: 0 /100
PH UR STRIP: 6.5 [PH] (ref 5–7)
PLATELET # BLD AUTO: 207 10E3/UL (ref 150–450)
POTASSIUM BLD-SCNC: 4.6 MMOL/L (ref 3.5–5)
RBC # BLD AUTO: 6.87 10E6/UL (ref 4.4–5.9)
RBC URINE: 1 /HPF
SODIUM SERPL-SCNC: 136 MMOL/L (ref 136–145)
SP GR UR STRIP: 1.02 (ref 1–1.03)
SQUAMOUS EPITHELIAL: <1 /HPF
UROBILINOGEN UR STRIP-MCNC: <2 MG/DL
WBC # BLD AUTO: 5.9 10E3/UL (ref 4–11)
WBC URINE: <1 /HPF

## 2022-07-25 PROCEDURE — 99284 EMERGENCY DEPT VISIT MOD MDM: CPT | Mod: 25

## 2022-07-25 PROCEDURE — 81001 URINALYSIS AUTO W/SCOPE: CPT | Performed by: EMERGENCY MEDICINE

## 2022-07-25 PROCEDURE — 36415 COLL VENOUS BLD VENIPUNCTURE: CPT | Performed by: EMERGENCY MEDICINE

## 2022-07-25 PROCEDURE — 258N000003 HC RX IP 258 OP 636: Performed by: EMERGENCY MEDICINE

## 2022-07-25 PROCEDURE — 250N000011 HC RX IP 250 OP 636: Performed by: EMERGENCY MEDICINE

## 2022-07-25 PROCEDURE — 93005 ELECTROCARDIOGRAM TRACING: CPT | Performed by: EMERGENCY MEDICINE

## 2022-07-25 PROCEDURE — 96361 HYDRATE IV INFUSION ADD-ON: CPT

## 2022-07-25 PROCEDURE — 96374 THER/PROPH/DIAG INJ IV PUSH: CPT

## 2022-07-25 PROCEDURE — 85025 COMPLETE CBC W/AUTO DIFF WBC: CPT | Performed by: EMERGENCY MEDICINE

## 2022-07-25 PROCEDURE — 82310 ASSAY OF CALCIUM: CPT | Performed by: EMERGENCY MEDICINE

## 2022-07-25 RX ADMIN — SODIUM CHLORIDE 1000 ML: 9 INJECTION, SOLUTION INTRAVENOUS at 16:23

## 2022-07-25 RX ADMIN — HYDROCORTISONE SODIUM SUCCINATE 100 MG: 100 INJECTION, POWDER, FOR SOLUTION INTRAMUSCULAR; INTRAVENOUS at 16:25

## 2022-07-25 NOTE — ED TRIAGE NOTES
"     Triage Assessment     Row Name 07/25/22 1010       Triage Assessment (Adult)    Airway WDL WDL       Respiratory WDL    Respiratory WDL WDL       Skin Circulation/Temperature WDL    Skin Circulation/Temperature WDL WDL       Cardiac WDL    Cardiac WDL WDL       Peripheral/Neurovascular WDL    Peripheral Neurovascular WDL WDL       Cognitive/Neuro/Behavioral WDL    Cognitive/Neuro/Behavioral WDL WDL            Patient reports that he has been sick for 6 years, c/o dizziness and fatigue.  Pt has \"bad kidneys\", does not receive dialysis.  Pt seems to be angry during triage, yelling at .  "

## 2022-07-25 NOTE — DISCHARGE INSTRUCTIONS
Encourage fluids.  Improve fluid intake.  Take your medications daily as prescribed.  Follow-up with your clinic in the next week.

## 2022-07-25 NOTE — ED PROVIDER NOTES
"EMERGENCY DEPARTMENT ENCOUNTER      NAME: Brooke Peterson  AGE: 31 year old male  YOB: 1990  MRN: [id number]  EVALUATION DATE & TIME: 7/25/2022  2:24 PM    PCP: Jose Rangel    ED PROVIDER: Alberto Correa M.D.      No chief complaint on file.    Chronic dizziness and fatigue.    FINAL IMPRESSION:  1.  Chronic dizziness and fatigue.      ED COURSE & MEDICAL DECISION MAKING:    3:04 PM  I met with the patient to gather history and to perform my initial exam. We discussed plans for the ED course, including diagnostic testing and treatment. PPE worn: cloth mask.  Patient with chronic many year history of dizziness and fatigue.  Is not certain why he is today over any other day.  Patient notes that \"I have bad kidneys but I am not on dialysis\".  Laboratory work ordered from triage shows a BUN/creatinine of 9 and 1.14.  Patient has a chronic history of erythrocytosis and elevated red blood cell count.  Currently 20.1 thousand previously 17.72 months ago.  History limited even with .   had a hard time understanding the patient also.   notes mumbling.  6:38 PM.  Urinalysis unremarkable.  Chemistries unremarkable.  Normal renal function.  CBC unremarkable other than hemoglobin 20.1 which is similar to 17-18,000 with previous testing.  Patient with a history of erythrocytosis.  None of these are new findings.  Patient received IV fluids and hydrocortisone.  Patient will be discharged home.  Patient in agreement with the plan.  Results communicated to the patient via .    Pertinent Labs & Imaging studies reviewed. (See chart for details)    31 year old male presents to the Emergency Department for evaluation of chronic dizziness and fatigue.    At the conclusion of the encounter I discussed the results of all of the tests and the disposition. The questions were answered. The patient or family acknowledged understanding and was agreeable with the care plan. 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  This is chronic as he has been this way for over 6 years.  Patient with underlying [place]'s disease and schizophrenia.  Says he has been taking his medications.    Patients HPI is limited due to patients language barrier, even with a .  .    He does not identify any waxing or waning symptoms otherwise, exacerbating or alleviating features,associated symptoms except as mentioned. He denies any pain related complaints.    REVIEW OF SYSTEMS   Review of Systems   Reason unable to perform ROS: Limited due to language barrier.        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        CURRENT MEDICATIONS:    acetaminophen (TYLENOL) 325 MG tablet  albuterol (PROAIR HFA/PROVENTIL HFA/VENTOLIN HFA) 108 (90 Base) MCG/ACT inhaler  baclofen (LIORESAL) 10 MG tablet  famotidine (PEPCID) 20 MG tablet  fludrocortisone (FLORINEF) 0.1 MG tablet  fluticasone-salmeterol (ADVAIR HFA) 115-21 MCG/ACT inhaler  hydrocortisone  (CORTEF) 10 MG tablet  levothyroxine (SYNTHROID/LEVOTHROID) 75 MCG tablet  loratadine (CLARITIN) 10 MG tablet  meclizine (ANTIVERT) 12.5 MG tablet  Multiple Vitamin (MULTI-VITAMINS) TABS  OLANZapine (ZYPREXA) 5 MG tablet  ondansetron (ZOFRAN) 4 MG tablet  vitamin B-12 (CYANOCOBALAMIN) 500 MCG tablet  vitamin D3 (VITAMIN D3) 50 mcg (2000 units) tablet        ALLERGIES:  No Known Allergies    FAMILY HISTORY:  Family History   Problem Relation Age of Onset     Diabetes Mother      Cancer No family hx of        SOCIAL HISTORY:   Social History     Socioeconomic History     Marital status: Single   Tobacco Use     Smoking status: Never Smoker     Smokeless tobacco: Never Used   Substance and Sexual Activity     Alcohol use: Not Currently     Drug use: Never     Social Determinants of Health     Financial Resource Strain: Medium Risk     Difficulty of Paying Living Expenses: Somewhat hard   Food Insecurity: No Food Insecurity     Worried About Running Out of Food in the Last Year: Never true     Ran Out of Food in the Last Year: Never true   Transportation Needs: Unmet Transportation Needs     Lack of Transportation (Medical): Yes     Lack of Transportation (Non-Medical): No   Physical Activity: Insufficiently Active     Days of Exercise per Week: 4 days     Minutes of Exercise per Session: 10 min   Stress: No Stress Concern Present     Feeling of Stress : Only a little   Social Connections: Socially Isolated     Frequency of Communication with Friends and Family: Never     Frequency of Social Gatherings with Friends and Family: More than three times a week     Attends Muslim Services: Never     Active Member of Clubs or Organizations: No     Attends Club or Organization Meetings: Never     Marital Status: Never    Intimate Partner Violence: Not At Risk     Fear of Current or Ex-Partner: No     Emotionally Abused: No     Physically Abused: No     Sexually Abused: No   Housing Stability: Low Risk      Unable to  "Pay for Housing in the Last Year: No     Number of Places Lived in the Last Year: 1     Unstable Housing in the Last Year: No   No drugs, alcohol, or tobacco.    VITALS:  BP (!) 134/91   Pulse 87   Temp 97.4  F (36.3  C) (Temporal)   Resp 21   Ht 1.575 m (5' 2\")   Wt 74.8 kg (165 lb)   SpO2 100%   BMI 30.18 kg/m      PHYSICAL EXAM    Vital Signs:  BP (!) 134/91   Pulse 87   Temp 97.4  F (36.3  C) (Temporal)   Resp 21   Ht 1.575 m (5' 2\")   Wt 74.8 kg (165 lb)   SpO2 100%   BMI 30.18 kg/m    General:  On entering the room he is in no apparent distress.    Neck:  Neck supple with full range of motion and nontender.    Back:  Back and spine are nontender.  No costovertebral angle tenderness.    HEENT:  Oropharynx clear with moist mucous membranes.  HEENT unremarkable.    Pulmonary:  Chest clear to auscultation without rhonchi rales or wheezing.    Cardiovascular:  Cardiac regular rate and rhythm without murmurs rubs or gallops.    Abdomen:  Abdomen soft nontender.  There is no rebound or guarding.    Muskuloskeletal:  he moves all 4 without any difficulty and has normal neurovascular exams.  Extremities without clubbing, cyanosis, or edema.  Legs and calves are nontender.    Neuro:  he is alert and oriented ×3 and moves all extremities symmetrically.    Psych:  Normal affect.    Skin:  Unremarkable and warm and dry.       LAB:  All pertinent labs reviewed and interpreted.  Labs Ordered and Resulted from Time of ED Arrival to Time of ED Departure   CBC WITH PLATELETS AND DIFFERENTIAL - Abnormal       Result Value    WBC Count 5.9      RBC Count 6.87 (*)     Hemoglobin 20.1 (*)     Hematocrit 58.9 (*)     MCV 86      MCH 29.3      MCHC 34.1      RDW 12.2      Platelet Count 207      % Neutrophils 44      % Lymphocytes 42      % Monocytes 10      % Eosinophils 3      % Basophils 1      % Immature Granulocytes 0      NRBCs per 100 WBC 0      Absolute Neutrophils 2.6      Absolute Lymphocytes 2.5      Absolute " Monocytes 0.6      Absolute Eosinophils 0.2      Absolute Basophils 0.0      Absolute Immature Granulocytes 0.0      Absolute NRBCs 0.0     ROUTINE UA WITH MICROSCOPIC REFLEX TO CULTURE - Abnormal    Color Urine Light Yellow      Appearance Urine Clear      Glucose Urine Negative      Bilirubin Urine Negative      Ketones Urine 10  (*)     Specific Gravity Urine 1.018      Blood Urine Negative      pH Urine 6.5      Protein Albumin Urine 50  (*)     Urobilinogen Urine <2.0      Nitrite Urine Negative      Leukocyte Esterase Urine Negative      RBC Urine 1      WBC Urine <1      Squamous Epithelials Urine <1     BASIC METABOLIC PANEL - Normal    Sodium 136      Potassium 4.6      Chloride 101      Carbon Dioxide (CO2) 28      Anion Gap 7      Urea Nitrogen 9      Creatinine 1.14      Calcium 10.1      Glucose 104      GFR Estimate 88         RADIOLOGY:  Reviewed all pertinent imaging. Please see official radiology report.  No orders to display     EKG:        I have independently reviewed and interpreted the EKG(s) documented above.      PROCEDURES:         I, Ted Hernandez, am serving as a scribe to document services personally performed by Dr. Correa based on my observation and the provider's statements to me. I, Alberto Correa MD attest that Ted Hernandez is acting in a scribe capacity, has observed my performance of the services and has documented them in accordance with my direction.    Alberto Correa M.D.  Emergency Medicine  [facility] EMERGENCY DEPARTMENT  [facility] [place] [facility] [address] [phone]  Dept: [phone]       Alberto Correa MD  07/25/22 5344

## 2022-07-26 LAB
ATRIAL RATE - MUSE: 81 BPM
DIASTOLIC BLOOD PRESSURE - MUSE: NORMAL MMHG
INTERPRETATION ECG - MUSE: NORMAL
P AXIS - MUSE: 37 DEGREES
PR INTERVAL - MUSE: 142 MS
QRS DURATION - MUSE: 72 MS
QT - MUSE: 366 MS
QTC - MUSE: 425 MS
R AXIS - MUSE: 24 DEGREES
SYSTOLIC BLOOD PRESSURE - MUSE: NORMAL MMHG
T AXIS - MUSE: 30 DEGREES
VENTRICULAR RATE- MUSE: 81 BPM

## 2022-07-26 NOTE — ED NOTES
Patient and family member reviewed discharge.  Discussed printed discharge information.  Follow-up appts reviewed.   What s/s warrant return to the ER.      Importance of social distancing, good hand hygiene, and wearing a mask when in public spaces.    Discharge completed by provider.

## 2022-07-26 NOTE — ED NOTES
Family called and on way to pick him up. Will be 20 minutes before arrival. Pt ready to go and waiting in room for now as concern for cognition and ability to wait without supervision in lobby.

## 2022-07-27 ENCOUNTER — OFFICE VISIT (OUTPATIENT)
Dept: ENDOCRINOLOGY | Facility: CLINIC | Age: 32
End: 2022-07-27
Payer: COMMERCIAL

## 2022-07-27 ENCOUNTER — LAB (OUTPATIENT)
Dept: LAB | Facility: CLINIC | Age: 32
End: 2022-07-27

## 2022-07-27 VITALS
OXYGEN SATURATION: 95 % | HEART RATE: 116 BPM | SYSTOLIC BLOOD PRESSURE: 104 MMHG | HEIGHT: 60 IN | BODY MASS INDEX: 32.71 KG/M2 | WEIGHT: 166.6 LBS | DIASTOLIC BLOOD PRESSURE: 75 MMHG

## 2022-07-27 DIAGNOSIS — D75.1 ERYTHROCYTOSIS: ICD-10-CM

## 2022-07-27 DIAGNOSIS — E03.9 HYPOTHYROIDISM, UNSPECIFIED TYPE: ICD-10-CM

## 2022-07-27 DIAGNOSIS — E27.1: Primary | ICD-10-CM

## 2022-07-27 DIAGNOSIS — E71.529: Primary | ICD-10-CM

## 2022-07-27 PROCEDURE — 99000 SPECIMEN HANDLING OFFICE-LAB: CPT | Performed by: PATHOLOGY

## 2022-07-27 PROCEDURE — 82668 ASSAY OF ERYTHROPOIETIN: CPT | Mod: 90 | Performed by: PATHOLOGY

## 2022-07-27 PROCEDURE — 36415 COLL VENOUS BLD VENIPUNCTURE: CPT | Performed by: PATHOLOGY

## 2022-07-27 PROCEDURE — 84403 ASSAY OF TOTAL TESTOSTERONE: CPT | Mod: 90 | Performed by: PATHOLOGY

## 2022-07-27 PROCEDURE — 99215 OFFICE O/P EST HI 40 MIN: CPT

## 2022-07-27 RX ORDER — LEVOTHYROXINE SODIUM 75 UG/1
75 TABLET ORAL DAILY
Qty: 90 TABLET | Refills: 4 | Status: SHIPPED | OUTPATIENT
Start: 2022-07-27

## 2022-07-27 ASSESSMENT — PAIN SCALES - GENERAL
PAINLEVEL: NO PAIN (0)
PAINLEVEL: NO PAIN (0)

## 2022-07-27 NOTE — PATIENT INSTRUCTIONS
Labs today  Referral for sleep study  Genetic counseling referral to talk about gene test and possibly family genetic disorder       ORDERS FOR HOME CARE:  1.  Please measure orthostatic heart rate and blood pressure and report back to me  2.  Get me your current MAR to review.         Information for patients on hydrocortisone for treatment of adrenal insufficiency        There are two forms of adrenal insufficiency  Primary adrenal insufficiency is due to primary failure of the adrenal gland. This is the type you have .  Secondary (or central) adrenal insufficiency is due to failure of the adrenal gland to make normal levels of hormone, but because of failure of the pituitary to supply the proper instructions.     Adrenal insufficiency is treated with adrenal hormone (usually hydrocortisone or prednisone) to replace the deficiency.  The treatment is designed to reproduce what the body would do in a healthy state.  This usually includes taking a slightly higher dose of hormone in the morning and a smaller dose later in the day.     For patients with adrenal insufficiency, steroid treatment is necessary to maintain a healthy state of life.  Too much or too little steroid treatment can have serious consequences, however.       During minor illness, the body normally makes more adrenal steroid and therefore you should also increase your dose of adrenal replacement medication as directed by your physician.    During major illness, or if you seek medical care because of your illness, be sure to tell the treating physician that you have  adrenal insufficiency  and that you require higher doses of steroids to compensate for the illness.          Your dose of during good health:  hydrocortisone 10 mg AM, 5 mg noon   Florinef 0.1 mg/day     Your dose for minor illness (colds, flu)  for 2-3 days, then back to usual maintenance dose: 20 mg three times/day     If you are unable to take your medication because of vomiting, it  is important to receive the medication intravenously.       Patients with adrenal insufficiency often wear a medical ID alert bracelet with the diagnosis noted  adrenal insufficiency

## 2022-07-27 NOTE — PROGRESS NOTES
Endocrine video visit note-    Attending Assessment/Plan :     Adrenal insufficiency, primary with history of high ACTH, hyperpigmentation, cushingoid appearance in the past.  x linked adrenaleukodystrophy pattern very long chain fatty acid pattern on testing 5/14/22  I don't believe he has gene testing of the ABCD1 gene. This is recommended to facilitate genetic counseling for the family.   Referral placed  I have counseled them on this but I am wary of their understanding.      Medication management is a major issue despite having homecare.  Once again I don't have the home care MAR and the patient is unable to tell me what he is getting.     Immigrant with language barrier. He is dependent on the care of others , unable to advocate and protect himself.  He presented today with a brother who doesn't live with him and who doesn't know much history.    Hypothyroidism by history.  Normal TFTs 7/12/2021 on current regimen      Pituitary -nodule seen on 2017 MRI, not on 2019.  Neither was pituitary study.      High Hgb /hematocrit -He is not on testosterone.  Hematology diagnosis 4/5/2022 was secondary erythrocytosis.  I am concerned about this as it apapers to be getting worse .  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 The EPO is NOT consistent with secondary erythrocytosis, continues to raise concern for p vera, despite the normal JAK2.  I will reach out to Dr Silver whom he saw in hematololgy/oncology    __ minutes spent on the date of the encounter doing chart review, history and exam, documentation and further activities as noted above.    Maddison Witt MD    Chief complaint/ HISTORY OF PRESENT ILLNESS  Toe presents for follow up of primary adrenal insufficiency and hypothyroidism.  He was seen today along with Angela   (on the phone) and his brother  Who doesn't live in the same house with him. .     The original diagnosis of hypothyroidism is not well documented on care everywhere.   The original diagnosis of adrenal insufficiency was 7/2017. He had high ACTH with low cortisol, failure to respond to cortrosyn stimulation testing.   I have been seeing him for the last few years.  During that time, we have had a lot of difficulty working with his home care team relative to getting an accurate medication list and also possibly relative to our medication rx orders being followed.  This is an issue at every visit and between visits.  Today once again, I do not have a copy of the MAR  From the home care.    He has a pill box with 2 rows and each day of the week.  The nurses set up his meds which he takes twice/day.  He doesn't know his meds .  I can see a 7/6/2022 note on the record describing that his meds were set up in pill boxes.  The endocrine relevant meds on the list from that day are as follows:  LT4 75 mcg/day  HC 10 AM and 5 mg PM  Florinef 0.1 mg/day  B12 500 mcg/day    He was hospitalized at [facility] 5/10-5/19/2022 with weakness of the right leg.  MRI showed demyelination of the corticospinal tract.  Measurement of very long chain fatty acids was consistent with x linked adrenaleukodystrophy or adrenalmyeloneuropathy.  He was seen in neuro follow up 6/22/2022 .  I have reviewed their note.   He was seen in the ED on 7/25/2022 with complaint of dizziness and fatigue.  This is a common complaint at our visits as well.        3/1/2021 175 lbs, 111/77, HR 86/minute; florinef 0.1; HC 20/5 on the med list - appt with Dr Rangel  7/12/2021 179 lbs, 114/80, HR 90/minute ; appt with Dr Rangel - reporting dizziness.  Wide based gate described.      We have the following selected and  more recent data  7/12/17: Na 129, K 4.7, Cl 97, C02 18, creatinine 1.47, lactate 1.9  7/24/17: ACTH 1845  7/25/17 cortrosyn stimulatin test  : cortisol < 1 -- 1.4-- 1.8  2/3/2020 ACTH > 1250, TSH 0.18, free T4 1.16, renin activity 9.3, Na 139, K 3.8, Ca 9.4 , phos 3.4  10/8/2020 TSH 0.77, prolactin 8.8, ACTH 106, renin activity 3.3  2/1/2021 TSH 0.02, Hgb 16.8, platelets 164K  3/10/2021  free T4 1.1, TSH 1.62, T3 75, Na 138, K 4, creatinine 0.82;   3/11/2021 renin 7.4  4/12/2021 TSH 2.34, T3 73, free T4 1.2, renin 4.4, Na 139, K 4, creatinine 0.87  7/12/201 TSH 2.18, free T4 1.14, prolactin 11.6, Na 139, K 4.2, creatinine 0.84, B12 515, glucose 107, Hgb 18.3, hematocrit 54.1  1/26/22 erythropoetin 4 (4-27 mU/ml)  4/5/2022 JAK2 has no mutations; No mutations were identified in JAK2 exons 12 or 14 (including codon V617).  5/5/2022 B12 359, TSH 2.69, free T4 1.07, prolactin 9, ca 9.9,   5/10/22 prolactin 13.2  5/14/2022 plasma total lipid Very long chain and branched chaing fatty acids C26:0 hexacosanoic 1.120 mc/ml (0.23-0.09); C24/C22 1.856  (0.84 +/0 0.1) , C26/C22 0.088 (0.01 +/- 0.04) - results are consistent with defect in peroxisomal fatty acid oxidation such as x-linked adrenaleukodystrophy or adrenomyeloneuropathy.   7/25/2022 in the ED Hgb 20.1, hematocrit 58.9, WBC 5900, platelets 207      Imaging-- review of PAC by me today - we have no outside images on PACS  7/24/17 CT abdomen: atrophic adrenal glands.   7/24/17 Brain MRI: 7 mm nodule within pituitary, hypoenhancing  6/24/19: MRI brain: images as reviewed by me today on PACS: pituitary flat/rounded upper contour on sagittal; flat on limited coronal; this is not a pituitary study and there are only limited pituitary images.  6/28/2022 renal US normal      REVIEW OF SYSTEMS  Not doing well  Tired  Unable to sleep - get a little bit of sleep;   Bedtime unknown .   Nausea  Not eating well-- unable to swallow food; can't swallow water either - This started in 2022   Cannot walk - using a wheel chair ;   Walks very slowly.    Would fall without the walker.  This has been for one year already.    No  pain anywhere  Dizziness.    Brother doesn't knowmuch - harder to understand him  Doesn't  Know wwdesiree is making medical decision -     Past Medical History  Past Medical History:   Diagnosis Date     Zapata's disease (H) 07/2017     Asthma      Asthma      Hypercalcemia 09/2019     Hyperprolactinemia (H) 12/2018     Hypothyroidism      Hypovitaminosis D 11/2018     Pituitary microadenoma (H)      PTSD (post-traumatic stress disorder)      Schizophrenia (H)       No past surgical history on file.      Medications    Current Outpatient Medications   Medication Sig Dispense Refill     levothyroxine (SYNTHROID/LEVOTHROID) 75 MCG tablet Take 1 tablet (75 mcg) by mouth daily 90 tablet 4     acetaminophen (TYLENOL) 325 MG tablet Take 2 tablets (650 mg) by mouth every 6 hours as needed for mild pain 90 tablet 3     albuterol (PROAIR HFA/PROVENTIL HFA/VENTOLIN HFA) 108 (90 Base) MCG/ACT inhaler Inhale 2 puffs into the lungs every 6 hours as needed for shortness of breath / dyspnea or wheezing 18 g 3     baclofen (LIORESAL) 10 MG tablet Take 1 tablet (10 mg) by mouth 2 times daily 60 tablet 11     famotidine (PEPCID) 20 MG tablet Take 20 mg by mouth 2 times daily       fludrocortisone (FLORINEF) 0.1 MG tablet Take 1 tablet (0.1 mg) by mouth in the morning. 90 tablet 4     fluticasone-salmeterol (ADVAIR HFA) 115-21 MCG/ACT inhaler Inhale 2 puffs into the lungs 2 times daily 12 g 3     hydrocortisone (CORTEF) 10 MG tablet Take 1 tablet (10 mg) by mouth every morning Take 1 tablet 10 mg by mouth in AM and 1/2 tablet in PM   May take Take 20 mg twice/day for 3 days if illness.       loratadine (CLARITIN) 10 MG tablet Take 1 tablet (10 mg) by mouth daily 90 tablet 2     meclizine (ANTIVERT) 12.5 MG tablet Take 1 tablet (12.5 mg) by mouth 3 times daily as needed for dizziness 30 tablet 1     Multiple Vitamin (MULTI-VITAMINS) TABS Take 1 tablet by mouth daily 90 tablet 2     OLANZapine (ZYPREXA) 5 MG tablet Take 1 tablet (5 mg) by  mouth every morning 90 tablet 0     ondansetron (ZOFRAN) 4 MG tablet Take 4 mg by mouth every 8 hours as needed for nausea       vitamin B-12 (CYANOCOBALAMIN) 500 MCG tablet Take 1 tablet (500 mcg) by mouth daily 30 tablet 1     vitamin D3 (VITAMIN D3) 50 mcg (2000 units) tablet Take 1 tablet (50 mcg) by mouth daily 30 tablet 3       Allergies  No Known Allergies    Family History  family history includes Diabetes in his mother.  Toe doesn't know family history    Social History  Social History     Tobacco Use     Smoking status: Never Smoker     Smokeless tobacco: Never Used   Substance Use Topics     Alcohol use: Not Currently     Drug use: Never     Lives with parents (mom, dad, 4 siblings- dad and sibs leave the home);.   Nurse sets up the medication every 2 weeks -     [facility] to     Physical Exam  /81 (BP Location: Right arm, Patient Position: Supine, Cuff Size: Adult Regular)   Pulse 92   Ht 1.524 m (5')   Wt 75.6 kg (166 lb 9.6 oz)   SpO2 95%   BMI 32.54 kg/m    Body mass index is 32.54 kg/m .   BP Readings from Last 1 Encounters:   07/27/22 119/81      Pulse Readings from Last 1 Encounters:   07/27/22 92      Resp Readings from Last 1 Encounters:   07/25/22 22      Temp Readings from Last 1 Encounters:   07/25/22 97.4  F (36.3  C) (Temporal)      SpO2 Readings from Last 1 Encounters:   07/27/22 95%      Wt Readings from Last 1 Encounters:   07/27/22 75.6 kg (166 lb 9.6 oz)      Ht Readings from Last 1 Encounters:   07/27/22 1.524 m (5')       BP  119/81   104/75      BP Location  Right arm   Right arm     Patient Position  Supine   Standing     Cuff Size  Adult Regular   Adult Regular     Pulse  92   116      GENERAL: no distress ; not cushingoid.  Walker; He looks like a different person from the person I last saw at our last visit.   SKIN: normal temperature; No hyperpigmentation of palmar creases.  No acne;   EYES: Eyes grossly normal to inspection.  .  NECK: no  visible masses; no grossly visible goiter   CARDIAC RRR s1 S2  ABDOMEN: rounded countour;   RESP: No audible wheeze, cough, or visible cyanosis.  No visible retractions or increased work of breathing.    NEURO: Awake, alert, responds appropriately to questions.  Mentation and speech sounds fluent but both his brother and the  couldn't understand him at times.  Gait is broadbased.  He walks with walker, followed instructions .  He responds to questions.   PSYCH:affect seems  normal,  appearance well-groomed.

## 2022-07-27 NOTE — NURSING NOTE
Chief Complaint   Patient presents with     Thyroid Problem     Thyroid cancer f/up     Vital signs:      BP: 104/75 Pulse: 116     SpO2: 95 %     Height: 152.4 cm (5') Weight: 75.6 kg (166 lb 9.6 oz)  Estimated body mass index is 32.54 kg/m  as calculated from the following:    Height as of this encounter: 1.524 m (5').    Weight as of this encounter: 75.6 kg (166 lb 9.6 oz).

## 2022-07-27 NOTE — LETTER
7/27/2022       RE: Brooke Peterson  [address] [place] [address]     Dear Colleague,    Thank you for referring your patient, Brooke Peterson, to the [facility] [place] at [facility]. Please see a copy of my visit note below.    Endocrine video visit note-    Attending Assessment/Plan :     Adrenal insufficiency, primary with history of high ACTH, hyperpigmentation, cushingoid appearance in the past.  x linked adrenaleukodystrophy pattern very long chain fatty acid pattern on testing 5/14/22  I don't believe he has gene testing of the ABCD1 gene. This is recommended to facilitate genetic counseling for the family.   Referral placed  I have counseled them on this but I am wary of their understanding.      Medication management is a major issue despite having homecare.  Once again I don't have the home care MAR and the patient is unable to tell me what he is getting.     Immigrant with language barrier. He is dependent on the care of others , unable to advocate and protect himself.  He presented today with a brother who doesn't live with him and who doesn't know much history.    Hypothyroidism by history.  Normal TFTs 7/12/2021 on current regimen      Pituitary -nodule seen on 2017 MRI, not on 2019.  Neither was pituitary study.      High Hgb /hematocrit -He is not on testosterone.  Hematology diagnosis 4/5/2022 was secondary erythrocytosis.  I am concerned about this as it apapers to be getting worse .  Adrenoleukodystrophy has presented with secondary erythrocytosis due to central sleep apnea   [email or web address]  Repeat erythropoeitin level  Referral to sleep study    Addendum: epo 6, testosterone 460. The EPO is NOT consistent with secondary erythrocytosis, continues to raise concern for p vera, despite the normal JAK2.  I will reach out to Dr Silver whom he saw in hematololgy/oncology    __ minutes spent  on the date of the encounter doing chart review, history and exam, documentation and further activities as noted above.    Maddison Witt MD    Chief complaint/ HISTORY OF PRESENT ILLNESS  Toe presents for follow up of primary adrenal insufficiency and hypothyroidism.  He was seen today along with Angela  (on the phone) and his brother  Who doesn't live in the same house with him. .     The original diagnosis of hypothyroidism is not well documented on care everywhere.   The original diagnosis of adrenal insufficiency was 7/2017. He had high ACTH with low cortisol, failure to respond to cortrosyn stimulation testing.   I have been seeing him for the last few years.  During that time, we have had a lot of difficulty working with his home care team relative to getting an accurate medication list and also possibly relative to our medication rx orders being followed.  This is an issue at every visit and between visits.  Today once again, I do not have a copy of the MAR  From the home care.    He has a pill box with 2 rows and each day of the week.  The nurses set up his meds which he takes twice/day.  He doesn't know his meds .  I can see a 7/6/2022 note on the record describing that his meds were set up in pill boxes.  The endocrine relevant meds on the list from that day are as follows:  LT4 75 mcg/day  HC 10 AM and 5 mg PM  Florinef 0.1 mg/day  B12 500 mcg/day    He was hospitalized at [facility] 5/10-5/19/2022 with weakness of the right leg.  MRI showed demyelination of the corticospinal tract.  Measurement of very long chain fatty acids was consistent with x linked adrenaleukodystrophy or adrenalmyeloneuropathy.  He was seen in neuro follow up 6/22/2022 .  I have reviewed their note.   He was seen in the ED on 7/25/2022 with complaint of dizziness and fatigue.  This is a common complaint at our visits as well.        3/1/2021 175 lbs, 111/77, HR 86/minute; florinef 0.1; HC 20/5 on the med list - appt  with Dr Rangel  7/12/2021 179 lbs, 114/80, HR 90/minute ; appt with Dr Rangel - reporting dizziness.  Wide based gate described.      We have the following selected and  more recent data  7/12/17: Na 129, K 4.7, Cl 97, C02 18, creatinine 1.47, lactate 1.9  7/24/17: ACTH 1845  7/25/17 cortrosyn stimulatin test : cortisol < 1 -- 1.4-- 1.8  2/3/2020 ACTH > 1250, TSH 0.18, free T4 1.16, renin activity 9.3, Na 139, K 3.8, Ca 9.4 , phos 3.4  10/8/2020 TSH 0.77, prolactin 8.8, ACTH 106, renin activity 3.3  2/1/2021 TSH 0.02, Hgb 16.8, platelets 164K  3/10/2021  free T4 1.1, TSH 1.62, T3 75, Na 138, K 4, creatinine 0.82;   3/11/2021 renin 7.4  4/12/2021 TSH 2.34, T3 73, free T4 1.2, renin 4.4, Na 139, K 4, creatinine 0.87  7/12/201 TSH 2.18, free T4 1.14, prolactin 11.6, Na 139, K 4.2, creatinine 0.84, B12 515, glucose 107, Hgb 18.3, hematocrit 54.1  1/26/22 erythropoetin 4 (4-27 mU/ml)  4/5/2022 JAK2 has no mutations; No mutations were identified in JAK2 exons 12 or 14 (including codon V617).  5/5/2022 B12 359, TSH 2.69, free T4 1.07, prolactin 9, ca 9.9,   5/10/22 prolactin 13.2  5/14/2022 plasma total lipid Very long chain and branched chaing fatty acids C26:0 hexacosanoic 1.120 mc/ml (0.23-0.09); C24/C22 1.856  (0.84 +/0 0.1) , C26/C22 0.088 (0.01 +/- 0.04) - results are consistent with defect in peroxisomal fatty acid oxidation such as x-linked adrenaleukodystrophy or adrenomyeloneuropathy.   7/25/2022 in the ED Hgb 20.1, hematocrit 58.9, WBC 5900, platelets 207      Imaging-- review of PAC by me today - we have no outside images on PACS  7/24/17 CT abdomen: atrophic adrenal glands.   7/24/17 Brain MRI: 7 mm nodule within pituitary, hypoenhancing  6/24/19: MRI brain: images as reviewed by me today on PACS: pituitary flat/rounded upper contour on sagittal; flat on limited coronal; this is not a pituitary study and there are only limited pituitary images.  6/28/2022 renal US normal      REVIEW OF SYSTEMS  Not doing  well  Tired  Unable to sleep - get a little bit of sleep;   Bedtime unknown .   Nausea  Not eating well-- unable to swallow food; can't swallow water either - This started in 2022   Cannot walk - using a wheel chair ;   Walks very slowly.    Would fall without the walker.  This has been for one year already.    No pain anywhere  Dizziness.    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      Medications    Current Outpatient Medications   Medication Sig Dispense Refill     levothyroxine (SYNTHROID/LEVOTHROID) 75 MCG tablet Take 1 tablet (75 mcg) by mouth daily 90 tablet 4     acetaminophen (TYLENOL) 325 MG tablet Take 2 tablets (650 mg) by mouth every 6 hours as needed for mild pain 90 tablet 3     albuterol (PROAIR HFA/PROVENTIL HFA/VENTOLIN HFA) 108 (90 Base) MCG/ACT inhaler Inhale 2 puffs into the lungs every 6 hours as needed for shortness of breath / dyspnea or wheezing 18 g 3     baclofen (LIORESAL) 10 MG tablet Take 1 tablet (10 mg) by mouth 2 times daily 60 tablet 11     famotidine (PEPCID) 20 MG tablet Take 20 mg by mouth 2 times daily       fludrocortisone (FLORINEF) 0.1 MG tablet Take 1 tablet (0.1 mg) by mouth in the morning. 90 tablet 4     fluticasone-salmeterol (ADVAIR HFA) 115-21 MCG/ACT inhaler Inhale 2 puffs into the lungs 2 times daily 12 g 3     hydrocortisone (CORTEF) 10 MG tablet Take 1 tablet (10 mg) by mouth every morning Take 1 tablet 10 mg by mouth in AM and 1/2 tablet in PM   May take Take 20 mg twice/day for 3 days if illness.       loratadine (CLARITIN) 10 MG tablet  Take 1 tablet (10 mg) by mouth daily 90 tablet 2     meclizine (ANTIVERT) 12.5 MG tablet Take 1 tablet (12.5 mg) by mouth 3 times daily as needed for dizziness 30 tablet 1     Multiple Vitamin (MULTI-VITAMINS) TABS Take 1 tablet by mouth daily 90 tablet 2     OLANZapine (ZYPREXA) 5 MG tablet Take 1 tablet (5 mg) by mouth every morning 90 tablet 0     ondansetron (ZOFRAN) 4 MG tablet Take 4 mg by mouth every 8 hours as needed for nausea       vitamin B-12 (CYANOCOBALAMIN) 500 MCG tablet Take 1 tablet (500 mcg) by mouth daily 30 tablet 1     vitamin D3 (VITAMIN D3) 50 mcg (2000 units) tablet Take 1 tablet (50 mcg) by mouth daily 30 tablet 3       Allergies  No Known Allergies    Family History  family history includes Diabetes in his mother.  Toe doesn't know family history    Social History  Social History     Tobacco Use     Smoking status: Never Smoker     Smokeless tobacco: Never Used   Substance Use Topics     Alcohol use: Not Currently     Drug use: Never     Lives with parents (mom, dad, 4 siblings- dad and sibs leave the home);.   Nurse sets up the medication every 2 weeks -     [facility] to [facility] to     Physical Exam  /81 (BP Location: Right arm, Patient Position: Supine, Cuff Size: Adult Regular)   Pulse 92   Ht 1.524 m (5')   Wt 75.6 kg (166 lb 9.6 oz)   SpO2 95%   BMI 32.54 kg/m    Body mass index is 32.54 kg/m .   BP Readings from Last 1 Encounters:   07/27/22 119/81      Pulse Readings from Last 1 Encounters:   07/27/22 92      Resp Readings from Last 1 Encounters:   07/25/22 22      Temp Readings from Last 1 Encounters:   07/25/22 97.4  F (36.3  C) (Temporal)      SpO2 Readings from Last 1 Encounters:   07/27/22 95%      Wt Readings from Last 1 Encounters:   07/27/22 75.6 kg (166 lb 9.6 oz)      Ht Readings from Last 1 Encounters:   07/27/22 1.524 m (5')       BP  119/81   104/75      BP Location  Right arm   Right arm     Patient Position  Supine   Standing     Cuff Size   Adult Regular   Adult Regular     Pulse  92   116      GENERAL: no distress ; not cushingoid.  Walker; He looks like a different person from the person I last saw at our last visit.   SKIN: normal temperature; No hyperpigmentation of palmar creases.  No acne;   EYES: Eyes grossly normal to inspection.  .  NECK: no visible masses; no grossly visible goiter   CARDIAC RRR s1 S2  ABDOMEN: rounded countour;   RESP: No audible wheeze, cough, or visible cyanosis.  No visible retractions or increased work of breathing.    NEURO: Awake, alert, responds appropriately to questions.  Mentation and speech sounds fluent but both his brother and the  couldn't understand him at times.  Gait is broadbased.  He walks with walker, followed instructions .  He responds to questions.   PSYCH:affect seems  normal,  appearance well-groomed.    Maddison Witt MD

## 2022-07-28 ENCOUNTER — MEDICAL CORRESPONDENCE (OUTPATIENT)
Dept: FAMILY MEDICINE | Facility: CLINIC | Age: 32
End: 2022-07-28

## 2022-07-28 LAB — EPO SERPL-ACNC: 6 MU/ML

## 2022-07-29 ENCOUNTER — ALLIED HEALTH/NURSE VISIT (OUTPATIENT)
Dept: OTHER | Facility: CLINIC | Age: 32
End: 2022-07-29
Payer: COMMERCIAL

## 2022-07-29 VITALS
SYSTOLIC BLOOD PRESSURE: 92 MMHG | RESPIRATION RATE: 14 BRPM | HEART RATE: 74 BPM | DIASTOLIC BLOOD PRESSURE: 70 MMHG | TEMPERATURE: 98.5 F | OXYGEN SATURATION: 99 %

## 2022-07-29 DIAGNOSIS — Z79.899 MEDICATION MANAGEMENT: Primary | ICD-10-CM

## 2022-07-29 LAB — TESTOST SERPL-MCNC: 460 NG/DL (ref 240–950)

## 2022-07-29 PROCEDURE — 99207 PR COMMUNITY PARAMEDIC-PATIENT MEETS CRITERIA: CPT

## 2022-07-29 ASSESSMENT — ACTIVITIES OF DAILY LIVING (ADL): DEPENDENT_IADLS:: CLEANING;COOKING;LAUNDRY;SHOPPING;MEAL PREPARATION;MEDICATION MANAGEMENT;TRANSPORTATION

## 2022-07-29 NOTE — PROGRESS NOTES
Community Paramedics Follow-up Visit  July 29, 2022  TIME: Gideon Peterson is a 31 year old male being seen at home for a follow-up visit.    Present at appointment: Patient, Parents    Primary Diagnosis: Psychosocial    Chief Complaint   Patient presents with     Recheck Medication     Community Paramedic home visit       [place] Utilization:   Clinic Utilization  Difficulty keeping appointments:: No  Compliance Concerns: No  No-Show Concerns: Yes  No PCP office visit in Past Year: No  Utilization    Hospital Admissions  1             ED Visits  2             No Show Count (past year)  23                Current as of: 7/29/2022  1:06 PM              BP 92/70 (BP Location: Left arm, Patient Position: Sitting)   Pulse 74   Temp 98.5  F (36.9  C)   Resp 14   SpO2 99%     Clinical Concerns:  Current Medical Concerns:      Current Behavioral Concerns: medication compliance    Education Provided to patient: went through and setup medications   Medication set up? Yes  Pill Box issued: No  Scale issued: No  Flu Shot given: No  COVID Vaccine Given: No  Lab draw or specimen collection: No  Food box issued: No  Collaborative visit with PCP: No  Wound Care: No    Health Maintenance Reviewed:      Clinical Pathway: None    No  Face to Face in Home / Community      Review of Symptoms/PE    Skin: negative  Eyes: negative  Ears/Nose/Throat: negative  Respiratory: No shortness of breath, dyspnea on exertion, cough, or hemoptysis  Cardiovascular: negative  Gastrointestinal: negative  Genitourinary: negative  Musculoskeletal: negative  Neurologic: negative  Psychiatric: negative    Pain Management::       Medication Review  Medication adherence problem (GOAL):: No  Knowledgeable about how to use meds:: No  Medication side effects suspected:: No    Diet/Exercise/Sleep  Diet:: Regular  Inadequate nutrition (GOAL):: No  Tube Feeding: No  Inadequate activity/exercise (GOAL):: No  Significant changes in sleep pattern (GOAL):  No    Plan:     Time spent with patient: 60    The patient meets one or more of the following criteria:  * Requires services to prevent readmission to a nursing home or hospital    Acute concern/Follow-up recommendations: follow care plan    Next CP visit scheduled: 08/12/2022    Issues for Provider to follow up on: Community Paramedic met with pt and his family in his home, medication boxes were empty.  Medication boxes filled for two weeks.  Pharmacy called and re ordered meds that were low:  Advair inhaler  Multi Vitamin  Baclofen  Vitamin B-12  Medications will be delivered when ready.      Provider follow up visit needed: TBD

## 2022-08-02 ENCOUNTER — TELEPHONE (OUTPATIENT)
Dept: ENDOCRINOLOGY | Facility: CLINIC | Age: 32
End: 2022-08-02

## 2022-08-02 DIAGNOSIS — E27.1: ICD-10-CM

## 2022-08-02 DIAGNOSIS — E27.1 ADDISON'S DISEASE (H): ICD-10-CM

## 2022-08-02 DIAGNOSIS — E71.529: ICD-10-CM

## 2022-08-02 DIAGNOSIS — E27.40 ADRENAL INSUFFICIENCY (H): Primary | ICD-10-CM

## 2022-08-02 RX ORDER — FLUDROCORTISONE ACETATE 0.1 MG/1
0.15 TABLET ORAL DAILY
Qty: 135 TABLET | Refills: 4 | Status: SHIPPED | OUTPATIENT
Start: 2022-08-02

## 2022-08-02 NOTE — TELEPHONE ENCOUNTER
----- Message from Maddison Witt MD sent at 8/2/2022  6:36 AM CDT -----  Regarding: RE: erythrocytosis  Yes, the adrenal insufficiency COULD cause volume depletion, that is a great question.  IN fact, that is why we did orthostatic  BP/pulse on him at our last appt, which I have now clarified in my note from that day.  He did have some orthostatic changes, nothing that caused me to change the florinef at the time.    I often measure renin on adrenal insufficiency patients to address this question as well, which I now regret that I didn't do.  He is on Florinef 0.1 mg/day which usually is sufficient for this .     What makes his care so difficult is his total dependence on others and the fact that the family members who have attended appts with him seem to know nothing at all, so it is not apparent anyone is overseeing what is happening with his medication administration.  Is he actually getting all we are prescribing?  I have worried about this since the day I met him.      I could trial a higher dose of florinef (such as 0.15 mg/day) for a few months and see if it helps.  The risk of doing that is making the BP too high and/or causing low K.    Maddison Witt    ----- Message -----  From: Felipe Silver MD  Sent: 8/1/2022  10:39 PM CDT  To: Jose Rangel MD, Maddison Witt MD  Subject: RE: erythrocytosis                               I reviewed his labs again.  Over the past 8 months his hemoglobin and hematocrit have fluctuated.  They are sometimes within normal limits.  His JAK2 is negative.  The mutations that we would see in a myeloproliferative disorder are also negative.  JAK2 is a highly sensitive test for polycythemia vera.  No more than 5% of patients with PV are JAK2 negative.  His serum erythropoietin remains within the normal range with also argues against polycythemia vera.  No evidence of kidney tumors on ultrasound.  Unlikely that he had a renal artery stenosis.  I think the next step is  a sleep study as you have ordered.    If the sleep study is unrevealing then I can see him and we can check a carboxyhemoglobin and consider CT imaging for an EPO producing tumor but this usually would result in an elevated serum erythropoietin level.  If all is negative then we wanted to pursue a diagnosis then we will check oxygen dissociation to get a p50.  This could give us clues to rare hemoglobin disorders.  Overall, I think the picture is more consistent with an erytrocytosis secondary to some other process.    His chart shows that he is a non-smoker but we should probably confirm this as well.    Could the adrenal insufficiency cause volume depletion and a rise in hematocrit?    I will keep an eye out for the sleep study results.    Sung    ----- Message -----  From: Maddison Witt MD  Sent: 8/1/2022   9:45 PM CDT  To: Jose Rangel MD, Fleipe Silver MD, #  Subject: erythrocytosis                                   We still don't have an explanation for the erythrocytosis which is worse.   I repeated the EPO and it is again normal.  I placed referral for sleep evaluation.   What else needs to be done now?     Maddison Witt  Endocrinology

## 2022-08-02 NOTE — TELEPHONE ENCOUNTER
Norma Galeana   Community Health Worker   Community Paramedic program   [phone]   [email or web address]     Please reach out to his home are team and give them the following new orders:      ORDERS FOR TOE ИРИНА:  1.  Increase fludrocortisone to 0.15 mg/day (1.5 of the 0.1 mg fludrocortisone tablets) to be taken in the morning.    2. Labs to be drawn after one week on the new dose of fludrocortisone: Potassium and plasma renin activity (orders are on the [place] Epic system)    3.  Check Orthostatic blood pressure and heart rate after he has been on the new dose of fludrocortisone one week.  Report back to me.    Maddison Witt MD

## 2022-08-02 NOTE — TELEPHONE ENCOUNTER
Scarlett Varma and Valerie Galeana notified via staff message.   M for Norma Galeana [facility] Paramedic Program with notification of orders from Dr Witt.   Direct nurse line number provided for questions.   Jeri Cowan, RN on 8/2/2022 at 9:10 AM     Scarlett Varma  [facility] Health Worker  [facility] Care Coordination  [email or web address]   Office: [phone]  Fax: [phone]    Norma Galeana   [facility] Paramedic program   [phone]   [email or web address]         RE      To clinic RN team:    Please reach out to his home are team and give them the following new orders:      ORDERS FOR TOE ИРИНА:  1.  Increase fludrocortisone to 0.15 mg/day (1.5 of the 0.1 mg fludrocortisone tablets) to be taken in the morning.    2. Labs to be drawn after one week on the new dose of fludrocortisone: Potassium and plasma renin activity (orders are on the [place] [employer] system)    3.  Check Orthostatic blood pressure and heart rate after he has been on the new dose of fludrocortisone one week.  Report back to me.    Maddison Witt MD

## 2022-08-02 NOTE — TELEPHONE ENCOUNTER
Spoke w/ [facility] States their team will call patient/family to schedule.   Scarlett Washington CHW notified.   FRANKIE AlbertW notified regarding transportation request.   Jeri Cowan, RN on 8/2/2022 at 8:55 AM         Transportation  [phone]  [facility] Transportation Line  Member Line:[phone]  Provider Line [phone]    Scarlett Varma  Community Health Worker  [facility] Care Coordination  [email or web address]   Office: [phone]  Fax: [phone]      RE      To clinic RN care coordinator:   We need some help with care coordination to get him scheduled and to the sleep medicine referral ASAP.  I placed referral for this at our appt 7/27 and I can see that so far it is not scheduled.  He is dependent on the care of others and he also has transportation issues.    How can we facilitate across the system to achieve the requested appointment? (perhaps his PCP Dr Rangel's team can also help).   This is about scheduling and implementation of one or more sleep related visits (transportation, family support, etc).  This might require, in part, working with his home care team which also may be in a state of change at present as indicated on other recent chart notes.   Thanks for your help on this difficult issue.   Maddison Witt MD

## 2022-08-02 NOTE — TELEPHONE ENCOUNTER
Valerie Galeana Deanne M RN; Scarlett Varma  Cc: Madison Issa, [facility]  Caller: Unspecified (Today,  6:54 AM)  Amilcar Wilcox,   Thank you for your call and message.     I am the CHW with the Community Paramedic program. Our Community Paramedic Ming visits Toe Po to set up his medications once every two weeks at home. I will need to discuss this with Ming and Dr. Rangel, as typically we need orders and approval from the patient's PCP to do lab draws. I will also have to check and see if our CPs can draw the plasma renin activity lab, as I'm not familiar with that one.     I will forward this message onto them and make them aware/see if they are in agreement.     Scarlett is the wonderful CHW at the [facility] I'll let her respond to the request about helping pt schedule the sleep study.     I will get back to you when I hear back from Ming and Dr. Rangel. Thank you! Let me know if you have any other questions.     Norma Galeana   Community Health Worker   Community Paramedic program   [phone]   [email or web address]

## 2022-08-04 NOTE — TELEPHONE ENCOUNTER
Valerie Galeana Lynn A, MD; Jeri Cowan RN; Ming Harris, NRP, CP  Caller: Unspecified (2 days ago,  6:54 AM)  Good morning!     I wanted to loop back and let you know that I spoke with Dr. Rangel, and he is in agreement with Dr. Witt's orders. Toe Po is scheduled to go into the clinic on Tuesday for a visit, and we will ask that his labs be drawn during that visit. Our CP Ming can assist with checking pt's blood pressure and heart rate during his home visits.     Thank you, and please feel free to reach out with any questions. Have a wonderful day.     Norma Galeana   Community Health Worker   Community Paramedic program   [phone]   [email or web address]

## 2022-08-05 ENCOUNTER — PATIENT OUTREACH (OUTPATIENT)
Dept: NURSING | Facility: CLINIC | Age: 32
End: 2022-08-05

## 2022-08-05 NOTE — PROGRESS NOTES
Clinic Care Coordination Contact    Follow Up Progress Note      Assessment: CALOS SILVA to talk with pt and check for mental health support providers involved in pt care    Care Gaps:    Health Maintenance Due   Topic Date Due     ASTHMA ACTION PLAN  Never done     ADVANCE CARE PLANNING  Never done     DEPRESSION ACTION PLAN  Never done     COVID-19 Vaccine (3 - Booster for Pfizer series) 02/03/2022       Postponed to a later date. Pt did not want to discuss     Goals addressed this encounter:    Goals Addressed                    This Visit's Progress       Medication 1 (pt-stated)   On track      Goal Statement: I would like to take my medications as prescribed  Date Goal set: 01/31/2022  Barriers: Language  Strengths: Motivated  Date to Achieve By: 08/2022  Patient expressed understanding of goal: yes  Action steps to achieve this goal:  1. I will refill my medications  2. I will take my mediations as prescribed  3. I will try to learn how to fill my pill box                CALOS SILVA called and got connected with pt via  services. CALOS SILVA introduced self to pt and explained why she was calling. CALOS SILVA asked pt how he was going and pt reported that he was unable to walk. CALOS SILVA asked pt if that had started today or if it was going on for longer than that. Pt reported that it has been happening for a while. CALOS SILVA asked pt if he told his PCP and pt reported that he did.    SHIRA asked pt if the community paramedic was still coming out to his home. Pt reported that they do every two weeks to refill medications. SW asked pt how taking his meds has been going and pt reports that he does take them, but feels like they are not working as well. CALOS SILVA asked pt what he was feeling and pt reported that he did not know. SHIRA asked pt if he was experiencing symptoms or side effects with his meds and pt reported that he did not know.     SHIRA asked pt if he was working with any other support professionals right now such as a case  manager or ARM worker. Pt reported that he did not know. SW asked reframed the question and asked if anyone came to the house or did he meet with someone to talk about how he is feeling. Pt reported that he doesn't have anyone and that it is something that he did not want. CC SHIRA clarified and repeated back to pt what he told her. Pt confirmed again.     Pt reported that he was looking for a wheel chair. SW asked if pt currently had a wheelchair and pt reported that he did not. SW let pt know that he would pass this information on to his PCP for that to be discussed at his appointment on 8/9. Pt said that would be good.     Intervention/Education provided during outreach: N/A     Outreach Frequency: monthly    Plan: CALOS SILVA to outreach to [facility] to confirm pt not involved in ACT team or receiving other case management services    Care Coordination team to follow up on 8/26 with CC RN. CHW to outreach in 4 weeks.

## 2022-08-09 ENCOUNTER — OFFICE VISIT (OUTPATIENT)
Dept: FAMILY MEDICINE | Facility: CLINIC | Age: 32
End: 2022-08-09
Payer: COMMERCIAL

## 2022-08-09 VITALS
DIASTOLIC BLOOD PRESSURE: 74 MMHG | HEART RATE: 83 BPM | BODY MASS INDEX: 31.64 KG/M2 | WEIGHT: 162 LBS | SYSTOLIC BLOOD PRESSURE: 106 MMHG | TEMPERATURE: 98.6 F

## 2022-08-09 DIAGNOSIS — R32 URINARY INCONTINENCE, UNSPECIFIED TYPE: ICD-10-CM

## 2022-08-09 DIAGNOSIS — F25.1 SCHIZOAFFECTIVE DISORDER, DEPRESSIVE TYPE, WITH CATATONIA (H): ICD-10-CM

## 2022-08-09 DIAGNOSIS — E71.529: ICD-10-CM

## 2022-08-09 DIAGNOSIS — F06.1 SCHIZOAFFECTIVE DISORDER, DEPRESSIVE TYPE, WITH CATATONIA (H): ICD-10-CM

## 2022-08-09 DIAGNOSIS — E27.1 ADDISON'S DISEASE (H): ICD-10-CM

## 2022-08-09 DIAGNOSIS — E71.529 ADRENOLEUKODYSTROPHY (H): Primary | ICD-10-CM

## 2022-08-09 DIAGNOSIS — E27.1: ICD-10-CM

## 2022-08-09 LAB — POTASSIUM SERPL-SCNC: 4.2 MMOL/L (ref 3.4–5.3)

## 2022-08-09 PROCEDURE — 84132 ASSAY OF SERUM POTASSIUM: CPT | Performed by: FAMILY MEDICINE

## 2022-08-09 PROCEDURE — 99000 SPECIMEN HANDLING OFFICE-LAB: CPT | Performed by: FAMILY MEDICINE

## 2022-08-09 PROCEDURE — 84244 ASSAY OF RENIN: CPT | Mod: 90 | Performed by: FAMILY MEDICINE

## 2022-08-09 PROCEDURE — 99215 OFFICE O/P EST HI 40 MIN: CPT | Performed by: FAMILY MEDICINE

## 2022-08-09 PROCEDURE — 36415 COLL VENOUS BLD VENIPUNCTURE: CPT | Performed by: FAMILY MEDICINE

## 2022-08-09 NOTE — PROGRESS NOTES
"  Assessment & Plan     Adrenoleukodystrophy (H)  Decatur's disease with adrenoleucodystrophy (H)  Decatur's disease (H)  Labs ordered from endocrinology  - Renin activity  - Potassium    Schizoaffective disorder, depressive type, with catatonia (H)  Restart efforts at obtaining psychiatric evaluation.  Had \"psycosis\" while living in [place]--some records in media tab.  He may have been transferred from [facility] to [facility] (Jan 2017)  - Adult Mental Health  Referral    Urinary incontinence, unspecified type  Orders re-entered.  - Incontinence Supplies Order for DME - ONLY FOR DME       BMI:   Estimated body mass index is 31.64 kg/m  as calculated from the following:    Height as of 7/27/22: 1.524 m (5').    Weight as of this encounter: 73.5 kg (162 lb).     No follow-ups on file.    Jose Rangel MD  [facility]    Michelle   Toe is a 31 year old accompanied by his father, presenting for the following health issues:  patient want a letter to get a wheelchair (Patient state \"He has difficulty speaking properly and wonder if you can write him a letter to get a wheel chair. If so when can he get it and where can he get it.)      HPI   Wheel chair and incontinence products addressed at most recent visit with me.  He still doesn't have them.  I am not sure how this fell through.  Patient and family do no have capacity to make this happen without someone doing it for them.    He speech is different today.  Seems to have difficulty articulating words.  Very difficult to evaluate through .   asks patient to clarify/he needs to say things over and over to communicate.    Psych evaluation had started before hospitalization.  Has not proceeded since.  Provider indicated he needed an in person visit.  He and his father endorse him having AH and VH.      Objective    /74 (BP Location: Right arm, Patient Position: Sitting, " Cuff Size: Adult Small)   Pulse 83   Temp 98.6  F (37  C) (Temporal)   Wt 73.5 kg (162 lb)   BMI 31.64 kg/m    Body mass index is 31.64 kg/m .  Physical Exam   GENERAL: alert, not distressed  CHEST: clear, no rales, rhonchi, or wheezes  CARDIAC: regular without murmur, gallop, or rub  PSYCH: speech loud volume, increasingly so to have  understand him.  NEURO: gait with 4 point walker, unsteady          Total time in record review, examination room, coordination of care, and documentation exceeded 40 minutes.  .  ..

## 2022-08-09 NOTE — Clinical Note
Patient still does not have wheel chair or incontinence supplies. We need to contact medical supply company to make these happen. Please contact [employer] (they can deliver to patient, I think). Let's make this happen.

## 2022-08-10 ENCOUNTER — PATIENT OUTREACH (OUTPATIENT)
Dept: CARE COORDINATION | Facility: CLINIC | Age: 32
End: 2022-08-10

## 2022-08-11 ENCOUNTER — TELEPHONE (OUTPATIENT)
Dept: ENDOCRINOLOGY | Facility: CLINIC | Age: 32
End: 2022-08-11

## 2022-08-11 ENCOUNTER — PATIENT OUTREACH (OUTPATIENT)
Dept: CARE COORDINATION | Facility: CLINIC | Age: 32
End: 2022-08-11

## 2022-08-11 LAB — RENIN PLAS-CCNC: 14 NG/ML/HR

## 2022-08-11 NOTE — PROGRESS NOTES
Community Paramedic Program    CHW Community/Care Team Outreach    Received a voicemail from DENIS Triplett with pt's endocrinologist, Dr. Witt. Per her message, Dr. Witt would like information about pt's current fludrocortisone dose (0.15 mg) and if pt's currently taking that dose, when did he start. Also requested a report back of pt's orthostatic blood pressure and heart rate.    Left message on endocrinology nurse line ([phone]) notifying RN that I will pass along the request to our CP Ming. CP is scheduled to visit pt tomorrow at noon at his apartment in [place].     Routing to CP and endocrinology team for awareness.

## 2022-08-11 NOTE — TELEPHONE ENCOUNTER
Please call his home care with the following orders and questions.  Thanks    ORDERS FOR TOE ИРИНА:    1.  Please confirm what current dose of fludrocortisone he has been getting.  If he is on fludrocortisone 0.15 mg/day now, what was the start date that it was actually in his tray?     2.  Check Orthostatic blood pressure and heart rate and  Report back to me.    Thanks  Maddison Witt MD

## 2022-08-11 NOTE — TELEPHONE ENCOUNTER
Faxed new order to [phone]. Left a message for Norma to call with the  Fludrocortisone dose and time frame and  A Orthostatic blood pressure Ioana Mendoza RN on 8/11/2022 at 2:54 PM

## 2022-08-11 NOTE — PROGRESS NOTES
Clinic Care Coordination Contact  [facility]/Voicemail    Clinical Data: Care Coordinator Outreach  Outreach attempted x 1.  Left message on patient's voicemail with call back information and requested return call.    Chart Review: Per MINDA Nguyen, CHW to assist with transportation on Thursday 8/18 at 9:30AM with Flaquita Franco for initial appointment for ALD.      CHW called [employer] [phone], transportation confirmed with [employer] [phone] for appointment on 8/18 at 9:30AM.    Plan: Care Coordinator will try to reach patient again in 1-2 business days.    Chrystal George  Clinic Care Coordination  [facility]    Phone: [phone]

## 2022-08-12 ENCOUNTER — ALLIED HEALTH/NURSE VISIT (OUTPATIENT)
Dept: OTHER | Facility: CLINIC | Age: 32
End: 2022-08-12
Payer: COMMERCIAL

## 2022-08-12 VITALS
TEMPERATURE: 98.7 F | HEART RATE: 110 BPM | DIASTOLIC BLOOD PRESSURE: 86 MMHG | SYSTOLIC BLOOD PRESSURE: 120 MMHG | RESPIRATION RATE: 14 BRPM | OXYGEN SATURATION: 98 %

## 2022-08-12 DIAGNOSIS — Z79.899 MEDICATION MANAGEMENT: Primary | ICD-10-CM

## 2022-08-12 PROCEDURE — 99207 PR COMMUNITY PARAMEDIC-PATIENT MEETS CRITERIA: CPT

## 2022-08-12 ASSESSMENT — ACTIVITIES OF DAILY LIVING (ADL): DEPENDENT_IADLS:: CLEANING;COOKING;LAUNDRY;SHOPPING;MEAL PREPARATION;MEDICATION MANAGEMENT;TRANSPORTATION

## 2022-08-12 NOTE — PROGRESS NOTES
Clinic Care Coordination Contact  [facility]/Voicemail    Clinical Data: Care Coordinator Outreach  Outreach attempted x 2.  Left message on patient's voicemail with call back information and requested return call.      Ming [facility] Paramedic is visiting patient tomorrow at 12PM.     Plan: CHW to route encounter to Ming for support with notifying patient about confirmed transportation to [facility] on 8/18 at 9:30AM with Flaquita Franco. CHW to route encounter to lead CCC team for further follow up.    Chrystal George  [facility] Care Coordination  [facility]    Phone: [phone]

## 2022-08-12 NOTE — TELEPHONE ENCOUNTER
Valerie,  We havn't received any information back yet on Toe. Ioana Mendoza RN on 8/12/2022 at 2:32 PM

## 2022-08-12 NOTE — PROGRESS NOTES
Community Paramedics Follow-up Visit  August 12, 2022  TIME: 12:30    Brooke Peterson is a 31 year old male being seen at home for a follow-up visit.    Present at appointment: Patient, Parents    Primary Diagnosis: Psychosocial    Chief Complaint   Patient presents with     Recheck Medication     Community Paramedic home visit       [place] Utilization:   Clinic Utilization  Difficulty keeping appointments:: No  Compliance Concerns: No  No-Show Concerns: Yes  No PCP office visit in Past Year: No  Utilization    Hospital Admissions  1             ED Visits  2             No Show Count (past year)  23                Current as of: 8/11/2022  7:33 PM              /86 (BP Location: Right arm, Patient Position: Standing)   Pulse 110   Temp 98.7  F (37.1  C)   Resp 14   SpO2 98%     Clinical Concerns:  Current Medical Concerns:      Current Behavioral Concerns: medication compliance    Education Provided to patient: went through and set up medications   Medication set up? Yes  Pill Box issued: No  Scale issued: No  Flu Shot given: No  COVID Vaccine Given: No  Lab draw or specimen collection: No  Food box issued: No  Collaborative visit with PCP: No  Wound Care: No    Health Maintenance Reviewed:      Clinical Pathway: None    No  Face to Face in Home / Community    Recent blood sugars:     Review of Symptoms/PE    Skin: negative  Eyes: negative  Ears/Nose/Throat: negative  Respiratory: No shortness of breath, dyspnea on exertion, cough, or hemoptysis  Cardiovascular: negative  Gastrointestinal: negative  Genitourinary: negative  Musculoskeletal: negative  Neurologic: negative  Psychiatric: negative    Pain Management::       Medication Review  Medication adherence problem (GOAL):: No  Knowledgeable about how to use meds:: No  Medication side effects suspected:: No    Diet/Exercise/Sleep  Diet:: Regular  Inadequate nutrition (GOAL):: No  Tube Feeding: No  Inadequate activity/exercise (GOAL):: No  Significant changes  in sleep pattern (GOAL): No    Plan:     Time spent with patient: 90    The patient meets one or more of the following criteria:  * Requires services to prevent readmission to a nursing home or hospital    Acute concern/Follow-up recommendations: follow care plan    Next CP visit scheduled: 08/26    Issues for Provider to follow up on: Toe reported having difficulty sleeping and asked if he could get a pill to help him sleep through the night, CP agreed to forward his request to his PCP  Four recently refilled prescriptions along with current pill bottles and two empty pill boxes were set out prior to my arrival.  Fludrocortisone, one of the medications that had been refilled since my last visit was changed from .1 mg, one pill  to  .15 ng, one and a half pills. Both pill boxes were filled with medications for two weeks including the updated Fludrocortisone dose.   He reported continued dizziness, nausea and weakness.  Pt asked when his wheel chair would arrive, CP called [facility] dVisit supply, they said they are waiting for medical records to support the wheel chair request.    Provider follow up visit needed: PADDY

## 2022-08-15 NOTE — PROGRESS NOTES
Note received from  Home care that 8/12/2022 was the first day on florinef 0.15 mg. Therefore, the high renin activity on 8/9 was obtained on florinef 0.1 mg/day (or else he wasn't taking it then).   Maddison Witt MD

## 2022-08-15 NOTE — NURSING NOTE
Dr Witt did you see the B/P readings ?  from 8/12/22 just sitting and standing  though no lying down. Ioana Mendoza RN on 8/15/2022 at 2:25 PM

## 2022-08-16 ENCOUNTER — PATIENT OUTREACH (OUTPATIENT)
Dept: CARE COORDINATION | Facility: CLINIC | Age: 32
End: 2022-08-16

## 2022-08-16 DIAGNOSIS — E71.529: ICD-10-CM

## 2022-08-16 DIAGNOSIS — E27.1 ADDISON'S DISEASE (H): Primary | ICD-10-CM

## 2022-08-16 DIAGNOSIS — E27.1: ICD-10-CM

## 2022-08-16 NOTE — PROGRESS NOTES
8/16/2022  Clinic Care Coordination Contact  Care Team Conversations    Call: [employer]  Provider Line [phone]  RE: Verify ride for appt on 8-18-22 at 9:30am to [facility]  Rep: Emelyn               Provided member ID, verified patient demographics, destination address appointment date and time.    8-18-22 at 9:30am appt in [facility]  [employer] [phone]   between: 8:30am-9am  2 passengers  Will call     9-19-22 at 2:40pm to [facility]   [employer] [phone]  : between 1:40pm-2:10pm  2 passengers  will call

## 2022-08-16 NOTE — PROGRESS NOTES
8/16/2022  Clinic Care Coordination Contact  Community Health Worker Follow Up    Intervention and Education during outreach:   [facility] Angela : Nazario  Spoke to patient's father stated he does not live with him anymore.  This is his number and that patient does not have a phone. Patient been using his phone.  Father stated he does not talk to him anymore.  Verified if he is still the PCA for his son.  Father questions why CHW ask many questions   Explained that CHW support to coordinate appt and set up ride to appts so needs to connect with patient and support from PCA to accompany to appt.  Father stated he is the PCA and that he only works when he is scheduled within his hours.  Informed father that he has appt on 8-18-22 at 9:30am and [employer] will  between 8:30-9am.  Father hung up phone unable to confirm with father.    Called the home number sister's number [email or web address]  Called mother number no answer  To get patient's contact number since the mobile number is patient's father and he does not live with patient anymore.  Father stated he does not talk to him but he is still the PCA.    CHW consulted with CC SHIRA Alarcon of situation.  CHW routed encounter and sent message to GAVIOTA Lea CP for support to verify patient's telephone number when ENOC Lai visits on 8-26-22.    CHW Next Follow Up:8 29-22    Scarlett Varma  Community Health Worker  [facility]  Clinic Care Coordination  [email or web address]   Office: [phone]  Fax: [phone]

## 2022-08-17 NOTE — PROGRESS NOTES
I see them. Thanks. Next time we need first supine, then standing one minute before checking.    Maddison Witt MD

## 2022-08-18 ENCOUNTER — TELEPHONE (OUTPATIENT)
Dept: TRANSPLANT | Facility: CLINIC | Age: 32
End: 2022-08-18

## 2022-08-18 ENCOUNTER — ONCOLOGY VISIT (OUTPATIENT)
Dept: TRANSPLANT | Facility: CLINIC | Age: 32
End: 2022-08-18
Attending: GENETIC COUNSELOR, MS
Payer: COMMERCIAL

## 2022-08-18 DIAGNOSIS — E71.529: ICD-10-CM

## 2022-08-18 DIAGNOSIS — E27.1: ICD-10-CM

## 2022-08-18 LAB
INTERPRETATION: NORMAL
LAB PDF RESULT: NORMAL
SIGNIFICANT RESULTS: NORMAL
SPECIMEN DESCRIPTION: NORMAL
TEST DETAILS, MDL: NORMAL

## 2022-08-18 PROCEDURE — 96040 HC GENETIC COUNSELING, EACH 30 MINUTES: CPT | Performed by: GENETIC COUNSELOR, MS

## 2022-08-18 PROCEDURE — 36415 COLL VENOUS BLD VENIPUNCTURE: CPT | Performed by: GENETIC COUNSELOR, MS

## 2022-08-18 NOTE — LETTER
"8/18/2022      RE: Brooke Peterson  [address] [place] [address]     Dear Colleague,    Thank you for the opportunity to participate in the care of your patient, Brooke Peterson, at the [facility] at [facility]. Please see a copy of my visit note below.    It was a pleasure meeting with Brooke Peterson along with his brother, Marcelo Mccullough, at [facility] to obtain a family history, review the genetics and inheritance of X-linked adrenoleukodystrophy (X-ALD), and discuss options for completing additional testing for Brooke Peterson. Brooke Peterson was referred by endocrinologist Dr. Maddison Witt. I was accompanied to this visit by a Angela .    Pertinent Medical History: Brooke Peterson has a complex medical history with only some details available for review. He has a history of adrenal insufficiency which was diagnosed in July of 2017. He has progressive leg weakness that has led to gait abnormalities and has both bladder and bowel incontinence. He currently uses a walker for ambulation and is waiting for access to a wheelchair. He also has dysarthria. Abnormalities have been noted in prior brain MRIs for [facility] although a specific diagnosis with cerebral ALD is not noted in his records. He also had a history of hypothyroidism, asthma, a pituitary microadenoma, schizophrenia and PTSD. Based on this history, VLCFA studies were sent to [facility] on 5/14/2022 which revealed that Brooke Peterson's VLCFAs were higher than normal and interpreted as \"consistent with a defect in peroxisomal fatty acid oxidation, such as X-linked adrenoleukodystrophy or adrenomyeloneuropathy.\" Based on Brooke Peterson's clinical symptoms and VLCFA studies, he has been given a clinical diagnosis of X-linked adrenoleukodystrophy (X-ALD). Genetic testing of the ABCD1 gene has not been completed at this time.    Family History: " A family history was obtained today and scanned into the medical record. The following information was significant:    Brooke was the first child born to his parents together. Brooke Peterson does not have children. He has six siblings:  o A brother, Piotr, who is currently 28 and is experiencing pain and loss of sensation in his legs. Piotr is the only sibling with a child at this time. His son is in good health.  o A brother, Marcelo Fortune, who is currently 25 and in good health.  o A sister, Rebekah, who is 21 and in good health.  o A brother, Marcelo Garcia, who is 16 and in good health.  o A brother, Marcelo Mccullough, who is 15 and in good health.  o A brother, Lan Crisostomo, who is 11 and in good health.    Maternal History: Brooke Peterson's mother, Mikki, is in her fifties or sixties and in good health. Brooke Peterson and Marcelo Mccullough do not know if she has siblings. Brooke Peterson's grandfather is living in his 80s-90s and does not have similar symptoms to Brooke Peterson. Brooke Peterson's grandmother passed away at an unknown age from unknown causes.    Paternal History: Brooke's father, Joey, is currently in his fifties or sixties and is in good health.    The remaining family history was negative for other features of X-ALD to the family's knowledge.    X-ALD Test Results:   Based on Brooke Peterson's clinical symptoms and VLCFA studies, he is expected to have X-ALD; however, additional testing is needed to confirm this diagnosis at a genetic level.    The next step of testing will look at the ABCD1 gene looking for small changes (sequencing) and large changes (copy number variant analysis) by next generation sequencing. Testing will be sent to the [facility]. We reviewed that there are three possible results: positive, negative, and variant of uncertain significance (VUS). If results are negative or a VUS is found, additional testing for Brooke Peterson and/or his relatives may be recommended to help explain these  results.     After reviewing the risks, benefits, limitations, and potential for genetic discrimination, [facility] consented to these studies. [facility] asked that these results by provided by phone with a Angela .    X-ALD Overview:  X-ALD is a genetic condition that can present with a variety of symptoms in different individuals affected with the disease. The most common forms of X-ALD include: (1) pre-symptomatic/asymptomatic, (2) cerebral disease, (3) adrenal insufficiency (Covington's disease), and (4) myelopathy/AMN.    Pre-symptomatic/asymptomatic: Some individuals who do not currently have symptoms are known to have X-ALD following a positive  screen or predictive genetic testing. These individuals are considered pre-symptomatic because they do not currently have symptoms.    Cerebral disease: This form is most common in boys between 3-12 years of age although cerebral disease can develop at other times in childhood or adulthood. Symptoms often begin with learning and behavioral challenges that worsen over time. Seizures can be the first symptom in some boys. Common symptoms as the disease progresses include vision & hearing problems, difficulties swallowing, and poor coordination. Untreated, individuals with the cerebral form of ALD typically develop worsening symptoms that eventually lead to full disability and death. Screening for early signs of cerebral disease with brain MRIs can help detect early signs of disease when more treatment options are available.     Adrenal insufficiency (Covington's disease): This form results from the adrenal glands not making enough of certain hormones. Some symptoms of adrenal insufficiency include weakness, weight loss, darkening patches of skin, vomiting, and coma. Adrenal insufficiency most commonly presents in childhood but can develop later in life. Boys and men with X-ALD are monitored for adrenal dysfunction so they can be treated if adrenal  "insufficiency develops.    Myeloneuropathy/AMN: This form typically presents in adulthood and involves weakness and stiffness in the lower extremities (paraparesis). Other symptoms can include difficulties walking (gait disorder), bladder and bowel control issues, and sexual dysfunction. This form is sometimes referred to as adrenomyeloneuropathy (AMN).    Males with X-ALD generally develop one or multiple of these forms of X-ALD in their lifetime. Females with X-ALD (historically called \"carriers\") are more likely to have either no symptoms or develop myeloneuropathy/AMN-like symptoms in adulthood (typically >30 years of age). Since cerebral disease and adrenal insufficiency are rare in females with X-ALD, girls and women with X-ALD are not routinely screened for these symptoms. Individuals with X-ALD, even in the same family, can have very different forms of X-ALD. Genetic testing, VLCFA studies, and family history cannot predict how someone with X-ALD will present in their lifetime.    X-ALD Genetics:  We all have DNA in every cell in our bodies that tells our bodies how to develop and function properly. Individual instructions in the DNA are called genes. Disease-causing variants (changes) in genes that stop the gene from working properly cause genetic diseases like X-ALD.    DNA is stored in structures called chromosomes. We all have 46 chromosomes that come in 23 pairs. The first 22 pairs of chromosomes are called autosomal and are the same in males and females. The 23rd pair of chromosomes are called sex chromosomes and are different in males and females. Male sex chromosomes are XY while female sex chromosomes are XX.     The gene associated with X-ALD is called ABCD1 which is located on the X chromosome. Normally, this gene provides the instructions for building a protein called adrenoleukodystrophy protein (ALDP). This protein helps to transport very long-chain fatty acids (VLCFA) into the peroxisomes. " Peroxisomes are a part of the cell that breaks down and processes many things including VLCFA.     Disease-causing changes in the ABCD1 gene leads to ALDP not working properly. When ALDP doesn't work like it should, VLCFA aren't moved into the peroxisomes to be broken down. This causes high levels of VLCFA in the body. This build-up of VLCFA appears to harm different parts of the body including: (1) the coating called myelin that protects the nerves in the brain and spine and (2) the adrenal glands that are responsible for making certain hormones in the body.    X-ALD Inheritance:  Because women have two copies of the X chromosome, they have two copies of the ABCD1 gene. Men only have one X chromosome so men only have one copy of the ABCD1 gene. As a result, if one copy of a woman's ABCD1 gene has a change, she is most often either unaffected or has myelopathy/AMN-like symptoms associated with X-ALD. This is because she has another copy of the ABCD1 gene on the other X chromosome that still works properly.  On the other hand, almost all boys and men with X-ALD from a change in the ABCD1 gene develop some symptoms because they do not have an extra copy.       For males with X-ALD, they will pass the ABCD1 gene variant to all of their daughters and none of their sons.     For females with X-ALD, there is a 50% chance of passing on the ABCD1 gene variant in each pregnancy. Any son has a 50% chance of inheriting the ABCD1 gene variant and any daughter has a 50% chance of inheriting the ABCD1 gene variant.  There are multiple reproductive options available to families with X-ALD. These can be reviewed further with [facility] at any time in the future if he is interested    Implications for [facility]'s Health:   We reviewed the progressive nature of X-ALD and [facility] expressed understanding. [facility] is aware that only some symptoms of X-ALD have treatment options and that some treatments aren't helpful if the  disease is too advanced. We used the medicine that Brooke Peterson takes for his adrenal insufficiency as an example of one symptom of X-ALD that can be treated. Brooke Peterson expressed interest in being evaluated further in neurology to learn how more about his current symptoms. A referral was placed to Dr. Galicia in adult neurology who cares for adult men and women with X-ALD.    Implications for Family Members:  We reviewed the implications of these findings for the family. While some cases of X-ALD are new (de michelle), it is most likely that Brooke inherited this condition from his mother, Mikki. If Mikki has X-ALD, all of her children have a 50% chance of having X-ALD. We reviewed the recommendation to share Brooke Peterson's diagnosis and the recommendation for testing his siblings with Brooke Peterson's family. We discussed options for providing written information to communicate this recommendation but Brooke Peterson and Marcelo Nany Mccullough shared they didn't feel that information would be the most helpful. I agreed to call Brooke Peterson's father after our visit today to discuss this recommendation further since he helps provide care for Brooke Peterson and was unable to come to the visit today. I did provide a copy of the [employer] for X-ALD, information on X-ALD Connect, and my contact information and shared that that information can be given to any relative who would like to speak with me. I offered to see if we can get the information from [employer] translated into Angela which they felt would be helpful. The  asked if [place] would be more helpful but Brooke Peterson and Marcelo Mccullough thought Angela would be better. I agreed to look into this further for the family and see if this is an option.    Social Notes:  Due to Brooke Peterson's dysarthria, the  had difficulties interpreting his thoughts and feelings today. Brooke Peterson's brother was able to assist some but did experience difficulties understanding his  brother as well. We did experience some success if Brooke Peterson repeated his statements and if I restated my understanding to confirm that we had an accurate understanding of his prior statement. Brooke Peterson was upset to hear that his disease symptoms were expected to be progressive and was interested is seeing if any treatments might be available to stop some symptoms from advancing or to improve his lived experience with his disease. He understands that the options may be limited depending on the assessment of how advanced his symptoms are at this stage. Brooke Fonseca's brother, Marcelo Mccullough, who was present today, showed signs of discomfort and anxiety upon learning that siblings could also have X-ALD. We did discuss how he may not have X-ALD and further, that there are often more treatment options before symptoms start. The important next step would be testing for Brooke Peterson's siblings. Marcelo Mccullough expressed interest in testing and I promised to speak with his father about this recommendation further when I call today. As Marcelo Mccullough is a minor, we were unable to initiate testing today. Both Brooke Peterson and Marcelo Mccullough shared that they would speak with their parents after our meeting today.     Plan:  1. A family history was obtained today and scanned into the medical record.  2. The genetics and inheritance of X-ALD were reviewed and Brooke's previous test results were reviewed.   3. After reviewing the risks, benefits, limitations, and potential for genetic discrimination, Brooke Peterson consented to ABCD1 gene testing through [facility]. Brooke Peterson asked that these results by provided by phone with a Angela .  4. A plan was made for me to reach out directly to Brooke Peterson's father to review the information from our discussion today and discuss strategies for getting his other children tested.   5. Education resources were provided to the family along with my  contact information. Additional questions or concerns were denied.    Sincerely,    Flaquita Callahan MS, MA, [facility] [place]  Licensed, Certified Genetic Counselor  Pediatric Blood & Marrow Transplant  [phone]  [email or web address]    Approximate time spent in consultation: 70 minutes

## 2022-08-19 ENCOUNTER — TELEPHONE (OUTPATIENT)
Dept: NEUROLOGY | Facility: CLINIC | Age: 32
End: 2022-08-19

## 2022-08-19 NOTE — PROGRESS NOTES
"It was a pleasure meeting with Brooke Peterson along with his brother, Marcelo Mccullough, at [facility] to obtain a family history, review the genetics and inheritance of X-linked adrenoleukodystrophy (X-ALD), and discuss options for completing additional testing for Brooke Peterson. Brooke Peterson was referred by endocrinologist Dr. Maddison Witt. I was accompanied to this visit by a Angela .    Pertinent Medical History: Brooke Peterson has a complex medical history with only some details available for review. He has a history of adrenal insufficiency which was diagnosed in July of 2017. He has progressive leg weakness that has led to gait abnormalities and has both bladder and bowel incontinence. He currently uses a walker for ambulation and is waiting for access to a wheelchair. He also has dysarthria. Abnormalities have been noted in prior brain MRIs for [facility] although a specific diagnosis with cerebral ALD is not noted in his records. He also had a history of hypothyroidism, asthma, a pituitary microadenoma, schizophrenia and PTSD. Based on this history, VLCFA studies were sent to [facility] on 5/14/2022 which revealed that Brooke Peterson's VLCFAs were higher than normal and interpreted as \"consistent with a defect in peroxisomal fatty acid oxidation, such as X-linked adrenoleukodystrophy or adrenomyeloneuropathy.\" Based on Brooke Peterson's clinical symptoms and VLCFA studies, he has been given a clinical diagnosis of X-linked adrenoleukodystrophy (X-ALD). Genetic testing of the ABCD1 gene has not been completed at this time.    Family History: A family history was obtained today and scanned into the medical record. The following information was significant:    Brooke was the first child born to his parents together. Brooke Peterson does not have children. He has six siblings:  o A brother, Piotr, who is currently 28 and is experiencing pain and loss of sensation in his legs. Piotr is the only sibling with a " child at this time. His son is in good health.  o A brother, Marcelo Fortune, who is currently 25 and in good health.  o A sister, Rebekah, who is 21 and in good health.  o A brother, Marcelo Garcia, who is 16 and in good health.  o A brother, Marcelo Mccullough, who is 15 and in good health.  o A brother, Lan Crisostomo, who is 11 and in good health.    Maternal History: Brooke Peterson's mother, Mikki, is in her fifties or sixties and in good health. Brooke Peterson and Marcelo Mccullough do not know if she has siblings. Brooke Peterson's grandfather is living in his 80s-90s and does not have similar symptoms to Brooke Peterson. Brooke Peterson's grandmother passed away at an unknown age from unknown causes.    Paternal History: Brooke's father, Joey, is currently in his fifties or sixties and is in good health.    The remaining family history was negative for other features of X-ALD to the family's knowledge.    X-ALD Test Results:   Based on Brooke Peterson's clinical symptoms and VLCFA studies, he is expected to have X-ALD; however, additional testing is needed to confirm this diagnosis at a genetic level.    The next step of testing will look at the ABCD1 gene looking for small changes (sequencing) and large changes (copy number variant analysis) by next generation sequencing. Testing will be sent to the [facility]. We reviewed that there are three possible results: positive, negative, and variant of uncertain significance (VUS). If results are negative or a VUS is found, additional testing for Brooke Peterson and/or his relatives may be recommended to help explain these results.     After reviewing the risks, benefits, limitations, and potential for genetic discrimination, Brooke Peterson consented to these studies. Brooke Peterson asked that these results by provided by phone with a Angela .    X-ALD Overview:  X-ALD is a genetic condition that can present with a variety of symptoms in different individuals affected with the  disease. The most common forms of X-ALD include: (1) pre-symptomatic/asymptomatic, (2) cerebral disease, (3) adrenal insufficiency (Callaway's disease), and (4) myelopathy/AMN.    Pre-symptomatic/asymptomatic: Some individuals who do not currently have symptoms are known to have X-ALD following a positive  screen or predictive genetic testing. These individuals are considered pre-symptomatic because they do not currently have symptoms.    Cerebral disease: This form is most common in boys between 3-12 years of age although cerebral disease can develop at other times in childhood or adulthood. Symptoms often begin with learning and behavioral challenges that worsen over time. Seizures can be the first symptom in some boys. Common symptoms as the disease progresses include vision & hearing problems, difficulties swallowing, and poor coordination. Untreated, individuals with the cerebral form of ALD typically develop worsening symptoms that eventually lead to full disability and death. Screening for early signs of cerebral disease with brain MRIs can help detect early signs of disease when more treatment options are available.     Adrenal insufficiency (Callaway's disease): This form results from the adrenal glands not making enough of certain hormones. Some symptoms of adrenal insufficiency include weakness, weight loss, darkening patches of skin, vomiting, and coma. Adrenal insufficiency most commonly presents in childhood but can develop later in life. Boys and men with X-ALD are monitored for adrenal dysfunction so they can be treated if adrenal insufficiency develops.    Myeloneuropathy/AMN: This form typically presents in adulthood and involves weakness and stiffness in the lower extremities (paraparesis). Other symptoms can include difficulties walking (gait disorder), bladder and bowel control issues, and sexual dysfunction. This form is sometimes referred to as adrenomyeloneuropathy (AMN).    Males with  "X-ALD generally develop one or multiple of these forms of X-ALD in their lifetime. Females with X-ALD (historically called \"carriers\") are more likely to have either no symptoms or develop myeloneuropathy/AMN-like symptoms in adulthood (typically >30 years of age). Since cerebral disease and adrenal insufficiency are rare in females with X-ALD, girls and women with X-ALD are not routinely screened for these symptoms. Individuals with X-ALD, even in the same family, can have very different forms of X-ALD. Genetic testing, VLCFA studies, and family history cannot predict how someone with X-ALD will present in their lifetime.    X-ALD Genetics:  We all have DNA in every cell in our bodies that tells our bodies how to develop and function properly. Individual instructions in the DNA are called genes. Disease-causing variants (changes) in genes that stop the gene from working properly cause genetic diseases like X-ALD.    DNA is stored in structures called chromosomes. We all have 46 chromosomes that come in 23 pairs. The first 22 pairs of chromosomes are called autosomal and are the same in males and females. The 23rd pair of chromosomes are called sex chromosomes and are different in males and females. Male sex chromosomes are XY while female sex chromosomes are XX.     The gene associated with X-ALD is called ABCD1 which is located on the X chromosome. Normally, this gene provides the instructions for building a protein called adrenoleukodystrophy protein (ALDP). This protein helps to transport very long-chain fatty acids (VLCFA) into the peroxisomes. Peroxisomes are a part of the cell that breaks down and processes many things including VLCFA.     Disease-causing changes in the ABCD1 gene leads to ALDP not working properly. When ALDP doesn't work like it should, VLCFA aren't moved into the peroxisomes to be broken down. This causes high levels of VLCFA in the body. This build-up of VLCFA appears to harm different " parts of the body including: (1) the coating called myelin that protects the nerves in the brain and spine and (2) the adrenal glands that are responsible for making certain hormones in the body.    X-ALD Inheritance:  Because women have two copies of the X chromosome, they have two copies of the ABCD1 gene. Men only have one X chromosome so men only have one copy of the ABCD1 gene. As a result, if one copy of a woman's ABCD1 gene has a change, she is most often either unaffected or has myelopathy/AMN-like symptoms associated with X-ALD. This is because she has another copy of the ABCD1 gene on the other X chromosome that still works properly.  On the other hand, almost all boys and men with X-ALD from a change in the ABCD1 gene develop some symptoms because they do not have an extra copy.       For males with X-ALD, they will pass the ABCD1 gene variant to all of their daughters and none of their sons.     For females with X-ALD, there is a 50% chance of passing on the ABCD1 gene variant in each pregnancy. Any son has a 50% chance of inheriting the ABCD1 gene variant and any daughter has a 50% chance of inheriting the ABCD1 gene variant.  There are multiple reproductive options available to families with X-ALD. These can be reviewed further with Brooke Peterson at any time in the future if he is interested    Implications for Brooke Peterson's Health:   We reviewed the progressive nature of X-ALD and Brooke Peterson expressed understanding. [facility] is aware that only some symptoms of X-ALD have treatment options and that some treatments aren't helpful if the disease is too advanced. We used the medicine that Brooke Peterson takes for his adrenal insufficiency as an example of one symptom of X-ALD that can be treated. Brooke Peterson expressed interest in being evaluated further in neurology to learn how more about his current symptoms. A referral was placed to Dr. Galicia in adult neurology who cares for adult men and women with  X-ALD.    Implications for Family Members:  We reviewed the implications of these findings for the family. While some cases of X-ALD are new (de michelle), it is most likely that Brooke inherited this condition from his mother, Mikki. If Mikki has X-ALD, all of her children have a 50% chance of having X-ALD. We reviewed the recommendation to share Brooke Peterson's diagnosis and the recommendation for testing his siblings with Brooke Peterson's family. We discussed options for providing written information to communicate this recommendation but Brooke Peterson and Marcelo Mccullough shared they didn't feel that information would be the most helpful. I agreed to call Brooke Peterson's father after our visit today to discuss this recommendation further since he helps provide care for Brooke Peterson and was unable to come to the visit today. I did provide a copy of the [employer] for X-ALD, information on X-ALD Connect, and my contact information and shared that that information can be given to any relative who would like to speak with me. I offered to see if we can get the information from [employer] translated into Angela which they felt would be helpful. The  asked if Andorran would be more helpful but Brooke Peterson and Marcelo Mccullough thought Angela would be better. I agreed to look into this further for the family and see if this is an option.    Social Notes:  Due to Brooke Peterson's dysarthria, the  had difficulties interpreting his thoughts and feelings today. Brooke Peterson's brother was able to assist some but did experience difficulties understanding his brother as well. We did experience some success if Brooke Peterson repeated his statements and if I restated my understanding to confirm that we had an accurate understanding of his prior statement. Brooke Peterson was upset to hear that his disease symptoms were expected to be progressive and was interested is seeing if any treatments might be available to stop some symptoms  from advancing or to improve his lived experience with his disease. He understands that the options may be limited depending on the assessment of how advanced his symptoms are at this stage. Brooke Fonseca's brother, Marcelo Mccullough, who was present today, showed signs of discomfort and anxiety upon learning that siblings could also have X-ALD. We did discuss how he may not have X-ALD and further, that there are often more treatment options before symptoms start. The important next step would be testing for Brooke Peterson's siblings. Marcelo Mccullough expressed interest in testing and I promised to speak with his father about this recommendation further when I call today. As Marcelo Mccullough is a minor, we were unable to initiate testing today. Both Brooke Peterson and Marcelo Mccullough shared that they would speak with their parents after our meeting today.     Plan:  1. A family history was obtained today and scanned into the medical record.  2. The genetics and inheritance of X-ALD were reviewed and [facility] previous test results were reviewed.   3. After reviewing the risks, benefits, limitations, and potential for genetic discrimination, Brooke Peterson consented to ABCD1 gene testing through [facility]. Brooke Peterson asked that these results by provided by phone with a Angela .  4. A plan was made for me to reach out directly to Brooke Peterson's father to review the information from our discussion today and discuss strategies for getting his other children tested.   5. Education resources were provided to the family along with my contact information. Additional questions or concerns were denied.    Sincerely,    Flaquita Callahan, MS, MA, [facility]  Licensed, Certified Genetic Counselor  Pediatric Blood & Marrow Transplant  [phone]  [email or web address]    Approximate time spent in consultation: 70 minutes

## 2022-08-19 NOTE — TELEPHONE ENCOUNTER
Health Call Center    Phone Message    May a detailed message be left on voicemail: yes     Reason for Call: Appointment Intake    Referring Provider Name: Flaquita Callahan  Diagnosis and/or Symptoms: Ariel's disease with adrenoleucodystrophy     URGENT REFERRAL 3-5 DAYS     Please review and contact the patient to discuss scheduling options. Dr. Galicia's first opening is not until Feb of 2023.      Action Taken: Message routed to:  [facility] Neurology    Travel Screening: Not Applicable

## 2022-08-23 LAB — INTERPRETATION: NORMAL

## 2022-08-24 ENCOUNTER — PATIENT OUTREACH (OUTPATIENT)
Dept: CARE COORDINATION | Facility: CLINIC | Age: 32
End: 2022-08-24

## 2022-08-24 NOTE — PROGRESS NOTES
8/24/2022  Clinic Care Coordination - Chart Review Only    Situation/Background: Patient chart reviewed by care coordinator related to [facility] conversion.    Assessment: Patient continues to be followed by Clinic Care Coordination.    Plan: Patient's chart updated to align with [facility] program for ongoing patient management.    Scarlett Varma  Community Health Worker  [facility] Care Coordination  [email or web address]   Office: [phone]  Fax: [phone]

## 2022-08-26 ENCOUNTER — ALLIED HEALTH/NURSE VISIT (OUTPATIENT)
Dept: OTHER | Facility: CLINIC | Age: 32
End: 2022-08-26
Payer: COMMERCIAL

## 2022-08-26 ENCOUNTER — PATIENT OUTREACH (OUTPATIENT)
Dept: NURSING | Facility: CLINIC | Age: 32
End: 2022-08-26

## 2022-08-26 ENCOUNTER — LAB (OUTPATIENT)
Dept: LAB | Facility: CLINIC | Age: 32
End: 2022-08-26
Payer: COMMERCIAL

## 2022-08-26 DIAGNOSIS — E71.529: ICD-10-CM

## 2022-08-26 DIAGNOSIS — E27.1: ICD-10-CM

## 2022-08-26 DIAGNOSIS — Z79.899 MEDICATION MANAGEMENT: Primary | ICD-10-CM

## 2022-08-26 LAB — POTASSIUM SERPL-SCNC: 3.9 MMOL/L (ref 3.4–5.3)

## 2022-08-26 PROCEDURE — 84132 ASSAY OF SERUM POTASSIUM: CPT

## 2022-08-26 PROCEDURE — 36415 COLL VENOUS BLD VENIPUNCTURE: CPT

## 2022-08-26 PROCEDURE — 99207 PR COMMUNITY PARAMEDIC-PATIENT MEETS CRITERIA: CPT

## 2022-08-26 PROCEDURE — 99000 SPECIMEN HANDLING OFFICE-LAB: CPT

## 2022-08-26 PROCEDURE — 84244 ASSAY OF RENIN: CPT | Mod: 90

## 2022-08-26 ASSESSMENT — ACTIVITIES OF DAILY LIVING (ADL): DEPENDENT_IADLS:: CLEANING;COOKING;LAUNDRY;SHOPPING;MEAL PREPARATION;MEDICATION MANAGEMENT;TRANSPORTATION

## 2022-08-26 NOTE — PROGRESS NOTES
Clinic Care Coordination Contact    Follow Up Progress Note      Assessment: RN CC outreach and spoke with patient with support of interpretive services    Community parametric was at home during our call   Collaboration to support patient goals    CP noted that patient received some paperwork that additional documentation needed for wheelchair.  CP will add to chart for CCC to review and support care team if needed to complete    Unclear status of home care referral, CP notes that there has not been any engagement that he is aware of from home care agency to enroll patient.  Chart review notes home care order placed at end of July to support transition to more sustainable medication management support     CP notes that he is not aware of any home care assessment completed      Care Gaps:    Health Maintenance Due   Topic Date Due     ASTHMA ACTION PLAN  Never done     ADVANCE CARE PLANNING  Never done     DEPRESSION ACTION PLAN  Never done     COVID-19 Vaccine (3 - Booster for Pfizer series) 02/03/2022     INFLUENZA VACCINE (1) 09/01/2022       Care Plans  Care Plan: Wheel chair     Problem: HP GENERAL PROBLEM     Goal: I would like to obtain wheel chair and size large briefs within the next 90 days.     Start Date: 8/10/2022 Expected End Date: 11/17/2022    This Visit's Progress: 30%    Note:     Barriers: Language barriers  Strengths: Accepting of support    Patient expressed understanding of goal: yes    Action steps to achieve this goal:  1. I will answer the call when [facility] reaches out to follow up on orders.   I will complete paperwork needed to support   2. I will provide information as needed for my wheel chair and size large brief deliveries.    3. I will follow up with CCC in the next month regarding this goal or reach out for support as needed.    Note: [facility] [phone]                    Care Plan: Medication management support at home     Problem: HP GENERAL PROBLEM      Goal: I would like to enroll in home care services to suppport medication management within 90 days     Start Date: 8/2/2022 Expected End Date: 11/3/2022    This Visit's Progress: 10%    Note:     Barriers: language, access   Strengths: family, CP assistance  Patient expressed understanding of goal: yes  Action steps to achieve this goal:  1. I will work with CP and CC and care team to enroll in home care support for medication set up   2. I will update Care coordination team during next outreach                           Plan: Patient will work with CCC and CP program to connect to additional support services within community to sustain medication set up and therapy support    Care team to address documentation follow up needed for DME ( CP to send to care team to review requested information)    RN CC will follow up with patient and care team to assist with progress

## 2022-08-26 NOTE — TELEPHONE ENCOUNTER
RECORDS RECEIVED FROM: internal   REASON FOR VISIT: ALD   Date of Appt: 10/13/22   NOTES (FOR ALL VISITS) STATUS DETAILS   OFFICE NOTE from referring provider Internal Flaquita Callahan (genetic counselor) @ [facility] BMT:  8/18/22   OFFICE NOTE from other specialist Internal Dr Jose Rangel @ [facility] [address]:  8/9/22 7/18/22 5/5/22    Dr Witt @ [facility] Endo:  7/27/22  9/22/21  3/9/21    Dr Fritz Dejesus @ [facility] Neurology:  6/22/22   DIS[facility] from [facility] [place] [facility]:  5/10/22-5/19/22   MEDICATION LIST Internal    IMAGING  (FOR ALL VISITS)     MRI (HEAD, NECK, SPINE) Internal [facility]:  MRI Pituitary 6/28/22  MRI Lumbar Spine 6/28/22  MRI Brain 5/11/22  MRI Thoracic Spine 5/10/22  MRI Lumbar Spine 5/10/22  MRI Brain 6/25/19

## 2022-08-28 VITALS — RESPIRATION RATE: 16 BRPM

## 2022-08-29 ENCOUNTER — PATIENT OUTREACH (OUTPATIENT)
Dept: CARE COORDINATION | Facility: CLINIC | Age: 32
End: 2022-08-29

## 2022-08-29 NOTE — PROGRESS NOTES
Community Paramedics Follow-up Visit  August 26, 2022  TIME: Gideon Peterson is a 31 year old male being seen at home for a follow-up visit.    Present at appointment: Patient, Parents    Primary Diagnosis: Psychosocial    Chief Complaint   Patient presents with     Outreach     Community Paramedic home visit. Medication set up.       [place] Utilization:   Clinic Utilization  Difficulty keeping appointments:: No  Compliance Concerns: No  No-Show Concerns: Yes  No PCP office visit in Past Year: No  Utilization    Hospital Admissions  1             ED Visits  2             No Show Count (past year)  27                Current as of: 8/28/2022  6:44 PM              Resp 16     Clinical Concerns:  Current Medical Concerns:      Current Behavioral Concerns: medication compliance    Education Provided to patient: went through and set up medications   Medication set up? Yes  Pill Box issued: No  Scale issued: No  Flu Shot given: No  COVID Vaccine Given: No  Lab draw or specimen collection: Yes  Food box issued: No  Collaborative visit with PCP: No  Wound Care: No    Health Maintenance Reviewed:      Clinical Pathway: None    No  Face to Face in Home / Community      Review of Symptoms/PE    Skin: negative  Eyes: negative  Ears/Nose/Throat: negative  Respiratory: No shortness of breath, dyspnea on exertion, cough, or hemoptysis  Cardiovascular: negative  Gastrointestinal: negative  Genitourinary: negative  Musculoskeletal: negative  Neurologic: negative  Psychiatric: negative    Pain Management::       Medication Review  Medication adherence problem (GOAL):: No  Knowledgeable about how to use meds:: No  Medication side effects suspected:: No    Diet/Exercise/Sleep  Diet:: Regular  Inadequate nutrition (GOAL):: No  Tube Feeding: No  Inadequate activity/exercise (GOAL):: No  Significant changes in sleep pattern (GOAL): No    Plan:     Time spent with patient: 90    The patient meets one or more of the following  criteria:  * Requires services to prevent readmission to a nursing home or hospital    Acute concern/Follow-up recommendations: follow care plan    Next CP visit scheduled: 09/09/2022    Issues for Provider to follow up on: The Community Paramedic met with pt in his home. Pt's father and brother were home during the visit.  Pt's medications were set up per medication list. All prescription present matched his current medication list.  Orthostatic vitals taken.  Venous blood drawn.  Pt had no new complaints. Pt did ask the status on his wheel chair, CP advised the order had been placed.     Provider follow up visit needed: PADDY

## 2022-08-29 NOTE — PROGRESS NOTES
8/29/2022  Clinic Care Coordination Contact  Community Health Worker Follow Up    Intervention and Education during outreach:   [facility] Angela : Shekhar Duffy  Called sister's cell and spoke to sister Magdaleno Rogers if she has contact number  for patient.  She stated to call her father's cell.  She lives with father now.  She requesting help pt get a free phone for him.  Stated mother's [employer] worker working on getting her a free phone for her mother.  Stated he does not have a cell phone   Sister requested if CHW can help to help get a free phone for him.    Sister reported father received a phone call from the wheelchair place that they need the PCP to complete the paperwork.    CHW Follow up: Monthly  CHW Plan: Follow up on goal(s)  CHW Next Follow Up: 9-28-22    Scarlett Varma  Community Health Worker  [facility]  Clinic Care Coordination  [email or web address]   Office: [phone]  Fax: [phone]    Clinic Care Coordination Contact    Community Health Worker Follow Up    Care Gaps:     Health Maintenance Due   Topic Date Due     ASTHMA ACTION PLAN  Never done     ADVANCE CARE PLANNING  Never done     DEPRESSION ACTION PLAN  Never done     COVID-19 Vaccine (3 - Booster for Pfizer series) 02/03/2022     INFLUENZA VACCINE (1) 09/01/2022       Care Gap Goal set: Yes and Scheduled 9-19-22 with PCP discuss Care gaps      Care Plan:   Care Plan: Wheel chair     Problem: HP GENERAL PROBLEM     Goal: I would like to obtain wheel chair and size large briefs within the next 90 days.     Start Date: 8/10/2022 Expected End Date: 11/17/2022    This Visit's Progress: 30%    Note:     Barriers: Language barriers  Strengths: Accepting of support    Patient expressed understanding of goal: yes    Action steps to achieve this goal:  1. I will answer the call when [facility] reaches out to follow up on orders.   I will complete paperwork needed to support   2. I will provide  information as needed for my wheel chair and size large brief deliveries.    3. I will follow up with CCC in the next month regarding this goal or reach out for support as needed.    Note: [facility] [phone]                    Care Plan: Medication management support at home     Problem: HP GENERAL PROBLEM     Goal: I would like to enroll in home care services to suppport medication management within 90 days     Start Date: 8/2/2022 Expected End Date: 11/3/2022    This Visit's Progress: 10%    Note:     Barriers: language, access   Strengths: family, CP assistance  Patient expressed understanding of goal: yes  Action steps to achieve this goal:  1. I will work with CP and CC and care team to enroll in home care support for medication set up   2. I will update Care coordination team during next outreach                         Care Plan: General: Phone Resources     Problem: HP GENERAL PROBLEM     Goal: General: I would like support to get information and resources/program for a free phone to communicate with other as soon as possible.     Start Date: 8/29/2022 Expected End Date: 11/29/2022    Note:     Barriers: no cell phone, limited income  Strengths: support from sister, father, CP, and CCC team  Patient expressed understanding of goal: yes  Action steps to achieve this goal:  1. I will wait to get information and resources for free phone from CC Team

## 2022-08-30 ENCOUNTER — PATIENT OUTREACH (OUTPATIENT)
Dept: CARE COORDINATION | Facility: CLINIC | Age: 32
End: 2022-08-30

## 2022-08-30 ENCOUNTER — LAB (OUTPATIENT)
Dept: LAB | Facility: CLINIC | Age: 32
End: 2022-08-30
Payer: COMMERCIAL

## 2022-08-30 ENCOUNTER — TELEPHONE (OUTPATIENT)
Dept: LAB | Facility: CLINIC | Age: 32
End: 2022-08-30

## 2022-08-30 DIAGNOSIS — E27.1: Primary | ICD-10-CM

## 2022-08-30 DIAGNOSIS — E71.529: Primary | ICD-10-CM

## 2022-08-30 LAB — RENIN PLAS-CCNC: 14.9 NG/ML/HR

## 2022-08-30 PROCEDURE — 81479 UNLISTED MOLECULAR PATHOLOGY: CPT | Performed by: PEDIATRICS

## 2022-08-30 PROCEDURE — G0452 MOLECULAR PATHOLOGY INTERPR: HCPCS | Mod: 26 | Performed by: STUDENT IN AN ORGANIZED HEALTH CARE EDUCATION/TRAINING PROGRAM

## 2022-08-30 PROCEDURE — 81405 MOPATH PROCEDURE LEVEL 6: CPT | Performed by: PEDIATRICS

## 2022-08-30 NOTE — PROGRESS NOTES
With the help of a Angela , I notified Toe Toe Po that no prior authorization is required for genetic testing. Explained that based on his insurance, we don't expect him to receive a bill.    Toe Toe Po expressed understanding and stated that he wants to proceed with testing. We will call when results are available. He had no further questions. [facility] Hold For Preauthorization: [[id number]] order was placed by a genetics provider.    Paulina Ferrer  Genomics Billing    [facility]   Molecular Diagnostics Laboratory  [address] [place] [id number]  [phone]

## 2022-08-30 NOTE — PROGRESS NOTES
Clinic Care Coordination Contact  Care Team Conversations    CCC team discussion during case review huddle  Still unclear about status of home care   RN CC will reach out to previous home care provider to support review of resumption of care or if new order needed        RN CC left message with Renetta to return call     Patient has PCP visit on 9/19

## 2022-09-01 ENCOUNTER — MEDICAL CORRESPONDENCE (OUTPATIENT)
Dept: HEALTH INFORMATION MANAGEMENT | Facility: CLINIC | Age: 32
End: 2022-09-01

## 2022-09-09 ENCOUNTER — TELEPHONE (OUTPATIENT)
Dept: TRANSPLANT | Facility: CLINIC | Age: 32
End: 2022-09-09

## 2022-09-09 ENCOUNTER — NURSE TRIAGE (OUTPATIENT)
Dept: NURSING | Facility: CLINIC | Age: 32
End: 2022-09-09

## 2022-09-09 ENCOUNTER — ALLIED HEALTH/NURSE VISIT (OUTPATIENT)
Dept: OTHER | Facility: CLINIC | Age: 32
End: 2022-09-09
Payer: COMMERCIAL

## 2022-09-09 ENCOUNTER — PATIENT OUTREACH (OUTPATIENT)
Dept: CARE COORDINATION | Facility: CLINIC | Age: 32
End: 2022-09-09

## 2022-09-09 VITALS
DIASTOLIC BLOOD PRESSURE: 90 MMHG | OXYGEN SATURATION: 99 % | TEMPERATURE: 98.4 F | RESPIRATION RATE: 14 BRPM | HEART RATE: 84 BPM | SYSTOLIC BLOOD PRESSURE: 114 MMHG

## 2022-09-09 DIAGNOSIS — F89 NEURODEVELOPMENTAL DISORDER: ICD-10-CM

## 2022-09-09 DIAGNOSIS — R42 DIZZINESS: ICD-10-CM

## 2022-09-09 DIAGNOSIS — Z79.899 MEDICATION MANAGEMENT: Primary | ICD-10-CM

## 2022-09-09 DIAGNOSIS — R11.0 NAUSEA: Primary | ICD-10-CM

## 2022-09-09 PROCEDURE — 99207 PR COMMUNITY PARAMEDIC-PATIENT MEETS CRITERIA: CPT

## 2022-09-09 ASSESSMENT — ACTIVITIES OF DAILY LIVING (ADL): DEPENDENT_IADLS:: CLEANING;COOKING;LAUNDRY;SHOPPING;MEAL PREPARATION;MEDICATION MANAGEMENT;TRANSPORTATION

## 2022-09-09 NOTE — LETTER
"September 9, 2022      TO: Brooke Peterson  [address] [place] [address]         Dear Brooke Peterson,    It was a pleasure speaking with you regarding the results of your genetic testing. Below is a summary of our discussion for your records:    Pertinent Medical History: Brooke Peterson has a history of adrenal insufficiency which was diagnosed in July of 2017. He has progressive leg weakness that has led to gait abnormalities and has both bladder and bowel incontinence. He currently uses a walker for ambulation and is waiting for access to a wheelchair. He also has dysarthria. Abnormalities have been noted in prior brain MRIs for Toe Toe Po although a specific diagnosis with cerebral ALD is not noted in his records. He also had a history of hypothyroidism, asthma, a pituitary microadenoma, schizophrenia and PTSD.      X-ALD Test Results:   Based on Brooke Peterson's symptoms, VLCFA studies were sent to [facility] on 5/14/2022 which revealed that Brooke Peterson's VLCFAs were elevated and interpreted as \"consistent with a defect in peroxisomal fatty acid oxidation, such as X-linked adrenoleukodystrophy or adrenomyeloneuropathy.\" ABCD1 gene testing was also sent to the [facility] which identified a hemizygous variant of uncertain significance called c.923G>C (p.[id number]) in the ABCD1 gene. 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     X-ALD Overview:  X-ALD is a genetic condition that can present with a variety of symptoms in different individuals affected with the disease. The most common forms of X-ALD include: (1) pre-symptomatic/asymptomatic, (2) cerebral disease, (3) adrenal insufficiency (Castalia's disease), and (4) myelopathy/AMN.    Pre-symptomatic/asymptomatic: Some individuals who " "do not currently have symptoms are known to have X-ALD following a positive  screen or predictive genetic testing. These individuals are considered pre-symptomatic because they do not currently have symptoms.    Cerebral disease: This form is most common in boys between 3-12 years of age although cerebral disease can develop at other times in childhood or adulthood. Symptoms often begin with learning and behavioral challenges that worsen over time. Seizures can be the first symptom in some boys. Common symptoms as the disease progresses include vision & hearing problems, difficulties swallowing, and poor coordination. Untreated, individuals with the cerebral form of ALD typically develop worsening symptoms that eventually lead to full disability and death. Screening for early signs of cerebral disease with brain MRIs can help detect early signs of disease when more treatment options are available.     Adrenal insufficiency (Ariel's disease): This form results from the adrenal glands not making enough of certain hormones. Some symptoms of adrenal insufficiency include weakness, weight loss, darkening patches of skin, vomiting, and coma. Adrenal insufficiency most commonly presents in childhood but can develop later in life. Boys and men with X-ALD are monitored for adrenal dysfunction so they can be treated if adrenal insufficiency develops.    Myeloneuropathy/AMN: This form typically presents in adulthood and involves weakness and stiffness in the lower extremities (paraparesis). Other symptoms can include difficulties walking (gait disorder), bladder and bowel control issues, and sexual dysfunction. This form is sometimes referred to as adrenomyeloneuropathy (AMN).     Males with X-ALD generally develop one or multiple of these forms of X-ALD in their lifetime. Females with X-ALD (historically called \"carriers\") are more likely to have either no symptoms or develop myeloneuropathy/AMN-like symptoms in " adulthood (typically >30 years of age). Since cerebral disease and adrenal insufficiency are rare in females with X-ALD, girls and women with X-ALD are not routinely screened for these symptoms. Individuals with X-ALD, even in the same family, can have very different forms of X-ALD. Genetic testing, VLCFA studies, and family history cannot predict how someone with X-ALD will present in their lifetime.     X-ALD Genetics:  We all have DNA in every cell in our bodies that tells our bodies how to develop and function properly. Individual instructions in the DNA are called genes. Disease-causing variants (changes) in genes that stop the gene from working properly cause genetic diseases like X-ALD.     DNA is stored in structures called chromosomes. We all have 46 chromosomes that come in 23 pairs. The first 22 pairs of chromosomes are called autosomal and are the same in males and females. The 23rd pair of chromosomes are called sex chromosomes and are different in males and females. Male sex chromosomes are XY while female sex chromosomes are XX.      The gene associated with X-ALD is called ABCD1 which is located on the X chromosome. Normally, this gene provides the instructions for building a protein called adrenoleukodystrophy protein (ALDP). This protein helps to transport very long-chain fatty acids (VLCFA) into the peroxisomes. Peroxisomes are a part of the cell that breaks down and processes many things including VLCFA.      Disease-causing changes in the ABCD1 gene leads to ALDP not working properly. When ALDP doesn't work like it should, VLCFA aren't moved into the peroxisomes to be broken down. This causes high levels of VLCFA in the body. This build-up of VLCFA appears to harm different parts of the body including: (1) the coating called myelin that protects the nerves in the brain and spine and (2) the adrenal glands that are responsible for making certain hormones in the body.     X-ALD  Inheritance:  Because women have two copies of the X chromosome, they have two copies of the ABCD1 gene. Men only have one X chromosome so men only have one copy of the ABCD1 gene. As a result, if one copy of a woman's ABCD1 gene has a change, she is most often either unaffected or has myelopathy/AMN-like symptoms associated with X-ALD. This is because she has another copy of the ABCD1 gene on the other X chromosome that still works properly.  On the other hand, almost all boys and men with X-ALD from a change in the ABCD1 gene develop some symptoms because they do not have an extra copy.       For males with X-ALD, they will pass the ABCD1 gene variant to all of their daughters and none of their sons.     For females with X-ALD, there is a 50% chance of passing on the ABCD1 gene variant in each pregnancy. Any son has a 50% chance of inheriting the ABCD1 gene variant and any daughter has a 50% chance of inheriting the ABCD1 gene variant.  There are multiple reproductive options available to families with X-ALD. These can be reviewed further with Brooke Brooke Nicholas at any time in the future if he is interested     Implications for Brooke Peterson's Health:   Brooke Cox Nicholas plans to meet with neurology to review his diagnosis with X-ALD and to consider what treatment options are available. Brooke Brooke Nicholas is aware that not all symptoms of X-ALD have treatment and that some treatments are not affective if the disease is too advanced.     Implications for Family Members:  We reviewed the implications of these findings for the family. Currently, Brooke Peterson's siblings who have not been tested have a 50% chance of having X-ALD and it is expected that Brooke Peterson's mother has X-ALD. Testing is available to confirm these diagnoses and offer treatment, management and reproductive options to relatives who are interested, so it is very important to share these results with siblings and maternal relatives who are at risk of having X-ALD. There are  "multiple reproductive options for individuals with X-ALD including prenatal diagnostic testing, in-vitro fertilization with preimplantation genetic testing (IVF-PGT), use of donor gametes, adoption and unassisted pregnancy. Relatives can find a local genetic counselor to assist with diagnostic testing and reproductive planning at the [email or web address] website.      It was a pleasure speaking with you regarding these results. Please find a copy of Toe's test results enclosed for your records. The above information is based on our current understanding of the genetic findings in your family. You are encouraged to reach out annually for any updates to your family's genetic testing information as our understanding of the genetic findings in your family may change over time. If you have any additional questions or concerns, please do not hesitate to call me at [phone] through  services.    Sincerely,    Flaquita Callahan MS, MA, [facility]  Licensed, Certified Genetic Counselor  Pediatric Blood & Marrow Transplant  [phone]  [email or web address]    ENCLOSURE: Please include a copy of Toe's results under the \"Laboratory\" tab titled \"next generation sequencing\" from 8/30/2022.        "

## 2022-09-09 NOTE — Clinical Note
Attn: HIMS  Please print and send a copy of this letter and result to the patient at the home address.  Thank you!  Flaquita

## 2022-09-09 NOTE — TELEPHONE ENCOUNTER
Clinic Action Needed:Yes    Reason for Call:  [facility] pharmacy calling requesting refills of Meclizine, Zofran, B12 on behalf of patient.      Yola Isabel RN  [facility]

## 2022-09-09 NOTE — TELEPHONE ENCOUNTER
September 9, 2022    I called with the help of a Angela  to speak with Brooke Peterson to review the results of his genetic testing which identified a variant of uncertain significance in the ABCD1 gene called c.923G>C (p.Wel211Zzj). 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    Brooke Peterson is being scheduled to meet with adult neurology to discuss this diagnosis and possible treatment options. He shared that he would like Scarlett Varma to give him a call to help him get access to a wheelchair and to follow up regarding another matter. I offered to message Scarlett Varma to request that Brooke Peterson receive a call back regarding these questions and he said that would be helpful.    Brooke Peterson gave verbal permission for his test results and clinical history to be shared with any relative who needs this information for testing. He also gave me permission to try and reach his sister, Kell Marinelli, to review his results with her.     A results letter will be sent along with a copy of the test results to Brooke Peterson's home address. Additional questions or concerns were denied.    Flaquita Callahan MS, MA, [facility]  Licensed, Certified Genetic Counselor  Pediatric Blood & Marrow Transplant  [phone]  [email or web address]

## 2022-09-09 NOTE — PROGRESS NOTES
9/9/2022  Clinic Care Coordination Contact  Community Health Worker Follow Up    Intervention and Education during outreach:     Angela :  Marcelo ID #[id number]   Received call from patient. Patient using his father's cell.  Stated he wants to know about the wheelchair.    Conference call with patient to [employer]   [phone] and spoke to Yen regarding status of wheelchair.  There is a note that need to refax the document to the clinic for PCP to complete to support the need for wheelchair to meet insurance criterias.  Requested to fax the document to CHW to give to PCP if needed  If [employer] received the document by next week, could deliver end of week or can also  the wheelchair.  They will call patient's father number when wheelchair is ready for .  Suggested to have it deliver to the home due to lack of transportation.  He is not sure where the appt is on 9-15-22 at 10:15am.  Suggested to talk to his brother which he agreed to help set up medical ride for 9-15-22 appt.    [facility] 9-13-22 phone resources-free phone  CHW Follow up: Monthly  CHW Plan: Follow up on goal(s)  CHW Next Follow Up: 10-11-22    Scarlett Varma  Community Health Worker  [facility]  Clinic Care Coordination  [email or web address]   Office: [phone]  Fax: [phone]  Clinic Care Coordination Contact    Community Health Worker Follow Up    Care Gaps:     Health Maintenance Due   Topic Date Due     ASTHMA ACTION PLAN  Never done     ADVANCE CARE PLANNING  Never done     DEPRESSION ACTION PLAN  Never done     COVID-19 Vaccine (3 - Booster for Pfizer series) 02/03/2022     INFLUENZA VACCINE (1) 09/01/2022       Care Gap Goal set: Yes and Scheduled 9-19-22 with PCP discuss care gaps      Care Plan:   Care Plan: Wheelchair     Problem: HP GENERAL PROBLEM     Goal: I would like to obtain wheel chair and size large briefs within the next 90 days.     Start Date:  8/10/2022 Expected End Date: 11/17/2022    This Visit's Progress: 30%    Note:     Barriers: Language barriers  Strengths: Accepting of support    Patient expressed understanding of goal: yes    Action steps to achieve this goal:  1. I will answer the call when [facility] reaches out to follow up on orders.   I will complete paperwork needed to support   2. I will provide information as needed for my wheel chair and size large brief deliveries.    3. I will follow up with CCC in the next month regarding this goal or reach out for support as needed.    Note: [facility] [phone]                    Care Plan: Medication management support at home     Problem: HP GENERAL PROBLEM     Goal: I would like to enroll in home care services to suppport medication management within 90 days     Start Date: 8/2/2022 Expected End Date: 11/3/2022    Recent Progress: 10%    Note:     Barriers: language, access   Strengths: family, CP assistance  Patient expressed understanding of goal: yes  Action steps to achieve this goal:  1. I am still waiting for skilled nurse and will continue to work with CP and care team to enroll in home care support for medication set up   2. I will update Care coordination team during next outreach   Updated 9-9-22 AL                      Care Plan: General: Phone Resources     Problem: HP GENERAL PROBLEM     Goal: General: I would like support to get information and resources/program for a free phone to communicate with other as soon as possible.     Start Date: 8/29/2022 Expected End Date: 11/29/2022    This Visit's Progress: 10%    Note:     Barriers: no cell phone, limited income  Strengths: support from sister, father, CP, and CCC team  Patient expressed understanding of goal: yes  Action steps to achieve this goal:  1. I will talk to CC SW on 9-13-22 at 2pm regarding information and resources for free phone.  Updated 9-9-22 AL

## 2022-09-09 NOTE — PROGRESS NOTES
Community Paramedics Follow-up Visit  September 9, 2022  TIME: 12:30    Brooke Peterson is a 31 year old male being seen at home for a follow-up visit.    Present at appointment:           Chief Complaint   Patient presents with     Outreach     Community Paramedic home visit, medication set up.       Universal Utilization:      Utilization    Hospital Admissions  1             ED Visits  2             No Show Count (past year)  27                Current as of: 9/9/2022  2:49 AM              BP (!) 114/90 (BP Location: Right arm, Patient Position: Standing)   Pulse 84   Temp 98.4  F (36.9  C)   Resp 14   SpO2 99%     Clinical Concerns:  Current Medical Concerns:      Current Behavioral Concerns: medication compliance    Education Provided to patient: went through and set up medications   Medication set up? Yes  Pill Box issued: No  Scale issued: No  Flu Shot given: No  COVID Vaccine Given: No  Lab draw or specimen collection: No  Food box issued: No  Collaborative visit with PCP: No  Wound Care: No    Health Maintenance Reviewed:      Clinical Pathway: None    No  Face to Face in Home / Community    Recent blood sugars:     Review of Symptoms/PE    Skin: negative  Eyes: negative  Ears/Nose/Throat: negative  Respiratory: No shortness of breath, dyspnea on exertion, cough, or hemoptysis  Cardiovascular: negative  Gastrointestinal: negative  Genitourinary: negative  Musculoskeletal: negative  Neurologic: negative  Psychiatric: negative    Pain Management::                 Plan:     Time spent with patient: 60    The patient meets one or more of the following criteria:  * Requires services to prevent readmission to a nursing home or hospital    Acute concern/Follow-up recommendations: follow care plan    Next CP visit scheduled: 09/23/2022    Issues for Provider to follow up on: The CP met with pt, set up his medications for two weeks, called pharmacy to refill prescriptions that were getting low and took orthostatic blood  pressures.    Provider follow up visit needed: TBD

## 2022-09-11 RX ORDER — ONDANSETRON 4 MG/1
4 TABLET, FILM COATED ORAL EVERY 8 HOURS PRN
Qty: 10 TABLET | Refills: 0 | Status: SHIPPED | OUTPATIENT
Start: 2022-09-11

## 2022-09-11 RX ORDER — MECLIZINE HCL 12.5 MG 12.5 MG/1
12.5 TABLET ORAL 3 TIMES DAILY PRN
Qty: 30 TABLET | Refills: 1 | Status: SHIPPED | OUTPATIENT
Start: 2022-09-11 | End: 2022-11-07

## 2022-09-12 ENCOUNTER — TELEPHONE (OUTPATIENT)
Dept: FAMILY MEDICINE | Facility: CLINIC | Age: 32
End: 2022-09-12

## 2022-09-12 NOTE — TELEPHONE ENCOUNTER
Forms/Letter Request    Type of form/letter: Medical Records Request from [employer]     Have you been seen for this request: Yes    Do we have the form/letter: YES: Per Graham from [employer] they have sent over request several times with no success at getting anything back. I had Graham re-faxed forms to me and placed it in Dr. Rangel's mailbox up front. Please look into forms and faxed back necessary docs to [employer].  Thank you.     When is form/letter needed by: ASAP    How would you like the form/letter returned: Fax    Okay to leave a detailed message?: Yes at Home number on file [phone] (home)

## 2022-09-13 ENCOUNTER — PATIENT OUTREACH (OUTPATIENT)
Dept: CARE COORDINATION | Facility: CLINIC | Age: 32
End: 2022-09-13

## 2022-09-13 NOTE — TELEPHONE ENCOUNTER
9/13/2022  Received call from patient requesting status of wheelchair.  Called to [employer] [phone] and spoke to Alyse  She stated they have not receive the form or medical records from PCP.    Please support to get wheelchair ASAP

## 2022-09-13 NOTE — PROGRESS NOTES
9/13/2022  Clinic Care Coordination Contact  Community Health Worker Follow Up    Intervention and Education during outreach:   Kathleen ID #[id number]  Received call from patient and family members father about the wheelchair  Suggested for English speaking family members to help call to [employer] to check on status of wheelchair.  Stated he does not have anyone.  Conference call to [employer] [phone] with patient check on status of wheelchair.  Spoke to Alyse still waiting for the doctor to complete the form  CHW sent telephone message to PCP Care Team Pool regarding completing the forms to process wheelchair order.  Forms faxed over to PCP already    CC  9-16-22 at 2:30pm  CHW Follow up: Monthly  CHW Plan: Follow up on goal(s)  CHW Next Follow Up: 10-17-22    Scarlett Varma  Community Health Worker  [facility]  Clinic Care Coordination  [email or web address]   Office: [phone]  Fax: [phone]    Clinic Care Coordination Contact    Community Health Worker Follow Up    Care Gaps:     Health Maintenance Due   Topic Date Due     ASTHMA ACTION PLAN  Never done     ADVANCE CARE PLANNING  Never done     DEPRESSION ACTION PLAN  Never done     COVID-19 Vaccine (3 - Booster for Pfizer series) 02/03/2022     INFLUENZA VACCINE (1) 09/01/2022       Care Gap Goal set: Yes and Scheduled 9-19-22 with PCP      Care Plan:   Care Plan: Wheelchair     Problem: HP GENERAL PROBLEM     Goal: I would like to obtain wheel chair and size large briefs within the next 90 days.     Start Date: 8/10/2022 Expected End Date: 11/17/2022    This Visit's Progress: 50% Recent Progress: 30%    Note:     Barriers: Language barriers  Strengths: Accepting of support  Patient expressed understanding of goal: yes  Action steps to achieve this goal:  1. I will wait for the doctor to fax back the forms to [employer] [phone] to process the wheel chair.  2. I will follow up with  CCC in the next month regarding this goal or reach out for support as needed.  Updated 9-13-22 AL                    Care Plan: Medication management support at home     Problem: HP GENERAL PROBLEM     Goal: I would like to enroll in home care services to suppport medication management within 90 days     Start Date: 8/2/2022 Expected End Date: 11/3/2022    Recent Progress: 10%    Note:     Barriers: language, access   Strengths: family, CP assistance  Patient expressed understanding of goal: yes  Action steps to achieve this goal:  1. I am still waiting for skilled nurse and will continue to work with CP and care team to enroll in home care support for medication set up   2. I will update Care coordination team during next outreach   Updated 9-9-22 AL                      Care Plan: General: Phone Resources     Problem: HP GENERAL PROBLEM     Goal: General: I would like support to get information and resources/program for a free phone to communicate with other as soon as possible.     Start Date: 8/29/2022 Expected End Date: 11/29/2022    This Visit's Progress: 30% Recent Progress: 10%    Note:     Barriers: no cell phone, limited income  Strengths: support from sister, father, CP, and CCC team  Patient expressed understanding of goal: yes  Action steps to achieve this goal:  1. I will talk to CC  on 9-16-22 at 2:30pm regarding information and resources for free phone.  Updated 9-13-22 AL

## 2022-09-14 NOTE — TELEPHONE ENCOUNTER
I was wondering if you could call [employer] back. I spoke to someone earlier and they stated they need office notes to be addended to reflect the patients order for light wheel chair. I spoke with Dr. Rangel and he reviewed the original order and he placed a order to [employer] for a STANDARD wheelchair. Can you please call them back and let them know that the office note does not need to be addended. I tried to call [employer] back but I was on hold for almost 10mins. Please let me know if your not able to call. Thanks!

## 2022-09-14 NOTE — TELEPHONE ENCOUNTER
Please call [employer] back.  All the information was sent to them for a standard wheel chair  They sent back a need to change the order to light weight wheel chair.    Why are they wanting a change to the order?

## 2022-09-15 ENCOUNTER — PATIENT OUTREACH (OUTPATIENT)
Dept: CARE COORDINATION | Facility: CLINIC | Age: 32
End: 2022-09-15

## 2022-09-15 NOTE — PROGRESS NOTES
Clinic Care Coordination Contact  Care Team Conversations    Care team sent message to CCC to support DME request clarification for wheelchair.    Spoke with rep - CCC team collaboration to support patient fulfillment of DME request

## 2022-09-16 ENCOUNTER — PATIENT OUTREACH (OUTPATIENT)
Dept: NURSING | Facility: CLINIC | Age: 32
End: 2022-09-16

## 2022-09-16 NOTE — PROGRESS NOTES
9/15/2022  Clinic Care Coordination Contact  Care Team Conversations    9-15-22 at 3:11pm Received VM from DAHIANA Ashley  at [facility]  Calling about the status of patient's wheelchair.   He has difficulty walking and been trying to get wheelchair for a month now.   Call back number [phone]     Updated information in Care Team    CC  9-16-22 at 2:30pm  CHW Follow up: Monthly  CHW Plan: Follow up on goal(s)  CHW Next Follow Up: 10-13-22    Scarlett Varma  Community Health Worker  [facility] Care Coordination  [email or web address]  [email or web address]   Office: [phone]  Fax: [phone]

## 2022-09-16 NOTE — PROGRESS NOTES
Clinic Care Coordination Contact    Follow Up Progress Note      Assessment: CC SHIRA to follow up with pt after CHW call on 9/13    Care Gaps:    Health Maintenance Due   Topic Date Due     ASTHMA ACTION PLAN  Never done     ADVANCE CARE PLANNING  Never done     DEPRESSION ACTION PLAN  Never done     COVID-19 Vaccine (3 - Booster for Pfizer series) 02/03/2022     INFLUENZA VACCINE (1) 09/01/2022       Declined due to pt's father not wanting to talk with CC SHIRA this afternoon     Care Plans  Care Plan: Wheelchair     Problem: HP GENERAL PROBLEM     Goal: I would like to obtain wheel chair and size large briefs within the next 90 days.     Start Date: 8/10/2022 Expected End Date: 11/17/2022    This Visit's Progress: 50% Recent Progress: 30%    Note:     Barriers: Language barriers  Strengths: Accepting of support  Patient expressed understanding of goal: yes  Action steps to achieve this goal:  1. I will wait for the doctor to fax back the forms to [employer] [phone] to process the wheel chair.  2. I will follow up with CCC in the next month regarding this goal or reach out for support as needed.  Updated 9-13-22 AL                    Care Plan: Medication management support at home     Problem: HP GENERAL PROBLEM     Goal: I would like to enroll in home care services to suppport medication management within 90 days     Start Date: 8/2/2022 Expected End Date: 11/3/2022    Recent Progress: 10%    Note:     Barriers: language, access   Strengths: family, CP assistance  Patient expressed understanding of goal: yes  Action steps to achieve this goal:  1. I am still waiting for skilled nurse and will continue to work with CP and care team to enroll in home care support for medication set up   2. I will update Care coordination team during next outreach   Updated 9-9-22 AL                      Care Plan: General: Phone Resources     Problem: HP GENERAL PROBLEM     Goal: General: I would like support to get  information and resources/program for a free phone to communicate with other as soon as possible.     Start Date: 8/29/2022 Expected End Date: 11/29/2022    This Visit's Progress: 30% Recent Progress: 10%    Note:     Barriers: no cell phone, limited income  Strengths: support from sister, father, CP, and CCC team  Patient expressed understanding of goal: yes  Action steps to achieve this goal:  1. I will talk to CALOS SILVA on 9-16-22 at 2:30pm regarding information and resources for free phone.  Updated 9-13-22 AL                      CC SHIRA called pt via  line. CC SW checked in with pt regarding how he was doing and  stated that pt was having a hard time understanding at the moment. SW asked pt if there was a family member there that SW could talk to.  notified SW that the father was currently on the line and reported that the father was having a hard time understanding pt.  explained that he was telling pt's father that pt did not need to understand pt at the moment and needed to listen to  so that way he knows what SW was trying to discuss with him.  notified SW that the father was not in a good mood and was giving the  a hard time. CC SW notified pt's father that she would call back at a later time.    Intervention/Education provided during outreach: N/A     Plan: CALOS SILVA try and connect with pt in a few weeks     Care Coordinator will follow up in a few weeks and try to connect with pt later.

## 2022-09-19 ENCOUNTER — VIRTUAL VISIT (OUTPATIENT)
Dept: FAMILY MEDICINE | Facility: CLINIC | Age: 32
End: 2022-09-19
Payer: COMMERCIAL

## 2022-09-19 DIAGNOSIS — G47.00 INSOMNIA, UNSPECIFIED TYPE: Primary | ICD-10-CM

## 2022-09-19 DIAGNOSIS — E71.529 X LINKED ADRENOLEUKODYSTROPHY (H): ICD-10-CM

## 2022-09-19 PROCEDURE — 99213 OFFICE O/P EST LOW 20 MIN: CPT | Mod: 95 | Performed by: FAMILY MEDICINE

## 2022-09-19 RX ORDER — TRAZODONE HYDROCHLORIDE 50 MG/1
50 TABLET, FILM COATED ORAL AT BEDTIME
Qty: 90 TABLET | Refills: 0 | Status: SHIPPED | OUTPATIENT
Start: 2022-09-19 | End: 2023-01-01

## 2022-09-19 NOTE — PROGRESS NOTES
Brooke is a 31 year old who is being evaluated via a billable telephone visit.      What phone number would you like to be contacted at? [phone]  How would you like to obtain your AVS? Mail a copy    Assessment & Plan     Insomnia, unspecified type  Discussed attempting to treat with sleep aid.  - traZODone (DESYREL) 50 MG tablet  Dispense: 90 tablet; Refill: 0    X linked adrenoleukodystrophy (H)  Follow up with specialists as scheduled.       BMI:   Estimated body mass index is 34.51 kg/m  as calculated from the following:    Height as of 7/27/22: 1.524 m (5').    Weight as of 10/13/22: 80.2 kg (176 lb 11.2 oz).           Return in about 1 week (around 9/26/2022) for Follow up, in person.    Jose Rangel MD  [facility]    Subjective   Brooke is a 31 year old, presenting for the following health issues:  discuss wheelchair      HPI   Difficult phone visit as patient has dysarthria and did not have anyone at home willing to help with his home visit.  There is a fair amount of dysfunction at the family level.  Patient has concerns about not sleeping.  States he has not slept for 1 hour for over a month.  Not a good clear how this could even be possible.    Also has concerns of dizziness.  This never really goes away.  Present for as long as I have known him.    I believe he has a wheel chair now.        Objective       Vitals:  No vitals were obtained today due to virtual visit.    Physical Exam   alert and no distress  PSYCH: Alert and oriented times 3; only moderately coherent speech, normal   rate and volume, or delusions    RESP: No cough, no audible wheezing, able to talk in full sentences  Remainder of exam unable to be completed due to telephone visits          Phone call duration: 12 minutes

## 2022-09-22 ENCOUNTER — PATIENT OUTREACH (OUTPATIENT)
Dept: CARE COORDINATION | Facility: CLINIC | Age: 32
End: 2022-09-22

## 2022-09-22 NOTE — PROGRESS NOTES
9/22/2022  Clinic Care Coordination Contact  Care Team Conversations    Call: [employer]  Member Line:[phone]  Provider Line [phone]  RE: Appt 9-27-22 at 10:00 am  to Neurology appt   [address]   [facility] [place] [address]    Rep: Ilene               Provided member ID, verified patient demographics, destination address appointment date and time.  9-27-22 at 10am Neurology appt  [employer] [phone]   between: 9:00am-9:30am  2 passengers  Will call     CHW Follow up: Monthly  CHW Plan: Follow up on goals  CHW Next Follow Up:10-13-22    Scarlett Varma  Community Health Worker  [facility]  Clinic Care Coordination  [email or web address]   Office: [phone]  Fax: [phone]

## 2022-09-23 ENCOUNTER — ALLIED HEALTH/NURSE VISIT (OUTPATIENT)
Dept: OTHER | Facility: CLINIC | Age: 32
End: 2022-09-23
Payer: COMMERCIAL

## 2022-09-23 VITALS
RESPIRATION RATE: 14 BRPM | DIASTOLIC BLOOD PRESSURE: 80 MMHG | SYSTOLIC BLOOD PRESSURE: 128 MMHG | TEMPERATURE: 98.2 F | HEART RATE: 84 BPM | OXYGEN SATURATION: 99 %

## 2022-09-23 DIAGNOSIS — E27.1 ADDISON'S DISEASE (H): ICD-10-CM

## 2022-09-23 DIAGNOSIS — E27.40 ADRENAL INSUFFICIENCY (H): ICD-10-CM

## 2022-09-23 DIAGNOSIS — Z79.899 MEDICATION MANAGEMENT: Primary | ICD-10-CM

## 2022-09-23 PROCEDURE — 99207 PR COMMUNITY PARAMEDIC-PATIENT MEETS CRITERIA: CPT

## 2022-09-23 ASSESSMENT — ACTIVITIES OF DAILY LIVING (ADL): DEPENDENT_IADLS:: CLEANING;COOKING;LAUNDRY;SHOPPING;MEAL PREPARATION;MEDICATION MANAGEMENT;TRANSPORTATION

## 2022-09-25 NOTE — PROGRESS NOTES
"In person evaluation  With  and father to help with history as patient has schizophrenia        HPI  Original consultation by Dr. Debra Ward  5/14/2022-5/17/2022, hospital visit  6/22/2022, in person consultation  9/27/2022, in person visit        31-year-old being evaluated neurologically for:  Adrenal leukodystrophy X-linked genetic disorder       Patient is predominantly wheelchair-bound  Right leg is worse than left  Arms are better than legs  Does have significant dysarthria  Does have psychiatric disease which makes some of the exam difficult    Seems to be swallowing food okay  Does have frequent falls when he tries to transfer when family is not around just gets up on his own    Rediscussed with father and patient through  the concern for diagnosis and the importance of following up with the treatment team at the [place] including Dr. Galicia further recommendations    It does not appear that the baclofen make much difference in his ability to stand or walk  Spasticity in the leg seems to be a little bit less but I would prefer not to increase the dose at this time    Discussed multiple issues as above with patient through the  and with his dad through the         A.  Adrenal leukodystrophy X-linked genetic disorder       Weakness of all extremities, right leg worse       Seems to have right-sided arm and leg weakness greater than left (leg greater than arm)           onset unknown        Gait troubles going on for at least 2 years        When I interviewed patient and father with the  he said that he had trouble running when he was a child         but it was because of asthma          father does not know much about patient's mothers brothers history         although there were maternal uncles that may have had walking trouble when they were \"elderly\"           Has some chronic bowel incontinence but that was not necessarily new          MRI " scan with demyelination of the corticospinal tract see MRI report below.        Very long chain fatty acid (5/14/2022)  C 26: Hexacosanoic    1.12 elevated  C26:1                           0.34 elevated  C 24/22 ratio                1.856 elevated  C 26/22 ratio                0.088 elevated  Results consistent with a defect in the  Perioxisomal fatty acid oxidation such as X-linked adrenal leukodystrophy or adrenal myeloneuropathy.  (X-linked ALD hemizygote versus X-linked ALD heterozygote)      B.  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  Normal thoracic spine MRI.  LUMBAR SPINE MRI: 5/10/2022  1.  Normal lumbar spine MRI.  MRI brain 5/11/2022  1.  There are areas of T2 signal hyperintensity involving the cortical spinal tracts on the right and left left greater than right. Beginning at the level of the posterior limb of the internal capsule and ending inferiorly into the midbrain and emeka.   2.  These changes were present on 06/24/2019 but are more apparent on current study.  3.  In addition there is mild T2 signal hyperintensity within the medial aspect of the right and left cerebellar hemispheres and right and left thalami.  4.  Differential diagnostic, considerations would include neurodegenerative disorders and toxic metabolic disease  MRI scan lumbar spine  6/28/2022 normal.  MRI head pituitary with and without contrast see official report 6/28/2022 compared to 5/11/2022  1.  Focal region in the mid posterior adenohypophysis of hypoenhancement may reflect small pituitary lesion such as microadenoma.  2.  Relatively stable appearing foci of T2/FLAIR signal hyperintensity involving the cortical spinal tracts bilaterally, but more conspicuous on the left. Additional involvement involving the bilateral thalami and the paramedian cerebellar hemispheres.   Associated mild enhancement as detailed above. Again, findings nonspecific but can be seen in neurodegenerative disorders, toxic metabolic etiologies. The findings in the cortical spinal tracts have been described with adrenoleukodystrophy.    CSF glucose 66, protein 37, no growth, cytology negative for malignancy, CSF Lyme's negative  Treponemal antibody negative/HIV antigen negative,   AST 86/  CRP 2.0, rheumatoid factor less than 15, ESR 6,  ANGELIC negative  B12 359, TSH 2.69, T4 free 1.07, prolactin level 9.0 (5/5/2022)  Very long chain fatty acid (5/14/2022)  C 26: Hexacosanoic    1.12 elevated  C26:1                           0.34 elevated  C 24/22 ratio                1.856 elevated  C 26/22 ratio                0.088 elevated  Results consistent with a defect in the  Perioxisomal fatty acid oxidation such as X-linked adrenal leukodystrophy or adrenal myeloneuropathy.  (X-linked ALD hemizygote versus X-linked ALD heterozygote)    MRI scan lumbar spine 6/28/2022 normal.  MRI head pituitary with and without contrast see official report 6/28/2022 compared to 5/11/2022  1.  Focal region in the mid posterior adenohypophysis of hypoenhancement may reflect small pituitary lesion such as microadenoma.  2.  Relatively stable appearing foci of T2/FLAIR signal hyperintensity involving the cortical spinal tracts bilaterally, but more conspicuous on the left. Additional involvement involving the bilateral thalami and the  paramedian cerebellar hemispheres.   Associated mild enhancement as detailed in the official report .   Again, findings nonspecific but can be seen in neurodegenerative disorders, toxic metabolic etiologies.   The findings in the cortical spinal tracts have been described with adrenoleukodystrophy.  Scans ordered by primary MD, follows with endocrinology for his pituitary/adrenal dysfunction      Exam    Review of systems  Difficulty with gait  Spastic gait  Cognitive difficulties  Denies diplopia  Has some dysarthria  No visual changes  Some constipation and difficulty with bladder incontinence chronic  Schizophrenia         General exam   Blood pressure 123/93, pulse 88, temperature 98.2  Alert and attentive  HEENT normal  Lungs clear  Heart rate regular  Abdomen soft  Symmetrical pulses  No edema the feet     Neurologic exam  Alert and attentive  No neglect  Due to language barrier and psychiatric disease difficulty test memory and language  Through the  answer some questions    Cranials 2 through 12 normal  May have some thick speech difficult with language barrier but  confirms that it seems thick  But I try to test for extraocular movements they seem to be intact but he would guess the wrong number of fingers but sometimes the gases were way off not sure that he understood the question.  When showing him to finger sometimes he put up 5 fingers on his hand      Upper extremities  Right arm slight drift compared to left arm  Can do the touchdown sign no    Lower extremities  Left leg 4+ out of 5  Right leg 3- out of 5 proximally  Distally seems to move better     Reflexes reported right over left  Biceps 2+/2+  Triceps 2+/2+  Knee 2+/2+  Ankle 2+/2+  Toes upgoing bilaterally        Gait  More trouble with transfer with the back then I think that some of the tone is decreased but his ability to maneuver is not necessarily better prefer not to increase medications at this  "time.           Assessment/plan     1.   Adrenal leukodystrophy X-linked genetic disorder        White matter changes in the CSF with CSF so far not showing any inflammation or elevated protein.        Some of the demyelination of the corticospinal tracts is somewhat worse on the left which could affect the right leg        Has adrenal difficulties        2.  MRI scan brain abnormal bilateral corticospinal tracts T2 signal changes       There are areas of T2 signal hyperintensity involving the cortical spinal tracts on the right and left left greater than right.        Beginning at the level of the posterior limb of the internal capsule and ending inferiorly into the midbrain and emeka.         These changes were present on 2019 but are more apparent on current study.         In addition there is mild T2 signal hyperintensity within the medial aspect of the right and left cerebellar hemispheres and right and left thalami.            difficult to get a good history with language barrier        patient may have had difficulty running when he was a teenager/younger        question if he has a chronic problem going on for years           Unclear if he could have had some type of  event versus hereditary leukodystrophy     3.  Adrenal corticotropin (106 a year ago, > 1250  2 years ago, should be <47)       4.  Patient has been evaluated by:       Dr. Maddison Witt endocrinology at the [facility]       Oncology at the nearest [place] for work-up for \"potential bone marrow transplant) if diagnosis is confirmed       Has appointment with neurology at the [facility] Dr. Galicia 10/13/2022        X-ALD Test Results:   Based on [place]e Po's clinical symptoms and VLCFA studies, he is expected to have X-ALD; however, additional testing is needed to confirm this diagnosis at a genetic level.     The next step of testing will look at the ABCD1 gene looking for small changes (sequencing) and large " changes (copy number variant analysis) by next generation sequencing. Testing will be sent to the [facility]. We reviewed that there are three possible results: positive, negative, and variant of uncertain significance (VUS). If results are negative or a VUS is found, additional testing for To[place] To[place] Po and/or his relatives may be recommended to help explain these results.              Diagnosis  Adrenal leukodystrophy X-linked genetic disorder       Patient age 31 possibly has had this for decades since it is a genetic disease hard to know when symptom onset was this.  We discussed the diagnosis and visits that he has had recently with the patient dad through the       Treatment  Baclofen 10 mg p.o. twice daily  Continue using the walker/wheelchair    In the future he may need a wheelchair but fear that once he sits in the wheelchair he is not going to continue to ambulate and he will lose more ground    Assessment and treatment complicated by his psychiatric disease    Adrenal treatment per primary MD/endocrinology    Patient has follow-up consultation with Dr. Galicia at the [facility] 10/13/2022 patient will keep this visit    Follow-up in 5 months to assess the baclofen      Discussed at length with patient and his father through the  today    Total care time today 32 minutes    As part of the visit today  Reviewed endocrinology notes 7/27/2022  Reviewed oncology notes 8/18/2022  Reviewed ER notes 7/25/2022 (chronic dizziness and fatigue)

## 2022-09-27 ENCOUNTER — OFFICE VISIT (OUTPATIENT)
Dept: NEUROLOGY | Facility: CLINIC | Age: 32
End: 2022-09-27
Payer: COMMERCIAL

## 2022-09-27 VITALS — HEART RATE: 88 BPM | OXYGEN SATURATION: 98 % | SYSTOLIC BLOOD PRESSURE: 123 MMHG | DIASTOLIC BLOOD PRESSURE: 93 MMHG

## 2022-09-27 DIAGNOSIS — R29.898 LEG WEAKNESS, BILATERAL: ICD-10-CM

## 2022-09-27 DIAGNOSIS — E71.529 X-LINKED ADRENOLEUKODYSTROPHY IN MALE (H): Primary | ICD-10-CM

## 2022-09-27 DIAGNOSIS — R26.9 ABNORMAL GAIT: ICD-10-CM

## 2022-09-27 PROCEDURE — 99214 OFFICE O/P EST MOD 30 MIN: CPT | Performed by: PSYCHIATRY & NEUROLOGY

## 2022-09-27 RX ORDER — HYDROCORTISONE 10 MG/1
TABLET ORAL
Qty: 160 TABLET | Refills: 4 | Status: ON HOLD | OUTPATIENT
Start: 2022-09-27 | End: 2023-01-01

## 2022-09-27 NOTE — TELEPHONE ENCOUNTER
hydrocortisone (CORTEF) 10 MG tablet      TRANSITIONAL ORDER ONLY ON MEDICATION LIST    Last Office Visit : 7-  Future Office visit:  8-9-2023    Routing refill request to provider for review/approval because:  Provider preference.

## 2022-09-27 NOTE — LETTER
9/27/2022         RE: Brooke Peterson  [address] [place] [address]        Dear Colleague,    Thank you for referring your patient, Brooke Peterson, to the [facility] [place]. Please see a copy of my visit note below.    In person evaluation  With  and father to help with history as patient has schizophrenia        HPI  Original consultation by Dr. Debra Ward  5/14/2022-5/17/2022, hospital visit  6/22/2022, in person consultation  9/27/2022, in person visit        31-year-old being evaluated neurologically for:  Adrenal leukodystrophy X-linked genetic disorder       Patient is predominantly wheelchair-bound  Right leg is worse than left  Arms are better than legs  Does have significant dysarthria  Does have psychiatric disease which makes some of the exam difficult    Seems to be swallowing food okay  Does have frequent falls when he tries to transfer when family is not around just gets up on his own    Rediscussed with father and patient through  the concern for diagnosis and the importance of following up with the treatment team at the [facility] including Dr. Galicia further recommendations    It does not appear that the baclofen make much difference in his ability to stand or walk  Spasticity in the leg seems to be a little bit less but I would prefer not to increase the dose at this time    Discussed multiple issues as above with patient through the  and with his dad through the         A.  Adrenal leukodystrophy X-linked genetic disorder       Weakness of all extremities, right leg worse       Seems to have right-sided arm and leg weakness greater than left (leg greater than arm)           onset unknown        Gait troubles going on for at least 2 years        When I interviewed patient and father with the  he said that he had trouble running when he was a child         but it was because of asthma          father does  "not know much about patient's mothers brothers history         although there were maternal uncles that may have had walking trouble when they were \"elderly\"           Has some chronic bowel incontinence but that was not necessarily new          MRI scan with demyelination of the corticospinal tract see MRI report below.        Very long chain fatty acid (5/14/2022)  C 26: Hexacosanoic    1.12 elevated  C26:1                           0.34 elevated  C 24/22 ratio                1.856 elevated  C 26/22 ratio                0.088 elevated  Results consistent with a defect in the  Perioxisomal fatty acid oxidation such as X-linked adrenal leukodystrophy or adrenal myeloneuropathy.  (X-linked ALD hemizygote versus X-linked ALD heterozygote)      B.  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  Normal thoracic spine MRI.  LUMBAR SPINE MRI: 5/10/2022  1.  Normal lumbar spine MRI.  MRI brain 5/11/2022  1.  There are areas of T2 signal hyperintensity involving the cortical spinal tracts on the right and left left greater than right. Beginning at the level of the posterior limb of the internal capsule and ending inferiorly into the midbrain and emeka.   2.  These changes were present on 06/24/2019 but are more apparent on current study.  3.  In addition there is mild T2 signal " hyperintensity within the medial aspect of the right and left cerebellar hemispheres and right and left thalami.  4.  Differential diagnostic, considerations would include neurodegenerative disorders and toxic metabolic disease  MRI scan lumbar spine 6/28/2022 normal.  MRI head pituitary with and without contrast see official report 6/28/2022 compared to 5/11/2022  1.  Focal region in the mid posterior adenohypophysis of hypoenhancement may reflect small pituitary lesion such as microadenoma.  2.  Relatively stable appearing foci of T2/FLAIR signal hyperintensity involving the cortical spinal tracts bilaterally, but more conspicuous on the left. Additional involvement involving the bilateral thalami and the paramedian cerebellar hemispheres.   Associated mild enhancement as detailed above. Again, findings nonspecific but can be seen in neurodegenerative disorders, toxic metabolic etiologies. The findings in the cortical spinal tracts have been described with adrenoleukodystrophy.    CSF glucose 66, protein 37, no growth, cytology negative for malignancy, CSF Lyme's negative  Treponemal antibody negative/HIV antigen negative,   AST 86/  CRP 2.0, rheumatoid factor less than 15, ESR 6,  ANGELIC negative  B12 359, TSH 2.69, T4 free 1.07, prolactin level 9.0 (5/5/2022)  Very long chain fatty acid (5/14/2022)  C 26: Hexacosanoic    1.12 elevated  C26:1                           0.34 elevated  C 24/22 ratio                1.856 elevated  C 26/22 ratio                0.088 elevated  Results consistent with a defect in the  Perioxisomal fatty acid oxidation such as X-linked adrenal leukodystrophy or adrenal myeloneuropathy.  (X-linked ALD hemizygote versus X-linked ALD heterozygote)    MRI scan lumbar spine 6/28/2022 normal.  MRI head pituitary with and without contrast see official report 6/28/2022 compared to 5/11/2022  1.  Focal region in the mid posterior adenohypophysis of hypoenhancement may reflect small pituitary  lesion such as microadenoma.  2.  Relatively stable appearing foci of T2/FLAIR signal hyperintensity involving the cortical spinal tracts bilaterally, but more conspicuous on the left. Additional involvement involving the bilateral thalami and the paramedian cerebellar hemispheres.   Associated mild enhancement as detailed in the official report .   Again, findings nonspecific but can be seen in neurodegenerative disorders, toxic metabolic etiologies.   The findings in the cortical spinal tracts have been described with adrenoleukodystrophy.  Scans ordered by primary MD, follows with endocrinology for his pituitary/adrenal dysfunction      Exam    Review of systems  Difficulty with gait  Spastic gait  Cognitive difficulties  Denies diplopia  Has some dysarthria  No visual changes  Some constipation and difficulty with bladder incontinence chronic  Schizophrenia         General exam   Blood pressure 123/93, pulse 88, temperature 98.2  Alert and attentive  HEENT normal  Lungs clear  Heart rate regular  Abdomen soft  Symmetrical pulses  No edema the feet     Neurologic exam  Alert and attentive  No neglect  Due to language barrier and psychiatric disease difficulty test memory and language  Through the  answer some questions    Cranials 2 through 12 normal  May have some thick speech difficult with language barrier but  confirms that it seems thick  But I try to test for extraocular movements they seem to be intact but he would guess the wrong number of fingers but sometimes the gases were way off not sure that he understood the question.  When showing him to finger sometimes he put up 5 fingers on his hand      Upper extremities  Right arm slight drift compared to left arm  Can do the touchdown sign no    Lower extremities  Left leg 4+ out of 5  Right leg 3- out of 5 proximally  Distally seems to move better     Reflexes reported right over left  Biceps 2+/2+  Triceps 2+/2+  Knee 2+/2+  Ankle  "2+/2+  Toes upgoing bilaterally        Gait  More trouble with transfer with the back then I think that some of the tone is decreased but his ability to maneuver is not necessarily better prefer not to increase medications at this time.           Assessment/plan     1.   Adrenal leukodystrophy X-linked genetic disorder        White matter changes in the CSF with CSF so far not showing any inflammation or elevated protein.        Some of the demyelination of the corticospinal tracts is somewhat worse on the left which could affect the right leg        Has adrenal difficulties        2.  MRI scan brain abnormal bilateral corticospinal tracts T2 signal changes       There are areas of T2 signal hyperintensity involving the cortical spinal tracts on the right and left left greater than right.        Beginning at the level of the posterior limb of the internal capsule and ending inferiorly into the midbrain and emeka.         These changes were present on 2019 but are more apparent on current study.         In addition there is mild T2 signal hyperintensity within the medial aspect of the right and left cerebellar hemispheres and right and left thalami.            difficult to get a good history with language barrier        patient may have had difficulty running when he was a teenager/younger        question if he has a chronic problem going on for years           Unclear if he could have had some type of  event versus hereditary leukodystrophy     3.  Adrenal corticotropin (106 a year ago, > 1250  2 years ago, should be <47)       4.  Patient has been evaluated by:       Dr. Maddison Witt endocrinology at the [facility]       Oncology at the [facility] for work-up for \"potential bone marrow transplant) if diagnosis is confirmed       Has appointment with neurology at the [facility] Dr. Galicia 10/13/2022        X-ALD Test Results:   Based on Toe Toe Po's clinical symptoms and VLCFA " studies, he is expected to have X-ALD; however, additional testing is needed to confirm this diagnosis at a genetic level.     The next step of testing will look at the ABCD1 gene looking for small changes (sequencing) and large changes (copy number variant analysis) by next generation sequencing. Testing will be sent to the [facility]. We reviewed that there are three possible results: positive, negative, and variant of uncertain significance (VUS). If results are negative or a VUS is found, additional testing for Toe Toe Po and/or his relatives may be recommended to help explain these results.              Diagnosis  Adrenal leukodystrophy X-linked genetic disorder       Patient age 31 possibly has had this for decades since it is a genetic disease hard to know when symptom onset was this.  We discussed the diagnosis and visits that he has had recently with the patient dad through the       Treatment  Baclofen 10 mg p.o. twice daily  Continue using the walker/wheelchair    In the future he may need a wheelchair but fear that once he sits in the wheelchair he is not going to continue to ambulate and he will lose more ground    Assessment and treatment complicated by his psychiatric disease    Adrenal treatment per primary MD/endocrinology    Patient has follow-up consultation with Dr. Galicia at the [facility] 10/13/2022 patient will keep this visit    Follow-up in 5 months to assess the baclofen      Discussed at length with patient and his father through the  today    Total care time today 32 minutes    As part of the visit today  Reviewed endocrinology notes 7/27/2022  Reviewed oncology notes 8/18/2022  Reviewed ER notes 7/25/2022 (chronic dizziness and fatigue)      Again, thank you for allowing me to participate in the care of your patient.        Sincerely,        Fritz Dejesus MD

## 2022-09-28 ENCOUNTER — PATIENT OUTREACH (OUTPATIENT)
Dept: CARE COORDINATION | Facility: CLINIC | Age: 32
End: 2022-09-28

## 2022-09-28 NOTE — PROGRESS NOTES
Care Coordination Clinician Chart Review     Situation: Patient chart reviewed by care coordinator.?     Background: Initial assessment and enrollment to Care Coordination was 12/8/2020.?Care plan(s) with patient-centered goal(s) were developed with participation from patient.? Lead CC handed patient off to CHW for continued outreach every 30 days.??     Assessment: Per chart review, patient outreach completed by CC CHW on 9/22/2022.? Patient is actively working to accomplish goal(s).? Patient's goal(s) remain(s) appropriate at this time.? Patient is due for updated Plan of Care.? Annual assessment will be due 12/8/2020.     Care Plan: Wheelchair     Problem: HP GENERAL PROBLEM     Goal: I would like to obtain wheel chair and size large briefs within the next 90 days.     Start Date: 8/10/2022 Expected End Date: 11/17/2022    This Visit's Progress: 50% Recent Progress: 30%    Note:     Barriers: Language barriers  Strengths: Accepting of support  Patient expressed understanding of goal: yes  Action steps to achieve this goal:  1. I will wait for the doctor to fax back the forms to [employer] [phone] to process the wheel chair.  2. I will follow up with CCC in the next month regarding this goal or reach out for support as needed.  Updated 9-13-22 AL                    Care Plan: Medication management support at home     Problem: HP GENERAL PROBLEM     Goal: I would like to enroll in home care services to suppport medication management within 90 days     Start Date: 8/2/2022 Expected End Date: 11/3/2022    Recent Progress: 10%    Note:     Barriers: language, access   Strengths: family, CP assistance  Patient expressed understanding of goal: yes  Action steps to achieve this goal:  1. I am still waiting for skilled nurse and will continue to work with CP and care team to enroll in home care support for medication set up   2. I will update Care coordination team during next outreach   Updated 9-9-22  AL                      Care Plan: General: Phone Resources     Problem: HP GENERAL PROBLEM     Goal: General: I would like support to get information and resources/program for a free phone to communicate with other as soon as possible.     Start Date: 8/29/2022 Expected End Date: 11/29/2022    This Visit's Progress: 30% Recent Progress: 10%    Note:     Barriers: no cell phone, limited income  Strengths: support from sister, father, CP, and CCC team  Patient expressed understanding of goal: yes  Action steps to achieve this goal:  1. I will talk to CC SW on 9-16-22 at 2:30pm regarding information and resources for free phone.  Updated 9-13-22 AL                        Plan/Recommendations: The patient will continue working with Care Coordination to achieve above goal(s).? CHW will involve Lead CC as needed or if patient is ready to move to maintenance.? Lead CC will continue to monitor CHW s monthly outreaches and progress to goal(s) every 6 weeks.    Plan of Care updated and sent to patient: Yes

## 2022-10-03 NOTE — PROGRESS NOTES
Community Paramedics Follow-up Visit  September 23,, 2022  TIME: Sukhdev Peterson is a 31 year old male being seen at home for a follow-up visit.    Present at appointment:           Chief Complaint   Patient presents with     Outreach     Community Paramedic medication set up       Universal Utilization:      Utilization    Hospital Admissions  1             ED Visits  2             No Show Count (past year)  31                Current as of: 10/3/2022  6:38 AM              /80 (BP Location: Left arm, Patient Position: Sitting)   Pulse 84   Temp 98.2  F (36.8  C)   Resp 14   SpO2 99%     Clinical Concerns:  Current Medical Concerns:      Current Behavioral Concerns: medication compliance    Education Provided to patient: went through and set up medications   Medication set up? Yes  Pill Box issued: No  Scale issued: No  Flu Shot given: No  COVID Vaccine Given: No  Lab draw or specimen collection: No  Food box issued: No  Collaborative visit with PCP: No  Wound Care: No    Health Maintenance Reviewed:      Clinical Pathway: None    No  Face to Face in Home / Community    Recent blood sugars:     Review of Symptoms/PE    Skin: negative  Eyes: negative  Ears/Nose/Throat: negative  Respiratory: No shortness of breath, dyspnea on exertion, cough, or hemoptysis  Cardiovascular: negative  Gastrointestinal: negative  Genitourinary: negative  Musculoskeletal: negative  Neurologic: negative  Psychiatric: negative    Pain Management::                 Plan:     Time spent with patient: 60    The patient meets one or more of the following criteria:  * Requires services to prevent readmission to a nursing home or hospital    Acute concern/Follow-up recommendations: follow care plan    Next CP visit scheduled: TBD    Issues for Provider to follow up on: medications set up for two weeks.    Provider follow up visit needed: TBD

## 2022-10-04 ENCOUNTER — PATIENT OUTREACH (OUTPATIENT)
Dept: CARE COORDINATION | Facility: CLINIC | Age: 32
End: 2022-10-04

## 2022-10-04 NOTE — PROGRESS NOTES
Community Paramedic Program  Community Health Worker Follow Up    Intervention and Education during outreach:     Called and spoke with pt with Angela bermaner on the line. Noted pt's last CP visit on 9/23 and asked if he'd like my help to schedule another visit for help to set up his medications for the next two weeks. Pt agreed. I notified pt that I will be scheduling a different CP to come and provided her name. Pt said his father will be home during the visit. Pt declined to take down my phone number and said he didn't have anything to write with during the call.    Pt is enrolled in Care Coordination and has ACT team/ through [employer]. Will follow up with CC and pt's PCP via staff message for a check in on long term solution for pt's medication set up at home. Pt has 10/12 visit with his PCP to discuss a prescription for a wheelchair.    CHW Plan:   1. Pt will visit with the CP on Thursday, October 6th at noon.  2. CP CHW will follow up with pt's care team at the [facility] for an update on the long term solution for pt's med set ups at home.

## 2022-10-06 ENCOUNTER — ALLIED HEALTH/NURSE VISIT (OUTPATIENT)
Dept: OTHER | Facility: CLINIC | Age: 32
End: 2022-10-06
Payer: COMMERCIAL

## 2022-10-06 ENCOUNTER — PATIENT OUTREACH (OUTPATIENT)
Dept: OTHER | Facility: CLINIC | Age: 32
End: 2022-10-06

## 2022-10-06 VITALS
TEMPERATURE: 98 F | SYSTOLIC BLOOD PRESSURE: 129 MMHG | DIASTOLIC BLOOD PRESSURE: 97 MMHG | OXYGEN SATURATION: 98 % | HEART RATE: 105 BPM | RESPIRATION RATE: 12 BRPM

## 2022-10-06 DIAGNOSIS — E55.9 VITAMIN D DEFICIENCY: ICD-10-CM

## 2022-10-06 DIAGNOSIS — Z79.899 MEDICATION MANAGEMENT: Primary | ICD-10-CM

## 2022-10-06 PROCEDURE — 99207 PR COMMUNITY PARAMEDIC-PATIENT MEETS CRITERIA: CPT

## 2022-10-06 RX ORDER — CHOLECALCIFEROL (VITAMIN D3) 50 MCG
1 TABLET ORAL DAILY
Qty: 90 TABLET | Refills: 3 | Status: SHIPPED | OUTPATIENT
Start: 2022-10-06

## 2022-10-06 NOTE — TELEPHONE ENCOUNTER
"Last Written Prescription Date:  6/14/22  Last Fill Quantity: 30,  # refills: 3   Last office visit provider:  9/19/22     Requested Prescriptions   Pending Prescriptions Disp Refills     vitamin D3 (CHOLECALCIFEROL) 50 mcg (2000 units) tablet [Pharmacy Med Name: VITAMIN D 50 MCG (2000 UT)t 50 MCG Tablet] 30 tablet 3     Sig: TAKE 1 TABLET (50 MCG) BY MOUTH DAILY       Vitamin Supplements (Adult) Protocol Passed - 10/6/2022 12:54 PM        Passed - High dose Vitamin D not ordered        Passed - Recent (12 mo) or future (30 days) visit within the authorizing provider's specialty     Patient has had an office visit with the authorizing provider or a provider within the authorizing providers department within the previous 12 mos or has a future within next 30 days. See \"Patient Info\" tab in inbasket, or \"Choose Columns\" in Meds & Orders section of the refill encounter.              Passed - Medication is active on med list             Falguni Shetty RN 10/06/22 2:42 PM  "

## 2022-10-06 NOTE — PROGRESS NOTES
Community Paramedic placed call to Jordan Juarez ([phone]) ACT  through [employer] in regards to setting patient up on a long term medication management system. Left message for a return call to Community Paramedic Renae LAGUNAS [phone]. Also asked if she would be able to assist Toe in getting set up for food vouchers as well as Toe asked CP today if he could get help with this.

## 2022-10-06 NOTE — PROGRESS NOTES
Community Paramedics Follow-up Visit  October 6, 2022  TIME: 1200    Toe SILVANO Peterson is a 31 year old male being seen at home for a follow-up visit.    Present at appointment: patient, Community Paramedic, patients father, others in the home, Angela  via phone          Chief Complaint   Patient presents with     Outreach     Recheck Medication       Universal Utilization:      Utilization    Hospital Admissions  1             ED Visits  2             No Show Count (past year)  31                Current as of: 10/5/2022 11:45 PM              BP (!) 129/97   Pulse 105   Temp 98  F (36.7  C)   Resp 12   SpO2 98%     Clinical Concerns:  Current Medical Concerns:  Schizophrenia    Current Behavioral Concerns: medication compliance    Education Provided to patient:    Medication set up? Yes  Pill Box issued: No  Scale issued: No  Flu Shot given: No  COVID Vaccine Given: No  Lab draw or specimen collection: No  Food box issued: No  Collaborative visit with PCP: No  Wound Care: No    Health Maintenance Reviewed:      Clinical Pathway: None    No  Face to Face in Home / Community      Review of Symptoms/PE    Skin: negative  Eyes: negative  Ears/Nose/Throat: negative  Respiratory: No shortness of breath, dyspnea on exertion, cough, or hemoptysis  Cardiovascular: negative  Gastrointestinal: negative  Genitourinary: negative  Musculoskeletal: negative  Neurologic: negative  Psychiatric: negative    Time spent with patient: 60    The patient meets one or more of the following criteria:  * Has been identified by their primary care provider at risk of nursing home placement    Acute concern/Follow-up recommendations: patient was asking if I could help him with food stamps as he is not longer getting them. Advised I would reach out to his SW    Next CP visit scheduled: 10/19/22    Issues for Provider to follow up on: Community Paramedic visited with Toe at his home. Communication via  on speaker phone.   did have a hard time understanding Toe. CP able to go through his medications and set them up for two weeks. Toe stated he had taken his medications for this morning - he had tonight and tomorrow left in his pill box.   Called pharmacy to reorder Vit B-12, Vit D, Meclizine and Baclofen.  Patient stated he continues to be dizzy and feels the medications is not helping him. Medications was not in his pill box so uncertain if he has been taking it when needed. Placed the meclizine in his pill box for AM/PM and advised if he needed he could take a 3 pill during the day.   Patient had asked for assistance getting set back up on food stamps. Advised that CP's are unable to work with any of his financial situations. Offered to reach out to his SW to let them know he needs assistance with this and he agreed.     Provider follow up visit needed: 10/12/22 with Dr Rangel

## 2022-10-11 ENCOUNTER — PATIENT OUTREACH (OUTPATIENT)
Dept: CARE COORDINATION | Facility: CLINIC | Age: 32
End: 2022-10-11

## 2022-10-11 NOTE — PROGRESS NOTES
10/11/2022  Clinic Care Coordination Contact  Care Team Conversations    Clinic Care Coordination Contact  Care Team Conversations    Call: [facility] [employer] [facility]  Provider Line [phone]  RE: transportation for 10-12-22 and 10-13-22  Rep: Yola               Provided member ID, verified patient demographics, destination address appointment date and time.  Representative stated there is no medical ride set up for tomorrow appt 10-12-22 at 120pm to [facility]   There is medical ride set up for 10-13-22 she could not verify the address where the appt.  Unable to check itinerary with address of upcoming appts.    10-11-22 Consulted with CC Team at case review to clarify if patient is with [facility] ACT team and getting support from ACT team nurse for medication.  Community Paramedic still supporting with medications.  [place] that  was patient's mother at [facility]     Plan: CC SW will support to clarify services for patient at [facility] and case management services and apply for SNAP if eligible.    Clinic Care Coordination Contact  [facility] [[email or web address]]/Voicemail    Clinical Data: Care Coordinator Outreach    Outreach attempted x 1.  Left message on patient's voicemail with call back information and requested return call.    Plan: Care Coordinator  will try to reach patient again in 10 business days.    CC SW will support to follow up regarding case management services.    CHW out of office 10-20-22 to 10-28-22  CHW follow up: 10-31-22    Scarlett Varma  Community Health Worker  [facility] Care Coordination  [email or web address]   Office: [phone]  Fax: [phone]

## 2022-10-13 ENCOUNTER — OFFICE VISIT (OUTPATIENT)
Dept: NEUROLOGY | Facility: CLINIC | Age: 32
End: 2022-10-13
Attending: GENETIC COUNSELOR, MS
Payer: COMMERCIAL

## 2022-10-13 ENCOUNTER — PRE VISIT (OUTPATIENT)
Dept: NEUROLOGY | Facility: CLINIC | Age: 32
End: 2022-10-13

## 2022-10-13 VITALS
BODY MASS INDEX: 34.51 KG/M2 | OXYGEN SATURATION: 99 % | SYSTOLIC BLOOD PRESSURE: 112 MMHG | WEIGHT: 176.7 LBS | DIASTOLIC BLOOD PRESSURE: 74 MMHG | HEART RATE: 86 BPM

## 2022-10-13 DIAGNOSIS — E71.529: Primary | ICD-10-CM

## 2022-10-13 DIAGNOSIS — E27.1: Primary | ICD-10-CM

## 2022-10-13 DIAGNOSIS — E71.529 ALD (ADRENOLEUKODYSTROPHY) (H): Primary | ICD-10-CM

## 2022-10-13 PROCEDURE — 99215 OFFICE O/P EST HI 40 MIN: CPT | Performed by: PSYCHIATRY & NEUROLOGY

## 2022-10-13 ASSESSMENT — PAIN SCALES - GENERAL: PAINLEVEL: MODERATE PAIN (5)

## 2022-10-13 NOTE — LETTER
"10/13/2022       RE: Brooke Peterson  [address] [place] [address]     Dear Colleague,    Thank you for referring your patient, Brooke Peterson, to the [facility] NEUROLOGY [facility] [place] at [facility]. Please see a copy of my visit note below.    31 year old man brought by his father because of symptoms related to a stroke. About 1 year ago, he became \"very sick\" and was hospitalized for a month. The father cannot explain what his symptoms were in detail but reports him as having become weak and then unconscious. He improved, but since recovery his speech worsened and his gait has worsened. The  cannot understand the patient because of dysarthria, and the patient is unable to read or write. His father indicates he was not schooled because he came to the [place] as an adult, not because of his medical, cognitive, or psychiatric conditions. Although his father indicates he is not aware that his son had any medical problems before last year, he indicates he did not accompany him to many medical appointments.     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 His father indicates that the patient becomes anxious on occasion but has no history of self-harm or harm to others.      Mental state: Alert, appropriate, speech, language, and thought content normal.     Cranial nerves II-XII normal. No definite abnormality of VF. No pathologic nystagmus. P=RRL. Fundi difficult to appreciate as he does not hold his eyes still well.    Sensory examination:     Right Left   Light touch Normal Normal   Vibration (timed)     Vibration (Rydell-Seiffer)     Temp preserved preserved   Pin     Pos     Legend:   MM = medial malleolus, TT = tibial tuberosity, K = patella, MCP = MCP joint  MF = mid-foot, DC = distal calf, MC = mid calf, PC = proximal calf      Motor examination:    MMT demonstrates broadly normal strength " except for a plegic RLE with normal or reduced tone.       Reflexes:   Right Left   Biceps 3 3   BRD 3 3   Triceps 3 3   Alena 2 2   Patellar 2 2   Achilles 3 3   Plantar Extensor Extensor   Clonus Sustained 3 beats      Coordination:  RRMs slow in hands and feet.    Gait:  Reports he is non-ambulatory    Brain MRI, laboratory reports, and clinic visits reviewed.      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    Recommendations:  Referral to pediatric BMT/ALD program. I was asked to perform a neurologic examination to assist in their management decisions. I do not see that he has been seen in their clinic except for genetic testing.  PM&R referral.  Return to general neurology as needed.  Patient's father has been working with social work at Dr Rangel's office for assistance. He requests that visits be in-person, with an , whenever possible.      Wil Galicia M.D.    60 minutes spent on the date of the encounter on chart review, history and examination, documentation and further activities as noted above.

## 2022-10-13 NOTE — PATIENT INSTRUCTIONS
We discussed the diagnosis today. I am referring you to Dr Coe, who specializes in this disorder, for his recommendations. His office asked for an examination from our department, and today's examination will be available for them to review. I am also referring you to a specialist in rehabilitation, to make sure you and your family have the help you need to care for yourself.

## 2022-10-13 NOTE — PROGRESS NOTES
"31 year old man brought by his father because of symptoms related to a stroke. About 1 year ago, he became \"very sick\" and was hospitalized for a month. The father cannot explain what his symptoms were in detail but reports him as having become weak and then unconscious. He improved, but since recovery his speech worsened and his gait has worsened. The  cannot understand the patient because of dysarthria, and the patient is unable to read or write. His father indicates he was not schooled because he came to the [place] an adult, not because of his medical, cognitive, or psychiatric conditions. Although his father indicates he is not aware that his son had any medical problems before last year, he indicates he did not accompany him to many medical appointments.     The EHR documents prior diagnoses of [place]'s disease, hypothyroidism, schizophrenia, and PTSD. His father indicates that the patient becomes anxious on occasion but has no history of self-harm or harm to others.      Mental state: Alert, appropriate, speech, language, and thought content normal.     Cranial nerves II-XII normal. No definite abnormality of VF. No pathologic nystagmus. P=RRL. Fundi difficult to appreciate as he does not hold his eyes still well.    Sensory examination:     Right Left   Light touch Normal Normal   Vibration (timed)     Vibration (Rydell-Seiffer)     Temp preserved preserved   Pin     Pos     Legend:   MM = medial malleolus, TT = tibial tuberosity, K = patella, MCP = MCP joint  MF = mid-foot, DC = distal calf, MC = mid calf, PC = proximal calf      Motor examination:    MMT demonstrates broadly normal strength except for a plegic RLE with normal or reduced tone.       Reflexes:   Right Left   Biceps 3 3   BRD 3 3   Triceps 3 3   Alena 2 2   Patellar 2 2   Achilles 3 3   Plantar Extensor Extensor   Clonus Sustained 3 beats      Coordination:  RRMs slow in hands and feet.    Gait:  Reports he is " non-ambulatory    Brain MRI, laboratory reports, and clinic visits reviewed.      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    Recommendations:  Referral to pediatric BMT/ALD program. I was asked to perform a neurologic examination to assist in their management decisions. I do not see that he has been seen in their clinic except for genetic testing.  PM&R referral.  Return to general neurology as needed.  Patient's father has been working with social work at Dr Rangel's office for assistance. He requests that visits be in-person, with an , whenever possible.      Wil Galicia M.D.    60 minutes spent on the date of the encounter on chart review, history and examination, documentation and further activities as noted above.

## 2022-10-18 ENCOUNTER — PATIENT OUTREACH (OUTPATIENT)
Dept: CARE COORDINATION | Facility: CLINIC | Age: 32
End: 2022-10-18

## 2022-10-18 NOTE — PROGRESS NOTES
10/18/2022  Clinic Care Coordination Contact  Care Team Conversations    Consulted with CCC Team regarding SNAP, home care nursing services for medication management  Plan: CC SW will complete referral to [place] [facility] TCM and email to Community Paramedics to bring the referral form to patient to sign     CHW sent referral to FRW for support to help with applying for SNAP.    Scarlett Varma  Community Health Worker  [facility]  Clinic Care Coordination  [email or web address]   Office: [phone]  Fax: [phone]

## 2022-10-18 NOTE — PROGRESS NOTES
Clinic Care Coordination Contact  Care Team Conversations    CCC team discussed case at [facility]  Chart review notes patient has video visit with BHP tomorrow and CP visit later in day    RN CC reached out to CP to see if possible to reschedule timing to support assist with video - unlikely, but made aware should schedule change and allow for time as not sure patient and father will be able to navigate video visit.     SW is also working to get additional support and will assist with application and send to CP team to print and have patient sign to support application process.    CP is working with family to support medication setup   RN CC reached out to MTM to discuss options   RN CC sent message to CP team to review patient ability to take medications on own and consider setting up visit with MTM to assist with initiating blister pack     Will await CP assessment and send referral to MTM if appropriate.

## 2022-10-19 ENCOUNTER — ALLIED HEALTH/NURSE VISIT (OUTPATIENT)
Dept: OTHER | Facility: CLINIC | Age: 32
End: 2022-10-19
Payer: COMMERCIAL

## 2022-10-19 VITALS
OXYGEN SATURATION: 98 % | HEART RATE: 71 BPM | SYSTOLIC BLOOD PRESSURE: 108 MMHG | TEMPERATURE: 97.3 F | RESPIRATION RATE: 12 BRPM | DIASTOLIC BLOOD PRESSURE: 85 MMHG

## 2022-10-19 DIAGNOSIS — Z79.899 MEDICATION MANAGEMENT: Primary | ICD-10-CM

## 2022-10-19 PROCEDURE — 99207 PR COMMUNITY PARAMEDIC-PATIENT MEETS CRITERIA: CPT

## 2022-10-19 NOTE — PROGRESS NOTES
Community Paramedics Follow-up Visit  October 19, 2022  TIME: 1000    Brooke Peterson is a 31 year old male being seen at home for a follow-up visit.    Present at appointment: Patient, Family, Community Paramedic, Angela  via phone         Chief Complaint   Patient presents with     Recheck Medication     Outreach       [place] Utilization:      Utilization    Hospital Admissions  1             ED Visits  2             No Show Count (past year)  31                Current as of: 10/19/2022  2:17 AM              /85   Pulse 71   Temp 97.3  F (36.3  C)   Resp 12   SpO2 98%     Clinical Concerns:  Current Medical Concerns:  Medication set up   Current Behavioral Concerns: medication compliance    Education Provided to patient: explained the food stamp program is called SNAP   Medication set up? Yes  Pill Box issued: No  Scale issued: No  Flu Shot given: No  COVID Vaccine Given: No  Lab draw or specimen collection: No  Food box issued: No  Collaborative visit with PCP: No  Wound Care: No    Health Maintenance Reviewed: Not assessed    Clinical Pathway: None    No  Face to Face in Home / Community    Review of Symptoms/PE    Skin: negative  Eyes: negative  Ears/Nose/Throat: negative  Respiratory: No shortness of breath, dyspnea on exertion, cough, or hemoptysis  Cardiovascular: negative  Gastrointestinal: negative  Genitourinary: negative  Musculoskeletal: negative  Neurologic: speech problems  Psychiatric: negative    Time spent with patient: 60    The patient meets one or more of the following criteria:  * Requires services to prevent readmission to a nursing home or hospital    Acute concern/Follow-up recommendations: follow current treatment plan    Next CP visit scheduled: 11/2/22    Issues for Provider to follow up on: Community Paramedic visited with Toe at his home. Family present, dad, mom who was sleeping and a  from [place] who was there for Brooke's mother. Had Toe sign the [place]  [facility] referral form.  in the home for his mother stated she had submitted the same form last week for Toe. Advised that SW was assisting with finding Toe a more permanent solution for medication set up. She stated that the [facility] does not set up medications. Advised I would pass this along. CP to scan the form and send back to Rosangela Cox stated he was not doing well today. Due to language barrier and Toe's difficulty with speech the  had a difficult time asking and understanding why Toe was not doing well today. Vitals were normal and no fever.   Filled Toe's medications for two weeks. Noted that one full day (am/pm) and another pm had not been taken. Called Pharmacy and reordered the Multi Vitamin, Famatodine, Loratidine and meclizine.   Wrote down the date and time for Toe's MRI appt at the [facility] per Toe's request.    Provider follow up visit needed: TBD

## 2022-10-21 ENCOUNTER — PATIENT OUTREACH (OUTPATIENT)
Dept: CARE COORDINATION | Facility: CLINIC | Age: 32
End: 2022-10-21

## 2022-10-21 NOTE — PROGRESS NOTES
Clinic Care Coordination Contact  Program:[facility]  County:[facility] Case #:  [facility] Worker:   Vamshi #:   Subscriber #:   Renewal:  Date Applied:     FRW Outreach:   10/21/2022:  FRW called patient and left a vm with call back information. FRW will make outreach next week      Health Insurance:      Referral/Screening:  FRW Screening    Row Name 10/18/22 2173       [facility] Benefits   Is patient requesting help applying for [facility] benefits? Yes  SNAP       Have you recently applied for any [facility] benefits? No       How many people in your household? 4       Do you buy/eat food together? Yes       What is the monthly gross income for the household (wages, social security, workers comp, and pension)?  784  [place] benefit       Ins[place]:   [place]? No       Is this an insurance renewal? No       Is this a new insurance application request? No       OTHER   Is this a nilay care application? No       Any other information for the FRW? lives with mother, received SSI benefit.

## 2022-10-24 ENCOUNTER — PATIENT OUTREACH (OUTPATIENT)
Dept: CARE COORDINATION | Facility: CLINIC | Age: 32
End: 2022-10-24

## 2022-10-24 NOTE — PROGRESS NOTES
Clinic Care Coordination Contact  Program:[facility]  County:[facility] Case #:  [facility] Worker:   Vamshi #:   Subscriber #:   Renewal:  Date Applied:     FRW Outreach:   10/24/2022: FRW called and talked with father and he was not at home so FRW could not talk with patient. FRW will try back next week.    10/21/2022:  FRW called patient and left a vm with call back information. FRW will make outreach next week  SNAP/CASH Application Screenin. Have you had [facility] benefits before?  2. How many people in the household, do you eat/buy food together?  3. What is your monthly income (include all tax members)?   4. Do you have a bank account?   5. Do you have any utility bills (electricity, rent, mortgage, phone, insurance, medical bills, etc.)?   6. Do you have social security cards and/or green cards?       Health Insurance:      Referral/Screening:  FRW Screening    Row Name 10/18/22 0458       [facility] Benefits   Is patient requesting help applying for [facility] benefits? Yes  SNAP       Have you recently applied for any [facility] benefits? No       How many people in your household? 4       Do you buy/eat food together? Yes       What is the monthly gross income for the household (wages, social security, workers comp, and pension)?  784  [place] benefit       Ins[place]:   [place]? No       Is this an insurance renewal? No       Is this a new insurance application request? No       OTHER   Is this a nilay care application? No       Any other information for the FRW? lives with mother, received SSI benefit.

## 2022-10-26 ENCOUNTER — TELEPHONE (OUTPATIENT)
Dept: CARE COORDINATION | Facility: CLINIC | Age: 32
End: 2022-10-26

## 2022-10-26 NOTE — TELEPHONE ENCOUNTER
[facility]  Pediatric Blood and Marrow Transplant Program  Social Work Telephone Contact     Data:   received a consultation request from the BMT medical team (Re :) transportation support. Brooke Peterson, age 32, has been scheduled for an MRI and follow-up with Dr. Coe. When the appointment was confirmed, Brooke asked for assistance in setting up a medical cab ride.     Assessment:    Subsequently, SHIRA contacted [facility] [employer] [phone] and secured transportation. Toe will be picked up between, 8:50 am-9:00 am., on 11/09/22. This ride was set up as a round-trip (will call) and a request was placed for a Angela speaking .       With telephonic  services, SHIRA called and relayed the aforementioned details directly to Brooke and his father, Joey. No questions identified.       Plan:   SW will remain available for consultation should questions or concerns arise.     ANAYELI Drummond, [facility]   Pediatric BMT & Survivorship    [email or web address]   Office: [phone]  Pager: [phone]        *NO LETTER

## 2022-10-28 ENCOUNTER — PATIENT OUTREACH (OUTPATIENT)
Dept: CARE COORDINATION | Facility: CLINIC | Age: 32
End: 2022-10-28

## 2022-10-28 NOTE — PROGRESS NOTES
Clinic Care Coordination Contact  [facility]/Voicemail       Clinical Data: CHW Outreach    Outreach attempted x 1.  Left message on patient's voicemail with call back information and requested return call.    Plan: CHW will make another attempt to reach the patient via phone or MyChart.    CHW outreach in the next 2 weeks.      GAVIOTA Turner  Clinic Care Coordination   [facility]   Phone: [phone]  [email or web address]

## 2022-11-02 ENCOUNTER — PATIENT OUTREACH (OUTPATIENT)
Dept: CARE COORDINATION | Facility: CLINIC | Age: 32
End: 2022-11-02

## 2022-11-02 ENCOUNTER — ALLIED HEALTH/NURSE VISIT (OUTPATIENT)
Dept: OTHER | Facility: CLINIC | Age: 32
End: 2022-11-02
Payer: COMMERCIAL

## 2022-11-02 VITALS
RESPIRATION RATE: 12 BRPM | TEMPERATURE: 98.4 F | OXYGEN SATURATION: 98 % | DIASTOLIC BLOOD PRESSURE: 55 MMHG | HEART RATE: 85 BPM | SYSTOLIC BLOOD PRESSURE: 98 MMHG

## 2022-11-02 DIAGNOSIS — Z79.899 MEDICATION MANAGEMENT: Primary | ICD-10-CM

## 2022-11-02 PROCEDURE — 99207 PR COMMUNITY PARAMEDIC-PATIENT MEETS CRITERIA: CPT

## 2022-11-02 NOTE — PROGRESS NOTES
Community Paramedics Follow-up Visit  November 2, 2022  TIME: Ale Peterson is a 32 year old male being seen at home for a follow-up visit.    Present at appointment: Patient, Family, Caregiver/father, Community Paramedic         Chief Complaint   Patient presents with     Recheck Medication       [place] Utilization:      Utilization    Hospital Admissions  1             ED Visits  2             No Show Count (past year)  35                Current as of: 11/2/2022  3:30 AM              BP 98/55   Pulse 85   Temp 98.4  F (36.9  C)   Resp 12   SpO2 98%     Clinical Concerns:  Current Medical Concerns:  Hypotension, medication set up, falling frequently    Current Behavioral Concerns:   Education Provided to patient:    Medication set up? Yes  Pill Box issued: No  Scale issued: No  Flu Shot given: No  COVID Vaccine Given: No  Lab draw or specimen collection: No  Food box issued: No  Collaborative visit with PCP: No  Wound Care: No    Health Maintenance Reviewed: Not assessed    Clinical Pathway: None    No  Face to Face in Home / Community    Review of Symptoms/PE    Skin: negative  Eyes: negative  Ears/Nose/Throat: postnasal drainage  Respiratory: No shortness of breath, dyspnea on exertion, cough, or hemoptysis  Cardiovascular: negative  Gastrointestinal: negative  Genitourinary: incontinence  Musculoskeletal: negative  Neurologic: local weakness and speech problems  Psychiatric: negative    Time spent with patient: 60    The patient meets one or more of the following criteria:  * Has been identified by their primary care provider at risk of nursing home placement    Acute concern/Follow-up recommendations: patient in need of long term medication set up    Next CP visit scheduled: 11/15/22    Issues for Provider to follow up on: Community Paramedic visited with patient at his home. Upon arrival patient was not in the living room on the couch. Father stated patient was having a hard time walking today. Asked  if patient had any recent falls and father stated patient falls all the time. Tried to ask more questions about his falls but patient,  and dad all had a hard time communicating. Filled patients medications for the next two weeks. Patient was short on meclizine. Called pharmacy and they indicated the doctor had not refilled the RX at this time. Community Paramedic to reach out to PCP for more refills. Patient needs refills on Vit B-12 and Olanzapine as well. Called in refills to pharmacy for meclinzine, Vit D, Baclofen, fludrocortisone, hydrocortisone, Levothyroxine, Vit B-12 and Olanzapine. Pharmacy to mail out his prescriptions once filled.  Community Paramedic to reach out to SW as well to follow up on finding patient a more long term solution for medication set up.    Provider follow up visit needed: PADDY

## 2022-11-02 NOTE — PROGRESS NOTES
Clinic Care Coordination Contact  Program:[facility]  County:[facility] Case #:  [facility] Worker:   Vamshi #:   Subscriber #:   Renewal:  Date Applied:     FRW Outreach:   2022: FRW called patient and left a final vm with call back information as attempt x3 with no answer or return phone calls. FRW resolved the FRW episode and remove patient from panel. Please refer to FRW for future needs.   10/24/2022: FRW called and talked with father and he was not at home so FRW could not talk with patient. FRW will try back next week.    10/21/2022:  FRW called patient and left a vm with call back information. FRW will make outreach next week  SNAP/CASH Application Screenin. Have you had [facility] benefits before?  2. How many people in the household, do you eat/buy food together?  3. What is your monthly income (include all tax members)?   4. Do you have a bank account?   5. Do you have any utility bills (electricity, rent, mortgage, phone, insurance, medical bills, etc.)?   6. Do you have social security cards and/or green cards?       Health Insurance:      Referral/Screening:  FRW Screening    Row Name 10/18/22 1353       [facility] Benefits   Is patient requesting help applying for [facility] benefits? Yes  SNAP       Have you recently applied for any [facility] benefits? No       How many people in your household? 4       Do you buy/eat food together? Yes       What is the monthly gross income for the household (wages, social security, workers comp, and pension)?  704  [place]I benefit       [place]:   [place]? No       Is this an insurance renewal? No       Is this a new insurance application request? No       OTHER   Is this a nilay care application? No       Any other information for the FRW? lives with mother, received SSI benefit.

## 2022-11-07 DIAGNOSIS — R42 DIZZINESS: ICD-10-CM

## 2022-11-07 DIAGNOSIS — F89 NEURODEVELOPMENTAL DISORDER: ICD-10-CM

## 2022-11-07 DIAGNOSIS — R44.0 AUDITORY HALLUCINATION: ICD-10-CM

## 2022-11-07 RX ORDER — MECLIZINE HCL 12.5 MG 12.5 MG/1
12.5 TABLET ORAL 3 TIMES DAILY PRN
Qty: 30 TABLET | Refills: 1 | Status: SHIPPED | OUTPATIENT
Start: 2022-11-07 | End: 2022-01-01

## 2022-11-07 RX ORDER — OLANZAPINE 5 MG/1
5 TABLET ORAL EVERY MORNING
Qty: 90 TABLET | Refills: 0 | Status: ON HOLD | OUTPATIENT
Start: 2022-11-07 | End: 2023-01-01

## 2022-11-11 ENCOUNTER — PATIENT OUTREACH (OUTPATIENT)
Dept: CARE COORDINATION | Facility: CLINIC | Age: 32
End: 2022-11-11

## 2022-11-11 NOTE — PROGRESS NOTES
11/11/2022  Clinic Care Coordination Contact  Community Health Worker Follow Up    Intervention and Education during outreach:   Angela : Tammy ID#  NAW  Called and spoke to patient and follow up on wheelchair.  Patient reported:  -got the wheelchair 4 days ago - 11-14-22  -still see CP for medications    Rescheduled appt with CALOS SILVA to 11-17-22 at 3pm to help get a free phone.    CALOS SILVA 11-17-22  CHW Follow up: Monthly  CHW Plan: Follow up on goal (s)  CHW Next Follow Up: 12-14-22    Scarlett Varma  Community Health Worker  [facility]  Clinic Care Coordination  [email or web address]   Office: [phone]  Fax: [phone]  Clinic Care Coordination Contact    Community Health Worker Follow Up    Care Gaps:     Health Maintenance Due   Topic Date Due     ASTHMA ACTION PLAN  Never done     ADVANCE CARE PLANNING  Never done     DEPRESSION ACTION PLAN  Never done     COVID-19 Vaccine (3 - Booster for Pfizer series) 10/29/2021     INFLUENZA VACCINE (1) 09/01/2022       Care Gaps Last addressed on will discuss at next visit with CP    Care Plan:   Care Plan: Wheelchair Completed 11/11/2022    Problem: HP GENERAL PROBLEM  Resolved 11/11/2022    Goal: I have the wheel chair on 11-14-22.  Completed 11/11/2022    Start Date: 8/10/2022 Expected End Date: 11/17/2022    This Visit's Progress: 100% Recent Progress: 50%    Note:     Barriers: Language barriers  Strengths: Accepting of support  Patient expressed understanding of goal: yes  Action steps to achieve this goal:    Call [employer] [phone] if there are issues with the wheel chair.                      Care Plan: Medication management support at home     Problem: HP GENERAL PROBLEM     Goal: I would like to enroll in home care services to supp\[facility]\"" medication management within 90 days     Start Date: 8/2/2022 Expected End Date: 11/3/2022    Recent Progress: 10%    Note:     Barriers: language, access   Strengths:  family, CP assistance  Patient expressed understanding of goal: yes  Action steps to achieve this goal:  1. I am still waiting for skilled nurse and will continue to work with CP and care team to enroll in home care support for medication set up   2. I will update Care coordination team during next outreach   Updated 9-9-22 AL                      Care Plan: General: Phone Resources     Problem: HP GENERAL PROBLEM     Goal: General: I would like support to get information and resources/program for a free phone to communicate with other as soon as possible.     Start Date: 8/29/2022 Expected End Date: 12/30/2022    This Visit's Progress: 40% Recent Progress: 30%    Note:     Barriers: no cell phone, limited income  Strengths: support from sister, father, CP, and CCC team  Patient expressed understanding of goal: yes  Action steps to achieve this goal:  1. I will talk to  SHIRA on 11-17-22 at 3 pm regarding information and resources for free phone.  Updated 11-11-22 AL

## 2022-11-15 ENCOUNTER — ALLIED HEALTH/NURSE VISIT (OUTPATIENT)
Dept: OTHER | Facility: CLINIC | Age: 32
End: 2022-11-15
Payer: COMMERCIAL

## 2022-11-15 DIAGNOSIS — Z79.899 MEDICATION MANAGEMENT: Primary | ICD-10-CM

## 2022-11-15 PROCEDURE — 99207 PR COMMUNITY PARAMEDIC-PATIENT MEETS CRITERIA: CPT

## 2022-11-17 ENCOUNTER — PATIENT OUTREACH (OUTPATIENT)
Dept: NURSING | Facility: CLINIC | Age: 32
End: 2022-11-17
Payer: COMMERCIAL

## 2022-11-18 VITALS
HEART RATE: 78 BPM | TEMPERATURE: 97.8 F | OXYGEN SATURATION: 98 % | RESPIRATION RATE: 12 BRPM | DIASTOLIC BLOOD PRESSURE: 94 MMHG | SYSTOLIC BLOOD PRESSURE: 115 MMHG

## 2022-11-18 NOTE — PROGRESS NOTES
Community Paramedics Follow-up Visit  November 15, 2022  TIME: 1200    Brooke Peterson is a 32 year old male being seen at home for a follow-up visit.    Present at appointment: Patient, Family, Community Paramedic, Angela  via phone         Chief Complaint   Patient presents with     Recheck Medication     Outreach       [place] Utilization:      Utilization    Hospital Admissions  1             ED Visits  2             No Show Count (past year)  37                Current as of: 11/17/2022  1:36 AM              BP (!) 115/94   Pulse 78   Temp 97.8  F (36.6  C)   Resp 12   SpO2 98%     Clinical Concerns:  Current Medical Concerns:  Previous stroke    Current Behavioral Concerns: medication set up and compliance    Education Provided to patient:    Medication set up? Yes  Pill Box issued: No  Scale issued: No  Flu Shot given: No  COVID Vaccine Given: No  Lab draw or specimen collection: No  Food box issued: No  Collaborative visit with PCP: No  Wound Care: No    Health Maintenance Reviewed: Not assessed    Clinical Pathway: None    No  Face to Face in Home / Community    Review of Symptoms/PE    Skin: negative  Eyes: negative  Ears/Nose/Throat: negative  Respiratory: No shortness of breath, dyspnea on exertion, cough, or hemoptysis  Cardiovascular: negative  Gastrointestinal: negative  Genitourinary: negative  Musculoskeletal: negative  Neurologic: speech problems  Psychiatric: negative    Time spent with patient: 60    The patient meets one or more of the following criteria:  * Requires services to prevent readmission to a nursing home or hospital    Acute concern/Follow-up recommendations: follow current treatment plan    Next CP visit scheduled: 11/29/22    Issues for Provider to follow up on: Community Paramedic visited with Toe at his home. Toe was sitting on the couch and appeared very tired. He stated has been dizzy and light headed. He was anxious to go lay down during our visit. All medications had  been taken except one evening of medications. Community Paramedic filled med boxes for 2 weeks. Called refills into Pharmacy for Meclizine and Loratadine. Toe states he has not heard anything about getting home care or some long term to come in and help set up his medications. Community Paramedic to reach out to  for status on long term medication set up.    Provider follow up visit needed: Dr Alvarez for new stroke

## 2022-11-23 DIAGNOSIS — E03.9 HYPOTHYROIDISM, UNSPECIFIED TYPE: ICD-10-CM

## 2022-11-23 DIAGNOSIS — E71.529 X LINKED ADRENOLEUKODYSTROPHY (H): Primary | ICD-10-CM

## 2022-11-23 DIAGNOSIS — E27.1 ADDISON'S DISEASE (H): ICD-10-CM

## 2022-11-23 DIAGNOSIS — E27.40 ADRENAL INSUFFICIENCY (H): ICD-10-CM

## 2022-11-29 ENCOUNTER — PATIENT OUTREACH (OUTPATIENT)
Dept: CARE COORDINATION | Facility: CLINIC | Age: 32
End: 2022-11-29

## 2022-11-29 NOTE — PROGRESS NOTES
"Community Paramedic Program  Community Health Worker Outreach    Called patient to reschedule appointment scheduled for today, November 30th at 12 pm    Visit rescheduled for: tomorrow, November 30th / 12 pm / CP Renae (date/time/CP)    Additional information:     Called pt with Angela  to notify him that we need to cancel today's visit.      had a hard time understanding pt during the call and asked pt if a family member was present to assist with communicating. A family member came to the phone and the  relayed the message we shared with pt. I offered to reschedule the visit for tomorrow at noon, and pt and pt's family member agreed. They said pt will be home \"and he's always home because he can't walk.\"     Pt's family member declined to take down my phone number and said they'll be home tomorrow when the CP comes to visit.      "

## 2022-11-29 NOTE — PROGRESS NOTES
Clinic Care Coordination Contact    Follow Up Progress Note      Assessment: CC SHIRA to assist pt with getting a government cell phone    Care Gaps:    Health Maintenance Due   Topic Date Due     ASTHMA ACTION PLAN  Never done     ADVANCE CARE PLANNING  Never done     DEPRESSION ACTION PLAN  Never done     COVID-19 Vaccine (3 - Booster for Pfizer series) 10/29/2021     INFLUENZA VACCINE (1) 09/01/2022     Pneumococcal Vaccine: Pediatrics (0 to 5 Years) and At-Risk Patients (6 to 64 Years) (2 - PCV) 10/20/2022     ASTHMA CONTROL TEST  12/14/2022     PHQ-9  12/14/2022       Declined due to time constraints and pt having speech difficulties     Care Plans  Care Plan: Wheelchair Completed 11/11/2022    Problem: HP GENERAL PROBLEM  Resolved 11/11/2022    Goal: I have the wheel chair on 11-14-22.  Completed 11/11/2022    Start Date: 8/10/2022 Expected End Date: 11/17/2022    This Visit's Progress: 100% Recent Progress: 50%    Note:     Barriers: Language barriers  Strengths: Accepting of support  Patient expressed understanding of goal: yes  Action steps to achieve this goal:    Call [employer] [phone] if there are issues with the wheel chair.                      Care Plan: Medication management support at home     Problem: HP GENERAL PROBLEM     Goal: I would like to enroll in home care services to suppport medication management within 90 days     Start Date: 8/2/2022 Expected End Date: 11/3/2022    Recent Progress: 10%    Note:     Barriers: language, access   Strengths: family, CP assistance  Patient expressed understanding of goal: yes  Action steps to achieve this goal:  1. I am still waiting for skilled nurse and will continue to work with CP and care team to enroll in home care support for medication set up   2. I will update Care coordination team during next outreach   Updated 9-9-22 AL                      Care Plan: General: Phone Resources     Problem: HP GENERAL PROBLEM     Goal: General: I  would like support to get information and resources/program for a free phone to communicate with other as soon as possible.     Start Date: 8/29/2022 Expected End Date: 12/30/2022    This Visit's Progress: 40% Recent Progress: 30%    Note:     Barriers: no cell phone, limited income  Strengths: support from sister, father, CP, and CCC team  Patient expressed understanding of goal: yes  Action steps to achieve this goal:  1. I will talk to CC SHIRA on 11-17-22 at 3 pm regarding information and resources for free phone.  Updated 11-11-22 AL                      CC SHIRA called and spoke to pt via  line. SW introduced herself and explained that she was calling to assist him with applying for the government phone program. SW asked pt the questions and filled out the application on his behalf with the answers pt gave. SW and pt completed the application and SW submitted.    Intervention/Education provided during outreach: Government phone    Plan: Pt to get a notification via [employer] regarding if he was approved for a government phone    Care Coordinator will follow up in 4 weeks to check on application

## 2022-11-30 ENCOUNTER — ALLIED HEALTH/NURSE VISIT (OUTPATIENT)
Dept: OTHER | Facility: CLINIC | Age: 32
End: 2022-11-30
Payer: COMMERCIAL

## 2022-11-30 VITALS
TEMPERATURE: 98.4 F | DIASTOLIC BLOOD PRESSURE: 98 MMHG | SYSTOLIC BLOOD PRESSURE: 129 MMHG | HEART RATE: 92 BPM | OXYGEN SATURATION: 99 % | RESPIRATION RATE: 12 BRPM

## 2022-11-30 DIAGNOSIS — Z79.899 MEDICATION MANAGEMENT: Primary | ICD-10-CM

## 2022-11-30 PROCEDURE — 99207 PR COMMUNITY PARAMEDIC-PATIENT MEETS CRITERIA: CPT

## 2022-12-01 NOTE — PROGRESS NOTES
Community Paramedics Follow-up Visit  November 30, 2022  TIME: 1200    Brooke Peterson is a 32 year old male being seen at home for a follow-up visit.    Present at appointment:  Patient, Community Paramedic, patients father/PCA, Angela  via phone         Chief Complaint   Patient presents with     Outreach     Recheck Medication       [place] Utilization:      Utilization    Hospital Admissions  1             ED Visits  2             No Show Count (past year)  36                Current as of: 11/30/2022  2:09 AM              BP (!) 129/98   Pulse 92   Temp 98.4  F (36.9  C)   Resp 12   SpO2 99%     Clinical Concerns:  Current Medical Concerns:  Medication set up    Current Behavioral Concerns: medication compliance at 100%    Education Provided to patient: filled out [place] form   Medication set up? Yes  Pill Box issued: No  Scale issued: No  Flu Shot given: No  COVID Vaccine Given: No  Lab draw or specimen collection: No  Food box issued: No  Collaborative visit with PCP: No  Wound Care: No    Health Maintenance Reviewed:      Clinical Pathway: None    No  Face to Face in Home / Community    Review of Symptoms/PE    Skin: negative  Eyes: negative  Ears/Nose/Throat: negative  Respiratory: No shortness of breath, dyspnea on exertion, cough, or hemoptysis  Cardiovascular: negative  Gastrointestinal: negative  Genitourinary: incontinence  Musculoskeletal: negative  Neurologic: speech problems  Psychiatric: negative    Time spent with patient: 60    The patient meets one or more of the following criteria:  * Requires services to prevent readmission to a nursing home or hospital    Acute concern/Follow-up recommendations: need for long term medication set up    Next CP visit scheduled: 12/13/22    Issues for Provider to follow up on: Community Paramedic visited with Brooke Peterson. Communication via Angela . Filled Toe's medications for two weeks out. Unable to confirm if Toe took any medication yesterday due  to  having difficulty understanding Toe. Called Pharmacy to refill RX - Advair, Albuterol, Vit B, Multi Vit, Baclofen, Trazodone and Meclizine. Refills needed for the multi vit, Baclofen, trazodone and meclizine, pharmacy to notify MD.   Explained to Toe the [facility] pill packs. Toe was in agreement with signing up for them and signed paperwork, CP to fax over to [facility]. CP explained to Toe that the Community Paramedic program is short term and this would be a way for his medications to be set up on a more long term basis.   Toe then asked if CP could call his SW as he had more questions. Neither  or patients father could understand what questions Toe had. Advised Toe that CP would reach out to SW and have SW call Toe.     Provider follow up visit needed: 1/24/23 with Dr Alvarez

## 2022-12-07 NOTE — TELEPHONE ENCOUNTER
Medication Question or Refill        What medication are you calling about (include dose and sig)?:   fluticasone-salmeterol (ADVAIR HFA) 115-21 MCG/ACT inhaler  meclizine (ANTIVERT) 12.5 MG tablet  Multiple Vitamin (MULTI-VITAMINS) TABS  vitamin B-12 (CYANOCOBALAMIN) 500 MCG tablet    Controlled Substance Agreement on file:   CSA -- Patient Level:    CSA: None found at the patient level.       Who prescribed the medication?: Dr. Rangel and Dr. Booker    Do you need a refill? Yes:     When did you use the medication last? NA    Patient offered an appointment? No    Do you have any questions or concerns?  No    Preferred Pharmacy:     [place] [address]  Phone #: [phone]  Fax #:  [phone]    Okay to leave a detailed message?: Yes at Home number on file [phone] (home)

## 2022-12-08 NOTE — LETTER
[facility]  Patient Centered Plan of Care  About Me:        Patient Name:  Brooke Peterson    YOB: 1990  Age:         32 year old   Dez MRN:    [id number] Telephone Information:  Home Phone [phone]   Mobile [phone]       Address:  [address] [place] [address] Email address:  No e-mail address on record      Emergency Contact(s)    Name Relationship Lgl Grd Work Phone Home Phone Mobile Phone   1. RAW,PYA Sister    [phone]   2. PETE SOUSA Father   [phone]   3. THIN,NADIRA Mother   [phone]            Primary language:  Angela     needed? Yes   [employer]:  [phone] op. 1  Other communication barriers:Language barrier; Literacy barrier    Preferred Method of Communication:     Current living arrangement: I live in a private home with family (with parents and siblings)    Mobility Status/ Medical Equipment: Independent w/Device        Health Maintenance  Health Maintenance Reviewed: Due/overdue  Overdue          Never   Done ASTHMA ACTION PLAN (Yearly)     Never   Done ADVANCE CARE PLANNING (Every 5 Years)     Never   Done DEPRESSION ACTION PLAN (Once)     OCT 29   2021 COVID-19 Vaccine (3 - Booster for Pfizer series)  Last completed: Sep 3, 2021     SEP 1   2022 INFLUENZA VACCINE (1)  Last completed: Oct 20, 2021     OCT 20   2022 Pneumococcal Vaccine: Pediatrics (0 to 5 Years) and At-Risk Patients (6 to 64 Years) (2 - PCV)  Last completed: Oct 20, 2021           My Access Plan  Medical Emergency 911   Primary Clinic Line [facility] - [phone]   24 Hour Appointment Line [phone] or  [phone]) (toll-free)   24 Hour Nurse Line [phone] (toll-free)   Preferred Urgent Care [facility] [phone]     Preferred Hospital [facility]  [phone]     Preferred Pharmacy [facility] - [place], [place] - [address]      Behavioral Health Crisis Line The National Suicide Prevention Lifeline at 1-933.311.8380 or Text/Call 988             My Care Team Members  Patient Care Team       Relationship Specialty Notifications Start End    Jose Rangel MD PCP - General Family Practice  9/16/19     Phone: [phone] Fax: [phone]         [address] [place] [address]    Maddison Witt MD MD INTERNAL MEDICINE - ENDOCRINOLOGY, DIABETES & METABOLISM  10/9/19     Phone: [phone] Fax: [phone]         [address] [place] [address]    Scarlett Varma Community Health Worker Primary Care - CC Admissions 12/6/19     Phone: [phone] Fax: [phone]         [address] [place] [address]    Ioana Mendoza RN Specialty Care Coordinator INTERNAL MEDICINE - ENDOCRINOLOGY, DIABETES & METABOLISM  2/11/20     Phone: [phone] Pager: [phone]        Maddison Witt MD Assigned Endocrinology Provider   10/23/20     Phone: [phone] Fax: [phone]         [address] [place] [address]    Rosangela Alarcon, LSW Lead Care Coordinator  Admissions 12/8/20     Jose Rangel MD Assigned PCP   6/16/21     Phone: [phone] Fax: [phone]         [address] [place] [address]    Nithya Nathan MD MD Neurology  9/8/21     appt 12-10-21 at 10:20am [address], [facility] [address]    Phone: [phone] Fax: [phone]         [address] [facility] [address]    Caprice Haile, DONTRELL    9/30/21     Phone: [phone] Fax: [phone]         [facility] [employer] [facility] [address] [facility] [address]    [facility] RN  Registered Nurse [facility] ([facility] ([place])  9/30/21     Phone: [phone]         Felipe Silver MD  Internal Medicine All results, Admissions 2/23/22     Phone: [phone] Fax: [phone]         [facility] [id number]    [facility] Transportation Line    3/29/22     Member ID#  [phone]    Phone: [phone]         Felipe Silver MD Assigned Cancer Care Provider   4/17/22     Phone: [phone] Fax: [phone] [address] [facility] [address]    Cecile Montana RN Clinic Care Coordinator Primary Care - CC Admissions 5/19/22     Fritz Dejesus MD Assigned Neuroscience Provider   6/25/22     Phone: [phone] Fax: [phone] [address] [facility] [address] [facility] [address]    Renae Ferrell, NRP, CP Community Paramedic  Admissions 10/4/22     Phone: [phone]         Leela Rocha Financial Resource Worker   12/8/22             My Care Plans  Self Management and Treatment Plan  Care Plan  Care Plan: Wheelchair Completed 11/11/2022    Problem: HP GENERAL PROBLEM  Resolved 11/11/2022    Goal: I have the wheel chair on 11-14-22.  Completed 11/11/2022    Start Date: 8/10/2022 Expected End Date: 11/17/2022    This Visit's Progress: 100% Recent Progress: 50%    Note:     Barriers: Language barriers  Strengths: Accepting of support  Patient expressed understanding of goal: yes  Action steps to achieve this goal:    Call [facility] [phone] if there are issues with the wheel chair.                      Care Plan: Medication management support at home     Problem: HP GENERAL PROBLEM     Goal: I would like to enroll in home care services to suppport medication management within 90 days     Start Date: 8/2/2022 Expected End Date: 11/3/2022    Recent Progress: 10%    Note:     Barriers: language, access   Strengths: family, CP assistance  Patient expressed understanding of goal: yes  Action steps to achieve this goal:  1. I am still waiting for skilled nurse and will continue to work with CP and care team to enroll in home care support for medication set up   2. I will update Care coordination team during next outreach   Updated 9-9-22 AL                      Care Plan: General: Phone Resources     Problem: HP GENERAL PROBLEM      Goal: General: I would like support to get information and resources/program for a free phone to communicate with other as soon as possible.     Start Date: 8/29/2022 Expected End Date: 12/30/2022    This Visit's Progress: 40% Recent Progress: 30%    Note:     Barriers: no cell phone, limited income  Strengths: support from sister, father, CP, and [facility] team  Patient expressed understanding of goal: yes  Action steps to achieve this goal:  1. I will talk to [facility] on 11-17-22 at 3 pm regarding information and resources for free phone.  Updated 11-11-22 AL                    Care Plan: General: Special Transportation Services with Wheelchair     Problem: HP GENERAL PROBLEM     Goal: General Goal - I want to obtain Special Transportation Services with wheelchair within 1-3 months     Start Date: 12/8/2022 Expected End Date: 1/31/2023    This Visit's Progress: 50% Recent Progress: 50%    Note:     Barriers: language, access  Strengths: support from CCC team  Patient expressed understanding of goal: yes  Action steps to achieve this goal:  1. I will wait for PCP to complete and submit the Certificate of Needs form to [facility] [employer].                    Care Plan: General: SNAP Benefit     Problem: HP GENERAL PROBLEM     Goal: General Goal - I want to reapply to obtain SNAP benefit within 1-3 months.     Start Date: 12/8/2022 Expected End Date: 1/31/2023    Recent Progress: 20%    Note:     Barriers: language, access  Strengths: support from [facility] team and CP  Patient expressed understanding of goal: yes  Action steps to achieve this goal:  1. I will wait to connect with FRW for support to reapply for SNAP benefit.                         Action Plans on File:                       Advance Care Plans/Directives Type:   No data recorded    My Medical and Care Information  Problem List   Patient Active Problem List   Diagnosis     Immigrant with language difficulty     Ariel's disease (H)     Adrenal  insufficiency (H)     Hypothyroidism, unspecified type     Insomnia, unspecified type     PTSD (post-traumatic stress disorder)     Schizoaffective disorder, depressive type, with catatonia (H)     Schizophrenia (H)     Vitamin D deficiency     Moderate persistent asthma     Pituitary microadenoma (H)     Neurodevelopmental disorder     Weakness of right leg     X linked adrenoleukodystrophy (H)      Current Medications and Allergies:  See printed Medication Report.    Care Coordination Start Date: 12/8/2020   Frequency of Care Coordination: 2 weeks     Form Last Updated: 12/08/2022

## 2022-12-08 NOTE — PROGRESS NOTES
12/8/2022  Clinic Care Coordination Contact  Financial Resource Worker Screening     County Benefits  Is patient requesting help applying for county benefits?: Yes (apply for SNAP)  Have you recently applied for any county benefits?: No  Do you buy/eat food together?: Yes  What is the monthly gross income for the household (wages, social security, workers comp, and pension)? : 800 ([place] benefit)     [place]:  [place] No  Is this an insurance renewal?: No  Is this a new insurance application request?: No     Any other information for the FRW?: Per CCC Team and CP and Leadership consultation 12-7-22 to send another referral to FRW to reach out to pt again to support to apply for SNAP benefit. Community Paramedic Tia can support to connect with patient when she is at the pt's home.  Community Paramedic will see patient in the home on 12-13-22 Tuesday at 12pm.     Care Coordination team will tell patient:   Thank you for answering all the questions, based on screening questions, our Financial Resource Worker will reach out to you with additional questions and next steps.

## 2022-12-08 NOTE — PROGRESS NOTES
Clinic Care Coordination Contact  Program: [facility]  County:  County Case #:  County Worker:   Vamshi #:   Subscriber #:   Renewal:  Date Applied:     FRW Outreach:   12/9/22:    Health Insurance:      Referral/Screening:  Financial Resource Worker Screening     [facility] Benefits  Is patient requesting help applying for [facility] benefits?: Yes (apply for SNAP)  Have you recently applied for any [facility] benefits?: No  Do you buy/eat food together?: Yes  What is the monthly gross income for the household (wages, social security, workers comp, and pension)? : 800 ([place] benefit)     [place]:  [place] No  Is this an insurance renewal?: No  Is this a new insurance application request?: No     Any other information for the FRW?: Per CCC Team and CP and Leadership consultation 12-7-22 to send another referral to FRW to reach out to pt again to support to apply for SNAP benefit. Community Paramedic Tia can support to connect with patient when she is at the pt's home.  Community Paramedic will see patient in the home on 12-13-22 Tuesday at 12pm.

## 2022-12-08 NOTE — PROGRESS NOTES
Care Coordination Clinician Chart Review     Situation: Patient chart reviewed by care coordinator.?     Background: Initial assessment and enrollment to Care Coordination was 12/8/202 .?? Care plan(s) with patient-centered goal(s) were developed with participation from patient.? Lead CC handed patient off to CHW for continued outreach every 30 days.??     Assessment: Per chart review, patient outreach completed by CC CHW on 12/8/2022.? Patient is actively working to accomplish goal(s).? Patient's goal(s) remain(s) appropriate at this time.? Patient is due for updated Plan of Care.? Annual assessment will be due 12/8/2022.     Care Plan: Wheelchair Completed 11/11/2022    Problem: HP GENERAL PROBLEM  Resolved 11/11/2022    Goal: I have the wheel chair on 11-14-22.  Completed 11/11/2022    Start Date: 8/10/2022 Expected End Date: 11/17/2022    This Visit's Progress: 100% Recent Progress: 50%    Note:     Barriers: Language barriers  Strengths: Accepting of support  Patient expressed understanding of goal: yes  Action steps to achieve this goal:    Call "[employer]" [phone] if there are issues with the wheel chair.                      Care Plan: Medication management support at home     Problem: HP GENERAL PROBLEM     Goal: I would like to enroll in home care services to suppport medication management within 90 days     Start Date: 8/2/2022 Expected End Date: 11/3/2022    Recent Progress: 10%    Note:     Barriers: language, access   Strengths: family, CP assistance  Patient expressed understanding of goal: yes  Action steps to achieve this goal:  1. I am still waiting for skilled nurse and will continue to work with CP and care team to enroll in home care support for medication set up   2. I will update Care coordination team during next outreach   Updated 9-9-22 AL                      Care Plan: General: Phone Resources     Problem: HP GENERAL PROBLEM     Goal: General: I would like support to get  information and resources/program for a free phone to communicate with other as soon as possible.     Start Date: 8/29/2022 Expected End Date: 12/30/2022    This Visit's Progress: 40% Recent Progress: 30%    Note:     Barriers: no cell phone, limited income  Strengths: support from sister, father, CP, and CCC team  Patient expressed understanding of goal: yes  Action steps to achieve this goal:  1. I will talk to CC SW on 11-17-22 at 3 pm regarding information and resources for free phone.  Updated 11-11-22 AL                    Care Plan: General: Special Transportation Services with Wheelchair     Problem: HP GENERAL PROBLEM     Goal: General Goal - I want to obtain Special Transportation Services with wheelchair within 1-3 months     Start Date: 12/8/2022 Expected End Date: 1/31/2023    This Visit's Progress: 50% Recent Progress: 50%    Note:     Barriers: language, access  Strengths: support from CCC team  Patient expressed understanding of goal: yes  Action steps to achieve this goal:  1. I will wait for PCP to complete and submit the Certificate of Needs form to [facility] Special Transportation.                    Care Plan: General: SNAP Benefit     Problem: HP GENERAL PROBLEM     Goal: General Goal - I want to reapply to obtain SNAP benefit within 1-3 months.     Start Date: 12/8/2022 Expected End Date: 1/31/2023    Recent Progress: 20%    Note:     Barriers: language, access  Strengths: support from CCC team and CP  Patient expressed understanding of goal: yes  Action steps to achieve this goal:  1. I will wait to connect with FRW for support to reapply for SNAP benefit.                        Plan/Recommendations: The patient will continue working with Care Coordination to achieve above goal(s).? CHW will involve Lead CC as needed or if patient is ready to move to maintenance.? Lead CC will continue to monitor CHW s monthly outreaches and progress to goal(s) every 6 weeks.    Plan of Care updated and sent to  patient: Yes    VANITA Adames  [facility] RSC

## 2022-12-08 NOTE — PROGRESS NOTES
12/8/2022  Clinic Care Coordination Contact  Community Health Worker Follow Up  Intervention and Education during outreach:   Angela/Samanta/Miguel :  Tracee  ID: ID#[id number]  Called and spoke to patient and notify that he is set up for Special Transportation Services with wheelchair for future medical appointments.   stated she has a hard time understanding him.  She is not sure what he is saying.    CHW mailed upcoming appt and noted transportation contact information.  CHW sent referral to W for support with reapplying SNAP benefit      12-8-22  Call: [facility] Transportation Line  Member Line:[phone]  Provider Line [phone]  RE:Process to obtain Special Transportation Services with wheelchair.  Member ID# [id number]  Representative: Ankita      Provided member ID, verified patient demographics     Spoke to [facility] and requested Special Transportation Services with wheelchair for patient. Stated PCP needs to complete and submit the Certificate of Needs form and  fax back to [facility].  Stated he has a catarino period which starts today until Feb 2023 then it will be permanent after [facility] received the Certificate of Needs form  Provided Ankita PCP contact information and address and CHW fax number.   She will send over to special Transportation dept and fax the form.     Special Transportation set up for 1-24-23 appt  arrive 12:15pm  [address] [place] [address]  [employer] [phone] will  between 11:30am -12pm  2 passengers  Will call     Print upcoming appts with address and mail to patient   No telephone listed on print out     Routed to CP team as FYI  Routed to CC SW and CC RN as FYI     CHW Follow up: Monthly  CHW Plan: Follow up on goals  CHW Next Follow Up: 1-6-23     Scarlett Varma  Community Health Worker  [facility]  Clinic Care Coordination  [email or web address]   Office:  [phone]  Fax: [phone]     Clinic Care Coordination Contact    Community Health Worker Follow Up    Care Gaps:     Health Maintenance Due   Topic Date Due     ASTHMA ACTION PLAN  Never done     ADVANCE CARE PLANNING  Never done     DEPRESSION ACTION PLAN  Never done     COVID-19 Vaccine (3 - Booster for Pfizer series) 10/29/2021     INFLUENZA VACCINE (1) 09/01/2022     Pneumococcal Vaccine: Pediatrics (0 to 5 Years) and At-Risk Patients (6 to 64 Years) (2 - PCV) 10/20/2022     ASTHMA CONTROL TEST  12/14/2022     PHQ-9  12/14/2022       Care Gaps Last addressed on will discuss with PCP at next office visit    Care Plan:   Care Plan: Wheelchair Completed 11/11/2022    Problem: HP GENERAL PROBLEM  Resolved 11/11/2022    Goal: I have the wheel chair on 11-14-22.  Completed 11/11/2022    Start Date: 8/10/2022 Expected End Date: 11/17/2022    This Visit's Progress: 100% Recent Progress: 50%    Note:     Barriers: Language barriers  Strengths: Accepting of support  Patient expressed understanding of goal: yes  Action steps to achieve this goal:    Call [employer] [phone] if there are issues with the wheel chair.                      Care Plan: Medication management support at home     Problem: HP GENERAL PROBLEM     Goal: I would like to enroll in home care services to suppport medication management within 90 days     Start Date: 8/2/2022 Expected End Date: 11/3/2022    Recent Progress: 10%    Note:     Barriers: language, access   Strengths: family, CP assistance  Patient expressed understanding of goal: yes  Action steps to achieve this goal:  1. I am still waiting for skilled nurse and will continue to work with CP and care team to enroll in home care support for medication set up   2. I will update Care coordination team during next outreach   Updated 9-9-22 AL                      Care Plan: General: Phone Resources     Problem: HP GENERAL PROBLEM     Goal: General: I would like support to get  information and resources/program for a free phone to communicate with other as soon as possible.     Start Date: 8/29/2022 Expected End Date: 12/30/2022    This Visit's Progress: 40% Recent Progress: 30%    Note:     Barriers: no cell phone, limited income  Strengths: support from sister, father, CP, and CCC team  Patient expressed understanding of goal: yes  Action steps to achieve this goal:  1. I will talk to [facility] on 11-17-22 at 3 pm regarding information and resources for free phone.  Updated 11-11-22 AL                    Care Plan: General: Special Transportation Services with Wheelchair     Problem: HP GENERAL PROBLEM     Goal: General Goal - I want to obtain Special Transportation Services with wheelchair within 1-3 months     Start Date: 12/8/2022 Expected End Date: 1/31/2023    This Visit's Progress: 50% Recent Progress: 50%    Note:     Barriers: language, access  Strengths: support from CCC team  Patient expressed understanding of goal: yes  Action steps to achieve this goal:  1. I will wait for PCP to complete and submit the Certificate of Needs form to [facility] Special Transportation.                    Care Plan: General: SNAP Benefit     Problem: HP GENERAL PROBLEM     Goal: General Goal - I want to reapply to obtain SNAP benefit within 1-3 months.     Start Date: 12/8/2022 Expected End Date: 1/31/2023    Recent Progress: 20%    Note:       Barriers: language, access  Strengths: support from CCC team and CP  Patient expressed understanding of goal: yes  Action steps to achieve this goal:  1. I will wait to connect with FRW for support to reapply for SNAP benefit.

## 2022-12-08 NOTE — PROGRESS NOTES
12/8/2022  Clinic Care Coordination Contact  Care Team Conversations    Received Certificate of Need form from [facility]    Printed form and gave form to PCP's CA to complete the form.  Form placed in PCP in box.    Scarlett Varma  Community Health Worker  [facility]  Clinic Care Coordination  [email or web address]   Office: [phone]  Fax: [phone]

## 2022-12-08 NOTE — PROGRESS NOTES
12/7/2022  Clinic Care Coordination Contact  Care Team Conversations    CCC team consultation with CP.  Plan: CHW send new referral to FRW for support to apply for SNAP and obtain Special Transportation Services for wheelchair   -in process of getting services at [facility] for /[facility] worker.    12/8/2022  CHW sent new referral to FRW to apply for SNAP    Call: [facility] Transportation Line  Member Line:[phone]  Provider Line [phone]  RE:Process to obtain Special Transportation Services with wheelchair.  Member ID# [id number]  Representative: Mooalliegrant   Provided member ID, verified patient demographics    Spoke to [facility] and requested Special Transportation Services with wheelchair for patient. Stated PCP needs to complete and submit the Certificate of Needs form and  fax back to [facility].  Stated he has a catarino period which starts today until Feb 2023 then it will be permanent after [facility] received the Certificate of Needs form  Provided Ankita PCP contact information and address and CHW fax number.   She will send over to special Transportation dept and fax the form.    Special Transportation set up for 1-24-23 appt  arrive 12:15pm  [address] [place] [address]  [employer] [phone] will  between 11:30am -12pm  2 passengers  Will call    Print upcoming appts with address and mail to patient   No telephone listed on print out    Routed to CP team as FYI  Routed to CC SW and CC RN as FYI    CHW Follow up: Monthly  CHW Plan: Follow up on goals  CHW Next Follow Up: 12-14-22    Scarlett Varma  Community Health Worker  [facility]  Clinic Care Coordination  [email or web address]   Office: [phone]  Fax: [phone]

## 2022-12-12 NOTE — PROGRESS NOTES
Clinic Care Coordination Contact  Program: [facility]  County:  [place] Case #:  County Worker:   Vamshi #:   Subscriber #:   Renewal:  Date Applied:     FRW Outreach:   12/12/22: FRW reached out to Community Paramedic that FRW will leave 12 spot open tomorrow.    Health Insurance:      Referral/Screening:  Financial Resource Worker Screening     [facility] Benefits  Is patient requesting help applying for Community Health benefits?: Yes (apply for SNAP)  Have you recently applied for any Community Health benefits?: No  Do you buy/eat food together?: Yes  What is the monthly gross income for the household (wages, social security, workers comp, and pension)? : 800 (SSI benefit)     [place]:  [place]?: No  Is this an insurance renewal?: No  Is this a new insurance application request?: No     Any other information for the FRW?: Per CCC Team and CP and Leadership consultation 12-7-22 to send another referral to FRW to reach out to pt again to support to apply for SNAP benefit. Community Paramedic Tia can support to connect with patient when she is at the pt's home.  Community Paramedic will see patient in the home on 12-13-22 Tuesday at 12pm.

## 2022-12-12 NOTE — Clinical Note
Hi Renae! I will be around tomorrow at 12 if you and the patient want to call me about SNAP. My number is [phone]

## 2022-12-13 NOTE — PROGRESS NOTES
Clinic Care Coordination Contact  Program: [facility]  County:  [facility] Case #:  [facility] Worker:   Zariaure #:   Subscriber #:   Renewal:  Date Applied:     FRW Outreach:   12/13/22: FRW, pt and Renae the Community Paramedic had a phone conference. Pt is receiving SNAP benefits and card is working. No further FRW needs at this time.   12/12/22: FRW reached out to Community Paramedic that FRW will leave 12 spot open tomorrow.    Health Insurance:      Referral/Screening:  Financial Resource Worker Screening     [facility] Benefits  Is patient requesting help applying for [facility] benefits?: Yes (apply for SNAP)  Have you recently applied for any [facility] benefits?: No  Do you buy/eat food together?: Yes  What is the monthly gross income for the household (wages, social security, workers comp, and pension)? : 800 (SSI benefit)     [place]:  [place]?: No  Is this an insurance renewal?: No  Is this a new insurance application request?: No     Any other information for the FRW?: Per CCC Team and CP and Leadership consultation 12-7-22 to send another referral to FRW to reach out to pt again to support to apply for SNAP benefit. Community Paramedic Tia can support to connect with patient when she is at the pt's home.  Community Paramedic will see patient in the home on 12-13-22 Tuesday at 12pm.

## 2022-12-15 NOTE — ED TRIAGE NOTES
Patient comes in via [place] em[facility]. Patient was triaged via daniel , no english.     Patient reports that he can not sleep with a poor appetite. It appears that the patient has a difficult time speaking, and may have delay.   Patient reports that he can not walk, which it has been many many years.

## 2022-12-15 NOTE — ED PROVIDER NOTES
ED Provider In Triage Note  [facility]  Encounter Date: Dec 15, 2022    Chief Complaint   Patient presents with     Generalized Weakness     Headache       Brief HPI:   Brooke Peterson is a 32 year old male presenting to the Emergency Department with a chief complaint of decreased appetite and not sleeping well.  History of adrenoleukodystrophy.  Non ambulatory for several years.  Denies any pain.  Angela speaking and has dysarthria at baseline    Brief Physical Exam:  There were no vitals taken for this visit.  General: Non-toxic appearing  HEENT: Atraumatic  Resp: No respiratory distress  Abdomen: Non-peritoneal  Neuro: Alert, oriented, answers questions appropriately  Psych: Behavior appropriate    Plan Initiated in Triage:  Orders Placed This Encounter   Procedures     Comprehensive metabolic panel     Magnesium     TSH with free T4 reflex     Peripheral IV catheter     CBC with platelets differential         PIT Dispo:   Return to [facility] while awaiting workup and ED bed availability    Ilene Tolbert DO on 12/15/2022 at 3:43 PM    Patient was evaluated by the Physician in Triage due to a limitation of available rooms in the Emergency Department. A plan of care was discussed based on the information obtained on the initial evaluation and patient was consuled to return back to the Emergency Department lob after this initial evalutaiton until results were obtained or a room became available in the Emergency Department. Patient was counseled not to leave prior to receiving the results of their workup.     Ilene Tolbert DO  [facility] EMERGENCY DEPARTMENT  [address] [place] [id number]  [phone]     Ilene Tolbert DO  12/15/22 0220

## 2022-12-15 NOTE — ED PROVIDER NOTES
"EMERGENCY DEPARTMENT ENCOUNTER            IMPRESSION:  Dehydration  Insomnia      MEDICAL DECISION MAKING:  Patient brought in by family for generally not feeling well and decreased appetite.  He has insomnia as well.  Patient has multiple medical problems and underlying developmental disorder.  He is not ambulatory.  He has speech disability    On exam his vital signs are normal.  He is awake and does not appear uncomfortable.  He is able to communicate with family.  His membranes are moist.  Cardiopulmonary abdominal exam are normal.  He is unable to ambulate    An IV was established and he was given fluid bolus.  He felt somewhat better    CT of the head did not show any acute process    Viral panel negative    Routine labs negative    Urinalysis negative    Results were discussed with the patient through an .  He has some overall improvement.  He is stable for discharge home.  I will prescribe him Atarax for sleep.  I believe he was somewhat dehydrated.           =================================================================  CHIEF COMPLAINT:  Chief Complaint   Patient presents with     Generalized Weakness     Headache         HPI  Toe T Po is a 32 year old male with a history of [place]'s disease, schizophrenia, hyperprolactinemia, pituitary microadenoma, and X-linked adrenoleukodystrophy who presents to the ED by EMS with father and sister for evaluation of generalized weakness, fatigue, and reduced appetite.    Sister gave patient's history.    Patient presents with generalized weakness, fatigue, and reduced appetite (no eating) and fluid intake that presented today and a reduced ability to sleep last night. Sister called EMS because the patient told her he needed an ambulance. Sister reports the patient's condition is worse than usual. She is unsure if he has had these current symptoms occur before. He has only had a little fluid today, because there was \"something is his throat.\" She also " "reports he is unable to grab things because his \"fingers [are] less flexible.\" He is unable to walk, but this is chronic. He does have a walker. He is unable to stand by himself. He use to be able to talk, but has not been talking as much recently, with sister noting he has been less comprehensible lately. He normally eats food with his hands and mouth. Patient denies being in any pain. Denies cough.    No report of any other medical concerns or complaints at this time.      I, Devante Omer am serving as a scribe to document services personally performed by Dr. Servando Avina MD, based on my observation and the provider's statements to me. I, Dr. Servando Avina MD attest that Devante Omer is acting in a scribe capacity, has observed my performance of the services and has documented them in accordance with my direction.      REVIEW OF SYSTEMS   Constitutional: Denies fever, chills, unintentional weight loss. Endorses generalized weakness, fatigue, reduced food and fluid intake, and reduced ability to sleep.   Eyes: Denies visual changes or discharge    HENT: Denies sore throat, ear pain or neck pain. Endorses \"something in throat.\"  Respiratory: Denies cough or shortness of breath    Cardiovascular: Denies chest pain, palpitations or leg swelling  GI: Denies abdominal pain, nausea, vomiting, or dark, bloody stools.    : Denies hematuria, dysuria, or flank pain  Musculoskeletal: Denies any new back pain or new muscle/joint pains  Skin: Denies rash or wound  Neurologic: Denies current headache, new weakness, focal weakness, or sensory changes. Endorses reduced finger flexibility (harder to grab items). Endorses reduced vocalization and comprehensibility.  Lymphatic: Denies swollen glands    Psychiatric: Denies depression, suicidal ideation or homicidal ideation.    Remainder of systems reviewed, unless noted in HPI all others negative.      PAST MEDICAL HISTORY:  Past Medical History:   Diagnosis Date     " Ariel's disease (H) 07/2017     Asthma      Asthma      Hypercalcemia 09/2019     Hyperprolactinemia (H) 12/2018     Hypothyroidism      Hypovitaminosis D 11/2018     Pituitary microadenoma (H)      PTSD (post-traumatic stress disorder)      Schizophrenia (H)        PAST SURGICAL HISTORY:  History reviewed. No pertinent surgical history.      CURRENT MEDICATIONS:    hydrOXYzine (ATARAX) 25 MG tablet  hydrOXYzine (ATARAX) 25 MG tablet  acetaminophen (TYLENOL) 325 MG tablet  albuterol (PROAIR HFA/PROVENTIL HFA/VENTOLIN HFA) 108 (90 Base) MCG/ACT inhaler  baclofen (LIORESAL) 10 MG tablet  famotidine (PEPCID) 20 MG tablet  fludrocortisone (FLORINEF) 0.1 MG tablet  fluticasone-salmeterol (ADVAIR HFA) 115-21 MCG/ACT inhaler  hydrocortisone (CORTEF) 10 MG tablet  levothyroxine (SYNTHROID/LEVOTHROID) 75 MCG tablet  loratadine (CLARITIN) 10 MG tablet  meclizine (ANTIVERT) 12.5 MG tablet  Multiple Vitamin (MULTI-VITAMINS) TABS  OLANZapine (ZYPREXA) 5 MG tablet  ondansetron (ZOFRAN) 4 MG tablet  traZODone (DESYREL) 50 MG tablet  vitamin B-12 (CYANOCOBALAMIN) 500 MCG tablet  vitamin D3 (CHOLECALCIFEROL) 50 mcg (2000 units) tablet        ALLERGIES:  No Known Allergies    FAMILY HISTORY:  Family History   Problem Relation Age of Onset     Diabetes Mother      Psychotic Disorder Mother      Depression Mother      Cancer No family hx of        SOCIAL HISTORY:   Social History     Socioeconomic History     Marital status: Single   Tobacco Use     Smoking status: Never     Smokeless tobacco: Never   Substance and Sexual Activity     Alcohol use: Not Currently     Drug use: Never     Social Determinants of Health     Financial Resource Strain: Medium Risk     Difficulty of Paying Living Expenses: Somewhat hard   Food Insecurity: No Food Insecurity     Worried About Running Out of Food in the Last Year: Never true     Ran Out of Food in the Last Year: Never true   Transportation Needs: Unmet Transportation Needs     Lack of  Transportation (Medical): Yes     Lack of Transportation (Non-Medical): No   Physical Activity: Insufficiently Active     Days of Exercise per Week: 4 days     Minutes of Exercise per Session: 10 min   Stress: No Stress Concern Present     Feeling of Stress : Only a little   Social Connections: Socially Isolated     Frequency of Communication with Friends and Family: Never     Frequency of Social Gatherings with Friends and Family: More than three times a week     Attends Jain Services: Never     Active Member of Clubs or Organizations: No     Attends Club or Organization Meetings: Never     Marital Status: Never    Intimate Partner Violence: Not At Risk     Fear of Current or Ex-Partner: No     Emotionally Abused: No     Physically Abused: No     Sexually Abused: No   Housing Stability: Low Risk      Unable to Pay for Housing in the Last Year: No     Number of Places Lived in the Last Year: 1     Unstable Housing in the Last Year: No       PHYSICAL EXAM:    /81   Pulse 70   Temp 98.7  F (37.1  C) (Temporal)   Resp 16   Wt 79.8 kg (176 lb)   SpO2 100%   BMI 34.37 kg/m      Constitutional: Awake, and alert and responsive, he is able to communicate with some speech disturbance with his family  Head: Normocephalic, atraumatic.  ENT: Mucous membranes dry. posterior oropharynx appears normal.  Eyes: Pupils midrange and reactive ,no conjunctival discharge  Neck: No lymphadenopathy, no stridor, supple, no soft tissue swelling  Chest: No tenderness   Respiratory: Respirations even, unlabored. Lungs clear to ascultation bilaterally, in no acute respiratory distress.  Cardiovascular: Regular rate and rhythm.+2 radial pulses, equal bilaterally.  No murmurs.   GI: Abdomen soft, non-tender to palpation in all 4 quadrants. No guarding or rebound. Bowel sounds intact on all 4 quadrants.   Back: No CVA tenderness.    Musculoskeletal: He has generalized weakness of the upper and lower extremities some atrophy  and decreased muscle bulk  Integument: Warm, dry. No rash. No bruising or petechiae.  Lymphatic: No cervical lymphadenopathy  Neurologic: He is awake alert and responsive.  Baseline speech disability  Psychiatric: Normal mood and affect. Normal judgement.    ED COURSE:    5:18 PM I met with the patient to gather history and to perform my initial exam. We discussed plans for the ED course, including diagnostic testing and treatment.      11:56 PM I met with the patient and discussed the plan for discharge. I discussed potential symptoms/reasons to return to the ED and all questions were answered. The patient is comfortable with the plan.    LAB:  All pertinent labs reviewed and interpreted.  Results for orders placed or performed during the hospital encounter of 12/15/22   CT Head w/o Contrast    Impression    IMPRESSION:  1.  No acute intracranial pathology.  2.  Mild to moderate cerebellar volume loss and areas of presumed gliosis in the medial cerebellar hemispheres. This appears grossly unchanged allowing for differences in modality compared with the most recent brain MRI of 6/28/2022.   Comprehensive metabolic panel   Result Value Ref Range    Sodium 136 136 - 145 mmol/L    Potassium 4.3 3.4 - 5.3 mmol/L    Chloride 99 98 - 107 mmol/L    Carbon Dioxide (CO2) 25 22 - 29 mmol/L    Anion Gap 12 7 - 15 mmol/L    Urea Nitrogen 14.8 6.0 - 20.0 mg/dL    Creatinine 0.82 0.67 - 1.17 mg/dL    Calcium 10.1 (H) 8.6 - 10.0 mg/dL    Glucose 98 70 - 99 mg/dL    Alkaline Phosphatase 83 40 - 129 U/L    AST 31 10 - 50 U/L    ALT 55 (H) 10 - 50 U/L    Protein Total 8.1 6.4 - 8.3 g/dL    Albumin 5.0 3.5 - 5.2 g/dL    Bilirubin Total 0.4 <=1.2 mg/dL    GFR Estimate >90 >60 mL/min/1.73m2   Result Value Ref Range    Magnesium 2.1 1.7 - 2.3 mg/dL   TSH with free T4 reflex   Result Value Ref Range    TSH 1.47 0.30 - 4.20 uIU/mL   CBC with platelets and differential   Result Value Ref Range    WBC Count 5.7 4.0 - 11.0 10e3/uL    RBC  Count 6.52 (H) 4.40 - 5.90 10e6/uL    Hemoglobin 18.9 (H) 13.3 - 17.7 g/dL    Hematocrit 55.9 (H) 40.0 - 53.0 %    MCV 86 78 - 100 fL    MCH 29.0 26.5 - 33.0 pg    MCHC 33.8 31.5 - 36.5 g/dL    RDW 13.2 10.0 - 15.0 %    Platelet Count 199 150 - 450 10e3/uL    % Neutrophils 51 %    % Lymphocytes 38 %    % Monocytes 7 %    % Eosinophils 3 %    % Basophils 1 %    % Immature Granulocytes 0 %    NRBCs per 100 WBC 0 <1 /100    Absolute Neutrophils 2.9 1.6 - 8.3 10e3/uL    Absolute Lymphocytes 2.1 0.8 - 5.3 10e3/uL    Absolute Monocytes 0.4 0.0 - 1.3 10e3/uL    Absolute Eosinophils 0.2 0.0 - 0.7 10e3/uL    Absolute Basophils 0.0 0.0 - 0.2 10e3/uL    Absolute Immature Granulocytes 0.0 <=0.4 10e3/uL    Absolute NRBCs 0.0 10e3/uL   Symptomatic Influenza A/B & SARS-CoV2 (COVID-19) Virus PCR Multiplex Nasopharyngeal    Specimen: Nasopharyngeal; Swab   Result Value Ref Range    Influenza A PCR Negative Negative    Influenza B PCR Negative Negative    RSV PCR Negative Negative    SARS CoV2 PCR Negative Negative   UA with Microscopic reflex to Culture    Specimen: Urine, Midstream   Result Value Ref Range    Color Urine Light Yellow Colorless, Straw, Light Yellow, Yellow    Appearance Urine Clear Clear    Glucose Urine Negative Negative mg/dL    Bilirubin Urine Negative Negative    Ketones Urine Negative Negative mg/dL    Specific Gravity Urine 1.016 1.001 - 1.030    Blood Urine Negative Negative    pH Urine 6.0 5.0 - 7.0    Protein Albumin Urine Negative Negative mg/dL    Urobilinogen Urine <2.0 <2.0 mg/dL    Nitrite Urine Negative Negative    Leukocyte Esterase Urine Negative Negative    Bacteria Urine Few (A) None Seen /HPF    Mucus Urine Present (A) None Seen /LPF    RBC Urine 1 <=2 /HPF    WBC Urine <1 <=5 /HPF       RADIOLOGY:  Reviewed all pertinent imaging. Please see official radiology report.  CT Head w/o Contrast   Final Result   IMPRESSION:   1.  No acute intracranial pathology.   2.  Mild to moderate cerebellar volume  loss and areas of presumed gliosis in the medial cerebellar hemispheres. This appears grossly unchanged allowing for differences in modality compared with the most recent brain MRI of 6/28/2022.             MEDICATIONS GIVEN IN THE EMERGENCY:  Medications   0.9% sodium chloride BOLUS (0 mLs Intravenous Stopped 12/15/22 2008)           NEW PRESCRIPTIONS STARTED AT TODAY'S ER VISIT:  New Prescriptions    HYDROXYZINE (ATARAX) 25 MG TABLET    Take 1 tablet (25 mg) by mouth nightly as needed for anxiety    HYDROXYZINE (ATARAX) 25 MG TABLET    Take 1 tablet (25 mg) by mouth 3 times daily as needed for other (sleep)          FINAL DIAGNOSIS:    ICD-10-CM    1. Dehydration  E86.0       2. Insomnia, unspecified type  G47.00                At the conclusion of the encounter I discussed the results of all of the tests and the disposition. The questions were answered. The patient or family acknowledged understanding and was agreeable with the care plan.     NAME: Brooke Peterson  AGE: 32 year old male  YOB: 1990  MRN: [id number]  EVALUATION DATE & TIME: No admission date for patient encounter.    PCP: Jose Rangel    ED PROVIDER: DYLON Nobles Wyatt Schoephoerster, am serving as a scribe to document services personally performed by Dr. Servando Avina based on my observation and the provider's statements to me. IServando MD attest that Devante Omer is acting in a scribe capacity, has observed my performance of the services and has documented them in accordance with my direction.    Servando Avina M.D.  Emergency Medicine  [facility] EMERGENCY DEPARTMENT  [address] [place] [id number]  [phone]  Dept: [phone]  12/15/2022       Servando Avina MD  12/16/22 0035

## 2022-12-16 NOTE — PROGRESS NOTES
Community Paramedics Follow-up Visit  December 13 2022  TIME: 1200    Brooke Peterson is a 32 year old male being seen at home for a follow-up visit.    Present at appointment: patient, Community Paramedic, mother, father/PCA, Angela  via phone, ALFONSO Caceres via phone          Chief Complaint   Patient presents with     Recheck Medication     Outreach       [place] Utilization:      Utilization    Hospital Admissions  1             ED Visits  3             No Show Count (past year)  35                Current as of: 12/16/2022 10:11 AM              BP 98/80   Pulse 75   Temp 98.1  F (36.7  C)   Resp 12   SpO2 97%     Clinical Concerns:  Current Medical Concerns:  Medication set up   Current Behavioral Concerns: Medication compliance decreasing    Education Provided to patient: Assisted patient with talking to a Financial Resource Worker   Medication set up? Yes  Pill Box issued: No  Scale issued: No  Flu Shot given: No  COVID Vaccine Given: No  Lab draw or specimen collection: No  Food box issued: No  Collaborative visit with PCP: No  Wound Care: No    Health Maintenance Reviewed:      Clinical Pathway: None    No  Face to Face in Home / Community    Review of Symptoms/PE    Skin: negative  Eyes: negative  Ears/Nose/Throat: negative  Respiratory: No shortness of breath, dyspnea on exertion, cough, or hemoptysis  Cardiovascular: negative  Gastrointestinal: negative  Genitourinary: incontinence  Musculoskeletal: negative  Neurologic: local weakness and behavior changes  Psychiatric: sleep disturbance and depression    Time spent with patient: 75     The patient meets one or more of the following criteria:  * Requires services to prevent readmission to a nursing home or hospital    Acute concern/Follow-up recommendations: patient appears to be very down and depressed today     Next  visit scheduled: 12/20/22    Issues for Provider to follow up on: Community Paramedic visited with patient at his home.  Called ALFONSO Chauhan via computer to assist Toe in getting SNAP benefits. During the conversation if was found that Toe is already getting SNAP benefits monthly. Toe appeared surprised about this. His father/PCA had asked about getting rent assistance as well for Toe. After father/PCA brought out documentation that had been received in the mail, Community Paramedic was able to show both Toe and his father/PCA that they were also getting rent assistance. Father/PCA had asked how to increase the rent assistance and Leela did explain this to them.   Community Paramedic set up Toe's medications. Will be short medications for 2nd week. CP to call [facility] to follow up with medications that will be short on. Per [facility]ai, Tab-A-Vit, Meclizine, Loratadine, Hydrocortisone and Vit D3 will be delivered to patient at his home. CP to return on 12/20 to set up medications for following week. [facility] stated they did not have a script for Trazodone, will send message to PCP for this script.   Upon completing visit with Brooke, he continued to appear very depressed and quiet, as CP was walking out the front door, Toe slid off the couch and laid on the floor.      Provider follow up visit needed: PADDY

## 2022-12-16 NOTE — DISCHARGE INSTRUCTIONS
Your work-up in the emergency department did not show any serious finding  Follow-up with your doctor if you want more sleep medication  Atarax prescribed for sleep

## 2022-12-21 NOTE — PROGRESS NOTES
Community Paramedics Follow-up Visit  December 20, 2022  TIME: 1400    Brooke Peterson is a 32 year old male being seen at home for a follow-up visit.    Present at appointment:  Patient, patient's father/PCA, mother, other family members, Angela  in person from [place] [employer]         Chief Complaint   Patient presents with     Recheck Medication       [place] Utilization:      Utilization    Hospital Admissions  1             ED Visits  3             No Show Count (past year)  35                Current as of: 12/21/2022  3:05 AM              BP 94/65   Pulse 87   Temp 98.1  F (36.7  C)   Resp 12   SpO2 95%     Clinical Concerns:  Current Medical Concerns:  Stroke like symptoms, generalized weakness, not eating or drinking, medication compliance    Current Behavioral Concerns: failure to thrive    Education Provided to patient: talked to family about taking patient back to ER if they are unable to get patient to eat or drink   Medication set up? Yes  Pill Box issued: No  Scale issued: No  Flu Shot given: No  COVID Vaccine Given: No  Lab draw or specimen collection: No  Food box issued: No  Collaborative visit with PCP: No  Wound Care: No    Health Maintenance Reviewed:      Clinical Pathway: None    No  Face to Face in Home / Community    Recent blood sugars: 87    Review of Symptoms/PE    Skin: negative  Eyes: visual blurring  Ears/Nose/Throat: negative  Respiratory: No shortness of breath, dyspnea on exertion, cough, or hemoptysis  Cardiovascular: negative  Gastrointestinal: poor appetite  Genitourinary: hard stool, difficulty passing stool  Musculoskeletal: negative  Neurologic: local weakness, speech problems and behavior changes  Psychiatric: sleeping all the time per family    Time spent with patient: 75    The patient meets one or more of the following criteria:  * Requires services to prevent readmission to a nursing home or hospital    Acute concern/Follow-up recommendations: patient  is unable to sit up, he is unable to feed himself, family feeds him and tries to get him to drink water. Patient sleeps on the floor. Family has a hard time with mobility with patient.    Next CP visit scheduled: 01/03/23    Issues for Provider to follow up on: Community Paramedic visited with patient at his home. Patient was found on the floor in front of the couch that he normally sits on during visits. Family states they are unable to put patient on the couch as he can no longer sit up on his own. Angela  in person and indicated it was difficult to understand patient's speech. Father and mother much more talkative with  in person. Family indicates that patient sleeps a lot, they have to feed him as he is unable to hold a fork or spoon and he drinks very little. They are trying to get patient to drink a protein type shake from [place]. Patient denies having any pain anywhere. Patient states at times he feels like he needs to vomit when he eats. Mother explained that patient has a nodule/bump in his throat that causes him to cough every time he eats. They stated the Dr does know about this. When asked if they mentioned this at the ER visit they stated no. Family states patient is still able to eat and swallow his food, he just coughs a lot. Patient indicates he does not have an appetite and does not know why. Patient did fall asleep during our visit.   Father asked if they are able to get a hospital bed for patient to place in their living room as patient can no longer get to his own bed. Patient is no longer walking and family says patients leg is very weak and stiff and he is unable to control it.   Family and patient may also benefit from a gait belt. Family indicates that patient falls frequently due to family not being able to hold onto patient. Patient has hit his head a few times during the falls.   Advised family that if patient does not eat or drink in the next few days they should  call 911 to have patient taken back to ER. Advised that calling 911 may be safer than trying to take patient on their own.     Provider follow up visit needed: Patient has follow up ED telephone visit with Dr Fritz Nye on 12/23/22 1/24/23 - Dr Anu Alvarez in person visit

## 2022-12-23 NOTE — PROGRESS NOTES
"Answers for HPI/ROS submitted by the patient on 12/23/2022  If you checked off any problems, how difficult have these problems made it for you to do your work, take care of things at home, or get along with other people?: Extremely difficult  PHQ9 TOTAL SCORE: 25  Assessment/ Plan  1. X linked adrenoleukodystrophy (H)  Symptoms appear to be progressing.  Reviewed Dr. Galicia's  (neuro) recent note, subsequently referred to Dr. Coe and physical medicine.  It seems that request for wheelchair is reasonable.  I have referred him to physical therapy as well for an assessment of this.  - Adult Physical Medicine and Rehab  Referral; Future  - Physical Therapy Referral; Future    2. Insomnia, unspecified type  Father requesting medicine to help with sleep.  Prescribed Atarax  - hydrOXYzine (ATARAX) 25 MG tablet; Take 2 tablets (50 mg) by mouth nightly as needed for itching  Dispense: 30 tablet; Refill: 3    3. Swallowing dysfunction  Video swallow study due to coughing with swallowing  - XR Video Swallow with SLP or OT - Order with Speech Therapy Referral; Future    4. Gait disturbance      5. Healthcare maintenance  COVID, flu, Pneumovax     There is no height or weight on file to calculate BMI.    Subjective  CC:  chief complaint  HPI:  Difficult visit today.  Telephone  could not understand patient at all.  Father not really engaged for most of the visit.    I got a note from a \"friend\"  \"Toe needs more PCA hours.  He also needs electric recliner chair because he cannot move his legs or arms.  When he gets up, he needs someone to assist him all the time.  He seems like he cannot eat food like before.  It looks like he has trouble swallowing.\"  This is somewhat paraphrase    Indicates maximal PHQ-9 of 25.  Patient seems to understand these according to my nurse.  He does see a psychiatrist.  Patient Active Problem List   Diagnosis     Immigrant with language difficulty     Hampshire's disease (H)     " Adrenal insufficiency (H)     Hypothyroidism, unspecified type     Insomnia, unspecified type     PTSD (post-traumatic stress disorder)     Schizoaffective disorder, depressive type, with catatonia (H)     Schizophrenia (H)     Vitamin D deficiency     Moderate persistent asthma     Pituitary microadenoma (H)     Neurodevelopmental disorder     Weakness of right leg     X linked adrenoleukodystrophy (H)     Current medications reviewed as follows:  acetaminophen (TYLENOL) 325 MG tablet, Take 2 tablets (650 mg) by mouth every 6 hours as needed for mild pain  albuterol (PROAIR HFA/PROVENTIL HFA/VENTOLIN HFA) 108 (90 Base) MCG/ACT inhaler, Inhale 2 puffs into the lungs every 6 hours as needed for shortness of breath / dyspnea or wheezing  baclofen (LIORESAL) 10 MG tablet, Take 1 tablet (10 mg) by mouth 2 times daily  famotidine (PEPCID) 20 MG tablet, Take 20 mg by mouth 2 times daily  fludrocortisone (FLORINEF) 0.1 MG tablet, Take 1.5 tablets (0.15 mg) by mouth daily  fluticasone-salmeterol (ADVAIR HFA) 115-21 MCG/ACT inhaler, Inhale 2 puffs into the lungs 2 times daily  hydrocortisone (CORTEF) 10 MG tablet, TAKE 1 TABLET BY MOUTH IN THE MORNING AND 1/2 TABLET IN THE EVENING. MAY TAKE TAKE 2 PILLS TWICE A DAY FOR 3 DAYS IF ILLNESS.  hydrOXYzine (ATARAX) 25 MG tablet, Take 1 tablet (25 mg) by mouth nightly as needed for anxiety  hydrOXYzine (ATARAX) 25 MG tablet, Take 1 tablet (25 mg) by mouth 3 times daily as needed for other (sleep)  levothyroxine (SYNTHROID/LEVOTHROID) 75 MCG tablet, Take 1 tablet (75 mcg) by mouth daily  loratadine (CLARITIN) 10 MG tablet, Take 1 tablet (10 mg) by mouth daily  meclizine (ANTIVERT) 12.5 MG tablet, Take 1 tablet (12.5 mg) by mouth 3 times daily as needed for dizziness  Multiple Vitamin (MULTI-VITAMINS) TABS, Take 1 tablet by mouth daily  OLANZapine (ZYPREXA) 5 MG tablet, Take 1 tablet (5 mg) by mouth every morning  ondansetron (ZOFRAN) 4 MG tablet, Take 1 tablet (4 mg) by mouth every  8 hours as needed for nausea  traZODone (DESYREL) 50 MG tablet, Take 1 tablet (50 mg) by mouth At Bedtime  vitamin B-12 (CYANOCOBALAMIN) 500 MCG tablet, Take 1 tablet (500 mcg) by mouth daily  vitamin D3 (CHOLECALCIFEROL) 50 mcg (2000 units) tablet, TAKE 1 TABLET (50 MCG) BY MOUTH DAILY    No current facility-administered medications on file prior to visit.     History   Smoking Status     Never   Smokeless Tobacco     Never     Social History     Social History Narrative     Not on file     Patient Care Team:  Jose Rangel MD as PCP - General (Family Practice)  Maddison Witt MD as MD (INTERNAL MEDICINE - ENDOCRINOLOGY, DIABETES & METABOLISM)  Scarlett Varma as Community Health Worker (Primary Care - CC)  Ioana Mendoza, RN as Specialty Care Coordinator (INTERNAL MEDICINE - ENDOCRINOLOGY, DIABETES & METABOLISM)  Maddison Witt MD as Assigned Endocrinology Provider  Rosangela Alarcon LSW as Lead Care Coordinator  Jose Rangel MD as Assigned PCP  Nithya Nathan MD as MD (Neurology)  Caprice Haile, NP  [place] RN  as Registered Nurse (HOME HEALTH AGENCY ([facility]), ([place]))  Felipe Silver MD (Internal Medicine)  [facility] Transportation Line  Felipe Silver MD as Assigned Cancer Care Provider  Cecile Montana, RN as Clinic Care Coordinator (Primary Care - CC)  Fritz Dejesus MD as Assigned Neuroscience Provider  Renae Ferrell NRP, CP as Community ParamedicROS        Objective  Physical Exam  Vitals:    12/23/22 1544   BP: 113/81   BP Location: Right arm   Patient Position: Sitting   Cuff Size: Adult Large   Pulse: 71   Resp: 18   Temp: 98.4  F (36.9  C)   TempSrc: Temporal   SpO2: 99%     On exam, he is in a wheelchair.  Trying to speak but speech is not understandable.  He is in no acute physical distress.  Chest is clear, cardiovascular exam normal.  Extremities without edema.  Oral cavity normal.  Diagnostics  None    Reviewed recent emergency room visit in which  she was seen for headache, weakness.  CT of his brain was negative, given IV fluids and discharge.  Is also discharged with Atarax.  This was on 12/15/2022.  At that time labs were normal.  They included BMP, magnesium and TSH    Please note: Voice recognition software was used in this dictation.  It may therefore contain typographical errors.      .  Wt Readings from Last 3 Encounters:   12/15/22 79.8 kg (176 lb)   10/13/22 80.2 kg (176 lb 11.2 oz)   08/09/22 73.5 kg (162 lb)

## 2022-12-27 NOTE — PROGRESS NOTES
Clinic Care Coordination Contact  Care Team Conversations    Case review at [facility] today  Patient attended visit with provider, concern for level of cares reviewed and validated     SW placed message to [facility] assessment team to check status of  and CADI waiver update    VA report submitted last week from CP following care conference discussion about pt PCA and support needs increase and family level of engagement      OT order for swallow studies   New PT orders placed for wheelchair assessment today    Team discussed that challenge with phone communication due to speech and language barriers, will have CP check on status and update team to support collaboration and next steps

## 2023-01-01 ENCOUNTER — HOSPITAL ENCOUNTER (INPATIENT)
Facility: HOSPITAL | Age: 33
LOS: 2 days | Discharge: HOSPICE | End: 2023-06-22
Attending: INTERNAL MEDICINE | Admitting: INTERNAL MEDICINE
Payer: COMMERCIAL

## 2023-01-01 ENCOUNTER — HOSPITAL ENCOUNTER (INPATIENT)
Facility: HOSPITAL | Age: 33
LOS: 1 days | Discharge: HOSPICE | End: 2023-06-20
Attending: EMERGENCY MEDICINE | Admitting: EMERGENCY MEDICINE
Payer: COMMERCIAL

## 2023-01-01 ENCOUNTER — ALLIED HEALTH/NURSE VISIT (OUTPATIENT)
Dept: OTHER | Facility: CLINIC | Age: 33
End: 2023-01-01
Payer: COMMERCIAL

## 2023-01-01 ENCOUNTER — VIRTUAL VISIT (OUTPATIENT)
Dept: FAMILY MEDICINE | Facility: CLINIC | Age: 33
End: 2023-01-01
Payer: COMMERCIAL

## 2023-01-01 ENCOUNTER — HOSPITAL ENCOUNTER (OUTPATIENT)
Facility: HOSPITAL | Age: 33
Setting detail: OBSERVATION
Discharge: HOME | End: 2023-02-17
Attending: EMERGENCY MEDICINE | Admitting: STUDENT IN AN ORGANIZED HEALTH CARE EDUCATION/TRAINING PROGRAM
Payer: COMMERCIAL

## 2023-01-01 ENCOUNTER — APPOINTMENT (OUTPATIENT)
Dept: PHYSICAL THERAPY | Facility: HOSPITAL | Age: 33
End: 2023-01-01
Payer: COMMERCIAL

## 2023-01-01 ENCOUNTER — APPOINTMENT (OUTPATIENT)
Dept: SPEECH THERAPY | Facility: HOSPITAL | Age: 33
End: 2023-01-01
Attending: STUDENT IN AN ORGANIZED HEALTH CARE EDUCATION/TRAINING PROGRAM
Payer: COMMERCIAL

## 2023-01-01 ENCOUNTER — PATIENT OUTREACH (OUTPATIENT)
Dept: CARE COORDINATION | Facility: CLINIC | Age: 33
End: 2023-01-01
Payer: COMMERCIAL

## 2023-01-01 ENCOUNTER — PATIENT OUTREACH (OUTPATIENT)
Dept: CARE COORDINATION | Facility: CLINIC | Age: 33
End: 2023-01-01

## 2023-01-01 ENCOUNTER — APPOINTMENT (OUTPATIENT)
Dept: SPEECH THERAPY | Facility: HOSPITAL | Age: 33
End: 2023-01-01
Payer: COMMERCIAL

## 2023-01-01 ENCOUNTER — APPOINTMENT (OUTPATIENT)
Dept: MRI IMAGING | Facility: HOSPITAL | Age: 33
End: 2023-01-01
Attending: EMERGENCY MEDICINE
Payer: COMMERCIAL

## 2023-01-01 ENCOUNTER — PATIENT OUTREACH (OUTPATIENT)
Dept: OTHER | Facility: CLINIC | Age: 33
End: 2023-01-01
Payer: COMMERCIAL

## 2023-01-01 ENCOUNTER — APPOINTMENT (OUTPATIENT)
Dept: PHYSICAL THERAPY | Facility: HOSPITAL | Age: 33
End: 2023-01-01
Attending: PHYSICIAN ASSISTANT
Payer: COMMERCIAL

## 2023-01-01 ENCOUNTER — APPOINTMENT (OUTPATIENT)
Dept: RADIOLOGY | Facility: HOSPITAL | Age: 33
End: 2023-01-01
Attending: EMERGENCY MEDICINE
Payer: COMMERCIAL

## 2023-01-01 ENCOUNTER — APPOINTMENT (OUTPATIENT)
Dept: SPEECH THERAPY | Facility: HOSPITAL | Age: 33
End: 2023-01-01
Attending: INTERNAL MEDICINE
Payer: COMMERCIAL

## 2023-01-01 ENCOUNTER — APPOINTMENT (OUTPATIENT)
Dept: PHYSICAL THERAPY | Facility: HOSPITAL | Age: 33
End: 2023-01-01
Attending: INTERNAL MEDICINE
Payer: COMMERCIAL

## 2023-01-01 ENCOUNTER — TELEPHONE (OUTPATIENT)
Dept: CARE COORDINATION | Facility: CLINIC | Age: 33
End: 2023-01-01

## 2023-01-01 ENCOUNTER — OFFICE VISIT (OUTPATIENT)
Dept: PHYSICAL MEDICINE AND REHAB | Facility: CLINIC | Age: 33
End: 2023-01-01
Attending: PSYCHIATRY & NEUROLOGY
Payer: COMMERCIAL

## 2023-01-01 ENCOUNTER — OFFICE VISIT (OUTPATIENT)
Dept: FAMILY MEDICINE | Facility: CLINIC | Age: 33
End: 2023-01-01
Payer: COMMERCIAL

## 2023-01-01 ENCOUNTER — TELEPHONE (OUTPATIENT)
Dept: FAMILY MEDICINE | Facility: CLINIC | Age: 33
End: 2023-01-01

## 2023-01-01 ENCOUNTER — MEDICAL CORRESPONDENCE (OUTPATIENT)
Dept: HEALTH INFORMATION MANAGEMENT | Facility: CLINIC | Age: 33
End: 2023-01-01
Payer: COMMERCIAL

## 2023-01-01 ENCOUNTER — TELEPHONE (OUTPATIENT)
Dept: BEHAVIORAL HEALTH | Facility: CLINIC | Age: 33
End: 2023-01-01

## 2023-01-01 ENCOUNTER — APPOINTMENT (OUTPATIENT)
Dept: RADIOLOGY | Facility: HOSPITAL | Age: 33
End: 2023-01-01
Attending: INTERNAL MEDICINE
Payer: COMMERCIAL

## 2023-01-01 ENCOUNTER — APPOINTMENT (OUTPATIENT)
Dept: RADIOLOGY | Facility: HOSPITAL | Age: 33
End: 2023-01-01
Attending: STUDENT IN AN ORGANIZED HEALTH CARE EDUCATION/TRAINING PROGRAM
Payer: COMMERCIAL

## 2023-01-01 ENCOUNTER — TELEPHONE (OUTPATIENT)
Dept: NEUROLOGY | Facility: CLINIC | Age: 33
End: 2023-01-01

## 2023-01-01 ENCOUNTER — HOSPITAL ENCOUNTER (EMERGENCY)
Facility: HOSPITAL | Age: 33
Discharge: HOSPICE | End: 2023-06-22
Attending: EMERGENCY MEDICINE | Admitting: EMERGENCY MEDICINE
Payer: COMMERCIAL

## 2023-01-01 VITALS
RESPIRATION RATE: 20 BRPM | WEIGHT: 154 LBS | OXYGEN SATURATION: 96 % | HEIGHT: 66 IN | TEMPERATURE: 97.5 F | DIASTOLIC BLOOD PRESSURE: 59 MMHG | HEART RATE: 70 BPM | BODY MASS INDEX: 24.75 KG/M2 | SYSTOLIC BLOOD PRESSURE: 101 MMHG

## 2023-01-01 VITALS
OXYGEN SATURATION: 97 % | TEMPERATURE: 98.7 F | RESPIRATION RATE: 12 BRPM | DIASTOLIC BLOOD PRESSURE: 87 MMHG | SYSTOLIC BLOOD PRESSURE: 111 MMHG | HEART RATE: 101 BPM

## 2023-01-01 VITALS
RESPIRATION RATE: 14 BRPM | DIASTOLIC BLOOD PRESSURE: 81 MMHG | HEART RATE: 91 BPM | OXYGEN SATURATION: 99 % | TEMPERATURE: 97.8 F | WEIGHT: 154 LBS | BODY MASS INDEX: 30.08 KG/M2 | SYSTOLIC BLOOD PRESSURE: 114 MMHG

## 2023-01-01 VITALS
DIASTOLIC BLOOD PRESSURE: 83 MMHG | RESPIRATION RATE: 12 BRPM | HEART RATE: 82 BPM | TEMPERATURE: 98.5 F | OXYGEN SATURATION: 98 % | SYSTOLIC BLOOD PRESSURE: 107 MMHG

## 2023-01-01 VITALS
TEMPERATURE: 98 F | OXYGEN SATURATION: 99 % | DIASTOLIC BLOOD PRESSURE: 84 MMHG | RESPIRATION RATE: 18 BRPM | HEART RATE: 106 BPM | SYSTOLIC BLOOD PRESSURE: 121 MMHG

## 2023-01-01 VITALS
SYSTOLIC BLOOD PRESSURE: 98 MMHG | HEART RATE: 87 BPM | TEMPERATURE: 98.7 F | RESPIRATION RATE: 12 BRPM | OXYGEN SATURATION: 98 % | DIASTOLIC BLOOD PRESSURE: 83 MMHG

## 2023-01-01 VITALS
HEART RATE: 70 BPM | RESPIRATION RATE: 12 BRPM | OXYGEN SATURATION: 97 % | TEMPERATURE: 98.8 F | SYSTOLIC BLOOD PRESSURE: 95 MMHG | DIASTOLIC BLOOD PRESSURE: 73 MMHG

## 2023-01-01 VITALS
SYSTOLIC BLOOD PRESSURE: 103 MMHG | OXYGEN SATURATION: 98 % | RESPIRATION RATE: 12 BRPM | DIASTOLIC BLOOD PRESSURE: 75 MMHG | TEMPERATURE: 98.4 F | HEART RATE: 71 BPM

## 2023-01-01 VITALS
DIASTOLIC BLOOD PRESSURE: 88 MMHG | TEMPERATURE: 98.6 F | OXYGEN SATURATION: 97 % | HEART RATE: 109 BPM | SYSTOLIC BLOOD PRESSURE: 103 MMHG | RESPIRATION RATE: 12 BRPM

## 2023-01-01 VITALS
TEMPERATURE: 98.3 F | DIASTOLIC BLOOD PRESSURE: 74 MMHG | OXYGEN SATURATION: 96 % | RESPIRATION RATE: 12 BRPM | SYSTOLIC BLOOD PRESSURE: 100 MMHG | HEART RATE: 80 BPM

## 2023-01-01 VITALS — SYSTOLIC BLOOD PRESSURE: 121 MMHG | OXYGEN SATURATION: 99 % | HEART RATE: 66 BPM | DIASTOLIC BLOOD PRESSURE: 80 MMHG

## 2023-01-01 VITALS
DIASTOLIC BLOOD PRESSURE: 57 MMHG | RESPIRATION RATE: 18 BRPM | HEART RATE: 89 BPM | TEMPERATURE: 97.5 F | OXYGEN SATURATION: 95 % | SYSTOLIC BLOOD PRESSURE: 100 MMHG

## 2023-01-01 VITALS
HEART RATE: 74 BPM | DIASTOLIC BLOOD PRESSURE: 78 MMHG | OXYGEN SATURATION: 96 % | SYSTOLIC BLOOD PRESSURE: 103 MMHG | RESPIRATION RATE: 12 BRPM | TEMPERATURE: 97.7 F

## 2023-01-01 VITALS — OXYGEN SATURATION: 95 % | HEART RATE: 105 BPM | TEMPERATURE: 99.4 F

## 2023-01-01 VITALS
OXYGEN SATURATION: 99 % | SYSTOLIC BLOOD PRESSURE: 118 MMHG | DIASTOLIC BLOOD PRESSURE: 80 MMHG | TEMPERATURE: 98.4 F | HEART RATE: 112 BPM | RESPIRATION RATE: 21 BRPM

## 2023-01-01 DIAGNOSIS — F89 NEURODEVELOPMENTAL DISORDER: ICD-10-CM

## 2023-01-01 DIAGNOSIS — Z79.899 MEDICATION MANAGEMENT: Primary | ICD-10-CM

## 2023-01-01 DIAGNOSIS — E71.529 X LINKED ADRENOLEUKODYSTROPHY (H): Primary | ICD-10-CM

## 2023-01-01 DIAGNOSIS — G31.80 LEUKODYSTROPHY (H): ICD-10-CM

## 2023-01-01 DIAGNOSIS — E27.40 ADRENAL INSUFFICIENCY (H): ICD-10-CM

## 2023-01-01 DIAGNOSIS — G47.00 INSOMNIA, UNSPECIFIED TYPE: ICD-10-CM

## 2023-01-01 DIAGNOSIS — R44.0 AUDITORY HALLUCINATION: ICD-10-CM

## 2023-01-01 DIAGNOSIS — R63.4 WEIGHT LOSS: ICD-10-CM

## 2023-01-01 DIAGNOSIS — E27.1 ADDISON'S DISEASE (H): ICD-10-CM

## 2023-01-01 DIAGNOSIS — E27.40 ADRENAL INSUFFICIENCY (H): Primary | ICD-10-CM

## 2023-01-01 DIAGNOSIS — K59.00 CONSTIPATION, UNSPECIFIED CONSTIPATION TYPE: ICD-10-CM

## 2023-01-01 DIAGNOSIS — R12 HEARTBURN: Primary | ICD-10-CM

## 2023-01-01 DIAGNOSIS — Z79.899 MEDICATION MANAGEMENT: ICD-10-CM

## 2023-01-01 DIAGNOSIS — G81.91 RIGHT HEMIPARESIS (H): ICD-10-CM

## 2023-01-01 DIAGNOSIS — Z51.5 HOSPICE CARE PATIENT: ICD-10-CM

## 2023-01-01 DIAGNOSIS — R53.81 PHYSICAL DECONDITIONING: ICD-10-CM

## 2023-01-01 DIAGNOSIS — E71.529 ALD (ADRENOLEUKODYSTROPHY) (H): Primary | ICD-10-CM

## 2023-01-01 DIAGNOSIS — R47.1 DYSARTHRIA: ICD-10-CM

## 2023-01-01 DIAGNOSIS — M62.81 GENERALIZED MUSCLE WEAKNESS: ICD-10-CM

## 2023-01-01 DIAGNOSIS — R62.7 ADULT FAILURE TO THRIVE: Primary | ICD-10-CM

## 2023-01-01 DIAGNOSIS — J96.01 ACUTE RESPIRATORY FAILURE WITH HYPOXIA (H): ICD-10-CM

## 2023-01-01 DIAGNOSIS — R51.9 NONINTRACTABLE HEADACHE, UNSPECIFIED CHRONICITY PATTERN, UNSPECIFIED HEADACHE TYPE: ICD-10-CM

## 2023-01-01 DIAGNOSIS — R62.7 FAILURE TO THRIVE IN ADULT: ICD-10-CM

## 2023-01-01 DIAGNOSIS — R63.0 POOR APPETITE: ICD-10-CM

## 2023-01-01 DIAGNOSIS — E71.529 X LINKED ADRENOLEUKODYSTROPHY (H): ICD-10-CM

## 2023-01-01 DIAGNOSIS — D35.2 PITUITARY MICROADENOMA (H): ICD-10-CM

## 2023-01-01 DIAGNOSIS — R52 PAIN: ICD-10-CM

## 2023-01-01 DIAGNOSIS — R13.10 SWALLOWING DYSFUNCTION: ICD-10-CM

## 2023-01-01 DIAGNOSIS — J69.0 ASPIRATION PNEUMONITIS (H): ICD-10-CM

## 2023-01-01 DIAGNOSIS — Z51.5 HOSPICE CARE PATIENT: Primary | ICD-10-CM

## 2023-01-01 DIAGNOSIS — F32.2 CURRENT SEVERE EPISODE OF MAJOR DEPRESSIVE DISORDER WITHOUT PSYCHOTIC FEATURES WITHOUT PRIOR EPISODE (H): ICD-10-CM

## 2023-01-01 LAB
ALBUMIN SERPL BCG-MCNC: 5.1 G/DL (ref 3.5–5.2)
ALBUMIN UR-MCNC: 20 MG/DL
ALP SERPL-CCNC: 97 U/L (ref 40–129)
ALT SERPL W P-5'-P-CCNC: 54 U/L (ref 10–50)
ANION GAP SERPL CALCULATED.3IONS-SCNC: 15 MMOL/L (ref 7–15)
APPEARANCE UR: CLEAR
AST SERPL W P-5'-P-CCNC: 40 U/L (ref 10–50)
ATRIAL RATE - MUSE: 82 BPM
BASOPHILS # BLD AUTO: 0.1 10E3/UL (ref 0–0.2)
BASOPHILS NFR BLD AUTO: 1 %
BILIRUB DIRECT SERPL-MCNC: <0.2 MG/DL (ref 0–0.3)
BILIRUB SERPL-MCNC: 1 MG/DL
BILIRUB UR QL STRIP: NEGATIVE
BUN SERPL-MCNC: 11.8 MG/DL (ref 6–20)
CALCIUM SERPL-MCNC: 10.5 MG/DL (ref 8.6–10)
CALCIUM SERPL-MCNC: 9.9 MG/DL (ref 8.6–10)
CHLORIDE SERPL-SCNC: 96 MMOL/L (ref 98–107)
COLOR UR AUTO: ABNORMAL
CREAT SERPL-MCNC: 0.87 MG/DL (ref 0.67–1.17)
CREAT SERPL-MCNC: 0.93 MG/DL (ref 0.67–1.17)
CRP SERPL-MCNC: 4.4 MG/L
DEPRECATED HCO3 PLAS-SCNC: 26 MMOL/L (ref 22–29)
DIASTOLIC BLOOD PRESSURE - MUSE: NORMAL MMHG
EOSINOPHIL # BLD AUTO: 0.3 10E3/UL (ref 0–0.7)
EOSINOPHIL NFR BLD AUTO: 5 %
ERYTHROCYTE [DISTWIDTH] IN BLOOD BY AUTOMATED COUNT: 12.2 % (ref 10–15)
ERYTHROCYTE [DISTWIDTH] IN BLOOD BY AUTOMATED COUNT: 13 % (ref 10–15)
GFR SERPL CREATININE-BSD FRML MDRD: >90 ML/MIN/1.73M2
GFR SERPL CREATININE-BSD FRML MDRD: >90 ML/MIN/1.73M2
GLUCOSE SERPL-MCNC: 95 MG/DL (ref 70–99)
GLUCOSE UR STRIP-MCNC: NEGATIVE MG/DL
HCT VFR BLD AUTO: 53.2 % (ref 40–53)
HCT VFR BLD AUTO: 59.1 % (ref 40–53)
HGB BLD-MCNC: 17.7 G/DL (ref 13.3–17.7)
HGB BLD-MCNC: 20.4 G/DL (ref 13.3–17.7)
HGB UR QL STRIP: NEGATIVE
IMM GRANULOCYTES # BLD: 0 10E3/UL
IMM GRANULOCYTES NFR BLD: 0 %
INTERPRETATION ECG - MUSE: NORMAL
KETONES UR STRIP-MCNC: 10 MG/DL
LEUKOCYTE ESTERASE UR QL STRIP: NEGATIVE
LYMPHOCYTES # BLD AUTO: 2.5 10E3/UL (ref 0.8–5.3)
LYMPHOCYTES NFR BLD AUTO: 40 %
MAGNESIUM SERPL-MCNC: 2.1 MG/DL (ref 1.7–2.3)
MCH RBC QN AUTO: 28.8 PG (ref 26.5–33)
MCH RBC QN AUTO: 29.4 PG (ref 26.5–33)
MCHC RBC AUTO-ENTMCNC: 33.3 G/DL (ref 31.5–36.5)
MCHC RBC AUTO-ENTMCNC: 34.5 G/DL (ref 31.5–36.5)
MCV RBC AUTO: 85 FL (ref 78–100)
MCV RBC AUTO: 87 FL (ref 78–100)
MONOCYTES # BLD AUTO: 0.5 10E3/UL (ref 0–1.3)
MONOCYTES NFR BLD AUTO: 8 %
MUCOUS THREADS #/AREA URNS LPF: PRESENT /LPF
NEUTROPHILS # BLD AUTO: 3 10E3/UL (ref 1.6–8.3)
NEUTROPHILS NFR BLD AUTO: 46 %
NITRATE UR QL: NEGATIVE
NRBC # BLD AUTO: 0 10E3/UL
NRBC BLD AUTO-RTO: 0 /100
P AXIS - MUSE: 45 DEGREES
PH UR STRIP: 6.5 [PH] (ref 5–7)
PLATELET # BLD AUTO: 162 10E3/UL (ref 150–450)
PLATELET # BLD AUTO: 179 10E3/UL (ref 150–450)
PLATELET # BLD AUTO: 197 10E3/UL (ref 150–450)
POTASSIUM SERPL-SCNC: 4.4 MMOL/L (ref 3.4–5.3)
PR INTERVAL - MUSE: 144 MS
PROT SERPL-MCNC: 8.9 G/DL (ref 6.4–8.3)
QRS DURATION - MUSE: 78 MS
QT - MUSE: 372 MS
QTC - MUSE: 434 MS
R AXIS - MUSE: 29 DEGREES
RBC # BLD AUTO: 6.15 10E6/UL (ref 4.4–5.9)
RBC # BLD AUTO: 6.95 10E6/UL (ref 4.4–5.9)
RBC URINE: <1 /HPF
SODIUM SERPL-SCNC: 137 MMOL/L (ref 136–145)
SP GR UR STRIP: 1.02 (ref 1–1.03)
SYSTOLIC BLOOD PRESSURE - MUSE: NORMAL MMHG
T AXIS - MUSE: 56 DEGREES
TSH SERPL DL<=0.005 MIU/L-ACNC: 2.86 UIU/ML (ref 0.3–4.2)
UROBILINOGEN UR STRIP-MCNC: 2 MG/DL
VENTRICULAR RATE- MUSE: 82 BPM
WBC # BLD AUTO: 4.3 10E3/UL (ref 4–11)
WBC # BLD AUTO: 6.6 10E3/UL (ref 4–11)
WBC URINE: <1 /HPF

## 2023-01-01 PROCEDURE — G0378 HOSPITAL OBSERVATION PER HR: HCPCS

## 2023-01-01 PROCEDURE — 250N000013 HC RX MED GY IP 250 OP 250 PS 637: Performed by: STUDENT IN AN ORGANIZED HEALTH CARE EDUCATION/TRAINING PROGRAM

## 2023-01-01 PROCEDURE — 96374 THER/PROPH/DIAG INJ IV PUSH: CPT

## 2023-01-01 PROCEDURE — 97110 THERAPEUTIC EXERCISES: CPT | Mod: GP

## 2023-01-01 PROCEDURE — 92526 ORAL FUNCTION THERAPY: CPT | Mod: GN

## 2023-01-01 PROCEDURE — 83735 ASSAY OF MAGNESIUM: CPT | Performed by: EMERGENCY MEDICINE

## 2023-01-01 PROCEDURE — A9585 GADOBUTROL INJECTION: HCPCS | Performed by: INTERNAL MEDICINE

## 2023-01-01 PROCEDURE — 96376 TX/PRO/DX INJ SAME DRUG ADON: CPT

## 2023-01-01 PROCEDURE — 250N000011 HC RX IP 250 OP 636: Performed by: INTERNAL MEDICINE

## 2023-01-01 PROCEDURE — 96372 THER/PROPH/DIAG INJ SC/IM: CPT | Performed by: INTERNAL MEDICINE

## 2023-01-01 PROCEDURE — 250N000011 HC RX IP 250 OP 636: Performed by: PHYSICIAN ASSISTANT

## 2023-01-01 PROCEDURE — 93005 ELECTROCARDIOGRAM TRACING: CPT

## 2023-01-01 PROCEDURE — 97110 THERAPEUTIC EXERCISES: CPT | Mod: GP,59

## 2023-01-01 PROCEDURE — 99207 PR CDG-CUT & PASTE-POTENTIAL IMPACT ON LEVEL: CPT | Performed by: STUDENT IN AN ORGANIZED HEALTH CARE EDUCATION/TRAINING PROGRAM

## 2023-01-01 PROCEDURE — 99207 PR COMMUNITY PARAMEDIC-PATIENT MEETS CRITERIA: CPT

## 2023-01-01 PROCEDURE — 97162 PT EVAL MOD COMPLEX 30 MIN: CPT | Mod: GP

## 2023-01-01 PROCEDURE — 99418 PROLNG IP/OBS E/M EA 15 MIN: CPT | Performed by: PHYSICIAN ASSISTANT

## 2023-01-01 PROCEDURE — 81001 URINALYSIS AUTO W/SCOPE: CPT | Performed by: EMERGENCY MEDICINE

## 2023-01-01 PROCEDURE — 99283 EMERGENCY DEPT VISIT LOW MDM: CPT

## 2023-01-01 PROCEDURE — 99239 HOSP IP/OBS DSCHRG MGMT >30: CPT | Performed by: INTERNAL MEDICINE

## 2023-01-01 PROCEDURE — 97530 THERAPEUTIC ACTIVITIES: CPT | Mod: GP

## 2023-01-01 PROCEDURE — 92610 EVALUATE SWALLOWING FUNCTION: CPT | Mod: GN

## 2023-01-01 PROCEDURE — 99232 SBSQ HOSP IP/OBS MODERATE 35: CPT | Performed by: STUDENT IN AN ORGANIZED HEALTH CARE EDUCATION/TRAINING PROGRAM

## 2023-01-01 PROCEDURE — 36415 COLL VENOUS BLD VENIPUNCTURE: CPT | Performed by: INTERNAL MEDICINE

## 2023-01-01 PROCEDURE — 85027 COMPLETE CBC AUTOMATED: CPT | Performed by: INTERNAL MEDICINE

## 2023-01-01 PROCEDURE — 250N000009 HC RX 250: Performed by: PHYSICIAN ASSISTANT

## 2023-01-01 PROCEDURE — 92507 TX SP LANG VOICE COMM INDIV: CPT | Mod: GN

## 2023-01-01 PROCEDURE — 96360 HYDRATION IV INFUSION INIT: CPT | Mod: 59

## 2023-01-01 PROCEDURE — 93010 ELECTROCARDIOGRAM REPORT: CPT | Performed by: INTERNAL MEDICINE

## 2023-01-01 PROCEDURE — 99442 PR PHYSICIAN TELEPHONE EVALUATION 11-20 MIN: CPT | Mod: 93 | Performed by: FAMILY MEDICINE

## 2023-01-01 PROCEDURE — 82565 ASSAY OF CREATININE: CPT | Performed by: INTERNAL MEDICINE

## 2023-01-01 PROCEDURE — 92611 MOTION FLUOROSCOPY/SWALLOW: CPT | Mod: GN | Performed by: SPEECH-LANGUAGE PATHOLOGIST

## 2023-01-01 PROCEDURE — 99207 PR CDG-CUT & PASTE-POTENTIAL IMPACT ON LEVEL: CPT | Performed by: INTERNAL MEDICINE

## 2023-01-01 PROCEDURE — 120N000001 HC R&B MED SURG/OB

## 2023-01-01 PROCEDURE — 82310 ASSAY OF CALCIUM: CPT | Performed by: EMERGENCY MEDICINE

## 2023-01-01 PROCEDURE — 250N000013 HC RX MED GY IP 250 OP 250 PS 637: Performed by: PHYSICIAN ASSISTANT

## 2023-01-01 PROCEDURE — 99222 1ST HOSP IP/OBS MODERATE 55: CPT | Performed by: INTERNAL MEDICINE

## 2023-01-01 PROCEDURE — 99232 SBSQ HOSP IP/OBS MODERATE 35: CPT | Performed by: INTERNAL MEDICINE

## 2023-01-01 PROCEDURE — 82310 ASSAY OF CALCIUM: CPT | Performed by: INTERNAL MEDICINE

## 2023-01-01 PROCEDURE — 84443 ASSAY THYROID STIM HORMONE: CPT | Performed by: EMERGENCY MEDICINE

## 2023-01-01 PROCEDURE — 250N000013 HC RX MED GY IP 250 OP 250 PS 637: Performed by: INTERNAL MEDICINE

## 2023-01-01 PROCEDURE — 99255 IP/OBS CONSLTJ NEW/EST HI 80: CPT | Performed by: PSYCHIATRY & NEUROLOGY

## 2023-01-01 PROCEDURE — 99252 IP/OBS CONSLTJ NEW/EST SF 35: CPT | Performed by: INTERNAL MEDICINE

## 2023-01-01 PROCEDURE — 258N000003 HC RX IP 258 OP 636: Performed by: INTERNAL MEDICINE

## 2023-01-01 PROCEDURE — 86140 C-REACTIVE PROTEIN: CPT | Performed by: EMERGENCY MEDICINE

## 2023-01-01 PROCEDURE — 74230 X-RAY XM SWLNG FUNCJ C+: CPT

## 2023-01-01 PROCEDURE — 85049 AUTOMATED PLATELET COUNT: CPT | Performed by: INTERNAL MEDICINE

## 2023-01-01 PROCEDURE — 255N000002 HC RX 255 OP 636: Performed by: INTERNAL MEDICINE

## 2023-01-01 PROCEDURE — 96361 HYDRATE IV INFUSION ADD-ON: CPT

## 2023-01-01 PROCEDURE — 82248 BILIRUBIN DIRECT: CPT | Performed by: EMERGENCY MEDICINE

## 2023-01-01 PROCEDURE — 99223 1ST HOSP IP/OBS HIGH 75: CPT | Performed by: INTERNAL MEDICINE

## 2023-01-01 PROCEDURE — 92523 SPEECH SOUND LANG COMPREHEN: CPT | Mod: GN

## 2023-01-01 PROCEDURE — 99215 OFFICE O/P EST HI 40 MIN: CPT | Performed by: STUDENT IN AN ORGANIZED HEALTH CARE EDUCATION/TRAINING PROGRAM

## 2023-01-01 PROCEDURE — 92611 MOTION FLUOROSCOPY/SWALLOW: CPT | Mod: GN

## 2023-01-01 PROCEDURE — 99285 EMERGENCY DEPT VISIT HI MDM: CPT

## 2023-01-01 PROCEDURE — 92526 ORAL FUNCTION THERAPY: CPT | Mod: GN | Performed by: SPEECH-LANGUAGE PATHOLOGIST

## 2023-01-01 PROCEDURE — 85025 COMPLETE CBC W/AUTO DIFF WBC: CPT | Performed by: EMERGENCY MEDICINE

## 2023-01-01 PROCEDURE — 99214 OFFICE O/P EST MOD 30 MIN: CPT | Mod: VID | Performed by: FAMILY MEDICINE

## 2023-01-01 PROCEDURE — 97530 THERAPEUTIC ACTIVITIES: CPT | Mod: GP,59

## 2023-01-01 PROCEDURE — 99233 SBSQ HOSP IP/OBS HIGH 50: CPT | Mod: 24 | Performed by: PHYSICIAN ASSISTANT

## 2023-01-01 PROCEDURE — 92610 EVALUATE SWALLOWING FUNCTION: CPT | Mod: GN | Performed by: SPEECH-LANGUAGE PATHOLOGIST

## 2023-01-01 PROCEDURE — 999N000226 HC STATISTIC SLP IP EVAL DEFER

## 2023-01-01 PROCEDURE — 36415 COLL VENOUS BLD VENIPUNCTURE: CPT | Performed by: EMERGENCY MEDICINE

## 2023-01-01 PROCEDURE — 99233 SBSQ HOSP IP/OBS HIGH 50: CPT | Performed by: INTERNAL MEDICINE

## 2023-01-01 PROCEDURE — 96372 THER/PROPH/DIAG INJ SC/IM: CPT | Mod: XU | Performed by: INTERNAL MEDICINE

## 2023-01-01 PROCEDURE — 258N000003 HC RX IP 258 OP 636: Performed by: EMERGENCY MEDICINE

## 2023-01-01 PROCEDURE — 70553 MRI BRAIN STEM W/O & W/DYE: CPT

## 2023-01-01 PROCEDURE — 99205 OFFICE O/P NEW HI 60 MIN: CPT | Mod: GC | Performed by: PHYSICAL MEDICINE & REHABILITATION

## 2023-01-01 PROCEDURE — 99285 EMERGENCY DEPT VISIT HI MDM: CPT | Mod: 25

## 2023-01-01 PROCEDURE — 71046 X-RAY EXAM CHEST 2 VIEWS: CPT

## 2023-01-01 PROCEDURE — 250N000009 HC RX 250: Performed by: INTERNAL MEDICINE

## 2023-01-01 RX ORDER — SENNOSIDES 8.6 MG
2 TABLET ORAL 2 TIMES DAILY
Status: DISCONTINUED | OUTPATIENT
Start: 2023-01-01 | End: 2023-01-01

## 2023-01-01 RX ORDER — SCOLOPAMINE TRANSDERMAL SYSTEM 1 MG/1
1 PATCH, EXTENDED RELEASE TRANSDERMAL
Qty: 10 PATCH | Refills: 2 | Status: SHIPPED | OUTPATIENT
Start: 2023-01-01 | End: 2023-01-01

## 2023-01-01 RX ORDER — HYDROCORTISONE 5 MG/1
10 TABLET ORAL 2 TIMES DAILY
Status: DISCONTINUED | OUTPATIENT
Start: 2023-01-01 | End: 2023-01-01

## 2023-01-01 RX ORDER — ACETAMINOPHEN 650 MG/1
650 SUPPOSITORY RECTAL EVERY 6 HOURS PRN
Status: DISCONTINUED | OUTPATIENT
Start: 2023-01-01 | End: 2023-01-01 | Stop reason: HOSPADM

## 2023-01-01 RX ORDER — PROCHLORPERAZINE MALEATE 10 MG
10 TABLET ORAL EVERY 6 HOURS PRN
Status: DISCONTINUED | OUTPATIENT
Start: 2023-01-01 | End: 2023-01-01 | Stop reason: HOSPADM

## 2023-01-01 RX ORDER — LEVOTHYROXINE SODIUM 25 UG/1
75 TABLET ORAL DAILY
Status: DISCONTINUED | OUTPATIENT
Start: 2023-01-01 | End: 2023-01-01 | Stop reason: HOSPADM

## 2023-01-01 RX ORDER — POLYETHYLENE GLYCOL 3350 17 G/17G
1 POWDER, FOR SOLUTION ORAL DAILY PRN
Qty: 507 G | Refills: 3 | Status: CANCELLED | OUTPATIENT
Start: 2023-01-01

## 2023-01-01 RX ORDER — FAMOTIDINE 20 MG/1
20 TABLET, FILM COATED ORAL 2 TIMES DAILY
Status: DISCONTINUED | OUTPATIENT
Start: 2023-01-01 | End: 2023-01-01 | Stop reason: HOSPADM

## 2023-01-01 RX ORDER — LORAZEPAM 0.5 MG/1
0.5 TABLET ORAL 2 TIMES DAILY PRN
Status: DISCONTINUED | OUTPATIENT
Start: 2023-01-01 | End: 2023-01-01 | Stop reason: HOSPADM

## 2023-01-01 RX ORDER — POLYETHYLENE GLYCOL 3350 17 G/17G
17 POWDER, FOR SOLUTION ORAL DAILY
Status: DISCONTINUED | OUTPATIENT
Start: 2023-01-01 | End: 2023-01-01 | Stop reason: HOSPADM

## 2023-01-01 RX ORDER — LIDOCAINE 40 MG/G
CREAM TOPICAL
Status: DISCONTINUED | OUTPATIENT
Start: 2023-01-01 | End: 2023-01-01 | Stop reason: HOSPADM

## 2023-01-01 RX ORDER — ONDANSETRON 4 MG/1
4 TABLET, FILM COATED ORAL EVERY 8 HOURS PRN
Status: DISCONTINUED | OUTPATIENT
Start: 2023-01-01 | End: 2023-01-01 | Stop reason: HOSPADM

## 2023-01-01 RX ORDER — TRAZODONE HYDROCHLORIDE 50 MG/1
50 TABLET, FILM COATED ORAL AT BEDTIME
Status: DISCONTINUED | OUTPATIENT
Start: 2023-01-01 | End: 2023-01-01 | Stop reason: HOSPADM

## 2023-01-01 RX ORDER — ONDANSETRON 4 MG/1
4 TABLET, ORALLY DISINTEGRATING ORAL
Status: DISCONTINUED | OUTPATIENT
Start: 2023-01-01 | End: 2023-01-01

## 2023-01-01 RX ORDER — BACLOFEN 10 MG/1
10 TABLET ORAL 2 TIMES DAILY
COMMUNITY

## 2023-01-01 RX ORDER — OLANZAPINE 5 MG/1
5 TABLET ORAL 2 TIMES DAILY
Qty: 60 TABLET | Refills: 1 | Status: SHIPPED | OUTPATIENT
Start: 2023-01-01 | End: 2023-01-01

## 2023-01-01 RX ORDER — BACLOFEN 10 MG/1
10 TABLET ORAL 2 TIMES DAILY
Status: DISCONTINUED | OUTPATIENT
Start: 2023-01-01 | End: 2023-01-01 | Stop reason: HOSPADM

## 2023-01-01 RX ORDER — ALBUTEROL SULFATE 90 UG/1
2 AEROSOL, METERED RESPIRATORY (INHALATION) EVERY 6 HOURS PRN
Status: DISCONTINUED | OUTPATIENT
Start: 2023-01-01 | End: 2023-01-01 | Stop reason: HOSPADM

## 2023-01-01 RX ORDER — GADOBUTROL 604.72 MG/ML
7 INJECTION INTRAVENOUS ONCE
Status: COMPLETED | OUTPATIENT
Start: 2023-01-01 | End: 2023-01-01

## 2023-01-01 RX ORDER — TRAZODONE HYDROCHLORIDE 50 MG/1
50 TABLET, FILM COATED ORAL
Status: DISCONTINUED | OUTPATIENT
Start: 2023-01-01 | End: 2023-01-01 | Stop reason: HOSPADM

## 2023-01-01 RX ORDER — ACETAMINOPHEN 500 MG
500-1000 TABLET ORAL EVERY 8 HOURS PRN
Status: DISCONTINUED | OUTPATIENT
Start: 2023-01-01 | End: 2023-01-01 | Stop reason: HOSPADM

## 2023-01-01 RX ORDER — SCOLOPAMINE TRANSDERMAL SYSTEM 1 MG/1
1 PATCH, EXTENDED RELEASE TRANSDERMAL
Status: DISCONTINUED | OUTPATIENT
Start: 2023-01-01 | End: 2023-01-01 | Stop reason: HOSPADM

## 2023-01-01 RX ORDER — HYDROCORTISONE 5 MG/1
10 TABLET ORAL EVERY MORNING
Status: DISCONTINUED | OUTPATIENT
Start: 2023-01-01 | End: 2023-01-01 | Stop reason: HOSPADM

## 2023-01-01 RX ORDER — PANTOPRAZOLE SODIUM 40 MG/1
40 TABLET, DELAYED RELEASE ORAL
Status: DISCONTINUED | OUTPATIENT
Start: 2023-01-01 | End: 2023-01-01 | Stop reason: HOSPADM

## 2023-01-01 RX ORDER — PANTOPRAZOLE SODIUM 40 MG/1
40 TABLET, DELAYED RELEASE ORAL
Qty: 30 TABLET | Refills: 1 | Status: SHIPPED | OUTPATIENT
Start: 2023-01-01

## 2023-01-01 RX ORDER — DOCUSATE SODIUM 100 MG/1
100 CAPSULE, LIQUID FILLED ORAL DAILY PRN
Status: DISCONTINUED | OUTPATIENT
Start: 2023-01-01 | End: 2023-01-01 | Stop reason: HOSPADM

## 2023-01-01 RX ORDER — SODIUM CHLORIDE 9 MG/ML
INJECTION, SOLUTION INTRAVENOUS CONTINUOUS
Status: DISCONTINUED | OUTPATIENT
Start: 2023-01-01 | End: 2023-01-01 | Stop reason: HOSPADM

## 2023-01-01 RX ORDER — SENNOSIDES 8.6 MG
1 TABLET ORAL DAILY
Status: DISCONTINUED | OUTPATIENT
Start: 2023-01-01 | End: 2023-01-01 | Stop reason: HOSPADM

## 2023-01-01 RX ORDER — LORAZEPAM 2 MG/ML
1 INJECTION INTRAMUSCULAR
Status: DISCONTINUED | OUTPATIENT
Start: 2023-01-01 | End: 2023-01-01 | Stop reason: HOSPADM

## 2023-01-01 RX ORDER — FLUTICASONE FUROATE AND VILANTEROL 100; 25 UG/1; UG/1
1 POWDER RESPIRATORY (INHALATION) DAILY
Status: DISCONTINUED | OUTPATIENT
Start: 2023-01-01 | End: 2023-01-01 | Stop reason: HOSPADM

## 2023-01-01 RX ORDER — MORPHINE SULFATE 2 MG/ML
2 INJECTION, SOLUTION INTRAMUSCULAR; INTRAVENOUS
Status: DISCONTINUED | OUTPATIENT
Start: 2023-01-01 | End: 2023-01-01 | Stop reason: HOSPADM

## 2023-01-01 RX ORDER — LORAZEPAM 1 MG/1
1 TABLET ORAL
Qty: 30 TABLET | Refills: 0 | Status: SHIPPED | OUTPATIENT
Start: 2023-01-01

## 2023-01-01 RX ORDER — MORPHINE SULFATE 2 MG/ML
1 INJECTION, SOLUTION INTRAMUSCULAR; INTRAVENOUS
Status: DISCONTINUED | OUTPATIENT
Start: 2023-01-01 | End: 2023-01-01 | Stop reason: HOSPADM

## 2023-01-01 RX ORDER — ENOXAPARIN SODIUM 100 MG/ML
40 INJECTION SUBCUTANEOUS EVERY 24 HOURS
Status: DISCONTINUED | OUTPATIENT
Start: 2023-01-01 | End: 2023-01-01 | Stop reason: HOSPADM

## 2023-01-01 RX ORDER — LORATADINE 10 MG/1
TABLET ORAL
Qty: 28 TABLET | Refills: 0 | Status: SHIPPED | OUTPATIENT
Start: 2023-01-01

## 2023-01-01 RX ORDER — ONDANSETRON 2 MG/ML
4 INJECTION INTRAMUSCULAR; INTRAVENOUS EVERY 6 HOURS PRN
Status: DISCONTINUED | OUTPATIENT
Start: 2023-01-01 | End: 2023-01-01 | Stop reason: HOSPADM

## 2023-01-01 RX ORDER — POLYETHYLENE GLYCOL 3350 17 G/17G
17 POWDER, FOR SOLUTION ORAL DAILY
Qty: 510 G | Refills: 1 | Status: SHIPPED | OUTPATIENT
Start: 2023-01-01 | End: 2023-01-01

## 2023-01-01 RX ORDER — BARIUM SULFATE 400 MG/ML
SUSPENSION ORAL ONCE
Status: COMPLETED | OUTPATIENT
Start: 2023-01-01 | End: 2023-01-01

## 2023-01-01 RX ORDER — NALOXONE HYDROCHLORIDE 0.4 MG/ML
0.2 INJECTION, SOLUTION INTRAMUSCULAR; INTRAVENOUS; SUBCUTANEOUS
Status: DISCONTINUED | OUTPATIENT
Start: 2023-01-01 | End: 2023-01-01 | Stop reason: HOSPADM

## 2023-01-01 RX ORDER — HYDROCORTISONE 10 MG/1
TABLET ORAL
Qty: 60 TABLET | Refills: 1 | Status: SHIPPED | OUTPATIENT
Start: 2023-01-01

## 2023-01-01 RX ORDER — LORAZEPAM 0.5 MG/1
0.5 TABLET ORAL EVERY 4 HOURS PRN
Status: DISCONTINUED | OUTPATIENT
Start: 2023-01-01 | End: 2023-01-01

## 2023-01-01 RX ORDER — ONDANSETRON 4 MG/1
4 TABLET, FILM COATED ORAL EVERY 8 HOURS PRN
Status: DISCONTINUED | OUTPATIENT
Start: 2023-01-01 | End: 2023-01-01

## 2023-01-01 RX ORDER — OLANZAPINE 5 MG/1
5 TABLET ORAL 2 TIMES DAILY
Status: DISCONTINUED | OUTPATIENT
Start: 2023-01-01 | End: 2023-01-01 | Stop reason: HOSPADM

## 2023-01-01 RX ORDER — LEVOTHYROXINE SODIUM 75 UG/1
75 TABLET ORAL
Status: DISCONTINUED | OUTPATIENT
Start: 2023-01-01 | End: 2023-01-01 | Stop reason: HOSPADM

## 2023-01-01 RX ORDER — SCOLOPAMINE TRANSDERMAL SYSTEM 1 MG/1
1 PATCH, EXTENDED RELEASE TRANSDERMAL
Qty: 10 PATCH | Refills: 2 | Status: SHIPPED | OUTPATIENT
Start: 2023-01-01

## 2023-01-01 RX ORDER — OLANZAPINE 5 MG/1
5 TABLET ORAL 2 TIMES DAILY
Qty: 60 TABLET | Refills: 1 | Status: SHIPPED | OUTPATIENT
Start: 2023-01-01

## 2023-01-01 RX ORDER — CALCIUM CARBONATE/VITAMIN D3 600 MG-10
500 TABLET ORAL DAILY
Status: DISCONTINUED | OUTPATIENT
Start: 2023-01-01 | End: 2023-01-01 | Stop reason: HOSPADM

## 2023-01-01 RX ORDER — MORPHINE SULFATE 20 MG/ML
10 SOLUTION ORAL
Status: DISCONTINUED | OUTPATIENT
Start: 2023-01-01 | End: 2023-01-01 | Stop reason: HOSPADM

## 2023-01-01 RX ORDER — HYDROCORTISONE 5 MG/1
5 TABLET ORAL 2 TIMES DAILY
Status: DISCONTINUED | OUTPATIENT
Start: 2023-01-01 | End: 2023-01-01 | Stop reason: HOSPADM

## 2023-01-01 RX ORDER — SENNOSIDES 8.6 MG
1 TABLET ORAL DAILY
Qty: 90 TABLET | Refills: 1 | Status: SHIPPED | OUTPATIENT
Start: 2023-01-01

## 2023-01-01 RX ORDER — POLYETHYLENE GLYCOL 3350 17 G/17G
17 POWDER, FOR SOLUTION ORAL DAILY
Qty: 510 G | Refills: 1 | Status: SHIPPED | OUTPATIENT
Start: 2023-01-01

## 2023-01-01 RX ORDER — LORAZEPAM 1 MG/1
1 TABLET ORAL
Status: DISCONTINUED | OUTPATIENT
Start: 2023-01-01 | End: 2023-01-01 | Stop reason: HOSPADM

## 2023-01-01 RX ORDER — NALOXONE HYDROCHLORIDE 0.4 MG/ML
0.1 INJECTION, SOLUTION INTRAMUSCULAR; INTRAVENOUS; SUBCUTANEOUS
Status: DISCONTINUED | OUTPATIENT
Start: 2023-01-01 | End: 2023-01-01 | Stop reason: HOSPADM

## 2023-01-01 RX ORDER — HYDROCORTISONE 5 MG/1
10 TABLET ORAL DAILY
Status: DISCONTINUED | OUTPATIENT
Start: 2023-01-01 | End: 2023-01-01 | Stop reason: HOSPADM

## 2023-01-01 RX ORDER — ATROPINE SULFATE 10 MG/ML
2 SOLUTION/ DROPS OPHTHALMIC EVERY 4 HOURS PRN
Status: DISCONTINUED | OUTPATIENT
Start: 2023-01-01 | End: 2023-01-01 | Stop reason: HOSPADM

## 2023-01-01 RX ORDER — ACETAMINOPHEN 500 MG
500-1000 TABLET ORAL EVERY 8 HOURS PRN
Qty: 60 TABLET | Refills: 1 | Status: SHIPPED | OUTPATIENT
Start: 2023-01-01

## 2023-01-01 RX ORDER — ONDANSETRON 4 MG/1
4 TABLET, ORALLY DISINTEGRATING ORAL EVERY 6 HOURS PRN
Status: DISCONTINUED | OUTPATIENT
Start: 2023-01-01 | End: 2023-01-01 | Stop reason: HOSPADM

## 2023-01-01 RX ORDER — AMOXICILLIN 250 MG
1 CAPSULE ORAL 2 TIMES DAILY
Status: DISCONTINUED | OUTPATIENT
Start: 2023-01-01 | End: 2023-01-01

## 2023-01-01 RX ORDER — LORATADINE 10 MG/1
10 TABLET ORAL DAILY
Status: DISCONTINUED | OUTPATIENT
Start: 2023-01-01 | End: 2023-01-01 | Stop reason: HOSPADM

## 2023-01-01 RX ORDER — HYDROCORTISONE 5 MG/1
5 TABLET ORAL EVERY EVENING
Status: DISCONTINUED | OUTPATIENT
Start: 2023-01-01 | End: 2023-01-01

## 2023-01-01 RX ORDER — OLANZAPINE 5 MG/1
5 TABLET ORAL EVERY MORNING
Status: DISCONTINUED | OUTPATIENT
Start: 2023-01-01 | End: 2023-01-01

## 2023-01-01 RX ORDER — MORPHINE SULFATE 10 MG/5ML
10 SOLUTION ORAL
Status: DISCONTINUED | OUTPATIENT
Start: 2023-01-01 | End: 2023-01-01 | Stop reason: HOSPADM

## 2023-01-01 RX ORDER — PROCHLORPERAZINE 25 MG
25 SUPPOSITORY, RECTAL RECTAL EVERY 12 HOURS PRN
Status: DISCONTINUED | OUTPATIENT
Start: 2023-01-01 | End: 2023-01-01 | Stop reason: HOSPADM

## 2023-01-01 RX ORDER — FAMOTIDINE 20 MG/1
TABLET, FILM COATED ORAL
Qty: 60 TABLET | Refills: 11 | Status: SHIPPED | OUTPATIENT
Start: 2023-01-01

## 2023-01-01 RX ORDER — PANTOPRAZOLE SODIUM 40 MG/1
40 TABLET, DELAYED RELEASE ORAL
Qty: 30 TABLET | Refills: 1 | Status: SHIPPED | OUTPATIENT
Start: 2023-01-01 | End: 2023-01-01

## 2023-01-01 RX ORDER — MORPHINE SULFATE 20 MG/ML
5 SOLUTION ORAL
Qty: 15 ML | Refills: 0 | Status: SHIPPED | OUTPATIENT
Start: 2023-01-01

## 2023-01-01 RX ORDER — VITAMIN B COMPLEX
50 TABLET ORAL DAILY
Status: DISCONTINUED | OUTPATIENT
Start: 2023-01-01 | End: 2023-01-01 | Stop reason: HOSPADM

## 2023-01-01 RX ORDER — HYDROCORTISONE 5 MG/1
10 TABLET ORAL EVERY MORNING
Status: DISCONTINUED | OUTPATIENT
Start: 2023-01-01 | End: 2023-01-01

## 2023-01-01 RX ORDER — TRAZODONE HYDROCHLORIDE 50 MG/1
50 TABLET, FILM COATED ORAL AT BEDTIME
Status: DISCONTINUED | OUTPATIENT
Start: 2023-01-01 | End: 2023-01-01

## 2023-01-01 RX ORDER — DOCUSATE SODIUM 100 MG/1
100 CAPSULE, LIQUID FILLED ORAL DAILY
Qty: 90 CAPSULE | Refills: 3 | Status: CANCELLED | OUTPATIENT
Start: 2023-01-01

## 2023-01-01 RX ORDER — MORPHINE SULFATE 20 MG/ML
5 SOLUTION ORAL
Status: DISCONTINUED | OUTPATIENT
Start: 2023-01-01 | End: 2023-01-01 | Stop reason: HOSPADM

## 2023-01-01 RX ORDER — HYDROCORTISONE 10 MG/1
TABLET ORAL
Qty: 60 TABLET | Refills: 1 | Status: SHIPPED | OUTPATIENT
Start: 2023-01-01 | End: 2023-01-01

## 2023-01-01 RX ORDER — SCOLOPAMINE TRANSDERMAL SYSTEM 1 MG/1
1 PATCH, EXTENDED RELEASE TRANSDERMAL
Qty: 10 PATCH | Refills: 1 | Status: SHIPPED | OUTPATIENT
Start: 2023-01-01 | End: 2023-01-01

## 2023-01-01 RX ORDER — TRAZODONE HYDROCHLORIDE 50 MG/1
50 TABLET, FILM COATED ORAL AT BEDTIME
Qty: 90 TABLET | Refills: 3 | Status: CANCELLED | OUTPATIENT
Start: 2023-01-01

## 2023-01-01 RX ORDER — ACETAMINOPHEN 325 MG/1
650 TABLET ORAL EVERY 6 HOURS PRN
Status: DISCONTINUED | OUTPATIENT
Start: 2023-01-01 | End: 2023-01-01 | Stop reason: HOSPADM

## 2023-01-01 RX ORDER — LANOLIN ALCOHOL/MO/W.PET/CERES
3 CREAM (GRAM) TOPICAL
Status: DISCONTINUED | OUTPATIENT
Start: 2023-01-01 | End: 2023-01-01 | Stop reason: HOSPADM

## 2023-01-01 RX ORDER — MORPHINE SULFATE 10 MG/5ML
5 SOLUTION ORAL
Status: DISCONTINUED | OUTPATIENT
Start: 2023-01-01 | End: 2023-01-01 | Stop reason: HOSPADM

## 2023-01-01 RX ORDER — BISACODYL 10 MG
10 SUPPOSITORY, RECTAL RECTAL DAILY PRN
Status: DISCONTINUED | OUTPATIENT
Start: 2023-01-01 | End: 2023-01-01 | Stop reason: HOSPADM

## 2023-01-01 RX ORDER — PROCHLORPERAZINE MALEATE 10 MG
10 TABLET ORAL EVERY 6 HOURS PRN
Qty: 30 TABLET | Refills: 0 | Status: SHIPPED | OUTPATIENT
Start: 2023-01-01

## 2023-01-01 RX ORDER — MECLIZINE HYDROCHLORIDE 25 MG/1
25 TABLET ORAL EVERY 6 HOURS PRN
Status: DISCONTINUED | OUTPATIENT
Start: 2023-01-01 | End: 2023-01-01

## 2023-01-01 RX ORDER — LORAZEPAM 0.5 MG/1
0.5 TABLET ORAL EVERY 8 HOURS PRN
Status: DISCONTINUED | OUTPATIENT
Start: 2023-01-01 | End: 2023-01-01

## 2023-01-01 RX ORDER — MULTIVITAMIN,THERAPEUTIC
1 TABLET ORAL DAILY
Status: DISCONTINUED | OUTPATIENT
Start: 2023-01-01 | End: 2023-01-01 | Stop reason: HOSPADM

## 2023-01-01 RX ORDER — TRAZODONE HYDROCHLORIDE 50 MG/1
50 TABLET, FILM COATED ORAL AT BEDTIME
Qty: 90 TABLET | Refills: 0 | Status: SHIPPED | OUTPATIENT
Start: 2023-01-01

## 2023-01-01 RX ORDER — DOCUSATE SODIUM 100 MG/1
100 CAPSULE, LIQUID FILLED ORAL DAILY PRN
Qty: 30 CAPSULE | Refills: 1 | Status: SHIPPED | OUTPATIENT
Start: 2023-01-01

## 2023-01-01 RX ORDER — LACTOSE-REDUCED FOOD
1 LIQUID (ML) ORAL 3 TIMES DAILY
Qty: 14220 ML | Refills: 3 | Status: SHIPPED | OUTPATIENT
Start: 2023-01-01

## 2023-01-01 RX ADMIN — BACLOFEN 10 MG: 10 TABLET ORAL at 19:40

## 2023-01-01 RX ADMIN — SENNOSIDES AND DOCUSATE SODIUM 1 TABLET: 50; 8.6 TABLET ORAL at 20:20

## 2023-01-01 RX ADMIN — FLUDROCORTISONE ACETATE 0.15 MG: 0.1 TABLET ORAL at 09:46

## 2023-01-01 RX ADMIN — LORAZEPAM 0.5 MG: 0.5 TABLET ORAL at 13:36

## 2023-01-01 RX ADMIN — Medication 3 MG: at 20:10

## 2023-01-01 RX ADMIN — FLUTICASONE FUROATE AND VILANTEROL TRIFENATATE 1 PUFF: 100; 25 POWDER RESPIRATORY (INHALATION) at 09:32

## 2023-01-01 RX ADMIN — BACLOFEN 10 MG: 10 TABLET ORAL at 20:11

## 2023-01-01 RX ADMIN — FAMOTIDINE 20 MG: 20 TABLET, FILM COATED ORAL at 21:16

## 2023-01-01 RX ADMIN — BACLOFEN 10 MG: 10 TABLET ORAL at 08:52

## 2023-01-01 RX ADMIN — FAMOTIDINE 20 MG: 20 TABLET, FILM COATED ORAL at 09:46

## 2023-01-01 RX ADMIN — FLUDROCORTISONE ACETATE 0.15 MG: 0.1 TABLET ORAL at 08:52

## 2023-01-01 RX ADMIN — FLUTICASONE FUROATE AND VILANTEROL TRIFENATATE 1 PUFF: 100; 25 POWDER RESPIRATORY (INHALATION) at 09:47

## 2023-01-01 RX ADMIN — FAMOTIDINE 20 MG: 20 TABLET, FILM COATED ORAL at 09:33

## 2023-01-01 RX ADMIN — FAMOTIDINE 20 MG: 20 TABLET, FILM COATED ORAL at 20:10

## 2023-01-01 RX ADMIN — ACETAMINOPHEN 650 MG: 325 TABLET ORAL at 16:57

## 2023-01-01 RX ADMIN — SENNOSIDES AND DOCUSATE SODIUM 1 TABLET: 50; 8.6 TABLET ORAL at 08:15

## 2023-01-01 RX ADMIN — FLUTICASONE FUROATE AND VILANTEROL TRIFENATATE 1 PUFF: 100; 25 POWDER RESPIRATORY (INHALATION) at 09:11

## 2023-01-01 RX ADMIN — ENOXAPARIN SODIUM 40 MG: 40 INJECTION SUBCUTANEOUS at 17:13

## 2023-01-01 RX ADMIN — HYDROCORTISONE 10 MG: 5 TABLET ORAL at 08:54

## 2023-01-01 RX ADMIN — HYDROCORTISONE 10 MG: 5 TABLET ORAL at 09:33

## 2023-01-01 RX ADMIN — SCOPALAMINE 1 PATCH: 1 PATCH, EXTENDED RELEASE TRANSDERMAL at 21:46

## 2023-01-01 RX ADMIN — POLYETHYLENE GLYCOL 3350 17 G: 17 POWDER, FOR SOLUTION ORAL at 08:55

## 2023-01-01 RX ADMIN — HYDROCORTISONE 10 MG: 5 TABLET ORAL at 09:46

## 2023-01-01 RX ADMIN — FAMOTIDINE 20 MG: 20 TABLET, FILM COATED ORAL at 20:57

## 2023-01-01 RX ADMIN — BISACODYL 10 MG: 10 SUPPOSITORY RECTAL at 16:05

## 2023-01-01 RX ADMIN — HYDROCORTISONE 5 MG: 5 TABLET ORAL at 19:40

## 2023-01-01 RX ADMIN — POLYETHYLENE GLYCOL 3350 17 G: 17 POWDER, FOR SOLUTION ORAL at 08:16

## 2023-01-01 RX ADMIN — LORAZEPAM 0.5 MG: 0.5 TABLET ORAL at 07:40

## 2023-01-01 RX ADMIN — Medication 3 MG: at 20:43

## 2023-01-01 RX ADMIN — ONDANSETRON 4 MG: 4 TABLET, ORALLY DISINTEGRATING ORAL at 13:35

## 2023-01-01 RX ADMIN — HYDROCORTISONE 10 MG: 5 TABLET ORAL at 10:11

## 2023-01-01 RX ADMIN — FAMOTIDINE 20 MG: 20 TABLET, FILM COATED ORAL at 08:53

## 2023-01-01 RX ADMIN — BACLOFEN 10 MG: 10 TABLET ORAL at 20:20

## 2023-01-01 RX ADMIN — BARIUM SULFATE 60 ML: 400 SUSPENSION ORAL at 10:34

## 2023-01-01 RX ADMIN — BACLOFEN 10 MG: 10 TABLET ORAL at 20:29

## 2023-01-01 RX ADMIN — SENNOSIDES 1 TABLET: 8.6 TABLET, FILM COATED ORAL at 09:33

## 2023-01-01 RX ADMIN — SENNOSIDES AND DOCUSATE SODIUM 1 TABLET: 50; 8.6 TABLET ORAL at 12:17

## 2023-01-01 RX ADMIN — ONDANSETRON 4 MG: 2 INJECTION INTRAMUSCULAR; INTRAVENOUS at 01:35

## 2023-01-01 RX ADMIN — FAMOTIDINE 20 MG: 20 TABLET, FILM COATED ORAL at 20:20

## 2023-01-01 RX ADMIN — ONDANSETRON 4 MG: 4 TABLET, ORALLY DISINTEGRATING ORAL at 17:54

## 2023-01-01 RX ADMIN — SENNOSIDES AND DOCUSATE SODIUM 1 TABLET: 50; 8.6 TABLET ORAL at 09:46

## 2023-01-01 RX ADMIN — ENOXAPARIN SODIUM 40 MG: 40 INJECTION SUBCUTANEOUS at 17:54

## 2023-01-01 RX ADMIN — DEXTROSE AND SODIUM CHLORIDE: 5; 900 INJECTION, SOLUTION INTRAVENOUS at 06:23

## 2023-01-01 RX ADMIN — LEVOTHYROXINE SODIUM 75 MCG: 0.03 TABLET ORAL at 09:33

## 2023-01-01 RX ADMIN — FAMOTIDINE 20 MG: 20 TABLET, FILM COATED ORAL at 19:40

## 2023-01-01 RX ADMIN — SODIUM CHLORIDE 1000 ML: 9 INJECTION, SOLUTION INTRAVENOUS at 15:05

## 2023-01-01 RX ADMIN — LORAZEPAM 0.5 MG: 0.5 TABLET ORAL at 17:55

## 2023-01-01 RX ADMIN — LEVOTHYROXINE SODIUM 75 MCG: 0.03 TABLET ORAL at 09:46

## 2023-01-01 RX ADMIN — MECLIZINE HYDROCHLORIDE 25 MG: 25 TABLET ORAL at 18:21

## 2023-01-01 RX ADMIN — ONDANSETRON 4 MG: 2 INJECTION INTRAMUSCULAR; INTRAVENOUS at 06:23

## 2023-01-01 RX ADMIN — ENOXAPARIN SODIUM 40 MG: 40 INJECTION SUBCUTANEOUS at 16:56

## 2023-01-01 RX ADMIN — FLUDROCORTISONE ACETATE 0.15 MG: 0.1 TABLET ORAL at 08:14

## 2023-01-01 RX ADMIN — HYDROCORTISONE 5 MG: 5 TABLET ORAL at 20:22

## 2023-01-01 RX ADMIN — HYDROCORTISONE 5 MG: 5 TABLET ORAL at 20:10

## 2023-01-01 RX ADMIN — ONDANSETRON 4 MG: 2 INJECTION INTRAMUSCULAR; INTRAVENOUS at 11:47

## 2023-01-01 RX ADMIN — ENOXAPARIN SODIUM 40 MG: 40 INJECTION SUBCUTANEOUS at 17:21

## 2023-01-01 RX ADMIN — ENOXAPARIN SODIUM 40 MG: 40 INJECTION SUBCUTANEOUS at 16:26

## 2023-01-01 RX ADMIN — LORAZEPAM 0.5 MG: 0.5 TABLET ORAL at 01:53

## 2023-01-01 RX ADMIN — HYDROCORTISONE 5 MG: 5 TABLET ORAL at 14:00

## 2023-01-01 RX ADMIN — OLANZAPINE 5 MG: 5 TABLET, FILM COATED ORAL at 09:33

## 2023-01-01 RX ADMIN — BACLOFEN 10 MG: 10 TABLET ORAL at 09:46

## 2023-01-01 RX ADMIN — BACLOFEN 10 MG: 10 TABLET ORAL at 09:33

## 2023-01-01 RX ADMIN — POLYETHYLENE GLYCOL 3350 17 G: 17 POWDER, FOR SOLUTION ORAL at 09:46

## 2023-01-01 RX ADMIN — DEXTROSE AND SODIUM CHLORIDE: 5; 900 INJECTION, SOLUTION INTRAVENOUS at 17:13

## 2023-01-01 RX ADMIN — FAMOTIDINE 20 MG: 20 TABLET, FILM COATED ORAL at 08:15

## 2023-01-01 RX ADMIN — DEXTROSE AND SODIUM CHLORIDE: 5; 900 INJECTION, SOLUTION INTRAVENOUS at 16:21

## 2023-01-01 RX ADMIN — FLUTICASONE FUROATE AND VILANTEROL TRIFENATATE 1 PUFF: 100; 25 POWDER RESPIRATORY (INHALATION) at 10:11

## 2023-01-01 RX ADMIN — FAMOTIDINE 20 MG: 20 TABLET, FILM COATED ORAL at 12:15

## 2023-01-01 RX ADMIN — HYDROCORTISONE 5 MG: 5 TABLET ORAL at 16:07

## 2023-01-01 RX ADMIN — PANTOPRAZOLE SODIUM 40 MG: 40 TABLET, DELAYED RELEASE ORAL at 08:53

## 2023-01-01 RX ADMIN — POLYETHYLENE GLYCOL 3350 17 G: 17 POWDER, FOR SOLUTION ORAL at 09:32

## 2023-01-01 RX ADMIN — OLANZAPINE 5 MG: 5 TABLET, FILM COATED ORAL at 08:52

## 2023-01-01 RX ADMIN — LEVOTHYROXINE SODIUM 75 MCG: 0.03 TABLET ORAL at 08:15

## 2023-01-01 RX ADMIN — PANTOPRAZOLE SODIUM 40 MG: 40 TABLET, DELAYED RELEASE ORAL at 16:05

## 2023-01-01 RX ADMIN — LEVOTHYROXINE SODIUM 75 MCG: 0.03 TABLET ORAL at 08:52

## 2023-01-01 RX ADMIN — HYDROCORTISONE 5 MG: 5 TABLET ORAL at 16:26

## 2023-01-01 RX ADMIN — FAMOTIDINE 20 MG: 20 TABLET, FILM COATED ORAL at 20:29

## 2023-01-01 RX ADMIN — PANTOPRAZOLE SODIUM 40 MG: 40 TABLET, DELAYED RELEASE ORAL at 06:54

## 2023-01-01 RX ADMIN — HYDROCORTISONE 10 MG: 5 TABLET ORAL at 08:52

## 2023-01-01 RX ADMIN — OLANZAPINE 5 MG: 5 TABLET, FILM COATED ORAL at 10:11

## 2023-01-01 RX ADMIN — OLANZAPINE 5 MG: 5 TABLET, FILM COATED ORAL at 21:16

## 2023-01-01 RX ADMIN — FLUDROCORTISONE ACETATE 0.15 MG: 0.1 TABLET ORAL at 10:10

## 2023-01-01 RX ADMIN — POLYETHYLENE GLYCOL 3350 17 G: 17 POWDER, FOR SOLUTION ORAL at 12:15

## 2023-01-01 RX ADMIN — TRAZODONE HYDROCHLORIDE 50 MG: 50 TABLET ORAL at 20:20

## 2023-01-01 RX ADMIN — POLYETHYLENE GLYCOL 3350 17 G: 17 POWDER, FOR SOLUTION ORAL at 10:27

## 2023-01-01 RX ADMIN — OLANZAPINE 5 MG: 5 TABLET, FILM COATED ORAL at 20:29

## 2023-01-01 RX ADMIN — BACLOFEN 10 MG: 10 TABLET ORAL at 20:56

## 2023-01-01 RX ADMIN — ENOXAPARIN SODIUM 40 MG: 40 INJECTION SUBCUTANEOUS at 16:58

## 2023-01-01 RX ADMIN — LEVOTHYROXINE SODIUM 75 MCG: 0.03 TABLET ORAL at 08:53

## 2023-01-01 RX ADMIN — HYDROCORTISONE 5 MG: 5 TABLET ORAL at 14:40

## 2023-01-01 RX ADMIN — FLUTICASONE FUROATE AND VILANTEROL TRIFENATATE 1 PUFF: 100; 25 POWDER RESPIRATORY (INHALATION) at 08:53

## 2023-01-01 RX ADMIN — BACLOFEN 10 MG: 10 TABLET ORAL at 21:16

## 2023-01-01 RX ADMIN — OLANZAPINE 5 MG: 5 TABLET, FILM COATED ORAL at 08:54

## 2023-01-01 RX ADMIN — GADOBUTROL 7 ML: 604.72 INJECTION INTRAVENOUS at 16:40

## 2023-01-01 RX ADMIN — BARIUM SULFATE 20 ML: 400 SUSPENSION ORAL at 10:33

## 2023-01-01 RX ADMIN — ACETAMINOPHEN 650 MG: 325 TABLET ORAL at 06:54

## 2023-01-01 RX ADMIN — LORAZEPAM 0.5 MG: 0.5 TABLET ORAL at 19:40

## 2023-01-01 RX ADMIN — MECLIZINE HYDROCHLORIDE 25 MG: 25 TABLET ORAL at 11:47

## 2023-01-01 RX ADMIN — MECLIZINE HYDROCHLORIDE 25 MG: 25 TABLET ORAL at 12:15

## 2023-01-01 RX ADMIN — SCOPALAMINE 1 PATCH: 1 PATCH, EXTENDED RELEASE TRANSDERMAL at 16:06

## 2023-01-01 RX ADMIN — SENNOSIDES AND DOCUSATE SODIUM 1 TABLET: 50; 8.6 TABLET ORAL at 20:12

## 2023-01-01 RX ADMIN — ENOXAPARIN SODIUM 40 MG: 40 INJECTION SUBCUTANEOUS at 16:07

## 2023-01-01 RX ADMIN — BACLOFEN 10 MG: 10 TABLET ORAL at 08:15

## 2023-01-01 RX ADMIN — HYDROCORTISONE 10 MG: 5 TABLET ORAL at 08:14

## 2023-01-01 RX ADMIN — FAMOTIDINE 20 MG: 20 TABLET, FILM COATED ORAL at 08:52

## 2023-01-01 RX ADMIN — ONDANSETRON 4 MG: 4 TABLET, ORALLY DISINTEGRATING ORAL at 08:15

## 2023-01-01 RX ADMIN — HYDROCORTISONE 5 MG: 5 TABLET ORAL at 17:21

## 2023-01-01 RX ADMIN — HYDROCORTISONE 5 MG: 5 TABLET ORAL at 20:56

## 2023-01-01 RX ADMIN — FLUDROCORTISONE ACETATE 0.15 MG: 0.1 TABLET ORAL at 08:53

## 2023-01-01 RX ADMIN — PANTOPRAZOLE SODIUM 40 MG: 40 TABLET, DELAYED RELEASE ORAL at 08:00

## 2023-01-01 RX ADMIN — BACLOFEN 10 MG: 10 TABLET ORAL at 10:10

## 2023-01-01 RX ADMIN — ENOXAPARIN SODIUM 40 MG: 40 INJECTION SUBCUTANEOUS at 16:13

## 2023-01-01 RX ADMIN — SCOPALAMINE 1 PATCH: 1 PATCH, EXTENDED RELEASE TRANSDERMAL at 21:31

## 2023-01-01 RX ADMIN — BARIUM SULFATE: 400 SUSPENSION ORAL at 10:13

## 2023-01-01 RX ADMIN — LEVOTHYROXINE SODIUM 75 MCG: 0.03 TABLET ORAL at 10:10

## 2023-01-01 RX ADMIN — OLANZAPINE 5 MG: 5 TABLET, FILM COATED ORAL at 08:14

## 2023-01-01 RX ADMIN — OLANZAPINE 5 MG: 5 TABLET, FILM COATED ORAL at 09:47

## 2023-01-01 RX ADMIN — FLUDROCORTISONE ACETATE 0.15 MG: 0.1 TABLET ORAL at 09:33

## 2023-01-01 RX ADMIN — SENNOSIDES 1 TABLET: 8.6 TABLET, FILM COATED ORAL at 08:52

## 2023-01-01 RX ADMIN — Medication 1 MG: at 04:11

## 2023-01-01 RX ADMIN — HYDROCORTISONE 5 MG: 5 TABLET ORAL at 14:05

## 2023-01-01 RX ADMIN — LEVOTHYROXINE SODIUM 75 MCG: 0.03 TABLET ORAL at 16:28

## 2023-01-01 RX ADMIN — BACLOFEN 10 MG: 10 TABLET ORAL at 08:53

## 2023-01-01 ASSESSMENT — ACTIVITIES OF DAILY LIVING (ADL)
ADLS_ACUITY_SCORE: 37
ADLS_ACUITY_SCORE: 45
ADLS_ACUITY_SCORE: 42
ADLS_ACUITY_SCORE: 72
ADLS_ACUITY_SCORE: 51
ADLS_ACUITY_SCORE: 50
ADLS_ACUITY_SCORE: 72
ADLS_ACUITY_SCORE: 47
ADLS_ACUITY_SCORE: 37
ADLS_ACUITY_SCORE: 45
TOILETING: 2-->COMPLETELY DEPENDENT (NOT DEVELOPMENTALLY APPROPRIATE)
ADLS_ACUITY_SCORE: 35
ADLS_ACUITY_SCORE: 47
ADLS_ACUITY_SCORE: 78
DEPENDENT_IADLS:: CLEANING;COOKING;LAUNDRY;SHOPPING;MEAL PREPARATION;MEDICATION MANAGEMENT;MONEY MANAGEMENT;TRANSPORTATION
ADLS_ACUITY_SCORE: 37
ADLS_ACUITY_SCORE: 47
SWALLOWING: 2-->DIFFICULTY SWALLOWING LIQUIDS/FOODS
ADLS_ACUITY_SCORE: 74
ADLS_ACUITY_SCORE: 37
TOILETING: 2-->COMPLETELY DEPENDENT
ADLS_ACUITY_SCORE: 37
ADLS_ACUITY_SCORE: 46
ADLS_ACUITY_SCORE: 49
ADLS_ACUITY_SCORE: 45
ADLS_ACUITY_SCORE: 46
BATHING: 2-->COMPLETELY DEPENDENT (NOT DEVELOPMENTALLY APPROPRIATE)
ADLS_ACUITY_SCORE: 47
ADLS_ACUITY_SCORE: 66
ADLS_ACUITY_SCORE: 51
ADLS_ACUITY_SCORE: 47
EATING/SWALLOWING: SWALLOWING LIQUIDS;SWALLOWING SOLID FOOD;EATING
ADLS_ACUITY_SCORE: 72
ADLS_ACUITY_SCORE: 45
ADLS_ACUITY_SCORE: 45
ADLS_ACUITY_SCORE: 47
ADLS_ACUITY_SCORE: 55
ADLS_ACUITY_SCORE: 78
EATING: 2-->COMPLETELY DEPENDENT (NOT DEVELOPMENTALLY APPROPRIATE)
ADLS_ACUITY_SCORE: 45
ADLS_ACUITY_SCORE: 51
DRESS: 2-->COMPLETELY DEPENDENT
ADLS_ACUITY_SCORE: 45
ADLS_ACUITY_SCORE: 47
ADLS_ACUITY_SCORE: 37
ADLS_ACUITY_SCORE: 45
ADLS_ACUITY_SCORE: 35
ADLS_ACUITY_SCORE: 47
ADLS_ACUITY_SCORE: 49
ADLS_ACUITY_SCORE: 45
ADLS_ACUITY_SCORE: 70
ADLS_ACUITY_SCORE: 39
TRANSFERRING: 2-->COMPLETELY DEPENDENT (NOT DEVELOPMENTALLY APPROPRIATE)
ADLS_ACUITY_SCORE: 55
FALL_HISTORY_WITHIN_LAST_SIX_MONTHS: NO
DRESS: 2-->COMPLETELY DEPENDENT (NOT DEVELOPMENTALLY APPROPRIATE)
ADLS_ACUITY_SCORE: 70
ADLS_ACUITY_SCORE: 51
ADLS_ACUITY_SCORE: 47
WEAR_GLASSES_OR_BLIND: NO
ADLS_ACUITY_SCORE: 49
ADLS_ACUITY_SCORE: 49
TOILETING_ISSUES: YES
ADLS_ACUITY_SCORE: 37
WALKING_OR_CLIMBING_STAIRS_DIFFICULTY: YES
ADLS_ACUITY_SCORE: 45
ADLS_ACUITY_SCORE: 49
ADLS_ACUITY_SCORE: 47
ADLS_ACUITY_SCORE: 51
ADLS_ACUITY_SCORE: 51
CHANGE_IN_FUNCTIONAL_STATUS_SINCE_ONSET_OF_CURRENT_ILLNESS/INJURY: NO
ADLS_ACUITY_SCORE: 37
ADLS_ACUITY_SCORE: 74
ADLS_ACUITY_SCORE: 45
ADLS_ACUITY_SCORE: 42
ADLS_ACUITY_SCORE: 55
ADLS_ACUITY_SCORE: 50
CONCENTRATING,_REMEMBERING_OR_MAKING_DECISIONS_DIFFICULTY: NO
ADLS_ACUITY_SCORE: 78
ADLS_ACUITY_SCORE: 55
ADLS_ACUITY_SCORE: 47
ADLS_ACUITY_SCORE: 66
ADLS_ACUITY_SCORE: 78
ADLS_ACUITY_SCORE: 45
ADLS_ACUITY_SCORE: 47
ADLS_ACUITY_SCORE: 45
ADLS_ACUITY_SCORE: 42
ADLS_ACUITY_SCORE: 47
ADLS_ACUITY_SCORE: 51
ADLS_ACUITY_SCORE: 45
ADLS_ACUITY_SCORE: 51
ADLS_ACUITY_SCORE: 53
ADLS_ACUITY_SCORE: 78
ADLS_ACUITY_SCORE: 35
ADLS_ACUITY_SCORE: 55
ADLS_ACUITY_SCORE: 35
ADLS_ACUITY_SCORE: 50
ADLS_ACUITY_SCORE: 35
WALKING_OR_CLIMBING_STAIRS: TRANSFERRING DIFFICULTY, DEPENDENT
ADLS_ACUITY_SCORE: 45
TOILETING_ASSISTANCE: TOILETING DIFFICULTY, DEPENDENT
ADLS_ACUITY_SCORE: 47
DRESSING/BATHING: BATHING DIFFICULTY, DEPENDENT;DRESSING DIFFICULTY, DEPENDENT
EATING: 2-->COMPLETELY DEPENDENT
ADLS_ACUITY_SCORE: 39
ADLS_ACUITY_SCORE: 45
DIFFICULTY_EATING/SWALLOWING: YES
DEPENDENT_IADLS:: CLEANING;COOKING;LAUNDRY;SHOPPING;MEAL PREPARATION;MEDICATION MANAGEMENT;TRANSPORTATION
ADLS_ACUITY_SCORE: 51
ADLS_ACUITY_SCORE: 35
ADLS_ACUITY_SCORE: 55
ADLS_ACUITY_SCORE: 78
ADLS_ACUITY_SCORE: 51
ADLS_ACUITY_SCORE: 51
DEPENDENT_IADLS:: CLEANING;COOKING;LAUNDRY;SHOPPING;MEAL PREPARATION;MEDICATION MANAGEMENT;MONEY MANAGEMENT;TRANSPORTATION
ADLS_ACUITY_SCORE: 66
ADLS_ACUITY_SCORE: 78
ADLS_ACUITY_SCORE: 45
ADLS_ACUITY_SCORE: 51
SWALLOWING: 2-->DIFFICULTY SWALLOWING LIQUIDS/FOODS
DOING_ERRANDS_INDEPENDENTLY_DIFFICULTY: YES
ADLS_ACUITY_SCORE: 35
ADLS_ACUITY_SCORE: 51
ADLS_ACUITY_SCORE: 47
TRANSFERRING: 2-->COMPLETELY DEPENDENT
ADLS_ACUITY_SCORE: 70
ADLS_ACUITY_SCORE: 53
ADLS_ACUITY_SCORE: 51
ADLS_ACUITY_SCORE: 53
DRESSING/BATHING_DIFFICULTY: YES
ADLS_ACUITY_SCORE: 72
ADLS_ACUITY_SCORE: 49
ADLS_ACUITY_SCORE: 45
ADLS_ACUITY_SCORE: 46
ADLS_ACUITY_SCORE: 45
ADLS_ACUITY_SCORE: 45
ADLS_ACUITY_SCORE: 49
ADLS_ACUITY_SCORE: 50
ADLS_ACUITY_SCORE: 37

## 2023-01-01 ASSESSMENT — ASTHMA QUESTIONNAIRES
QUESTION_5 LAST FOUR WEEKS HOW WOULD YOU RATE YOUR ASTHMA CONTROL: POORLY CONTROLLED
QUESTION_2 LAST FOUR WEEKS HOW OFTEN HAVE YOU HAD SHORTNESS OF BREATH: ONCE OR TWICE A WEEK
QUESTION_1 LAST FOUR WEEKS HOW MUCH OF THE TIME DID YOUR ASTHMA KEEP YOU FROM GETTING AS MUCH DONE AT WORK, SCHOOL OR AT HOME: SOME OF THE TIME
QUESTION_4 LAST FOUR WEEKS HOW OFTEN HAVE YOU USED YOUR RESCUE INHALER OR NEBULIZER MEDICATION (SUCH AS ALBUTEROL): ONE OR TWO TIMES PER DAY
ACT_TOTALSCORE: 11
ACT_TOTALSCORE: 11
QUESTION_2 LAST FOUR WEEKS HOW OFTEN HAVE YOU HAD SHORTNESS OF BREATH: ONCE OR TWICE A WEEK
QUESTION_4 LAST FOUR WEEKS HOW OFTEN HAVE YOU USED YOUR RESCUE INHALER OR NEBULIZER MEDICATION (SUCH AS ALBUTEROL): ONE OR TWO TIMES PER DAY
ACT_TOTALSCORE: 14
QUESTION_3 LAST FOUR WEEKS HOW OFTEN DID YOUR ASTHMA SYMPTOMS (WHEEZING, COUGHING, SHORTNESS OF BREATH, CHEST TIGHTNESS OR PAIN) WAKE YOU UP AT NIGHT OR EARLIER THAN USUAL IN THE MORNING: TWO OR THREE NIGHTS A WEEK
QUESTION_3 LAST FOUR WEEKS HOW OFTEN DID YOUR ASTHMA SYMPTOMS (WHEEZING, COUGHING, SHORTNESS OF BREATH, CHEST TIGHTNESS OR PAIN) WAKE YOU UP AT NIGHT OR EARLIER THAN USUAL IN THE MORNING: TWO OR THREE NIGHTS A WEEK
QUESTION_5 LAST FOUR WEEKS HOW WOULD YOU RATE YOUR ASTHMA CONTROL: SOMEWHAT CONTROLLED
QUESTION_1 LAST FOUR WEEKS HOW MUCH OF THE TIME DID YOUR ASTHMA KEEP YOU FROM GETTING AS MUCH DONE AT WORK, SCHOOL OR AT HOME: ALL OF THE TIME

## 2023-01-01 ASSESSMENT — PATIENT HEALTH QUESTIONNAIRE - PHQ9
SUM OF ALL RESPONSES TO PHQ QUESTIONS 1-9: 17
10. IF YOU CHECKED OFF ANY PROBLEMS, HOW DIFFICULT HAVE THESE PROBLEMS MADE IT FOR YOU TO DO YOUR WORK, TAKE CARE OF THINGS AT HOME, OR GET ALONG WITH OTHER PEOPLE: EXTREMELY DIFFICULT
SUM OF ALL RESPONSES TO PHQ QUESTIONS 1-9: 17

## 2023-01-03 NOTE — PROGRESS NOTES
Community Paramedic Program  Community Health Worker Outreach    Called patient to reschedule appointment scheduled for today, January 3rd at 2 pm    Visit rescheduled for: Thursday, January 5th / 2 pm/ CP Piedad (date/time/CP)    Additional information:   Spoke with the in person Angela , Rustam ([phone]), who was scheduled to do a co-visit with the CP today at pt's home in [place]. Antontamela works for [employer] and was scheduled to visit, per our request.     Confirmed with Rustam that due to weather and driving conditions today, we are canceling the visit and would like to reschedule for this week Thursday, January 5th at 2 pm. Rustam offered to call pt, share update and confirm pt's availability for CP visit on Thursday instead. He called me back to confirm he spoke with pt and pt is available on Thursday at 2 pm.     Notified the CP and reached out to [facility] interpreting services to request assistance with rescheduling in person Angela  for visit on Thursday. CP will check to see if pt received his [place] bubble packs at visit and, if not, set up pt's medications for the next two weeks.

## 2023-01-05 NOTE — PROGRESS NOTES
Community Paramedics Follow-up Visit  January 5, 2023  TIME: 1400    Brooke Peterson is a 32 year old male being seen at home for a follow-up visit.    Present at appointment:  Patient, father,  [facility] Par[facility]ic         Chief Complaint   Patient presents with     Recheck Medication       Universal Utilization:      Utilization    Hospital Admissions  1             ED Visits  3             No Show Count (past year)  36                Current as of: 1/4/2023 10:14 PM              Pulse 105   Temp 99.4  F (37.4  C)   SpO2 95%     Clinical Concerns:  Current Medical Concerns:  N&V, weakness    Current Behavioral Concerns: no    Education Provided to patient: no   Medication set up? Yes  Pill Box issued: No  Scale issued: No  Flu Shot given: No  COVID Vaccine Given: No  Lab draw or specimen collection: No  Food box issued: No  Collaborative visit with PCP: No  Wound Care: No    Health Maintenance Reviewed:      Clinical Pathway: None    No  Face to Face in Home / Community      Review of Symptoms/PE    Skin: negative  Eyes: negative  Ears/Nose/Throat: negative  Respiratory: No shortness of breath, dyspnea on exertion, cough, or hemoptysis  Cardiovascular: tachycardia  Gastrointestinal: poor appetite, nausea and vomiting  Genitourinary: incontinence  Musculoskeletal: muscular weakness  Neurologic: speech problems  Psychiatric: negative    Pain Management::                 Plan:     Time spent with patient: 60    The patient meets one or more of the following criteria:  * Requires services to prevent readmission to a nursing home or hospital      Next CP visit scheduled: 1/17/23    Issues for Provider to follow up on:  I saw Toe with a Angela  and his father.  This is my first time visiting him as I am covering for his regular Community Paramedic.  The father reports that Brooke is unable to keep down food.  He gags and will throw it up, however he said that he has been able to keep down his  medications.  Per his father, Toe is bed bound (which consists of a couch on the floor) and is dependent on them for all cares, including feeding him.    I set up his medications for 2 weeks, from the current medication list that [facility] has, who I called to verify due to the medication bottles on hand and a conflicting medication list in Epic.  There was also a newly hand written list of the medications he currently is taking.  He now has Loratadine, Hydrocortisone and Vitamin D set up in the packs.  I removed them and placed them in the pill box with his other medications from the bottles.  His father is giving him the set up medications from the pill box twice a day.  He has bottles of Zofran and Hydroxyzine that are PRN for up to 3 times a day which are not on the [facility] medication list and are PRN, so I did not set them up in the box.  His father reports never taking any of the medications from the bottles.  There is TAB-A-Kathleen on the list and looking at a receipt from [facility], was delivered but I was unable to find it and set them up. His father did not know where it was and said he keeps all of the medications together.  I was unable to set up 2 days of the Vit B due to running out.      Provider follow up visit needed: 1/24/22

## 2023-01-06 NOTE — PROGRESS NOTES
"1/6/2023  Clinic Care Coordination Contact  Care Team Conversations    Chart Reviewed  Per FRW patient approved from [employer] benefit.  Goal completed.  Form was given to PCP on 12-8-22 to complete and fax back to [facility].  Form not scan in Media  CHW sent telephone message to PCP Care team regarding the Certificate of Need form for [employer] for wheelchair.    Added goal to connect with [facility] for services case management or [facility] worker.    Consulted with  SHIRA regarding follow up  Received message from  SHIRA  Plan: Per CC SHIRA deferred out to next month.  \"Rosangela Alarcon, LSW  Scarlett Varma  Defer out a month. Mulga does not have any updates for me at this time. \"    Routed to  SHIRA, Valerie Galeana, CHW CP, and Renae Ferrell as JENAROI     CHW Follow up: Monthly  CHW Plan: Follow up on goals  CHW Next Follow Up: 2-7-23    Scarlett Varma  Community Health Worker  [facility]  Clinic Care Coordination  [email or web address]   Office: [phone]  Fax: [phone]            "

## 2023-01-06 NOTE — TELEPHONE ENCOUNTER
1/6/2023  Inquiring status of the [facility] Certificate of Need form for wheeelchair  It is not scan in the Media    Form in PCP's box on 12-8-22

## 2023-01-20 NOTE — PROGRESS NOTES
Community Paramedics Follow-up Visit  January 17, 2023  TIME: 1200    Brooke Peterson is a 32 year old male being seen at home for a follow-up visit.    Present at appointment: patient, Community Paramedic, mother and father/PCA, in person Angela           Chief Complaint   Patient presents with     Outreach     Recheck Medication       Universal Utilization:      Utilization    Hospital Admissions  1             ED Visits  3             No Show Count (past year)  38                Current as of: 1/20/2023 10:56 AM              /78   Pulse 74   Temp 97.7  F (36.5  C)   Resp 12   SpO2 96%     Clinical Concerns:  Current Medical Concerns:  Failure to thrive, generalized weakness, vomiting, unable to eat, drinking very little    Current Behavioral Concerns: patients speech is non understandable    Education Provided to patient: tried to talk family into taking patient back to ER   Medication set up? Yes  Pill Box issued: No  Scale issued: No  Flu Shot given: No  COVID Vaccine Given: No  Lab draw or specimen collection: No  Food box issued: No  Collaborative visit with PCP: No  Wound Care: No    Health Maintenance Reviewed:      Clinical Pathway: None    No  Face to Face in Home / Community    Recent blood sugars: 112    Review of Symptoms/PE    Skin: negative  Eyes: negative  Ears/Nose/Throat: object in throat, makes him vomit when eating  Respiratory: No shortness of breath, dyspnea on exertion, cough, or hemoptysis  Cardiovascular: negative  Gastrointestinal: poor appetite and vomiting  Genitourinary: incontinence  Musculoskeletal: negative and muscular weakness  Neurologic: local weakness and speech problems  Psychiatric: negative    Time spent with patient: 60    The patient meets one or more of the following criteria:  * Requires services to prevent readmission to a nursing home or hospital    Acute concern/Follow-up recommendations: Tried to talk family into taking patient back to ER. Patient is  "drinking very minimal fluids (1/2 cup of vitamin water a day) He is eating banana's. Half a banana per day. This has been occurring since patient was last in the ED. Very high concern for dehydration. Patient has increased weakness, unable to hold arms up, right arm he is unable to raise on his own. Patient can not feed himself    Next CP visit scheduled: 01/24/23    Issues for Provider to follow up on: Community Paramedic visited with patient at his home. In person  assisted with communication.  no longer able to understand patient. Patient's words are extremely slurred. Patient is no longer able to walk. Family has a hard time moving him from couch where he sleeps and lays all day to wheelchair. He has been in the same cloths for the last 3 CP visits (6 weeks) he wears a Depends. During this visit it appeared he had a clean Depend on. Per , father is engaged with patient. Several times father appeared to be laughing at inappropriate times. Talked to family about concerns for patients well being and not eating or drinking enough to maintain. Family denies taking him to the ER and states they were told by ED to follow up with PCP on 1/24/23. They also explained it is too hard to get patient out of him to take him anywhere. Explained that patient can be taken in if symptoms do not improve and are worsening and 911 can assist in getting patient out and transported and parents denied, they stated they do not feel it is an \"emergency\"   Set up patients medications in pill box, only had enough for one week. Was short B12, Vit D3 and trazadone. Called [facility] and they indicated they stopped the order that was suppose to go out last week due to father denying when they called. Had  explain to father that [facility] will call each month for delivery permission and father needs to agree to this. CP will return in one week to set up medications for two weeks as [facility] will deliver " medications on Monday 1/24.     Provider follow up visit needed: 01/24/23 with Dr Rangel

## 2023-01-24 NOTE — LETTER
2023       RE: Brooke Peterson  [address] [place] [address]     Dear Colleague,    Thank you for referring your patient, Brooke Peterson, to the [facility] PHYSICAL MEDICINE AND REHABILITATION CLINIC [place] at [facility]. Please see a copy of my visit note below.           PM&R Clinic Note     Patient Name: Brooke Peterson : 1990 Medical Record: [id number]     Requesting Physician/clinician: Wil Galicia MD           History of Present Illness:     Brooke Peterson is a 32 year old male with history of Ariel's disease, hypothyroidism, schizophrenia, PTSD, and a diagnosis of X linked adrenoleukodystrophy who presents as a referral from Neurology to discuss rehab needs.     He has followed with Jessica Coe and [facility] in the Neurology Department.   His primary care physician Dr. Rangel.      Brooke comes in a wheelchair with his father today.  Phone  was used.  During the conversation to answer questions head nod and shake with his father providing collateral information as needed.    Symptoms,  - Father reports all symptoms started after moving to [place] but he cannot remember exactly how long that has been, possibly 10 years.   - He continues to have great difficulty with any mobility.  He has not been walking really at all for about a year and is currently wheel chair bound. Wheelchair is working well for them.   - He feels the weakness in the right arm and the leg continue to progress to the point where he was unable to really use either at all at this time.  - His father reports his transfers are very difficult and they require 2 people to get him in and out of the wheelchair.  - His dad reports tightness in the arm and the leg.  He will have intermittent spasms.  He has been taking baclofen twice a day does not feel it has made a difference. He does feel more fatigued during the day.   - Hasn't been able to feed himself or provide other daily  ADLs for quite some time.   - He continues to have difficulty with dysarthria for nearly 3 years.   - He reports and appears to have good receptive language.   - Father reports he coughs and occasionally spits up after swallowing during every meal. He typically will only eat 3 or so bites of rice before he requests to stop. They're hoping to have this evaluated.   - He has chronic constipation with his last bowel movement being about 7 days ago. He typically has very hard stools. He denies any abdominal pain and distension today but does have a poor appetite.     Therapies/HEP,  - He has not done any physical therapy, occupational therapy or speech therapy.   - They are both interested in home therapies.     Functionally,   - Dependent on all mobility, ADLs and IADLs.  - They are hoping to try and get a hospital bed for their home.   - His father is his PCA and they're qualified for 7.5 hours a day. Father today is inquiring whether more can be approved.   - Care coordination and community paramedics involved with his care for med management.          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         Social History:     Social History     Tobacco Use     Smoking status: Never     Smokeless tobacco: Never   Substance Use Topics     Alcohol use: Not Currently       Marital Status: No  Living situation: Lives in a house with many GAVIN and cannot easily navigate with this wheelchair. Requires his father and drivers to get up and down. There are two floors but he stays in the living room.   Family support: His father is his PCA for 7.5 years. His mother and 6 siblings, 3 live in the  home.            Functional history:     Brooke Peterson is dependent with all aspects of life.    ADLs: Dependent   Assistive devices: Wheelchair  iADLs (medication management and finances): Dependent   Hand dominance: Right handed  Driving: No           Family History:     Family History   Problem Relation Age of Onset     Diabetes Mother      Psychotic Disorder Mother      Depression Mother      Cancer No family hx of             Medications:     Current Outpatient Medications   Medication Sig Dispense Refill     acetaminophen (TYLENOL) 325 MG tablet Take 2 tablets (650 mg) by mouth every 6 hours as needed for mild pain 90 tablet 3     albuterol (PROAIR HFA/PROVENTIL HFA/VENTOLIN HFA) 108 (90 Base) MCG/ACT inhaler Inhale 2 puffs into the lungs every 6 hours as needed for shortness of breath / dyspnea or wheezing 18 g 3     baclofen (LIORESAL) 10 MG tablet Take 1 tablet (10 mg) by mouth 2 times daily 60 tablet 11     famotidine (PEPCID) 20 MG tablet Take 20 mg by mouth 2 times daily       fludrocortisone (FLORINEF) 0.1 MG tablet Take 1.5 tablets (0.15 mg) by mouth daily 135 tablet 4     fluticasone-salmeterol (ADVAIR HFA) 115-21 MCG/ACT inhaler Inhale 2 puffs into the lungs 2 times daily 36 g 3     hydrocortisone (CORTEF) 10 MG tablet TAKE 1 TABLET BY MOUTH IN THE MORNING AND 1/2 TABLET IN THE EVENING. MAY TAKE TAKE 2 PILLS TWICE A DAY FOR 3 DAYS IF ILLNESS. 160 tablet 4     levothyroxine (SYNTHROID/LEVOTHROID) 75 MCG tablet Take 1 tablet (75 mcg) by mouth daily 90 tablet 4     loratadine (CLARITIN) 10 MG tablet Take 1 tablet (10 mg) by mouth daily 90 tablet 2     meclizine (ANTIVERT) 12.5 MG tablet TAKE 1 TABLET BY MOUTH THREE TIMES A DAY AS NEEDED FOR DIZZINESS 90 tablet 11     Multiple Vitamin (TAB-A-MAGALY) TABS TAKE 1 TABLET BY MOUTH DAILY 28 tablet 11     OLANZapine (ZYPREXA) 5 MG tablet Take 1 tablet (5 mg) by mouth every morning 90 tablet 0     ondansetron (ZOFRAN) 4 MG tablet Take 1 tablet (4 mg) by mouth every 8  hours as needed for nausea 10 tablet 0     traZODone (DESYREL) 50 MG tablet Take 1 tablet (50 mg) by mouth At Bedtime 90 tablet 0     vitamin B-12 (CYANOCOBALAMIN) 500 MCG tablet Take 1 tablet (500 mcg) by mouth daily 90 tablet 3     vitamin D3 (CHOLECALCIFEROL) 50 mcg (2000 units) tablet TAKE 1 TABLET (50 MCG) BY MOUTH DAILY 90 tablet 3     hydrOXYzine (ATARAX) 25 MG tablet Take 2 tablets (50 mg) by mouth nightly as needed for itching (Patient not taking: Reported on 1/5/2023) 30 tablet 3     hydrOXYzine (ATARAX) 25 MG tablet Take 1 tablet (25 mg) by mouth nightly as needed for anxiety (Patient not taking: Reported on 1/5/2023) 10 tablet 0            Allergies:     No Known Allergies           ROS:     A focused ROS is negative other than the symptoms noted above in the HPI.         Physical Examiniation:     VITAL SIGNS: /80   Pulse 66   SpO2 99%   BMI: Estimated body mass index is 34.37 kg/m  as calculated from the following:    Height as of 7/27/22: 1.524 m (5').    Weight as of 12/15/22: 79.8 kg (176 lb).    Gen: NAD, pleasant and cooperative, very soft spoken with dysarthria limiting ability of interpretor to understand him  HEENT: normocepalic, without obvious abnormality  Cardio: regular pulse  Pulm: non-labored breathing in room air  Abd: not tender and not distended   Ext: WWP, no edema in BLE, no tenderness in calves  Neuro/MSK:    Cognition: Awake, alert, answering questions with yes/no head nods appropriately but otherwise unable to fully assess due to langauge barrier and significant dysarthria. Follows commands with ease.      CN: intact  1. 2nd CN: Pupils equal, round, reactive to light and accomodation. and visual fields intact to confrontation.   2. 3rd,4th,6th CN:  EOMI, appropriate pupillary responses  3. 5th CN: facial sensation intact   4. 7th CN: face symmetrical   5. 8th CN: functional hearing bilaterally  6. 9th, 10th CN: palate elevates symmetrically   7. 11th CN:  sternocleidomastoids and trapezii strong   8. 12th CN: tongue midline and without fasciculations       Strength:    SF  EF  EE  WE  G  I  HF  KE  DF  EHL  PF             R  2 2 2 2 2 2 1 0 0 0 0                       L  3 4 4 4 4 4 2 4 4 4 4     Sensory: normal to light touch throughout      Reflexes: 3+ and symmetrical at Biceps, BR, Triceps, Patellar and Achilles b/l            Bonilla positive bilaterally            Babinski upgoing bilaterally            Sustained clonus in the RLE and 4 beats in the left     Gait: Unable to assess     Tone: 1+ in bilateral EF, otherwise without any spasticity noted      ROM: full PROM in each extremity          Laboratory/Imaging:     Admission on 12/15/2022, Discharged on 12/16/2022   Component Date Value Ref Range Status     Sodium 12/15/2022 136  136 - 145 mmol/L Final     Potassium 12/15/2022 4.3  3.4 - 5.3 mmol/L Final     Chloride 12/15/2022 99  98 - 107 mmol/L Final     Carbon Dioxide (CO2) 12/15/2022 25  22 - 29 mmol/L Final     Anion Gap 12/15/2022 12  7 - 15 mmol/L Final     Urea Nitrogen 12/15/2022 14.8  6.0 - 20.0 mg/dL Final     Creatinine 12/15/2022 0.82  0.67 - 1.17 mg/dL Final     Calcium 12/15/2022 10.1 (H)  8.6 - 10.0 mg/dL Final     Glucose 12/15/2022 98  70 - 99 mg/dL Final     Alkaline Phosphatase 12/15/2022 83  40 - 129 U/L Final     AST 12/15/2022 31  10 - 50 U/L Final     ALT 12/15/2022 55 (H)  10 - 50 U/L Final     Protein Total 12/15/2022 8.1  6.4 - 8.3 g/dL Final     Albumin 12/15/2022 5.0  3.5 - 5.2 g/dL Final     Bilirubin Total 12/15/2022 0.4  <=1.2 mg/dL Final     GFR Estimate 12/15/2022 >90  >60 mL/min/1.73m2 Final    Effective December 21, 2021 eGFRcr in adults is calculated using the 2021 CKD-EPI creatinine equation which includes age and gender (Courtney et al., NEJM, DOI: 10.1056/XTOXtv7760663)     Magnesium 12/15/2022 2.1  1.7 - 2.3 mg/dL Final     TSH 12/15/2022 1.47  0.30 - 4.20 uIU/mL Final     WBC Count 12/15/2022 5.7  4.0 - 11.0 10e3/uL  Final     RBC Count 12/15/2022 6.52 (H)  4.40 - 5.90 10e6/uL Final     Hemoglobin 12/15/2022 18.9 (H)  13.3 - 17.7 g/dL Final     Hematocrit 12/15/2022 55.9 (H)  40.0 - 53.0 % Final     MCV 12/15/2022 86  78 - 100 fL Final     MCH 12/15/2022 29.0  26.5 - 33.0 pg Final     MCHC 12/15/2022 33.8  31.5 - 36.5 g/dL Final     RDW 12/15/2022 13.2  10.0 - 15.0 % Final     Platelet Count 12/15/2022 199  150 - 450 10e3/uL Final     % Neutrophils 12/15/2022 51  % Final     % Lymphocytes 12/15/2022 38  % Final     % Monocytes 12/15/2022 7  % Final     % Eosinophils 12/15/2022 3  % Final     % Basophils 12/15/2022 1  % Final     % Immature Granulocytes 12/15/2022 0  % Final     NRBCs per 100 WBC 12/15/2022 0  <1 /100 Final     Absolute Neutrophils 12/15/2022 2.9  1.6 - 8.3 10e3/uL Final     Absolute Lymphocytes 12/15/2022 2.1  0.8 - 5.3 10e3/uL Final     Absolute Monocytes 12/15/2022 0.4  0.0 - 1.3 10e3/uL Final     Absolute Eosinophils 12/15/2022 0.2  0.0 - 0.7 10e3/uL Final     Absolute Basophils 12/15/2022 0.0  0.0 - 0.2 10e3/uL Final     Absolute Immature Granulocytes 12/15/2022 0.0  <=0.4 10e3/uL Final     Absolute NRBCs 12/15/2022 0.0  10e3/uL Final     Influenza A PCR 12/15/2022 Negative  Negative Final     Influenza B PCR 12/15/2022 Negative  Negative Final     RSV PCR 12/15/2022 Negative  Negative Final     SARS CoV2 PCR 12/15/2022 Negative  Negative Final    NEGATIVE: SARS-CoV-2 (COVID-19) RNA not detected, presumed negative.     Color Urine 12/15/2022 Light Yellow  Colorless, Straw, Light Yellow, Yellow Final     Appearance Urine 12/15/2022 Clear  Clear Final     Glucose Urine 12/15/2022 Negative  Negative mg/dL Final     Bilirubin Urine 12/15/2022 Negative  Negative Final     Ketones Urine 12/15/2022 Negative  Negative mg/dL Final     Specific Gravity Urine 12/15/2022 1.016  1.001 - 1.030 Final     Blood Urine 12/15/2022 Negative  Negative Final     pH Urine 12/15/2022 6.0  5.0 - 7.0 Final     Protein Albumin Urine  12/15/2022 Negative  Negative mg/dL Final     Urobilinogen Urine 12/15/2022 <2.0  <2.0 mg/dL Final     Nitrite Urine 12/15/2022 Negative  Negative Final     Leukocyte Esterase Urine 12/15/2022 Negative  Negative Final     Bacteria Urine 12/15/2022 Few (A)  None Seen /HPF Final     Mucus Urine 12/15/2022 Present (A)  None Seen /LPF Final     RBC Urine 12/15/2022 1  <=2 /HPF Final     WBC Urine 12/15/2022 <1  <=5 /HPF Final            Assessment/Plan:     Brooke Peterson is a 32 year old male with history of Ariel's disease, hypothyroidism, schizophrenia, PTSD, and a diagnosis of X linked adrenoleukodystrophy who presents as a referral from Neurology to discuss rehab needs.     Brooke presents today with his father.  Phone  was used.  As mentioned in his neurology notes primary care notes, toe has significant impairments which is led to a gradual functional decline.  He is now wheelchair-bound and reliant upon others, primarily his father as his acting PCA, to provide all cares including feeding, bathing mobility, transfers, med management and others.  They report he has never been part of the therapy program and they are not interested in this today.  We will order PT, OT and SLP to address his functional deficits including his impaired mobility and especially swallowing given report of frequent coughing as he seems to be at high risk for aspiration.     He does not seem to have significant spasticity today which would limit his transfers and/or mobility. No additional medications or injections recommended. Could consider decreasing or stopping altogether his baclofen as they report it seems to make him drowsy and has not made a difference with his tone.     He was however also inquired today about obtaining a hospital bed along with additional PCA hours and both seem appropriate at this time, especially considering he spends the majority of his time in the living room. We will leave this to his primary care to  order, if determined necessary.     We did consider obtaining basic labs today however looking back in the chart he had these obtained on 12/15 after his ER visit for poor appetite and these seemed largely within normal limits.     We also ordered colace to be used daily for constipation with hard stools as this seems to be affecting his appetite.     Dr. Rangel did speak with Dr. Alvarez prior to today's appointment, of which the relevant details will be included in the addendum of this note.    1. Patient education: In depth discussion and education was provided about the assessment and implications of each of the below recommendations for management. Patient indicated readiness to learn, all questions were answered and understanding of material presented was confirmed.  2. Work-up:   1. None  3. Therapy/equipment/braces:  1. Home care referrals for PT, OT and SLP were placed  2. Recommend consideration of hospital bed given severe mobility issues, risk for developing wounds and confined to the main living room.   4. Medications:  1. Colace for constipation  2. Could consider decreasing or stopping baclofen given limited tone on today's exam   5. Interventions:  1. None   6. Referral / follow up with other providers:  1. Continue to follow up with his Neurology providers and PCP  7. Follow up:  1. As needed    Patient was seen and discussed with attending physician, Dr. Alvarez.     Maximino Homer DO  PGY3 PM&R  [facility]  Pager: [phone]    I appreciate the opportunity to participate in the care of your patient.     90 minutes spent on the date of the encounter doing chart review, history and exam, documentation and further activities as noted above.              Attestation signed by Anu Alvarez MD at 1/24/2023  3:21 PM:  Attending's note   Thank you for allowing us to participate in the care of this patient.   We were asked to see this patient by Dr Galicia to evaluate  rehabilitation needs.   Patient has been seen, examined and evaluated by me independently. I reviewed today's vitals signs, lab values, imaging, medications, and current issues including medical, functional and social history significant to this admission.      I agree with the above note which reflects my direct input and interpretation of progress.      Plan of care   4 presented today accompanied by his father, communication with the patient was through .  On examination he did not look like he was having issues with failure to thrive.  I had a long conversation with the patient's primary care physician Dr. Rangel, who I am appreciated of for getting in touch with me.  From the rehabitation perspective, the following are the recommendations.  -He does not present with significant spasticity to continue the baclofen at 10 mg p.o. twice daily.  The baclofen is more productive of lethargy in this patient and hence will be discontinued today.  -Would recommend that he would benefit from increasing the PCA hours daily a foster home is found for him per his primary care doctor.  -He is quite constipated his last bowel movement was 7 days ago.  He would benefit from starting Colace on a daily basis as well as MiraLAX for a as needed basis.  -He does own a wheelchair and hence we will not order for another.  -He is quite functionally declined given the right sided weakness.  He has not received any therapies prior to this.  Would recommend PT OT and speech home-based.  Orders placed.    -His father who accompanied him today did report choking on food at times, he would benefit from a swallow eval by the speech-language pathologist.    Counseled the patient and his family regarding the recommendations stated above.  They endorsed the understanding of the recommendations and agree with the plan of care.  Thank you for allowing us to participate in the care of this patient.      Anu Alvarez MD Total of  f/u25/35/50 // new 45/60/80 min spent in this encounter, preparing to see the patient in review of the tests, obtaining history from family, and communicating with nursing, therapy team and primary team. Additional time spent on documenting of the clinical information.     Anu Alvarez MD, [facility]   Total of 70 min spent in this encounter, preparing to see the patient in review of the tests, obtaining history from family, and communicating with nursing, therapy team and primary team. Additional time spent on documenting of the clinical information.     Anu Alvarez MD, A

## 2023-01-24 NOTE — PROGRESS NOTES
Care Coordination Clinician Chart Review    Situation: Patient chart reviewed by Care Coordinator.       Background: Care Coordination Program started: 12/8/2020. Initial assessment completed and patient-centered care plan(s) were developed with participation from patient. Lead CC handed patient off to CHW for continued outreaches.       Assessment: Per chart review, patient outreach completed by CC CHW on 1/6/23.  Patient is actively working to accomplish goal(s).  Working with community paramedic. Concern for FTT, Patient's goal(s) appropriate and relevant at this time. Patient is not due for updated Plan of Care.  Assessments will be completed annually or as needed/with change of patient status.      Care Plan: Medication management support at home     Problem: HP GENERAL PROBLEM     Goal: I would like to enroll in home care services to suppport medication management within 90 days     Start Date: 8/2/2022 Expected End Date: 11/3/2022    This Visit's Progress: 50% Recent Progress: 10%    Note:     Barriers: language, access   Strengths: family, CP assistance  Patient expressed understanding of goal: yes  Action steps to achieve this goal:  1. I am still waiting for skilled nurse and will continue to work with CP and care team to enroll in home care support for medication set up   2. I will update Care coordination team during next outreach   Updated 9-9-22 AL  1/24/23 working with comm. Paramedic                      Care Plan: General: Phone Resources     Problem: HP GENERAL PROBLEM     Goal: General: I would like support to get information and resources/program for a free phone to communicate with other as soon as possible.     Start Date: 8/29/2022 Expected End Date: 12/30/2022    This Visit's Progress: 40% Recent Progress: 30%    Note:     Barriers: no cell phone, limited income  Strengths: support from sister, father, CP, and CCC team  Patient expressed understanding of goal: yes  Action steps to achieve  this goal:  1. I will talk to CC SW on 11-17-22 at 3 pm regarding information and resources for free phone.  Updated 11-11-22 AL                    Care Plan: General: Special Transportation Services with Wheelchair     Problem: HP GENERAL PROBLEM     Goal: General Goal - I want to obtain Special Transportation Services with wheelchair within 1-3 months     Start Date: 12/8/2022 Expected End Date: 1/31/2023    This Visit's Progress: 50% Recent Progress: 50%    Note:     Barriers: language, access  Strengths: support from CCC team  Patient expressed understanding of goal: yes  Action steps to achieve this goal:  1. I will wait for PCP to complete and submit the Certificate of Needs form to [facility].                    Care Plan: General: [facility]/Case Management Services     Problem: HP GENERAL PROBLEM     Goal: General Goal - I want support to connect with [facility]/case management services with 1-4 months.     Start Date: 1/6/2023 Expected End Date: 3/31/2023    This Visit's Progress: 20%    Note:     Barriers: language  Strengths: support from CCC team,family, Community paramedic  Patient expressed understanding of goal: yes  Action steps to achieve this goal:  1. I will wait to hear from staff at [facility] for services.                            Plan/Recommendations: The patient will continue working with Care Coordination to achieve goal(s) as above. CHW will continue outreaches at minimum every 30 days and will involve Lead CC as needed or if patient is ready to move to Maintenance. Lead CC will continue to monitor CHW outreaches and patient's progress to goal(s) every 6 weeks. Will route to have Lead SW CC review also.     Plan of Care updated and sent to patient: VANITA Perez / VANITA Izquierdo  [facility]   Care Coordination  [facility]  1/24/2023 11:14 AM

## 2023-01-24 NOTE — PROGRESS NOTES
PM&R Clinic Note     Patient Name: Brooke Peterson : 1990 Medical Record: [id number]     Requesting Physician/clinician: Wil Galicia MD           History of Present Illness:     Brooke Peterson is a 32 year old male with history of Hazen's disease, hypothyroidism, schizophrenia, PTSD, and a diagnosis of X linked adrenoleukodystrophy who presents as a referral from Neurology to discuss rehab needs.     He has followed with Jessica Coe and [facility] in the Neurology Department.   His primary care physician Dr. Rangel.      Brooke comes in a wheelchair with his father today.  Phone  was used.  During the conversation to answer questions head nod and shake with his father providing collateral information as needed.    Symptoms,  - Father reports all symptoms started after moving to [place] but he cannot remember exactly how long that has been, possibly 10 years.   - He continues to have great difficulty with any mobility.  He has not been walking really at all for about a year and is currently wheel chair bound. Wheelchair is working well for them.   - He feels the weakness in the right arm and the leg continue to progress to the point where he was unable to really use either at all at this time.  - His father reports his transfers are very difficult and they require 2 people to get him in and out of the wheelchair.  - His dad reports tightness in the arm and the leg.  He will have intermittent spasms.  He has been taking baclofen twice a day does not feel it has made a difference. He does feel more fatigued during the day.   - Hasn't been able to feed himself or provide other daily ADLs for quite some time.   - He continues to have difficulty with dysarthria for nearly 3 years.   - He reports and appears to have good receptive language.   - Father reports he coughs and occasionally spits up after swallowing during every meal. He typically will only eat 3 or so bites of rice before he requests to stop.  They're hoping to have this evaluated.   - He has chronic constipation with his last bowel movement being about 7 days ago. He typically has very hard stools. He denies any abdominal pain and distension today but does have a poor appetite.     Therapies/HEP,  - He has not done any physical therapy, occupational therapy or speech therapy.   - They are both interested in home therapies.     Functionally,   - Dependent on all mobility, ADLs and IADLs.  - They are hoping to try and get a hospital bed for their home.   - His father is his PCA and they're qualified for 7.5 hours a day. Father today is inquiring whether more can be approved.   - Care coordination and community paramedics involved with his care for med management.          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         Social History:     Social History     Tobacco Use     Smoking status: Never     Smokeless tobacco: Never   Substance Use Topics     Alcohol use: Not Currently       Marital Status: No  Living situation: Lives in a house with many GAVIN and cannot easily navigate with this wheelchair. Requires his father and drivers to get up and down. There are two floors but he stays in the living room.   Family support: His father is his PCA for 7.5 years. His mother and 6 siblings, 3 live in the home.            Functional history:     Brooke Peterson is dependent with all aspects of life.    ADLs: Dependent   Assistive devices: Wheelchair  iADLs (medication management and finances): Dependent   Hand dominance: Right handed  Driving: No           Family History:     Family History   Problem Relation Age of Onset     Diabetes  Mother      Psychotic Disorder Mother      Depression Mother      Cancer No family hx of             Medications:     Current Outpatient Medications   Medication Sig Dispense Refill     acetaminophen (TYLENOL) 325 MG tablet Take 2 tablets (650 mg) by mouth every 6 hours as needed for mild pain 90 tablet 3     albuterol (PROAIR HFA/PROVENTIL HFA/VENTOLIN HFA) 108 (90 Base) MCG/ACT inhaler Inhale 2 puffs into the lungs every 6 hours as needed for shortness of breath / dyspnea or wheezing 18 g 3     baclofen (LIORESAL) 10 MG tablet Take 1 tablet (10 mg) by mouth 2 times daily 60 tablet 11     famotidine (PEPCID) 20 MG tablet Take 20 mg by mouth 2 times daily       fludrocortisone (FLORINEF) 0.1 MG tablet Take 1.5 tablets (0.15 mg) by mouth daily 135 tablet 4     fluticasone-salmeterol (ADVAIR HFA) 115-21 MCG/ACT inhaler Inhale 2 puffs into the lungs 2 times daily 36 g 3     hydrocortisone (CORTEF) 10 MG tablet TAKE 1 TABLET BY MOUTH IN THE MORNING AND 1/2 TABLET IN THE EVENING. MAY TAKE TAKE 2 PILLS TWICE A DAY FOR 3 DAYS IF ILLNESS. 160 tablet 4     levothyroxine (SYNTHROID/LEVOTHROID) 75 MCG tablet Take 1 tablet (75 mcg) by mouth daily 90 tablet 4     loratadine (CLARITIN) 10 MG tablet Take 1 tablet (10 mg) by mouth daily 90 tablet 2     meclizine (ANTIVERT) 12.5 MG tablet TAKE 1 TABLET BY MOUTH THREE TIMES A DAY AS NEEDED FOR DIZZINESS 90 tablet 11     Multiple Vitamin (TAB-A-MAGALY) TABS TAKE 1 TABLET BY MOUTH DAILY 28 tablet 11     OLANZapine (ZYPREXA) 5 MG tablet Take 1 tablet (5 mg) by mouth every morning 90 tablet 0     ondansetron (ZOFRAN) 4 MG tablet Take 1 tablet (4 mg) by mouth every 8 hours as needed for nausea 10 tablet 0     traZODone (DESYREL) 50 MG tablet Take 1 tablet (50 mg) by mouth At Bedtime 90 tablet 0     vitamin B-12 (CYANOCOBALAMIN) 500 MCG tablet Take 1 tablet (500 mcg) by mouth daily 90 tablet 3     vitamin D3 (CHOLECALCIFEROL) 50 mcg (2000 units) tablet TAKE 1 TABLET (50 MCG) BY MOUTH DAILY  90 tablet 3     hydrOXYzine (ATARAX) 25 MG tablet Take 2 tablets (50 mg) by mouth nightly as needed for itching (Patient not taking: Reported on 1/5/2023) 30 tablet 3     hydrOXYzine (ATARAX) 25 MG tablet Take 1 tablet (25 mg) by mouth nightly as needed for anxiety (Patient not taking: Reported on 1/5/2023) 10 tablet 0            Allergies:     No Known Allergies           ROS:     A focused ROS is negative other than the symptoms noted above in the HPI.         Physical Examiniation:     VITAL SIGNS: /80   Pulse 66   SpO2 99%   BMI: Estimated body mass index is 34.37 kg/m  as calculated from the following:    Height as of 7/27/22: 1.524 m (5').    Weight as of 12/15/22: 79.8 kg (176 lb).    Gen: NAD, pleasant and cooperative, very soft spoken with dysarthria limiting ability of interpretor to understand him  HEENT: normocepalic, without obvious abnormality  Cardio: regular pulse  Pulm: non-labored breathing in room air  Abd: not tender and not distended   Ext: WWP, no edema in BLE, no tenderness in calves  Neuro/MSK:    Cognition: Awake, alert, answering questions with yes/no head nods appropriately but otherwise unable to fully assess due to langauge barrier and significant dysarthria. Follows commands with ease.      CN: intact  1. 2nd CN: Pupils equal, round, reactive to light and accomodation. and visual fields intact to confrontation.   2. 3rd,4th,6th CN:  EOMI, appropriate pupillary responses  3. 5th CN: facial sensation intact   4. 7th CN: face symmetrical   5. 8th CN: functional hearing bilaterally  6. 9th, 10th CN: palate elevates symmetrically   7. 11th CN: sternocleidomastoids and trapezii strong   8. 12th CN: tongue midline and without fasciculations       Strength:    SF  EF  EE  WE  G  I  HF  KE  DF  EHL  PF             R  2 2 2 2 2 2 1 0 0 0 0                       L  3 4 4 4 4 4 2 4 4 4 4     Sensory: normal to light touch throughout      Reflexes: 3+ and symmetrical at Biceps, BR, Triceps,  Patellar and Achilles b/l            Bonilla positive bilaterally            Babinski upgoing bilaterally            Sustained clonus in the RLE and 4 beats in the left     Gait: Unable to assess     Tone: 1+ in bilateral EF, otherwise without any spasticity noted      ROM: full PROM in each extremity          Laboratory/Imaging:     Admission on 12/15/2022, Discharged on 12/16/2022   Component Date Value Ref Range Status     Sodium 12/15/2022 136  136 - 145 mmol/L Final     Potassium 12/15/2022 4.3  3.4 - 5.3 mmol/L Final     Chloride 12/15/2022 99  98 - 107 mmol/L Final     Carbon Dioxide (CO2) 12/15/2022 25  22 - 29 mmol/L Final     Anion Gap 12/15/2022 12  7 - 15 mmol/L Final     Urea Nitrogen 12/15/2022 14.8  6.0 - 20.0 mg/dL Final     Creatinine 12/15/2022 0.82  0.67 - 1.17 mg/dL Final     Calcium 12/15/2022 10.1 (H)  8.6 - 10.0 mg/dL Final     Glucose 12/15/2022 98  70 - 99 mg/dL Final     Alkaline Phosphatase 12/15/2022 83  40 - 129 U/L Final     AST 12/15/2022 31  10 - 50 U/L Final     ALT 12/15/2022 55 (H)  10 - 50 U/L Final     Protein Total 12/15/2022 8.1  6.4 - 8.3 g/dL Final     Albumin 12/15/2022 5.0  3.5 - 5.2 g/dL Final     Bilirubin Total 12/15/2022 0.4  <=1.2 mg/dL Final     GFR Estimate 12/15/2022 >90  >60 mL/min/1.73m2 Final    Effective December 21, 2021 eGFRcr in adults is calculated using the 2021 CKD-EPI creatinine equation which includes age and gender (Courtney et al., NEJM, DOI: 10.1056/QIVFot5454148)     Magnesium 12/15/2022 2.1  1.7 - 2.3 mg/dL Final     TSH 12/15/2022 1.47  0.30 - 4.20 uIU/mL Final     WBC Count 12/15/2022 5.7  4.0 - 11.0 10e3/uL Final     RBC Count 12/15/2022 6.52 (H)  4.40 - 5.90 10e6/uL Final     Hemoglobin 12/15/2022 18.9 (H)  13.3 - 17.7 g/dL Final     Hematocrit 12/15/2022 55.9 (H)  40.0 - 53.0 % Final     MCV 12/15/2022 86  78 - 100 fL Final     MCH 12/15/2022 29.0  26.5 - 33.0 pg Final     MCHC 12/15/2022 33.8  31.5 - 36.5 g/dL Final     RDW 12/15/2022 13.2  10.0  - 15.0 % Final     Platelet Count 12/15/2022 199  150 - 450 10e3/uL Final     % Neutrophils 12/15/2022 51  % Final     % Lymphocytes 12/15/2022 38  % Final     % Monocytes 12/15/2022 7  % Final     % Eosinophils 12/15/2022 3  % Final     % Basophils 12/15/2022 1  % Final     % Immature Granulocytes 12/15/2022 0  % Final     NRBCs per 100 WBC 12/15/2022 0  <1 /100 Final     Absolute Neutrophils 12/15/2022 2.9  1.6 - 8.3 10e3/uL Final     Absolute Lymphocytes 12/15/2022 2.1  0.8 - 5.3 10e3/uL Final     Absolute Monocytes 12/15/2022 0.4  0.0 - 1.3 10e3/uL Final     Absolute Eosinophils 12/15/2022 0.2  0.0 - 0.7 10e3/uL Final     Absolute Basophils 12/15/2022 0.0  0.0 - 0.2 10e3/uL Final     Absolute Immature Granulocytes 12/15/2022 0.0  <=0.4 10e3/uL Final     Absolute NRBCs 12/15/2022 0.0  10e3/uL Final     Influenza A PCR 12/15/2022 Negative  Negative Final     Influenza B PCR 12/15/2022 Negative  Negative Final     RSV PCR 12/15/2022 Negative  Negative Final     SARS CoV2 PCR 12/15/2022 Negative  Negative Final    NEGATIVE: SARS-CoV-2 (COVID-19) RNA not detected, presumed negative.     Color Urine 12/15/2022 Light Yellow  Colorless, Straw, Light Yellow, Yellow Final     Appearance Urine 12/15/2022 Clear  Clear Final     Glucose Urine 12/15/2022 Negative  Negative mg/dL Final     Bilirubin Urine 12/15/2022 Negative  Negative Final     Ketones Urine 12/15/2022 Negative  Negative mg/dL Final     Specific Gravity Urine 12/15/2022 1.016  1.001 - 1.030 Final     Blood Urine 12/15/2022 Negative  Negative Final     pH Urine 12/15/2022 6.0  5.0 - 7.0 Final     Protein Albumin Urine 12/15/2022 Negative  Negative mg/dL Final     Urobilinogen Urine 12/15/2022 <2.0  <2.0 mg/dL Final     Nitrite Urine 12/15/2022 Negative  Negative Final     Leukocyte Esterase Urine 12/15/2022 Negative  Negative Final     Bacteria Urine 12/15/2022 Few (A)  None Seen /HPF Final     Mucus Urine 12/15/2022 Present (A)  None Seen /LPF Final     RBC  Urine 12/15/2022 1  <=2 /HPF Final     WBC Urine 12/15/2022 <1  <=5 /HPF Final            Assessment/Plan:     Brooke Peterson is a 32 year old male with history of Elmore's disease, hypothyroidism, schizophrenia, PTSD, and a diagnosis of X linked adrenoleukodystrophy who presents as a referral from Neurology to discuss rehab needs.     Brooke presents today with his father.  Phone  was used.  As mentioned in his neurology notes primary care notes, toe has significant impairments which is led to a gradual functional decline.  He is now wheelchair-bound and reliant upon others, primarily his father as his acting PCA, to provide all cares including feeding, bathing mobility, transfers, med management and others.  They report he has never been part of the therapy program and they are not interested in this today.  We will order PT, OT and SLP to address his functional deficits including his impaired mobility and especially swallowing given report of frequent coughing as he seems to be at high risk for aspiration.     He does not seem to have significant spasticity today which would limit his transfers and/or mobility. No additional medications or injections recommended. Could consider decreasing or stopping altogether his baclofen as they report it seems to make him drowsy and has not made a difference with his tone.     He was however also inquired today about obtaining a hospital bed along with additional PCA hours and both seem appropriate at this time, especially considering he spends the majority of his time in the living room. We will leave this to his primary care to order, if determined necessary.     We did consider obtaining basic labs today however looking back in the chart he had these obtained on 12/15 after his ER visit for poor appetite and these seemed largely within normal limits.     We also ordered colace to be used daily for constipation with hard stools as this seems to be affecting his appetite.      Dr. Rangel did speak with Dr. Alvarez prior to today's appointment, of which the relevant details will be included in the addendum of this note.    1. Patient education: In depth discussion and education was provided about the assessment and implications of each of the below recommendations for management. Patient indicated readiness to learn, all questions were answered and understanding of material presented was confirmed.  2. Work-up:   1. None  3. Therapy/equipment/braces:  1. Home care referrals for PT, OT and SLP were placed  2. Recommend consideration of hospital bed given severe mobility issues, risk for developing wounds and confined to the main living room.   4. Medications:  1. Colace for constipation  2. Could consider decreasing or stopping baclofen given limited tone on today's exam   5. Interventions:  1. None   6. Referral / follow up with other providers:  1. Continue to follow up with his Neurology providers and PCP  7. Follow up:  1. As needed    Patient was seen and discussed with attending physician, Dr. Alvarez.     Maximino Coronel DO  PGY3 PM&R  [facility]  Pager: [phone]

## 2023-01-31 NOTE — PROGRESS NOTES
Community Paramedics Follow-up Visit  January 31, 2023  TIME: 1200    Brooke Peterson is a 32 year old male being seen at home for a follow-up visit.    Present at appointment:  Patient, father/PCA, mother, Community Paramedic, Angela  in person         Chief Complaint   Patient presents with     Outreach     Recheck Medication       Universal Utilization:      Utilization    Hospital Admissions  1             ED Visits  3             No Show Count (past year)  40                Current as of: 1/29/2023  3:41 PM              BP 95/73   Pulse 70   Temp 98.8  F (37.1  C)   Resp 12   SpO2 97%     Clinical Concerns:  Current Medical Concerns:  Generalized weakness    Current Behavioral Concerns:     Education Provided to patient: [place] pill packs and why the need to set up medications in his pill boxes for a few more weeks   Medication set up? Yes  Pill Box issued: No  Scale issued: No  Flu Shot given: No  COVID Vaccine Given: No  Lab draw or specimen collection: No  Food box issued: No  Collaborative visit with PCP: No  Wound Care: No    Health Maintenance Reviewed:      Clinical Pathway: None    No  Face to Face in Home / Community    Review of Symptoms/PE    Skin: bruising  Eyes: negative  Ears/Nose/Throat: negative  Respiratory: No shortness of breath, dyspnea on exertion, cough, or hemoptysis  Cardiovascular: negative  Gastrointestinal: poor appetite and vomiting  Genitourinary: negative  Musculoskeletal: muscular weakness  Neurologic: local weakness and speech problems  Psychiatric: negative    Time spent with patient: 45    The patient meets one or more of the following criteria:  * Requires services to prevent readmission to a nursing home or hospital    Acute concern/Follow-up recommendations: patient recently fell again while transferring. Hit his knee, moderate bruising to left knee, no pain, denies hitting his head or any other pain    Next CP visit scheduled: 2/14/23    Issues for Provider to follow  up on: Community Paramedic visited with patient at his home. Angela  in person. Upon arrival found Toe in the back bedroom with his legs off the bed and his upper body lying across the bed. Urine odor noted. Father was able to bring me envelope of patients bubble packs and medications in bottles. Patient has been taking his medications in the bubble packs as indicated. Removed Baclofen effective today as patient has continued to take it. Placed Colace in med box as patient states he has been taking it daily. Father indicates it has been helping with bowel movements. Advised to watch for diarrhea and to call clinic if he has 3 days of diarrhea. Father indicates patient has been eating very little and father needs to push patient to eat. Father is feeding him Porridge Rice and Toe is able to swallow this without any problems. Father is also trying to get Toe to drink Vitamin water. He has only been drinking about a half of a bottle daily. (approx 6-8oz)  Followed up with father/PCA about PT, OT or an appt for a swallow test and father stated the patient already had it done. Did not see this in his chart. CP to follow up with Care Coordination as there is a sense there may be some confusion with this.   Set up pill boxes with his medications. [place] pill packs contain the Baclofen twice daily, removed both pills. Pill packs do not contain Olanzapine or Trazodone. CP to follow up with Isai on status of these medications. Pt has Olanzapine in the bottle, added these to his pill box. Pt is completely out of Trazodone. Advised Toe and his father that once the [place] pill packs are correct with all of his medications in them, then he will no longer need his pill boxes. Also advised that Community Paramedic services will end as well.  Father stated the wheelchair they have for Toe was broken, one of the foot rests had come off when patient was being taken down the stairs. Community Paramedic examined the chair and  found that the chair was not broken. Showed Father how to take the foot rests off on each side and explained that they can move out as well. Unsure if father completely understood as he denied trying to put them on or take them off himself.    Provider follow up visit needed: PADDY

## 2023-02-07 NOTE — PROGRESS NOTES
2/7/2023  Clinic Care Coordination Contact  Care Team Conversations  Consulted with CCC Team regarding today's outreach.  Plan: Per CC SW okay to deferred to next month.  Okay to delete the phone goal.    Patient has Special Transportation Services with wheelchair recommendation.    CHW Follow up: Monthly  CHW Plan: Follow up on goal  CHW Next Follow Up:3-17-23    Scarlett Varma  Community Health Worker  [facility]  Clinic Care Coordination  [email or web address]   Office: [phone]  Fax: [phone]  Clinic Care Coordination Contact    Community Health Worker Follow Up    Care Gaps:     Health Maintenance Due   Topic Date Due     ASTHMA ACTION PLAN  Never done     ADVANCE CARE PLANNING  Never done     DEPRESSION ACTION PLAN  Never done     COVID-19 Vaccine (3 - Booster for Pfizer series) 10/29/2021       Patient accepted scheduling phone number for [phone]  to schedule independently     Care Plan:   Care Plan: Medication management support at home     Problem: HP GENERAL PROBLEM     Goal: I would like to enroll in home care services to suppport medication management within 90 days     Start Date: 8/2/2022 Expected End Date: 11/3/2022    This Visit's Progress: 50% Recent Progress: 10%    Note:     Barriers: language, access   Strengths: family, CP assistance  Patient expressed understanding of goal: yes  Action steps to achieve this goal:  1. I am still waiting for skilled nurse and will continue to work with CP and care team to enroll in home care support for medication set up   2. I will update Care coordination team during next outreach   Updated 9-9-22 AL  1/24/23 working with comm. Paramedic                      Care Plan: General: [facility]/Case Management Services     Problem: HP GENERAL PROBLEM     Goal: General Goal - I want support to connect with [facility]/case management services with 1-4 months.     Start Date: 1/6/2023 Expected End Date: 3/31/2023    This Visit's  Progress: 20%    Note:     Barriers: language  Strengths: support from CCC team,family, Community paramedic  Patient expressed understanding of goal: yes  Action steps to achieve this goal:  1. I will wait to hear from staff at [facility] for services.

## 2023-02-10 PROBLEM — G31.80 LEUKODYSTROPHY (H): Status: ACTIVE | Noted: 2023-01-01

## 2023-02-10 NOTE — H&P
[facility]    History and Physical - Hospitalist Service       Date of Admission:  2/10/2023    Assessment & Plan         Brooke Peterson is a 32 year old male with a pertinent history of adrenoleukodystrophy, schizophrenia, Cortland's disease, PTSD, hypothyroidism, and neurodevelopmental disorder who presents to this ED via wheelchair with family member for evaluation of failure to thrive in adult.    Faillure to thrive  Dysarthria and dysphagia  Worsening weakness   Weight loss  Dehydration  -worse with speech (family cannot understand sometimes), mental status, weakness, now wheel chair bound  -worse swallow function with coughing on oral, dysphagia, weight loss  -Hospitalized in may 2022, with MRI and neurology consultation: neurologist concerned it is some kind of adrenoleukodystrophy. Patient was discharged to short-term care facility first then back to home with [facility]. As per family, HHC stopped. When pt was discharged, physician recommended pt to see special neurologist at  or [facility]. Not sure if pt has been seen by neurologist for outpatient workup. To me, his diagnosis is not very clear, most likely some kind of neurodevelopmental disorder.  -will consult neurology, recheck mri  -PT  -SW for placement?  -nutrition consult  -SLP for swallow evaluation, NPO for now  -ivf    Ariel's disease  Schizophrenia  PTSD  hypothyroidism  -PTA meds after review    Polycythemia   -looks like chronic but rising recently  -Hematology consultation       Diet: NPO for Medical/Clinical Reasons Except for: Ice Chips, Meds    DVT Prophylaxis: Enoxaparin (Lovenox) SQ  Booth Catheter: Not present  Lines: None     Cardiac Monitoring: None  Code Status: Full Code      Clinically Significant Risk Factors Present on Admission           # Hypercalcemia: Highest Ca = 10.5 mg/dL in last 2 days, will monitor as appropriate                 IVF    Disposition Plan      Expected Discharge Date: 02/11/2023                   Nikolai Garcia MD  Hospitalist Service  [facility]  Securely message with [employer] (more info)  Text page via [employer] Paging/Directory     ______________________________________________________________________    Chief Complaint   failure to thrive in adult.    History is obtained from the patient, chart review      History of Present Illness   Brooke Peterson is a 32 year old male with a pertinent history of adrenoleukodystrophy, schizophrenia, Bricelyn's disease, PTSD, hypothyroidism, and neurodevelopmental disorder who presents to this ED via wheelchair with family member for evaluation of failure to thrive in adult.     HPI limited due to cognitive impairment     The patient was referred here from a clinic visit evaluating concerns about failure to thrive.      Per family member,   The patient has had a loss of appetite recently, lost 25 pounds in the last month, and has been constipated for 10 days. The family member and  both state that they are unable to understand the patient right now. Additionally, the family member states that the patient has been wheelchair bound and not communicating clearly since the beginning of 2022 and his condition has gotten worse. The family member does not know if the patient is in pain but guesses that he may be dizzy because he cannot walk. Family is also stating that he is unable to eat and has been coughing frequently when trying to eat.      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 No pertinent surgical history.    Prior to Admission Medications   Prior to Admission Medications   Prescriptions Last Dose Informant  Patient Reported? Taking?   Multiple Vitamin (TAB-A-MAGALY) TABS Unknown  No Yes   Sig: TAKE 1 TABLET BY MOUTH DAILY   OLANZapine (ZYPREXA) 5 MG tablet Unknown  No Yes   Sig: Take 1 tablet (5 mg) by mouth every morning   acetaminophen (TYLENOL) 325 MG tablet Unknown  No Yes   Sig: Take 2 tablets (650 mg) by mouth every 6 hours as needed for mild pain   albuterol (PROAIR HFA/PROVENTIL HFA/VENTOLIN HFA) 108 (90 Base) MCG/ACT inhaler Unknown  No Yes   Sig: Inhale 2 puffs into the lungs every 6 hours as needed for shortness of breath / dyspnea or wheezing   baclofen (LIORESAL) 10 MG tablet Unknown  Yes Yes   Sig: Take 10 mg by mouth 2 times daily   docusate sodium (COLACE) 100 MG capsule Unknown  No Yes   Sig: Take 1 capsule (100 mg) by mouth daily as needed for constipation   famotidine (PEPCID) 20 MG tablet Unknown  Yes Yes   Sig: Take 20 mg by mouth 2 times daily   fludrocortisone (FLORINEF) 0.1 MG tablet Unknown  No Yes   Sig: Take 1.5 tablets (0.15 mg) by mouth daily   fluticasone-salmeterol (ADVAIR HFA) 115-21 MCG/ACT inhaler Unknown  No Yes   Sig: Inhale 2 puffs into the lungs 2 times daily   hydrocortisone (CORTEF) 10 MG tablet   No No   Sig: TAKE 1 TABLET BY MOUTH IN THE MORNING AND 1/2 TABLET IN THE EVENING. MAY TAKE TAKE 2 PILLS TWICE A DAY FOR 3 DAYS IF ILLNESS.   levothyroxine (SYNTHROID/LEVOTHROID) 75 MCG tablet Unknown  No Yes   Sig: Take 1 tablet (75 mcg) by mouth daily   loratadine (CLARITIN) 10 MG tablet Unknown  No Yes   Sig: Take 1 tablet (10 mg) by mouth daily   meclizine (ANTIVERT) 12.5 MG tablet Unknown  No Yes   Sig: TAKE 1 TABLET BY MOUTH THREE TIMES A DAY AS NEEDED FOR DIZZINESS   ondansetron (ZOFRAN) 4 MG tablet Unknown  No Yes   Sig: Take 1 tablet (4 mg) by mouth every 8 hours as needed for nausea   traZODone (DESYREL) 50 MG tablet Unknown  No Yes   Sig: Take 1 tablet (50 mg) by mouth At Bedtime   vitamin B-12 (CYANOCOBALAMIN) 500 MCG tablet Unknown  No Yes   Sig: Take 1 tablet (500 mcg) by  mouth daily   vitamin D3 (CHOLECALCIFEROL) 50 mcg (2000 units) tablet Unknown  No Yes   Sig: TAKE 1 TABLET (50 MCG) BY MOUTH DAILY      Facility-Administered Medications: None        Social History   I have reviewed this patient's social history and updated it with pertinent information if needed.  Social History     Tobacco Use     Smoking status: Never     Smokeless tobacco: Never   Substance Use Topics     Alcohol use: Not Currently     Drug use: Never       Family History   I have reviewed this patient's family history and updated it with pertinent information if needed.  Family History   Problem Relation Age of Onset     Diabetes Mother      Psychotic Disorder Mother      Depression Mother      Cancer No family hx of        Allergies   No Known Allergies     Physical Exam   Vital Signs: Temp: 98.5  F (36.9  C) Temp src: Oral BP: 118/84 Pulse: 90   Resp: 12 SpO2: 96 % O2 Device: None (Room air)    Weight: 154 lbs 0 oz    General.  Awake not in acute distress.  HEENT.  Pupils equal round react to light, anicteric, EOM intact.  Neck supple no JVD.  CVS regular rhythm no murmur gallops.  Lungs.  Clear to auscultation bilateral no wheezing or rales.  Abdomen.  Soft nontender bowel sounds present.  Extremities.  No edema no calf tenderness.  Neurological.  Awake and alert. Weakness+ worse on left side, wheel chair bound  Skin no rash. No pallor.  Psych. flat mood.     Medical Decision Making       70 MINUTES SPENT BY ME on the date of service doing chart review, history, exam, documentation & further activities per the note.      Data     I have personally reviewed the following data over the past 24 hrs:    6.6  \   20.4 (H)   / 197     137 96 (L) 11.8 /  95   4.4 26 0.93 \       ALT: 54 (H) AST: 40 AP: 97 TBILI: 1.0   ALB: 5.1 TOT PROTEIN: 8.9 (H) LIPASE: N/A       TSH: 2.86 T4: N/A A1C: N/A       Procal: N/A CRP: 4.40 Lactic Acid: N/A         Imaging results reviewed over the past 24 hrs:   Recent Results (from  the past 24 hour(s))   Chest XR,  PA & LAT    Narrative    EXAM: XR CHEST 2 VIEWS  LOCATION: [facility]  DATE/TIME: 2/10/2023 2:58 PM    INDICATION: Coarse lung sounds on the left, cough, aspiration risk  COMPARISON: 05/14/2022.      Impression    IMPRESSION: No acute consolidation, pneumothorax or pleural effusion. Normal heart size and pulmonary vascularity. No significant change from 05/14/2022.     MRI pending

## 2023-02-10 NOTE — ED TRIAGE NOTES
The pt [facility] via [facility] rom the [facility]. The pt has s had constipation x 10 days, the pt has lost 25lbs in the past month. No appetite. PT OT referrals made no one has seen him at this time. Family is looking for placement for the patient. Fatigue  X 2 months with hx of depression. Meds at home include synthroid, advair, antivert, zyprexa, zofran,. The pt has a hx of leukodystrophy. Pt has not walked in the past month.       Per family he is unable to eat. Fatigue and weakness.

## 2023-02-10 NOTE — PROGRESS NOTES
Assessment & Plan     Adult failure to thrive  Poor appetite  Leukodystrophy (H)  Dysphagia  Function has quickly declined.  Father reports that patient has had poor appetite and coughing with swallowing.  Has had approximately 25 pound weight loss in the last month.  PT and OT for home were previously ordered, however father reports that this has not been established.  Father is his PCA. Father reports that is becoming difficult to take care of patient at home.    -Spoke with ED provider, will send patient to the ED for evaluation for FTT  -Consider swallow study  -May need placement given how quickly he has declined and barriers to care/language barrier    Constipation, unspecified constipation type  Patient was post to be taking Colace daily and MiraLAX daily as needed.  Father reports that he thinks patient is taking it, however father does not know his medications and is unsure.  Has not had a bowel movement in 10 days.  Mildly distended, no tenderness to palpation on exam, normal bowel sounds.  -Colace daily, MiraLAX daily as needed    Current severe episode of major depressive disorder without psychotic features without prior episode (H)  Insomnia, unspecified type  Was prescribed trazodone 50 mg nightly, father reports that he thinks patient is taking it, however prescription was last prescribed in September.  Patient also has hydroxyzine on his medication list, however father reports patient is not taking it?  -Trazodone 50 mg nightly      Review of external notes as documented elsewhere in note  60 minutes spent chart reviewing, face to face, and coordinating care.      BMI:   Estimated body mass index is 30.08 kg/m  as calculated from the following:    Height as of 7/27/22: 1.524 m (5').    Weight as of this encounter: 69.9 kg (154 lb).     Depression Screening Follow Up    PHQ 2/10/2023   PHQ-9 Total Score 17   Q9: Thoughts of better off dead/self-harm past 2 weeks Not at all   F/U: Thoughts of suicide  or self-harm -   F/U: Safety concerns -       Depression Screening Follow Up    PHQ 2/10/2023   PHQ-9 Total Score 17   Q9: Thoughts of better off dead/self-harm past 2 weeks Not at all   F/U: Thoughts of suicide or self-harm -   F/U: Safety concerns -         No follow-ups on file.    Diane Greenwood MD  [facility] [address]    Subjective   Toe is a 32 year old accompanied by his father, presenting for the following health issues:  office visit (Unable to walk,talk,eat or do anything.need physical therapy, shower tub, and sleeping bed. Constipation. Haven't had bm for over 10 days.)      HPI     Adrenoleukodystrophy  - needs a lot  - PT/OT for home was ordered - father reports that they are not coming currently.     Depression  - monitor    Constipation  - was supposed to take colace every day and miralax daily prn  - dad reporst he s taking colace every day, not taking miralax  - last BM 10 days ago  -     Coughing with eating  - makes him soft foods, like rice porridge.   - is able to swallow.   - has poor appetite, does not want to eat.       Insomnia  - was prescribed hydroxyzine, also on trazodone 50mg nightly  - father repos that he thinks pt is taking them. Meds are prepacked?       Review of Systems   Not able to obtain      Objective    /81 (BP Location: Left arm, Patient Position: Sitting, Cuff Size: Adult Regular)   Pulse 91   Temp 97.8  F (36.6  C) (Temporal)   Resp 14   Wt 69.9 kg (154 lb)   SpO2 99%   BMI 30.08 kg/m    Body mass index is 30.08 kg/m .  Physical Exam   General Appearance:  Alert, cooperative, no distress, appears stated age.  Head:  Normocephalic, without obvious abnormality, atraumatic.  Eyes:  Conjunctivae/corneas clear, extraocular movements intact both eyes.  Lungs:  Clear to auscultation bilaterally, respirations unlabored.  Heart:  Regular rate and rhythm, S1 and S2 normal, no murmur, rub or gallop.  Abdomen:  Soft, non-tender, bowel sounds active all four  quadrants.  Mild distention.  Extremities:  Atraumatic, no cyanosis or edema.  Skin:  Skin color, texture, turgor normal, no rashes or lesions.  Neurologic: Wheelchair-bound.  Generalized weakness.

## 2023-02-10 NOTE — ED PROVIDER NOTES
5:49 PM.  Medical patient signed out to me earlier.  I was asked to follow-up on the pending MRI study and notify neurology and the hospitalist if anything acute was observed.  MRI per radiologist notes no acute findings.  Patient admitting awaiting for bed.     Alberto Correa MD  02/10/23 [id number]

## 2023-02-10 NOTE — TELEPHONE ENCOUNTER
Rosangela Alarcon,  ([phone]) with pts clinic-  The care coordinator called reporting pt is very complex with physical and mental health.  Pt used to be able to walk and has had a significant decline.  Unable to communicate as he use to -  Speaks only Koran.  As interpretor have been utilized, interpretors and family have a hard time communicating with this pt as well.    Pt has leukodystrophy dysphagia.   Pt also has MH Dx of PTSD, Schizoaffective DO-Depressive type with catatonia,  And schizophrenia.   Pt is involved with targeted case management with the [facility].    Pt does not have therapy or psychiatry for month.    Pts care team,  from [place] all feel pt would benefit from a CADI Waiver and are just initiating requesting this for the pt.

## 2023-02-10 NOTE — ED NOTES
Bed: [facility]NED[facility]-H  Expected date: 2/10/23  Expected time: 1:12 PM  Means of arrival: Ambulance  Comments:  STP 32M Failure to thrive

## 2023-02-10 NOTE — ED NOTES
RN asking questions regarding MRI checklist, family states he hasnot had surgery. Father states he does not know if he has implantable devices in his body.

## 2023-02-10 NOTE — ED NOTES
Patient and father poor historians for MRI checklist. After reviewing chart it appears patient's had multiple MRI's in the past. Spoke with MD who stated that patient should be suitable for MRI. Called MRI and notified of MRI checklist.

## 2023-02-10 NOTE — LETTER
[facility]  [address], [address]  [place] [address]    2/28/2023    To whom it may concern:    I am writing to advocate for early reassessment for CADI waiver for patient Brooke Cox was recently admitted at [facility] from 2/10-2/17/2023 with worsening weakness, dysarthria, dysphagia and weight loss. Brooke has a progressive neurodegenerative disease (X-linked adrenoleukodystrophy) and has become increasingly dependent for support. He now requires 24 hour care including assist with all transfers and ADLs. Brooke also has limited ability to express his needs due to dysarthria and severe memory impairment. He has significant symptom burden with persistent nausea, dizziness, anxiety and insomnia. He is unable to manage or administer his own medications. Brooke s family provides care and support at home, but they are limited by other demands including younger siblings at home. We anticipate Brooke s needs will continue to increase as his disease progresses. He would greatly benefit from increased support at home through CADI waiver.    Sincerely,    Gia Coombs PA-C  Palliative Medicine  [facility]  [phone]

## 2023-02-10 NOTE — PHARMACY-ADMISSION MEDICATION HISTORY
"Pharmacy Note - Admission Medication History    Pertinent Provider Information:   Spoke with patient and patient's father with interpretor services. Neither know medication list. Father states that someone comes to the house and fills meds for his son but doesn't know the \"lady's name\" or agency. Therefore, med history completed via surescripts- patient routinely fills medications-- of note however- trazodone last filled in Sept for 90 day supply.      ______________________________________________________________________    Prior To Admission (PTA) med list completed and updated in EMR.       PTA Med List   Medication Sig Last Dose     acetaminophen (TYLENOL) 325 MG tablet Take 2 tablets (650 mg) by mouth every 6 hours as needed for mild pain Unknown     albuterol (PROAIR HFA/PROVENTIL HFA/VENTOLIN HFA) 108 (90 Base) MCG/ACT inhaler Inhale 2 puffs into the lungs every 6 hours as needed for shortness of breath / dyspnea or wheezing Unknown     baclofen (LIORESAL) 10 MG tablet Take 10 mg by mouth 2 times daily Unknown     docusate sodium (COLACE) 100 MG capsule Take 1 capsule (100 mg) by mouth daily as needed for constipation Unknown     famotidine (PEPCID) 20 MG tablet Take 20 mg by mouth 2 times daily Unknown     fludrocortisone (FLORINEF) 0.1 MG tablet Take 1.5 tablets (0.15 mg) by mouth daily Unknown     fluticasone-salmeterol (ADVAIR HFA) 115-21 MCG/ACT inhaler Inhale 2 puffs into the lungs 2 times daily Unknown     levothyroxine (SYNTHROID/LEVOTHROID) 75 MCG tablet Take 1 tablet (75 mcg) by mouth daily Unknown     loratadine (CLARITIN) 10 MG tablet Take 1 tablet (10 mg) by mouth daily Unknown     meclizine (ANTIVERT) 12.5 MG tablet TAKE 1 TABLET BY MOUTH THREE TIMES A DAY AS NEEDED FOR DIZZINESS Unknown     Multiple Vitamin (TAB-A-MAGALY) TABS TAKE 1 TABLET BY MOUTH DAILY Unknown     OLANZapine (ZYPREXA) 5 MG tablet Take 1 tablet (5 mg) by mouth every morning Unknown     ondansetron (ZOFRAN) 4 MG tablet Take 1 " tablet (4 mg) by mouth every 8 hours as needed for nausea Unknown     traZODone (DESYREL) 50 MG tablet Take 1 tablet (50 mg) by mouth At Bedtime Unknown     vitamin B-12 (CYANOCOBALAMIN) 500 MCG tablet Take 1 tablet (500 mcg) by mouth daily Unknown     vitamin D3 (CHOLECALCIFEROL) 50 mcg (2000 units) tablet TAKE 1 TABLET (50 MCG) BY MOUTH DAILY Unknown       Information source(s): Patient, CareEverywhere/SureScripts and interpretor services  Method of interview communication: in-person    Summary of Changes to PTA Med List  New: baclofen   Discontinued: -  Changed: -    Patient was asked about OTC/herbal products specifically.  PTA med list reflects this.    In the past week, patient estimated taking medication this percent of the time:  greater than 90%. Patient has someone come to house to fill med boxes for him.     Medication Affordability:  Not including over the counter (OTC) medications, was there a time in the past 12 months when you did not take your medications as prescribed because of cost?: Unable to Assess    Allergies were reviewed, assessed, and updated with the patient.      Patient did not bring any medications to the hospital and can't retrieve from home. No multi-dose medications are available for use during hospital stay.     The information provided in this note is only as accurate as the sources available at the time of the update(s).    Thank you for the opportunity to participate in the care of this patient.    PAUL PAINTER formerly [facility]  2/10/2023 3:14 PM

## 2023-02-10 NOTE — ED PROVIDER NOTES
EMERGENCY DEPARTMENT ENCOUNTER      NAME: Brooke Peterson  AGE: 32 year old male  YOB: 1990  MRN: [id number]  EVALUATION DATE & TIME: 2/10/2023  1:30 PM    PCP: Jose Rangel    ED PROVIDER: Jose Cevallos M.D.      Chief Complaint   Patient presents with     Constipation     Anorexia         FINAL IMPRESSION:  1. Generalized muscle weakness    2. Failure to thrive in adult    3. Dysarthria    4. Weight loss          ED COURSE & MEDICAL DECISION MAKIN year old male presents to the Emergency Department for evaluation of weakness, weight loss, failure to thrive.  Referred from clinic today due to difficulty with cough, swallowing, and subsequent at least 15 pound weight loss in the last month.  He is accompanied by family member.  He has a history of leukodystrophy with MRI abnormalities demonstrated earlier this year.  Also history of schizophrenia and adrenal leukodystrophy.  Presented to clinic today with the above concerns.  Family member is also having significant difficulty caring for him.  It sounds like over the last year has become nonambulatory, now wheelchair-bound.  He has more pronounced strength deficits on the right, unable to resist gravity in his right leg.  His face is symmetric.  The  tells me his speech is very difficult to understand and this is echoed by family.  He underwent lab evaluation as below.  So far has appeared generally unremarkable and stable.   Given the pronounced asymmetric weakness, inability to ambulate, known leukodystrophy, MRI is obtained to evaluate for further intracranial process, stroke, other demyelinating lesions.  I shared the clinic provider's concerns about failure to thrive and need for hospitalization for at a minimum care management, probably swallow evaluation, possibly additional consultations.  Reviewed patient's initial lab tests which so far have revealed no major metabolic derangements, stable hemoglobin.  Chest x-ray clear of  pneumonia or aspiration.  Patient is pending for MRI as above.  I think regardless of neuroimaging findings patient will require admission.  Discussed case with hospitalist Dr. Garcia for observation admission.  Also briefly updated oncoming ED provider Dr Correa to review MRI findings in the event of any major CVA, mass, etc. that would require more urgent consultation.    1:40 PM I met with the patient, obtained history, performed an initial exam, and discussed options and plan for diagnostics and treatment here in the ED.   3:40 PM discussed case with hospitalist Dr. Garcia, and ED provider Dr. Correa      Medical Decision Making    History:    Supplemental history from: Family Member/Significant Other and EMS    External Record(s) reviewed: Documented in chart, if applicable.    Work Up:    Chart documentation includes differential considered and any EKGs or imaging independently interpreted by provider, where specified.    In additional to work up documented, I considered the following work up: Documented in chart, if applicable.    External consultation:    Discussion of management with another provider: Documented in chart, if applicable    Complicating factors:    Care impacted by chronic illness: Leukodystrophy, schizophrenia, arun's disease    Care affected by social determinants of health: Access to Medical Care and Literacy    Disposition considerations: Admit.            MEDICATIONS GIVEN IN THE EMERGENCY:  Medications   0.9% sodium chloride BOLUS (1,000 mLs Intravenous New Bag 2/10/23 2955)       NEW PRESCRIPTIONS STARTED AT TODAY'S ER VISIT  New Prescriptions    No medications on file          =================================================================    HPI    Patient information was obtained from: EMS and family member    Use of : Yes (Angela) - In Person      Brooke Peterson is a 32 year old male with a pertinent history of adrenoleukodystrophy, schizophrenia, [place]'s disease, PTSD,  hypothyroidism, and neurodevelopmental disorder who presents to this ED via wheelchair with family member for evaluation of failure to thrive in adult.    HPI limited due to cognitive impairment    The patient was referred here from a clinic visit evaluating concerns about failure to thrive.     Per family member,   The patient has had a loss of appetite recently, lost 25 pounds in the last month, and has been constipated for 10 days. The family member and  both state that they are unable to understand the patient right now. Additionally, the family member states that the patient has been wheelchair bound and not communicating clearly since the beginning of 2022 and his condition has gotten worse. The family member does not know if the patient is in pain but guesses that he may be dizzy because he cannot walk. Family is also stating that he is unable to eat and has been coughing frequently when trying to eat.    REVIEW OF SYSTEMS   All systems reviewed and negative except as noted in HPI.    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 No pertinent surgical history.        CURRENT MEDICATIONS:    No current facility-administered medications for this encounter.     Current Outpatient Medications   Medication     acetaminophen (TYLENOL) 325 MG tablet     albuterol (PROAIR HFA/PROVENTIL HFA/VENTOLIN HFA) 108 (90 Base) MCG/ACT inhaler     baclofen (LIORESAL) 10 MG tablet     docusate sodium (COLACE) 100 MG capsule     famotidine (PEPCID) 20 MG tablet     fludrocortisone (FLORINEF) 0.1 MG tablet     fluticasone-salmeterol (ADVAIR HFA) 115-21 MCG/ACT inhaler     levothyroxine  "(SYNTHROID/LEVOTHROID) 75 MCG tablet     loratadine (CLARITIN) 10 MG tablet     meclizine (ANTIVERT) 12.5 MG tablet     Multiple Vitamin (TAB-A-MAGALY) TABS     OLANZapine (ZYPREXA) 5 MG tablet     ondansetron (ZOFRAN) 4 MG tablet     traZODone (DESYREL) 50 MG tablet     vitamin B-12 (CYANOCOBALAMIN) 500 MCG tablet     vitamin D3 (CHOLECALCIFEROL) 50 mcg (2000 units) tablet     hydrocortisone (CORTEF) 10 MG tablet         ALLERGIES:  No Known Allergies    FAMILY HISTORY:  Family History   Problem Relation Age of Onset     Diabetes Mother      Psychotic Disorder Mother      Depression Mother      Cancer No family hx of        SOCIAL HISTORY:   Social History     Socioeconomic History     Marital status: Single   Tobacco Use     Smoking status: Never     Smokeless tobacco: Never   Substance and Sexual Activity     Alcohol use: Not Currently     Drug use: Never     Social Determinants of Health     Financial Resource Strain: Medium Risk     Difficulty of Paying Living Expenses: Somewhat hard   Food Insecurity: No Food Insecurity     Worried About Running Out of Food in the Last Year: Never true     Ran Out of Food in the Last Year: Never true   Transportation Needs: Unmet Transportation Needs     Lack of Transportation (Medical): Yes     Lack of Transportation (Non-Medical): No   Intimate Partner Violence: Not At Risk     Fear of Current or Ex-Partner: No     Emotionally Abused: No     Physically Abused: No     Sexually Abused: No       VITALS:  /84   Pulse 90   Temp 98.5  F (36.9  C) (Oral)   Resp 12   Ht 1.676 m (5' 6\")   Wt 69.9 kg (154 lb)   SpO2 96%   BMI 24.86 kg/m      PHYSICAL EXAM    Constitutional: Chronically ill-appearing young male patient, laying in bed, no acute distress  HENT: Normocephalic, Atraumatic. Neck Supple.  Eyes: EOMI, Conjunctiva normal.  Respiratory: Breathing comfortably on room air. Speaks full sentences easily. Lungs clear to ascultation.  Cardiovascular: Normal heart rate, " Regular rhythm. No peripheral edema.  Abdomen: Soft, nontender  Musculoskeletal: Good range of motion in all major joints. No major deformities noted.  Integument: Warm, Dry.  Neurologic: Awake and alert.  Speech is slurred.  Face is symmetric.  Sensation to light touch is intact x4.  Strength is diminished particularly in the right upper and lower extremity.  Unable to lift the right leg off the bed without assistance.  Strength is 4 out of 5 with  strength elbow flexion and extension and shoulder flexion on the right.  Psychiatric: Cooperative. Unable to further assess.     LAB:  All pertinent labs reviewed and interpreted.  Labs Ordered and Resulted from Time of ED Arrival to Time of ED Departure   BASIC METABOLIC PANEL - Abnormal       Result Value    Sodium 137      Potassium 4.4      Chloride 96 (*)     Carbon Dioxide (CO2) 26      Anion Gap 15      Urea Nitrogen 11.8      Creatinine 0.93      Calcium 10.5 (*)     Glucose 95      GFR Estimate >90     HEPATIC FUNCTION PANEL - Abnormal    Protein Total 8.9 (*)     Albumin 5.1      Bilirubin Total 1.0      Alkaline Phosphatase 97      AST 40      ALT 54 (*)     Bilirubin Direct <0.20     CBC WITH PLATELETS AND DIFFERENTIAL - Abnormal    WBC Count 6.6      RBC Count 6.95 (*)     Hemoglobin 20.4 (*)     Hematocrit 59.1 (*)     MCV 85      MCH 29.4      MCHC 34.5      RDW 13.0      Platelet Count 197      % Neutrophils 46      % Lymphocytes 40      % Monocytes 8      % Eosinophils 5      % Basophils 1      % Immature Granulocytes 0      NRBCs per 100 WBC 0      Absolute Neutrophils 3.0      Absolute Lymphocytes 2.5      Absolute Monocytes 0.5      Absolute Eosinophils 0.3      Absolute Basophils 0.1      Absolute Immature Granulocytes 0.0      Absolute NRBCs 0.0     TSH WITH FREE T4 REFLEX - Normal    TSH 2.86     MAGNESIUM - Normal    Magnesium 2.1     CRP INFLAMMATION - Normal    CRP Inflammation 4.40     ROUTINE UA WITH MICROSCOPIC REFLEX TO CULTURE        RADIOLOGY:  Reviewed all pertinent imaging. Please see official radiology report.  Chest XR,  PA & LAT   Final Result   IMPRESSION: No acute consolidation, pneumothorax or pleural effusion. Normal heart size and pulmonary vascularity. No significant change from 05/14/2022.      MR Brain w/o & w Contrast    (Results Pending)           I, Anil Cordon, am serving as a scribe to document services personally performed by Dr. Jose Cevallos, based on my observation and the provider's statements to me. I, Jose Cevallos MD attest that Anil Cordon is acting in a scribe capacity, has observed my performance of the services and has documented them in accordance with my direction.    Jose Cevallos M.D.  Emergency Medicine  [facility] EMERGENCY DEPARTMENT  [address] [place] [address]  [phone]  Dept: [phone]     Jose Cevallos MD  02/10/23 4218

## 2023-02-10 NOTE — ED NOTES
Expected Patient Referral to ED  12:34 PM    Referring Clinic/Provider:  Diane Greenwood, Family Medicine    Reason for referral/Clinical facts:  Patient presented for first clinic visit today with concerns about failure to thrive.  History of adrenal leukodystrophy.  Has had significant decline over the past month, weight has gone from 1 79-1 54 today.  He is now wheelchair-bound, it sounds like he was not fairly recently.  Family says he is unable to eat, has a severe cough with eating.  Would like evaluation for concerns about failure to thrive, may need swallow evaluation if unable to eat and coughing frequently when trying to eat.  Also sounds severely constipated although not seeming obstructed on exam.  Provider thinks probably will require admission for these issues, no beds for direct.    Recommendations provided:  Send to ED for further evaluation          Jose Cevallos MD  [facility] EMERGENCY DEPARTMENT  [facility] [facility] [address]  [phone]       Jose Cevallos MD  02/10/23 5908

## 2023-02-11 PROBLEM — E71.529 X LINKED ADRENOLEUKODYSTROPHY (H): Status: ACTIVE | Noted: 2022-07-18

## 2023-02-11 NOTE — CONSULTS
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  Unchanged signal abnormality in bilateral corticospinal tract    Previous MRI scan showed signal abnormality left greater than right suggesting neurodegenerative disorder    Very long chain fatty acid checked in May 2022 were elevated and on genetic testing (ABCD 1) no pathogenic or likely pathogenic variants were detected but 1 variant of uncertain significance was detected and in the context of elevated very long chain fatty acid and there is variant of uncertain significance diagnosis of X-linked adrenal leukodystrophy was made    Unfortunately ALD is a progressive disorder and patient to undergo hematopoietic cell transplant (HCT) can improve survival rate but it is not curative and rapid progression is common.    No dietary modification or use of statin medication to lower very long chain fatty acid has shown to be effective    Physical therapy consult    Nothing more to add from neurology standpoint, I will sign off.  Please call if any question    Thank you again for this referral, please feel free to contact me if you have any questions.    Alejandro Rubio MD  [facility]  (Formerly, [facility]     CHIEF COMPLAINT X linked adrenoleukodystrophy (H)     HISTORY OF PRESENT ILLNESS     We have been  requested by Dr. Reeves to evaluate Brooke Peterson who is a 32 year old  male for adrenoleukodystrophy    Patient is a 32-year-old male with history of schizophrenia, Ariel disease, PTSD,  adrenal leukodystrophy admitted with failure to thrive, dysarthria and worsening weakness.  According to family member patient had lost appetite and lost almost 25 pounds in the last month and his speech has become more dysarthric and family members are unable to understand him.  He had a MRI of the brain that was negative for acute intracranial abnormalities.  There was unchanged signal abnormality involving both corticospinal tract suggesting a neurodegenerative disorder.    According to family member patient developed weakness of all extremities right leg worse along with arm weakness and some dysarthria..  Dad had limited knowledge about family history but reports that patient's uncles might have some trouble walking..  Dad reports that patient had trouble running when he was a child but attributed that to asthma.  He had MRI of the brain that showed T2 hyperintensity involving corticospinal track on the right and left and possibility of neurodegenerative disorder was raised.  Very long chain fatty acids checked on 5/14/2022 were increased consistent with paroxysmal fatty acid oxidation disorder (X-linked adrenal leukodystrophy or adrenal myeloneuropathy..  He had a lumbar puncture in the past that was negative for malignancy with normal glucose.  Other lab work included normal RPR, HIV, antinuclear antibody, vitamin B12, TSH.  Due to elevated very long chain fatty acids genetic testing (ABCD 1) and although pathogenic variants were not detected a variant of uncertain significance was detected and as patient has elevated very long chain fatty acid suggested the diagnosis of X-linked adrenal leukodystrophy.     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     Adrenal insufficiency (H) E27.40     Hypothyroidism, unspecified type E03.9     Insomnia, unspecified type G47.00     PTSD (post-traumatic stress disorder) F43.10     Schizoaffective disorder, depressive type, with catatonia (H) F25.1, F06.1     Schizophrenia (H) F20.9     Vitamin D deficiency E55.9     Moderate persistent asthma J45.40     Pituitary microadenoma (H) D35.2     Neurodevelopmental disorder F89     Weakness of right leg R29.898     X linked adrenoleukodystrophy (H) E71.529     Leukodystrophy (H) E75.29      Clinically Significant Risk Factors Present on Admission           # Hypercalcemia: Highest Ca = 10.5 mg/dL in last 2 days, will monitor as appropriate                     PAST MEDICAL & SURGICAL HISTORY     Past Medical History: Patient  has a past medical history of [place]'s disease (H) (07/2017), Asthma, Asthma, Hypercalcemia (09/2019), Hyperprolactinemia (H) (12/2018), Hypothyroidism, Hypovitaminosis D (11/2018), Pituitary microadenoma (H), PTSD (post-traumatic stress disorder), and Schizophrenia (H).    Past Surgical History: He  has no past surgical history on file.     SOCIAL HISTORY     Reviewed, and he  reports that he has never smoked. He has never used smokeless tobacco. He reports that he does not currently use alcohol. He reports that he does not use drugs.     FAMILY HISTORY     Reviewed, and family history includes Depression in his mother; Diabetes in his mother; Psychotic Disorder in his mother.     ALLERGIES     No Known Allergies     REVIEW OF SYSTEMS     Pertinent items are noted in HPI.     HOME & HOSPITAL MEDICATIONS     Prior to Admission Medications  (Not in a hospital admission)      Hospital Medications    enoxaparin ANTICOAGULANT  40 mg Subcutaneous Q24H        PHYSICAL EXAM     Vital signs  Temp:  [97.8  F (36.6  C)-99.6  F (37.6  C)] 97.8  F (36.6  C)  Pulse:  [67-98] 67  Resp:  [12-18] 16  BP: (107-118)/(64-84) 107/64  SpO2:  [96 %-99 %]  99 %    Weight:   Wt Readings from Last 1 Encounters:   02/10/23 69.9 kg (154 lb)      General Physical Exam: Patient is alert and oriented x 1. Vital signs were reviewed and are documented in EMR. Neck was supple, no carotid bruit, thyromegaly, JVD or lymphadenopathy noted.  Neurological Exam:  Patient is alert and oriented able to follow commands by mimicking.  Face symmetrical tongue midline.  Pupil equal round and reactive.  Able to move left side to command no movement on the right.  He has increased tone.  Sensation was intact to light touch pinprick bilaterally.  He has 4+ reflexes on the right with sustained clonus.  On the left reflexes are 3+ with 3-4 beats of clonus.  Toes are bilaterally upgoing.  Did not cooperate for cerebellar testing.  Patient is wheelchair-bound     DIAGNOSTIC STUDIES     Pertinent Radiology   Radiology Results: Reviewed impression and images     MRI  2/11/2023  1.  Negative for acute intracranial abnormality.  2.  Unchanged abnormal signal involving the bilateral corticospinal tracts. Differential considerations remain the same.  5/11/2023  1.  There are areas of T2 signal hyperintensity involving the cortical spinal tracts on the right and left left greater than right. Beginning at the level of the posterior limb of the internal capsule and ending inferiorly into the midbrain and emeka.   These changes were present on 06/24/2019 but are more apparent on current study. In addition there is mild T2 signal hyperintensity within the medial aspect of the right and left cerebellar hemispheres and right and left thalami. Differential diagnostic   considerations would include neurodegenerative disorders and toxic metabolic disease.    VERY LONG CHAIN FATTY ACID (5/14/2022)  C 26: Hexacosanoic    1.12 elevated  C26:1                           0.34 elevated   C 24/22 ratio                1.856 elevated  C 26/22 ratio                0.088 elevated  Results consistent with a defect in the  Perioxisomal fatty acid oxidation such as X-linked adrenal leukodystrophy or adrenal myeloneuropathy. (X-linked ALD hemizygote versus X-linked ALD heterozygote)    NEXT GENERATION SEQUENCING AND COPY NUMBER VARIATION ANALYSIS OF ABCD1     RESULTS: NEGATIVE  Pathogenic/Likely Pathogenic Variant(s): None Detected  Variant(s) of Uncertain Significance: One Detected  Interpretation     A variant with possible clinical significance was detected in the ABCD1 gene. This result, in the context of abnormal very long chain fatty acids, is suggestive of a diagnosis of X-linked adrenoleukodystrophy.  Clinical correlation and genetic counseling regarding these results is recommended.     Pertinent Labs   Lab Results: Personally Reviewed   Recent Results (from the past 24 hour(s))   Basic metabolic panel    Collection Time: 02/10/23  1:44 PM   Result Value Ref Range    Sodium 137 136 - 145 mmol/L    Potassium 4.4 3.4 - 5.3 mmol/L    Chloride 96 (L) 98 - 107 mmol/L    Carbon Dioxide (CO2) 26 22 - 29 mmol/L    Anion Gap 15 7 - 15 mmol/L    Urea Nitrogen 11.8 6.0 - 20.0 mg/dL    Creatinine 0.93 0.67 - 1.17 mg/dL    Calcium 10.5 (H) 8.6 - 10.0 mg/dL    Glucose 95 70 - 99 mg/dL    GFR Estimate >90 >60 mL/min/1.73m2   Hepatic function panel    Collection Time: 02/10/23  1:44 PM   Result Value Ref Range    Protein Total 8.9 (H) 6.4 - 8.3 g/dL    Albumin 5.1 3.5 - 5.2 g/dL    Bilirubin Total 1.0 <=1.2 mg/dL    Alkaline Phosphatase 97 40 - 129 U/L    AST 40 10 - 50 U/L    ALT 54 (H) 10 - 50 U/L    Bilirubin Direct <0.20 0.00 - 0.30 mg/dL   TSH with free T4 reflex    Collection Time: 02/10/23  1:44 PM   Result Value Ref Range    TSH 2.86 0.30 - 4.20 uIU/mL   Magnesium    Collection Time: 02/10/23  1:44 PM   Result Value Ref Range    Magnesium 2.1 1.7 - 2.3 mg/dL   CRP inflammation    Collection Time: 02/10/23  1:44 PM   Result Value Ref Range    CRP Inflammation 4.40 <5.00 mg/L   CBC with platelets and differential    Collection Time:  02/10/23  1:44 PM   Result Value Ref Range    WBC Count 6.6 4.0 - 11.0 10e3/uL    RBC Count 6.95 (H) 4.40 - 5.90 10e6/uL    Hemoglobin 20.4 (H) 13.3 - 17.7 g/dL    Hematocrit 59.1 (H) 40.0 - 53.0 %    MCV 85 78 - 100 fL    MCH 29.4 26.5 - 33.0 pg    MCHC 34.5 31.5 - 36.5 g/dL    RDW 13.0 10.0 - 15.0 %    Platelet Count 197 150 - 450 10e3/uL    % Neutrophils 46 %    % Lymphocytes 40 %    % Monocytes 8 %    % Eosinophils 5 %    % Basophils 1 %    % Immature Granulocytes 0 %    NRBCs per 100 WBC 0 <1 /100    Absolute Neutrophils 3.0 1.6 - 8.3 10e3/uL    Absolute Lymphocytes 2.5 0.8 - 5.3 10e3/uL    Absolute Monocytes 0.5 0.0 - 1.3 10e3/uL    Absolute Eosinophils 0.3 0.0 - 0.7 10e3/uL    Absolute Basophils 0.1 0.0 - 0.2 10e3/uL    Absolute Immature Granulocytes 0.0 <=0.4 10e3/uL    Absolute NRBCs 0.0 10e3/uL   UA with Microscopic reflex to Culture    Collection Time: 02/10/23  4:12 PM    Specimen: Urine, Midstream   Result Value Ref Range    Color Urine Light Yellow Colorless, Straw, Light Yellow, Yellow    Appearance Urine Clear Clear    Glucose Urine Negative Negative mg/dL    Bilirubin Urine Negative Negative    Ketones Urine 10 (A) Negative mg/dL    Specific Gravity Urine 1.019 1.001 - 1.030    Blood Urine Negative Negative    pH Urine 6.5 5.0 - 7.0    Protein Albumin Urine 20 (A) Negative mg/dL    Urobilinogen Urine 2.0 (A) <2.0 mg/dL    Nitrite Urine Negative Negative    Leukocyte Esterase Urine Negative Negative    Mucus Urine Present (A) None Seen /LPF    RBC Urine <1 <=2 /HPF    WBC Urine <1 <=5 /HPF       Total time spent for face to face visit, reviewing labs/imaging studies, counseling and coordination of care was: 1 Hour 30 Minutes More than 50% of this time was spent on counseling and coordination of care.      This note was dictated using voice recognition software.  Any grammatical or context distortions are unintentional and inherent to the software.

## 2023-02-11 NOTE — PLAN OF CARE
Problem: Plan of Care - These are the overarching goals to be used throughout the patient stay.    Goal: Plan of Care Review  Description: The Plan of Care Review/Shift note should be completed every shift.  The Outcome Evaluation is a brief statement about your assessment that the patient is improving, declining, or no change.  This information will be displayed automatically on your shift note.  Outcome: Progressing   Goal Outcome Evaluation:       Pt's dad states that he doesn't understand his son. Dad aware on NPO status. Dad  updated on plan of care. Call light and items placed within reach, side rails up.

## 2023-02-11 NOTE — PLAN OF CARE
"Pt arrived to floor, pt assist of 2. Dependent care. Angela speaking. Pt does not speak well, but understands angela and some english, pt can answer yes or no questions.    /70 (BP Location: Left arm)   Pulse 73   Temp 98.1  F (36.7  C) (Oral)   Resp 16   Ht 1.676 m (5' 6\")   Wt 69.9 kg (154 lb)   SpO2 98%   BMI 24.86 kg/m       Pt denies pain, pt incontinent of bowel and bladder. W/c bound at baseline, lives at home with family.  "

## 2023-02-11 NOTE — PROGRESS NOTES
[facility] Progress Note - Hospitalist Service    Date of Admission:  2/10/2023    Assessment & Plan   Brooke Peterson is a 32 year old male with a pertinent history of adrenoleukodystrophy, schizophrenia, [place]'s disease, PTSD, hypothyroidism, and neurodevelopmental disorder who presents to this ED via wheelchair with family member for evaluation of failure to thrive in adult.     Faillure to thrive  Dysarthria and dysphagia  Worsening weakness   Weight loss  Dehydration  -worse dysarthria ,  mental status, weakness, now wheel chair bound  -worse swallow function with coughing on oral, dysphagia, weight loss  -Hospitalized in may 2022, with MRI and neurology consultation: neurologist concerned it is some kind of adrenoleukodystrophy. Patient was discharged to short-term care facility first then back to home with [facility]. As per family, HHC stopped. When pt was discharged, physician recommended pt to see special neurologist at  or [facility].   -  Neurology consult appreciated .  Now has signed off  -Unfortunately adrenal leukodystrophy is progressive neurologic disease with no cure  - PT  - Nutrition consult  - SLP evaluation appreciated.  VSS done and dietary recommendations with puréed diet and thin liquids      Ariel's disease  -PTA fludrocortisone 0.15 mg oral daily  -PTA Hydrocortisone 10 mg oral every morning and 5 mg oral every evening    Schizophrenia  -PTA olanzapine 5 mg oral every morning    Hypothyroidism  -PTA meds after review     Polycythemia   -looks like chronic but rising recently  -Hematology consultation        Diet: Combination Diet Pureed Diet (level 4); Thin Liquids (level 0)    DVT Prophylaxis: Enoxaparin (Lovenox) SQ  Booth Catheter: Not present  Lines: None     Cardiac Monitoring: None  Code Status: Full Code      Clinically Significant Risk Factors Present on Admission           # Hypercalcemia: Highest Ca = 10.5 mg/dL in last 2 days, will monitor as  appropriate                     Disposition Plan     Expected Discharge Date: 02/11/2023                  Sung Reeves MD  Hospitalist Service  [facility]  Securely message with [email or web address] (more info)  Text page via [employer] Paging/Directory   ______________________________________________________________________    Interval History   Patient was being transported to video swallow study.  Case was discussed with RN and SLP teams.    Physical Exam   Vital Signs: Temp: 98.7  F (37.1  C) Temp src: Oral BP: 110/84 Pulse: 76   Resp: 16 SpO2: 96 % O2 Device: None (Room air)    Weight: 154 lbs 0 oz    General Appearance: No distress   Respiratory: BLAE  Cardiovascular: S1/S2  GI: soft, normoactive BS  Skin: intact and warm   Other: Right side body weakness     Medical Decision Making       45 MINUTES SPENT BY ME on the date of service doing chart review, history, exam, documentation & further activities per the note.      Data

## 2023-02-11 NOTE — PROGRESS NOTES
"CLINICAL SWALLOW EVALUATION     02/11/23 0853   Appointment Info   Signing Clinician's Name / Credentials (SLP) Leela Stephenson [facility]   General Information   Onset of Illness/Injury or Date of Surgery 02/10/23   Referring Physician Sung Reeves MD   Patient/Family Therapy Goal Statement (SLP) Patient did not state but agreed to swallow evaluation.   Pertinent History of Current Problem Per neurologist note: \"Patient is a 32-year-old male with history of schizophrenia, [place] disease, PTSD,  adrenal leukodystrophy admitted with failure to thrive, dysarthria and worsening weakness.  According to family member patient had lost appetite and lost almost 25 pounds in the last month and his speech has become more dysarthric and family members are unable to understand him.  He had a MRI of the brain that was negative for acute intracranial abnormalities.  There was unchanged signal abnormality involving both corticospinal tract suggesting a neurodegenerative disorder. According to family member patient developed weakness of all extremities right leg worse along with arm weakness and some dysarthria. CXR: No acute consolidation, pneumothorax or pleural effusion. Normal heart size and pulmonary vascularity. No significant change from 05/14/2022.   General Observations Patient alert, cooperative. Patient's father present. Patient nodded head yes/no or pointed to his father to answer questions. Speech severely dysarthric and not intelligible to  and father. RN reported patient given Zofran earlier today due to nausea.       Present yes   Language Angela  (via [employer])   Type of Evaluation   Type of Evaluation Swallow Evaluation   Oral Motor   Oral Musculature anomalies present   Structural Abnormalities none present   Mucosal Quality adequate   Dentition (Oral Motor)   Dentition (Oral Motor) natural dentition;adequate dentition   Facial Symmetry (Oral Motor)   Facial Symmetry (Oral " "Motor) right side impairment   Right Side Facial Asymmetry minimal impairment   Lip Function (Oral Motor)   Lip Range of Motion (Oral Motor) retraction impairment   Lip Strength (Oral Motor) WFL   Lip Coordination (Oral Motor) moderate impairment   Retraction, Lip Range of Motion right side;moderate impairment   Tongue Function (Oral Motor)   Tongue Strength (Oral Motor) strength decreased;bilateral;moderate impairment  (right weaker than left side)   Tongue Coordination/Speed (Oral Motor) reduced rate   Jaw Function (Oral Motor)   Jaw Function (Oral Motor) WNL   Cough/Swallow/Gag Reflex (Oral Motor)   Soft Palate/Velum (Oral Motor) elevation decreased on right   Volitional Throat Clear/Cough (Oral Motor) reduced strength   Volitional Swallow (Oral Motor) mildly delayed   Vocal Quality/Secretion Management (Oral Motor)   Vocal Quality (Oral Motor) hoarse;hypophonic;strained/strangled   Secretion Management (Oral Motor) WNL   General Swallowing Observations   Past History of Dysphagia No formal swallow evaluations per EMR review. Patient's father reported limited oral intake at home. He reported some coughing with oral intake (unable to recall with liquids or solids or both). He reported foods are mostly smooth or mashed \"like yogurt\" consistency. He reported patient vomits after eating at times.   Respiratory Support (General Swallowing Observations) none   Current Diet/Method of Nutritional Intake (General Swallowing Observations, NIS) NPO   Swallowing Evaluation Clinical swallow evaluation   Clinical Swallow Evaluation   Feeding Assistance dependent   Clinical Swallow Evaluation Textures Trialed thin liquids;mildly thick liquids;pureed   Clinical Swallow Eval: Thin Liquid Texture Trial   Mode of Presentation, Thin Liquids spoon;fed by clinician   Volume of Liquid or Food Presented 2 small ice chips   Oral Phase of Swallow impaired mastication;delayed AP movement;effortful AP movement  (1 ice chip spilled out of " mouth)   Pharyngeal Phase of Swallow coughing/choking   Diagnostic Statement Immediate coughing episode after swallowing melted ice chip.   Clinical Swallow Eval: Mildly Thick Liquids   Mode of Presentation spoon;fed by clinician   Volume Presented 4 sips by spoon   Oral Phase effortful AP movement   Pharyngeal Phase   (appeared delayed)   Diagnostic Statement No  overt aspiration signs occurred. Audible swallows.   Clinical Swallow Evaluation: Puree Solid Texture Trial   Mode of Presentation, Puree spoon;fed by clinician   Volume of Puree Presented 4 small bites applesauce (1/3 tsp size bites)   Oral Phase, Puree effortful AP movement   Pharyngeal Phase, Puree   (appeared delayed)   Diagnostic Statement No overt apsiration signs occurred. Audible swallows.   Esophageal Phase of Swallow   Patient reports or presents with symptoms of esophageal dysphagia No   Swallowing Recommendations   Diet Consistency Recommendations NPO  (until further instrumental swallow assessment)   Instrumental Assessment Recommendations VFSS (videofluoroscopic swallowing study)   General Therapy Interventions   Planned Therapy Interventions Dysphagia Treatment   Dysphagia treatment Modified diet education;Instruction of safe swallow strategies   Clinical Impression   Criteria for Skilled Therapeutic Interventions Met (SLP Eval) Yes, treatment indicated   SLP Diagnosis Dysphagia   Risks & Benefits of therapy have been explained evaluation/treatment results reviewed;care plan/treatment goals reviewed;risks/benefits reviewed;current/potential barriers reviewed;participants voiced agreement with care plan;participants included;patient;father   Clinical Impression Comments Moderate oropharyngeal dysphagia based on clinical exam this morning characterized by reduced oral bolus control and AP bolus transit, delayed appearing swallow response, immediate coughing after limited intake of thin liquid. No overt aspiration signs occurred with mildly  thick liquid or puree consistencies. Patient requried total feeding assist due to weakness. Recommend video swallow study to further assess oropharyngeal swallow function. Patient to remain NPO for now. Further recommendations pending results of instrumental exam.   SLP Total Evaluation Time   Eval: oral/pharyngeal swallow function, clinical swallow Minutes (77957) 12   SLP Goals   Therapy Frequency (SLP Eval) 5 times/wk   SLP Predicted Duration/Target Date for Goal Attainment 03/11/23   SLP Goals Swallow   SLP: Safely tolerate diet without signs/symptoms of aspiration Pureed diet;Mildly thick liquids;With use of swallow precautions;With assistance/supervision   Interventions   Interventions Quick Adds Swallowing Dysfunction   Swallowing Dysfunction &/or Oral Function for Feeding   Treatment of Swallowing Dysfunction &/or Oral Function for Feeding Minutes (21473) 8   Symptoms Noted During/After Treatment None   Treatment Detail/Skilled Intervention Provided education about signs of aspiration, video swallow study procedure and current NPO status.   SLP Discharge Planning   SLP Plan VFSS   SLP Discharge Recommendation home with home care speech therapy   SLP Rationale for DC Rec Below baseline for swallow function; continued SLP for safe swallow strategies and least restrictive diet.   SLP Brief overview of current status  Recommend video swallow study to further assess oropharyngeal swallow function, determine least restrictive diet and safe swallow strategies. Patient to remain NPO for now. Further recommendations pending results of instrumental exam.   Total Session Time   Total Session Time (sum of timed and untimed services) 20

## 2023-02-11 NOTE — PROGRESS NOTES
"VIDEO SWALLOW STUDY     02/11/23 1141   Appointment Info   Signing [facility]'s [facility] / Credentials (SLP[facility]   General Information   Onset of Illness/Injury or Date of Surgery 02/10/23   Referring Physician Sung Reeves MD   Pertinent History of Current Problem Per neurologist note: \"Patient is a 32-year-old male with history of schizophrenia, [place] disease, PTSD,  adrenal leukodystrophy admitted with failure to thrive, dysarthria and worsening weakness.  According to family member patient had lost appetite and lost almost 25 pounds in the last month and his speech has become more dysarthric and family members are unable to understand him.  He had a MRI of the brain that was negative for acute intracranial abnormalities.  There was unchanged signal abnormality involving both corticospinal tract suggesting a neurodegenerative disorder. According to family member patient developed weakness of all extremities right leg worse along with arm weakness and some dysarthria. CXR: No acute consolidation, pneumothorax or pleural effusion. Normal heart size and pulmonary vascularity. No significant change from 05/14/2022.   General Observations Patient alert, cooperative, follows instructions well for swallow strategies.       Present yes   Language Angela  (iPad)   Type of Evaluation   Type of Evaluation Swallow Evaluation   General Swallowing Observations   Swallowing Evaluation Videofluoroscopic swallow study (VFSS)   VFSS Evaluation   Radiologist Dr. Wagoner   Views Taken left lateral   Physical Location of Procedure [facility] radiology   VFSS Textures Trialed thin liquids;slightly thick liquids;mildly thick liquids;pureed   VFSS Eval: Thin Liquid Texture Trial   Mode of Presentation, Thin Liquid spoon;fed by clinician   Order of Presentation 5, 6 (no chin tuck); 19, 20 (with chin tuck)   Preparatory Phase poor bolus control   Oral Phase, Thin Liquid " impaired AP movement;premature pharyngeal entry   Bolus Location When Swallow Triggered pyriforms   Pharyngeal Phase, Thin Liquid impaired hyolaryngeal excursion;impaired epiglottic movement;impaired tongue base retraction;pharyngeal wall coating;residue in vallecula;residue in pyriform sinus;impaired pharyngoesophageal segment opening   Rosenbek's Penetration Aspiration Scale: Thin Liquid Trial Results 3 - contrast remains above the vocal cords, visible residue remains (penetration)   Strategies and Compensations chin tuck   Diagnostic Statement No aspiration or penetration when using chin tuck posture.   VFSS Eval: Slightly Thick Liquids   Mode of Presentation spoon;fed by clinician   Order of Presentation 3, 4, 9 (no chin tuck); 18 (chin tuck)   Preparatory Phase poor bolus control   Oral Phase impaired AP movement;premature pharyngeal entry   Bolus Location When Swallow Triggered pyriforms   Pharyngeal Phase impaired hyolaryngel excursion;impaired epiglottic movement;impaired tongue base retraction;pharyngeal wall coating;residue in vallecula;residue in pyriform sinus;impaired pharyngoesophageal segment opening   Rosenbek's Penetration Aspiration Scale 3 - contrast remains above the vocal cords, visable residue remains (penetration)   Strategies and Compensations chin tuck   Diagnostic Statement No aspiration or penetration when using chin tuck posture.   VFSS Eval: Mildly Thick Liquids   Mode of Presentation spoon;fed by clinician   Order of Presentation 1, 2, 10, 11, 14 (no chin tuck); 15, 16 (with chin tuck)   Preparatory Phase poor bolus control   Oral Phase impaired AP movement;premature pharyngeal entry   Bolus Location When Swallow Triggered pyriforms   Pharyngeal Phase impaired hyolaryngel excursion;impaired epiglottic movement;impaired tongue base retraction;pharyngeal wall coating;residue in vallecula;residue in pyriform sinus;impaired pharyngoesophageal segment opening   Rosenbek's Penetration  Aspiration Scale 3 - contrast remains above the vocal cords, visible residue remains (penetration)   Strategies and Compensations chin tuck   Diagnostic Statement No aspiration or penetration when using chin tuck posture.   VFSS Evaluation: Puree Solid Texture Trial   Mode of Presentation, Puree spoon;fed by clinician   Order of Presentation 7, 12, 13 (no chin tuck); 17 (with chin tuck)   Preparatory Phase poor bolus control   Oral Phase, Puree impaired AP movement;premature pharyngeal entry   Bolus Location When Swallow Triggered valleculae   Pharyngeal Phase, Puree impaired hyolaryngel excursion;impaired epiglottic movement;impaired tongue base retraction;pharyngeal wall coating;residue in vallecula;residue in pyriform sinus;impaired pharyngoesophageal segment opening  (severe pyriform sinus residue without chin tuck)   Rosenbek's Penetration Aspiration Scale: Puree Food Trial Results 2 - contrast enters airway, remains above the vocal cords, no residue remains (penetration)  (possibly flash penetration of residue material)   Strategies and Compensations chin tuck   Diagnostic Statement No aspiration or penetration when using chin tuck posture. Minimal-mild pharyngeal residue with chin tuck posture.   Esophageal Phase of Swallow   Esophageal sweep performed during today s vidofluoroscopic exam  No   Swallowing Recommendations   Diet Consistency Recommendations pureed (level 4);thin liquids (level 0)   Supervision Level for Intake 1:1 supervision needed   Mode of Delivery Recommendations bolus size, small;liquids via spoon only;no cups;no straws;slow rate of intake   Postural Recommendations chin tuck  (EVERY SWALLOW)   Swallowing Maneuver Recommendations alternate food and liquid intake   Monitoring/Assistance Required (Eating/Swallowing) stop eating activities when fatigue is present;monitor for cough or change in vocal quality with intake   Recommended Feeding/Eating Techniques (Swallow Eval) maintain upright  posture during/after eating for 30 minutes;minimize distractions during oral intake;provide assist with feeding   Medication Administration Recommendations, Swallowing (SLP) Crushed with applesauce   General Therapy Interventions   Planned Therapy Interventions Dysphagia Treatment   Dysphagia treatment Modified diet education;Instruction of safe swallow strategies;Compensatory strategies for swallowing   Clinical Impression   Criteria for Skilled Therapeutic Interventions Met (SLP Eval) Yes, treatment indicated   SLP Diagnosis Moderate-severe oropharyngeal dysphagia   Risks & Benefits of therapy have been explained evaluation/treatment results reviewed;care plan/treatment goals reviewed;risks/benefits reviewed;current/potential barriers reviewed;participants voiced agreement with care plan;participants included;patient   Clinical Impression Comments Moderate-severe oropharyngeal dysphagia characterized by reduced oral bolus control, delayed swallow response, reduced BOT retraction, reduced pharyngeal constriction, reduced hyolaryngeal elevation, absent epiglottic inversion (epiglottis moves posteriorly only to approximate PPW) which results in laryneal penetration of all consistencies of liquids as well as suspect pharyngeal residue material (increasing degree as study progressed) and moderate to severe pyriform sinus residue. Barium observed at and below level of vocal folds suspect as a result of persistent laryngeal penetration and pharyngeal residue as exam progressed. No cough response present. Patient unable to achieve effective cough response on command due to weakness. Chin tuck posture significantly improved safety and efficiency of swallow function. No aspiration or laryngeal penetration occurred and minimal to mild pharyngeal residue remained when using chin tuck posture. Patient demonstrated excellent use of this strategy during exam. Recommend initiate puree diet with thin liquids (IDDSI 4, 0) given  total feeding assist and swallow strategies (upright at 90 degrees, chin tuck EVERY swallow, liquids by spoon, slow pace, alternate liquids/solids). Please crush pills with applesauce. Monitor for signs/symptoms of aspiration and hold PO if occur. SLP to follow for diet tolerance, swallow strategy training.   SLP Total Evaluation Time   Evaluation, videofluoroscopic eval of swallow function Minutes (24458) 15   SLP Goals   Therapy Frequency (SLP Eval) 5 times/wk   SLP Predicted Duration/Target Date for Goal Attainment 03/11/23   SLP Goals Swallow   SLP: Safely tolerate diet without signs/symptoms of aspiration Pureed diet;Thin liquids   Swallowing Dysfunction &/or Oral Function for Feeding   Treatment of Swallowing Dysfunction &/or Oral Function for Feeding Minutes (50940) 8   Symptoms Noted During/After Treatment None   Treatment Detail/Skilled Intervention Provided education about diet modifications and swallow strategies. Patient needs 1:1/total feeding assist and initial reinforcement as needed for new swallow strategies.   SLP Discharge Planning   SLP Plan 5x/wk: meal f/u puree/thin with chin tuck every swallow; practice small sips by straw or cup as appropriate (requires total feeding assist)   SLP Discharge Recommendation home with home care speech therapy   SLP Rationale for DC Rec Below baseline for swallow function; continued SLP for safe swallow strategies and least restrictive diet.   SLP Brief overview of current status  Recommend initiate puree diet with thin liquids (IDDSI 4, 0) given total feeding assist and swallow strategies (upright at 90 degrees, chin tuck EVERY swallow, liquids by spoon, slow pace, alternate liquids/solids). Please crush pills with applesauce. Monitor for signs/symptoms of aspiration and hold PO if occur. SLP to follow for diet tolerance, swallow strategy training.   Total Session Time   Total Session Time (sum of timed and untimed services) 23

## 2023-02-11 NOTE — ED NOTES
[facility]  [facility] date:   Expected time:   Means of arrival:   Comments:  Walter pt

## 2023-02-11 NOTE — PROGRESS NOTES
Angela  used, Emelyn [id number]  Pt reports feeling tired and nauseous.  PRN zofran given.   at times had difficulty understanding pt, father was able to help.  Pt denies pain, states that he is comfortable.  Pt father in room throughout night, states pt can usually tolerate very soft food.  Writer informed POC of swallow study and neurology consult.    PT and family member had no further questions.    Tammy Looney RN

## 2023-02-11 NOTE — PROGRESS NOTES
"PHYSICAL THERAPY     02/11/23 0900   Appointment Info   Signing Clinician's Name / Credentials (PT) Phill Mcnamara, PT, DPT       Present yes   Language Angela   Living Environment   People in Home parent(s)   Current Living Arrangements house   Home Accessibility no concerns   Transportation Anticipated family or friend will provide   Living Environment Comments Dad states that there are stairs in the home but the pt lives on main level and does not need to use them. He states that he is unable to transfer out of bed without physical assistance and is unable to propel himself in a wheelchair. Pt uses commode and needs physical assist to transfer onto it.   Self-Care   Usual Activity Tolerance poor   Current Activity Tolerance poor   Regular Exercise No   Equipment Currently Used at Home commode chair;wheelchair, manual   Fall history within last six months no   General Information   Onset of Illness/Injury or Date of Surgery 02/10/23   Referring Physician Nikolia Garcia MD   Patient/Family Therapy Goals Statement (PT) Dad states \"to get him better\"   Pertinent History of Current Problem (include personal factors and/or comorbidities that impact the POC) Per neurologist note: \"Patient is a 32-year-old male with history of schizophrenia, [place] disease, PTSD,  adrenal leukodystrophy admitted with failure to thrive, dysarthria and worsening weakness.  According to family member patient had lost appetite and lost almost 25 pounds in the last month and his speech has become more dysarthric and family members are unable to understand him.  He had a MRI of the brain that was negative for acute intracranial abnormalities.  There was unchanged signal abnormality involving both corticospinal tract suggesting a neurodegenerative disorder. According to family member patient developed weakness of all extremities right leg worse along with arm weakness and some dysarthria. CXR: No acute consolidation, " pneumothorax or pleural effusion. Normal heart size and pulmonary vascularity. No significant change from 05/14/2022.   Cognition   Affect/Mental Status (Cognition) low arousal/lethargic   Orientation Status (Cognition) oriented to;person;place   Follows Commands (Cognition) follows one-step commands;75-90% accuracy;physical/tactile prompts required;verbal cues/prompting required   Pain Assessment   Patient Currently in Pain   (unable to assess)   Posture    Posture Protracted shoulders   Range of Motion (ROM)   Range of Motion ROM deficits secondary to weakness   Strength (Manual Muscle Testing)   Strength (Manual Muscle Testing) Deficits observed during functional mobility   Strength Comments pt demosntrates gross 1/5 MMT on R LE and 3/5 on R LE   Bed Mobility   Bed Mobility supine-sit;sit-supine   Supine-Sit [place] (Bed Mobility) maximum assist (25% patient effort)   Sit-Supine [place] (Bed Mobility) maximum assist (25% patient effort)   Bed Mobility Limitations decreased ability to use legs for bridging/pushing;decreased ability to use arms for pushing/pulling;impaired ability to control trunk for mobility   Impairments Contributing to Impaired Bed Mobility impaired balance;decreased strength;impaired postural control   Transfers   Transfers sit-stand transfer   Transfer Safety Concerns Noted losing balance backward;decreased weight-shifting ability   Impairments Contributing to Impaired Transfers impaired balance;impaired postural control;decreased strength   Sit-Stand Transfer   Sit-Stand [place] (Transfers) maximum assist (25% patient effort)   Assistive Device (Sit-Stand Transfers) other (see comments)   Comment, (Sit-Stand Transfer) attempted to stand but unable to and significant LOB.   Gait/Stairs (Locomotion)   [place] Level (Gait) not tested   Balance   Balance other (describe)   Balance Comments pt demosntrates significant trunk lean to L side in sitting and needs George to maintain  balance.   Clinical Impression   Criteria for Skilled Therapeutic Intervention Yes, treatment indicated   PT Diagnosis (PT) decreased functional mobility   Influenced by the following impairments decreased strength, balance and activity tolerance   Functional limitations due to impairments bed mobility, transfers   Clinical Presentation (PT Evaluation Complexity) Evolving/Changing   Clinical Presentation Rationale presents as medically diagnosed   Clinical Decision Making (Complexity) moderate complexity   Planned Therapy Interventions (PT) balance training;bed mobility training;home exercise program;neuromuscular re-education;patient/family education;postural re-education;ROM (range of motion);strengthening;transfer training;wheelchair management/propulsion training;progressive activity/exercise   Anticipated Equipment Needs at Discharge (PT) lift device   Risk & Benefits of therapy have been explained evaluation/treatment results reviewed;participants included;patient;father   PT Total Evaluation Time   PT Eval, Moderate Complexity Minutes (91211) 15   Therapy Certification   Start of care date 02/11/23   Certification date from 02/11/23   Certification date to 02/17/23   Medical Diagnosis [place]'s disease   Physical Therapy Goals   PT Frequency Daily   PT Predicted Duration/Target Date for Goal Attainment 02/17/23   PT Goals Bed Mobility;Transfers   PT: Bed Mobility Moderate assist;Supine to/from sit   PT: Transfers Moderate assist;Sit to/from stand;Bed to/from chair   Interventions   Interventions Quick Adds Therapeutic Activity   Therapeutic Activity   Therapeutic Activities: dynamic activities to improve functional performance Minutes (23734) 10   Symptoms Noted During/After Treatment Fatigue   Treatment Detail/Skilled Intervention Pt performs supine <> sit transfer with maxA and heavy cues for technique. Pt is only able to use L UE to assist with transfer and has strong trunk lean to L side throughout  transfer and when sitting EOB needing George when sitting to maintain balance. Pt needs maxA for B LE management.   PT Discharge Planning   PT Plan bed mobility, AROM/PROM, ex, transfers   PT Discharge Recommendation (DC Rec) home with assist   PT Rationale for DC Rec Pt needs physical assist for all mobility which family is able to provide but may need increased assistance for ADLs and transfers. May benefit from transfer device.   PT Brief overview of current status pt uses wheelchair at baseline and has limited OOB acitivty at baseline; needs physical assist for transfers at baseline.   Total Session Time   Timed Code Treatment Minutes 10   Total Session Time (sum of timed and untimed services) 25   Phill Mcnamara, PT, DTaP                                                                                  [facility]      OUTPATIENT PHYSICAL THERAPY EVALUATION  PLAN OF TREATMENT FOR OUTPATIENT REHABILITATION  (COMPLETE FOR INITIAL CLAIMS ONLY)  Patient's Last Name, First Name, M.I.  YOB: 1990  Po,Toe  T                        Provider's Name  [facility] Medical Record No.  [id number]                               Onset Date:  02/10/23   Start of Care Date:  02/11/23      Type:     _X_PT   ___OT   ___SLP Medical Diagnosis:  [place]'s disease                        PT Diagnosis:  decreased functional mobility   Visits from SOC:  1   _________________________________________________________________________________  Plan of Treatment/Functional Goals    Planned Interventions: balance training, bed mobility training, home exercise program, neuromuscular re-education, patient/family education, postural re-education, ROM (range of motion), strengthening, transfer training, wheelchair management/propulsion training, progressive activity/exercise     Goals: See Physical Therapy Goals on Care Plan in [facility] electronic health record.    Therapy Frequency:  Daily  Predicted Duration of Therapy Intervention: 02/17/23  _________________________________________________________________________________    I CERTIFY THE NEED FOR THESE SERVICES FURNISHED UNDER        THIS PLAN OF TREATMENT AND WHILE UNDER MY CARE     (Physician co-signature of this document indicates review and certification of the therapy plan).              Certification date from: 02/11/23, Certification date to: 02/17/23    Referring Physician: Nikolai Garcia MD            Initial Assessment        See Physical Therapy evaluation dated 02/11/23 in Epic electronic health record.

## 2023-02-11 NOTE — PROGRESS NOTES
PRIMARY DIAGNOSIS: GENERALIZED WEAKNESS    OUTPATIENT/OBSERVATION GOALS TO BE MET BEFORE DISCHARGE  1. Orthostatic performed: N/A    2. Tolerating PO medications: No    3. Return to near baseline physical activity: No    4. Cleared for discharge by consultants (if involved): No    Discharge Planner Nurse   Safe discharge environment identified: No. Family looking  into placement  Barriers to discharge: Yes       Entered by: Debi Kathleen RN 02/11/2023 11:21 AM     Please review provider order for any additional goals.   Nurse to notify provider when observation goals have been met and patient is ready for discharge.

## 2023-02-11 NOTE — PROGRESS NOTES
Pt to transfer to P1 Room 122. P1 has been notified that the note is in.     Pt has some medications that Pharmacy is to send. Have not received medications at this time. Talked with Pharmacy and they will send to P1. Medications to be taken crushed with applesauce.

## 2023-02-11 NOTE — PLAN OF CARE
PRIMARY DIAGNOSIS: ACUTE PAIN  OUTPATIENT/OBSERVATION GOALS TO BE MET BEFORE DISCHARGE:  1. Pain Status: Pain free.    2. Return to near baseline physical activity: No    3. Cleared for discharge by consultants (if involved): No    Discharge Planner Nurse   Safe discharge environment identified: No  Barriers to discharge: Yes       Entered by: Nany Lauren RN 02/10/2023 7:13 PM     Please review provider order for any additional goals.   Nurse to notify provider when observation goals have been met and patient is ready for discharge.Goal Outcome Evaluation:

## 2023-02-11 NOTE — PLAN OF CARE
PRIMARY DIAGNOSIS: GENERALIZED WEAKNESS    OUTPATIENT/OBSERVATION GOALS TO BE MET BEFORE DISCHARGE  1. Orthostatic performed: No    2. Tolerating PO medications: No    3. Return to near baseline physical activity:  pt has had increasingly weakening strength since beginning of year.    4. Cleared for discharge by consultants (if involved): No    Discharge Planner Nurse   Safe discharge environment identified: Yes  Barriers to discharge: Yes       Entered by: Tammy Looney RN 02/11/2023 3:11 AM   Tammy Looney RN      Please review provider order for any additional goals.   Nurse to notify provider when observation goals have been met and patient is ready for discharge.Goal Outcome Evaluation:

## 2023-02-11 NOTE — PLAN OF CARE
PRIMARY DIAGNOSIS: GENERALIZED WEAKNESS    OUTPATIENT/OBSERVATION GOALS TO BE MET BEFORE DISCHARGE  1. Orthostatic performed: No    2. Tolerating PO medications: No    3. Return to near baseline physical activity: No    4. Cleared for discharge by consultants (if involved): No    Discharge Planner Nurse   Safe discharge environment identified: Yes  Barriers to discharge: Yes       Entered by: Tammy Looney RN 02/11/2023 5:47 AM  Tammy Looney RN      Please review provider order for any additional goals.   Nurse to notify provider when observation goals have been met and patient is ready for discharge.Goal Outcome Evaluation:

## 2023-02-11 NOTE — CONSULTS
Care Management Initial Consult    General Information  Assessment completed with: -chart review,    Type of CM/SW Visit: Chart Assessment    Primary Care Provider verified and updated as needed: Yes   Readmission within the last 30 days: no previous admission in last 30 days         Advance Care Planning:            Communication Assessment  Patient's communication style: spoken language (non-English)             Cognitive  Cognitive/Neuro/Behavioral: .WDL except, speech, orientation        Orientation: other (see comments) (LELIA)  Mood/Behavior: flat affect     Speech: dysarthric (not understandable per Dad and )    Living Environment:   People in home: parent(s)     Current living Arrangements: house      Able to return to prior arrangements:  (TBD)       Family/Social Support:  Care provided by: parent(s)  Provides care for: no one, unable/limited ability to care for self     PCA, Parent(s)          Description of Support System: Supportive, Involved         Current Resources:   Patient receiving home care services:       Community Resources:    Equipment currently used at home: commode chair, wheelchair, manual  Supplies currently used at home:      Employment/Financial:  Employment Status:          Financial Concerns:             Lifestyle & Psychosocial Needs:  Social Determinants of Health     Tobacco Use: Low Risk      Smoking Tobacco Use: Never     Smokeless Tobacco Use: Never     Passive Exposure: Not on file   Alcohol Use: Not At Risk     Frequency of Alcohol Consumption: Never     Average Number of Drinks: Not asked     Frequency of Binge Drinking: Never   Financial Resource Strain: Medium Risk     Difficulty of Paying Living Expenses: Somewhat hard   Food Insecurity: No Food Insecurity     Worried About Running Out of Food in the Last Year: Never true     Ran Out of Food in the Last Year: Never true   Transportation Needs: Unmet Transportation Needs     Lack of Transportation (Medical): Yes      Lack of Transportation (Non-Medical): No   Physical Activity: Not on file   Stress: Not on file   Social Connections: Not on file   Intimate Partner Violence: Not At Risk     Fear of Current or Ex-Partner: No     Emotionally Abused: No     Physically Abused: No     Sexually Abused: No   Depression: At risk     PHQ-2 Score: 4   Housing Stability: Not on file       Functional Status:  Prior to admission patient needed assistance:   Dependent ADLs:: Bathing, Dressing, Eating, Grooming, Incontinence, Wheelchair-independent, Toileting, Transfers  Dependent IADLs:: Cleaning, Cooking, Laundry, Shopping, Meal Preparation, Medication Management, Money Management, Transportation      Additional Information:    Unable to reach patient's family. Patient difficult to understand via . Father is primary family contact. Patient lives with his parents in their home. He requires assistance with ADLs and IADLs, requires assistance with transfers and using wheelchair. Patient has PCA hours (father is PCA). [facility] provides medication set up every 2 weeks. Patient is connected with [facility] care coordination.     Patient was at clinic 2/10. Sent to ED for failure to thrive. Anticipating need for increased support at home.    Final discharge plan pending progression and recommendations.       Whitney Olivera RN

## 2023-02-11 NOTE — PROGRESS NOTES
"Physical Therapy     02/11/23 0900   Appointment Info   Signing Clinician's Name / Credentials (PT) Phill Mcnamara, PT, DPT       Present yes   Language Angela   Living Environment   People in Home parent(s)   Current Living Arrangements house   Home Accessibility no concerns   Transportation Anticipated family or friend will provide   Living Environment Comments Dad states that there are stairs in the home but the pt lives on main level and does not need to use them. He states that he is unable to transfer out of bed without physical assistance and is unable to propel himself in a wheelchair. Pt uses commode and needs physical assist to transfer onto it.   Self-Care   Usual Activity Tolerance poor   Current Activity Tolerance poor   Regular Exercise No   Equipment Currently Used at Home commode chair;wheelchair, manual   Fall history within last six months no   General Information   Onset of Illness/Injury or Date of Surgery 02/10/23   Referring Physician Nikloai Garcia MD   Patient/Family Therapy Goals Statement (PT) Dad states \"to get him better\"   Pertinent History of Current Problem (include personal factors and/or comorbidities that impact the POC) Per neurologist note: \"Patient is a 32-year-old male with history of schizophrenia, [place] disease, PTSD,  adrenal leukodystrophy admitted with failure to thrive, dysarthria and worsening weakness.  According to family member patient had lost appetite and lost almost 25 pounds in the last month and his speech has become more dysarthric and family members are unable to understand him.  He had a MRI of the brain that was negative for acute intracranial abnormalities.  There was unchanged signal abnormality involving both corticospinal tract suggesting a neurodegenerative disorder. According to family member patient developed weakness of all extremities right leg worse along with arm weakness and some dysarthria. CXR: No acute consolidation, " pneumothorax or pleural effusion. Normal heart size and pulmonary vascularity. No significant change from 05/14/2022.   Cognition   Affect/Mental Status (Cognition) low arousal/lethargic   Orientation Status (Cognition) oriented to;person;place   Follows Commands (Cognition) follows one-step commands;75-90% accuracy;physical/tactile prompts required;verbal cues/prompting required   Pain Assessment   Patient Currently in Pain   (unable to assess)   Posture    Posture Protracted shoulders   Range of Motion (ROM)   Range of Motion ROM deficits secondary to weakness   Strength (Manual Muscle Testing)   Strength (Manual Muscle Testing) Deficits observed during functional mobility   Strength Comments pt demosntrates gross 1/5 MMT on R LE and 3/5 on R LE   Bed Mobility   Bed Mobility supine-sit;sit-supine   Supine-Sit [place] (Bed Mobility) maximum assist (25% patient effort)   Sit-Supine [place] (Bed Mobility) maximum assist (25% patient effort)   Bed Mobility Limitations decreased ability to use legs for bridging/pushing;decreased ability to use arms for pushing/pulling;impaired ability to control trunk for mobility   Impairments Contributing to Impaired Bed Mobility impaired balance;decreased strength;impaired postural control   Transfers   Transfers sit-stand transfer   Transfer Safety Concerns Noted losing balance backward;decreased weight-shifting ability   Impairments Contributing to Impaired Transfers impaired balance;impaired postural control;decreased strength   Sit-Stand Transfer   Sit-Stand [place] (Transfers) maximum assist (25% patient effort)   Assistive Device (Sit-Stand Transfers) other (see comments)   Comment, (Sit-Stand Transfer) attempted to stand but unable to and significant LOB.   Gait/Stairs (Locomotion)   [place] Level (Gait) not tested   Balance   Balance other (describe)   Balance Comments pt demosntrates significant trunk lean to L side in sitting and needs George to maintain  balance.   Clinical Impression   Criteria for Skilled Therapeutic Intervention Yes, treatment indicated   PT Diagnosis (PT) decreased functional mobility   Influenced by the following impairments decreased strength, balance and activity tolerance   Functional limitations due to impairments bed mobility, transfers   Clinical Presentation (PT Evaluation Complexity) Evolving/Changing   Clinical Presentation Rationale presents as medically diagnosed   Clinical Decision Making (Complexity) moderate complexity   Planned Therapy Interventions (PT) balance training;bed mobility training;home exercise program;neuromuscular re-education;patient/family education;postural re-education;ROM (range of motion);strengthening;transfer training;wheelchair management/propulsion training;progressive activity/exercise   Anticipated Equipment Needs at Discharge (PT) lift device   Risk & Benefits of therapy have been explained evaluation/treatment results reviewed;participants included;patient;father   PT Total Evaluation Time   PT Eval, Moderate Complexity Minutes (86309) 15   Physical Therapy Goals   PT Frequency Daily   PT Predicted Duration/Target Date for Goal Attainment 02/17/23   PT Goals Bed Mobility;Transfers   PT: Bed Mobility Moderate assist;Supine to/from sit   PT: Transfers Moderate assist;Sit to/from stand;Bed to/from chair   Interventions   Interventions Quick Adds Therapeutic Activity   Therapeutic Activity   Therapeutic Activities: dynamic activities to improve functional performance Minutes (91944) 10   Symptoms Noted During/After Treatment Fatigue   Treatment Detail/Skilled Intervention Pt performs supine <> sit transfer with maxA and heavy cues for technique. Pt is only able to use L UE to assist with transfer and has strong trunk lean to L side throughout transfer and when sitting EOB needing George when sitting to maintain balance. Pt needs maxA for B LE management.   PT Discharge Planning   PT Plan bed mobility,  AROM/PROM, ex, transfers   PT Discharge Recommendation (DC Rec) home with assist   PT Rationale for DC Rec Pt needs physical assist for all mobility which family is able to provide but may need increased assistance for ADLs and transfers. May benefit from transfer device.   PT Brief overview of current status pt uses wheelchair at baseline and has limited OOB acitivty at baseline; needs physical assist for transfers at baseline.   Total Session Time   Timed Code Treatment Minutes 10   Total Session Time (sum of timed and untimed services) 25   Phill Mcnamara, PT, DTaP                                                                                [facility]      OUTPATIENT PHYSICAL THERAPY EVALUATION  PLAN OF TREATMENT FOR OUTPATIENT REHABILITATION  (COMPLETE FOR INITIAL CLAIMS ONLY)  Patient's Last Name, First Name, M.I.  YOB: 1990  Po,Toe  T                        Provider's Name  [facility] Medical Record No.  [id number]                               Onset Date:  02/10/23   Start of Care Date:         Type:     _X_PT   ___OT   ___SLP Medical Diagnosis:                           PT Diagnosis:  decreased functional mobility   Visits from SOC:  1   _________________________________________________________________________________  Plan of Treatment/Functional Goals    Planned Interventions: balance training, bed mobility training, home exercise program, neuromuscular re-education, patient/family education, postural re-education, ROM (range of motion), strengthening, transfer training, wheelchair management/propulsion training, progressive activity/exercise     Goals: See Physical Therapy Goals on Care Plan in [facility] electronic health record.    Therapy Frequency: Daily  Predicted Duration of Therapy Intervention: 02/17/23  _________________________________________________________________________________    I CERTIFY THE NEED FOR THESE SERVICES  FURNISHED UNDER        THIS PLAN OF TREATMENT AND WHILE UNDER MY CARE     (Physician co-signature of this document indicates review and certification of the therapy plan).               ,      Referring Physician: Nikolai Garcia MD            Initial Assessment        See Physical Therapy evaluation dated   in Epic electronic health record.

## 2023-02-11 NOTE — PROGRESS NOTES
"[facility] ED Handoff Report    ED Chief Complaint:     ED Diagnosis:  (M62.81) Generalized muscle weakness  Comment: Right > left  Plan:     (R62.7) Failure to thrive in adult  Comment:   Plan:     (R47.1) Dysarthria  Comment: Per Pt's Father and  pt speech is not understandable. Pt can answer yes/no questions.  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 Possibly looking for placement d/t increased cares.    Elimination Status: Continent: Primo fit in place     Activity Level: Total assist/lift    Patients Preferred Language:  Other: Angela     Needed: Yes    Vital Signs:  /84 (BP Location: Left arm, Patient Position: Semi-Horner's)   Pulse 76   Temp 98.7  F (37.1  C) (Oral)   Resp 16   Ht 1.676 m (5' 6\")   Wt 69.9 kg (154 lb)   SpO2 96%   BMI 24.86 kg/m       Cardiac Rhythm: N/A    Pain Score: 0/10    Is the Patient Confused:  No    Last Food or Drink: Unknown. Pt has not been eating per family. Has lost 25 lbs this past month.     Focused Assessment:   Per family members patient has had a loss of appetite and lost almost 25 pounds in the last month along with increased weakness. Right side greater than left side. His speech has become more dysarthric and family members are unable to understand him.     Tests Performed: Done: Labs and Imaging    Treatments Provided:  Video Swallow Study. Per Speech pt can have a Pureed Diet, thin liquids with a chin tuck while eating.     Family Dynamics/Concerns: No    Family " Updated On Visitor Policy: Yes    Plan of Care Communicated to Family: Yes    Who Was Updated about Plan of Care: Pt's Father.    Belongings Checklist Done and Signed by Patient: No    Medications sent with patient: Yes    Covid: asymptomatic , negative    Additional Information: Patient is a 32 year old male with a pertinent history of adrenoleukodystrophy, schizophrenia, Ariel's disease, PTSD, hypothyroidism, and neurodevelopmental disorder who presents to this ED via wheelchair with family member for evaluation of failure to thrive in adult.    CRUSH Pt's med and put in applesauce.     RN: Debi Kathleen RN 2/11/2023 12:46 PM

## 2023-02-11 NOTE — CONSULTS
[facility] Hematology and Oncology Inpatient Consult Note    Patient: Brooke Peterson  MRN: [id number]  Date of Service: 2/11/2023      Reason for Visit  Failure to thrive, dysarthria      Assessment  Secondary erythrocytosis  Failure to thrive and speech difficulties  Adrenal leukodystrophy    Plan:  Erythrocytosis  Secondary leukocytosis.  Intermittent elevations in the hemoglobin and hematocrit levels.  JAK2, LASHAWN R and MPL testing in the past have been negative.  At presentation his hemoglobin was 20 and hematocrit was 59%.  With fluids it has come down to 17.7 and 53.2 respectively.  This indicates volume contraction leading to erythrocytosis.  He also has adrenal leukodystrophy with some muscle weakness so potential hypoxia due to respiratory muscle weakness cannot be completely ruled out.  I do not think he needs any further intervention from my side at this point in time.  Primary bone marrow disorder is highly unlikely considering negative MPN mutation testing and absence of typical symptoms of myeloproliferative disorder.  Could consider a bone marrow biopsy in the future if there is no other explanation for his elevated hemoglobin levels.  However considering his medical situation at this point and I am not sure if this will be very helpful. Continue to monitor his hemoglobin and hematocrit levels.  Would not recommend any phlebotomies.  Continue hydration.    Staging History    Cancer Staging   No matching staging information was found for the patient.      History  Mr. Brooke Peterson is a 32 year old male with history of X-linked adrenal leukodystrophy, Ariel disease, elevated very long chain fatty acids who has been hospitalized with worsening dysarthria and failure to thrive has been consulted by the medicine team for further evaluation of elevated hemoglobin level.  History was obtained with the help of Angela blue.    Mentioned earlier he has a history of X-linked adrenal leukodystrophy.  Also has  elevated very long chain fatty acids.  His neurological symptoms have worsened recently.  He has had intermittent elevation in the hemoglobin in the past along with elevated hematocrit.  Back in April he was checked for JAK2, LASHAWN R and MPL 10 mutations.  All of them came back negative.  If her levels have been within normal limits.  Upon hospitalization this time his hemoglobin was 20 g/dL and hematocrit was 59%.  Never smoker.  No known history of sleep apnea.  However he does have some generalized muscle weakness.  Denies any shortness of breath today.  Complains of dizziness.  He speech is very impaired and difficult to understand.    The MRI of brain was negative for any acute abnormality.     Denies any itching.  No aquagenic urticaria.  Denies any headache or blurred vision.    Review of systems.    A 14 point review of systems was obtained.  Positive findings noted in the history.  Rest of the review of system is otherwise negative.      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 No pertinent surgical history.  Family History   Problem Relation Age of Onset     Diabetes Mother      Psychotic Disorder Mother      Depression Mother      Cancer No family hx of      Social History     Socioeconomic History     Marital status: Single     Spouse name: None     Number of children: None     Years of education: None     Highest education level: None   Tobacco Use     Smoking status: Never     Smokeless tobacco: Never   Substance and Sexual Activity     Alcohol use: Not Currently     Drug use: Never     Social Determinants of Health     Financial Resource Strain: Medium Risk     Difficulty of Paying Living Expenses: Somewhat  "hard   Food Insecurity: No Food Insecurity     Worried About Running Out of Food in the Last Year: Never true     Ran Out of Food in the Last Year: Never true   Transportation Needs: Unmet Transportation Needs     Lack of Transportation (Medical): Yes     Lack of Transportation (Non-Medical): No   Intimate Partner Violence: Not At Risk     Fear of Current or Ex-Partner: No     Emotionally Abused: No     Physically Abused: No     Sexually Abused: No       Allergies    No Known Allergies       Physical Exam    /70 (BP Location: Left arm)   Pulse 73   Temp 98.1  F (36.7  C) (Oral)   Resp 16   Ht 1.676 m (5' 6\")   Wt 69.9 kg (154 lb)   SpO2 98%   BMI 24.86 kg/m      GENERAL: Alert and oriented to time place and person. In no distress.    HEAD: Atraumatic and normocephalic.    EYES: CAMPOS, EOMI. No pallor. No icterus.    Oral cavity: no mucosal lesion or tonsillar enlargement.    NECK: supple. JVP normal.No thyroid enlargement.    LYMPH NODES: No palpable, cervical, axillary or inguinal lymphadenopathy.    CHEST: clear to auscultation bilaterally. Symmetrical breath movements bilaterally.    CVS: S1 and S2 are Regular rate and rhythm. No murmur or gallop or rub heard. No peripheral edema.    ABDOMEN: Soft. Not tender. Not distended. No palpable hepatomegaly or splenomegaly. No other mass palpable. Bowel sounds heard.    EXTREMITIES: Warm.    NEUROLOGICAL: Preserved orientation.  Neuromuscular system intact.  Cranial nerve appears to be intact.  No cerebellar deficit apparent.    SKIN: no rash, or bruising or purpura.      Lab Results  Recent Results (from the past 24 hour(s))   UA with Microscopic reflex to Culture    Collection Time: 02/10/23  4:12 PM    Specimen: Urine, Midstream   Result Value Ref Range    Color Urine Light Yellow Colorless, Straw, Light Yellow, Yellow    Appearance Urine Clear Clear    Glucose Urine Negative Negative mg/dL    Bilirubin Urine Negative Negative    Ketones Urine 10 (A) " Negative mg/dL    Specific Gravity Urine 1.019 1.001 - 1.030    Blood Urine Negative Negative    pH Urine 6.5 5.0 - 7.0    Protein Albumin Urine 20 (A) Negative mg/dL    Urobilinogen Urine 2.0 (A) <2.0 mg/dL    Nitrite Urine Negative Negative    Leukocyte Esterase Urine Negative Negative    Mucus Urine Present (A) None Seen /LPF    RBC Urine <1 <=2 /HPF    WBC Urine <1 <=5 /HPF   CBC with platelets    Collection Time: 02/11/23 11:00 AM   Result Value Ref Range    WBC Count 4.3 4.0 - 11.0 10e3/uL    RBC Count 6.15 (H) 4.40 - 5.90 10e6/uL    Hemoglobin 17.7 13.3 - 17.7 g/dL    Hematocrit 53.2 (H) 40.0 - 53.0 %    MCV 87 78 - 100 fL    MCH 28.8 26.5 - 33.0 pg    MCHC 33.3 31.5 - 36.5 g/dL    RDW 12.2 10.0 - 15.0 %    Platelet Count 179 150 - 450 10e3/uL   Calcium    Collection Time: 02/11/23 11:00 AM   Result Value Ref Range    Calcium 9.9 8.6 - 10.0 mg/dL        Imaging Results    Chest XR,  PA & LAT    Result Date: 2/10/2023  EXAM: XR CHEST 2 VIEWS LOCATION: [facility] DATE/TIME: 2/10/2023 2:58 PM INDICATION: Coarse lung sounds on the left, cough, aspiration risk COMPARISON: 05/14/2022.     IMPRESSION: No acute consolidation, pneumothorax or pleural effusion. Normal heart size and pulmonary vascularity. No significant change from 05/14/2022.    MR Brain w/o & w Contrast    Result Date: 2/10/2023  EXAM: MR BRAIN W/O and W CONTRAST LOCATION: [facility] DATE/TIME: 2/10/2023 4:55 PM INDICATION: History of leukodystrophy, presenting with difficulty swallowing, cough, inability to walk, seems to have asymmetric right upper and lower extremity weakness on exam COMPARISON: CT head 12/17/2022 and MRI brain 06/28/2022 CONTRAST: 7mL gadavist TECHNIQUE: Routine multiplanar multisequence head MRI without and with intravenous contrast. FINDINGS: INTRACRANIAL CONTENTS: No acute or subacute infarct. No mass, acute hemorrhage, or extra-axial fluid collections. Unchanged appearance of  abnormal, relatively symmetric T2/FLAIR hyperintensity involving the bilateral corticospinal tracts with inclusion of the medial thalami, cerebral peduncles, and central emeka. There is also similar T2/FLAIR signal abnormality of the paramedian cerebellar hemispheres. These findings appear unchanged when compared with prior. No new foci of abnormality are identified. Normal ventricles and sulci. Normal position of the cerebellar tonsils. No pathologic contrast enhancement. SELLA: No abnormality accounting for technique. OSSEOUS STRUCTURES/SOFT TISSUES: Normal marrow signal. The major intracranial vascular flow voids are maintained. ORBITS: No abnormality accounting for technique. SINUSES/MASTOIDS: No paranasal sinus mucosal disease. No middle ear or mastoid effusion.     IMPRESSION: 1.  Negative for acute intracranial abnormality. 2.  Unchanged abnormal signal involving the bilateral corticospinal tracts. Differential considerations remain the same.    XR Video Swallow with SLP or OT    Result Date: 2/11/2023  EXAM: XR VIDEO SWALLOW WITH SLP OR OT LOCATION: [facility] DATE/TIME: 2/11/2023 10:45 AM INDICATION: Difficulty swallowing. COMPARISON: None. TECHNIQUE: Routine swallow study with speech pathology using multiple barium thicknesses. FINDINGS: FLUOROSCOPIC TIME: 1.9 NUMBER OF IMAGES: 1 videofluoroscopic imaging series Swallow study with Speech Pathology using multiple barium thicknesses, including thin consistency, slightly thickened consistency, mildly thick consistency and puree. Variable laryngeal penetration occurs with thin, slightly thickened, and mildly thick consistencies. After the first swallow there is mild piriform stasis. Accumulation of barium in the piriforms on subsequent swallows results in moderate piriform stasis  and could predispose to spillover events. Variable stasis in the vallecula and piriforms with puree consistency. Due to repeated instances of penetration  there is eventual barium resulting in coating of the laryngeal vestibule with globule noted anteriorly at the level of the cords after the 9th trial and eventually extends below the level of the cords to the upper trachea. There was no spontaneous cough. Multiple trials were then performed with various consistencies using a chin tuck maneuver. The chin tuck maneuver was effective at reducing penetration with all consistencies.     IMPRESSION: 1.  Increased stasis of material within the piriform sinuses with subsequent swallows. Eventual laryngeal penetration with several different consistencies. The accumulation results in eventual coating of the vestibule and extension of barium to and slightly below the level of the cords. There was no spontaneous cough. 2.  Chin tuck maneuver is effective at reducing the degree of stasis and no new instances of laryngeal penetration occurred during chin tuck. Please see separately dictated report from speech pathology for additional description, analysis, and management recommendations.       Signed by: Bill Moctezuma MD

## 2023-02-12 NOTE — PROGRESS NOTES
PRIMARY DIAGNOSIS: ACUTE PAIN  OUTPATIENT/OBSERVATION GOALS TO BE MET BEFORE DISCHARGE:  1. Pain Status: Pain free.    2. Return to near baseline physical activity: Yes    3. Cleared for discharge by consultants (if involved): No    Discharge Planner Nurse   Safe discharge environment identified: No  Barriers to discharge: Yes       Entered by: Phyllis Barfield RN 02/12/2023 4:52 PM     Please review provider order for any additional goals.   Nurse to notify provider when observation goals have been met and patient is ready for discharge.

## 2023-02-12 NOTE — PLAN OF CARE
"PRIMARY DIAGNOSIS: \"GENERIC\" NURSING  OUTPATIENT/OBSERVATION GOALS TO BE MET BEFORE DISCHARGE:  1. ADLs back to baseline: No    2. Activity and level of assistance: WC bound at baseline, A2 with ty    3. Pain status: Complains he can't bend his arm with the IV when he sleeps.    4. Return to near baseline physical activity: Yes     Discharge Planner Nurse   Safe discharge environment identified: Yes  Barriers to discharge: Yes       Entered by: Yamilex Ware RN 02/12/2023 6:43 AM     Please review provider order for any additional goals.   Nurse to notify provider when observation goals have been met and patient is ready for discharge.    Pt asked how long he needed to stay by writing on piece of paper and clipboard. Pt's dad in room, frustrated that he is unable to understand his son, unsure what son's diagnosis means. Pt would like a shower today. Melatonin given to help pt sleep. Incontinent of bowel and bladder. PIV in R AC saline locked. Takes medications crushed and mixed with orange juice with a spoon. Pureed diet with thin liquids. Remind pt to tuck chin while swallowing. A2 with ty as pt is WC bound at baseline.   "

## 2023-02-12 NOTE — PROGRESS NOTES
Care Management Follow Up    Length of Stay (days): 0    Expected Discharge Date: 02/14/2023     Concerns to be Addressed:     Patient being seen by a variety of medical disciplines to determine cause of recent significant weakness and weight loss, challenges with communication  Patient plan of care discussed at interdisciplinary rounds: Yes  Anticipated Discharge Disposition:  Home with family assist, home with home care vs TCU   Anticipated Discharge Services:  Pending medical progression and recommendation of care team  Anticipated Discharge DME:  Therapy recommending lift device for home    Patient/family educated on Medicare website which has current facility and service quality ratings:    Education Provided on the Discharge Plan:    Patient/Family in Agreement with the Plan:      Referrals Placed by CM/SW:  None yet  Private pay costs discussed: Not applicable    Additional Information:  Brief visit made to family in room. Patient's father shares he is overwhelmed by all the information they are receiving and that they have many questions. Family shared with rehabilitation services that they are able to meet patient's needs at home. Therapy recommends they consider getting a lift device. This is conflicting with what was shared in the Emergency Center when they indicated they were desiring facility placement. Patient father requests a visit again tomorrow to further discuss discharge plans/options.    TOM SeverinoSW

## 2023-02-12 NOTE — PLAN OF CARE
"PRIMARY DIAGNOSIS: \"GENERIC\" NURSING  OUTPATIENT/OBSERVATION GOALS TO BE MET BEFORE DISCHARGE:  1. ADLs back to baseline: No    2. Activity and level of assistance: pt w/c bound at baseline assist of 2.    3. Pain status: Pain free.    4. Return to near baseline physical activity: No     Discharge Planner Nurse   Safe discharge environment identified: No  Barriers to discharge: Yes       Entered by: KEVIN WRIGHT RN 02/12/2023 2:37 PM     Please review provider order for any additional goals.   Nurse to notify provider when observation goals have been met and patient is ready for discharge.    "

## 2023-02-12 NOTE — TELEPHONE ENCOUNTER
"famotidine (PEPCID) 20 MG tablet  Last Written Prescription Date:  6/14/22  Last Fill Quantity: N/A,  # refills: 0   Last office visit provider:  Diane Greenwood MD 2/10/23   Routing refill request to provider for review/approval because:  A break in medication      loratadine (CLARITIN) 10 MG tablet  Last Written Prescription Date:  1/26/22  Last Fill Quantity: 90,  # refills: 2   Last office visit provider:   Diane Greenwood MD 2/10/23   Routing refill request to provider for review/approval because:  A break in medication         Requested Prescriptions   Pending Prescriptions Disp Refills     famotidine (PEPCID) 20 MG tablet [Pharmacy Med Name: Famotidine 20MG TABS] 56 tablet      Sig: TAKE 1 TABLET BY MOUTH TWICE A DAY       H2 Blockers Protocol Passed - 2/10/2023 10:22 AM        Passed - Patient is age 12 or older        Passed - Recent (12 mo) or future (30 days) visit within the authorizing provider's specialty     Patient has had an office visit with the authorizing provider or a provider within the authorizing providers department within the previous 12 mos or has a future within next 30 days. See \"Patient Info\" tab in inbasket, or \"Choose Columns\" in Meds & Orders section of the refill encounter.              Passed - Medication is active on med list           loratadine (CLARITIN) 10 MG tablet [Pharmacy Med Name: Loratadine 10MG TABS] 28 tablet      Sig: TAKE 1 TABLET BY MOUTH DAILY       Antihistamines Protocol Passed - 2/10/2023 10:22 AM        Passed - Patient is 3-64 years of age     Apply weight-based dosing for peds patients age 3 - 12 years of age.    Forward request to provider for patients under the age of 3 or over the age of 64.          Passed - Recent (12 mo) or future (30 days) visit within the authorizing provider's specialty     Patient has had an office visit with the authorizing provider or a provider within the authorizing providers department within the previous 12 mos or has a future within next " "30 days. See \"Patient Info\" tab in inbasket, or \"Choose Columns\" in Meds & Orders section of the refill encounter.              Passed - Medication is active on med list             Yola Cowan RN 02/12/23 1:03 PM  "

## 2023-02-12 NOTE — PLAN OF CARE
"PRIMARY DIAGNOSIS: \"GENERIC\" NURSING  OUTPATIENT/OBSERVATION GOALS TO BE MET BEFORE DISCHARGE:  ADLs back to baseline: No    Activity and level of assistance: w/c bound at baseline    Pain status: Pain free.    Return to near baseline physical activity: No     Discharge Planner Nurse   Safe discharge environment identified: No  Barriers to discharge: Yes       Entered by: KEVIN WRIGHT RN 02/12/2023 2:38 PM     Please review provider order for any additional goals.   Nurse to notify provider when observation goals have been met and patient is ready for discharge.    /73 (BP Location: Left arm)   Pulse 99   Temp 98.3  F (36.8  C) (Oral)   Resp 16   Ht 1.676 m (5' 6\")   Wt 69.9 kg (154 lb)   SpO2 95%   BMI 24.86 kg/m       Pt family at bedside. Discussed disease and process and plan of care. Pt c/o dizziness MD updated medication ordered and given and effective.     Pt denies pain, assist of 2 with cares and transfers.    "

## 2023-02-12 NOTE — PLAN OF CARE
"Goal Outcome Evaluation:    Pt Angela speaking, but is dysarthric. Can answer some questions with yes or no, thumbs up or pointing at a body part. Family members at bedside most of the evening they can speak some English. Pt c/o a H/D. Tylenol given. Pt reported that it helped with a \"thumbs up.\" Pt still reports feeling dizzy he did not get OOB this evening. Swallows pills fine with OJ crush medications and remind pt by showing to tuck his chin down before swallowing. Family member at bedside assists with the urinal for elimination.       Plan of Care Reviewed With: patient, and family    Overall Patient Progress: improvingOverall Patient Progress: improving      Problem: Plan of Care - These are the overarching goals to be used throughout the patient stay.    Goal: Optimal Comfort and Wellbeing  Outcome: Progressing     Problem: Plan of Care - These are the overarching goals to be used throughout the patient stay.    Goal: Optimal Comfort and Wellbeing  Intervention: Monitor Pain and Promote Comfort  Recent Flowsheet Documentation  Taken 2/11/2023 1553 by Radha Daley, RN  Pain Management Interventions:    medication (see MAR)    emotional support     Problem: Plan of Care - These are the overarching goals to be used throughout the patient stay.    Goal: Readiness for Transition of Care  Outcome: Progressing              " Pt present to ED for bilaterally lower extremity pain since yesterday. Pt states intermittent achy feeling in affected extremities. States positive COVID result January 3rd. Denies falls, fevers or birth control.

## 2023-02-12 NOTE — PROGRESS NOTES
[facility]    Medicine Progress Note - Hospitalist Service    Date of Admission:  2/10/2023    Assessment & Plan   Brooke Peterson is a 32 year old male with a pertinent history of adrenoleukodystrophy, schizophrenia, [place]'s disease, PTSD, hypothyroidism, and neurodevelopmental disorder who presents to this ED via wheelchair with family member for evaluation of failure to thrive in adult.     Faillure to thrive  Dysarthria and dysphagia  Worsening weakness   Weight loss  Dehydration  X-linked adrenal leukodystrophy  -worse dysarthria ,  mental status, weakness, now wheel chair bound  -worse swallow function with coughing on oral, dysphagia, weight loss  -Hospitalized in may 2022, with MRI and neurology consultation: neurologist concerned it is some kind of adrenoleukodystrophy. Patient was discharged to short-term care facility first then back to home with [facility]. As per family, HHC stopped. When pt was discharged, physician recommended pt to see special neurologist at  or [facility].   -  Neurology consult appreciated .  Now has signed off  -Unfortunately adrenal leukodystrophy is progressive neurologic disease with no cure  - PT  - Nutrition consult  - SLP evaluation appreciated.  VSS done and dietary recommendations with puréed diet and thin liquids   -Palliative care consult to help with determination of goals of care and symptom management     [place]'s disease  -PTA fludrocortisone 0.15 mg oral daily  -PTA Hydrocortisone 10 mg oral every morning and 5 mg oral every evening    Schizophrenia  -PTA olanzapine 5 mg oral every morning    Hypothyroidism  -PTA meds after review     Polycythemia   -looks like chronic but rising recently  -Hematology consultation     Dizziness?  -Patient describes what appears to be dizziness.  Very difficult to get accurate history from the patient due to dysarthria and language barrier.  -Meclizine 25 mg oral as needed  -Monitor for symptoms       Diet:  Combination Diet Pureed Diet (level 4); Thin Liquids (level 0)    DVT Prophylaxis: Enoxaparin (Lovenox) SQ  Booth Catheter: Not present  Lines: None     Cardiac Monitoring: None  Code Status: Full Code      Clinically Significant Risk Factors Present on Admission           # Hypercalcemia: Highest Ca = 10.5 mg/dL in last 2 days, will monitor as appropriate                     Disposition Plan     Expected Discharge Date: 02/14/2023                  Sung Reeves MD  Hospitalist Service  [facility]  Securely message with [employer] (more info)  Text page via [employer] Paging/Directory   ______________________________________________________________________    Interval History   No distress noted.  Patient describes what seems to be  ?dizziness.  Angela language interpretation was used via telephone.  Case was discussed with RN and family members.  Patient's father,  Mother,  brother and sister were present.  They are informed and counseled on the prognosis and the plan of care.  Family is very emotional.     Physical Exam   Vital Signs: Temp: 98.5  F (36.9  C) Temp src: Oral BP: 113/81 Pulse: 70   Resp: 18 SpO2: 99 % O2 Device: None (Room air)    Weight: 154 lbs 0 oz    General Appearance: No distress noted.  Dysarthria, quadriparesis  Respiratory: BLAE  Cardiovascular: S1/S2  GI: soft, normoactive BS  Skin: intact and warm   Other: Quadriparesis    Medical Decision Making       45 MINUTES SPENT BY ME on the date of service doing chart review, history, exam, documentation & further activities per the note.      Data

## 2023-02-12 NOTE — PLAN OF CARE
"PRIMARY DIAGNOSIS: \"GENERIC\" NURSING  OUTPATIENT/OBSERVATION GOALS TO BE MET BEFORE DISCHARGE:  1. ADLs back to baseline: No    2. Activity and level of assistance: WC bound at baseline, ty lift with 2 assist.    3. Pain status: Pain free.    4. Return to near baseline physical activity: Yes     Discharge Planner Nurse   Safe discharge environment identified: Yes  Barriers to discharge: Yes       Entered by: Yamilex Ware RN 02/12/2023 2:29 AM     Please review provider order for any additional goals.   Nurse to notify provider when observation goals have been met and patient is ready for discharge.      "

## 2023-02-13 NOTE — PROGRESS NOTES
"   02/11/23 0853   Appointment Info   Signing Clinician's Name / Credentials (SLP) Leela Stephenson [facility]   General Information   Onset of Illness/Injury or Date of Surgery 02/10/23   Referring Physician Sung Reeves MD   Patient/Family Therapy Goal Statement (SLP) Patient did not state but agreed to swallow evaluation.   Pertinent History of Current Problem Per neurologist note: \"Patient is a 32-year-old male with history of schizophrenia, Ariel disease, PTSD,  adrenal leukodystrophy admitted with failure to thrive, dysarthria and worsening weakness.  According to family member patient had lost appetite and lost almost 25 pounds in the last month and his speech has become more dysarthric and family members are unable to understand him.  He had a MRI of the brain that was negative for acute intracranial abnormalities.  There was unchanged signal abnormality involving both corticospinal tract suggesting a neurodegenerative disorder. According to family member patient developed weakness of all extremities right leg worse along with arm weakness and some dysarthria. CXR: No acute consolidation, pneumothorax or pleural effusion. Normal heart size and pulmonary vascularity. No significant change from 05/14/2022.   General Observations Patient alert, cooperative. Patient's father present. Patient nodded head yes/no or pointed to his father to answer questions. Speech severely dysarthric and not intelligible to  and father. RN reported patient given Zofran earlier today due to nausea.       Present yes   Language Angela  (via [place])   Type of Evaluation   Type of Evaluation Swallow Evaluation   Oral Motor   Oral Musculature anomalies present   Structural Abnormalities none present   Mucosal Quality adequate   Dentition (Oral Motor)   Dentition (Oral Motor) natural dentition;adequate dentition   Facial Symmetry (Oral Motor)   Facial Symmetry (Oral Motor) right side impairment " "  Right Side Facial Asymmetry minimal impairment   Lip Function (Oral Motor)   Lip Range of Motion (Oral Motor) retraction impairment   Lip Strength (Oral Motor) WFL   Lip Coordination (Oral Motor) moderate impairment   Retraction, Lip Range of Motion right side;moderate impairment   Tongue Function (Oral Motor)   Tongue Strength (Oral Motor) strength decreased;bilateral;moderate impairment  (right weaker than left side)   Tongue Coordination/Speed (Oral Motor) reduced rate   Jaw Function (Oral Motor)   Jaw Function (Oral Motor) WNL   Cough/Swallow/Gag Reflex (Oral Motor)   Soft Palate/Velum (Oral Motor) elevation decreased on right   Volitional Throat Clear/Cough (Oral Motor) reduced strength   Volitional Swallow (Oral Motor) mildly delayed   Vocal Quality/Secretion Management (Oral Motor)   Vocal Quality (Oral Motor) hoarse;hypophonic;strained/strangled   Secretion Management (Oral Motor) WNL   General Swallowing Observations   Past History of Dysphagia No formal swallow evaluations per EMR review. Patient's father reported limited oral intake at home. He reported some coughing with oral intake (unable to recall with liquids or solids or both). He reported foods are mostly smooth or mashed \"like yogurt\" consistency. He reported patient vomits after eating at times.   Respiratory Support (General Swallowing Observations) none   Current Diet/Method of Nutritional Intake (General Swallowing Observations, NIS) NPO   Swallowing Evaluation Clinical swallow evaluation   Clinical Swallow Evaluation   Feeding Assistance dependent   Clinical Swallow Evaluation Textures Trialed thin liquids;mildly thick liquids;pureed   Clinical Swallow Eval: Thin Liquid Texture Trial   Mode of Presentation, Thin Liquids spoon;fed by clinician   Volume of Liquid or Food Presented 2 small ice chips   Oral Phase of Swallow impaired mastication;delayed AP movement;effortful AP movement  (1 ice chip spilled out of mouth)   Pharyngeal Phase of " Swallow coughing/choking   Diagnostic Statement Immediate coughing episode after swallowing melted ice chip.   Clinical Swallow Eval: Mildly Thick Liquids   Mode of Presentation spoon;fed by clinician   Volume Presented 4 sips by spoon   Oral Phase effortful AP movement   Pharyngeal Phase   (appeared delayed)   Diagnostic Statement No  overt aspiration signs occurred. Audible swallows.   Clinical Swallow Evaluation: Puree Solid Texture Trial   Mode of Presentation, Puree spoon;fed by clinician   Volume of Puree Presented 4 small bites applesauce (1/3 tsp size bites)   Oral Phase, Puree effortful AP movement   Pharyngeal Phase, Puree   (appeared delayed)   Diagnostic Statement No overt apsiration signs occurred. Audible swallows.   Esophageal Phase of Swallow   Patient reports or presents with symptoms of esophageal dysphagia No   Swallowing Recommendations   Diet Consistency Recommendations NPO  (until further instrumental swallow assessment)   Instrumental Assessment Recommendations VFSS (videofluoroscopic swallowing study)   General Therapy Interventions   Planned Therapy Interventions Dysphagia Treatment   Dysphagia treatment Modified diet education;Instruction of safe swallow strategies   Clinical Impression   Criteria for Skilled Therapeutic Interventions Met (SLP Eval) Yes, treatment indicated   SLP Diagnosis Dysphagia   Risks & Benefits of therapy have been explained evaluation/treatment results reviewed;care plan/treatment goals reviewed;risks/benefits reviewed;current/potential barriers reviewed;participants voiced agreement with care plan;participants included;patient;father   Clinical Impression Comments Moderate oropharyngeal dysphagia based on clinical exam this morning characterized by reduced oral bolus control and AP bolus transit, delayed appearing swallow response, immediate coughing after limited intake of thin liquid. No overt aspiration signs occurred with mildly thick liquid or puree  consistencies. Patient requried total feeding assist due to weakness. Recommend video swallow study to further assess oropharyngeal swallow function. Patient to remain NPO for now. Further recommendations pending results of instrumental exam.   SLP Total Evaluation Time   Eval: oral/pharyngeal swallow function, clinical swallow Minutes (82907) 12   Therapy Certification   Start of Care Date 02/11/23   Certification date from 02/11/23   Certification date to 03/11/23   Medical Diagnosis Adrenal Leukodystrophy   SLP Goals   Therapy Frequency (SLP Eval) 5 times/wk   SLP Predicted Duration/Target Date for Goal Attainment 03/11/23   SLP Goals Swallow   SLP: Safely tolerate diet without signs/symptoms of aspiration Pureed diet;Mildly thick liquids;With use of swallow precautions;With assistance/supervision   Interventions   Interventions Quick Adds Swallowing Dysfunction   Swallowing Dysfunction &/or Oral Function for Feeding   Treatment of Swallowing Dysfunction &/or Oral Function for Feeding Minutes (33971) 8   Symptoms Noted During/After Treatment None   Treatment Detail/Skilled Intervention Provided education about signs of aspiration, video swallow study procedure and current NPO status.   SLP Discharge Planning   SLP Plan VFSS   SLP Discharge Recommendation home with home care speech therapy   SLP Rationale for DC Rec Below baseline for swallow function; continued SLP for safe swallow strategies and least restrictive diet.   SLP Brief overview of current status  Recommend video swallow study to further assess oropharyngeal swallow function, determine least restrictive diet and safe swallow strategies. Patient to remain NPO for now. Further recommendations pending results of instrumental exam.   Total Session Time   Total Session Time (sum of timed and untimed services) 20       [facility]  OUTPATIENT SPEECH LANGUAGE PATHOLOGY EVALUATION  PLAN OF TREATMENT FOR OUTPATIENT  REHABILITATION  (COMPLETE FOR INITIAL CLAIMS ONLY)  Patient's Last Name, First Name, M.I.  YOB: 1990  Po,Toe  T                        Provider's Name  [facility] Medical Record No.  [id number]                             Onset Date:  02/10/23  Start of Care Date: (P) 02/11/23    Type:     ___PT   ___OT   _X_SLP Medical Diagnosis: (P) Adrenal Leukodystrophy          SLP Diagnosis:  dysphagia, dysarthria  Visits from SOC:  1     See note for plan of treatment, functional goals and certification details    I CERTIFY THE NEED FOR THESE SERVICES FURNISHED UNDER        THIS PLAN OF TREATMENT AND WHILE UNDER MY CARE     (Physician co-signature of this document indicates review and certification of the therapy plan).  ;

## 2023-02-13 NOTE — PLAN OF CARE
"PRIMARY DIAGNOSIS: \"GENERIC\" NURSING  OUTPATIENT/OBSERVATION GOALS TO BE MET BEFORE DISCHARGE:  1. ADLs back to baseline: No    2. Activity and level of assistance: WC bound baseline, ty lift for transfers    3. Pain status: Pain free.    4. Return to near baseline physical activity: No     Discharge Planner Nurse   Safe discharge environment identified: No  Barriers to discharge: Yes       Entered by: Yamilex Ware RN 02/13/2023 4:36 AM     Please review provider order for any additional goals.   Nurse to notify provider when observation goals have been met and patient is ready for discharge.    Pt appears comfortable, PRN melatonin effective. Family still in room. Resting between cares. Will continue to monitor.  "

## 2023-02-13 NOTE — PLAN OF CARE
"PRIMARY DIAGNOSIS: \"GENERIC\" NURSING  OUTPATIENT/OBSERVATION GOALS TO BE MET BEFORE DISCHARGE:  ADLs back to baseline: No    Activity and level of assistance: ty    Pain status: Pain free.    Return to near baseline physical activity: No     Discharge Planner Nurse   Safe discharge environment identified: Yes  Barriers to discharge: Yes       Entered by: KEVIN LAZO RN 02/13/2023 1:13 PM     Please review provider order for any additional goals.   Nurse to notify provider when observation goals have been met and patient is ready for discharge.      Problem: Plan of Care - These are the overarching goals to be used throughout the patient stay.    Goal: Absence of Hospital-Acquired Illness or Injury  Outcome: Progressing  Intervention: Identify and Manage Fall Risk  Recent Flowsheet Documentation  Taken 2/13/2023 1245 by Kevin Lazo RN  Safety Promotion/Fall Prevention:   fall prevention program maintained   clutter free environment maintained  Taken 2/13/2023 0945 by Kevin Lazo RN  Safety Promotion/Fall Prevention:   fall prevention program maintained   clutter free environment maintained     /77 (BP Location: Left arm)   Pulse 90   Temp 98.5  F (36.9  C) (Oral)   Resp 17   Ht 1.676 m (5' 6\")   Wt 69.9 kg (154 lb)   SpO2 96%   BMI 24.86 kg/m       Pt denies pain,     C/o nausea and dizziness, medication given and not effetive MD notified  "

## 2023-02-13 NOTE — PROGRESS NOTES
SHIRA met with pt and pt father in pt room introduce self using  services line as family speaks Angela. Pt father stated he wanted a recliner chair in home for pt, and a commode for pt. Father stated pt will need medical transport. Per bedside nurse, pt father, who is PCA is showing understanding for pt needing to be upright for eating.     SHIRA contacted pt MINDA Adames from the clinic. She offered information on pt from a history of working with pt. Pt has a  through [employer]. [phone]. Pt has community paramedics that come to see pt in his home every 2 weeks. There are some concerns that pt father is not able to properly provide care for pt, pt father is PCA and has previously sent away other care professionals from the home. Reports have been filed on pt physical cleanliness.   SHIRA placed call to [facility]. Pt has 7 hours of PCA currently. She has reached out to the [facility] 2 months ago for a reassessment and requested a [place] choices assessment which for some reason could not happen. SHIRA informed her that pt has had a dramatic change in condition, but she stated it was too soon for an assessment.     SHIRA met with pt sister and pt father in room, sister providing interpreting in Angela. Sister says family has siblings between 12-17 years old at home and pt room is on first floor. SHIRA discussed home care HHA, Speech.PT, and nursing with pt, sister and father. Pt will need a hospital bed and lift at discharge. SHIRA sent message to MD with request.     ANAYELI Vieira

## 2023-02-13 NOTE — PLAN OF CARE
"PRIMARY DIAGNOSIS: \"GENERIC\" NURSING  OUTPATIENT/OBSERVATION GOALS TO BE MET BEFORE DISCHARGE:  ADLs back to baseline: No    Activity and level of assistance: ty lift wheelchair bound at baseline    Pain status: Pain free.    Return to near baseline physical activity: No     Discharge Planner Nurse   Safe discharge environment identified: Yes  Barriers to discharge: Yes       Entered by: KEVIN WRIGHT RN 02/13/2023 1:12 PM     Please review provider order for any additional goals.   Nurse to notify provider when observation goals have been met and patient is ready for discharge.      "

## 2023-02-13 NOTE — PROGRESS NOTES
[facility]    Medicine Progress Note - Hospitalist Service    Date of Admission:  2/10/2023    Assessment & Plan   Brooke Peterson is a 32 year old male with a pertinent history of adrenoleukodystrophy, schizophrenia, [place]'s disease, PTSD, hypothyroidism, and neurodevelopmental disorder who presents to this ED via wheelchair with family member for evaluation of failure to thrive in adult.     Faillure to thrive  Dysarthria and dysphagia  Worsening weakness   Weight loss  Dehydration  X-linked adrenal leukodystrophy  -worse dysarthria ,  mental status, weakness, now wheel chair bound  -worse swallow function with coughing on oral, dysphagia, weight loss  -Hospitalized in may 2022, with MRI and neurology consultation: neurologist concerned it is some kind of adrenoleukodystrophy. Patient was discharged to short-term care facility first then back to home with [facility]. As per family, HHC stopped. When pt was discharged, physician recommended pt to see special neurologist at  or [facility].   -  Neurology consult appreciated .  Now has signed off  -Unfortunately adrenal leukodystrophy is progressive neurologic disease with no cure  - PT  - Nutrition consult  - SLP evaluation appreciated.  VSS done and dietary recommendations with puréed diet and thin liquids   -Palliative care consult to help with determination of goals of care and symptom management     Washburn's disease  -PTA fludrocortisone 0.15 mg oral daily  -PTA Hydrocortisone 10 mg oral every morning and 5 mg oral every evening    Schizophrenia  -PTA olanzapine 5 mg oral every morning    Hypothyroidism  -PTA meds after review     Polycythemia   -looks like chronic but rising recently  -Hematology consultation     Dizziness?  -Patient describes what appears to be dizziness.  Very difficult to get accurate history from the patient due to dysarthria and language barrier.  -Meclizine 25 mg oral as needed  -Monitor for symptoms       Diet:  Combination Diet Pureed Diet (level 4); Mildly Thick (level 2)    DVT Prophylaxis: Enoxaparin (Lovenox) SQ  Booth Catheter: Not present  Lines: None     Cardiac Monitoring: None  Code Status: Full Code      Clinically Significant Risk Factors Present on Admission                               Disposition Plan     Expected Discharge Date: 02/14/2023    Discharge Delays: Specialist Consult (enter specialist & decision needed in comments)  Social/Family Delay    Discharge Comments: Neuro signed off  Pallitive consult          Sung Reeves MD  Hospitalist Service  [facility]  Securely message with [employer] (more info)  Text page via [employer] Paging/Directory   ______________________________________________________________________    Interval History   Patient wrote on  white board for communication.  He states that that he is having insomnia and he would like some medication to help him sleep.  He also stated that he would like to go home upon discharge.  Management plan discussed with the patient, case management and palliative care team and his PCP over the phone.     Physical Exam   Vital Signs: Temp: 98.5  F (36.9  C) Temp src: Oral BP: 120/77 Pulse: 90   Resp: 17 SpO2: 96 % O2 Device: None (Room air)    Weight: 154 lbs 0 oz    General Appearance: No distress noted.  Dysarthria, quadriparesis  Respiratory: BLAE  Cardiovascular: S1/S2  GI: soft, normoactive BS  Skin: intact and warm   Other: Quadriparesis.  Right upper and lower extremity has power of 2-3 out of 5 whereas left upper and lower extremities power is 1-2 out of 5.  Sensation is intact    Medical Decision Making       45 MINUTES SPENT BY ME on the date of service doing chart review, history, exam, documentation & further activities per the note.      Data

## 2023-02-13 NOTE — PROGRESS NOTES
"PRIMARY DIAGNOSIS: \"GENERIC\" NURSING  OUTPATIENT/OBSERVATION GOALS TO BE MET BEFORE DISCHARGE:  1. ADLs back to baseline: No    2. Activity and level of assistance: Up with maximum assistance. Consider SW and/or PT evaluation.     3. Pain status: Pain free.    4. Return to near baseline physical activity: No     Discharge Planner Nurse   Safe discharge environment identified: No  Barriers to discharge: Yes       Entered by: Phyllis Barfield RN 02/12/2023 8:08 PM   Pt alert and denied any pain. Pt was fed and he ate a little bit of his dinner. Pt's family at bedside and very cooperative.    Please review provider order for any additional goals.   Nurse to notify provider when observation goals have been met and patient is ready for discharge.  "

## 2023-02-13 NOTE — CONSULTS
[facility]  Palliative Care Consultation Note    Patient: Brooke SCHAEFER Po  Date of Admission:  2/10/2023    Requesting Clinician / Team: Hospitalist  Reason for consult: Goals of care, symptom management    Impression & Recommendations:  Toe and family discussed his ALD diagnosis and prognosis yesterday with primary team, but today patient and his father are unable to demonstrate understanding of previous conversation. Difficult to determine how much Toe understands given dysarthria and language barrier.    Plan for care conference tomorrow 2/14 at 12pm at bedside with in-person Angela . Notified Dr. Reeves (hospitalist). Paged Neurology without response.    Goals of Care: life-prolonging    Advanced Care Planning:  Advance directive: No  POLST: No  Code status: Full Code  Health care agent/surrogate: Caro (rivka)    Symptom Management:  #Nausea with intermittent vomiting - chronic  #Dizziness, light-headedness - chronic  - Continue zofran and compazine prn  - Check QTc. If within normal limits, schedule zofran TID with meals  - Already on zyprexa 5mg daily, consider increase to BID  - Continue meclizine 25mg q6h prn  - Primary team started ativan 0.5mg q4h prn anxiety, will also help with nausea  - Sea bands and aromatherapy  - Requested Integrative Therapies    #Depressed mood, anxiety, insomnia  - Continue home zyprexa 5mg, consider increase to BID as above  - Agree with resuming home trazodone 50mg at bedtime  - Primary team started ativan 0.5mg q4h prn anxiety    #Constipation - chronic  - No BMs documented yet  - Continue miralax daily  - Continue senna BID  - Continue prn bisacodyl suppository    Psychosocial & Spiritual:   - Lives at home with parents and 3 younger siblings (ages 12, 14 and 16). Caro (dad) is primary caregiver for Brooke. Vandana (mom) is struggling with depression. They have a total of 7 children, Toe is the eldest.  - Islam, local  has been  visiting. Brooke appreciates their support.    These recommendations have been discussed with Dr. Marrero.      Thank you for the opportunity to participate in the care of this patient and family. Our team: will continue to follow.     During regular M-F work hours -- if you are not sure who specifically to contact -- please contact us by calling us directly at the Palliative Care Main Line [phone]    After regular work hours and on weekends/holidays, you can leave a message at [phone]    Assessments:  Brooke is a 33 y/o man with adrenoleukodystrophy (ALD), Moultrie's disease, hypothyroidism, neurodevelopmental disorder, schizophrenia and PTSD, admitted 2/10 with worsening weakness, dysarthria, dysphagia and weight loss.   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  - Yesterday 2/13, Dr. Reeves (hospitalist) met with family (parents and 2 adult siblings). Reviewed prognosis and plan of care.    Today, the patient was seen for:  Intro to Palliative Care, symptom management, emotional support    Prognosis, Goals, & Planning:      Functional Status just prior to hospitalization: Palliative Performance Score:       40%- 1. Mainly in bed; 2. Unable to do most activity, extensive disease; 3. Mainly assistance; 4. Normal or reduced; 5. Full or drowsy +/- confusion      Prognosis, Goals, and/or Advance Care Planning were addressed today: Yes        Summary/Comments: Briefly touched  "on poor prognosis      Patient's decision making preferences: unable to assess          Patient has decision-making capacity today for complex decisions: Partial (needs assistance with complex decisions)            I have concerns about the patient/family's health literacy today: Yes           Patient has a completed Health Care Directive: No.       Code status: Full Code    Coping, Meaning, & Spirituality:   Mood, coping, and/or meaning in the context of serious illness were addressed today: Yes  Summary/Comments: Toe and Caro were saddened when discussing Brooke's neurodegenerative disease. They have had their  visit, which has been very supportive for Brooke.    Social:     Living situation: home with parents and 3 younger siblings    Key family / caregivers: Caro (dad), Vandana (mom) and 6 younger siblings    History of Present Illness:  History gathered today from: patient, family/loved ones, medical chart, medical team members    Met with Brooke and Caro (dad) at bedside with in-person Angela . Brooke is able to nod/shake his head in response to questions. He can also speak though it is difficult for family and  to understand him.     I introduced the role of Palliative Care: The Palliative Care team provides additional support for patients with serious illness and their loved ones. This includes managing symptoms, clarifying goals of care and navigating difficult decisions with the patient and their family.    Brooke lives at home with Caro and Dora (mom) as well as 3 younger siblings. Brooke is the eldest of 7 siblings. Their family came to [place] about [place] ago after living in a refugee camp in [place]. They were originally living in [place] but moved to [place] about 5 years ago for better medical care for Toe. Caro says Brooke suffered a \"stroke back home\" in [place] and had to learn how to talk and walk again with physical therapy. He says Brooke did not " "have any medical issues prior to that. Toe has become progressively weaker, now unable to walk for about a year (wheelchair bound). Caro attributes these problems to \"another stroke.\"    Brooke complains of persistent dizziness and nausea, worse with moving his head, ongoing x months. He denies any pain. He does have depressed mood, anxiety and insomnia. Oral intake is limited due to nausea. Toe also has some difficulty swallowing.    I asked Caro what he has understood from the doctors about Brooke's neurologic condition. He replied, \"I don't know, I haven't been notified yet\" and \"even if they tell me, I won't know what to do.\" Brooke also shook his head to indicate he did not know about his neurologic condition. I shared that Brooke has been diagnosed with a neurodegenerative disease which causes progressive weakness and functional decline including difficulty speaking and swallowing. Brooke and Caro were saddened by this news. They are open to meeting with Neurology tomorrow to discuss in more detail. Encouraged them to invite other family members.    Brooke and family are Jain and have had their local  visit him here, which has been very supportive for Brooke. He also enjoys watching TV, listening to music and spending time with his siblings.     Medications:  I have reviewed this patient's medication profile and medications from this hospitalization.   Noted:  Baclofen 10mg bid  Zyprexa 5mg daily  D5NS at 75/hr  Tylenol prn  Meclizine 25mg q6h prn  Melatonin 3mg prn  Zofran prn    PERTINENT PHYSICAL EXAMINATION:  Vital Signs: Blood pressure 114/73, pulse 72, temperature 98.3  F (36.8  C), temperature source Oral, resp. rate 17, height 1.676 m (5' 6\"), weight 69.9 kg (154 lb), SpO2 96 %.   GENERAL: laying in bed, NAD, eyes open, alert and engaged, communicates by nodding/shaking his head or speaking a few words  HEENT: Normocephalic, anicteric sclera, moist mucous membranes  LUNGS: non-labored   NEUROLOGIC: " eyes open, alert and engaged, communicates by nodding/shaking his head or speaking a few words  PSYCH: withdrawn    Data reviewed:  Recent imaging reviewed, my comments on pertinents:   Video swallow 2/11  IMPRESSION:   1.  Increased stasis of material within the piriform sinuses with subsequent swallows. Eventual laryngeal penetration with several different consistencies. The accumulation results in eventual coating of the vestibule and extension of barium to and slightly below the level of the cords. There was no spontaneous cough.  2.  Chin tuck maneuver is effective at reducing the degree of stasis and no new instances of laryngeal penetration occurred during chin tuck.    MR brain 2/10  IMPRESSION:  1.  Negative for acute intracranial abnormality.  2.  Unchanged abnormal signal involving the bilateral corticospinal tracts. Differential considerations remain the same.    Recent lab data reviewed, my comments on pertinents:   CMP  Recent Labs   Lab 02/11/23  1100 02/10/23  1344   NA  --  137   POTASSIUM  --  4.4   CHLORIDE  --  96*   CO2  --  26   ANIONGAP  --  15   GLC  --  95   BUN  --  11.8   CR  --  0.93   GFRESTIMATED  --  >90   LASHAWN 9.9 10.5*   MAG  --  2.1   PROTTOTAL  --  8.9*   ALBUMIN  --  5.1   BILITOTAL  --  1.0   ALKPHOS  --  97   AST  --  40   ALT  --  54*     CBC  Recent Labs   Lab 02/11/23  1100 02/10/23  1344   WBC 4.3 6.6   RBC 6.15* 6.95*   HGB 17.7 20.4*   HCT 53.2* 59.1*   MCV 87 85   MCH 28.8 29.4   MCHC 33.3 34.5   RDW 12.2 13.0    197       TTS: I have personally spent a total of 80 minutes  today on the unit in review of medical record, consultation with the medical providers and assessment of patient today, with more than 50% of this time spent in counseling, coordination of care, and conversation in a family meeting re: diagnostic results, prognosis, symptom management, risks and benefits of management options,  emotional support and development of plan of care.     Gia Coombs,  FAUZIA DUPREE [facility], Palliative Care  Dept phone: [phone]  Securely message with the Vocera Web Console

## 2023-02-13 NOTE — CARE PLAN
"PRIMARY DIAGNOSIS: \"GENERIC\" NURSING  OUTPATIENT/OBSERVATION GOALS TO BE MET BEFORE DISCHARGE:  1. ADLs back to baseline: No    2. Activity and level of assistance: Debbie    3. Pain status: Pain free.    4. Return to near baseline physical activity: No     Discharge Planner Nurse   Safe discharge environment identified: Yes  Barriers to discharge: Yes       Entered by: Madison Sampson RN 02/13/2023 3:59 PM     Please review provider order for any additional goals.   Nurse to notify provider when observation goals have been met and patient is ready for discharge.  "

## 2023-02-13 NOTE — PLAN OF CARE
"PRIMARY DIAGNOSIS: \"GENERIC\" NURSING  OUTPATIENT/OBSERVATION GOALS TO BE MET BEFORE DISCHARGE:  1. ADLs back to baseline: No    2. Activity and level of assistance: WC bound at baseline, ty lift for transfers    3. Pain status: Pain free.    4. Return to near baseline physical activity: No     Discharge Planner Nurse   Safe discharge environment identified: No  Barriers to discharge: Yes       Entered by: Yamilex Ware RN 02/13/2023 12:58 AM     Please review provider order for any additional goals.   Nurse to notify provider when observation goals have been met and patient is ready for discharge.    Family to meet with palliative today, discuss home care or TCU placement. Pt given melatonin on eves, currently resting. Pt's father in room.   "

## 2023-02-13 NOTE — UTILIZATION REVIEW
Concurrent stay review; Secondary Review Determination     Under the authority of the Utilization Management Committee, the utilization review process indicated a secondary review on Toe T Po.  The review outcome is based on review of the medical records, discussions with staff, and applying clinical experience noted on the date of the review.        (x) Observation Status Appropriate - Concurrent stay review    RATIONALE FOR DETERMINATION   Toe T Po is a 32 yr old male with adrenoleukodystrophy, schizophrenia, Ariel's disease, PTSD, hypothyroidism, and neurodevelopmental disorder who presents to this ED via wheelchair with family member for evaluation of failure to thrive in adult.  Has been medically stable but working on goals of care with palliative care.    Patient is clinically improving and there is no clear indication to change patient's status to inpatient. The severity of illness, intensity of service provided, expected LOS and risk for adverse outcome make the care appropriate for observation.    The information on this document is developed by the utilization review team in order for the business office to ensure compliance.  This only denotes the appropriateness of proper admission status and does not reflect the quality of care rendered.         The definitions of Inpatient Status and Observation Status used in making the determination above are those provided in the CMS Coverage Manual, Chapter 1 and Chapter 6, section 70.4.      Sincerely,   Radha Laird MD  Utilization Review  Physician Advisor  [facility]

## 2023-02-14 NOTE — PROGRESS NOTES
[facility]  Palliative Care Daily Progress Note       Recommendations & Counseling     Care conference with Toe and family today and in-person Angela . Family present included Caro (dad), Charlene/Tino (brother) and Makr (sister).    Summary:  - [place] that Brooke has been experiencing memory loss for some time. He understands conversations in the present but does not recall events as recent as yesterday. He is unable to repeat anything he has previously been told about his illness.  - Brooke has some capacity to participate in medical decision making with support from family. He names Caro (rivka) as his surrogate decision maker.  - Caro has heard about Toe's disease and prognosis several times during this admission. Today he understands that Brooke has a rare neurologic disease that is progressive and incurable. He is struggling to accept this reality.  - Explained that Toe is getting limited nutrition due to nausea and dysphagia, and we anticipate his dysphagia will continue to worsen. They are open to the possibility of tube feeding if indicated.  - Family is currently planning to take him home upon discharge, where he is cared for by his father and supported by other family members.    Goals of Care: life-prolonging     Advanced Care Planning:  Advance directive: No  POLST: No  Code status: Full Code  Health care agent/surrogate: Caro (rivka)     Symptom Management:  #Nausea with intermittent vomiting - chronic  #Dizziness, light-headedness - chronic  - Due to cerebellar effects from ALD vs adrenal insufficiency  - Continue zofran and compazine prn  - Stop scheduled zofran as ineffective per patient  - Continue zyprexa 5mg daily, consider increase to BID  - Stop meclizine as ineffective per patient and nursing  - Start scopolamine patch  - Continue ativan 0.5mg q4h prn anxiety, persistent nausea or dizziness  - Requested Vestibular Therapy  - Sea bands and aromatherapy  -  Integrative Therapies     #Depressed mood, anxiety, insomnia   - Continue home zyprexa 5mg, consider increase to BID as above  - Continue home trazodone 50mg at bedtime  - Continue ativan 0.5mg q4h prn anxiety     #Constipation - chronic  - No BMs documented yet  - Stop scheduled zofran as may be contributing  - Continue miralax daily  - Evidence suggests docusate sodium is ineffective for constipation and actually worsens stool quality (all mush, no push)--suggest changing to senna alone  - Increase senna to 17mg BID  - Continue prn bisacodyl suppository     Psychosocial & Spiritual:   - Lives at home with parents and 3 younger siblings (ages 12, 14 and 16). Caro (dad) is primary caregiver for Brooke. Vandana (mom) is struggling with depression. They have a total of 7 children, Brooke is the eldest.  - Sikh, local  has been visiting. Brooke appreciates their support.      Assessments          Brooke is a 31 y/o man with adrenoleukodystrophy (ALD), Ariel's disease, hypothyroidism, neurodevelopmental disorder, schizophrenia and PTSD, admitted 2/10 with worsening weakness, dysarthria, dysphagia and weight loss.   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    Today, the patient was seen for:  Care conference, symptom management, family support              Interval History:     Chart review/discussion with unit or clinical team members:   - Receiving zofran and ativan with improvement  "in nausea    Per patient or family/caregivers today:  Care conference with Toe and family today and in-person Angela  from 12:10-12:50. Family present included Caro (dad), Charlene/Tino (brother) and Mark (sister). See above for summary.    Toe reports no improvement in nausea or dizziness with current treatments. He also reports continued difficulty sleeping. Continues to deny pain.           Medications:     I have reviewed this patient's medication profile and medications during this hospitalization.    Noted meds:    Zyprexa 5mg daily  Zofran 4mg ODT before meals and prn  Trazodone at bedtime  Lorazepam 0.5mg po q4h prn anxiety (received 1x overnight)  Meclizine 25mg q6h prn dizziness  Compazine prn nausea (2nd line)           Physical Exam:   Vital Signs: Blood pressure 104/70, pulse 73, temperature 97.8  F (36.6  C), temperature source Oral, resp. rate 17, height 1.676 m (5' 6\"), weight 69.9 kg (154 lb), SpO2 95 %.   GENERAL: laying in bed, NAD, eyes open, alert and engaged, communicates by nodding/shaking his head or speaking a few words  HEENT: Normocephalic, anicteric sclera, moist mucous membranes  LUNGS: non-labored   NEUROLOGIC: eyes open, alert and engaged, communicates by nodding/shaking his head or speaking a few words  PSYCH: tearful           Data Reviewed:     Reviewed recent pertinent imaging:   No new imaging    Reviewed recent labs:   Unremarkable       TTS: I have personally spent a total of 90 minutes today on the unit in review of medical record, consultation with the medical providers and assessment of patient today, with more than 50% of this time spent in counseling, coordination of care, and conversation in a family meeting re: diagnostic results, prognosis, symptom management, risks and benefits of management options,  emotional support and development of plan of care.     FAUZIA Ross [facility], Palliative Care  Dept phone: [phone]  Securely " message with the Vocera Web Console

## 2023-02-14 NOTE — TELEPHONE ENCOUNTER
Message given to Taya with patient registration to coordinate call back from Dr. Rubio to Valerie.     Rodger Kimble RN, BSN  [facility] Neu[facility]

## 2023-02-14 NOTE — CARE PLAN
"PRIMARY DIAGNOSIS: \"GENERIC\" NURSING  OUTPATIENT/OBSERVATION GOALS TO BE MET BEFORE DISCHARGE:  1. ADLs back to baseline: No    2. Activity and level of assistance: Debbie    3. Pain status: Pain free.    4. Return to near baseline physical activity: No     Discharge Planner Nurse   Safe discharge environment identified: Yes  Barriers to discharge: Yes       Entered by: Madison Sampson RN 02/13/2023 8:04 PM     Please review provider order for any additional goals.   Nurse to notify provider when observation goals have been met and patient is ready for discharge.  "

## 2023-02-14 NOTE — TELEPHONE ENCOUNTER
[facility] Call Center    Phone Message    May a detailed message be left on voicemail: no     Reason for Call: Other: Valerie from [facility] is calling to get eduardo of Dr. Rubio. Please call Valerie back. It's regarding care conference. Ph: [phone]      Action Taken: Message routed to:  Other: [facility]    Travel Screening: Not Applicable

## 2023-02-14 NOTE — PLAN OF CARE
"Goal Outcome Evaluation:      PRIMARY DIAGNOSIS: \"GENERIC\" NURSING  OUTPATIENT/OBSERVATION GOALS TO BE MET BEFORE DISCHARGE:  1. ADLs back to baseline: No    2. Activity and level of assistance: Pt in bed, offered to sit up in chair or sit up, pt refused.     3. Pain status: Pain free.    4. Return to near baseline physical activity: No     Discharge Planner Nurse   Safe discharge environment identified: No  Barriers to discharge: Yes       Entered by: Colette Beaulieu RN 02/14/2023 2:49 PM     Please review provider order for any additional goals.   Nurse to notify provider when observation goals have been met and patient is ready for discharge.    /69 (BP Location: Left arm)   Pulse 74   Temp 97.7  F (36.5  C) (Oral)   Resp 16   Ht 1.676 m (5' 6\")   Wt 69.9 kg (154 lb)   SpO2 95%   BMI 24.86 kg/m      Flat affect, compliant with care. Denies pain. Continue to c/o dizziness, nausea. MD notified. Zofran and PRN Ativan given with minimal relieve. Pt refused to sit up in chair, pt repositioned in bed, and able to move. Family at bedside,  used.     "

## 2023-02-14 NOTE — PLAN OF CARE
"PRIMARY DIAGNOSIS: \"GENERIC\" NURSING  OUTPATIENT/OBSERVATION GOALS TO BE MET BEFORE DISCHARGE:  ADLs back to baseline: No    Activity and level of assistance: pt maximum assist up with ty wc bound at baseline    Pain status: Pain free.    Return to near baseline physical activity: No     Discharge Planner Nurse   Safe discharge environment identified: No  Barriers to discharge: Yes        Entered by: KEVIN WRIGHT RN 02/14/2023 12:27 PM     Please review provider order for any additional goals.   Nurse to notify provider when observation goals have been met and patient is ready for discharge.  "

## 2023-02-14 NOTE — PROGRESS NOTES
PALLIATIVE CARE SOCIAL WORK Progress Note     Family care conference today with Palliative PA Gia, Dr. Reeves, Pt, father and a sister and brother. Angela  present as well.   Medical update provided and discussion about what to expect, specifically decline and swallowing issues.   Pt states that he does not remember having these discussions previously. When asked about this further, it seems he is having trouble with memory.   Specific information about Pts disease and prognosis is new for the Pt and family. They expressed feeling sad and unsure about what to do or how to help.   Medical providers educated that there is no cure for this illness, nor any medication or treatment that can stop or reverse course.   Pt stated that he would be open to having a feeding tube if needed.   Pt was tearful at times during the discussion.   Checked in with family about how care is going at home. They have not specific concerns about care and will keep providing what Pt needs. Talked with them about how the rest of the family is coping and offered to be available to other family members for discussion or emotional support as needed.     Plan: PCSW continues to follow for support.     ANAYELI Nelson, [facility]  Palliative Care Team  Team Pager: [phone]

## 2023-02-14 NOTE — PROGRESS NOTES
"PRIMARY DIAGNOSIS: \"GENERIC\" NURSING  OUTPATIENT/OBSERVATION GOALS TO BE MET BEFORE DISCHARGE:  1. ADLs back to baseline: Yes    2. Activity and level of assistance: Up with maximum assistance. Consider SW and/or PT evaluation. Debbie and wheelchair per baseline.    3. Pain status: Pain free.    4. Return to near baseline physical activity: Yes     Discharge Planner Nurse   Safe discharge environment identified: Yes  Barriers to discharge: Yes       Entered by: Micaela Alvarado RN 02/14/2023      Please review provider order for any additional goals.   Nurse to notify provider when observation goals have been met and patient is ready for discharge.    VSS, Patient used white board to communicate his needs. Denied pain. Nausea managed with Zofran and Ativan with good results.    "

## 2023-02-14 NOTE — PROGRESS NOTES
[facility]    Medicine Progress Note - Hospitalist Service    Date of Admission:  2/10/2023    Assessment & Plan   Brooke Peterson is a 32 year old male with a pertinent history of adrenoleukodystrophy, schizophrenia, Lansing's disease, PTSD, hypothyroidism, and neurodevelopmental disorder who presents to this ED via wheelchair with family member for evaluation of failure to thrive in adult.     Faillure to thrive  Dysarthria and dysphagia  Worsening weakness   Weight loss  Dehydration  X-linked adrenal leukodystrophy  -worse dysarthria ,  mental status, weakness, now wheel chair bound  -worse swallow function with coughing on oral, dysphagia, weight loss  -Hospitalized in may 2022, with MRI and neurology consultation: neurologist concerned it is some kind of adrenoleukodystrophy. Patient was discharged to short-term care facility first then back to home with [facility]. As per family, HHC stopped. When pt was discharged, physician recommended pt to see special neurologist at  or [facility].   -  Neurology consult appreciated .  Now has signed off  -Unfortunately adrenal leukodystrophy is progressive neurologic disease with no cure  - PT  - Nutrition consult  - SLP evaluation appreciated.  VSS done and dietary recommendations with puréed diet and thin liquids   -Palliative care consult appreciated.  02/14: We had a family meeting with patient, father, 2 siblings and palliative care team in the presence of live professional Angela roberts.  Patient and his father stated that they have never been informed of the diagnosis.  They appear to be struggling to accept what is happening to him.  I had a meeting  with the family over the weekend explaining about the disease and possible prognosis. I also discussed with his PCP over the phone that patient may need placement due to the fact that he is getting more dependent on others.  Patient expressed interest to go home.  -Oncology consult to  comment on hematopoietic stem cell transplantation. Though very unlikely that patient will be a good candidate for it.     Wilkinson's disease  -PTA fludrocortisone 0.15 mg oral daily  -increase Hydrocortisone to 20 mg oral in divided doses    Schizophrenia  -PTA olanzapine 5 mg oral every morning    Hypothyroidism  -PTA meds after review     Polycythemia   -looks like chronic but rising recently  -Hematology consultation     Dizziness?  -Patient describes what appears to be dizziness.  Very difficult to get accurate history from the patient due to dysarthria and language barrier.  -Meclizine 25 mg oral as needed.  Has not been helpful so far  -Monitor for symptoms       Diet: Combination Diet Pureed Diet (level 4); Mildly Thick (level 2)    DVT Prophylaxis: Enoxaparin (Lovenox) SQ  Booth Catheter: Not present  Lines: None     Cardiac Monitoring: None  Code Status: Full Code      Clinically Significant Risk Factors Present on Admission                               Disposition Plan      Expected Discharge Date: 02/17/2023    Discharge Delays: Specialist Consult (enter specialist & decision needed in comments)  Social/Family Delay  Placement - Homecare    Discharge Comments: Neuro signed off  Pallitive consult  DME  needs home care          Sung Reeves MD  Hospitalist Service  [facility]  Securely message with [employer] (more info)  Text page via [employer] Paging/Directory   ______________________________________________________________________    Interval History   We had a family meeting with palliative care team and family members present of life Angela .  He still concerned about insomnia, memory difficulties, nausea and increased dysphagia.  Management plan discussed with the patient, family and palliative care team.    Physical Exam   Vital Signs: Temp: 98.6  F (37  C) Temp src: Oral BP: 104/73 Pulse: 82   Resp: 16 SpO2: 96 % O2 Device: None (Room air)    Weight:  154 lbs 0 oz    General Appearance: No distress noted.  Dysarthria, quadriparesis  Respiratory: BLAE  Cardiovascular: S1/S2  GI: soft, normoactive BS  Skin: intact and warm   Other: Quadriparesis.  Right upper and lower extremity has power of 2-3 out of 5 whereas left upper and lower extremities power is 1-2 out of 5.  Sensation is intact    Medical Decision Making       45 MINUTES SPENT BY ME on the date of service doing chart review, history, exam, documentation & further activities per the note.      Data

## 2023-02-14 NOTE — PROGRESS NOTES
"PRIMARY DIAGNOSIS: \"GENERIC\" NURSING  OUTPATIENT/OBSERVATION GOALS TO BE MET BEFORE DISCHARGE:  1. ADLs back to baseline: Yes    2. Activity and level of assistance: Up with maximum assistance. Consider SW and/or PT evaluation. Debbie and wheelchair per baseline.    3. Pain status: Pain free.    4. Return to near baseline physical activity: Yes     Discharge Planner Nurse   Safe discharge environment identified: Yes  Barriers to discharge: Yes       Entered by: Micaela Alvarado RN 02/14/2023 5:24 AM     Please review provider order for any additional goals.   Nurse to notify provider when observation goals have been met and patient is ready for discharge.    VSS, Patient used white board to communicate his needs. Denied pain. Nausea managed with Zofran and Ativan with good results.  "

## 2023-02-14 NOTE — PLAN OF CARE
"PRIMARY DIAGNOSIS: \"GENERIC\" NURSING  OUTPATIENT/OBSERVATION GOALS TO BE MET BEFORE DISCHARGE:  1. ADLs back to baseline: No    2. Activity and level of assistance: Assist of 2 with ty, able to move side to side in bed    3. Pain status: Pain free.    4. Return to near baseline physical activity: No     Discharge Planner Nurse   Safe discharge environment identified: Yes  Barriers to discharge: Yes       Entered by: Colette Beaulieu RN 02/14/2023 5:48 PM     Please review provider order for any additional goals.   Nurse to notify provider when observation goals have been met and patient is ready for discharge.    /73 (BP Location: Right arm)   Pulse 82   Temp 98.6  F (37  C) (Oral)   Resp 16   Ht 1.676 m (5' 6\")   Wt 69.9 kg (154 lb)   SpO2 96%   BMI 24.86 kg/m      Scheduled dulcolax suppository given at 1605. Pt had BM X2. Continue to report dizziness and nausea. Denied pain. Scopolamine patch in place.    "

## 2023-02-15 NOTE — PLAN OF CARE
"PRIMARY DIAGNOSIS: \"GENERIC\" NURSING  OUTPATIENT/OBSERVATION GOALS TO BE MET BEFORE DISCHARGE:  ADLs back to baseline: No  2  Activity and level of assistance: Using ty lift if needed, in bed 1 assist to turn. Pt able to assist.    Pain status: Pain free.    Return to near baseline physical activity: No     Discharge Planner Nurse   Safe discharge environment identified: Yes  Barriers to discharge: Yes       Entered by: Davion Wilks RN 02/14/2023 10:47 PM    Gave ativan x1 for dizziness and nausea, pt sleeping on reassessment.   "

## 2023-02-15 NOTE — PROGRESS NOTES
[facility]    Medicine Progress Note - Hospitalist Service    Date of Admission:  2/10/2023    Assessment & Plan   Brooke Peterson is a 32 year old male with a pertinent history of adrenoleukodystrophy, schizophrenia, Cooks's disease, PTSD, hypothyroidism, and neurodevelopmental disorder who presents to this ED via wheelchair with family member for evaluation of failure to thrive in adult.     Faillure to thrive  Dysarthria and dysphagia  Worsening weakness   Weight loss  Dehydration  X-linked adrenal leukodystrophy  -worse dysarthria ,  mental status, weakness, now wheel chair bound  -worse swallow function with coughing on oral, dysphagia, weight loss  -Hospitalized in may 2022, with MRI and neurology consultation: neurologist concerned it is some kind of adrenoleukodystrophy. Patient was discharged to short-term care facility first then back to home with [facility]. As per family, HHC stopped. When pt was discharged, physician recommended pt to see special neurologist at  or [facility].   -  Neurology consult appreciated .  Now has signed off  -Unfortunately adrenal leukodystrophy is progressive neurologic disease with no cure  - PT  - Nutrition consult  - SLP evaluation appreciated.  VSS done and dietary recommendations with puréed diet and thin liquids   -Palliative care consult appreciated.  02/14: We had a family meeting with patient, father, 2 siblings and palliative care team in the presence of live professional Angela roberts.  Patient and his father stated that they have never been informed of the diagnosis.  They appear to be struggling to accept what is happening to him.  I had a meeting  with the family over the weekend explaining about the disease and possible prognosis. I also discussed with his PCP over the phone that patient may need placement due to the fact that he is getting more dependent on others.  Patient expressed interest to go home.  -Oncology consult pending:   to comment on hematopoietic stem cell transplantation. Though very unlikely that patient will be a good candidate for it.     Ariel's disease  -PTA fludrocortisone 0.15 mg oral daily  -increased  Hydrocortisone to 20 mg oral in divided doses. Was 15 mg PO snow in divided doses)    Schizophrenia  -increased olanzapine to 5 mg oral BID to help with nausea and sleep     Hypothyroidism  -PTA levothyroxine 75 mcg oral daily     Polycythemia   -looks like chronic but rising recently  -Hematology consult appreciated.  Recommending no further work-up  -Oncology was also consulted to comment on possibility of stem cell transplant.  Has not commented on it yet.   -Patient's illness has progressed significantly making it unlikely that he will be a good candidate for stem cell transplantation.    Dizziness?  -Patient describes what appears to be dizziness.  Very difficult to get accurate history from the patient due to dysarthria and language barrier.  -Meclizine 25 mg oral as needed.    -Slight improvement of his dizziness is noted       Diet: Combination Diet Pureed Diet (level 4); Mildly Thick (level 2)    DVT Prophylaxis: Enoxaparin (Lovenox) SQ  Booth Catheter: Not present  Lines: None     Cardiac Monitoring: None  Code Status: Full Code      Clinically Significant Risk Factors Present on Admission                               Disposition Plan     Expected Discharge Date: 02/17/2023    Discharge Delays: Specialist Consult (enter specialist & decision needed in comments)  Social/Family Delay  Placement - Homecare  Placement - LTC    Discharge Comments: Neuro signed off  Pallitive consult  DME  needs home care          Sung Reeves MD  Hospitalist Service  [facility]  Securely message with [employer] (more info)  Text page via [employer] Paging/Directory   ______________________________________________________________________    Interval History   No new complaints.  Patient appears to be  forgetful.  Management plan discussed with the patient, his father, RN and  palliative care team    Physical Exam   Vital Signs: Temp: 97.8  F (36.6  C) Temp src: Oral BP: 100/69 Pulse: 83   Resp: 16 SpO2: 96 % O2 Device: None (Room air)    Weight: 154 lbs 0 oz    General Appearance: No distress noted.  Dysarthria, quadriparesis  Respiratory: BLAE  Cardiovascular: S1/S2  GI: soft, normoactive BS  Skin: intact and warm   Other: Quadriparesis.  Right upper and lower extremity has power of 2-3 out of 5 whereas left upper and lower extremities power is 1-2 out of 5.  Sensation is intact    Medical Decision Making       45 MINUTES SPENT BY ME on the date of service doing chart review, history, exam, documentation & further activities per the note.      Data

## 2023-02-15 NOTE — PLAN OF CARE
"Goal Outcome Evaluation:                        PRIMARY DIAGNOSIS: \"GENERIC\" NURSING  OUTPATIENT/OBSERVATION GOALS TO BE MET BEFORE DISCHARGE:  ADLs back to baseline: No    Activity and level of assistance: mechanical lift/total care      Pain status: Pain free.    Return to near baseline physical activity: No     Discharge Planner Nurse   Safe discharge environment identified: No  Barriers to discharge: Yes       Entered by: Corinne Xie RN 02/15/2023 2:57 PM   Gave ativan x1 this morning for nausea with reported relief.    Please review provider order for any additional goals.   Nurse to notify provider when observation goals have been met and patient is ready for discharge.  "

## 2023-02-15 NOTE — PLAN OF CARE
Goal Outcome Evaluation:                      PRIMARY DIAGNOSIS: GENERALIZED WEAKNESS    OUTPATIENT/OBSERVATION GOALS TO BE MET BEFORE DISCHARGE  1. Orthostatic performed: N/A    2. Tolerating PO medications: Yes    3. Return to near baseline physical activity: No    4. Cleared for discharge by consultants (if involved): No    Discharge Planner Nurse   Safe discharge environment identified: No  Barriers to discharge: Yes       Entered by: Corinne Xie RN 02/15/2023 2:53 PM     Please review provider order for any additional goals.   Nurse to notify provider when observation goals have been met and patient is ready for discharge.

## 2023-02-15 NOTE — PROGRESS NOTES
"[facility]  Palliative Care Daily Progress Note       Recommendations & Counseling     Brooke appears to have severe memory impairment.     He oriented to \"hospital\" but not year or purpose. Not able to recall events or symptoms even a few hours prior.     On chart review, this is documented in 2019 and appears to have progressed.    Repeated discussions of Brooke's condition and prognosis are traumatic as he does not retain this information. Recommend discussing separately with family using in-person Angela .    Spoke with Brooke's father Caro separately with in-person Angela .    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 He acknowledges he has been told by multiple doctors that Brooke has a genetic, neurologic disease which is progressive.    Explored code status. Emphasized that we do not need to make a decision about this today and encouraged him to discuss further with family.     This was a challenging conversation due to language barrier and poor health literacy. I believe further conversations will be needed.    Placed referral for outpatient Palliative Care.    Goals of Care: life-prolonging     Advanced Care Planning:  Advance directive: No  Code status: Full Code  Health care agent/surrogate: Caro (dad)  - Strongly recommend using in-person  when communicating with family     Symptom Management:  #Nausea with intermittent vomiting - chronic  #Dizziness, light-headedness - chronic  - Due to cerebellar effects from ALD vs adrenal insufficiency  - Continue increased dose hydrocortisone  - Continue zofran and compazine prn  - Continue scopolamine patch  - Increase zyprexa 5mg to BID  - Reduce frequency of ativan 0.5mg prn to q8h  - Requested Vestibular Therapy  - Sea bands and aromatherapy  - Integrative Therapies     #Depressed mood, anxiety, insomnia   - Increase zyprexa 5mg to BID  - Make trazodone 50mg at bedtime " PRN  - Reduce frequency of ativan 0.5mg prn to q8h     #Constipation - chronic  - Continue miralax daily  - Continue senna 1 tab daily  - Continue prn bisacodyl suppository     Psychosocial & Spiritual:   - Lives at home with parents and 3 younger siblings (ages 12, 14 and 16). Caro (dad) is primary caregiver for Brooke. Vandana (mom) is struggling with depression. They have a total of 7 children, Brooke is the eldest.  - [employer], local  has been visiting. Brooke appreciates their support.    Total time spent: 70 minutes    Gia Coombs PA-C / Palliative Care / Text me on Vocera  Team Pager [phone] (8am-430pm M-F)      Assessments          Brooke is a 33 y/o man with adrenoleukodystrophy (ALD), Erie's disease, hypothyroidism, neurodevelopmental disorder, schizophrenia and PTSD, admitted 2/10 with worsening weakness, dysarthria, dysphagia and weight loss.   - Noted to have significant memory loss, on chart review this is documented in 05/2019 Behavioral Health note    Care conference summary 2/14:  - Brooke has been experiencing memory loss for some time. He understands conversations in the present but does not recall events as recent as yesterday.  - Brooke has some capacity to participate in medical decision making with support from family. He names Caro (dad) as his surrogate decision maker.  - Family understands that Brooke has a rare neurologic disease that is progressive and incurable. They are struggling to accept this reality.  - Toe and family are open to the possibility of tube feeding if indicated.  - Family is currently planning to take him home upon discharge.    Today, the patient was seen for:  Symptom management              Interval History:     Chart review/discussion with unit or clinical team members:   - Hydrocortisone increased to 10mg/5mg/5mg (20mg total daily) per primary team  - Received prn ativan 0.5mg x3 in past 24h  - Scopolamine patch in place  - BM x2 after suppository    Per  "patient or family/caregivers today:  Met with Toe and Caro (rivka) at bedside with in-person Angela . Toe complains of ongoing nausea and dizziness. He rates nausea as moderate. He does not remember meeting me before or our conversation yesterday. He does not recall eating breakfast. He is oriented to \"hospital\" and dad at bedside, but does not know which hospital he is in or why. He does not remember how many siblings he has.    Met with Caro separately outside the room. He remembers that we discussed Toe's disease yesterday and that it is genetic. He acknowledges he has been told by multiple doctors that Toe has a genetic, neurologic disease which is progressive. He still struggles to understand how the disease can be genetic if no other family members have it. Attempted to explain how genetic diseases can arise, unclear if Caro understood. Emphasized that regardless of cause, Toe's disease is progressive and will ultimately lead to his death.     Explored code status. Explained that it would be very difficult for Toe to recover if he was resuscitated. Caro deferred to medical team to do \"what is best\" for Toe. Emphasized that we do not need to make a decision about this today and encouraged him to discuss further with family.              Medications:     I have reviewed this patient's medication profile and medications during this hospitalization.    Noted meds:    Hydrocortisone increased to 10mg/5mg/5mg (20mg total daily) per primary team  Zyprexa 5mg daily  Trazodone at bedtime (held last night as patient was sleeping)  Lorazepam 0.5mg po q4h prn anxiety (received 3x in past 24h)  Zofran 4mg ODT prn  Compazine prn nausea (2nd line)           Physical Exam:   Vital Signs: Blood pressure 105/72, pulse 62, temperature 97.5  F (36.4  C), temperature source Oral, resp. rate 16, height 1.676 m (5' 6\"), weight 69.9 kg (154 lb), SpO2 96 %.   GENERAL: laying in bed, NAD, A&Ox1-2  HEENT: " Normocephalic, anicteric sclera, moist mucous membranes  LUNGS: non-labored  NEUROLOGIC: eyes open, alert and engaged, communicates by nodding/shaking his head or speaking a few words  PSYCH: flat           Data Reviewed:     Reviewed recent pertinent imaging:   No new imaging    Reviewed recent labs:   No new labs

## 2023-02-15 NOTE — PLAN OF CARE
Problem: Plan of Care - These are the overarching goals to be used throughout the patient stay.    Goal: Optimal Comfort and Wellbeing  Outcome: Not Progressing  Problem: Nausea and Vomiting  Goal: Nausea and Vomiting Relief  Outcome: Not Progressing    Patient sleeping most of shift. VSS. Patient is not dizzy or nauseous when resting. Patient has slurred speech. Patient is extremely weak. Father at bedside to help with cares. Continuing to monitor.

## 2023-02-16 NOTE — PLAN OF CARE
"PRIMARY DIAGNOSIS: \"GENERIC\" NURSING  OUTPATIENT/OBSERVATION GOALS TO BE MET BEFORE DISCHARGE:  ADLs back to baseline: No    Activity and level of assistance: yt lift, w/c bound    Pain status: Pain free.    Return to near baseline physical activity: Yes     Discharge Planner Nurse   Safe discharge environment identified: Yes  Barriers to discharge: Yes       Entered by: KEVIN WRIGHT RN 02/16/2023 2:12 PM     Please review provider order for any additional goals.   Nurse to notify provider when observation goals have been met and patient is ready for discharge.  "

## 2023-02-16 NOTE — PLAN OF CARE
"  PRIMARY DIAGNOSIS: GENERALIZED WEAKNESS    OUTPATIENT/OBSERVATION GOALS TO BE MET BEFORE DISCHARGE  1. Orthostatic performed: N/A    2. Tolerating PO medications: Yes    3. Return to near baseline physical activity: No    4. Cleared for discharge by consultants (if involved): Yes    Discharge Planner Nurse   Safe discharge environment identified: Yes  Barriers to discharge: Yes       Entered by: KEVIN WRIGHT RN 02/16/2023 2:13 PM     Please review provider order for any additional goals.   Nurse to notify provider when observation goals have been met and patient is ready for discharge.    /56 (BP Location: Left arm)   Pulse 61   Temp 97.9  F (36.6  C) (Oral)   Resp 16   Ht 1.676 m (5' 6\")   Wt 69.9 kg (154 lb)   SpO2 97%   BMI 24.86 kg/m       Pt denies pain, c/o dizziness but denies nausea. Pt assist of 1 in bed, pt refused repositioning occasionally.  "

## 2023-02-16 NOTE — PROGRESS NOTES
Care Management Follow Up    Length of Stay (days): 0    Expected Discharge Date: 02/17/2023     Concerns to be Addressed:       Patient plan of care discussed at interdisciplinary rounds: Yes    Anticipated Discharge Disposition:  Home with home care     Anticipated Discharge Services:  RN, PT, OT, Speech, HHA, SW  Anticipated Discharge DME:  Lift and hospital bed    Patient/family educated on Medicare website which has current facility and service quality ratings:  yes  Education Provided on the Discharge Plan:  yes  Patient/Family in Agreement with the Plan:  yes    Referrals Placed by CM/SW:  [facility], multiple home care agencies  Private pay costs discussed: Not applicable    Additional Information:  SHIRA spoke to [facility] [employer], and was faxed paperwork and note requirements for pt DME. MD completed paperwork and SHIRA faxed this back to [facility] along with supporting progress notes from pt chart. SHIRA also scanned and emailed to [facility] [employer].       ANAYELI Vieira

## 2023-02-16 NOTE — PROGRESS NOTES
In regard to semi-electric hospital bed:  Due to his diagnosis of X-linked adrenoleukodystrophy with resulting limited mobility, the patient requires frequent changes in body position - every 1 -2 hours and needs to be at 90 degrees for eating and drinking.    In regard to the lift:  The patient would be bed confined if he does not have a lift to help with transfers between bed and chair/wheelchair and commode.  He is unable to use his arms to transfer himself or assist in his own transfer.  He does require the assistance of 1-2 people to do these transfers between bed and chair/wheelchair and commode.  He cannot be safely transferred without this lift due to his severe degenerative neurologic condition which makes him dependent on others to help.  This will fit in his home where it is needed to accomplish these transfers.    Pedro Aaron MD

## 2023-02-16 NOTE — PLAN OF CARE
PRIMARY DIAGNOSIS: GENERALIZED WEAKNESS    OUTPATIENT/OBSERVATION GOALS TO BE MET BEFORE DISCHARGE  1. Orthostatic performed: Yes:                                    2. Tolerating PO medications: Yes    3. Return to near baseline physical activity: No    4. Cleared for discharge by consultants (if involved): No    Discharge Planner Nurse   Safe discharge environment identified: Yes  Barriers to discharge: No       Entered by: Kiki Trujillo RN 02/16/2023 4:19 AM     Please review provider order for any additional goals.   Nurse to notify provider when observation goals have been met and patient is ready for discharge.Goal Outcome Evaluation:       Pt is alert but confused , no signs of pain as pt has slept most of the shift, pt is voiding in the urinal, vitals are stable, family at bed side. Pt is voiding, BS was 32. Complained of pain in his left leg, prn tylenol for pain control.

## 2023-02-16 NOTE — PLAN OF CARE
PRIMARY DIAGNOSIS: GENERALIZED WEAKNESS    OUTPATIENT/OBSERVATION GOALS TO BE MET BEFORE DISCHARGE  1. Orthostatic performed: Yes:                                    2. Tolerating PO medications: Yes    3. Return to near baseline physical activity: No    4. Cleared for discharge by consultants (if involved): No    Discharge Planner Nurse   Safe discharge environment identified: Yes  Barriers to discharge: No       Entered by: Kiki Trujillo RN 02/16/2023 4:18 AM     Please review provider order for any additional goals.   Nurse to notify provider when observation goals have been met and patient is ready for discharge.Goal Outcome Evaluation:

## 2023-02-16 NOTE — PLAN OF CARE
PRIMARY DIAGNOSIS: GENERALIZED WEAKNESS    OUTPATIENT/OBSERVATION GOALS TO BE MET BEFORE DISCHARGE  1. Orthostatic performed: No    2. Tolerating PO medications: crushed in applesauce    3. Return to near baseline physical activity: progressive neurogenative disease, will likely not return to baseline    4. Cleared for discharge by consultants (if involved): No    Discharge Planner Nurse   Safe discharge environment identified: No  Barriers to discharge: Yes       Entered by: Raiza Spence RN 02/16/2023 12:23 AM     Please review provider order for any additional goals.   Nurse to notify provider when observation goals have been met and patient is ready for discharge.Goal Outcome Evaluation:

## 2023-02-16 NOTE — PLAN OF CARE
Goal Outcome Evaluation:       Patient reports no pain this shift. Uses laminated paper with alphabet to point to communicate. Family brought soup that was thick enough for patient to eat. Patient is able to use the call light effectively. Is oriented to place and self. Does not know the year but knows it is winter. Flat affect. Patient did not void this shift, unless family emptied urinal. Attempted to ask family if they had been emptying urinal, but family member kept falling asleep. Bladder scan showed 91mL

## 2023-02-16 NOTE — TELEPHONE ENCOUNTER
Shala SAMANIEGO calling to talk with Dr Rangel regarding patient currently admitted to the hospital for Palliative Care.    Message routed to Dr Claire Gray RN  [facility]

## 2023-02-16 NOTE — PLAN OF CARE
Physical Therapy Discharge Summary    Reason for therapy discharge:    No further expectations of functional progress.    Progress towards therapy goal(s). See goals on Care Plan in [facility] electronic health record for goal details.  Goals not met.  Barriers to achieving goals:   limited tolerance for therapy.    Therapy recommendation(s):    Continued therapy is recommended.  Rationale/Recommendations:  home care PT. At this time, pt is dependent for cares and has been for months. He will be receiving mechanical lift and hospital bed to home. Will discontinue PT orders at this time.

## 2023-02-16 NOTE — PROGRESS NOTES
[facility]  Palliative Care Chart Check Note    Palliative medicine following for goals of care/symptom management.    Chart reviewed and recent events noted from preceding day. Per MAR, has not required prn ativan or trazodone in over 24 hours. Symptoms stable per nursing. Planning for discharge home when equipment delivered, likely tomorrow 2/17.    Called and spoke with PCP Dr. Jose Rangel at [facility]. Reviewed recent goals of care discussions and symptom management. Dr. Rangel is in agreement with medication changes. Notified hospitalist Dr. Aaron.    Symptom Management:  #Chronic nausea and dizziness  - Continue increased dose hydrocortisone (continue on discharge)  - Continue zofran and compazine prn  - Discontinued home meclizine  - Continue scopolamine patch (continue on discharge)  - Continue zyprexa 5mg BID (continue on discharge)  - Reduced frequency of ativan 0.5mg prn to BID (stop on discharge)  - Requested Vestibular Therapy  - Sea bands and aromatherapy  - Integrative Therapies     #Depressed mood, anxiety, insomnia   - Continue zyprexa 5mg BID (continue on discharge)  - Continue trazodone 50mg PRN insomnia (change home dose to PRN on discharge)  - Reduced frequency of ativan 0.5mg prn to BID (stop on discharge)     #Constipation - chronic  - Continue miralax daily  - Continue senna 1 tab daily  - Continue prn bisacodyl suppository    Gia Coombs PA-C / Palliative Care / Text me on Vocera  Team Pager [phone] (8am-430pm M-F)

## 2023-02-16 NOTE — PLAN OF CARE
PRIMARY DIAGNOSIS: GENERALIZED WEAKNESS    OUTPATIENT/OBSERVATION GOALS TO BE MET BEFORE DISCHARGE  1. Orthostatic performed: No    2. Tolerating PO medications: crushed in applesauce    3. Return to near baseline physical activity: will likely not return to baseline due to neurogenative disease    4. Cleared for discharge by consultants (if involved): No    Discharge Planner Nurse   Safe discharge environment identified: No  Barriers to discharge: Yes       Entered by: Raiza Spence RN 02/16/2023 12:25 AM     Please review provider order for any additional goals.   Nurse to notify provider when observation goals have been met and patient is ready for discharge.Goal Outcome Evaluation:

## 2023-02-17 NOTE — PLAN OF CARE
Speech Language Therapy Discharge Summary    Reason for therapy discharge:    Discharged to home.  All goals and outcomes met, no further needs identified.    Progress towards therapy goal(s). See goals on Care Plan in [facility] electronic health record for goal details.  Goals met    Therapy recommendation(s):    Current goals have been met. Consider further ST involvement if further training is needed at home or if symptoms worsen.     Patient and his family have been educated on thickened liquids and provided with instructions, samples and how to order. Written information provided in English, patient's sister speaks English. W/E was determined to be most efficient and accurate means of communication for patient in setting of severe dysarthria with highly reduced intelligibility. Family was educated previous sessions on progressive nature of disease and to report increased concerns to MD and pursue ST as appropriate. No further specific ST needs at this time.

## 2023-02-17 NOTE — PLAN OF CARE
PRIMARY DIAGNOSIS: GENERALIZED WEAKNESS    OUTPATIENT/OBSERVATION GOALS TO BE MET BEFORE DISCHARGE  1. Orthostatic performed: No    2. Tolerating PO medications: crushed in apple sauce    3. Return to near baseline physical activity: will likely not return to baseline    4. Cleared for discharge by consultants (if involved): Yes, just waiting on equipment for home    Discharge Planner Nurse   Safe discharge environment identified: Yes  Barriers to discharge: Yes       Entered by: Raiza Spence RN 02/16/2023 10:44 PM     Please review provider order for any additional goals.   Nurse to notify provider when observation goals have been met and patient is ready for discharge.Goal Outcome Evaluation:        Patient reports no pain. C/o dizziness and some nausea. Ativan given to help with both. Effective per patient

## 2023-02-17 NOTE — PROGRESS NOTES
[facility]    Medicine Progress Note - Hospitalist Service    Date of Admission:  2/10/2023    Assessment & Plan     Brooke Peterson is a 32 year old male with a pertinent history of X-linked adrenoleukodystrophy, schizophrenia, Trousdale's disease, PTSD, hypothyroidism, and neurodevelopmental disorder who presents to this ED via wheelchair with family member for evaluation of failure to thrive in adult.  Evaluation by neurology suggests this is a progressive decline of his adrenoleukodystrophy and is progressive.  This will eventually be terminal.  Plan is now to help family care for him at home with DME and arrange for outpatient HHC.     Faillure to thrive  Dysarthria and dysphagia  Worsening weakness   Weight loss  Dehydration  X-linked adrenoleukodystrophy  -worse dysarthria ,  mental status, weakness, now wheel chair bound  -worse swallow function with coughing on oral, dysphagia, weight loss  -Hospitalized in may 2022, with MRI and neurology consultation: neurologist concerned it is some kind of adrenoleukodystrophy. Patient was discharged to short-term care facility first then back to home with HHC. As per family, HHC stopped. When pt was discharged, physician recommended pt to see special neurologist at  or [facility].   -  Neurology consult appreciated .  Now has signed off  -Unfortunately adrenoleukodystrophy is progressive neurologic disease with no cure  - PT  - Nutrition consult  - SLP evaluation appreciated.  VSS done and dietary recommendations with puréed diet and thin liquids   -Palliative care consult appreciated.  02/14: Family meeting with patient, father, 2 siblings and palliative care team in the presence of live professional Angela roberts.  Patient and his father stated that they have never been informed of the diagnosis.  They appeared to be struggling to accept what is happening to him.  Patient expressed interest to go home.  -Can obtain outpatient Oncology  consult to comment on hematopoietic stem cell transplantation. Though very unlikely that patient will be a good candidate for it.     Ariel's disease  -PTA fludrocortisone 0.15 mg oral daily  -increased  Hydrocortisone to 20 mg oral in divided doses. Was 15 mg PO snow in divided doses)     Schizophrenia  -increased olanzapine to 5 mg oral BID to help with nausea and sleep      Hypothyroidism  -PTA levothyroxine 75 mcg oral daily     Polycythemia   -looks like chronic but rising recently  -Hematology consult appreciated.  Recommending no further work-up  -Patient's illness has progressed significantly making it unlikely that he will be a good candidate for stem cell transplantation.     Dizziness?  -Patient describes what appears to be dizziness.  Very difficult to get accurate history from the patient due to dysarthria and language barrier.  -Meclizine 25 mg oral as needed.    -Slight improvement of his dizziness is noted       Diet: Combination Diet Pureed Diet (level 4); Mildly Thick (level 2)    DVT Prophylaxis: Enoxaparin (Lovenox) SQ  Booth Catheter: Not present  Lines: None     Cardiac Monitoring: None  Code Status: Full Code      Clinically Significant Risk Factors Present on Admission                               Disposition Plan      Expected Discharge Date: 02/17/2023    Discharge Delays: Other (Add Comment)    Discharge Comments: Neuro signed off  Pallitive consult  DME  needs home care          Pedro Aaron MD  Hospitalist Service  [facility]  Securely message with [employer] (more info)  Text page via [employer] Paging/Directory   ______________________________________________________________________    Interval History   Seen with , no chest pain, SOB, cough, N/V, pain.  Appetite not good.    Physical Exam   Vital Signs: Temp: 98  F (36.7  C) Temp src: Oral BP: 117/65 Pulse: 107   Resp: 18 SpO2: 96 % O2 Device: None (Room air)    Weight: 154 lbs 0 oz    General  Appearance:  No distress noted.  Dysarthria, partial quadriparesis  Respiratory: rhonchi bilaterally  Cardiovascular: RRR S1S2  GI: soft, normoactive BS  Skin: intact and warm   Neuro:  Partial Quadriparesis.  Right upper and lower extremity has power of 2-3 out of 5 whereas left upper and lower extremities power is 1-2 out of 5.  Sensation is intact    Medical Decision Making       50 MINUTES SPENT BY ME on the date of service doing chart review, history, exam, documentation & further activities per the note.   Discussed with SW and filled out multiple forms to obtain DME for home use requiring multiple visits to unit    Data     I have personally reviewed the following data over the past 24 hrs:    N/A  \   N/A   / 162     N/A N/A N/A /  N/A   N/A N/A 0.87 \       Imaging results reviewed over the past 24 hrs:   No results found for this or any previous visit (from the past 24 hour(s)).  NOTE: Data reviewed over the past 24 hrs contributes toward MDM complexity

## 2023-02-17 NOTE — PLAN OF CARE
"PRIMARY DIAGNOSIS: \"GENERIC\" NURSING  OUTPATIENT/OBSERVATION GOALS TO BE MET BEFORE DISCHARGE:  ADLs back to baseline: No    Activity and level of assistance: Bedrest/wheel chair bound    Pain status: Pain free.    Return to near baseline physical activity: No     Discharge Planner Nurse   Safe discharge environment identified: Yes  Barriers to discharge: No       Entered by: Marlene Vallejo RN 02/17/2023 5:10 AM     Please review provider order for any additional goals.   Nurse to notify provider when observation goals have been met and patient is ready for discharge.Goal Outcome Evaluation:    Non-verbal. Nods yes/no when asking questions. Denies pain/nausea. Wheelchair bound. Turn/repo, incontinent of urine x1 overnight. PO intake encouraged. Awaiting DME.            "

## 2023-02-17 NOTE — PLAN OF CARE
"PRIMARY DIAGNOSIS: \"GENERIC\" NURSING  OUTPATIENT/OBSERVATION GOALS TO BE MET BEFORE DISCHARGE:  ADLs back to baseline: No    Activity and level of assistance: Wheelchair bound/bedrest    Pain status: Improved with use of alternative comfort measures i.e.: positioning    Return to near baseline physical activity: No     Discharge Planner Nurse   Safe discharge environment identified: Yes  Barriers to discharge: No       Entered by: Marlene Vallejo RN 02/17/2023 3:12 AM     Please review provider order for any additional goals.   Nurse to notify provider when observation goals have been met and patient is ready for discharge.Goal Outcome Evaluation:                        "

## 2023-02-17 NOTE — DISCHARGE SUMMARY
[facility]  Hospitalist Discharge Summary      Date of Admission:  2/10/2023  Date of Discharge:  2/17/2023  Discharging Provider: Pedro Aaron MD  Discharge Service: Hospitalist Service    Discharge Diagnoses   Principal Problem:    X linked adrenoleukodystrophy (H)  Active Problems:    [place]'s disease (H)    Hypothyroidism, unspecified type    PTSD (post-traumatic stress disorder)    Schizoaffective disorder, depressive type, with catatonia (H)    Moderate persistent asthma    Polycythemia      Follow-ups Needed After Discharge    Follow up with primary care provider, Jose Rangel, within 7 days to evaluate medication change and for hospital follow- up.  No follow up labs or test are needed.        -may need to transition to hospice in the not too distant future.    Discharge Disposition   Discharged to home  Condition at discharge: Stable      Hospital Course   Brooke Peterson is a 32 year old male with a pertinent history of X-linked adrenoleukodystrophy, schizophrenia, Ariel's disease, PTSD, hypothyroidism, and neurodevelopmental disorder who presented to the ED via wheelchair with family member for evaluation of failure to thrive in adult.  MRI of the brain did not show any new changes.  Evaluation by neurology suggested this is a progressive decline of his adrenoleukodystrophy and is progressive.  This will eventually be terminal.  Plan is now to help family care for him at home with DME and arrange for outpatient C.  We are having difficulty finding a home health care agency that will accept him with the type of insurance he has.  Family is aware of this and have plans to have family helping him at home. SW will keep trying to find an agency to accept him but his PCP may need to continue to work on this as well.     Video swallow was done and he was placed on a modified diet.  We increased his hydrocortisone total daily dosage in order to help his overall functioning but this may be  of limited benefit.  Heme/onc assessed him for polycythemia but did not feel a bone marrow biopsy would be of benefit given his overall status and felt there may be other explanations for this besides a bone marrow disorder.    Consultations This Hospital Stay   PHYSICAL THERAPY ADULT IP CONSULT  CARE MANAGEMENT / SOCIAL WORK IP CONSULT  NUTRITION SERVICES ADULT IP CONSULT  NEUROLOGY IP CONSULT  HEMATOLOGY & ONCOLOGY IP CONSULT  SPEECH LANGUAGE PATH ADULT IP CONSULT  PALLIATIVE CARE ADULT IP CONSULT  INTEGRATIVE THERAPIES CONSULT  HEMATOLOGY & ONCOLOGY IP CONSULT  NUTRITION SERVICES ADULT IP CONSULT  VESTIBULAR PHYSICAL THERAPY IP CONSULT    Code Status   Full Code    Time Spent on this Encounter   I, Pedro Aaron MD, personally saw the patient today and spent greater than 30 minutes discharging this patient.       Pedro Aaron MD  [address] [address]  Phone: [phone]  Fax: [phone]  ______________________________________________________________________    Physical Exam   Vital Signs: Temp: 97.8  F (36.6  C) Temp src: Oral BP: 98/70 Pulse: 67   Resp: 20 SpO2: 96 % O2 Device: None (Room air)    Weight: 154 lbs 0 oz  The patient was interviewed and examined on the day of discharge.       Primary Care Physician   Jose Rangel    Discharge Orders      Palliative Care Referral      Home Care Referral      Follow-up and recommended labs and tests     Follow up with primary care provider, Jose Rangel, within 7 days to evaluate medication change and for hospital follow- up.  No follow up labs or test are needed.     Activity    Your activity upon discharge: activity as tolerated     Debbie Lift Order    Debbie Lift Documentation:   Patient is non-weight bearing: No.     I, the undersigned, certify that the above prescribed supplies are medically necessary for this patient and is both reasonable and necessary in reference to accepted standards of  "medical and necessary in reference to accepted standards of medical practice in the treatment of this patient's condition and is not prescribed as a convenience.     Hospital Bed Order    Hospital Bed Documentation:   Hospital bed is required for body positioning, to allow for safe transfers to wheelchair and standing and frequent changes in body position, not feasible in an ordinary bed     NOTE: Patient must have a \"Yes\" in one of the four following questions to qualify for a hospital bed.    1. Does the patient require positioning of the body in ways not feasible with an ordinary bed due to a medical condition that is expected to last at least 1 month? Yes (Please explain): Patient has a neurodegenerative disorder with progressive neurologic deficit    2. Does the patient require, for the alleviation of pain, positioning of the body in ways not feasible with an ordinary bed? Yes (Please explain):      3. Does the patient require the head of the bed to be elevated more than 30 degrees most of the time due to congestive heart failure, chronic pulmonary disease, or aspiration? No    4. Does the patient require traction that can only be attached to a hospital bed? No    Additional Criteria:    Does the patient require frequent changes in body position and/or have an immediate need for change in body position? Yes - Patient qualifies for Semi Electric Bed     Trapeze Criteria:  (Patient must meet standard hospital bed criteria also)   1. Does patient need this device to sit up because of a respiratory condition, for change in body position for other medical reasons, or to get in or out of bed? Yes (Please explain): Patient is having quadriparesis which is anticipated to progressively worsen.     I, the undersigned, certify that the above prescribed supplies are medically necessary for this patient and is both reasonable and necessary in reference to accepted standards of medical and necessary in reference to accepted " standards of medical practice in the treatment of this patient's condition and is not prescribed as a convenience.     Diet    Follow this diet upon discharge: Orders Placed This Encounter      Combination Diet Pureed Diet (level 4); Mildly Thick (level 2)       Significant Results and Procedures   Results for orders placed or performed during the hospital encounter of 02/10/23   Chest XR,  PA & LAT    Narrative    EXAM: XR CHEST 2 VIEWS  LOCATION: [facility]  DATE/TIME: 2/10/2023 2:58 PM    INDICATION: Coarse lung sounds on the left, cough, aspiration risk  COMPARISON: 05/14/2022.      Impression    IMPRESSION: No acute consolidation, pneumothorax or pleural effusion. Normal heart size and pulmonary vascularity. No significant change from 05/14/2022.   MR Brain w/o & w Contrast    Narrative    EXAM: MR BRAIN W/O and W CONTRAST  LOCATION: [facility]  DATE/TIME: 2/10/2023 4:55 PM    INDICATION: History of leukodystrophy, presenting with difficulty swallowing, cough, inability to walk, seems to have asymmetric right upper and lower extremity weakness on exam  COMPARISON: CT head 12/17/2022 and MRI brain 06/28/2022  CONTRAST: 7mL gadavist  TECHNIQUE: Routine multiplanar multisequence head MRI without and with intravenous contrast.    FINDINGS:  INTRACRANIAL CONTENTS: No acute or subacute infarct. No mass, acute hemorrhage, or extra-axial fluid collections. Unchanged appearance of abnormal, relatively symmetric T2/FLAIR hyperintensity involving the bilateral corticospinal tracts with inclusion   of the medial thalami, cerebral peduncles, and central emeka. There is also similar T2/FLAIR signal abnormality of the paramedian cerebellar hemispheres. These findings appear unchanged when compared with prior. No new foci of abnormality are identified.   Normal ventricles and sulci. Normal position of the cerebellar tonsils. No pathologic contrast enhancement.    SELLA: No  abnormality accounting for technique.    OSSEOUS STRUCTURES/SOFT TISSUES: Normal marrow signal. The major intracranial vascular flow voids are maintained.     ORBITS: No abnormality accounting for technique.     SINUSES/MASTOIDS: No paranasal sinus mucosal disease. No middle ear or mastoid effusion.       Impression    IMPRESSION:  1.  Negative for acute intracranial abnormality.  2.  Unchanged abnormal signal involving the bilateral corticospinal tracts. Differential considerations remain the same.   XR Video Swallow with SLP or OT    Narrative    EXAM: XR VIDEO SWALLOW WITH SLP OR OT  LOCATION: [facility]  DATE/TIME: 2/11/2023 10:45 AM    INDICATION: Difficulty swallowing.  COMPARISON: None.    TECHNIQUE: Routine swallow study with speech pathology using multiple barium thicknesses.    FINDINGS:   FLUOROSCOPIC TIME: 1.9  NUMBER OF IMAGES: 1 videofluoroscopic imaging series    Swallow study with Speech Pathology using multiple barium thicknesses, including thin consistency, slightly thickened consistency, mildly thick consistency and puree.    Variable laryngeal penetration occurs with thin, slightly thickened, and mildly thick consistencies. After the first swallow there is mild piriform stasis. Accumulation of barium in the piriforms on subsequent swallows results in moderate piriform stasis   and could predispose to spillover events. Variable stasis in the vallecula and piriforms with puree consistency. Due to repeated instances of penetration there is eventual barium resulting in coating of the laryngeal vestibule with globule noted   anteriorly at the level of the cords after the 9th trial and eventually extends below the level of the cords to the upper trachea. There was no spontaneous cough.    Multiple trials were then performed with various consistencies using a chin tuck maneuver. The chin tuck maneuver was effective at reducing penetration with all consistencies.      Impression     IMPRESSION:    1.  Increased stasis of material within the piriform sinuses with subsequent swallows. Eventual laryngeal penetration with several different consistencies. The accumulation results in eventual coating of the vestibule and extension of barium to and   slightly below the level of the cords. There was no spontaneous cough.  2.  Chin tuck maneuver is effective at reducing the degree of stasis and no new instances of laryngeal penetration occurred during chin tuck.    Please see separately dictated report from speech pathology for additional description, analysis, and management recommendations.       Discharge Medications   Current Discharge Medication List      START taking these medications    Details   pantoprazole (PROTONIX) 40 MG EC tablet Take 1 tablet (40 mg) by mouth every morning (before breakfast)  Qty: 30 tablet, Refills: 1    Associated Diagnoses: X linked adrenoleukodystrophy (H)      polyethylene glycol (MIRALAX) 17 GM/Dose powder Take 17 g by mouth daily  Qty: 510 g, Refills: 1    Associated Diagnoses: X linked adrenoleukodystrophy (H)      scopolamine (TRANSDERM) 1 MG/3DAYS 72 hr patch Place 1 patch onto the skin every 72 hours  Qty: 10 patch, Refills: 1    Associated Diagnoses: X linked adrenoleukodystrophy (H)      sennosides (SENOKOT) 8.6 MG tablet Take 1 tablet by mouth daily  Qty: 90 tablet, Refills: 1    Associated Diagnoses: X linked adrenoleukodystrophy (H)         CONTINUE these medications which have CHANGED    Details   hydrocortisone (CORTEF) 10 MG tablet Take 10 mg every morning, 5 mg (1/2 tab) at 2:00 PM and 5 mg at 5:00 PM daily  Qty: 60 tablet, Refills: 1    Associated Diagnoses: Adrenal insufficiency (H); Cropsey's disease (H)      OLANZapine (ZYPREXA) 5 MG tablet Take 1 tablet (5 mg) by mouth 2 times daily  Qty: 60 tablet, Refills: 1    Associated Diagnoses: Auditory hallucination         CONTINUE these medications which have NOT CHANGED    Details   acetaminophen  (TYLENOL) 325 MG tablet Take 2 tablets (650 mg) by mouth every 6 hours as needed for mild pain  Qty: 90 tablet, Refills: 3    Associated Diagnoses: Nonintractable headache, unspecified chronicity pattern, unspecified headache type      albuterol (PROAIR HFA/PROVENTIL HFA/VENTOLIN HFA) 108 (90 Base) MCG/ACT inhaler Inhale 2 puffs into the lungs every 6 hours as needed for shortness of breath / dyspnea or wheezing  Qty: 18 g, Refills: 3    Comments: Pharmacy may dispense brand covered by insurance (Proair, or proventil or ventolin or generic albuterol inhaler)  Associated Diagnoses: Moderate persistent asthma without complication      baclofen (LIORESAL) 10 MG tablet Take 10 mg by mouth 2 times daily      docusate sodium (COLACE) 100 MG capsule Take 1 capsule (100 mg) by mouth daily as needed for constipation  Qty: 30 capsule, Refills: 1    Associated Diagnoses: Constipation, unspecified constipation type      fludrocortisone (FLORINEF) 0.1 MG tablet Take 1.5 tablets (0.15 mg) by mouth daily  Qty: 135 tablet, Refills: 4    Associated Diagnoses: Otis's disease (H)      fluticasone-salmeterol (ADVAIR HFA) 115-21 MCG/ACT inhaler Inhale 2 puffs into the lungs 2 times daily  Qty: 36 g, Refills: 3    Associated Diagnoses: Moderate persistent asthma without complication; Encounter for medication refill      levothyroxine (SYNTHROID/LEVOTHROID) 75 MCG tablet Take 1 tablet (75 mcg) by mouth daily  Qty: 90 tablet, Refills: 4    Associated Diagnoses: Hypothyroidism, unspecified type      Multiple Vitamin (TAB-A-MAGALY) TABS TAKE 1 TABLET BY MOUTH DAILY  Qty: 28 tablet, Refills: 11    Comments: Refill Too Soon  Associated Diagnoses: Encounter for medication refill; Adrenal insufficiency (H)      ondansetron (ZOFRAN) 4 MG tablet Take 1 tablet (4 mg) by mouth every 8 hours as needed for nausea  Qty: 10 tablet, Refills: 0    Associated Diagnoses: Nausea      traZODone (DESYREL) 50 MG tablet Take 1 tablet (50 mg) by mouth At  Bedtime  Qty: 90 tablet, Refills: 0    Associated Diagnoses: Insomnia, unspecified type      vitamin B-12 (CYANOCOBALAMIN) 500 MCG tablet Take 1 tablet (500 mcg) by mouth daily  Qty: 90 tablet, Refills: 3    Associated Diagnoses: Neurodevelopmental disorder; Encounter for medication refill      vitamin D3 (CHOLECALCIFEROL) 50 mcg (2000 units) tablet TAKE 1 TABLET (50 MCG) BY MOUTH DAILY  Qty: 90 tablet, Refills: 3    Associated Diagnoses: Vitamin D deficiency      famotidine (PEPCID) 20 MG tablet TAKE 1 TABLET BY MOUTH TWICE A DAY  Qty: 60 tablet, Refills: 11    Comments: Prescription   Associated Diagnoses: Heartburn      loratadine (CLARITIN) 10 MG tablet TAKE 1 TABLET BY MOUTH DAILY  Qty: 28 tablet, Refills: 0    Comments: Prescription   Associated Diagnoses: Heartburn         STOP taking these medications       meclizine (ANTIVERT) 12.5 MG tablet Comments:   Reason for Stopping:             Allergies   No Known Allergies

## 2023-02-17 NOTE — PROGRESS NOTES
Care Management Discharge Note    Discharge Date: 02/17/2023       Discharge Disposition:  Home with family and home care  Discharge Services:  RN, HHA    Discharge DME:      Discharge Transportation:[facility] stretcher at 7 M  Private pay costs discussed: Not applicable      Patient/family educated on Medicare website which has current facility and service quality ratings:    yes  Education Provided on the Discharge Plan:  yes  Persons Notified of Discharge Plans: Pt, pt sister, nursing   Patient/Family in Agreement with the Plan: yes    Handoff Referral Completed: Yes    Additional Information:  SW spoke to [facility], they are processing pt request for hospital bed and lift. SHIRA faxed updated note from 2-16 for lift to pt to [facility].  Bedside nurse updated pt with reason for delay for discharge. Pt needs hosptial bed to discharge.    SHIRA contacted [facility] about pt hospital bed and lift and spoke to Anisa, case is being reviewed, all documents are in and the bed should be able to be delivered today or tomorrow. Anisa will continue to process and call SW back with any questions. Bed has been approved for delivery sometime this evening. SHIRA provided information to pt sister Kell who interpreted for pt. Pya will wait for call from [facility] and let nursing staff know time of delivery so transportation can be arranged for pt. Pt will need stretcher transport at discharge.     SHIRA met with pt and sister to discuss pt returning home [facility] hospital bed and the lift will come next week. Sister stated understanding and translated for pt and pt mother. SHIRA contacted [facility] , pt bed to be delivered around 5 PM. SHIRA contactd [facility] transport. Pt transport at 7 PM via stretcher. PCS completed.       ANAYELI Vieira

## 2023-02-17 NOTE — UTILIZATION REVIEW
Concurrent stay review; Secondary Review Determination - [facility]        Under the authority of the Utilization Management Committee, the utilization review process indicated a secondary review on the above patient.  The review outcome is based on review of the medical records, discussions with staff, and applying clinical experience noted on the date of the review.        (x) Observation/outpatient Status Appropriate - Concurrent stay review       RATIONALE FOR DETERMINATION:     Patient delayed discharge is related to disposition, there is no medical necessity for inpatient admission at the time of this review. If there is a change in patient status, please resend for review.    The information on this document is developed by the utilization review team in order for the business office to ensure compliance.  This only denotes the appropriateness of proper admission status and does not reflect the quality of care rendered.       The definitions of Inpatient Status and Observation Status used in making the determination above are those provided in the CMS Coverage Manual, Chapter 1 and Chapter 6, section 70.4.       Sincerely,    Radha Laird MD

## 2023-02-17 NOTE — PROGRESS NOTES
SW asked to assist with finding home care services of RN PT OT HHA SW and speech for pt.  Pt has [facility], which is known for having very few providers that will accept insurance.    SW left messages at both [facility] and [facility] home health care agencies inquiring as to status of referrals and requesting return calls.  SHIRA spoke with Isha at [facility] - pt declined - at capacity.  SHIRA spoke with Ksenia at [facility] - they do not accept any [place] insurances.  SHIRA sent referral to [facility].  SHIRA noted that Kylie at [facility] declined pt in [facility] due to unable to staff.  SHIRA sent to [facility].  SHIRA noted pt declined by Lauren in epic a short time later due to at payor capacity. SHIRA spoke with Brian at [facility] - they are now called [facility] - they do not provide any skilled home care services through Medicare.        ANAYELI Foreman, ANGUS 02/17/23 7:07 AM

## 2023-02-17 NOTE — PROGRESS NOTES
[facility]  Palliative Care Chart Check Note    Palliative medicine following for goals of care/symptom management.    Chart reviewed and recent events noted from preceding day. Planning for discharge home pending equipment delivery. Discussed with SHIRA and referred to [facility] for outpatient services.    Discharge med recommendations below.    Symptom Management:  #Chronic nausea and dizziness  - Continue increased dose hydrocortisone (continue on discharge)  - Continue zofran and compazine prn  - Discontinued home meclizine  - Continue scopolamine patch (continue on discharge)  - Continue zyprexa 5mg BID (continue on discharge)  - Continue ativan 0.5mg BID PRN (stop on discharge)  - Requested Vestibular Therapy  - Sea bands and aromatherapy  - Integrative Therapies     #Depressed mood, anxiety, insomnia   - Continue zyprexa 5mg BID (continue on discharge)  - Continue trazodone 50mg PRN insomnia (discontinue or PRN on discharge)  - Continue ativan 0.5mg BID PRN (stop on discharge)     #Constipation - chronic  - Continue miralax daily  - Continue senna 1 tab daily  - Continue prn bisacodyl suppository    Gia Coombs PA-C / Palliative Care / Text me on Vocera  Team Pager [phone] (8am-430pm M-F)

## 2023-02-17 NOTE — PLAN OF CARE
PRIMARY DIAGNOSIS: GENERALIZED WEAKNESS    OUTPATIENT/OBSERVATION GOALS TO BE MET BEFORE DISCHARGE  1. Orthostatic performed: No    2. Tolerating PO medications: crushed with applesauce    3. Return to near baseline physical activity: will likely not return to baseline    4. Cleared for discharge by consultants (if involved): Yes    Discharge Planner Nurse   Safe discharge environment identified: Yes  Barriers to discharge: Yes, waiting on medical equipment for home       Entered by: Raiza Spence RN 02/16/2023 10:48 PM     Please review provider order for any additional goals.   Nurse to notify provider when observation goals have been met and patient is ready for discharge.Goal Outcome Evaluation:       No pain this shift. Told RN that he is so tired but having difficulty sleeping. PRN melatonin given. Patient expresses desire to go home.

## 2023-02-17 NOTE — CONSULTS
Integrative Therapy Consult    Healing PresenceYes  Essential Oils: Topical (EO/Topical Oil), Inhalation (EO/Topical oil)     Support Healing process blend - HC, Tea Tree - HC, Lavender Massage Oil - HC       Healing Music:       Breathwork:       Guided Imagery:       Acupressure: Hands on Li4     Oshibori:       Energy Therapy:       Healing Touch:       Reiki:       Qi Gong:     Massage: Hand, Foot      Targeted Massage:    Sleep Promotion:       Other Therapy:       Intervention Reason: Comfort, Well Being     Pre and Post Session Scores: Patient Desires Treatment: yes                             Delivery:         Referrals:      Kalie Hackett

## 2023-02-18 PROBLEM — E03.9 HYPOTHYROIDISM, UNSPECIFIED TYPE: Status: ACTIVE | Noted: 2017-09-27

## 2023-02-18 PROBLEM — F06.1: Status: ACTIVE | Noted: 2019-06-05

## 2023-02-18 PROBLEM — F25.1: Status: ACTIVE | Noted: 2019-06-05

## 2023-02-18 PROBLEM — F43.10 PTSD (POST-TRAUMATIC STRESS DISORDER): Status: ACTIVE | Noted: 2019-06-05

## 2023-02-18 PROBLEM — J45.40 MODERATE PERSISTENT ASTHMA: Status: ACTIVE | Noted: 2018-03-27

## 2023-02-18 NOTE — PLAN OF CARE
Goal Outcome Evaluation:       Pt. Able to make needs knoiwn to staff with the use of his letter board. Denies pain. No difficulty breathing. Eating a fair amount of food without difficulty. Anxious to go home. Awaiting ride for discharge.

## 2023-02-18 NOTE — PLAN OF CARE
Goal Outcome Evaluation:         Pt was discharge to home  via EMS and accompany by family at around 1935. All the discharge information was given to pt/family prior to discharge.

## 2023-02-20 NOTE — PROGRESS NOTES
ENDOCRINE/ DIABETES PROGRESS NOTE    ADMISSION DATE:  2/16/2023  DATE:  2/20/2023  CURRENT HOSPITAL DAY:  Hospital Day: 5  CONSULTING PHYSICIAN:  Sandra Crowder MD  ATTENDING PHYSICIAN:  Liam Childers MD  CODE STATUS:  Selective Treatment/DNR                              ASSESSMENT/ PLAN:     68 year old female with PMH of T1DM, hypothyroidism, emphysema, CHF sp LVAD 2017, osteoporosis, who was admitted for anemia. FOBT is positive. Endocrine is consulted for T1DM management.      #T1DM with hyperglycemia  -cb retinoapthy  1/31/2023  A1c 6.6%    Home regimen: lantus 10 units + humalog 5 units tid with meals +LDSS >200      2/19 Blood sugars higher yesterday due to lantus dose given late. Will continue lantus 10 units and Humalog 4 units TID with meals  2/20 glycemia reviewed, keep insulin regimen same lantus 10 units and Humalog 4 units TID with meals and LDSS        #CHF sp LVAD      #Hypothyroidism  -continue LT4 75 mcg daily, last TSH at goal   TSH 1.854 on 1/31/2023    Osteoporosis  -sp IV reclast x 4 doses, last dose in 2023       #Anemia  -possible acute blood loss, GI on consult      INTERVAL HISTORY:      Justine Stuart is a 68 year old female patient admitted with Anemia, unspecified type [D64.9].    Events over last 24 hours:    Still has black stools  Eating some but does not like the food  No nausea no vomiting     Patient's current diet is Cardiac, Consistent Carb Moderate (45-75 Gm/meal) Diet.       Blood sugar review:  Last 24 hours blood sugars ranged Recent Labs   Lab 02/19/23  1220 02/19/23  1344 02/19/23  1633 02/19/23  1951 02/19/23 2023 02/20/23  0743   GLUCOSE BEDSIDE 114* 197* 98 69* 87 185*         MEDICATIONS:      The medication list was reviewed today.     MEDICATIONS  Current Facility-Administered Medications   Medication Dose Route Frequency Provider Last Rate Last Admin   • bisacodyl (DULCOLAX) EC tablet 5 mg  5 mg Oral BID PRN Taqueria Jenkins MD   5 mg at 02/18/23 1731   •  Mental Health Visit Note    5/16/2019    Start time: 10:05am    Stop Time: 10:44am   Session # 7      Session Type: Patient is presenting for an Individual session.    Brooke Peterson is a 28 y.o. male is being seen today for    Chief Complaint   Patient presents with     MH Follow Up     Patient presented for follow up psychotherapy to address coping with fatigue, heavy heart and acculturation difficulty       New symptoms or complaints: None    Functional Impairment:   Personal: 4  Family: 3  Work: 4  Social:4    Clinical assessment of mental status: Reviewed. MSE is current effective 5/16/2019  Grooming: Disheveled  Attire: Appropriate  Age: Appears Stated  Behavior Towards Examiner: Cooperative, Guarded   Motor Activity: Retarded. Has cane  Eye Contact: Avoidant  Mood: Depressed  Affect: Congruent w/content of speech, Blunted and Flat  Speech/Language: Delayed/Hesitant  Attention: Distractible  Concentration: Brief  Thought Process: Within normal- unclear at times.  Thought Content: Hallucinations: Auditory  Delusions: Within normal  Orientation: X 3.  Memory: Impairment, Recent and Remote  Judgement: Impairment  Estimated Intelligence: Below Average  Demonstrated Insight: Diminished  Fund of Knowledge: inadequate      Suicidal/Homicidal Ideation present: None Reported This Session     Patient's impression of their current status: Patient reports he attended psychiatric NP appointment for medications. He also comes in today with completed N-648 form.   Patient endorses the following symptoms:   Sleep:poor   Depressed mood: most days  Anxiety/Worries: sometimes  Pain: daily. Also includes dizziness and other somatic symptoms.   Appetite: poor   Engagement in activities: minimal- mostly at home watching movies. Doesn't socialize with others. Withdrawn. Does attend [employer] on weekends.   Medication Adherence: Good.      Therapist impression of patients current state: The patient presented for an individual psychotherapy  magnesium oxide (MAG-OX) tablet 400 mg  400 mg Oral BID Jacobo Murphy CNP   400 mg at 02/20/23 0851   • famotidine (PEPCID) tablet 20 mg  20 mg Oral Daily Taqueria Jenkins MD   20 mg at 02/20/23 0852   • fluticasone propionate (FLOVENT HFA) 110 MCG/ACT inhaler 2 puff  2 puff Inhalation BID Resp Taqueria Jenkins MD       • levothyroxine (SYNTHROID, LEVOTHROID) tablet 75 mcg  75 mcg Oral QAM AC Taqueria Jenkins MD   75 mcg at 02/20/23 0511   • pravastatin (PRAVACHOL) tablet 40 mg  40 mg Oral Daily Taqueria Jenkins MD   40 mg at 02/20/23 0852   • dextrose 50 % injection 25 g  25 g Intravenous PRN Priti Pemberton, DO       • dextrose 50 % injection 12.5 g  12.5 g Intravenous PRN Priti Pemberton, DO       • glucagon (GLUCAGEN) injection 1 mg  1 mg Intramuscular PRN Priti Pemberton, DO       • dextrose (GLUTOSE) 40 % gel 15 g  15 g Oral PRN Priti Pemberton, DO       • dextrose (GLUTOSE) 40 % gel 30 g  30 g Oral PRN Priti Pemberton, DO       • insulin glargine (LANTUS) injection 10 Units  10 Units Subcutaneous Daily Priti Pemberton, DO   10 Units at 02/20/23 0852   • insulin lispro (ADMELOG,HumaLOG) - Correction Dose   Subcutaneous Nightly Priti Pemberton, DO   3 Units at 02/17/23 2148   • insulin lispro (ADMELOG, HumaLOG) injection 4 Units  4 Units Subcutaneous TID  Priti Pemberton, DO   4 Units at 02/20/23 0852   • sodium chloride 0.9% infusion   Intravenous Continuous PRN Taqueria Jenkins MD       • insulin lispro (ADMELOG,HumaLOG) - Correction Dose   Subcutaneous TID  Priti Pemberton, DO   1 Units at 02/20/23 0900   • hydrALAZINE (APRESOLINE) tablet 50 mg  50 mg Oral TID Petra Armstrong CNP   50 mg at 02/20/23 0852   • isosorbide dinitrate (ISORDIL) tablet 10 mg  10 mg Oral TID Petra Armstrong CNP   10 mg at 02/20/23 0852   • lisinopril (ZESTRIL) tablet 5 mg  5 mg Oral Daily Petra Armstrong CNP   5 mg at 02/20/23 0852   • bisacodyl (DULCOLAX) suppository 10 mg  10 mg Rectal Daily PRN Petra Armstrong CNP       • magnesium hydroxide (MILK OF  session with this provider. A professional Angela , Celestino, was present for the visit due to the language complexity. His father was also present. Patient continues to present with symptoms of depressed mood, blunted affect, and avoidant body language. He is minimally engaged in session. Therapist reviewed N-648 form that patient brings today. It was completed by a clinical psychologist at [facility]. 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 This impairs his ability to learn or retain new information. His psychosocial factors and acculturation difficulty continue to further impact his mental health symptoms. He continues to have poor memory as well. All of these have also impaired his ability to actively participate in therapy and see positive effects from it. He continues to benefit greatly from a team approach and case management type of support. Therapist will continue to assist patient in getting connected to more formal supports to help meet his mental health needs. He will need to be connected to a new medication management provider as well as the provider he saw for intake will no longer be working for [facility]. Therapist also reviewed the initial consult with psychiatric NP for coordination of care. Zyprexa was reduced to 5 mg for irritability and anxiety and invega was started at 6 mg daily for report of auditory hallucinations.  Patient to take medications as prescribed. Patient plans to follow up with PCP today as well.     Type of psychotherapeutic technique provided: Insight oriented, Client centered and Solution-focused, psychoeducation    Progress toward short term goals:Progress as expected, attended intake appointment with psychiatric nurse practition for medication management.       Review of long term goals: Not done at today's visit.  Effective 3/8/2019-06/08/2019.    Diagnosis:   1.  MAGNESIA) 400 MG/5ML suspension 30 mL  30 mL Oral Daily PRN Petra LAUREN Armstrong, CNP   30 mL at 02/17/23 1449   • meropenem (MERREM) 500 mg in sodium chloride 0.9 % 100 mL IVPB  500 mg Intravenous 3 times per day Taqueria Jenkins  mL/hr at 02/20/23 0513 500 mg at 02/20/23 0513   • sodium chloride (PF) 0.9 % injection 10 mL  10 mL Injection PRN Liam Childers MD       • sodium chloride (PF) 0.9 % injection 10 mL  10 mL Injection PRN Liam Childers MD       • sodium chloride (PF) 0.9 % injection 10 mL  10 mL Injection 2 times per day Liam Childers MD   10 mL at 02/20/23 0901   • sodium chloride (PF) 0.9 % injection 10 mL  10 mL Injection 2 times per day Liam Childers MD   10 mL at 02/20/23 0920   • sodium chloride (PF) 0.9 % injection 20 mL  20 mL Injection PRN Liam Childers MD       • sodium chloride 0.9% infusion   Intravenous Continuous PRN Pam Proctor MD       • sodium chloride 0.9% infusion   Intravenous Continuous PRN Kailyn Acosta MD           OBJECTIVE:      VITAL SIGNS:     Vital Last Value 24 Hour Range   Temperature 97.8 °F (36.6 °C) (02/20/23 0800) Temp  Min: 97.5 °F (36.4 °C)  Max: 98.1 °F (36.7 °C)   Pulse 69 (02/20/23 0800) Pulse  Min: 65  Max: 89   Respiratory 18 (02/20/23 0800) Resp  Min: 15  Max: 18   Non-Invasive  Blood Pressure 94/65 (02/20/23 0800) BP  Min: 94/65  Max: 107/79   Pulse Oximetry 99 % (02/20/23 0800) SpO2  Min: 99 %  Max: 100 %     Vital Today Admitted   Weight 51.5 kg (113 lb 8.6 oz) (scale #3) (02/20/23 0500) Weight: 52.6 kg (115 lb 15.4 oz) (02/17/23 0700)   Height N/A Height: 5' (152.4 cm) (02/17/23 0700)   BMI N/A BMI (Calculated): 22.65 (02/17/23 0700)     INTAKE/OUTPUT:      Intake/Output Summary (Last 24 hours) at 2/20/2023 1103  Last data filed at 2/19/2023 2200  Gross per 24 hour   Intake 120 ml   Output 850 ml   Net -730 ml             PHYSICAL EXAM:    GA: NAD  Normocephalic atraumatic  Moist mucous membranes  ENT: EOM intact  Resp: Non labored  Schizoaffective disorder, depressive type, with catatonia (H)    2. PTSD (post-traumatic stress disorder)        Plan and Follow up: Patient is scheduled for follow up psychotherapy on 5/30/19.    Discharge Criteria/Planning: Client has chronic symptoms and ongoing therapy for maintenance stability recommended.    Nathaly Lira, AMANDA 5/16/2019   breathing, No resp distress   Neuro: Alert, awake, conversive  Psych: appropriate mood and affect  Clean sites of insertion       LABORATORY DATA:          Hemoglobin A1C (%)   Date Value   01/31/2023 6.6 (H)   07/08/2022 7.9 (H)   03/10/2022 7.8 (H)       Creatinine (mg/dL)   Date Value   02/20/2023 1.10 (H)   02/19/2023 1.16 (H)   02/18/2023 1.09 (H)       Lab Results   Component Value Date    TSH 1.854 01/31/2023    TSH 1.894 11/02/2022    TSH 0.285 (L) 07/08/2022           The Diabetes Tests flowsheet was reviewed.           Sandra Crowder MD, LEANDRO, FACE

## 2023-02-21 NOTE — PROGRESS NOTES
Community Paramedics Follow-up Visit  February 21, 2023  TIME: 1100    Brooke Peterson is a 32 year old male being seen at home for a follow-up visit.    Present at appointment: patient, Community Paramedic, Angela  via phone, father/PCA and mother          Chief Complaint   Patient presents with     Outreach     Recheck Medication       Universal Utilization:      Utilization    Hospital Admissions  2             ED Visits  4             No Show Count (past year)  43                Current as of: 2/21/2023  1:00 AM              /88   Pulse 109   Temp 98.6  F (37  C)   Resp 12   SpO2 97%     Clinical Concerns:  Current Medical Concerns:  Generalized weakness, inability to ambulate, progression of worsening symptoms    Current Behavioral Concerns:     Education Provided to patient: how to take his medication   Medication set up? Yes  Pill Box issued: No  Scale issued: No  Flu Shot given: No  COVID Vaccine Given: No  Lab draw or specimen collection: No  Food box issued: No  Collaborative visit with PCP: No  Wound Care: No    Health Maintenance Reviewed:      Clinical Pathway: None    No  Face to Face in Home / Community    Review of Symptoms/PE    Skin: negative  Eyes: negative  Ears/Nose/Throat: negative  Respiratory: No shortness of breath, dyspnea on exertion, cough, or hemoptysis  Cardiovascular: negative  Gastrointestinal: poor appetite, nausea and constipation  Genitourinary: incontinence  Musculoskeletal: muscular weakness  Neurologic: local weakness and speech problems  Psychiatric: negative    Time spent with patient: 120    The patient meets one or more of the following criteria:  * Has been identified by their primary care provider at risk of nursing home placement    Acute concern/Follow-up recommendations: non medication compliant    Next CP visit scheduled: 2/28/23    Issues for Provider to follow up on: Community Paramedic visited with patient at his home. Communication via Angela  . Per father, patient was only taking pantoprazole, senokot, olanzapine and Hydrocortisone in the morning and evening only. Toe no longer had his Scopolamine patch on the back of his ear and his father indicated it feel off when they tried to give him a bath when he came home from the hospital. Advised as to what it was for. Toe and father agreed to have the one available placed. Assisted father in placing the patch behind the right ear. Explained to father that it can not get wet and has to be removed in 3 days. Father would like more of these for future use. Will send message to .    had put the medications that were given to him when they left the hospital into the empty pill. They were not giving patient any of the other medications that he was to continue. Changes made to Hydrocortisone was not made, patient was getting it in the morning and at bedtime. Community Paramedic went through each medication and dosage and set it up correctly in the pill boxes. Short Trazadone, will send message to MD for more if appropriate. Patient's mother also inquired about getting more sleeping medication as patient does not sleep at night. Patient did fall asleep during CP visit.   Called [facility] to get status on next bubble packs that are suppose to be mailed out. They are currently waiting for the updates/changes from the Dr. STUBSB to follow up with this.   CP explained to father/PCA when to administer patient's hydrocortisone per prescription. Explained to father that medication needs to be taken in the AM and then a 1/2 pill at 2pm and another 1/2 pill at 5pm. Difficulty explaining to father, unsure if he understands.     Provider follow up visit needed: 3/6/23

## 2023-02-28 NOTE — PROGRESS NOTES
2/28/2023  Clinic Care Coordination Contact  Care Team Conversations    Consulted with CC SHIRA and CC RN regarding following up with patient.  Reviewed and discuss goals.  Patient has [facility] worker from [facility].  Goal completed.  Still working with CP for medication management.  Still working on getting assessment for CADI waiver waiting for PCP to write a letter to support to get another assessment from [facility] for CADI waiver services.    Plan: okay for CHW to deferred today's follow up to next month.  Per CC SHIRA to schedule follow up with her on 3-17-23     Routed to CC SHIRA regarding follow up on 3-17-23  CHW off 3-10-23 to 3-20-23.     CC SHIRA 3-17-23 follow up  CHW Follow up: Monthly  CHW Plan: Follow up on goal  CHW Next Follow Up: 4-7-23    Scarlett Varma  Community Health Worker  [facility] Care Coordination  [email or web address]   Office: [phone]  Fax: [phone]

## 2023-03-02 NOTE — PROGRESS NOTES
3/2/2023  Clinic Care Coordination Contact  Care Team Conversations    CHW faxed the 2 letters from Dr. Rangel (3-1-2023)  and Gia Em PA-C from Palliative Medicine (2-)  to  [facility]  Fax# [phone]      Scarlett Varma  Community Health Worker  [facility]  Clinic Care Coordination  [email or web address]   Office: [phone]  Fax: [phone]

## 2023-03-02 NOTE — PROGRESS NOTES
Community Paramedics Follow-up Visit  Feb 28th, 2023  TIME: 1200    Brooke Peterson is a 32 year old male being seen at home for a follow-up visit.    Present at appointment:  Patient, Community Paramedic, In person , father/PCA, mother         Chief Complaint   Patient presents with     Recheck Medication     Outreach       [place] Utilization:      Utilization    Hospital Admissions  2             ED Visits  4             No Show Count (past year)  44                Current as of: 3/2/2023  6:07 AM              /75   Pulse 71   Temp 98.4  F (36.9  C)   Resp 12   SpO2 98%     Clinical Concerns:  Current Medical Concerns:  Failure to thrive, medication set up    Current Behavioral Concerns: medications compliance    Education Provided to patient:    Medication set up? Yes  Pill Box issued: No  Scale issued: No  Flu Shot given: No  COVID Vaccine Given: No  Lab draw or specimen collection: No  Food box issued: No  Collaborative visit with PCP: No  Wound Care: No    Health Maintenance Reviewed:      Clinical Pathway: None    No  Face to Face in Home / Community      Review of Symptoms/PE    Skin: negative  Eyes: negative  Ears/Nose/Throat: negative  Respiratory: No shortness of breath, dyspnea on exertion, cough, or hemoptysis  Cardiovascular: negative  Gastrointestinal: coughs during swallowing  Genitourinary: negative  Musculoskeletal: muscular weakness  Neurologic: speech problems  Psychiatric: negative    Time spent with patient: 45    The patient meets one or more of the following criteria:  * Requires services to prevent readmission to a nursing home or hospital    Acute concern/Follow-up recommendations: patient was not taking his medications as indicated    Next CP visit scheduled: 3/7/23    Issues for Provider to follow up on: Community Paramedic visited with patient at his home. Patient was found lying on the couch in the main living room area. Patients cloths appeared fresh and changed.  Patient very sleepy. Had father explain what they had been giving patient for his medications. Father showed a pill box containing 3 medications in it. He confirmed this was the only medications patient had been taking since his discharge from the hospital.  Medications only including medications given to them at the hospital. Father denied continuing with all other medications. A pill box with all medications was available but no medications had been taken. Advised father that he needed to continue with all his previous medications as well. Father appeared to not understand that he was to continue with all the other meds. Made RX adjustments to the existing pill box per release papers.   [facility] had not sent out new medications, called to follow up. They indicated they were waiting for updated RX's. Patient is  out of Trazadone and asked if he can get a refill. Advised this should be coming with [place] pill packs. Patient unable to sleep at night. Per father patient does a lot of sleeping during the day. Advised to try to limit naps so patient is tired at night. He agreed to try this  CP to follow up in one week with patient to confirm receipt of [place] pill packs for one month.  Reminded Father of upcoming appointment with Dr Rangel via video. Father confused and stated he does not know how to do video and would try to ask one of his kids to help if they are home. Tried to explain to father how video calls worked, unsure if he understood.      Provider follow up visit needed: 3/6/23 with Dr Rangel via video

## 2023-03-03 NOTE — PROGRESS NOTES
Community Paramedic Program  Community Health Worker Follow Up    Intervention and Education during outreach:   Called and spoke with pt's sister Kell Marinelli (CTC on file). Offered to schedule CP visit on Monday morning so CP can facilitate a VV with pt's PCP. Shared with pt's sister that care team is aware of how difficult it is for pt and his family to get pt out of the house, and she agreed. She confirmed that she will notify her dad and mom about the visit. Shared that pt's PCP would like to follow up on how pt's doing since being in the hospital and discuss what kind of support he needs at home.     Confirmed that visit will take place at pt's home in [place], where pt and his dad live. Notified Kell Marinelli that we have arranged for an in person Angela  to come with the CP.    Provided my contact information to pt's sister. Asked her to reach out with questions or to reschedule if needed. She agreed. She declined having any questions or needing anything else from me to help prepare for Monday's visit. Thanked her for her time and help.    Notified the CP after speaking with pt's family. Routing to care team for awareness.     CHW Plan:   1. Pt and pt's family will visit with the CP and an in person Angela  on Monday, March 6th at 9 am.  2. CP will assist family to log onto a virtual visit with pt's PCP.  3. Pt's sister will contact the CP CHW with questions, updates or to reschedule if needed.

## 2023-03-06 NOTE — PROGRESS NOTES
Community Paramedic Program  Community Health Worker Outreach    CP reached out to reschedule appointment scheduled for tomorrow, March 7th at 12 pm at pt's home in [place]    Visit rescheduled for: Wednesday, March 8th / 3 pm / [facility] (date/time/CP)    Additional information:   Received update from the CP that [facility] will not be able to deliver pt's bubble packs until Tuesday evening and not tonight, as previously planned. CP asked me to cancel the visit tomorrow and reach out to interpreting services to notify them, as in person Angela  is scheduled to join visit.     CP will reach out to pt/pt's family to reschedule visit for Wednesday at 3 pm for med rec/set up. I reached out to interpreting services to notify and ask if in person  can be rescheduled for Wednesday.     CP also confirmed that she completed visit this morning and was able to successfully facilitate VV with pt's PCP from pt's home.

## 2023-03-06 NOTE — PROGRESS NOTES
"Brooke is a 32 year old who is being evaluated via a billable video visit.      How would you like to obtain your AVS? MyChart  If the video visit is dropped, the invitation should be resent by: Text to cell phone: [phone]  Will anyone else be joining your video visit? No          Assessment & Plan     X linked adrenoleukodystrophy (H)  We discussed potential ways to help patient with services that he needs.  We have been unsuccessful in enrolling him in home care.  Community paramedics have been faithfully attending to his cares but are overtaxed by the amount of cares that he needs.  Since he has a progressive illness and has had a fairly significant functional decline over the last 6 to 12 months we discussed hospice as an option.  I think he qualifies given the pace of his decompensation and I believe that this would likely be a helpful intervention for him.  Discussed that the philosophy of care is more focused on comfort and quality of life rather than prolonging life.  Father discussed that he was open to that referral.  They will be made.    - Hospice Referral    Nonintractable headache, unspecified chronicity pattern, unspecified headache type  Patient did report difficulty with a headache today.  There is nothing in his current medication regimen per the community paramedic who is in his home that he could use for a as needed medicine for pain.  We will send a prescription for acetaminophen.  - acetaminophen (TYLENOL) 500 MG tablet  Dispense: 60 tablet; Refill: 1     BMI:   Estimated body mass index is 24.86 kg/m  as calculated from the following:    Height as of 2/10/23: 1.676 m (5' 6\").    Weight as of 2/10/23: 69.9 kg (154 lb).     No follow-ups on file.    Jose Rangel MD  [facility]    Subjective   Toe is a 32 year old accompanied by his family, presenting for the following health issues:  office visit      History of Present Illness       Reason for visit:  Office " visit    He eats 0-1 servings of fruits and vegetables daily.He consumes 0 sweetened beverage(s) daily.   He is taking medications regularly.     I met with patient and his father along with community paramedic and professional Angela .  Visit was conducted over [employer] teams as that was a platform available to the community paramedics in the patient's household.  Patient has difficulty with transportation due to serious illness and inability to ambulate and therefore we proceeded with this visit virtually.  The family did not have sophisticated equipment in order to facilitate a visit so we had made plans for the community paramedic to be there with a [employer] laptop and access to [employer] teams.    Patient has dysarthria and therefore father did most of the communicating.  Father indicated he did not know the name of the sickness but knows that his son cannot walk.  He does understand that this condition is likely to be progressive in nature and not likely to improve.  He understands that we do not have a way to treat adrenoleukodystrophy sufficiently for the patient to recover.    The patient has had significant declines and ability to function.  Father has not necessarily noticed decreases in ability but does endorse that his son just lays in bed.  Hospital bed is present on the video.  We reviewed that he has lost ability to ambulate.  We reviewed that he has difficulty with speech due to dysarthria.  He also has difficulty knowing when he needs to use the restroom and needs complete help to get there.  We discussed whether or not there is difficulty with swallowing.  Father is not convinced that there is a lot of difficulty swallowing but he is continuing to need to do everything to help feed his son.    Patient endorses some headache pain and does report that he would like some sleeping pills.  He is sleeping often throughout the day.    Father reports there was some effort to secure home  physical therapy.  That appeared not to ever be arranged.  Community paramedic contributes that she thinks this was not a covered service under his insurance.        Objective           Vitals:  No vitals were obtained today due to virtual visit.    Physical Exam   GENERAL: Healthy, alert and no distress  EYES: Eyes grossly normal to inspection.  No discharge or erythema, or obvious scleral/conjunctival abnormalities.  RESP: No audible wheeze, cough, or visible cyanosis.  No visible retractions or increased work of breathing.    SKIN: Visible skin clear. No significant rash, abnormal pigmentation or lesions.  NEURO: Cranial nerves grossly intact.  Mentation and speech appropriate for age.  PSYCH: Mentation appears normal, affect normal/bright, judgement and insight intact, normal speech and appearance well-groomed.        Video-Visit Details    Type of service:  Video Visit   Video Start Time: 0937  Video End Time:1000    Originating Location (pt. Location): Home    Distant Location (provider location):  On-site  Platform used for Video Visit: Other: MS teams    Total time of visit exceeded 30 minutes including record review, video meeting time, and documentation.

## 2023-03-06 NOTE — PROGRESS NOTES
Community Paramedics Follow-up Visit  March 6, 2023  TIME: 0900    Brooke Peterson is a 32 year old male being seen at home for a follow-up visit.    Present at appointment:  Patient, Community Paramedic, Father/PCA, In person          Chief Complaint   Patient presents with     Outreach     Assist with Virtual Dr Visit       Universal Utilization:        Utilization    Hospital Admissions  2             ED Visits  4             No Show Count (past year)  45                Current as of: 3/4/2023  4:04 PM              BP 98/83   Pulse 87   Temp 98.7  F (37.1  C)   Resp 12   SpO2 98%     Clinical Concerns:  Current Medical Concerns:  Failure to thrive    Current Behavioral Concerns:      Education Provided to patient:     Medication set up? No  Pill Box issued: No  Scale issued: No  Flu Shot given: No  COVID Vaccine Given: No  Lab draw or specimen collection: No  Food box issued: No  Collaborative visit with PCP: Yes  Wound Care: No    Health Maintenance Reviewed:      Clinical Pathway: None    No  Face to Face in Home / Community    Review of Symptoms/PE    Skin: negative  Eyes: negative  Ears/Nose/Throat: negative  Respiratory: No shortness of breath, dyspnea on exertion, cough, or hemoptysis  Cardiovascular: negative  Gastrointestinal: negative  Genitourinary: negative  Musculoskeletal: negative and muscular weakness  Neurologic: headaches  Psychiatric: negative    Time spent with patient: 60    The patient meets one or more of the following criteria:  * Requires services to prevent readmission to a nursing home or hospital    Acute concern/Follow-up recommendations: follow current treatment plan    Next CP visit scheduled: 3/8/23    Issues for Provider to follow up on: Community Paramedic visited with Brooke and his father/PCA to assist in the Dr appt with Dr Rangel. Able to complete visit via Teams. Talked with Joey Montero Brooke's father and he confirmed that no bubble packs from [place] had been received.  "Called [facility] and they had indicated that they tried calling and was unable to get a hold of someone to confirm delivery so they put it on \"call when needed basis\" Advised medications needed to be sent ASAP. Asked that they be delivered this evening, [facility] denied indicating deliver was full for this evening. Asked that they call Brooke Dolan's sister to set up delivery. Confirmed delivery to be Tuesday 3/7 in the evening between 6-9. Toe will be out of medication the evening of 3/7. Community Paramedic to cancel 3/7 visit and reschedule to 3/8 to set up medications.   Dr Rangel will also look into getting more Easy Mix simplythick as Toe is out of this.     Provider follow up visit needed: TBD      "

## 2023-03-09 NOTE — PROGRESS NOTES
Community Paramedics Follow-up Visit  March 8, 2023  TIME: 1500    Toe T Po is a 32 year old male being seen at home for a follow-up visit.    Present at appointment:  Patient, Community Paramedic, Father/PCA, Angela  in person         Chief Complaint   Patient presents with     Outreach     Recheck Medication       Universal Utilization:      Utilization    Hospital Admissions  2             ED Visits  4             No Show Count (past year)  45                Current as of: 3/8/2023  7:11 PM              /87   Pulse 101   Temp 98.7  F (37.1  C)   Resp 12   SpO2 97%     Clinical Concerns:  Current Medical Concerns: adrenolekodystrophy    Current Behavioral Concerns:     Education Provided to patient:    Medication set up? Yes  Pill Box issued: No  Scale issued: No  Flu Shot given: No  COVID Vaccine Given: No  Lab draw or specimen collection: No  Food box issued: No  Collaborative visit with PCP: No  Wound Care: No    Health Maintenance Reviewed:      Clinical Pathway: None    No  Face to Face in Home / Community    Review of Symptoms/PE    Skin: negative  Eyes: negative  Ears/Nose/Throat: negative  Respiratory: No shortness of breath, dyspnea on exertion, cough, or hemoptysis  Cardiovascular: negative  Gastrointestinal: poor appetite  Genitourinary: incontinence  Musculoskeletal: negative  Neurologic: headaches  Psychiatric: negative    Time spent with patient: 60    The patient meets one or more of the following criteria:  * Requires services to prevent readmission to a nursing home or hospital    Acute concern/Follow-up recommendations: [facility] had not delivered medications on Tuesday night, patient has been without medication for several days    Next  visit scheduled: 3/14/23    Issues for Provider to follow up on: Community Paramedic visited with Toe at his home. Pt was found in bed, upright and sleeping. Easily wakes. Raymundo continues to complain of a headache. Due to medications not being  delivered, Toe unable to take anything to ease the pain. Called and talked with [facility], they indicated that they tried to deliver the previous evening but no one answered the phone. Explained that if they do not call with an , father is unable to understand due to not speaking English. Multiple calls made to [facility] explaining they are Angela speaking. Third party delivery service does not have options to call with . Father request medications be moved back to [facility]. CP called [facility] to set this up.   Set up medications in pill box with what was available. Advised father to administer medications from pill box until [facility] bubble packs arrive. CP to follow up on 3/14 to make sure all medications are there.   Father has not heard from Hospice yet. Advised that when they call, he will want to agree to their services. Father asked that they call with a Angela .     Provider follow up visit needed: PADDY

## 2023-03-09 NOTE — PROGRESS NOTES
Clinic Care Coordination Contact  Care Team Conversations    CCC team discussed at Case review huddle.   Patient had visit with CP yesterday. Father notes he has not heard from Hospice team for assessment.    RN CC reach out to hospice intake - update notes that visit is scheduled for Friday 3/10 at noon.In person  requested to attend visit.

## 2023-03-17 NOTE — PROGRESS NOTES
Clinic Care Coordination Contact  Care Coordination Clinician Chart Review    Situation: Patient chart reviewed by Care Coordinator.       Background: Care Coordination Program started: 12/8/2020. Initial assessment completed and patient-centered care plan(s) were developed with participation from patient. Lead CC handed patient off to CHW for continued outreaches.       Assessment: Per chart review, patient outreach completed by CC CHW on 3/9/23.  Patient is actively working to accomplish goal(s). Patient's goal(s) appropriate and relevant at this time. Patient is not due for updated Plan of Care.  Assessments will be completed annually or as needed/with change of patient status.      Care Plan: Wheelchair Completed 11/11/2022    Problem: HP GENERAL PROBLEM  Resolved 11/11/2022    Goal: I have the wheel chair on 11-14-22.  Completed 11/11/2022    Start Date: 8/10/2022 Expected End Date: 11/17/2022    This Visit's Progress: 100% Recent Progress: 50%    Note:     Barriers: Language barriers  Strengths: Accepting of support  Patient expressed understanding of goal: yes  Action steps to achieve this goal:    Call [employer] [phone] if there are issues with the wheel chair.                      Care Plan: Medication management support at home     Problem: HP GENERAL PROBLEM     Goal: I would like to enroll in home care services to suppport medication management within 90 days     Start Date: 8/2/2022 Expected End Date: 11/3/2022    This Visit's Progress: 50% Recent Progress: 10%    Note:     Barriers: language, access   Strengths: family, CP assistance  Patient expressed understanding of goal: yes  Action steps to achieve this goal:  1. I am still waiting for skilled nurse and will continue to work with CP and care team to enroll in home care support for medication set up   2. I will update Care coordination team during next outreach   Updated 9-9-22 AL  1/24/23 working with comm. Paramedic                       Care Plan: General: Special Transportation Services with Wheelchair ( Completed 2-7-23) Completed 2/7/2023    Problem: HP GENERAL PROBLEM  Resolved 2/7/2023    Goal: General Goal - I have been approved for Special Transportation Services with wheelchair.  Completed 2/7/2023    Start Date: 12/8/2022 Expected End Date: 1/31/2023    This Visit's Progress: 100% Recent Progress: 50%    Note:     Barriers: language, access  Strengths: support from CCC team  Patient expressed understanding of goal: yes  Action steps to achieve this goal:  PCP completed and faxed the Certificate of Needs form to [facility].                    Care Plan: General: SNAP Benefit ( completed 1-6-23) Completed 1/6/2023    Problem: HP GENERAL PROBLEM  Resolved 1/6/2023    Goal: General Goal - I received my SNAP benefit as of December 2022.  Completed 1/6/2023    Start Date: 12/8/2022 Expected End Date: 1/31/2023    This Visit's Progress: 100% Recent Progress: 20%    Note:     Barriers: language, access  Strengths: support from [facility] team and CP  Patient expressed understanding of goal: yes  Action steps to achieve this goal:  [facility]  [address] [place] [address]  [phone] EZ info line to checkon benefits  [phone]                       Care Plan: General: [facility]/Case Management Services Compteted 2-28-23) Completed 2/28/2023    Problem: HP GENERAL PROBLEM  Resolved 2/28/2023    Goal: General Goal - I have support and connected with [facility] at [facility]..  Completed 2/28/2023    Start Date: 1/6/2023 Expected End Date: 3/31/2023    This Visit's Progress: 100% Recent Progress: 20%    Note:     Barriers: language  Strengths: support from CCC team,family, Community paramedic  Patient expressed understanding of goal: yes  Action steps to achieve this goal:  Father and I ask for support from [facility] worker at [facility] [phone]   [address] [place] [address] [address]  [place] [address]  Phone: [phone]  Fax: [phone]  [email or web address]                             Plan/Recommendations: The patient will continue working with Care Coordination to achieve goal(s) as above. CHW will continue outreaches at minimum every 30 days and will involve Lead CC as needed or if patient is ready to move to Maintenance. Lead CC will continue to monitor CHW outreaches and patient's progress to goal(s) every 6 weeks.     Plan of Care updated and sent to patient: VANITA Browne   Social Work Care Coordinator   [facility]    [phone]

## 2023-03-21 NOTE — PROGRESS NOTES
Community Paramedics Follow-up Visit  March 14, 2023  TIME: 1400    Brooke Peterson is a 32 year old male being seen at home for a follow-up visit.    Present at appointment: patient, Community Paramedic, patients father/PCA, Angela  via phone          Chief Complaint   Patient presents with     Outreach     Recheck Medication       Universal Utilization:      Utilization    Hospital Admissions  2             ED Visits  4             No Show Count (past year)  46                Current as of: 3/19/2023  6:51 AM              /74   Pulse 80   Temp 98.3  F (36.8  C)   Resp 12   SpO2 96%     Clinical Concerns:  Current Medical Concerns:  Terminal illness    Current Behavioral Concerns: medication compliance    Education Provided to patient:    Medication set up? Yes  Pill Box issued: No  Scale issued: No  Flu Shot given: No  COVID Vaccine Given: No  Lab draw or specimen collection: No  Food resource given: No  Collaborative visit with PCP: No  Wound Care: No    Health Maintenance Reviewed:      Clinical Pathway: None    No  Face to Face in Home / Community    Review of Symptoms/PE    Skin: negative  Eyes: negative  Ears/Nose/Throat: negative  Respiratory: No shortness of breath, dyspnea on exertion, cough, or hemoptysis  Cardiovascular: negative  Gastrointestinal: negative  Genitourinary: negative  Musculoskeletal: muscular weakness  Neurologic: negative  Psychiatric: negative    Time spent with patient: 60    The patient meets one or more of the following criteria:  * Requires services to prevent readmission to a nursing home or hospital     Acute concern/Follow-up recommendations: follow current treatment plan    Next CP visit scheduled: 3/28/23    Issues for Provider to follow up on: Community Paramedic visited with patient at his home. Bubble packs from [employer] had arrived. Father was giving patient medications in bubble packs. Set up medication in pill box with medications from bubble packs and  medication that were not in the bubble packs. Toe is currently missing his trazadone and Vit D in the pill box that was set up by CP. Confirmed with [facility] that these medications would be sent out. All medications moved back to [facility] from [facility], will not longer be using the [facility] bubble packs mainly due to delivery complications and communication barriers.   Medications in pill box set up for two weeks. CP to reach out to Hospice to coordinate CP transferring all care including medications set up to Hospice.    Provider follow up visit needed: PADDY

## 2023-03-28 NOTE — PROGRESS NOTES
Community Paramedics Visit  March 28, 2023 TIME: 1200    Brooke Peterson is a 32 year old male being seen at home for a Community Paramedic Home visit.    Present at appointment: patient, Community Paramedic, Angela , Patients mother          Chief Complaint   Patient presents with     Outreach     Recheck Medication       Universal Utilization:      Utilization    Hospital Admissions  2             ED Visits  4             No Show Count (past year)  46                Current as of: 3/28/2023  5:51 PM              Clinical Concerns:  Current Medical Concerns:  adrenoleukodystrophy    Current Behavioral Concerns:     Education Provided to patient:      Vitals: /83   Pulse 82   Temp 98.5  F (36.9  C)   Resp 12   SpO2 98%   BMI: There is no height or weight on file to calculate BMI.       Health Maintenance Reviewed:      Clinical Pathway: None    Review of Symptoms/PE    Skin: negative  Eyes: negative  Ears/Nose/Throat: negative  Respiratory: No shortness of breath, dyspnea on exertion, cough, or hemoptysis  Cardiovascular: negative  Gastrointestinal: poor appetite, nausea and vomiting  Genitourinary: negative  Musculoskeletal: negative  Neurologic: dizziness  Psychiatric: negative    Medication Management:    Medications need to be refilled: Currently out of Trazadone and only has one week of medication. Per pharmacy, unable to fill medications until 4/8/23     Medications set up: Yes  Pill Box issued: No  Scale issued: No  Flu Shot given: No  COVID Vaccine Given: No  Lab draw or specimen collection: No  Food resource given: No  Collaborative visit with PCP: No  Wound Care: No         No  Face to Face in Home / Community    Today's PHQ-2 Score:      Today's PHQ-9 Score:   PHQ-9 SCORE 2/10/2023   PHQ-9 Total Score MyChart 17 (Moderately severe depression)   PHQ-9 Total Score 17   Some encounter information is confidential and restricted. Go to Review Flowsheets activity to see all data.        Today's  GAD7 score:   RADHA-7 SCORE 3/22/2022   Total Score 9   Some encounter information is confidential and restricted. Go to Review Flowsheets activity to see all data.        Care Plan:  Care Plan: Medication Regimen - I will get set up on a long term plan for getting my medications set up     Problem: Medication adherence     Long-Range Goal: Consistently take Medications as Prescribed     Recent Progress: 80%    Note:     Missed 1 full day and one evening - 11/30/22  Missed 2 am's and 3 pm's out of the last two weeks - 12/13/22  Missed 2 full days of medications out of the last week - 12/20/22  All medication appears to be given 1/5/22, Piedad Harris  Missed 2 pills one of the evenings 1/17/22 - Short B12, Vit D3 and Trazadone - only able to set up one weeks of medication  Started using [employer] pill backs - Olanazipine and Trazadone missing. Took out Baclofen 1/31/23  Was only taking the medications that was sent home from the hospital 2/21/23                          Patient Active Problem List   Diagnosis     Immigrant with language difficulty     [place]'s disease (H)     Adrenal insufficiency (H)     Hypothyroidism, unspecified type     Insomnia, unspecified type     PTSD (post-traumatic stress disorder)     Schizoaffective disorder, depressive type, with catatonia (H)     Schizophrenia (H)     Vitamin D deficiency     Moderate persistent asthma     Pituitary microadenoma (H)     Neurodevelopmental disorder     Weakness of right leg     X linked adrenoleukodystrophy (H)         Current Outpatient Medications:      acetaminophen (TYLENOL) 325 MG tablet, Take 2 tablets (650 mg) by mouth every 6 hours as needed for mild pain, Disp: 90 tablet, Rfl: 3     acetaminophen (TYLENOL) 500 MG tablet, Take 1-2 tablets (500-1,000 mg) by mouth every 8 hours as needed for mild pain (headaches), Disp: 60 tablet, Rfl: 1     albuterol (PROAIR HFA/PROVENTIL HFA/VENTOLIN HFA) 108 (90 Base) MCG/ACT inhaler, Inhale 2 puffs into the lungs  every 6 hours as needed for shortness of breath / dyspnea or wheezing, Disp: 18 g, Rfl: 3     baclofen (LIORESAL) 10 MG tablet, Take 10 mg by mouth 2 times daily, Disp: , Rfl:      docusate sodium (COLACE) 100 MG capsule, Take 1 capsule (100 mg) by mouth daily as needed for constipation, Disp: 30 capsule, Rfl: 1     famotidine (PEPCID) 20 MG tablet, TAKE 1 TABLET BY MOUTH TWICE A DAY, Disp: 60 tablet, Rfl: 11     fludrocortisone (FLORINEF) 0.1 MG tablet, Take 1.5 tablets (0.15 mg) by mouth daily, Disp: 135 tablet, Rfl: 4     fluticasone-salmeterol (ADVAIR HFA) 115-21 MCG/ACT inhaler, Inhale 2 puffs into the lungs 2 times daily, Disp: 36 g, Rfl: 3     hydrocortisone (CORTEF) 10 MG tablet, Take 10 mg every morning, 5 mg (1/2 tab) at 2:00 PM and 5 mg at 5:00 PM daily, Disp: 60 tablet, Rfl: 1     levothyroxine (SYNTHROID/LEVOTHROID) 75 MCG tablet, Take 1 tablet (75 mcg) by mouth daily, Disp: 90 tablet, Rfl: 4     loratadine (CLARITIN) 10 MG tablet, TAKE 1 TABLET BY MOUTH DAILY, Disp: 28 tablet, Rfl: 0     Multiple Vitamin (TAB-A-MAGALY) TABS, TAKE 1 TABLET BY MOUTH DAILY, Disp: 28 tablet, Rfl: 11     OLANZapine (ZYPREXA) 5 MG tablet, Take 1 tablet (5 mg) by mouth 2 times daily, Disp: 60 tablet, Rfl: 1     ondansetron (ZOFRAN) 4 MG tablet, Take 1 tablet (4 mg) by mouth every 8 hours as needed for nausea, Disp: 10 tablet, Rfl: 0     pantoprazole (PROTONIX) 40 MG EC tablet, Take 1 tablet (40 mg) by mouth every morning (before breakfast), Disp: 30 tablet, Rfl: 1     polyethylene glycol (MIRALAX) 17 GM/Dose powder, Take 17 g by mouth daily, Disp: 510 g, Rfl: 1     scopolamine (TRANSDERM) 1 MG/3DAYS 72 hr patch, Place 1 patch onto the skin every 72 hours, Disp: 10 patch, Rfl: 2     sennosides (SENOKOT) 8.6 MG tablet, Take 1 tablet by mouth daily, Disp: 90 tablet, Rfl: 1     traZODone (DESYREL) 50 MG tablet, Take 1 tablet (50 mg) by mouth At Bedtime, Disp: 90 tablet, Rfl: 0     vitamin B-12 (CYANOCOBALAMIN) 500 MCG tablet, Take  1 tablet (500 mcg) by mouth daily, Disp: 90 tablet, Rfl: 3     vitamin D3 (CHOLECALCIFEROL) 50 mcg (2000 units) tablet, TAKE 1 TABLET (50 MCG) BY MOUTH DAILY, Disp: 90 tablet, Rfl: 3    Plan:    Time spent with patient: 60    The patient meets one or more of the following criteria:  * Requires services to prevent readmission to a nursing home or hospital    Acute concern/Follow-up recommendations: Patient has not had Hospice back out to see him in over 2 weeks    Next CP visit scheduled: 4/11/23    Issues for Provider to follow up on: Community Paramedic visited with patient at his home.  in person. Toe found in his bed, alert. He states his headaches are gone but he is still dizzy and it is causing N/V. Patient had dry heaves during the visit and complain it was due to being dizzy.   Joey Montero, Brooke's father was not present for the visit today. Toe's mother was present. She indicated that Toe had bruises on his legs near his groin area. Upon examination, unable to locate any bruises. Toe denies pain in the area and also denies having pain anywhere else. She states Joey Montero has been applying cream to the bruises.  Noted that a specialized wheel chair and a ty lift was delivered to the home. Mother states they have not been using it because no one has been over to show her or Joey Montero how to use it. When asked if Hospice has been visiting, both her and Toe denied stating they had only been there once. They were told that they would be coming out twice a week but no one has come out to visit other than the Community Paramedic.   Mother asked many questions in regards to Toe being able to walk again. She was emotionally upset and appears to be having a hard time dealing with Toe's terminal diagnosis. She has not talked to anyone about the situation and appears to be having a very difficult time with everything. Community Paramedic to research reaching out to her therapist at [place] for support which she  approved of Community Paramedic doing.   Set up patients medications in the pill box. No medications have been mailed out from [facility]. Called to get an update on when they would be mailed. Pharmacy indicated most medications are refillable on 4/8/23. There were a couple of medications they needed to reach out to the doctor for more refills. They will mail out medications on 4/8/23. Of note: Toe will be out of most of his medications on 4/4/23. Pharmacy is aware of this as well. CP to revisit Toe on 4/11/23 to set up medications.      Provider follow up visit needed: PADDY

## 2023-03-29 NOTE — TELEPHONE ENCOUNTER
"Routing refill request to provider for review/approval because:  A break in medication    Last Written Prescription Date:  9/19/2022  Last Fill Quantity: 90,  # refills: 0   Last office visit provider:  3/6/2023     Requested Prescriptions   Pending Prescriptions Disp Refills     traZODone (DESYREL) 50 MG tablet [Pharmacy Med Name: TRAZODONE HCL 50 MG TABS 50 Tablet] 90 tablet 0     Sig: TAKE 1 TABLET (50 MG) BY MOUTH AT BEDTIME       Serotonin Modulators Passed - 3/28/2023 12:32 PM        Passed - Recent (12 mo) or future (30 days) visit within the authorizing provider's specialty     Patient has had an office visit with the authorizing provider or a provider within the authorizing providers department within the previous 12 mos or has a future within next 30 days. See \"Patient Info\" tab in inbasket, or \"Choose Columns\" in Meds & Orders section of the refill encounter.              Passed - Medication is active on med list        Passed - Patient is age 18 or older             YESENIA REINOSO RN 03/29/23 2:31 PM  "

## 2023-03-30 NOTE — PROGRESS NOTES
Clinic Care Coordination Contact  Care Team Conversations    RN CC spoke with Hospice team for update on engagement. CP visit noted concern for patient and family not using Debbie lift and may need some additional education and instruction.     Hospice team reported that patient first visit was on 3/13/23  Additional skilled nurse visit on 3/14/23 and 3/17/23 (  - Jaycee)  Patient and family also had a visit with nicky on 3/16 and SW on 3/17    Next visit with Skilled Nursing is scheduled for tomorrow.   Then set up for weekly follow up on Fridays    RN CC requested SN to outreach to CP for an update about concern and to follow up with RN CC to discuss if unable to reach CP before tomorrows schedule visit.

## 2023-04-11 NOTE — PROGRESS NOTES
Community Paramedics Follow-up Visit  April 11, 2023  TIME: 1400    Brooke Peterson is a 32 year old male being seen at home for a follow-up visit.    Present at appointment:  Community Paramedic, patient, Toe's father/PCA,  for CP, Hospice RN, Hospice SW,  for RN/Hospice         Chief Complaint   Patient presents with     Outreach     Recheck Medication       Universal Utilization:      Utilization    Hospital Admissions  2             ED Visits  4             No Show Count (past year)  47                Current as of: 4/11/2023  4:25 AM              There were no vitals taken for this visit.    Clinical Concerns:  Current Medical Concerns:  adrenoleukodystrophy    Current Behavioral Concerns:     Education Provided to patient: warm hand off to the Hospice team   Medication set up? Yes  Pill Box issued: No  Scale issued: No  Flu Shot given: No  COVID Vaccine Given: No  Lab draw or specimen collection: No  Food resource given: No  Collaborative visit with PCP: No  Wound Care: No    Health Maintenance Reviewed:      Clinical Pathway: None    No  Face to Face in Home / Community    Review of Symptoms/PE    Skin: negative  Eyes: negative  Ears/Nose/Throat: negative  Respiratory: No shortness of breath, dyspnea on exertion, cough, or hemoptysis  Cardiovascular: negative  Gastrointestinal: negative  Genitourinary: incontinence  Musculoskeletal: muscular weakness  Neurologic: headaches, involuntary movements and speech problems  Psychiatric: negative    Time spent with patient: 45    The patient meets one or more of the following criteria:  * Requires services to prevent readmission to a nursing home or hospital    Acute concern/Follow-up recommendations: follow current treatment plan    Next CP visit scheduled: Patient to graduate Paramedic program    Issues for Provider to follow up on: Community Paramedic visited with Toe and family today. Also in attedance at the the home was [facility] SW and  RN. SW was able to speak with Brooke's father while CP and RN went through all of Brooke's medications and set them up for one week out. Per RN, Brooke is scheduled to have the Hospice RN visit weekly. Warm hand off completed with explanation of medications and requests from Brooke's father that has not been fulfilled as of yet. (how to use Debbie lift, small plastic medication cups, fluid thicken solution) RN to look into these things and provide requests to family.  explained to Toe and father that CP will not longer be visiting and the Hospice RN and SW will be taking over. Brooke and Brooke's father very appreciative of all the help and appears to understand the handoff. Will keep case open for one week. If no further requests by MD or [facility] staff will graduate patient form Community Paramedic program.    Provider follow up visit needed: PADDY

## 2023-04-13 NOTE — PROGRESS NOTES
4/13/2023  Clinic Care Coordination Contact  Care Team Conversations    Patient enrolled in Hospice Care     Consulted with [facility] Team 4-13-23 regarding today's follow up with patient  Plan: Per CC SHIRA patient connected with Hospice Care Team this Tuesday.  Community Paramedic team will follow up in a week before closing patient from CP.    Per CC SHIRA okay for CHW to be off of case/care team and not follow up with patient due to family has support from Hospice Care Team.    No further outreach or follow up from CHW as of 4-13-23.    Scarlett Varma  Community Health Worker  [facility] Care Coordination  [email or web address]   Office: [phone]  Fax: [phone]

## 2023-04-24 NOTE — PROGRESS NOTES
This is a neurologic follow-up note    31-year-old admitted to hospital 5/10/2022  Original neurologic consult by Dr. Nithya aWrd 5/11/2022      Chief complaint  Weakness of all extremities  Right leg weakness worse  Seems to have right-sided arm and leg weakness greater than left (leg greater than arm)      HPI  31-year-old presented to the ER on May 10, 2022 with right leg weakness.  He reported that he was having difficulty with gait.    Patient complained of weakness of all 4 extremities but worse on the right leg  Has some chronic bowel incontinence but that was not necessarily new    Has had some leg pain worse over the last 3 days prior to admission    Patient may have had some difficulty with gait going on for 2 years    MRI scan with demyelination of the corticospinal tract see MRI report below.    Past medical history  Ariel's disease  Pituitary microadenoma  Hypothyroidism  Asthma  Hypercalcemia  Hypovitamin ptosis D  PTSD/schizophrenia    Habits      Family history  Unknown      Work-up  THORACIC SPINE MRI: 5/10/2022  1.  Normal thoracic spine MRI.  LUMBAR SPINE MRI: 5/10/2022  1.  Normal lumbar spine MRI.  MRI brain 5/11/2022  1.  There are areas of T2 signal hyperintensity involving the cortical spinal tracts on the right and left left greater than right. Beginning at the level of the posterior limb of the internal capsule and ending inferiorly into the midbrain and emeka.   2.  These changes were present on 06/24/2019 but are more apparent on current study.  3.  In addition there is mild T2 signal hyperintensity within the medial aspect of the right and left cerebellar hemispheres and right and left thalami.  4.  Differential diagnostic, considerations would include neurodegenerative disorders and toxic metabolic disease.  CSF glucose 66, protein 37, no growth, cytology negative for malignancy  Treponemal antibody negative/HIV antigen negative,   AST 86/  CRP 2.0, rheumatoid  "factor less than 15, ESR 6,  B12 359, TSH 2.69, T4 free 1.07, prolactin level 9.0 (5/5/2022)  COVID negative on admission          Labs  Sodium 137 potassium 4.0  BUN 24 creatinine 0.98  AST 86/  Glucose 110  White blood count 5.8, hemoglobin 17.7, platelets 135,000          Exam  Blood pressure 94/61, pulse 83, temperature 97.4  Blood pressure range 94//77  Pulse range 83-1 28  T-max 102.5          Review of system  Had some post LP headache and lightheadedness and dizziness  Some concern that some of the lightheadedness and dizziness had been \"present for years\"  And then with the  it sounds like the headache may have been going on for a couple of days.        General exam per primary MD  Alert and attentive  HEENT normal  Lungs clear  Heart rate regular  Abdomen soft  Symmetrical pulses  No edema the feet    Neurologic exam  Alert and attentive  No neglect  Due to language barrier and psychiatric disease difficulty test memory and language    Cranials 2 through 12 normal      Upper extremities  Right arm slight drift compared to left arm    Lower extremities  Left leg 4+ out of 5  Right leg 3- out of 5 proximally  Distally seems to move better    Reflexes reported right over left  Biceps 2+/2+  Triceps 2+/2+  Knee 2+/2+  Ankle 2+/2+  Toes upgoing bilaterally      Gait  Getting therapy              Assessment/plan    1.   White matter changes in the CSF with CSF so far not showing any inflammation or elevated protein.        Some of the demyelination of the corticospinal tracts is somewhat worse on the left which could affect the right leg        Question if he has an Adrenal Leukodystrophy      2.  MRI scan brain abnormal bilateral corticospinal tracts T2 signal changes       There are areas of T2 signal hyperintensity involving the cortical spinal tracts on the right and left left greater than right.        Beginning at the level of the posterior limb of the internal capsule and ending " "inferiorly into the midbrain and emeka.         These changes were present on 06/24/2019 but are more apparent on current study.         In addition there is mild T2 signal hyperintensity within the medial aspect of the right and left cerebellar hemispheres and right and left thalami.       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  Adrenal corticotropin (106 a year ago, > 1250  2 years ago, should be <47)    ANGELIC pending  Lyme's titer pending  Check VLCFA    Will need a outpatient EMG    Patient may have more of the \"adult onset\" X-linked adrenal cortical leukodystrophy with a milder onset of difficulty with gait demyelination of central nervous system and some bowel and bladder difficulty.  There are some other \"leukodystrophies\" such as metachromatic leukodystrophy once again would have to have the adult onset with more of a \"mild presentation and the slightly atypical\" compared to the typical childhood presentation which tends to be much more severe and have much more CNS changes on MRI.    Disposition per primary MD  Discussed with nursing staff face-to-face    PT to maximize gait stability  32 minutes total care time today greater than 50% face-to-face patient care team discussing and evaluating the above.  "

## 2023-04-25 NOTE — PROGRESS NOTES
Patient to graduate from Community Paramedic Program. Home Hospice to continue care. Please send a new referral if a Community Paramedic is needed in the future.

## 2023-05-04 PROBLEM — F25.1: Status: RESOLVED | Noted: 2019-06-05 | Resolved: 2023-01-01

## 2023-05-04 PROBLEM — F20.9 SCHIZOPHRENIA (H): Status: RESOLVED | Noted: 2017-08-25 | Resolved: 2023-01-01

## 2023-05-04 PROBLEM — F06.1: Status: RESOLVED | Noted: 2019-06-05 | Resolved: 2023-01-01

## 2023-05-04 NOTE — PROGRESS NOTES
Clinic Care Coordination Contact    Assessment: Care Coordinator contacted patient for 2 month follow up.  Patient has continued to follow the plan of care and assessment is negative for any new needs or concerns.    Enrollment status: Graduated as pt's ongoing goals have been completed. Pt is involved with Hospice Care.      Plan: No further outreaches at this time.  Patient will continue to follow the plan of care.  If new needs arise a new Care Coordination referral may be placed.  FYI to PCP    VANITA Henry   Social Work Care Coordinator   [facility]    [phone]

## 2023-05-04 NOTE — PROGRESS NOTES
`Brooke is a 32 year old who is being evaluated via a billable telephone visit.      What phone number would you like to be contacted at? [phone]  How would you like to obtain your AVS? Mail a copy  Distant Location (provider location):  On-site    Subjective   Brooke is a 32 year old, presenting for the following health issues:  Anorexia        5/4/2023    10:40 AM   Additional Questions   Roomed by Heather ROWLAND     History of Present Illness       Reason for visit:  Patient is eating a very small amount for a long time now- Patient unable to talk- father is speaking for him but is not very knowledgable about patient's condition-  set up appointment for patient to recieve ensure prescription      32-year-old  X-linked adrenal leukodystrophy recently diagnosed, rapid decline of cognitive function/behavioral functions and physical functions recently.  Review of chart reveals he is on hospice at home.  Family contacting us requesting prescription for Ensure.  Not eating much at all.  Family makes him porridge which she does not really like, also gives him water.  Sometimes coughs and chokes on food.    Family spoke with hospice nurse and  about this and was told to request prescription from physician.    Reviewed video swallow study from February                                                                   IMPRESSION:     1.  Increased stasis of material within the piriform sinuses with subsequent swallows. Eventual laryngeal penetration with several different consistencies. The accumulation results in eventual coating of the vestibule and extension of barium to and   slightly below the level of the cords. There was no spontaneous cough.  2.  Chin tuck maneuver is effective at reducing the degree of stasis and no new instances of laryngeal penetration occurred during chin tuck.     Please see separately dictated report from speech pathology for additional description, analysis, and management  recommendations.     Order-Level Documents:    There are no order-level documents.  Lab and Collection    XR Video Swallow with SLP or OT (Order: [id number]) - 2/11/2023    Speech therapy note in February    - Therapy recommendation(s):    Current goals have been met. Consider further ST involvement if further training is needed at home or if symptoms worsen.      Patient and his family have been educated on thickened liquids and provided with instructions, samples and how to order. Written information provided in English, patient's sister speaks English. W/E was determined to be most efficient and accurate means of communication for patient in setting of severe dysarthria with highly reduced intelligibility. Family was educated previous sessions on progressive nature of disease and to report increased concerns to MD and pursue ST as appropriate. No further specific ST needs at this time.      Goals of care obviously changed since this time.  Discussed that insurance coverage may be an issue.  Recommend continuing to try to find food that he likes at this point for comfort.  Prescription for up to 2 cans of Ensure per day called into [facility]    Phone call duration: 15 minutes

## 2023-06-20 PROBLEM — Z51.5 HOSPICE CARE PATIENT: Status: ACTIVE | Noted: 2023-01-01

## 2023-06-20 PROBLEM — R52 PAIN: Status: ACTIVE | Noted: 2023-01-01

## 2023-06-20 NOTE — ED NOTES
Bed: JNED-18  Expected date: 6/20/23  Expected time: 1:34 PM  Means of arrival: Ambulance  Comments:  SPF 31 yo M on hospice- needs more care

## 2023-06-20 NOTE — ED NOTES
"[facility] ED Handoff Report    ED Chief Complaint: Hospice care.     ED Diagnosis:  (Z51.5) Hospice care patient  Comment: Pt comes to ED after poor hospice care at home with family.   Plan: Admit and continue care in-patient.     (J96.01) Acute respiratory failure with hypoxia (H)  Comment: De-sats into 80's on room air.   Plan:  Titrate O2 for sats above 90%. Has needed 1 - 5 lpm NC. Pt needing intermittent oral suctioning.     (J69.0) Aspiration pneumonitis (H)  Comment: Very course lung sounds, productive cough.   Plan: Pt is now NPO.        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 Family providing hospice care.      Elimination Status: Continent: No and wears briefs     Activity Level: Total assist/lift    Patients Preferred Language:  Other: Angela, understands some English and nods yes or no.      Needed: Yes    Vital Signs:  /80   Pulse 112   Temp 98.4  F (36.9  C) (Axillary)   Resp 21   SpO2 99%      Cardiac Rhythm: Sinus tach, no longer on tele due to hospice status.     Pain Score: Patient is unable to quantify. Pt nods \"no\" to pain.     Is the Patient Confused:  No    Last Food or Drink: 6/19/23 at Unknown.     Focused Assessment:  Pt alert, nods yes or no. Pt drooling, needs occasional oral suctioning. LS course and Pt has a fair, productive cough with yellow / tan thick sputum. Pt has some purposeful movement to left arm but otherwise has very limited ability to move.     Tests Performed: None. IV placed, labs ordered for tomorrow AM.     Treatments Provided:  O2, " IV, suctioning.     Family Dynamics/Concerns: Yes; please explain: Pt's father not adhering to thickened liquids and easy to chew foods. Pt is aspirating. Pt requested hospice RN to send him to the hospital today.     Family Updated On Visitor Policy: No    Plan of Care Communicated to Family: Yes    Who Was Updated about Plan of Care: The Pt.     Belongings Checklist Done and Signed by Patient: No    Medications sent with patient: None.     Covid: asymptomatic , Not tested.       RN: Justin Parekh RN 6/20/2023 6:22 PM

## 2023-06-20 NOTE — PROGRESS NOTES
[facility]    Consult Note - [facility] Inpatient Hospice    ______________________________________________________________________    [facility] Contact Number: [phone]    - Providers: Please contact [facility] with changes in orders or clinical plan of care   - Nursing: Please contact [facility] with significant changes in patient condition    Hospice will notify the care team (including the hospitalist) to confirm date of inpatient hospice (GIP) admission.    New Epic encounter will not be created until hospice completes admission.   ______________________________________________________________________      Indication for Inpatient Hospice: hypoxia due to aspiration    Goals for Hospital Discharge: pt able to be assessed with no breathing distress in 24 hours    Plan of Care Discussed with the Following:   - Nurse:   DENIS Anderson  - Charge Nurse:   - Hospitalist/Rounding Provider: MD Derick  - Hospice Provider: Irish    Summary of Visit (includes assessment, medications and any new orders):     Writer assessed pt today in the ER. Pt alert but very lethargic, phlegm in and around mouth from aspiration, with occasional productive cough. Writer used pipette for oral cares. IV inserted to support medications as pt is npo and high aspiration risk. Writer informed pt was given unthickened liquids which he aspirated on and now needing supportive cares for dyspnea and aspiration. Writer reached out to hospitalistDerick is planning on discharge then readmit into [facility].  Pt needing constant oral cares and supervision as he at times chokes. Pt is awaiting to be placed up on the floor and to have [facility] hospice follow. Pt will be reassessed within 24 hours for discharge to a lower level of care such as a hospice facility    Alyson Real RN

## 2023-06-20 NOTE — MEDICATION SCRIBE - ADMISSION MEDICATION HISTORY
Medication Scribe Admission Medication History    Admission medication history is complete. The information provided in this note is only as accurate as the sources available at the time of the update.    Medication reconciliation/reorder completed by provider prior to medication history? No    Information Source([facility]A    Pertinent Information: Patient unresponsive. Called sister [phone]. Sister stated that according to her father, patient has been unable to swallow pills for the past several days. Called  who sets up medication weekly: [phone] and [phone] and received no response. Used as medication history the discharge note from 2/10/23 and medication prescription fill history.    Changes made to PTA medication list:    Added: None    Deleted: Duplicate Tyenol    Changed: None    Medication Affordability:  Not including over the counter (OTC) medications, was there a time in the past 3 months when you did not take your medications as prescribed because of cost?: Unable to Assess    Allergies reviewed with patient and updates made in EHR: unable to assess    Medication History Completed By: Servando Alexis 6/20/2023 2:59 PM    Prior to Admission medications    Medication Sig Last Dose Taking? Auth Provider Long Term End Date   acetaminophen (TYLENOL) 500 MG tablet Take 1-2 tablets (500-1,000 mg) by mouth every 8 hours as needed for mild pain (headaches) Past Week Yes Jose Rangel MD     albuterol (PROAIR HFA/PROVENTIL HFA/VENTOLIN HFA) 108 (90 Base) MCG/ACT inhaler Inhale 2 puffs into the lungs every 6 hours as needed for shortness of breath / dyspnea or wheezing Past Week Yes Artie Booker MD Yes    baclofen (LIORESAL) 10 MG tablet Take 10 mg by mouth 2 times daily Past Week Yes Unknown, Entered By History     docusate sodium (COLACE) 100 MG capsule Take 1 capsule (100 mg) by mouth daily as needed for constipation Past Week Yes Anu Alvarez  MD Crist[place]     famotidine (PEPCID) 20 MG tablet TAKE 1 TABLET BY MOUTH TWICE A DAY Past Week Yes Yola Stuart MD     fludrocortisone (FLORINEF) 0.1 MG tablet Take 1.5 tablets (0.15 mg) by mouth daily Past Week Yes Maddison Witt MD Yes    fluticasone-salmeterol (ADVAIR HFA) 115-21 MCG/ACT inhaler Inhale 2 puffs into the lungs 2 times daily Past Week Yes Jose Rangel MD Yes    hydrocortisone (CORTEF) 10 MG tablet Take 10 mg every morning, 5 mg (1/2 tab) at 2:00 PM and 5 mg at 5:00 PM daily Past Week Yes Jose Rangel MD Yes    levothyroxine (SYNTHROID/LEVOTHROID) 75 MCG tablet Take 1 tablet (75 mcg) by mouth daily Past Week Yes Maddison Witt MD Yes    loratadine (CLARITIN) 10 MG tablet TAKE 1 TABLET BY MOUTH DAILY Past Week Yes Yola Stuart MD     Multiple Vitamin (TAB-A-MAGALY) TABS TAKE 1 TABLET BY MOUTH DAILY Past Week Yes Jose Rangel MD     Nutritional Supplements (ENSURE NUTRITION SHAKE) LIQD Take 1 Bottle by mouth 3 times daily Past Week Yes Fritz Nye MD     OLANZapine (ZYPREXA) 5 MG tablet Take 1 tablet (5 mg) by mouth 2 times daily Past Week Yes Jose Rangel MD Yes    ondansetron (ZOFRAN) 4 MG tablet Take 1 tablet (4 mg) by mouth every 8 hours as needed for nausea Past Week Yes Jose Rangel MD     pantoprazole (PROTONIX) 40 MG EC tablet Take 1 tablet (40 mg) by mouth every morning (before breakfast) Past Week Yes Jose Rangel MD     polyethylene glycol (MIRALAX) 17 GM/Dose powder Take 17 g by mouth daily Past Week Yes Jose Rangel MD     scopolamine (TRANSDERM) 1 MG/3DAYS 72 hr patch Place 1 patch onto the skin every 72 hours Past Week Yes Jose Rangel MD     sennosides (SENOKOT) 8.6 MG tablet Take 1 tablet by mouth daily Past Week Yes Pedro Aaron MD     traZODone (DESYREL) 50 MG tablet TAKE 1 TABLET (50 MG) BY MOUTH AT BEDTIME Past Week Yes Jose Rangel MD Yes    vitamin B-12 (CYANOCOBALAMIN) 500 MCG tablet Take 1 tablet (500 mcg) by mouth daily Past Week Yes  Jose Rangel MD     vitamin D3 (CHOLECALCIFEROL) 50 mcg (2000 units) tablet TAKE 1 TABLET (50 MCG) BY MOUTH DAILY Past Week Yes Jose Rangel MD

## 2023-06-20 NOTE — H&P
Admission History and Physical   Brooke Peterson,    1990,   [id number]    [facility]   Hospice care patient [Z51.5]    PCP: Jose Rangel,    Code status:  Prior       Extended Emergency Contact Information  Primary Emergency Contact: Kell Marinelli  Mobile Phone: [phone]  Relation: Sister  Preferred language: English   needed? No  Secondary Emergency Contact: Joey Montero   [facility]  Home Phone: [phone]  Mobile Phone: [phone]  Relation: Father   needed? Yes  Mother: Mikki Lloyd   [facility]  Home Phone: [phone]       Principal Problem:    Hospice care patient       ASSESSMENT AND PLAN:  Admitted due to poor care by family at home, patient on home hospice.  Consult inpatient hospice to see    History of progressive adrenal leukodystrophy, progressive weakness totally dependent for ADLs    Generalized muscle weakness and dysphagia, suspicious for aspiration, n.p.o. for now, consult speech therapy    [place]'s disease/adrenal insufficiency, pituitary macroadenoma, on hospice    Symptom control we will begin with narcotic analgesia, hospice order set placed    DNR/DNI    DVT PPX:  Mechanical    Barriers to discharge    Anticipated Discharge date inpatient          Chief Complaint:  Poor self-care at home    HPI:  Brooke Peterson is a 32 year old old male on home hospice being totally cared for by 5 members especially his father.  Hospice nurse reviewed patient today and even expressed poor self-care at home.  Family has been feeding him with food even though he has dysphagia, hospice nurse noted signs for aspiration, he was admitted to the hospital for GIP hospice and symptom control.  Spoke to ED physician regarding patient's history      Medical History  Past Medical History:   Diagnosis Date     [place]'s disease (H) 2017     Asthma      Asthma      Hypercalcemia 2019     Hyperprolactinemia (H) 2018     Hypothyroidism      Hypovitaminosis D 2018      Pituitary microadenoma (H)      PTSD (post-traumatic stress disorder)      Schizophrenia (H)           Surgical History  He  has no past surgical history on file.      SOCIAL HISTORY:  Social History     Socioeconomic History     Marital status: Single     Spouse name: Not on file     Number of children: Not on file     Years of education: Not on file     Highest education level: Not on file   Occupational History     Not on file   Tobacco Use     Smoking status: Never     Passive exposure: Never     Smokeless tobacco: Never   Vaping Use     Vaping status: Not on file   Substance and Sexual Activity     Alcohol use: Not Currently     Drug use: Never     Sexual activity: Not on file   Other Topics Concern     Not on file   Social History Narrative     Not on file     Social Determinants of Health     Financial Resource Strain: Medium Risk (1/12/2022)    Overall Financial Resource Strain (CARDIA)      Difficulty of Paying Living Expenses: Somewhat hard   Food Insecurity: No Food Insecurity (1/12/2022)    Hunger Vital Sign      Worried About Running Out of Food in the Last Year: Never true      Ran Out of Food in the Last Year: Never true   Transportation Needs: Unmet Transportation Needs (1/12/2022)    PRAPARE - Transportation      Lack of Transportation (Medical): Yes      Lack of Transportation (Non-Medical): No   Physical Activity: Insufficiently Active (1/12/2022)    Exercise Vital Sign      Days of Exercise per Week: 4 days      Minutes of Exercise per Session: 10 min   Stress: No Stress Concern Present (1/12/2022)    [place] [employer] - Occupational Stress Questionnaire      Feeling of Stress : Only a little   Social Connections: Socially Isolated (1/12/2022)    Social Connection and Isolation Panel [NHANES]      Frequency of Communication with Friends and Family: Never      Frequency of Social Gatherings with Friends and Family: More than three times a week      Attends Synagogue  Services: Never      Active Member of Clubs or Organizations: No      Attends Club or Organization Meetings: Never      Marital Status: Never    Intimate Partner Violence: Not At Risk (1/12/2022)    Humiliation, Afraid, Rape, and Kick questionnaire      Fear of Current or Ex-Partner: No      Emotionally Abused: No      Physically Abused: No      Sexually Abused: No   Housing Stability: Low Risk  (1/12/2022)    Housing Stability Vital Sign      Unable to Pay for Housing in the Last Year: No      Number of Places Lived in the Last Year: 1      Unstable Housing in the Last Year: No       FAMILY HISTORY:  Family History   Problem Relation Age of Onset     Diabetes Mother      Psychotic Disorder Mother      Depression Mother      Cancer No family hx of          ALLERGIES:  No Known Allergies    MEDICATIONS:  See pharmacy    ROS:  12 point review of systems reviewed and is negative except as stated above      PHYSICAL EXAM:  /87   Pulse 112   Temp 98.4  F (36.9  C) (Axillary)   Resp 24   SpO2 96%     General: Patient is nonverbal  HEENT: Pupils equal and reactive,ENT WNL   Neck- supple, No JVP elevation, lymphadenopathy or thyromegaly. Trachea-central.  Chest: Clear to auscultation bilaterally.  Heart: S1S2 regular. No M/R/G.  Abdomen: Soft. NT, ND. No organomegaly. Bowel sounds- active.  Back: No spine tenderness. No CVA tenderness.  Extremities: No leg swelling. Peripheral pulses 2+ bilaterally.  Neuro: No focal neurological deficit  Skin: no skin rashes     DIAGNOSTIC DATA:  Case discussed extensively ED physician,      Ordered blood work to review        Advanced Care Planning       Real Sepulveda MD

## 2023-06-20 NOTE — ED PROVIDER NOTES
EMERGENCY DEPARTMENT ENCOUNTER      NAME: Brooke Peterson  AGE: 32 year old male  YOB: 1990  MRN: [id number]  EVALUATION DATE & TIME: 6/20/2023  1:43 PM    PCP: Jose Rangel    ED PROVIDER: Kika Matthews MD    Chief Complaint   Patient presents with     Hospice Care         FINAL IMPRESSION:  1. Hospice care patient    2. Acute respiratory failure with hypoxia (H)    3. Aspiration pneumonitis (H)          ED COURSE & MEDICAL DECISION MAKING:    Pertinent Labs & Imaging studies reviewed. 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  In short patient has been reliant on family for cares due to him being bedridden from the above.  Family is not compliant with his thickened food and fluid diet and as a result patient has been aspirating and requested care elsewhere.  Sent in by hospice for inpatient hospice admission.  Here patient does note increasing shortness of breath and cough from his baseline.  He is slightly tachycardic, and does have an O2 requirement which is reportedly new.  It sounds like he is not on oxygen at baseline.    Case discussed with hospice social worker as well as hospice physician.  They discussed goals of care with patient this morning.  Though he does not have a POLST on file, it does not sound like he wanted to escalate any cares.  No plan for labs, imaging, antibiotics or fluids.  Will admit for inpatient hospice.      ED Course as of 06/20/23 1424   Tue Jun 20, 2023   1345 Introduced to the patient, obtained history of present illness, and performed initial physical exam at this time.      1352 Spoke w Narcisa, home hospice.  Severe progressive descending paralysis, understands english, can nod yes/now.  Dad, Shew, is primary care giver - all family speaks Angela.  Given un-thickened  liquids/foods even though pt doesn't want it, causing aspiration.  Dad today given food and now pt coughing up.  On hospice, but do not have a POLST on file.  Wants inpt hospice.     1356 Spoke w Dr. Gilbert hospice service. Bed ridden, dependent for ADLs. Father difficult to work with even with /cultural barriers father continues to feed food that pt chokes/aspirates on.  Pt sufferring and begged to be sent to hospital for inpt hospice, end of life cares.         Medical Decision Making    History:    Supplemental history from: EMS    External Record(s) reviewed: Outpatient Record: 5/4/2023 virtual visit with PCP    Work Up:    Chart documentation includes differential considered and any EKGs or imaging independently interpreted by provider, where specified.    In additional to work up documented, I considered the following work up: See MDM    External consultation:    Discussion of management with another provider: Hospice social worker/physician, admitting hospitalist    Complicating factors:    Care impacted by chronic illness: Other: Neuromuscular disorder    Care affected by social determinants of health: Access to Medical Care    Disposition considerations: Admit.        At the conclusion of the encounter I discussed the results of all of the tests and the disposition. The questions were answered. The patient or family acknowledged understanding and was agreeable with the care plan.    CRITICAL CARE:  Critical Care  Performed by: Kika Matthews MD  Authorized by: Kika Matthews MD     Total critical care time: 42 minutes  Criticalcare time was exclusive of separately billable procedures and treating other patients.  Critical care was necessary to treat or prevent imminent or life-threatening deterioration of the following conditions: Hypoxic respiratory failure  Critical care was time spent personally by me on the following activities: development of treatment plan with patient or surrogate,  discussions with consultants, examination of patient, evaluation of patient's response totreatment, obtaining history from patient or surrogate, ordering and performing treatments and interventions, ordering and review of laboratory studies, ordering and review of radiographic studies and re-evaluation ofpatient's condition, this excludes any separately billable procedures.      MEDICATIONS GIVEN IN THE EMERGENCY:  Medications - No data to display    NEW PRESCRIPTIONS STARTED AT TODAY'S ER VISIT  New Prescriptions    No medications on file          =================================================================    HPI    Patient information was obtained from: Patient and hospice staff    Use of Intrepreter: Yes (Phone) - Language Angela Peterson is a 32 year old male with pertinent medical history of X-linked adrenoleukodystrophy, Birchwood's disease, adrenal insufficiency, PTSD, pituitary microadenoma, asthma who presents from home hospice to be placed on hospital hospice.     Patient is nonverbal but understanding, so an  was used to asked yes/no questions.    Patient endorses shortness of breath, increased cough, vomit, and dysuria. Patient denies any abdominal pain, fever, or bowel movement issues.       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    CURRENT MEDICATIONS:    Prior to Admission Medications   Prescriptions Last Dose Informant Patient Reported? Taking?   Multiple Vitamin (TAB-A-MAGALY) TABS   No No   Sig: TAKE 1 TABLET BY MOUTH DAILY   Nutritional Supplements (ENSURE NUTRITION SHAKE) LIQD   No No   Sig: Take 1 Bottle by mouth 3 times daily    OLANZapine (ZYPREXA) 5 MG tablet   No No   Sig: Take 1 tablet (5 mg) by mouth 2 times daily   acetaminophen (TYLENOL) 325 MG tablet   No No   Sig: Take 2 tablets (650 mg) by mouth every 6 hours as needed for mild pain   acetaminophen (TYLENOL) 500 MG tablet   No No   Sig: Take 1-2 tablets (500-1,000 mg) by mouth every 8 hours as needed for mild pain (headaches)   albuterol (PROAIR HFA/PROVENTIL HFA/VENTOLIN HFA) 108 (90 Base) MCG/ACT inhaler   No No   Sig: Inhale 2 puffs into the lungs every 6 hours as needed for shortness of breath / dyspnea or wheezing   baclofen (LIORESAL) 10 MG tablet   Yes No   Sig: Take 10 mg by mouth 2 times daily   docusate sodium (COLACE) 100 MG capsule   No No   Sig: Take 1 capsule (100 mg) by mouth daily as needed for constipation   famotidine (PEPCID) 20 MG tablet   No No   Sig: TAKE 1 TABLET BY MOUTH TWICE A DAY   fludrocortisone (FLORINEF) 0.1 MG tablet   No No   Sig: Take 1.5 tablets (0.15 mg) by mouth daily   fluticasone-salmeterol (ADVAIR HFA) 115-21 MCG/ACT inhaler   No No   Sig: Inhale 2 puffs into the lungs 2 times daily   hydrocortisone (CORTEF) 10 MG tablet   No No   Sig: Take 10 mg every morning, 5 mg (1/2 tab) at 2:00 PM and 5 mg at 5:00 PM daily   levothyroxine (SYNTHROID/LEVOTHROID) 75 MCG tablet   No No   Sig: Take 1 tablet (75 mcg) by mouth daily   loratadine (CLARITIN) 10 MG tablet   No No   Sig: TAKE 1 TABLET BY MOUTH DAILY   ondansetron (ZOFRAN) 4 MG tablet   No No   Sig: Take 1 tablet (4 mg) by mouth every 8 hours as needed for nausea   pantoprazole (PROTONIX) 40 MG EC tablet   No No   Sig: Take 1 tablet (40 mg) by mouth every morning (before breakfast)   polyethylene glycol (MIRALAX) 17 GM/Dose powder   No No   Sig: Take 17 g by mouth daily   scopolamine (TRANSDERM) 1 MG/3DAYS 72 hr patch   No No   Sig: Place 1 patch onto the skin every 72 hours   sennosides (SENOKOT) 8.6 MG tablet   No No   Sig: Take 1 tablet by mouth daily   traZODone (DESYREL) 50 MG tablet   No  No   Sig: TAKE 1 TABLET (50 MG) BY MOUTH AT BEDTIME   vitamin B-12 (CYANOCOBALAMIN) 500 MCG tablet   No No   Sig: Take 1 tablet (500 mcg) by mouth daily   vitamin D3 (CHOLECALCIFEROL) 50 mcg (2000 units) tablet   No No   Sig: TAKE 1 TABLET (50 MCG) BY MOUTH DAILY      Facility-Administered Medications: None       ALLERGIES:  No Known Allergies    FAMILY HISTORY:  Family History   Problem Relation Age of Onset     Diabetes Mother      Psychotic Disorder Mother      Depression Mother      Cancer No family hx of        SOCIAL HISTORY:  Social History     Tobacco Use     Smoking status: Never     Passive exposure: Never     Smokeless tobacco: Never   Substance Use Topics     Alcohol use: Not Currently     Drug use: Never        VITALS:  Patient Vitals for the past 24 hrs:   BP Temp Temp src Pulse Resp SpO2   06/20/23 1400 -- 98.4  F (36.9  C) Axillary -- -- --   06/20/23 1345 123/87 97.6  F (36.4  C) Axillary 112 24 96 %       PHYSICAL EXAM    General Appearance: Chronically ill-appearing adult male nonverbal. Shakes head to answer yes/no questions.   Head:  Normocephalic  ENT:  membranes are moist without pallor  Neck:  Supple  Cardio:  Regular rhythm, no murmur/gallop/rub, tachycardic in 110s.   Pulm:  No respiratory distress, Hypoxic requiring nasal cannula for O2.  Coarse breath sounds bilaterally  Abdomen:  Soft, non-tender, non distended,no rebound or guarding.  Extremities: Extremities with muscle wasting/atrophy  Skin:  Skin warm, dry, no rashes  Neuro: Laying in bed alert eyes open looking around room.  Nods head yes or no with questions.  Otherwise extremity weakness unable to lift extremities up off the bed.    The creation of this record is based on the scribe s observations of the work being performed by Kika Matthews MD and the provider s statements to them. It was created on her behalf by Carolann Kilpatrick, a trained medical scribe. This document has been checked and approved by the attending  provider.    Kika Matthews MD  Emergency Medicine  [facility] EMERGENCY DEPARTMENT  [facility] [facility] [address] [phone]  Dept: [phone]       Kika Matthews MD  06/20/23 142

## 2023-06-20 NOTE — ED NOTES
Paged Dr. Sepulveda about need for IV and labs as this Pt is on hospice.   MD responded at 5621 to hold off on starting an IV until Pt sees hospice care provider.

## 2023-06-20 NOTE — ED TRIAGE NOTES
Patient arrived via SPF from home hospice to be placed on hospital hospice. Per EMS, patient's O2 was 90% on RA; Placed on 3L/nc and improved to 98%. EMS report glucose: 98. Other VS stable.      Triage Assessment     Row Name 06/20/23 7829       Triage Assessment (Adult)    Airway WDL WDL       Respiratory WDL    Respiratory WDL X  low O2 sats; EMS placed on NC/3L       Skin Circulation/Temperature WDL    Skin Circulation/Temperature WDL WDL       Cardiac WDL    Cardiac WDL X  tachycardia       Peripheral/Neurovascular WDL    Peripheral Neurovascular WDL WDL       Cognitive/Neuro/Behavioral WDL    Cognitive/Neuro/Behavioral WDL WDL

## 2023-06-21 NOTE — PROGRESS NOTES
Writer was updated by Alyson through [facility], that they will be doing GIP consult at 10:30 am today with pt, and the community . Bedside nurse updated.    Madison Todd RN on 6/21/2023 at 10:19 AM      3:00pm- Linda SILVA through [facility] updated writer that pt will be going home tomorrow via stretcher transport back to pts home with [facility] following pt outpatient. Family will be home. She informed writer that she set up stretcher transport set up for tomorrow 10:40am-11:20am 6/22.       Madison Todd RN on 6/21/2023 at 3:18 PM

## 2023-06-21 NOTE — PLAN OF CARE
Goal Outcome Evaluation:             Spoke with pt. With  present in room. Denied pain, expressed thirst. Explained plan for speech consult and hospice consult first. Speech discontinued, then order put back in for tomorrow morning. Hospice came and agreed for hospice to continue. Pt. Agreed to go home. Family arrived and asked about giving pt. Food. Educated family about use of thickening agent for liquids. Pt. Tolerated moderately thick juice and pudding. Wound on coccyx is open. O2 4 liters. Pt. Nodding yes and no to questions in english. Pointed to things he needed with left hand. Refused all pills when asked with  and later. Family said pt began refusing medications Monday.

## 2023-06-21 NOTE — PLAN OF CARE
Problem: Skin Injury Risk Increased  Goal: Skin Health and Integrity  Outcome: Progressing   Goal Outcome Evaluation:       Skin integrity maintained. Pump added to bed for comfort and to reduce breakdown. Turned and repositioned q2hrs and PRN. Oral suctioning performed for comfort. Oral cares done q2hrs and PRN. Soft touch call light placed in room so pt able to call staff if needed. Pt can use appropriately. Pt able to communicate with head nod/shakes, thumbs up/down. Limited use of LUE. Pt incontinent of bladder and wears briefs. Denies pain or discomfort. Communicates that he is unable to swallow pills, even if crushed. Held scheduled oral medications.

## 2023-06-21 NOTE — PROGRESS NOTES
[facility] SHIRA and CNL met with patient to discuss discharge planning. SW used whiteboard and patient communicated that he does not want to go home, does not want to go to a group home or facility, and wants to stay in the hospital. When given the choice of facility vs home; patient chose home. Plan is for patient to discharge home tomorrow. [facility] Stretcher transportation scheduled for 6/22/2023 between [id number]. Patients family is aware of discharge plans and will be home.    [facility] report filed by community SHIRA.  No medication needed at time of discharge.   Oxygen needs to be listed in DME on discharge orders.   Writer completed PCS- faxed and on chart.    ANGUS Riley  [phone]

## 2023-06-21 NOTE — PLAN OF CARE
Patient comfort care, npo, q2 turn , can only nod yes or no, here for inpatient hospice evaluation. No other changes

## 2023-06-21 NOTE — PROGRESS NOTES
[facility]    Medicine Progress Note - Hospitalist Service    Date of Admission:  6/20/2023    Assessment & Plan      Admitted due to poor care by family at home, patient on home hospice.    Consult inpatient hospice: Recommendations reviewed and appreciated.  I speak with the hospice nurse, symptoms are much better controlled.  Discussed with hospice social worker, plan to discharge home tomorrow June 22    History of progressive adrenal leukodystrophy, progressive weakness totally dependent for ADLs     Generalized muscle weakness and dysphagia,   suspicious for aspiration, n.p.o. on admission but patient would like to eat.   Home diet is restarted: Goals of care are comfort given patient's hospice status, will allow the patient a modified diet.  consult speech therapy: Recommendation for video study tomorrow       Orovada's disease/adrenal insufficiency, pituitary macroadenoma, on hospice     Symptom control we will begin with narcotic analgesia, hospice order set placed     Diet: Pureed Diet (level 4) Mildly Thick (level 2)    DVT Prophylaxis: Pneumatic Compression Devices  Booth Catheter: Not present  Lines: None     Cardiac Monitoring: None  Code Status: No CPR- Do NOT Intubate      Clinically Significant Risk Factors Present on Admission                                Disposition Plan      Expected Discharge Date: 06/22/2023, 10:40 AM      Discharge Comments: Hospice informed writer that she set up stretcher transport set up for tomorrow 10:40am-11:20am 6/22. [facility] is following.          Bharat Maher MD  Hospitalist Service  [facility]  Securely message with [employer] (more info)  Text page via [employer] Paging/Directory   ______________________________________________________________________    Interval History   Patient seen and examined, doing much better this morning.  Did have a discussion with the hospice team, patient's symptoms are much better  controlled.  Family was not present during evaluation    Physical Exam   Vital Signs: Temp: 98.2  F (36.8  C) Temp src: Oral BP: 94/58 Pulse: 106   Resp: 20 SpO2: 100 % O2 Device: Nasal cannula Oxygen Delivery: 5 LPM  Weight: 0 lbs 0 oz    General: Patient is nonverbal  HEENT: Pupils equal and reactive,ENT WNL   Neck- supple, No JVP elevation, lymphadenopathy or thyromegaly. Trachea-central.  Chest: Clear to auscultation bilaterally.  Heart: S1S2 regular. No M/R/G.  Abdomen: Soft. NT, ND. No organomegaly. Bowel sounds- active.  Back: No spine tenderness. No CVA tenderness.  Extremities: No leg swelling. Peripheral pulses 2+ bilaterally.  Neuro: No focal neurological deficit  Skin: no skin rashes     Medical Decision Making       35 MINUTES SPENT BY ME on the date of service doing chart review, history, exam, documentation & further activities per the note.      Data

## 2023-06-22 NOTE — PROGRESS NOTES
Speech Language Therapy Discharge Summary    Reason for therapy discharge:    All goals and outcomes met, no further needs identified.    Progress towards therapy goal(s). See goals on Care Plan in [facility] electronic health record for goal details.  Goals met    Therapy recommendation(s):    No further therapy is recommended. Patient is able to liberalize recommended diet as he wishes.

## 2023-06-22 NOTE — ED PROVIDER NOTES
EMERGENCY DEPARTMENT ENCOUNTER      NAME: Brooke Peterson  AGE: 32 year old male  YOB: 1990  MRN: [id number]  EVALUATION DATE & TIME: 2023 12:47 PM    PCP: Jose Rangel    ED PROVIDER: Milka Teixeira MD      Chief Complaint   Patient presents with     Home Care/Hospice         FINAL IMPRESSION:  1. Hospice care patient          ED COURSE & MEDICAL DECISION MAKIN:18  PM I met with the patient for an interview and initial exam. Plans for treatment were discussed.  1:25 PM I called the patient's sister, Kell. No one picked up and I left a voicemail.  2:05 PM Kell called. Patient had been sent here as the patient's oxygen had not been set up at home. In the interim, the patient's oxygen is now set up, and he is safe to transfer home. I spoke with the care manager and they will arrange for patient to be transfer home.     Pertinent Labs & Imaging studies reviewed. (See chart for details)  32 year old male presents to the Emergency Department for not having oxygen set up at home.  Patient was recently admitted and placed on hospice, was discharged today and upon arrival home, the patient's oxygen had not yet been set up.  The patient was therefore brought back to the emergency department.  The patient at baseline is nonverbal.  I spoke with the patient's sister, she updated me that since the patient had return to the hospital the home oxygen had been set up.  They are comfortable with the patient transferring back home given everything is now set up.  Patient otherwise with no other changes.    At the conclusion of the encounter I discussed the results of all of the tests and the disposition. The questions were answered. The patient or family acknowledged understanding and was agreeable with the care plan.       0 minutes of critical care time     Medical Decision Making    History:    Supplemental history from: Documented in chart, if applicable and Family Member/Significant Other    External  Record(s) reviewed: Documented in chart, if applicable. and Inpatient Record: 06/20/2023-06/22/2023    Work Up:    Chart documentation includes differential considered and any EKGs or imaging independently interpreted by provider, where specified.    In additional to work up documented, I considered the following work up: Documented in chart, if applicable.    External consultation:    Discussion of management with another provider: Documented in chart, if applicable    Complicating factors:    Care impacted by chronic illness: Chronic Lung Disease and Mental Health    Care affected by social determinants of health: N/A    Disposition considerations: Discharge. No recommendations on prescription strength medication(s). I considered admission, but ultimately discharged patient after issue resolved.      MEDICATIONS GIVEN IN THE EMERGENCY:  Medications - No data to display    NEW PRESCRIPTIONS STARTED AT TODAY'S ER VISIT  New Prescriptions    No medications on file          =================================================================    HPI    LIMITED HPI DUE TO PATIENT BEING NONVERBAL    Patient information was obtained from: The patient and nurse    Use of : N/A         Brooke Peterson is a 32 year old male with a pertinent history of Asthma, Hypothyroidism, Schizophrenia, Long's Disease who presents to this ED by ambulance for evaluation of home care/hospice.    Per chart review, the patient was admitted at [facility] from 06/20/23-06/22/23 for pain. The patient is on home hospice and is being cared for by 5 members including his father. They have been feeding him with food despite him having dysphagia. Hospice nurse noted sings for aspiration and he was admitted to the hospital for [facility] hospice where his symptoms were controlled. The patient was discharged home with hospice. XR chest (2/10/23) revealed no acute pneumothorax, or pleural effusion, otherwise the rest of the XR was normal. MR Brain  revealed no acute abnormalities. The patient was started on Ativan, Roxanol, and Compazine.    Per nurse, the patient was brought in by ambulance. He was placed on 2L NC en route to the ED. He is nonverbal but was able to nod in agreement when asked if he was short of breath and nauseous.      PAST MEDICAL HISTORY:  Past Medical History:   Diagnosis Date     [place]'s disease (H) 07/2017     Asthma      Asthma      Hypercalcemia 09/2019     Hyperprolactinemia (H) 12/2018     Hypothyroidism      Hypovitaminosis D 11/2018     Pituitary microadenoma (H)      PTSD (post-traumatic stress disorder)      Schizophrenia (H)        PAST SURGICAL HISTORY:  No past surgical history on file.        CURRENT MEDICATIONS:    acetaminophen (TYLENOL) 500 MG tablet  albuterol (PROAIR HFA/PROVENTIL HFA/VENTOLIN HFA) 108 (90 Base) MCG/ACT inhaler  baclofen (LIORESAL) 10 MG tablet  docusate sodium (COLACE) 100 MG capsule  famotidine (PEPCID) 20 MG tablet  fludrocortisone (FLORINEF) 0.1 MG tablet  fluticasone-salmeterol (ADVAIR HFA) 115-21 MCG/ACT inhaler  hydrocortisone (CORTEF) 10 MG tablet  levothyroxine (SYNTHROID/LEVOTHROID) 75 MCG tablet  loratadine (CLARITIN) 10 MG tablet  LORazepam (ATIVAN) 1 MG tablet  morphine sulfate (ROXANOL) 20 mg/mL (HIGH CONC) soln  Multiple Vitamin (TAB-A-MAGALY) TABS  Nutritional Supplements (ENSURE NUTRITION SHAKE) LIQD  OLANZapine (ZYPREXA) 5 MG tablet  ondansetron (ZOFRAN) 4 MG tablet  pantoprazole (PROTONIX) 40 MG EC tablet  polyethylene glycol (MIRALAX) 17 GM/Dose powder  prochlorperazine (COMPAZINE) 10 MG tablet  scopolamine (TRANSDERM) 1 MG/3DAYS 72 hr patch  sennosides (SENOKOT) 8.6 MG tablet  traZODone (DESYREL) 50 MG tablet  vitamin B-12 (CYANOCOBALAMIN) 500 MCG tablet  vitamin D3 (CHOLECALCIFEROL) 50 mcg (2000 units) tablet        ALLERGIES:  No Known Allergies    FAMILY HISTORY:  Family History   Problem Relation Age of Onset     Diabetes Mother      Psychotic Disorder Mother      Depression  Mother      Cancer No family hx of        SOCIAL HISTORY:   Social History     Socioeconomic History     Marital status: Single   Tobacco Use     Smoking status: Never     Passive exposure: Never     Smokeless tobacco: Never   Substance and Sexual Activity     Alcohol use: Not Currently     Drug use: Never     Social Determinants of Health     Financial Resource Strain: Medium Risk (1/12/2022)    Overall Financial Resource Strain (CARDIA)      Difficulty of Paying Living Expenses: Somewhat hard   Food Insecurity: No Food Insecurity (1/12/2022)    Hunger Vital Sign      Worried About Running Out of Food in the Last Year: Never true      Ran Out of Food in the Last Year: Never true   Transportation Needs: Unmet Transportation Needs (1/12/2022)    PRAPARE - Transportation      Lack of Transportation (Medical): Yes      Lack of Transportation (Non-Medical): No   Physical Activity: Insufficiently Active (1/12/2022)    Exercise Vital Sign      Days of Exercise per Week: 4 days      Minutes of Exercise per Session: 10 min   Stress: No Stress Concern Present (1/12/2022)    [employer] - Occupational Stress Questionnaire      Feeling of Stress : Only a little   Social Connections: Socially Isolated (1/12/2022)    Social Connection and Isolation Panel [NHANES]      Frequency of Communication with Friends and Family: Never      Frequency of Social Gatherings with Friends and Family: More than three times a week      Attends Protestant Services: Never      Active Member of Clubs or Organizations: No      Attends Club or Organization Meetings: Never      Marital Status: Never    Intimate Partner Violence: Not At Risk (1/12/2022)    Humiliation, Afraid, Rape, and Kick questionnaire      Fear of Current or Ex-Partner: No      Emotionally Abused: No      Physically Abused: No      Sexually Abused: No   Housing Stability: Low Risk  (1/12/2022)    Housing Stability Vital Sign      Unable to Pay for  Housing in the Last Year: No      Number of Places Lived in the Last Year: 1      Unstable Housing in the Last Year: No       VITALS:  /84   Pulse 106   Temp 98  F (36.7  C) (Oral)   Resp 18   SpO2 99%     PHYSICAL EXAM    Constitutional: Well developed, Well nourished, NAD  HENT: Normocephalic, Atraumatic, Bilateral external ears normal  Neck- Normal range of motion  Eyes: PERRL, EOMI, Conjunctiva normal, No discharge.   Respiratory: No respiratory distress  Cardiovascular: Normal heart rate, Regular rhythm  GI: Bowel sounds normal, Soft, No tenderness,   Musculoskeletal: No edema. No tenderness to palpation or major deformities noted.   Integument: Warm, Dry, No erythema, No rash  Neurologic: Normal function of left upper extremity and right lower extremity. Unable to squeeze with right hand and move right foot.      LAB:  All pertinent labs reviewed and interpreted.       RADIOLOGY:  Reviewed all pertinent imaging. Please see official radiology report.  No orders to display         I, Que Lilly, am serving as a scribe to document services personally performed by Milka Teixeira, based on my observation and the provider's statements to me. I, Milka Teixeira MD, attest that Que Lilly is acting in a scribe capacity, has observed my performance of the services and has documented them in accordance with my direction.    Milka Teixeira MD  Emergency Medicine  [facility] EMERGENCY DEPARTMENT  [facility] [facility] [address]  [phone]     Milka Teixeira MD  06/22/23 4506

## 2023-06-22 NOTE — PROGRESS NOTES
"[facility] needs to deliver Oxygen. Per Linda from hospice, \"oxygen had not yet arrived at home. The outpatient hospice team is working on the delivery time and then he should be able to discharge back to home then\".  "

## 2023-06-22 NOTE — PROGRESS NOTES
Care Management Discharge Note    Discharge Date: 06/22/2023       Discharge Disposition:  Home with op hospice     Private pay costs discussed: Not applicable    Does the patient's insurance plan have a 3 day qualifying hospital stay waiver?  No      Education Provided on the Discharge Plan:  Per Care Team   Persons Notified of Discharge Plans: Yes  Patient/Family in Agreement with the Plan:      Handoff Referral Completed: Yes    Additional Information:    Final discharge plan is for pt to return home with [facility].  Family will be there at the pts home.  Transport between 10:40 am-11:20 am today.   Spoke with bedside and she updated writer as well.       Madison Todd RN

## 2023-06-22 NOTE — ED NOTES
Bed: JNED-10  Expected date: 6/22/23  Expected time: 12:31 PM  Means of arrival: Ambulance  Comments:  [facility] 31 [facility] M return from [facility]

## 2023-06-22 NOTE — DISCHARGE SUMMARY
[facility]  Hospitalist Discharge Summary      Date of Admission:  6/20/2023  Date of Discharge:  6/22/2023  Discharging Provider: Bharat Maher MD  Discharge Service: Hospitalist Service    Discharge Diagnoses   Admitted due to poor symptom control, patient on home hospice.    Consult inpatient hospice: Recommendations reviewed and appreciated.  I speak with the hospice nurse, symptoms are much better controlled.  Discussed with hospice social worker, plan to discharge home tomorrow June 22     History of progressive adrenal leukodystrophy, progressive weakness totally dependent for ADLs     Generalized muscle weakness and dysphagia,   suspicious for aspiration, n.p.o. on admission but patient would like to eat.   Home diet is restarted: Goals of care are comfort given patient's hospice status, will allow the patient a modified diet.  consult speech therapy: Recommendation for video study tomorrow        Ariel's disease/adrenal insufficiency, pituitary macroadenoma, on hospice     Symptom control we will begin with narcotic analgesia, hospice order set placed    Clinically Significant Risk Factors          Follow-ups Needed After Discharge   Follow-up Appointments     Follow-up and recommended labs and tests       Follow up with primary care provider, Jose Rangel, within 7 days for   hospital follow- up and medication changes.  No follow up labs or test are   needed.        {Additional follow-up instructions/to-do's for PCP: follow up with pain medication adjustments and symptom control for this hospice patient    Unresulted Labs Ordered in the Past 30 Days of this Admission     No orders found from 5/21/2023 to 6/21/2023.      These results will be followed up by PCP    Discharge Disposition   Discharged to home  Condition at discharge: Fair    Hospital Course     Brooke Peterson is a 32 year old old male on home hospice being totally cared for by 5 members especially his father.  Hospice  nurse reviewed patient today and even expressed poor self-care at home.  Family has been feeding him with food even though he has dysphagia, hospice nurse noted signs for aspiration, he was admitted to the hospital for GIP hospice and symptom control.  Hospice team did evaluate the patient and symptoms were well controlled.  He is discharged to home w/hospice and medication changes as noted.       Consultations This Hospital Stay   PHARMACY IP CONSULT  [facility] INPATIENT HOSPICE ADULT CONSULT  SPIRITUAL HEALTH SERVICES IP CONSULT  SPEECH LANGUAGE PATH ADULT IP CONSULT    Code Status   No CPR- Do NOT Intubate    Time Spent on this Encounter   I, Bharat Maher MD, personally saw the patient today and spent greater than 30 minutes discharging this patient.       Bharat Maher MD  [address] [address]  Phone: [phone]  Fax: [phone]  ______________________________________________________________________    Physical Exam   Vital Signs: Temp: 97.5  F (36.4  C) Temp src: Oral BP: 100/57 Pulse: 89   Resp: 18 SpO2: 98 % O2 Device: Nasal cannula Oxygen Delivery: 4 LPM  Weight: 0 lbs 0 oz    General: Patient is nonverbal  HEENT: Pupils equal and reactive,ENT WNL   Neck- supple, No JVP elevation, lymphadenopathy or thyromegaly. Trachea-central.  Chest: Clear to auscultation bilaterally.  Heart: S1S2 regular. No M/R/G.  Abdomen: Soft. NT, ND. No organomegaly. Bowel sounds- active.  Back: No spine tenderness. No CVA tenderness.  Extremities: No leg swelling. Peripheral pulses 2+ bilaterally.  Neuro: No focal neurological deficit  Skin: sacral wound present on admission on back       Primary Care Physician   Jose Rangel    Discharge Orders      Hospice Referral      Reason for your hospital stay    Patient was admitted with uncontrolled pain symptoms to inpatient hospice and discharged to home w/hospice     Follow-up and recommended labs and tests     Follow up  with primary care provider, Jose Rangel, within 7 days for hospital follow- up and medication changes.  No follow up labs or test are needed.     Activity    Your activity upon discharge: activity as tolerated     Diet    Follow this diet upon discharge: Orders Placed This Encounter      Pureed Diet (level 4) Mildly Thick (level 2)       Significant Results and Procedures   Results for orders placed or performed during the hospital encounter of 02/10/23   Chest XR,  PA & LAT    Narrative    EXAM: XR CHEST 2 VIEWS  LOCATION: [facility]  DATE/TIME: 2/10/2023 2:58 PM    INDICATION: Coarse lung sounds on the left, cough, aspiration risk  COMPARISON: 05/14/2022.      Impression    IMPRESSION: No acute consolidation, pneumothorax or pleural effusion. Normal heart size and pulmonary vascularity. No significant change from 05/14/2022.   MR Brain w/o & w Contrast    Narrative    EXAM: MR BRAIN W/O and W CONTRAST  LOCATION: [facility]  DATE/TIME: 2/10/2023 4:55 PM    INDICATION: History of leukodystrophy, presenting with difficulty swallowing, cough, inability to walk, seems to have asymmetric right upper and lower extremity weakness on exam  COMPARISON: CT head 12/17/2022 and MRI brain 06/28/2022  CONTRAST: 7mL gadavist  TECHNIQUE: Routine multiplanar multisequence head MRI without and with intravenous contrast.    FINDINGS:  INTRACRANIAL CONTENTS: No acute or subacute infarct. No mass, acute hemorrhage, or extra-axial fluid collections. Unchanged appearance of abnormal, relatively symmetric T2/FLAIR hyperintensity involving the bilateral corticospinal tracts with inclusion   of the medial thalami, cerebral peduncles, and central emeka. There is also similar T2/FLAIR signal abnormality of the paramedian cerebellar hemispheres. These findings appear unchanged when compared with prior. No new foci of abnormality are identified.   Normal ventricles and sulci. Normal position of  the cerebellar tonsils. No pathologic contrast enhancement.    SELLA: No abnormality accounting for technique.    OSSEOUS STRUCTURES/SOFT TISSUES: Normal marrow signal. The major intracranial vascular flow voids are maintained.     ORBITS: No abnormality accounting for technique.     SINUSES/MASTOIDS: No paranasal sinus mucosal disease. No middle ear or mastoid effusion.       Impression    IMPRESSION:  1.  Negative for acute intracranial abnormality.  2.  Unchanged abnormal signal involving the bilateral corticospinal tracts. Differential considerations remain the same.   XR Video Swallow with SLP or OT    Narrative    EXAM: XR VIDEO SWALLOW WITH SLP OR OT  LOCATION: [facility]  DATE/TIME: 2/11/2023 10:45 AM    INDICATION: Difficulty swallowing.  COMPARISON: None.    TECHNIQUE: Routine swallow study with speech pathology using multiple barium thicknesses.    FINDINGS:   FLUOROSCOPIC TIME: 1.9  NUMBER OF IMAGES: 1 videofluoroscopic imaging series    Swallow study with Speech Pathology using multiple barium thicknesses, including thin consistency, slightly thickened consistency, mildly thick consistency and puree.    Variable laryngeal penetration occurs with thin, slightly thickened, and mildly thick consistencies. After the first swallow there is mild piriform stasis. Accumulation of barium in the piriforms on subsequent swallows results in moderate piriform stasis   and could predispose to spillover events. Variable stasis in the vallecula and piriforms with puree consistency. Due to repeated instances of penetration there is eventual barium resulting in coating of the laryngeal vestibule with globule noted   anteriorly at the level of the cords after the 9th trial and eventually extends below the level of the cords to the upper trachea. There was no spontaneous cough.    Multiple trials were then performed with various consistencies using a chin tuck maneuver. The chin tuck maneuver was  effective at reducing penetration with all consistencies.      Impression    IMPRESSION:    1.  Increased stasis of material within the piriform sinuses with subsequent swallows. Eventual laryngeal penetration with several different consistencies. The accumulation results in eventual coating of the vestibule and extension of barium to and   slightly below the level of the cords. There was no spontaneous cough.  2.  Chin tuck maneuver is effective at reducing the degree of stasis and no new instances of laryngeal penetration occurred during chin tuck.    Please see separately dictated report from speech pathology for additional description, analysis, and management recommendations.     *Note: Due to a large number of results and/or encounters for the requested time period, some results have not been displayed. A complete set of results can be found in Results Review.       Discharge Medications   Current Discharge Medication List      START taking these medications    Details   LORazepam (ATIVAN) 1 MG tablet Place 1 tablet (1 mg) under the tongue every 3 hours as needed for anxiety  Qty: 30 tablet, Refills: 0    Associated Diagnoses: Hospice care patient      morphine sulfate (ROXANOL) 20 mg/mL (HIGH CONC) soln Place 0.25 mLs (5 mg) under the tongue every hour as needed for moderate pain or shortness of breath (or prevention of moderate pain/dyspnea)  Qty: 15 mL, Refills: 0    Associated Diagnoses: Hospice care patient      prochlorperazine (COMPAZINE) 10 MG tablet Take 1 tablet (10 mg) by mouth every 6 hours as needed for vomiting  Qty: 30 tablet, Refills: 0    Associated Diagnoses: Hospice care patient         CONTINUE these medications which have NOT CHANGED    Details   acetaminophen (TYLENOL) 500 MG tablet Take 1-2 tablets (500-1,000 mg) by mouth every 8 hours as needed for mild pain (headaches)  Qty: 60 tablet, Refills: 1    Associated Diagnoses: Nonintractable headache, unspecified chronicity pattern,  unspecified headache type      albuterol (PROAIR HFA/PROVENTIL HFA/VENTOLIN HFA) 108 (90 Base) MCG/ACT inhaler Inhale 2 puffs into the lungs every 6 hours as needed for shortness of breath / dyspnea or wheezing  Qty: 18 g, Refills: 3    Comments: Pharmacy may dispense brand covered by insurance (Proair, or proventil or ventolin or generic albuterol inhaler)  Associated Diagnoses: Moderate persistent asthma without complication      baclofen (LIORESAL) 10 MG tablet Take 10 mg by mouth 2 times daily      docusate sodium (COLACE) 100 MG capsule Take 1 capsule (100 mg) by mouth daily as needed for constipation  Qty: 30 capsule, Refills: 1    Associated Diagnoses: Constipation, unspecified constipation type      famotidine (PEPCID) 20 MG tablet TAKE 1 TABLET BY MOUTH TWICE A DAY  Qty: 60 tablet, Refills: 11    Comments: Prescription   Associated Diagnoses: Heartburn      fludrocortisone (FLORINEF) 0.1 MG tablet Take 1.5 tablets (0.15 mg) by mouth daily  Qty: 135 tablet, Refills: 4    Associated Diagnoses: Hubbardston's disease (H)      fluticasone-salmeterol (ADVAIR HFA) 115-21 MCG/ACT inhaler Inhale 2 puffs into the lungs 2 times daily  Qty: 36 g, Refills: 3    Associated Diagnoses: Moderate persistent asthma without complication; Encounter for medication refill      hydrocortisone (CORTEF) 10 MG tablet Take 10 mg every morning, 5 mg (1/2 tab) at 2:00 PM and 5 mg at 5:00 PM daily  Qty: 60 tablet, Refills: 1    Associated Diagnoses: Adrenal insufficiency (H); Hubbardston's disease (H)      levothyroxine (SYNTHROID/LEVOTHROID) 75 MCG tablet Take 1 tablet (75 mcg) by mouth daily  Qty: 90 tablet, Refills: 4    Associated Diagnoses: Hypothyroidism, unspecified type      loratadine (CLARITIN) 10 MG tablet TAKE 1 TABLET BY MOUTH DAILY  Qty: 28 tablet, Refills: 0    Comments: Prescription   Associated Diagnoses: Heartburn      Multiple Vitamin (TAB-A-MAGALY) TABS TAKE 1 TABLET BY MOUTH DAILY  Qty: 28 tablet, Refills: 11     Comments: Refill Too Soon  Associated Diagnoses: Encounter for medication refill; Adrenal insufficiency (H)      Nutritional Supplements (ENSURE NUTRITION SHAKE) LIQD Take 1 Bottle by mouth 3 times daily  Qty: 82065 mL, Refills: 3    Associated Diagnoses: Swallowing dysfunction      OLANZapine (ZYPREXA) 5 MG tablet Take 1 tablet (5 mg) by mouth 2 times daily  Qty: 60 tablet, Refills: 1    Associated Diagnoses: Auditory hallucination      ondansetron (ZOFRAN) 4 MG tablet Take 1 tablet (4 mg) by mouth every 8 hours as needed for nausea  Qty: 10 tablet, Refills: 0    Associated Diagnoses: Nausea      pantoprazole (PROTONIX) 40 MG EC tablet Take 1 tablet (40 mg) by mouth every morning (before breakfast)  Qty: 30 tablet, Refills: 1    Associated Diagnoses: X linked adrenoleukodystrophy (H)      polyethylene glycol (MIRALAX) 17 GM/Dose powder Take 17 g by mouth daily  Qty: 510 g, Refills: 1    Associated Diagnoses: X linked adrenoleukodystrophy (H)      scopolamine (TRANSDERM) 1 MG/3DAYS 72 hr patch Place 1 patch onto the skin every 72 hours  Qty: 10 patch, Refills: 2    Associated Diagnoses: X linked adrenoleukodystrophy (H)      sennosides (SENOKOT) 8.6 MG tablet Take 1 tablet by mouth daily  Qty: 90 tablet, Refills: 1    Associated Diagnoses: X linked adrenoleukodystrophy (H)      traZODone (DESYREL) 50 MG tablet TAKE 1 TABLET (50 MG) BY MOUTH AT BEDTIME  Qty: 90 tablet, Refills: 0    Associated Diagnoses: Insomnia, unspecified type      vitamin B-12 (CYANOCOBALAMIN) 500 MCG tablet Take 1 tablet (500 mcg) by mouth daily  Qty: 90 tablet, Refills: 3    Associated Diagnoses: Neurodevelopmental disorder; Encounter for medication refill      vitamin D3 (CHOLECALCIFEROL) 50 mcg (2000 units) tablet TAKE 1 TABLET (50 MCG) BY MOUTH DAILY  Qty: 90 tablet, Refills: 3    Associated Diagnoses: Vitamin D deficiency           Allergies   No Known Allergies

## 2023-06-22 NOTE — PLAN OF CARE
Problem: Plan of Care - These are the overarching goals to be used throughout the patient stay.    Goal: Optimal Comfort and Wellbeing  Outcome: Progressing     Problem: Skin Injury Risk Increased  Goal: Skin Health and Integrity  Outcome: Progressing   Goal Outcome Evaluation:       Patient repositioned, mepilex sacrum for pea sized open wound.

## 2023-06-22 NOTE — PROGRESS NOTES
[facility] SW used a Angela  to speak to patients father, Joey. SW confimed the O2 was delivered and verified that Joye will be home for the rest of the night and will open the door.     Transportation is set for asap, approximately [id number].    Linda Alegria, [facility]  [phone]

## 2023-06-22 NOTE — ED TRIAGE NOTES
Pt BIBA f/home after previously discharged f/ P2. EMS reported family called because hospice was never set up (was suppose to be set up prior to discharge f/P2). EMS reported pt is nonverbal (this is baseline per family). HX: ALS, thyroid cancer, aspiration pneumonia    Wound noted on coccyx, mepilex dressing already applied.      Triage Assessment     Row Name 06/22/23 [id number]       Triage Assessment (Adult)    Airway WDL WDL       Respiratory WDL    Respiratory WDL X    Rhythm/Pattern, Respiratory shortness of breath       Skin Circulation/Temperature WDL    Skin Circulation/Temperature WDL WDL       Cardiac WDL    Cardiac WDL WDL       Peripheral/Neurovascular WDL    Peripheral Neurovascular WDL WDL

## 2023-06-22 NOTE — PROGRESS NOTES
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  In short patient has been reliant on family for cares due to him being bedridden from the above.  Family is not compliant with his thickened food and fluid diet and as a result patient has been aspirating and requested care elsewhere.  Sent in by hospice for inpatient hospice admission.  Here patient does note increasing shortness of breath and cough from his baseline.  He is slightly tachycardic, and does have an O2 requirement which is reportedly new.  It sounds like he is not on oxygen at baseline.     Case discussed with hospice social worker as well as hospice physician.  They discussed goals of care with patient this morning.  Though he does not have a POLST on file, it does not sound like he wanted to escalate any cares.  No plan for labs, imaging, antibiotics or fluids.  Will admit for inpatient hospice.\" SLP consulted per patient's wishes. Pt nodded head to pursuing VFSS.   General Observations Alert & cooperative. Nonverbal - comprehends and uses head turn or LUE to point when presented 2 options as well as head shake/nod for Y/N.       Present no  (per patient's preference)   Type of Evaluation   Type of Evaluation Swallow Evaluation   Oral Motor   Oral Musculature unable to assess due to poor participation/comprehension   Mucosal Quality sticky   Dentition (Oral Motor)   Dentition (Oral Motor) natural dentition;adequate dentition   Facial Symmetry (Oral " Motor)   Facial Symmetry (Oral Motor) bilateral impairment;involuntary movements present  (generalized weakness)   Bilateral Facial Asymmetry bilateral;moderate impairment  (reduced movements)   Lip Function (Oral Motor)   Lip Range of Motion (Oral Motor) retraction impairment   Retraction, Lip Range of Motion bilateral;moderate impairment  (minimal movements)   Tongue Function (Oral Motor)   Tongue ROM (Oral Motor) unable/difficult to assess   Jaw Function (Oral Motor)   Jaw Function (Oral Motor) unable/difficult to assess   Vocal Quality/Secretion Management (Oral Motor)   Vocal Quality (Oral Motor) not tested  (pt nonverbal)   General Swallowing Observations   Past History of Dysphagia Per EMR, previous VFSS completed on 2/11/23 with recommendation for puree and thin liquid by spoon only with chin tuck for all textures with no penetration or aspiration.   Comment, General Swallowing Observations Pt expressed interest in pursuing VFSS. He indicated that he was interested in liquids, but does not want to eat solids at this time. He expressed that he was interested in thickened liquids if eating other foods but also having thin water as he wishes.   Respiratory Support (General Swallowing Observations) nasal cannula   Current Diet/Method of Nutritional Intake (General Swallowing Observations, NIS) mildly thick liquids (level 2);pureed (level 4)   Swallowing Evaluation Videofluoroscopic swallow study (VFSS)   VFSS Evaluation   Radiologist Dr. Vizcaino   Views Taken left lateral   Physical Location of Procedure [facility]   VFSS Textures Trialed thin liquids;mildly thick liquids;moderately thick liquids/liquidized;pureed   VFSS Eval: Thin Liquid Texture Trial   Mode of Presentation, Thin Liquid spoon;fed by clinician   Order of Presentation 6 (with Chin tuck)   Preparatory Phase anterior loss of bolus;poor bolus control   Oral Phase, Thin Liquid impaired AP movement;premature pharyngeal entry   Bolus Location  When Swallow Triggered pyriforms  (pooling in pyriforms)   Pharyngeal Phase, Thin Liquid impaired epiglottic movement;impaired tongue base retraction;residue in vallecula;residue in pyriform sinus   Rosenbek's Penetration Aspiration Scale: Thin Liquid Trial Results 5 - contrast contacts vocal cords, visible residue remains (penetration)   Strategies and Compensations chin tuck  (unsuccessful)   Diagnostic Statement Penetration to cords with visible residue & undercoating of epiglottis. Mild vallecular and pyriform residue. Horizontal tilt of epiglottis   VFSS Eval: Mildly Thick Liquids   Mode of Presentation spoon;fed by clinician   Order of Presentation 1 (no chin tuck), 2 (chin tuck)   Preparatory Phase anterior loss of bolus;poor bolus control   Oral Phase impaired AP movement;premature pharyngeal entry   Bolus Location When Swallow Triggered pyriforms   Pharyngeal Phase impaired epiglottic movement;impaired tongue base retraction;residue in pyriform sinus   Rosenbek's Penetration Aspiration Scale 8 - contrast passes glottis, visible subglottic residue remains, absent patient response (aspiration)   Strategies and Compensations chin tuck  (attempted with no success and increased penetration depth)   Diagnostic Statement Horizontal tilt of epiglottis with mild pyriform residue. Mildly thickened liquid accumulated on vocal folds and below glottis, observed during moderately thickened liquid presentations. Chin tuck resulted in deeper penetration.   VFSS Eval: Moderately Thick Liquids   Mode of Presentation spoon;fed by clinician   Order of Presentation 3, 4, 5   Preparatory Phase poor bolus control   Oral Phase impaired AP movement;premature pharyngeal entry   Bolus Location When Swallow Triggered valleculae   Pharyngeal Phase impaired epiglottic movement;impaired tongue base retraction;residue in vallecula;residue in pyriform sinus   Rosenbek's Penetration Aspiration Scale 1 - no aspiration, contrast does not  enter airway   Diagnostic Statement Mild pyriform, vallecula, and pharyngeal wall residue. Does not fully clear between swallows, but falls to pool in vallecula and pyriforms.   VFSS Evaluation: Puree Solid Texture Trial   Mode of Presentation, Puree spoon;fed by clinician   Order of Presentation 7   Preparatory Phase poor bolus control;prolonged bolus preparation   Oral Phase, Puree impaired AP movement   Bolus Location When Swallow Triggered valleculae   Pharyngeal Phase, Puree impaired tongue base retraction;residue in vallecula;residue in pyriform sinus   Rosenbek's Penetration Aspiration Scale: Puree Food Trial Results 3 - contrast remains above the vocal cords, visible residue remains (penetration)  (suspected penetration of pharyngeal residue)   Diagnostic Statement No penetration or aspiration of puree. Mild pyriform and vallecular residue. Audible swallow.   Swallowing Recommendations   Diet Consistency Recommendations moderately thick liquids/liquidized (level 3);pureed (level 4);free water protocol   Mode of Delivery Recommendations liquids via spoon only;bolus size, small;no cups;no straws;slow rate of intake  (per patient's preferences)   Recommended Feeding/Eating Techniques (Swallow Eval) maintain upright sitting position for eating;provide assist with feeding;provide oral hygiene prior to intake   Medication Administration Recommendations, Swallowing (SLP) Per patient preference   Instrumental Assessment Recommendations   (completed VFSS 6/22/23)   Comment, Swallowing Recommendations Recommend pureed solids and moderately thickened liquids with free water protocol after good oral cares. Patient in agreement with recommendations and wants to pursue thickened liquids (per Y/N questions). Patient in agreement and understands that he is able to liberalize diet as he pleases.   General Therapy Interventions   Planned Therapy Interventions Dysphagia Treatment   Dysphagia treatment Modified diet  education;Instruction of safe swallow strategies;Compensatory strategies for swallowing   Clinical Impression   Criteria for Skilled Therapeutic Interventions Met (SLP Eval) Yes, treatment indicated   SLP Diagnosis Dysphagia   Risks & Benefits of therapy have been explained evaluation/treatment results reviewed;care plan/treatment goals reviewed;risks/benefits reviewed;current/potential barriers reviewed;participants voiced agreement with care plan;participants included;patient;father   Clinical Impression Comments Videofluoroscopic Swallow Study completed. Patient had deep penetration to vocal cords with thin and residue of mildly thickened liquids. Penetrated residue of mildly thickened liquids was observed accumulating on vocal folds with suspected aspiration during moderately thickened trials. Oral phase is slowed with poor bolus control and effortful AP movement. Tongue base retraction was reduced. Swallow response was delayed with liquids. Epiglottic inversion was incomplete with horizontal tilt for all liquid texture trials with complete epiglottic inversion with purees.  Audible swallow observed with purees. Mild stasis occurred with all textures. Hyolaryngeal elevation was WNL and hyolaryngeal excursion was WNL.  Mastication was not tested for safety. Cricopharyngeus was WNL. Recommend diet of pureed solids and moderately thickened liquids with strategies sitting upright, all PO provided by spoon, and with free water protocol. Patient in agreement with plan and expressed interest in pursuing modified diet with decreased interest in solids. Patient is able to liberalize diet (comfort care) as he wishes.   SLP Total Evaluation Time   Evaluation, videofluoroscopic eval of swallow function Minutes (04933) 10   SLP Goals   Therapy Frequency (SLP Eval) one time eval and treatment only   SLP Predicted Duration/Target Date for Goal Attainment 06/23/23   SLP Goals SLP Goal 1   SLP: Goal 1 Patient and family will  express understanding of VFSS results and education on thickening liquid & free water protocol, as patient wishes.   SLP Discharge Planning   SLP Plan Review VFSS results & educate on thickened liquids, free water protocol, and ability for pt to liberalize diet as he chooses.   SLP Discharge Recommendation   (per medical team)   SLP Rationale for DC Rec patient with comfort cares and requesting SLP to establish LRD with education   SLP Brief overview of current status  Recommend pureed solids and moderately thickened liquids BY SPOON with free water protocol (unthickened liquids between meals). Patient able to modify and liberalize diet as he wishes (comfort care).   Total Session Time   Total Session Time (sum of timed and untimed services) 10

## 2023-06-22 NOTE — PLAN OF CARE
Goal Outcome Evaluation:    Overall Patient Progress: no changeOverall Patient Progress: no change    Outcome Evaluation: Pt denied discomfort. Very thirsty last night and overnight. Family at bedside. It was an uneventful night.    BP 94/58 (BP Location: Left arm)   Pulse 104   Temp 98.2  F (36.8  C) (Oral)   Resp 16   SpO2 100%

## 2023-06-22 NOTE — PROGRESS NOTES
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  In short patient has been reliant on family for cares due to him being bedridden from the above.  Family is not compliant with his thickened food and fluid diet and as a result patient has been aspirating and requested care elsewhere.  Sent in by hospice for inpatient hospice admission.  Here patient does note increasing shortness of breath and cough from his baseline.  He is slightly tachycardic, and does have an O2 requirement which is reportedly new.  It sounds like he is not on oxygen at baseline.     Case discussed with hospice social worker as well as hospice physician.  They discussed goals of care with patient this morning.  Though he does not have a POLST on file, it does not sound like he wanted to escalate any cares.  No plan for labs, imaging, antibiotics or fluids.  Will admit for inpatient hospice.\" SLP consulted per patient's wishes. Pt nodded head to pursuing VFSS.   General Observations Alert & cooperative. Nonverbal. Pt's father at bedside.       Present yes   Language Angela (via [employer])   Type of Evaluation   Type of Evaluation Swallow Evaluation   Oral Motor   Oral Musculature unable to assess due to poor participation/comprehension   Mucosal Quality dry;sticky   Dentition (Oral Motor)   Dentition (Oral Motor) natural dentition;adequate dentition   Facial Symmetry (Oral Motor)   Facial Symmetry (Oral Motor) bilateral " impairment;involuntary movements present  (generalized weakness)   Bilateral Facial Asymmetry bilateral;moderate impairment  (reduced movements)   Lip Function (Oral Motor)   Lip Range of Motion (Oral Motor) retraction impairment   Retraction, Lip Range of Motion bilateral;moderate impairment  (minimal movements)   Tongue Function (Oral Motor)   Tongue Coordination/Speed (Oral Motor) unable/difficult to assess   Tongue ROM (Oral Motor) unable/difficult to assess  (pt shook head no when asked to lateralize, depress, and elevate tongue)   Jaw Function (Oral Motor)   Jaw Function (Oral Motor) unable/difficult to assess   Vocal Quality/Secretion Management (Oral Motor)   Vocal Quality (Oral Motor) not tested  (pt nonverbal)   Comment, Vocal Quality/Secretion Management (Oral Motor) Per RN notes, suctioning for comfort provided for secretions   General Swallowing Observations   Past History of Dysphagia Per EMR, previous VFSS completed on 2/11/23 with recommendation for puree and thin liquid by spoon only with chin tuck for all textures with no penetration or aspiration.   Comment, General Swallowing Observations RN reported coughing on liquids and minimal PO intake   Respiratory Support (General Swallowing Observations) nasal cannula  (5L per RN (% O2))   Current Diet/Method of Nutritional Intake (General Swallowing Observations, NIS) mildly thick liquids (level 2);pureed (level 4)   Swallowing Evaluation Clinical swallow evaluation   Clinical Swallow Evaluation   Feeding Assistance dependent   Additional evaluation(s) completed today Yes   Rationale for completing additional evaluation assess least restrictive diet per pt's wishes   Clinical Swallow Evaluation Textures Trialed thin liquids;pureed   Clinical Swallow Eval: Thin Liquid Texture Trial   Mode of Presentation, Thin Liquids spoon;fed by clinician   Volume of Liquid or Food Presented 3x ice chips; 3 tsp of water   Oral Phase of Swallow   (inefficient and  poor bolus control suspected)   Oral Residue right lip drooling   Pharyngeal Phase of Swallow coughing/choking;throat clearing;wet vocal quality after swallow   Diagnostic Statement Holding liquid with very limited hyolaryngeal elevation/excursion. Coughing/choking after delay after 3 tsp of water   Clinical Swallow Evaluation: Puree Solid Texture Trial   Mode of Presentation, Puree spoon;fed by clinician   Volume of Puree Presented 1 bite   Oral Phase, Puree   (inefficient and poor bolus control suspected)   Pharyngeal Phase, Puree repeated swallows  (audible swallow)   Diagnostic Statement Bolus holding with very limited hyolaryngeal elevation/excursion with audible swallow   Swallowing Recommendations   Diet Consistency Recommendations mildly thick liquids (level 2);pureed (level 4)   Mode of Delivery Recommendations bolus size, small;no straws;no cups;slow rate of intake   Monitoring/Assistance Required (Eating/Swallowing) stop eating activities when fatigue is present   Recommended Feeding/Eating Techniques (Swallow Eval) maintain upright sitting position for eating;moisten oral mucosa prior to intake;provide assist with feeding;provide oral hygiene prior to intake   Medication Administration Recommendations, Swallowing (SLP) Per patient preference   Instrumental Assessment Recommendations VFSS (videofluoroscopic swallowing study)  (per patient request)   Comment, Swallowing Recommendations Recommend pureed solids and mildly thickened liquids BY SPOON. Patient able to modify and liberalize diet as he wishes. Patient expressed (via Y/N questions, multiple times) interest in pursuing VFSS, thickened liquids with thin water as wanted.   General Therapy Interventions   Planned Therapy Interventions Dysphagia Treatment   Dysphagia treatment Modified diet education;Instruction of safe swallow strategies;Compensatory strategies for swallowing   Clinical Impression   Criteria for Skilled Therapeutic Interventions Met  (SLP Eval) Yes, treatment indicated   SLP Diagnosis Dysphagia   Risks & Benefits of therapy have been explained evaluation/treatment results reviewed;care plan/treatment goals reviewed;risks/benefits reviewed;current/potential barriers reviewed;participants voiced agreement with care plan;participants included;patient;father   Clinical Impression Comments Clinical Swallow Evaluation completed. SLP consulted per patient's wishes receiving comfort care, to establish the least restrictive diet. Patient had overt s/s aspiration with thin liquid, mildly thick liquid, and puree with a delayed, productive cough, which is unsure whether related to PO intake. Oral motor function was moderately impaired. Patient would shake his head no when asked to complete lingual ROM movements. Mastication was not tested due to safety. Hyolaryngeal elevation appears reduced upon visualization and palpation. Recommend diet of pureed solids and mildly thickened liquids with strategies of sitting upright and liquids by spoon only. Patient is able to liberalize diet as he wishes for quality of life. SLP to follow for establishing least restrictive diet, per patient's wishes and pt & family education.   SLP Total Evaluation Time   Eval: oral/pharyngeal swallow function, clinical swallow Minutes (34111) 20   SLP Discharge Planning   SLP Plan Complete VFSS   SLP Discharge Recommendation   (per medical team)   SLP Rationale for DC Rec patient with comfort cares and requesting SLP to establish LRD.   SLP Brief overview of current status  Recommend pureed solids and mildly thickened liquids BY SPOON. Patient able to modify and liberalize diet as he wishes. Patient expressed (via Y/N questions, multiple times) interest in pursuing VFSS, thickened liquids with thin water as wanted.   Total Session Time   Total Session Time (sum of timed and untimed services) 20

## 2023-06-22 NOTE — CONSULTS
Family called the ER and told us that [facility] has now delivered the oxygen so they are fine with patient coming home. I got ahold of Linda and she is calling for transportation again.

## 2023-06-22 NOTE — PROGRESS NOTES
I just left a message with Hospice coverage staff at [facility]: Madison Cunninghamoto [phone] and also Linda SILVA [phone]. Before I left messages Madison on [employer] also left messages, and teams messaged both of them with also no response. We need to know what to do with this patient of hospice.    He was on [facility] hospice and it was set up that he was to be seen at noon today 6/22/23 to sign onto [facility] hospice services. He has 5 family members including his father who are his caregivers. He is non-verbal at baseline. No family is present with him in the ER.

## 2023-06-22 NOTE — PROGRESS NOTES
"Care Management Initial Consult    General Information  Assessment completed with: -chart review,    Type of CM/SW Visit: Initial Assessment    Primary Care Provider verified and updated as needed:     Readmission within the last 30 days: previous discharge plan unsuccessful (6/20/23 - 6/22/23)   Return Category:  (hospice didn't have plan in place)  Reason for Consult: discharge planning, other (see comments) (oxygen)  Advance Care Planning: Advance Care Planning Reviewed: no concerns identified          Communication Assessment  Patient's communication style: spoken language (non-English) (speaks Angela - non-verbal at baseline)             Cognitive  Cognitive/Neuro/Behavioral:                        Living Environment:   People in home: parent(s), sibling(s)     Current living Arrangements: house      Able to return to prior arrangements: yes       Family/Social Support:  Care provided by: parent(s) (\"father is his PCA\")  Provides care for: no one, unable/limited ability to care for self     Parent(s), Children, PCA          Description of Support System: Supportive, Involved    Support Assessment: Adequate family and caregiver support, Adequate social supports    Current Resources:   Patient receiving home care services: No     Community Resources: County Worker, OP [facility], County Programs, PCA, DME (Critical access hospital worker from [facility], Mental health providers, Home Care-Skilled Nursing Services -med management)  Equipment currently used at home: wheelchair, manual, commode chair  Supplies currently used at home: Nutritional Supplements, Oxygen Tubing/Supplies ([facility] is setting up Home Oxygen)    Employment/Financial:  Employment Status: disabled        Financial Concerns:     Referral to Financial Worker: No       Does the patient's insurance plan have a 3 day qualifying hospital stay waiver?  No    Lifestyle & Psychosocial Needs:  Social Determinants of Health     Tobacco Use: Low Risk  " (5/4/2023)    Patient History      Smoking Tobacco Use: Never      Smokeless Tobacco Use: Never      Passive Exposure: Never   Alcohol Use: Not At Risk (1/12/2022)    AUDIT-C      Frequency of Alcohol Consumption: Never      Average Number of Drinks: Not on file      Frequency of Binge Drinking: Never   Financial Resource Strain: Medium Risk (1/12/2022)    Overall Financial Resource Strain (CARDIA)      Difficulty of Paying Living Expenses: Somewhat hard   Food Insecurity: No Food Insecurity (1/12/2022)    Hunger Vital Sign      Worried About Running Out of Food in the Last Year: Never true      Ran Out of Food in the Last Year: Never true   Transportation Needs: Unmet Transportation Needs (1/12/2022)    PRAPARE - Transportation      Lack of Transportation (Medical): Yes      Lack of Transportation (Non-Medical): No   Physical Activity: Insufficiently Active (1/12/2022)    Exercise Vital Sign      Days of Exercise per Week: 4 days      Minutes of Exercise per Session: 10 min   Stress: No Stress Concern Present (1/12/2022)    [employer] - Occupational Stress Questionnaire      Feeling of Stress : Only a little   Social Connections: Socially Isolated (1/12/2022)    Social Connection and Isolation Panel [NHANES]      Frequency of Communication with Friends and Family: Never      Frequency of Social Gatherings with Friends and Family: More than three times a week      Attends Mu-ism Services: Never      Active Member of Clubs or Organizations: No      Attends Club or Organization Meetings: Never      Marital Status: Never    Intimate Partner Violence: Not At Risk (1/12/2022)    Humiliation, Afraid, Rape, and Kick questionnaire      Fear of Current or Ex-Partner: No      Emotionally Abused: No      Physically Abused: No      Sexually Abused: No   Depression: At risk (2/10/2023)    PHQ-2      PHQ-2 Score: 4   Housing Stability: Low Risk  (1/12/2022)    Housing Stability Vital Sign       Unable to Pay for Housing in the Last Year: No      Number of Places Lived in the Last Year: 1      Unstable Housing in the Last Year: No       Functional Status:  Prior to admission patient needed assistance:   Dependent ADLs:: Bathing, Dressing, Eating, Grooming, Incontinence, Wheelchair-independent, Toileting, Transfers, Wheelchair-with assist  Dependent IADLs:: Cleaning, Cooking, Laundry, Shopping, Meal Preparation, Medication Management, Money Management, Transportation       Mental Health Status:          Chemical Dependency Status:                Values/Beliefs:  Spiritual, Cultural Beliefs, Uatsdin Practices, Values that affect care:                 Additional Information:  See below  Care Management Discharge Note    Discharge Date:  6/22/23       Discharge Disposition:  home    Discharge Services:  [facility], [facility]    Discharge DME:  Home O2 and other supplies already    Discharge Transportation: health plan transportation    Private pay costs discussed: transportation costs    Does the patient's insurance plan have a 3 day qualifying hospital stay waiver?  No    PAS Confirmation Code:    Patient/family educated on Medicare website which has current facility and service quality ratings:      Education Provided on the Discharge Plan:    Persons Notified of Discharge Plans: Brooke, sister at home, [facility]  Patient/Family in Agreement with the Plan:      Handoff Referral Completed: No    Additional Information:  Already discharged once today and was just in ER waiting for Oxygen at home. It is now in the house. So awaiting [facility] stretcher transport. Sister Kell is the one we are communicating with for the discharge planning.    Josephine Granados RN

## 2023-06-22 NOTE — CARE PLAN
Pt discharged to home with hospice via stretcher at 1140. Family/Pt educated on diet changes- thickened liquids and pureed. Also instructed on importance of not feeding pt if he refuses. Hospice to meet pt/family at home at 1200.

## 2023-12-28 NOTE — TELEPHONE ENCOUNTER
3-21-19  Called [facility] [phone] and spoke to scheduling staff.   Stated patient came in yesterday 3-20-19 for the testing and has upcoming on 4-3-19 at 9:30am for the feedback session.  Stated he will have another appt for the signing.  Thanked staff for his support.   Hospitalist Progress Note   Admit Date:  2023  4:53 AM   Name:  Shaista Santos   Age:  48 y.o. Sex:  female  :  1973   MRN:  [id number]   Room:  [facility]/    Presenting/Chief Complaint: Flank Pain     Reason(s) for Admission: Kidney stone [N20.0]  Nephrolithiasis [N20.0]  Septicemia [facility]) [A41.9]     Hospital Course:   48 y.o. female with medical history of tachycardia,who presented with right flank pain. The pain has been ongoing for the last 2 days but significantly worsened within the last 24 hours. Symptoms were associated with nausea but no vomiting. The pain is sharp, right flank located and non radiating. In the ED, HR was in the 160's, with Tmax of 102.5 noted. Labs showed K of 2.9, with UA positive for nitrite and leukocyte esterase. CT abdomen showed Obstructing right distal ureteral 5 mm stone with moderate to severe associated hydroureteronephrosis and perinephric stranding. Patient admitted for further management        Subjective & 24hr Events:   Patient was seen and examined at the bedside. No overnight events. Patient with fevers this morning. Overall feeling better over the last 24 hours. No new complaints. Assessment & Plan:   Kidney stone  UTI  -UA positive for nitrite and leukocyte esterase. -CT abdomen showed Obstructing right distal ureteral 5 mm stone with moderate to severe associated hydroureteronephrosis and perinephric stranding. -NPO   -Flomax  -morphine, Toradol for pain  -started on rocephin  -Urology consulted  - patient with fever this morning 102.9. Culture still pending  - Status post right ureteral stent placement for right distal ureteral stone  - Per urology patient will need outpatient follow-up with Dr. Guero Lopez next week     Hypokalemia  -K:2.9  -repleted     #Tachycardia  -On atenolol at home, resumed    PT/OT evals and PPD ordered? No  Diet:  ADULT DIET;  Regular  VTE prophylaxis: Lovenox  Code status: Full Code      Non-peripheral

## 2024-01-14 NOTE — TELEPHONE ENCOUNTER
I spoke with sravanthi  from [employer].    Patient has been scheduled with [employer] [phone] for a  time of 815 Will call  round trip.   TRIP ID#:  [id number]           
Patient is needing a ride for their appointment on:        Date: 2/22/2019  Time: 9:00am    Name of Location/Address/Phone #:   [address] [address]  PH: [phone]      Name of  patient is seeing & 's specialty:   Nathaly PATEL    Passenger (s):   2    Special considerations: None    Is the patient able to walk to the transportation vehicle?    Yes     Do they need DOOR to DOOR service? (company person comes knock on the door and walks them to car)     No    Please call patient back to confirm the ride details. Thanks!      
Ride has been cancelled for this appt     
Used Language Line/  Name: arvind   ID: [id number]      Patient is informed of the message and has no further questions.    Completing task.    
No

## 2024-07-22 ENCOUNTER — TELEPHONE (OUTPATIENT)
Dept: FAMILY MEDICINE | Facility: CLINIC | Age: 34
End: 2024-07-22
Payer: COMMERCIAL

## 2024-07-22 NOTE — TELEPHONE ENCOUNTER
Forms/Letter Request  Type of form/letter: OTHER: [employer]      Do we have the form/letter: Yes:     Who is the form from? [employer] (if other please explain)    Where did/will the form come from? form was faxed in    When is form/letter needed by: na    How would you like the form/letter returned: Fax : [phone]

## 2024-07-25 ENCOUNTER — MEDICAL CORRESPONDENCE (OUTPATIENT)
Dept: HEALTH INFORMATION MANAGEMENT | Facility: CLINIC | Age: 34
End: 2024-07-25
Payer: COMMERCIAL

## 2024-10-29 NOTE — PROGRESS NOTES
Problem: Discharge Planning  Goal: Discharge to home or other facility with appropriate resources  10/29/2024 1603 by Nathaly Spence RN  Outcome: Completed  10/29/2024 0302 by Harriet Alexander RN  Outcome: Progressing     Problem: Safety - Adult  Goal: Free from fall injury  10/29/2024 1603 by Nathaly Spence RN  Outcome: Completed  10/29/2024 0302 by Harriet Alexander RN  Outcome: Progressing     Problem: Pain  Goal: Verbalizes/displays adequate comfort level or baseline comfort level  10/29/2024 1603 by Nathaly Spence RN  Outcome: Completed  10/29/2024 0302 by Harriet Alexander RN  Outcome: Progressing     Problem: Skin/Tissue Integrity  Goal: Absence of new skin breakdown  10/29/2024 1603 by Nathaly Spence RN  Outcome: Completed  10/29/2024 0302 by Harriet Alexander RN  Outcome: Progressing     Problem: ABCDS Injury Assessment  Goal: Absence of physical injury  10/29/2024 1603 by Nathaly Spence RN  Outcome: Completed  10/29/2024 0302 by Harriet Alexander RN  Outcome: Progressing     Problem: Respiratory - Adult  Goal: Achieves optimal ventilation and oxygenation  10/29/2024 1603 by Nathaly Spence RN  Outcome: Completed  10/29/2024 0815 by Terrie Anthony RCP  Outcome: Progressing  10/29/2024 0302 by Harriet Alexander RN  Outcome: Progressing      Toe is a 30 year old who is being evaluated via a billable telephone visit.      What phone number would you like to be contacted at? [phone]    How would you like to obtain your AVS? Mail a copy    Rola Nix MA

## 2025-01-22 NOTE — PROGRESS NOTES
1/20/2022  Clinic Care Coordination Contact  Care Team Conversations    Check [place]    SUBSCRIBER INFORMATION  Date of Service Subscriber ID Subscriber Name Birthdate Age Gender  01[birth year]20[birth year]2022 [id number] TOE T [address] [birth year] MALE     Address  [address] , [address] , [place]  [address]     PROVIDER INFORMATION  Provider ID Submitter Transaction ID Provider Name Taxonomy Code Qualifier Taxonomy Code  [id number] xx [facility]  This subscriber has eligibility for MA: Medical Assistance.  Elig Type AX: MA Early Expansion  Eligibility Begin Date: 06/01/2017  Eligibility End Date: --/--/----  This subscriber is eligible for the following service types: Medical Care ,  Chiropractic ,  Dental Care ,  Hospital ,  Hospital - Inpatient ,  Hospital - Outpatient ,  Emergency Services ,  Pharmacy ,  Professional (Physician) Visit - Office ,  Vision (Optometry) ,  Mental Health ,  Urgent Care  Prepaid Health Plan    This subscriber receives MA12 - Prepaid Medical Assistance Program (PMAP) delivered through [employer]. The phone number is [phone].    Other Eligibility Information    No Special Transportation.    No Hospice.    Refer to Health Care Programs and Services Overview of the [facility] Manual for a list of covered services.    Waivers  None    Subscriber Responsibility Information  An office visit copay of 3 dollars may exist for this subscriber.  A copay of 3.50 dollars for Non-Emergency ER visits may exist for this subscriber.  Restricted Recipient Program  None     Medicare  None     Date of Service:  01/21/2025     I discussed with the patient the rationale for immunizations and suggested [facility].  We discussed the rationale for shingles, and pneumococcal vaccine is recommended.    The patient is on Mounjaro.  His weight is down 36 pounds.  His A1c has improved.  He is no longer taking metformin.  He will continue to watch his intake.  We also discussed remaining active.  He is to be aware that he could lose muscle mass along with fatty tissue.    He does have a history of thyroid nodule.  He is scheduled for an ultrasound and followup with endocrinology team in April.    We also discussed his history of hypertension.  He is not taking the previously prescribed metoprolol as his blood pressure seems to be appropriate.  He is on the valsartan, which we will plan on continuing for now.    We also discussed his history of notable coronary plaque.  He did have a cardiac CT scan.  States that he was told that they had thought that perhaps the abnormality noted on the previous cardiac scan was related to not being able to visualize the area well.  However, I did point out that the catheterization did show evidence of mild diffuse plaque which can worsen over time and I have strongly recommended he continue with the cholesterol-lowering medication.  We did review his previous labs.  I did point out that his lipoprotein A level was elevated.  This is difficult to treat except for the use of a statin, which I have encouraged to continue.    We did [facility], which indicates his PSA was in the normal range.  His bilirubin was also normal at 0.6, and liver tests were normal as was the creatinine.  The patient does have issues with his feet and states that he felt like he sort of tore something in his plantar aspect of his right foot.  I discussed that he is diabetic, and this is something we should follow closely as I  am concerned he could develop a Charcot or perhaps rocker bottom type foot.    The patient also has ongoing pain in the base of his wrist and thumb.  We discussed following up with our hand team to look into this further.    The patient has not had an eye exam in some time, which I strongly encouraged him to schedule.    We discussed the rationale for urinalysis as he is a diabetic, but also with his hypertension.  With analysis, a microalbumin will be ordered at this time.    We also reviewed that he has a history of erectile dysfunction, uses Viagra at times.  He is less active than he was in the past.  Requests a refill as he has no side effects.  Medication was sent and encouraged to check for a coupon as well.    We also discussed his history of sleep apnea.  He has not been using the machine and states he does not always sleep well.  We also discussed that I suspect that some of this may be related to sleep hygiene issues, but I would recommend he follow up with the sleep team to discuss this further.  We will attempt to assist him in scheduling with Dr. Ferrer.        Dictated By:  Brian Lebron MD  Signing Provider:  MD DUDLEY Tam/AQT   D:  01/21/2025 12:52:46 PM  T:  01/21/2025 07:02:11 PM  Job:  [id number]  CC:  Dr. Ferrer

## (undated) RX ORDER — LIDOCAINE HYDROCHLORIDE 10 MG/ML
INJECTION, SOLUTION EPIDURAL; INFILTRATION; INTRACAUDAL; PERINEURAL
Status: DISPENSED
Start: 2022-05-12